# Patient Record
Sex: MALE | Race: WHITE | Employment: FULL TIME | URBAN - METROPOLITAN AREA
[De-identification: names, ages, dates, MRNs, and addresses within clinical notes are randomized per-mention and may not be internally consistent; named-entity substitution may affect disease eponyms.]

---

## 2017-01-30 ENCOUNTER — TELEPHONE (OUTPATIENT)
Dept: CARDIOLOGY CLINIC | Age: 57
End: 2017-01-30

## 2017-03-01 ENCOUNTER — TELEPHONE (OUTPATIENT)
Dept: CARDIOLOGY CLINIC | Age: 57
End: 2017-03-01

## 2017-03-08 ENCOUNTER — OFFICE VISIT (OUTPATIENT)
Dept: ORTHOPEDIC SURGERY | Age: 57
End: 2017-03-08

## 2017-03-08 VITALS
WEIGHT: 205 LBS | HEART RATE: 68 BPM | HEIGHT: 71 IN | BODY MASS INDEX: 28.7 KG/M2 | DIASTOLIC BLOOD PRESSURE: 63 MMHG | SYSTOLIC BLOOD PRESSURE: 109 MMHG

## 2017-03-08 DIAGNOSIS — M54.50 ACUTE LOW BACK PAIN WITHOUT SCIATICA, UNSPECIFIED BACK PAIN LATERALITY: Primary | ICD-10-CM

## 2017-03-08 DIAGNOSIS — M51.36 DDD (DEGENERATIVE DISC DISEASE), LUMBAR: ICD-10-CM

## 2017-03-08 DIAGNOSIS — M53.3 COCCYGODYNIA: ICD-10-CM

## 2017-03-08 DIAGNOSIS — M53.3 COCCYXDYNIA: ICD-10-CM

## 2017-03-08 DIAGNOSIS — M62.9 HAMSTRING TIGHTNESS OF BOTH LOWER EXTREMITIES: ICD-10-CM

## 2017-03-08 DIAGNOSIS — M53.3 SACROILIAC PAIN: ICD-10-CM

## 2017-03-08 PROBLEM — M51.369 DDD (DEGENERATIVE DISC DISEASE), LUMBAR: Status: ACTIVE | Noted: 2017-03-08

## 2017-03-08 PROCEDURE — 99203 OFFICE O/P NEW LOW 30 MIN: CPT | Performed by: PHYSICAL MEDICINE & REHABILITATION

## 2017-03-08 PROCEDURE — 72220 X-RAY EXAM SACRUM TAILBONE: CPT | Performed by: PHYSICAL MEDICINE & REHABILITATION

## 2017-03-08 PROCEDURE — 72100 X-RAY EXAM L-S SPINE 2/3 VWS: CPT | Performed by: PHYSICAL MEDICINE & REHABILITATION

## 2017-03-08 RX ORDER — PREDNISONE 10 MG/1
TABLET ORAL
Qty: 26 TABLET | Refills: 0 | Status: SHIPPED | OUTPATIENT
Start: 2017-03-08 | End: 2017-04-07

## 2017-04-07 ENCOUNTER — OFFICE VISIT (OUTPATIENT)
Dept: FAMILY MEDICINE CLINIC | Age: 57
End: 2017-04-07

## 2017-04-07 VITALS
DIASTOLIC BLOOD PRESSURE: 70 MMHG | SYSTOLIC BLOOD PRESSURE: 110 MMHG | BODY MASS INDEX: 29.63 KG/M2 | WEIGHT: 207 LBS | HEIGHT: 70 IN

## 2017-04-07 DIAGNOSIS — K21.9 GASTROESOPHAGEAL REFLUX DISEASE WITHOUT ESOPHAGITIS: ICD-10-CM

## 2017-04-07 DIAGNOSIS — R25.1 TREMOR: ICD-10-CM

## 2017-04-07 DIAGNOSIS — R53.83 FATIGUE, UNSPECIFIED TYPE: ICD-10-CM

## 2017-04-07 DIAGNOSIS — I42.2 HYPERTROPHIC CARDIOMYOPATHY (HCC): ICD-10-CM

## 2017-04-07 DIAGNOSIS — R73.9 ELEVATED BLOOD SUGAR: ICD-10-CM

## 2017-04-07 DIAGNOSIS — E78.5 HYPERLIPIDEMIA, UNSPECIFIED HYPERLIPIDEMIA TYPE: Primary | ICD-10-CM

## 2017-04-07 LAB
A/G RATIO: 2 (ref 1.1–2.2)
ALBUMIN SERPL-MCNC: 4.1 G/DL (ref 3.4–5)
ALP BLD-CCNC: 80 U/L (ref 40–129)
ALT SERPL-CCNC: 28 U/L (ref 10–40)
ANION GAP SERPL CALCULATED.3IONS-SCNC: 13 MMOL/L (ref 3–16)
AST SERPL-CCNC: 19 U/L (ref 15–37)
BILIRUB SERPL-MCNC: 0.6 MG/DL (ref 0–1)
BUN BLDV-MCNC: 21 MG/DL (ref 7–20)
CALCIUM SERPL-MCNC: 9.2 MG/DL (ref 8.3–10.6)
CHLORIDE BLD-SCNC: 107 MMOL/L (ref 99–110)
CHOLESTEROL, TOTAL: 157 MG/DL (ref 0–199)
CO2: 26 MMOL/L (ref 21–32)
CREAT SERPL-MCNC: 1 MG/DL (ref 0.9–1.3)
GFR AFRICAN AMERICAN: >60
GFR NON-AFRICAN AMERICAN: >60
GLOBULIN: 2.1 G/DL
GLUCOSE BLD-MCNC: 104 MG/DL (ref 70–99)
HDLC SERPL-MCNC: 40 MG/DL (ref 40–60)
LDL CHOLESTEROL CALCULATED: 99 MG/DL
POTASSIUM SERPL-SCNC: 4.7 MMOL/L (ref 3.5–5.1)
SODIUM BLD-SCNC: 146 MMOL/L (ref 136–145)
TOTAL PROTEIN: 6.2 G/DL (ref 6.4–8.2)
TRIGL SERPL-MCNC: 88 MG/DL (ref 0–150)
TSH SERPL DL<=0.05 MIU/L-ACNC: 2.37 UIU/ML (ref 0.27–4.2)
VITAMIN D 25-HYDROXY: 33.1 NG/ML
VLDLC SERPL CALC-MCNC: 18 MG/DL

## 2017-04-07 PROCEDURE — 99214 OFFICE O/P EST MOD 30 MIN: CPT | Performed by: FAMILY MEDICINE

## 2017-04-07 PROCEDURE — 36415 COLL VENOUS BLD VENIPUNCTURE: CPT | Performed by: FAMILY MEDICINE

## 2017-04-07 RX ORDER — ATORVASTATIN CALCIUM 20 MG/1
TABLET, FILM COATED ORAL
Qty: 30 TABLET | Refills: 6 | Status: SHIPPED | OUTPATIENT
Start: 2017-04-07 | End: 2018-03-19 | Stop reason: SDUPTHER

## 2017-04-08 LAB
ESTIMATED AVERAGE GLUCOSE: 122.6 MG/DL
HBA1C MFR BLD: 5.9 %

## 2017-05-26 ENCOUNTER — OFFICE VISIT (OUTPATIENT)
Dept: CARDIOLOGY CLINIC | Age: 57
End: 2017-05-26

## 2017-05-26 VITALS
WEIGHT: 206 LBS | HEART RATE: 62 BPM | DIASTOLIC BLOOD PRESSURE: 68 MMHG | BODY MASS INDEX: 29.49 KG/M2 | SYSTOLIC BLOOD PRESSURE: 106 MMHG | HEIGHT: 70 IN

## 2017-05-26 DIAGNOSIS — I48.19 PERSISTENT ATRIAL FIBRILLATION (HCC): Primary | ICD-10-CM

## 2017-05-26 PROCEDURE — 99214 OFFICE O/P EST MOD 30 MIN: CPT | Performed by: INTERNAL MEDICINE

## 2017-05-26 PROCEDURE — 93000 ELECTROCARDIOGRAM COMPLETE: CPT | Performed by: INTERNAL MEDICINE

## 2017-05-26 RX ORDER — LANOLIN ALCOHOL/MO/W.PET/CERES
500 CREAM (GRAM) TOPICAL NIGHTLY
COMMUNITY
End: 2017-09-26 | Stop reason: SINTOL

## 2017-06-06 RX ORDER — OMEPRAZOLE 40 MG/1
40 CAPSULE, DELAYED RELEASE ORAL DAILY
Qty: 90 CAPSULE | Refills: 1 | Status: SHIPPED | OUTPATIENT
Start: 2017-06-06 | End: 2017-12-15 | Stop reason: SDUPTHER

## 2017-06-19 ENCOUNTER — TELEPHONE (OUTPATIENT)
Dept: CARDIOLOGY CLINIC | Age: 57
End: 2017-06-19

## 2017-06-20 ENCOUNTER — TELEPHONE (OUTPATIENT)
Dept: CARDIOLOGY CLINIC | Age: 57
End: 2017-06-20

## 2017-07-03 PROCEDURE — 93272 ECG/REVIEW INTERPRET ONLY: CPT | Performed by: INTERNAL MEDICINE

## 2017-07-18 DIAGNOSIS — I49.3 PVC (PREMATURE VENTRICULAR CONTRACTION): ICD-10-CM

## 2017-07-18 DIAGNOSIS — I45.10 RIGHT BUNDLE BRANCH BLOCK: Primary | ICD-10-CM

## 2017-07-26 ENCOUNTER — OFFICE VISIT (OUTPATIENT)
Dept: ORTHOPEDIC SURGERY | Age: 57
End: 2017-07-26

## 2017-07-26 VITALS
HEART RATE: 58 BPM | SYSTOLIC BLOOD PRESSURE: 137 MMHG | DIASTOLIC BLOOD PRESSURE: 86 MMHG | BODY MASS INDEX: 29.48 KG/M2 | WEIGHT: 205.91 LBS | HEIGHT: 70 IN

## 2017-07-26 DIAGNOSIS — S16.1XXA CERVICAL STRAIN, INITIAL ENCOUNTER: Primary | ICD-10-CM

## 2017-07-26 DIAGNOSIS — M51.36 DDD (DEGENERATIVE DISC DISEASE), LUMBAR: ICD-10-CM

## 2017-07-26 DIAGNOSIS — S29.019A THORACIC MYOFASCIAL STRAIN, INITIAL ENCOUNTER: ICD-10-CM

## 2017-07-26 DIAGNOSIS — S39.012A LUMBAR STRAIN, INITIAL ENCOUNTER: ICD-10-CM

## 2017-07-26 PROCEDURE — 72040 X-RAY EXAM NECK SPINE 2-3 VW: CPT | Performed by: PHYSICAL MEDICINE & REHABILITATION

## 2017-07-26 PROCEDURE — 72070 X-RAY EXAM THORAC SPINE 2VWS: CPT | Performed by: PHYSICAL MEDICINE & REHABILITATION

## 2017-07-26 PROCEDURE — 72100 X-RAY EXAM L-S SPINE 2/3 VWS: CPT | Performed by: PHYSICAL MEDICINE & REHABILITATION

## 2017-07-26 PROCEDURE — 99213 OFFICE O/P EST LOW 20 MIN: CPT | Performed by: PHYSICAL MEDICINE & REHABILITATION

## 2017-07-26 RX ORDER — MELOXICAM 15 MG/1
TABLET ORAL
Qty: 30 TABLET | Refills: 1 | Status: SHIPPED | OUTPATIENT
Start: 2017-07-26 | End: 2017-12-20

## 2017-07-28 ENCOUNTER — HOSPITAL ENCOUNTER (OUTPATIENT)
Dept: PHYSICAL THERAPY | Age: 57
Discharge: HOME | End: 2017-07-31
Admitting: PHYSICAL MEDICINE & REHABILITATION

## 2017-08-02 ENCOUNTER — HOSPITAL ENCOUNTER (OUTPATIENT)
Dept: PHYSICAL THERAPY | Age: 57
Discharge: HOME | End: 2017-08-02
Admitting: PHYSICAL MEDICINE & REHABILITATION

## 2017-08-04 ENCOUNTER — HOSPITAL ENCOUNTER (OUTPATIENT)
Dept: PHYSICAL THERAPY | Age: 57
Discharge: HOME | End: 2017-08-04
Admitting: PHYSICAL MEDICINE & REHABILITATION

## 2017-08-08 ENCOUNTER — HOSPITAL ENCOUNTER (OUTPATIENT)
Dept: PHYSICAL THERAPY | Age: 57
Discharge: HOME | End: 2017-08-08
Admitting: PHYSICAL MEDICINE & REHABILITATION

## 2017-08-08 ENCOUNTER — TELEPHONE (OUTPATIENT)
Dept: FAMILY MEDICINE CLINIC | Age: 57
End: 2017-08-08

## 2017-08-09 NOTE — TELEPHONE ENCOUNTER
Let patient know that I reviewed the PT notes. From his description it sounds like a muscle spasm in the abdominal muscles.  Continue with physical therapy and follow-up with Ortho or here if symptoms worsen

## 2017-08-11 ENCOUNTER — HOSPITAL ENCOUNTER (OUTPATIENT)
Dept: PHYSICAL THERAPY | Age: 57
Discharge: HOME | End: 2017-08-11
Admitting: PHYSICAL MEDICINE & REHABILITATION

## 2017-08-15 ENCOUNTER — HOSPITAL ENCOUNTER (OUTPATIENT)
Dept: PHYSICAL THERAPY | Age: 57
Discharge: HOME | End: 2017-08-15
Admitting: PHYSICAL MEDICINE & REHABILITATION

## 2017-08-18 ENCOUNTER — HOSPITAL ENCOUNTER (OUTPATIENT)
Dept: PHYSICAL THERAPY | Age: 57
Discharge: HOME | End: 2017-08-18
Admitting: PHYSICAL MEDICINE & REHABILITATION

## 2017-08-22 ENCOUNTER — HOSPITAL ENCOUNTER (OUTPATIENT)
Dept: PHYSICAL THERAPY | Age: 57
Discharge: HOME | End: 2017-08-22
Admitting: PHYSICAL MEDICINE & REHABILITATION

## 2017-08-24 ENCOUNTER — OFFICE VISIT (OUTPATIENT)
Dept: FAMILY MEDICINE CLINIC | Age: 57
End: 2017-08-24

## 2017-08-24 ENCOUNTER — TELEPHONE (OUTPATIENT)
Dept: CARDIOLOGY CLINIC | Age: 57
End: 2017-08-24

## 2017-08-24 VITALS
OXYGEN SATURATION: 96 % | BODY MASS INDEX: 29.2 KG/M2 | TEMPERATURE: 97.8 F | DIASTOLIC BLOOD PRESSURE: 74 MMHG | WEIGHT: 204 LBS | HEIGHT: 70 IN | HEART RATE: 60 BPM | SYSTOLIC BLOOD PRESSURE: 122 MMHG

## 2017-08-24 DIAGNOSIS — R23.2 FLUSHING: ICD-10-CM

## 2017-08-24 DIAGNOSIS — T50.905A ADVERSE EFFECT DUE TO CORRECT MEDICINAL SUBSTANCE, PROPERLY GIVEN, INITIAL ENCOUNTER: Primary | ICD-10-CM

## 2017-08-24 DIAGNOSIS — L29.9 PRURITUS: ICD-10-CM

## 2017-08-24 PROCEDURE — 99213 OFFICE O/P EST LOW 20 MIN: CPT | Performed by: PHYSICIAN ASSISTANT

## 2017-08-25 ENCOUNTER — HOSPITAL ENCOUNTER (OUTPATIENT)
Dept: PHYSICAL THERAPY | Age: 57
Discharge: HOME | End: 2017-08-25
Admitting: PHYSICAL MEDICINE & REHABILITATION

## 2017-08-29 ENCOUNTER — HOSPITAL ENCOUNTER (OUTPATIENT)
Dept: PHYSICAL THERAPY | Age: 57
Discharge: HOME | End: 2017-08-29
Admitting: PHYSICAL MEDICINE & REHABILITATION

## 2017-09-05 ENCOUNTER — HOSPITAL ENCOUNTER (OUTPATIENT)
Dept: PHYSICAL THERAPY | Age: 57
Discharge: HOME | End: 2017-09-05
Admitting: PHYSICAL MEDICINE & REHABILITATION

## 2017-09-08 ENCOUNTER — HOSPITAL ENCOUNTER (OUTPATIENT)
Dept: PHYSICAL THERAPY | Age: 57
Discharge: HOME | End: 2017-09-08
Admitting: PHYSICAL MEDICINE & REHABILITATION

## 2017-09-15 ENCOUNTER — HOSPITAL ENCOUNTER (OUTPATIENT)
Dept: PHYSICAL THERAPY | Age: 57
Discharge: HOME | End: 2017-09-15
Admitting: PHYSICAL MEDICINE & REHABILITATION

## 2017-09-22 ENCOUNTER — HOSPITAL ENCOUNTER (OUTPATIENT)
Dept: PHYSICAL THERAPY | Age: 57
Discharge: HOME | End: 2017-09-22
Admitting: PHYSICAL MEDICINE & REHABILITATION

## 2017-09-26 ENCOUNTER — OFFICE VISIT (OUTPATIENT)
Dept: CARDIOLOGY CLINIC | Age: 57
End: 2017-09-26

## 2017-09-26 ENCOUNTER — TELEPHONE (OUTPATIENT)
Dept: CARDIOLOGY | Age: 57
End: 2017-09-26

## 2017-09-26 VITALS
HEIGHT: 70 IN | HEART RATE: 64 BPM | BODY MASS INDEX: 28.63 KG/M2 | WEIGHT: 200 LBS | SYSTOLIC BLOOD PRESSURE: 94 MMHG | DIASTOLIC BLOOD PRESSURE: 62 MMHG

## 2017-09-26 DIAGNOSIS — I42.2 HYPERTROPHIC CARDIOMYOPATHY (HCC): ICD-10-CM

## 2017-09-26 DIAGNOSIS — I48.0 PAROXYSMAL ATRIAL FIBRILLATION (HCC): Primary | ICD-10-CM

## 2017-09-26 DIAGNOSIS — I42.1 HYPERTROPHIC OBSTRUCTIVE CARDIOMYOPATHY (HCC): Primary | ICD-10-CM

## 2017-09-26 DIAGNOSIS — I45.10 RIGHT BUNDLE BRANCH BLOCK: ICD-10-CM

## 2017-09-26 PROCEDURE — 99214 OFFICE O/P EST MOD 30 MIN: CPT | Performed by: NURSE PRACTITIONER

## 2017-09-26 PROCEDURE — 93000 ELECTROCARDIOGRAM COMPLETE: CPT | Performed by: NURSE PRACTITIONER

## 2017-09-26 RX ORDER — ARMODAFINIL 150 MG/1
150 TABLET ORAL DAILY
COMMUNITY
End: 2017-12-20

## 2017-10-17 ENCOUNTER — HOSPITAL ENCOUNTER (OUTPATIENT)
Dept: NON INVASIVE DIAGNOSTICS | Age: 57
Discharge: HOME | End: 2017-10-17
Attending: NURSE PRACTITIONER | Admitting: NURSE PRACTITIONER

## 2017-10-17 ENCOUNTER — TELEPHONE (OUTPATIENT)
Dept: CARDIOLOGY CLINIC | Age: 57
End: 2017-10-17

## 2017-10-17 DIAGNOSIS — I42.1 OBSTRUCTIVE HYPERTROPHIC CARDIOMYOPATHY (HCC): ICD-10-CM

## 2017-10-17 LAB
LV EF: 58 %
LVEF MODALITY: NORMAL

## 2017-10-17 NOTE — TELEPHONE ENCOUNTER
Pt states that he discussed starting amiodarone with npbb at last ov. States he did some research on his own and would like to discuss with npbb or rps. Please call pt.

## 2017-10-18 NOTE — TELEPHONE ENCOUNTER
Dr. Apple Ware - Patient discussed the Amio with Paty Borges but wants to discuss in more detail with you. Mobile number [phone].

## 2017-10-19 RX ORDER — AMIODARONE HYDROCHLORIDE 200 MG/1
200 TABLET ORAL DAILY
Qty: 30 TABLET | Refills: 3 | Status: SHIPPED | OUTPATIENT
Start: 2017-10-19 | End: 2017-12-07 | Stop reason: SDUPTHER

## 2017-10-19 NOTE — TELEPHONE ENCOUNTER
10-19-17  at [phone] informing pt to call back. Pt can be scheduled with Mari Zavala as a new pt per RPS tomorrow 10-20-17 at 8a.

## 2017-10-19 NOTE — TELEPHONE ENCOUNTER
I spoke with pt, he stated he was not of the understanding that another physician would be doing a procedure on him other than RPS. I informed the pt that according to RPS message that RPS informed the pt that he needs a R/LHC with . Pt said he was not told this info. Pt is willing to see Christopher Juarez but cannot make any time of than first thing in the morning tomorrow due to work. Please ask Christopher Juarez if he is willing to see the pt at 730a or 8a or a time that works for Christopher Juarez. I informed the pt that our office will call him back with rosie at [phone].

## 2017-10-20 ENCOUNTER — OFFICE VISIT (OUTPATIENT)
Dept: CARDIOLOGY CLINIC | Age: 57
End: 2017-10-20

## 2017-10-20 ENCOUNTER — TELEPHONE (OUTPATIENT)
Dept: CARDIOLOGY CLINIC | Age: 57
End: 2017-10-20

## 2017-10-20 VITALS
DIASTOLIC BLOOD PRESSURE: 72 MMHG | BODY MASS INDEX: 28.77 KG/M2 | SYSTOLIC BLOOD PRESSURE: 118 MMHG | WEIGHT: 201 LBS | HEIGHT: 70 IN | HEART RATE: 62 BPM

## 2017-10-20 DIAGNOSIS — E78.2 MIXED HYPERLIPIDEMIA: ICD-10-CM

## 2017-10-20 DIAGNOSIS — I42.2 HYPERTROPHIC CARDIOMYOPATHY (HCC): Primary | ICD-10-CM

## 2017-10-20 DIAGNOSIS — G47.33 OBSTRUCTIVE SLEEP APNEA: ICD-10-CM

## 2017-10-20 DIAGNOSIS — K21.9 GASTROESOPHAGEAL REFLUX DISEASE WITHOUT ESOPHAGITIS: ICD-10-CM

## 2017-10-20 DIAGNOSIS — I48.0 PAROXYSMAL ATRIAL FIBRILLATION (HCC): ICD-10-CM

## 2017-10-20 DIAGNOSIS — I45.10 RIGHT BUNDLE BRANCH BLOCK: ICD-10-CM

## 2017-10-20 PROCEDURE — 99214 OFFICE O/P EST MOD 30 MIN: CPT | Performed by: INTERNAL MEDICINE

## 2017-10-20 NOTE — PATIENT INSTRUCTIONS
Recommendation:  - I reviewed his echo. I did not see any speckled appearance that was reported on the echocardiogram. He has known non-obstructive cardiomyopathy which has been managed with Verapamil. He has no risk factors that will predispose to sudden cardiac cath. He does have late enchancement on cardiac MRI which has been suggested as a potential tie-breaker in ICD decision making. However, as patients have late gadolinium enhancement and statistical power has been limited by low SCD event rates, the incremental information provided by late gadolinium enhancement is not yet clear. - I will obtain a follow up cardiac MRI. - He has atrial fibrillation which is managed by EP (Dr. Apple Ware).

## 2017-10-20 NOTE — PROGRESS NOTES
Seasonal allergies; Sleep apnea; and Tachycardia. Surgical History:   has a past surgical history that includes Knee arthroscopy (Right, 1982); Umbilical hernia repair; Colonoscopy (1/17/11); Incisional hernia repair; TURP (2012); Inguinal hernia repair (Bilateral, 2009, 2012,); Varicocelectomy (2000); Shoulder arthroscopy (Right, 12/31/2013); Endoscopy, colon, diagnostic; Upper gastrointestinal endoscopy (9/3); Elbow surgery (2015); and Wrist surgery. Social History:   reports that he has never smoked. He has never used smokeless tobacco. He reports that he drinks alcohol. He reports that he does not use drugs. Family History:  family history includes Cancer in his mother; Heart Disease in his maternal grandfather and maternal grandmother; High Blood Pressure in his father; High Cholesterol in his brother and mother; Prostate Cancer in his paternal uncle. Home Medications:  Reviewed and are listed in nursing record. and/or listed below  Current Outpatient Prescriptions   Medication Sig Dispense Refill    amiodarone (CORDARONE) 200 MG tablet Take 1 tablet by mouth daily 30 tablet 3    Armodafinil (NUVIGIL) 150 MG TABS tablet Take 150 mg by mouth daily      omeprazole (PRILOSEC) 40 MG delayed release capsule Take 1 capsule by mouth daily 90 capsule 1    Coenzyme Q10 (COQ10) 48 G POWD Take by mouth      Cholecalciferol (D3 ADULT) 1000 UNITS CHEW Take by mouth      zinc 50 MG CAPS Take by mouth      GARCINIA CAMBOGIA-CHROMIUM PO Take 1,000 mg by mouth      apixaban (ELIQUIS) 5 MG TABS tablet Take 1 tablet by mouth 2 times daily 180 tablet 3    verapamil (CALAN SR) 120 MG extended release tablet TAKE 1 TABLET TWICE A  tablet 3    atorvastatin (LIPITOR) 20 MG tablet TAKE 1 TABLET BY MOUTH ONE TIME A DAY 30 tablet 6    Misc Natural Products (TURMERIC CURCUMIN) CAPS Take by mouth      MAGNESIUM PO Take  by mouth daily.  clonazePAM (KLONOPIN) 1 MG tablet Take 1 mg by mouth daily.       mm.  Abnormal delayed enhancement throughout the areas of wall thickening involving almost the entire interventricular septum and anterior wall and abnormal delayed enhancement in the normal thickness lateral wall and inferior wall. No evidence of systolic anterior motion mitral valve or mitral valve regurgitation. Prominent dilatation of the left atrium, with further enlargement since 12/6/2013. Variant anatomy of the left atrial pulmonary vein ostia with common trunk for the left superior and left inferior pulmonary veins. Left ventricular ejection fraction is within normal limits at 60 %. Right ventricular ejection fraction is within normal limits at 68 %. Assessment:  1. Hypertrophic cardiomyopathy ([address]     2. Right bundle branch block     3. Paroxysmal atrial fibrillation (HCC)     4. Mixed hyperlipidemia     5. Gastroesophageal reflux disease without esophagitis     6. Obstructive sleep apnea       Recommendation:  - I reviewed his echo. I did not see any speckled appearance that was reported on the echocardiogram. He has known non-obstructive cardiomyopathy which has been managed with Verapamil. He has no risk factors that will predispose to sudden cardiac death. He does have late enchancement on cardiac MRI which has been suggested as a potential tie-breaker in ICD decision making. However statistical power has been limited by low SCD event rates, the incremental information provided by late gadolinium enhancement is not yet clear. - I will obtain a follow up cardiac MRI. - He has atrial fibrillation which is managed by EP (Dr. Beth Zhu). - I will see him back in a year. If you have questions, please do not hesitate to call me. I look forward to following Ramin Aceves along with you.       Felicitas Garvin MD, [facility] - [place], [place]  [phone] [place] office  [phone] [facility]  10/20/2017 9:03 AM

## 2017-10-24 NOTE — TELEPHONE ENCOUNTER
10-24-17  at [phone] informing pt to call back to inform if he has scheduled his MRI anywhere so [facility] can precert.

## 2017-11-01 NOTE — TELEPHONE ENCOUNTER
11-1-17  at [phone] informing pt to call back to inform if he has scheduled his MRI anywhere so Lorene Rivera can precert

## 2017-12-07 DIAGNOSIS — Z79.899 ENCOUNTER FOR MONITORING AMIODARONE THERAPY: Primary | ICD-10-CM

## 2017-12-07 DIAGNOSIS — Z51.81 ENCOUNTER FOR MONITORING AMIODARONE THERAPY: Primary | ICD-10-CM

## 2017-12-07 RX ORDER — AMIODARONE HYDROCHLORIDE 200 MG/1
200 TABLET ORAL DAILY
Qty: 10 TABLET | Refills: 0 | Status: SHIPPED | OUTPATIENT
Start: 2017-12-07 | End: 2018-03-13 | Stop reason: CLARIF

## 2017-12-13 ENCOUNTER — TELEPHONE (OUTPATIENT)
Dept: ORTHOPEDIC SURGERY | Age: 57
End: 2017-12-13

## 2017-12-13 ENCOUNTER — OFFICE VISIT (OUTPATIENT)
Dept: ORTHOPEDIC SURGERY | Age: 57
End: 2017-12-13

## 2017-12-13 VITALS
DIASTOLIC BLOOD PRESSURE: 64 MMHG | WEIGHT: 201.06 LBS | SYSTOLIC BLOOD PRESSURE: 110 MMHG | HEIGHT: 70 IN | BODY MASS INDEX: 28.78 KG/M2 | HEART RATE: 59 BPM

## 2017-12-13 DIAGNOSIS — M25.562 PAIN IN JOINT OF LEFT KNEE: Primary | ICD-10-CM

## 2017-12-13 DIAGNOSIS — S83.212A BUCKET-HANDLE TEAR OF MEDIAL MENISCUS OF LEFT KNEE AS CURRENT INJURY, INITIAL ENCOUNTER: ICD-10-CM

## 2017-12-13 PROCEDURE — 99214 OFFICE O/P EST MOD 30 MIN: CPT | Performed by: ORTHOPAEDIC SURGERY

## 2017-12-13 NOTE — PROGRESS NOTES
CHIEF COMPLAINT:    Chief Complaint   Patient presents with    Knee Pain     left knee pain. PT states back early last month he was playing baseball & was catching. he noticed a little bit of popping but no pain. Couple days later, he noticed pain & swelling. now swelling has gone away, but still having pain kneecap/medial side. HISTORY OF PRESENT ILLNESS:                The patient is a 62 y.o. male who presents today for evaluation of LEFT knee pain. He states this pain began about 6 weeks ago after playing baseball. He states that he was the catcher during the skin which involved a lot of deep squatting and lateral movements. He states he did notice some catching and clicking in the knee at that time which was followed by swelling and pain in the knee. He states he has tried rest, ice, compression and anti-inflammatory medications with no lasting relief. When questioned about the metal fragment noted on x-ray, he states he did have an incident a couple years ago where he got what he thought was a piece of wood stuck in his leg when cutting wood. He now question 5th that was maybe a shard of metal that flaked off instead.     Past Medical History:   Diagnosis Date    Abnormal heart rhythm     ADHD (attention deficit hyperactivity disorder)     Carpal tunnel syndrome 1/21/2015    Chronic low back pain     Chronic neck pain     Chronic sinusitis     Dr. Felicitas Garvin Dental crown present     lower    Epigastric hernia     Esophageal spasm     GERD (gastroesophageal reflux disease)     Hyperlipidemia     Hypertrophic cardiomyopathy (HCC)     IHSS (idiopathic hypertrophic subaortic stenosis) (HCC)     HUNTER on CPAP     Dr. Felicitas Garvin Primary localized osteoarthrosis, shoulder region 10/18/2013    Prostate cancer [facility])     prostate ; s/p radiation treatment    PVC's (premature ventricular contractions)     RBBB (right bundle branch block)     RLS (restless legs syndrome)     Dr. Julien Pak  Seasonal allergies     Sleep apnea     Tachycardia           The pain assessment was noted & is as follows:  Pain Assessment  Location of Pain: Knee  Location Modifiers: Left  Severity of Pain: 4  Quality of Pain: Aching, Sharp, Dull  Duration of Pain: A few hours  Frequency of Pain: Several times daily  Aggravating Factors: Bending, Stretching, Kneeling, Stairs, Walking  Limiting Behavior: Some  Relieving Factors: Rest, Nsaids, Ice  Result of Injury: No  Work-Related Injury: No  Are there other pain locations you wish to document?: No]      Work Status/Functionality:     Past Medical History: Medical history form was reviewed today & can be found in the media tab  Past Medical History:   Diagnosis Date    Abnormal heart rhythm     ADHD (attention deficit hyperactivity disorder)     Carpal tunnel syndrome 1/21/2015    Chronic low back pain     Chronic neck pain     Chronic sinusitis     Dr. Josue jamil present     lower    Epigastric hernia     Esophageal spasm     GERD (gastroesophageal reflux disease)     Hyperlipidemia     Hypertrophic cardiomyopathy (HCC)     IHSS (idiopathic hypertrophic subaortic stenosis) (HCC)     HUNTER on CPAP     Dr. Laurell Goltz Primary localized osteoarthrosis, shoulder region 10/18/2013    Prostate [address] ([facility] [address]     prostate ; s/p radiation treatment    PVC's (premature ventricular contractions)     RBBB (right bundle branch block)     RLS (restless legs syndrome)     Dr. Laurell Goltz Seasonal allergies     Sleep apnea     Tachycardia       Past Surgical History:     Past Surgical History:   Procedure Laterality Date    COLONOSCOPY  1/17/11    ELBOW SURGERY  2015    left    ENDOSCOPY, COLON, DIAGNOSTIC      INCISIONAL HERNIA REPAIR      INGUINAL HERNIA REPAIR Bilateral 2009, 2012,    KNEE ARTHROSCOPY Right 1982    Right    SHOULDER ARTHROSCOPY Right 12/31/2013    VIDEO ARTHROSCOPY RIGHT SHOULDER, SUBACROMIAL DECOMPRESSION, DISTAL CLAVICLE RESECTION, ROTATOR CUFF DEBRIDEMENT          TURP  8388    UMBILICAL HERNIA REPAIR      UPPER GASTROINTESTINAL ENDOSCOPY  9/3    VARICOCELECTOMY  2000    WRIST SURGERY      left     Current Medications:     Current Outpatient Prescriptions:     amiodarone (CORDARONE) 200 MG tablet, Take 1 tablet by mouth daily Patient needs lab work before any further refills. Pt notified. , Disp: 10 tablet, Rfl: 0    Armodafinil (NUVIGIL) 150 MG TABS tablet, Take 150 mg by mouth daily, Disp: , Rfl:     meloxicam (MOBIC) 15 MG tablet, 1 po qd for one week then PRN, Disp: 30 tablet, Rfl: 1    omeprazole (PRILOSEC) 40 MG delayed release capsule, Take 1 capsule by mouth daily, Disp: 90 capsule, Rfl: 1    Coenzyme Q10 (COQ10) 48 G POWD, Take by mouth, Disp: , Rfl:     Cholecalciferol (D3 ADULT) 1000 UNITS CHEW, Take by mouth, Disp: , Rfl:     zinc 50 MG CAPS, Take by mouth, Disp: , Rfl:     GARCINIA CAMBOGIA-CHROMIUM PO, Take 1,000 mg by mouth, Disp: , Rfl:     apixaban (ELIQUIS) 5 MG TABS tablet, Take 1 tablet by mouth 2 times daily, Disp: 180 tablet, Rfl: 3    verapamil (CALAN SR) 120 MG extended release tablet, TAKE 1 TABLET TWICE A DAY, Disp: 180 tablet, Rfl: 3    atorvastatin (LIPITOR) 20 MG tablet, TAKE 1 TABLET BY MOUTH ONE TIME A DAY, Disp: 30 tablet, Rfl: 6    fluticasone (FLONASE) 50 MCG/ACT nasal spray, 2 sprays by Nasal route daily, Disp: 1 Bottle, Rfl: 3    Misc Natural Products (TURMERIC CURCUMIN) CAPS, Take by mouth, Disp: , Rfl:     MAGNESIUM PO, Take  by mouth daily. , Disp: , Rfl:     clonazePAM (KLONOPIN) 1 MG tablet, Take 1 mg by mouth daily. , Disp: , Rfl:     Melatonin 10 MG CAPS, Take 10 mg by mouth Daily. , Disp: , Rfl:     naproxen (NAPROSYN) 500 MG tablet, Take 500 mg by mouth as needed. , Disp: , Rfl:     Multiple Vitamins-Minerals (GARRETT MULTI MEN PO), Take  by mouth 2 times daily. , Disp: , Rfl:   Allergies:  Review of patient's allergies indicates no known allergies.   Social History: reports that he has never smoked. He has never used smokeless tobacco. He reports that he drinks alcohol. He reports that he does not use drugs. Family History:   Family History   Problem Relation Age of Onset    Cancer Mother      melenoma    High Cholesterol Mother     High Blood Pressure Father     High Cholesterol Brother     Heart Disease Maternal Grandmother     Heart Disease Maternal Grandfather     Prostate Cancer Paternal Uncle        REVIEW OF SYSTEMS:   For new problems, a full review of systems will be found scanned in the patient's chart. CONSTITUTIONAL: Denies unexplained weight loss, fevers, chills   NEUROLOGICAL: Denies unsteady gait or progressive weakness  SKIN: Denies skin changes, delayed healing, rash, itching       PHYSICAL EXAM:    Vitals: Blood pressure 110/64, pulse 59, height 5' 10\" (1.778 m), weight 201 lb 1 oz (91.2 kg). GENERAL EXAM:  · General Apparence: Patient is adequately groomed with no evidence of malnutrition. · Orientation: The patient is oriented to time, place and person. · Mood & Affect:The patient's mood and affect are appropriate       LEFT knee PHYSICAL EXAMINATION:  · Inspection:  No visible deformity. He does have a small abrasion over the anterior aspect of the knee as well as an old nodule at the location of the shard of metal    · Palpation:  Tenderness to palpation along the medial patellofemoral compartment and anterior medial joint space      · Range of Motion: Normal range of motion    · Strength: No gross strength deficits are noted    · Special Tests:  Positive Ervin testing medially. Patient is unable to do a deep squat            · Skin:  There are no rashes, ulcerations or lesions. · Gait & station: Some relieving      · Additional Examinations:        Right Lower Extremity: Examination of the right lower extremity does not show any tenderness, deformity or injury. Range of motion is unremarkable. There is no gross instability.   There are no rashes, ulcerations or lesions. Strength and tone are normal.      Diagnostic Testing:      Views:  3   Location:  LEFT knee   Findings:  X-rays taken today reveal normal anatomy with mild medial patellofemoral arthritic changes. He does have a small shard of metal at his proximal tibia    Orders     Orders Placed This Encounter   Procedures    XR KNEE LEFT (3 VIEWS)     33585     Order Specific Question:   Reason for exam:     Answer:   Pain         Assessment / Treatment Plan:     1. LEFT knee medial meniscus tear    2. LEFT knee patellofemoral syndrome    We discussed with him our concern for meniscus tear. Given his presence of metal in the leg, he is not a candidate for an MRI scan. We discussed the possibility of attempting a diagnostic and therapeutic cortisone injection however, the patient is not interested in temporary treatment. We discussed the other treatment option of performing an  arthroscopy of the LEFT knee for a suspected medial meniscus tear. The patient is much more interested in this treatment plan and was scheduled for surgery today. We discussed the risks, benefits, and complications of arthroscopic knee surgery. The patient realizes that there are concerns with this surgery with respect to infection, deep vein thrombosis, neurological injury, delayed  rehabilitation, the possibility of arthrofibrosis of the knee, and specifically  Hoffa's fat pad fibrosis that can potentially cause difficulties. The patient realizes that there are also anesthetic concerns including cardiopulmonary issues, pulmonary issues, and even possibility of death or dystrophy. The patient voiced understanding to this as well as the normal  rehabilitation  that   is involved with weeks of physical therapy, exercise, and strengthening.  The patient also realizes that even though the surgery, from a functional perspective, typically allows the patient to return to good function at about 6 weeks, that it

## 2017-12-15 ENCOUNTER — OFFICE VISIT (OUTPATIENT)
Dept: FAMILY MEDICINE CLINIC | Age: 57
End: 2017-12-15

## 2017-12-15 ENCOUNTER — TELEPHONE (OUTPATIENT)
Dept: CARDIOLOGY CLINIC | Age: 57
End: 2017-12-15

## 2017-12-15 VITALS
HEIGHT: 71 IN | DIASTOLIC BLOOD PRESSURE: 52 MMHG | BODY MASS INDEX: 28.92 KG/M2 | WEIGHT: 206.6 LBS | SYSTOLIC BLOOD PRESSURE: 105 MMHG | HEART RATE: 57 BPM | TEMPERATURE: 96.2 F | OXYGEN SATURATION: 97 %

## 2017-12-15 DIAGNOSIS — M25.562 CHRONIC PAIN OF LEFT KNEE: ICD-10-CM

## 2017-12-15 DIAGNOSIS — R73.9 ELEVATED BLOOD SUGAR: ICD-10-CM

## 2017-12-15 DIAGNOSIS — G89.29 CHRONIC PAIN OF LEFT KNEE: ICD-10-CM

## 2017-12-15 DIAGNOSIS — Z01.818 PREOP EXAMINATION: Primary | ICD-10-CM

## 2017-12-15 DIAGNOSIS — E78.5 HYPERLIPIDEMIA, UNSPECIFIED HYPERLIPIDEMIA TYPE: ICD-10-CM

## 2017-12-15 LAB
A/G RATIO: 2.4 (ref 1.1–2.2)
ALBUMIN SERPL-MCNC: 4.5 G/DL (ref 3.4–5)
ALP BLD-CCNC: 66 U/L (ref 40–129)
ALT SERPL-CCNC: 20 U/L (ref 10–40)
ANION GAP SERPL CALCULATED.3IONS-SCNC: 11 MMOL/L (ref 3–16)
AST SERPL-CCNC: 15 U/L (ref 15–37)
BILIRUB SERPL-MCNC: 0.8 MG/DL (ref 0–1)
BUN BLDV-MCNC: 16 MG/DL (ref 7–20)
CALCIUM SERPL-MCNC: 9.3 MG/DL (ref 8.3–10.6)
CHLORIDE BLD-SCNC: 102 MMOL/L (ref 99–110)
CHOLESTEROL, TOTAL: 209 MG/DL (ref 0–199)
CO2: 31 MMOL/L (ref 21–32)
CREAT SERPL-MCNC: 1 MG/DL (ref 0.9–1.3)
GFR AFRICAN AMERICAN: >60
GFR NON-AFRICAN AMERICAN: >60
GLOBULIN: 1.9 G/DL
GLUCOSE BLD-MCNC: 104 MG/DL (ref 70–99)
HDLC SERPL-MCNC: 52 MG/DL (ref 40–60)
LDL CHOLESTEROL CALCULATED: 135 MG/DL
POTASSIUM SERPL-SCNC: 4.9 MMOL/L (ref 3.5–5.1)
SODIUM BLD-SCNC: 144 MMOL/L (ref 136–145)
TOTAL PROTEIN: 6.4 G/DL (ref 6.4–8.2)
TRIGL SERPL-MCNC: 110 MG/DL (ref 0–150)
VLDLC SERPL CALC-MCNC: 22 MG/DL

## 2017-12-15 PROCEDURE — 36415 COLL VENOUS BLD VENIPUNCTURE: CPT | Performed by: PHYSICIAN ASSISTANT

## 2017-12-15 PROCEDURE — 99242 OFF/OP CONSLTJ NEW/EST SF 20: CPT | Performed by: PHYSICIAN ASSISTANT

## 2017-12-15 RX ORDER — MULTIVIT WITH MINERALS/LUTEIN
1000 TABLET ORAL DAILY
COMMUNITY
End: 2019-08-06

## 2017-12-15 RX ORDER — OMEPRAZOLE 40 MG/1
40 CAPSULE, DELAYED RELEASE ORAL DAILY
Qty: 90 CAPSULE | Refills: 1 | Status: SHIPPED | OUTPATIENT
Start: 2017-12-15 | End: 2018-06-11 | Stop reason: SDUPTHER

## 2017-12-15 RX ORDER — UBIDECARENONE 75 MG
50 CAPSULE ORAL DAILY
COMMUNITY
End: 2020-09-30

## 2017-12-15 RX ORDER — ASCORBIC ACID 500 MG
500 TABLET ORAL DAILY
COMMUNITY
End: 2019-08-06

## 2017-12-15 NOTE — PROGRESS NOTES
[facility]   Pre-operative History and Physical      DIAGNOSIS:    1. Preop examination     2. Chronic pain of left knee           PROCEDURE:  Arthroscopic left knee surgery      History Obtained From:  patient    HISTORY OF PRESENT ILLNESS:    Anastacia Ge 1960 is a 62 y.o. male who presents for pre-operative evaluation.     Past Medical History:    Past Medical History:   Diagnosis Date    Abnormal heart rhythm     ADHD (attention deficit hyperactivity disorder)     Carpal tunnel syndrome 1/21/2015    Chronic low back pain     Chronic neck pain     Chronic sinusitis     Dr. Tiki Simeon Dental crown present     lower    Epigastric hernia     Esophageal spasm     GERD (gastroesophageal reflux disease)     Hyperlipidemia     Hypertrophic cardiomyopathy (HCC)     IHSS (idiopathic hypertrophic subaortic stenosis) (HCC)     HUNTER on CPAP     Dr. Tiki Simeon Primary localized osteoarthrosis, shoulder region 10/18/2013    Prostate cancer [facility])     prostate ; s/p radiation treatment    PVC's (premature ventricular contractions)     RBBB (right bundle branch block)     RLS (restless legs syndrome)     Dr. Tiki Simeon Seasonal allergies     Sleep apnea     Tachycardia      Past Surgical History:    Past Surgical History:   Procedure Laterality Date    COLONOSCOPY  1/17/11    ELBOW SURGERY  2015    left    ENDOSCOPY, COLON, DIAGNOSTIC      INCISIONAL HERNIA REPAIR      INGUINAL HERNIA REPAIR Bilateral 2009, 2012,    KNEE ARTHROSCOPY Right 1982    Right    SHOULDER ARTHROSCOPY Right 12/31/2013    VIDEO ARTHROSCOPY RIGHT SHOULDER, SUBACROMIAL DECOMPRESSION, DISTAL CLAVICLE RESECTION, ROTATOR CUFF DEBRIDEMENT          TURP  3685    UMBILICAL HERNIA REPAIR      UPPER GASTROINTESTINAL ENDOSCOPY  9/3    VARICOCELECTOMY  2000    WRIST SURGERY      left       Current Medication  Current Outpatient Prescriptions   Medication Sig Dispense Refill    vitamin E 1000 units capsule Take recommendations. Shyam Anderson is Medically stable for surgery. Report will be faxed.

## 2017-12-15 NOTE — TELEPHONE ENCOUNTER
Pt wanted to let Dr Swift Has know that he has scheduled his cardiac MRI is scheduled for Tuesday at [facility]. Pt also states he is having a knee scope on Thursday,Dec 21 with Dr Sara Mcclure at [facility] and needs cardiac clearance.

## 2017-12-16 LAB
ESTIMATED AVERAGE GLUCOSE: 128.4 MG/DL
HBA1C MFR BLD: 6.1 %

## 2017-12-20 NOTE — ANESTHESIA PRE-OP
Department of Anesthesiology  Preprocedure Note       Name:  Blayne Hernandez   Age:  62 y.o.  :  1960                                          MRN:  [id number]         Date:  2017      Surgeon:    Procedure:    Medications prior to admission:   Prior to Admission medications    Medication Sig Start Date End Date Taking? Authorizing Provider   vitamin E 1000 units capsule Take 1,000 Units by mouth daily    Historical Provider, MD   vitamin C (ASCORBIC ACID) 500 MG tablet Take 500 mg by mouth daily    Historical Provider, MD   vitamin B-12 (CYANOCOBALAMIN) 100 MCG tablet Take 50 mcg by mouth daily    Historical Provider, MD   omeprazole (PRILOSEC) 40 MG delayed release capsule Take 1 capsule by mouth daily 12/15/17   Matitt HERLINDA Estrada   amiodarone (CORDARONE) 200 MG tablet Take 1 tablet by mouth daily Patient needs lab work before any further refills. Pt notified. 17   Willie Johansen CNP   Coenzyme Q10 (COQ10) 48 G POWD Take by mouth daily     Historical Provider, MD   Cholecalciferol (D3 ADULT) 1000 UNITS CHEW Take by mouth daily     Historical Provider, MD   zinc 50 MG CAPS Take by mouth daily     Historical Provider, MD   apixaban (ELIQUIS) 5 MG TABS tablet Take 1 tablet by mouth 2 times daily 17   Eduin Onofre MD   verapamil (CALAN SR) 120 MG extended release tablet TAKE 1 TABLET TWICE A DAY 17   Willie Johansen CNP   atorvastatin (LIPITOR) 20 MG tablet TAKE 1 TABLET BY MOUTH ONE TIME A DAY 17   Cb Hickman MD   Misc Natural Products (TURMERIC CURCUMIN) CAPS Take by mouth daily     Historical Provider, MD   MAGNESIUM PO Take  by mouth daily. Historical Provider, MD   clonazePAM (KLONOPIN) 1 MG tablet Take 1 mg by mouth daily. 14   Historical Provider, MD   Melatonin 10 MG CAPS Take 10 mg by mouth as needed     Historical Provider, MD   naproxen (NAPROSYN) 500 MG tablet Take 500 mg by mouth as needed.  13   Historical Provider, MD   Multiple Vitamins-Minerals (GARRETT UMBILICAL HERNIA REPAIR      X 2    UPPER GASTROINTESTINAL ENDOSCOPY  9/3    VARICOCELECTOMY  2000       Social History:    Social History   Substance Use Topics    Smoking status: Never Smoker    Smokeless tobacco: Never Used    Alcohol use Yes      Comment: 2-3 drinks 3X per year                                 Counseling given: Not Answered      Vital Signs (Current): There were no vitals filed for this visit. BP Readings from Last 3 Encounters:   12/15/17 (!) 105/52   12/13/17 110/64   10/20/17 118/72       NPO Status:                                                                                 BMI:   Wt Readings from Last 3 Encounters:   12/20/17 200 lb (90.7 kg)   12/15/17 206 lb 9.6 oz (93.7 kg)   12/13/17 201 lb 1 oz (91.2 kg)     There is no height or weight on file to calculate BMI. Anesthesia Evaluation  Patient summary reviewed and Nursing notes reviewed no history of anesthetic complications:   Airway: Mallampati: II  TM distance: >3 FB      Dental:          Pulmonary:Negative Pulmonary ROS and normal exam    (+) sleep apnea: on CPAP,                             Cardiovascular:Negative CV ROS                   ROS comment: Chronic paroxysmal afib. Neuro/Psych:   Negative Neuro/Psych ROS  (+) neuromuscular disease:, psychiatric history:             ROS comment: Chronic neck pain GI/Hepatic/Renal: Neg GI/Hepatic/Renal ROS  (+) GERD:,      (-) hiatal hernia       Endo/Other: Negative Endo/Other ROS                    Abdominal:           Vascular:                                     NPO > MN    SR RBBB - no significant acute st t wave changes when compared to older ekgs    ECHO   Summary   -- Systolic function appears grossly normal with an estimated ejection   fraction of 55-60 %.   Left ventricular size is normal with severe asymetric left ventricular   hypertrophy.  Speckled appearance of myocardium, suggest rule out for   restrictive cardiomyopathy and comparison with ECG voltage. Normal left   ventricle diastolic filling pressure.   -- Moderately dilated left atrium with increased left atrial volume.      Signature    12/15/17 Dr. Adler Even Cardiac clearance         Anesthesia Plan      general     ASA 3     (I discussed with the patient the risks and benefits of PIV, general anesthesia, IV Narcotics, PACU. All questions were answered the patient agrees with the plan)  Induction: intravenous. Anesthetic plan and risks discussed with patient. Plan discussed with CRNA.                   Daniel Michaels MD   12/20/2017

## 2017-12-21 ENCOUNTER — HOSPITAL ENCOUNTER (OUTPATIENT)
Dept: SURGERY | Age: 57
Discharge: HOME | End: 2017-12-21
Attending: ORTHOPAEDIC SURGERY | Admitting: ORTHOPAEDIC SURGERY

## 2017-12-21 VITALS
HEIGHT: 71 IN | WEIGHT: 200 LBS | OXYGEN SATURATION: 99 % | HEART RATE: 58 BPM | BODY MASS INDEX: 28 KG/M2 | TEMPERATURE: 97.5 F | DIASTOLIC BLOOD PRESSURE: 56 MMHG | SYSTOLIC BLOOD PRESSURE: 97 MMHG | RESPIRATION RATE: 14 BRPM

## 2017-12-21 RX ORDER — LABETALOL HYDROCHLORIDE 5 MG/ML
5 INJECTION, SOLUTION INTRAVENOUS EVERY 10 MIN PRN
Status: DISCONTINUED | OUTPATIENT
Start: 2017-12-21 | End: 2017-12-22 | Stop reason: HOSPADM

## 2017-12-21 RX ORDER — OXYCODONE HYDROCHLORIDE AND ACETAMINOPHEN 5; 325 MG/1; MG/1
1 TABLET ORAL PRN
Status: COMPLETED | OUTPATIENT
Start: 2017-12-21 | End: 2017-12-21

## 2017-12-21 RX ORDER — MORPHINE SULFATE 2 MG/ML
1 INJECTION, SOLUTION INTRAMUSCULAR; INTRAVENOUS EVERY 5 MIN PRN
Status: DISCONTINUED | OUTPATIENT
Start: 2017-12-21 | End: 2017-12-22 | Stop reason: HOSPADM

## 2017-12-21 RX ORDER — OXYCODONE HYDROCHLORIDE AND ACETAMINOPHEN 5; 325 MG/1; MG/1
2 TABLET ORAL PRN
Status: COMPLETED | OUTPATIENT
Start: 2017-12-21 | End: 2017-12-21

## 2017-12-21 RX ORDER — PROMETHAZINE HYDROCHLORIDE 25 MG/ML
6.25 INJECTION, SOLUTION INTRAMUSCULAR; INTRAVENOUS
Status: ACTIVE | OUTPATIENT
Start: 2017-12-21 | End: 2017-12-21

## 2017-12-21 RX ORDER — DIPHENHYDRAMINE HYDROCHLORIDE 50 MG/ML
12.5 INJECTION INTRAMUSCULAR; INTRAVENOUS
Status: ACTIVE | OUTPATIENT
Start: 2017-12-21 | End: 2017-12-21

## 2017-12-21 RX ORDER — HYDROCODONE BITARTRATE AND ACETAMINOPHEN 5; 325 MG/1; MG/1
1-2 TABLET ORAL EVERY 4 HOURS PRN
Qty: 40 TABLET | Refills: 0 | Status: SHIPPED | OUTPATIENT
Start: 2017-12-21 | End: 2018-07-09 | Stop reason: ALTCHOICE

## 2017-12-21 RX ORDER — LIDOCAINE HYDROCHLORIDE 10 MG/ML
1 INJECTION, SOLUTION EPIDURAL; INFILTRATION; INTRACAUDAL; PERINEURAL
Status: ACTIVE | OUTPATIENT
Start: 2017-12-21 | End: 2017-12-21

## 2017-12-21 RX ORDER — MEPERIDINE HYDROCHLORIDE 50 MG/ML
12.5 INJECTION INTRAMUSCULAR; INTRAVENOUS; SUBCUTANEOUS EVERY 5 MIN PRN
Status: DISCONTINUED | OUTPATIENT
Start: 2017-12-21 | End: 2017-12-22 | Stop reason: HOSPADM

## 2017-12-21 RX ORDER — SODIUM CHLORIDE, SODIUM LACTATE, POTASSIUM CHLORIDE, CALCIUM CHLORIDE 600; 310; 30; 20 MG/100ML; MG/100ML; MG/100ML; MG/100ML
INJECTION, SOLUTION INTRAVENOUS CONTINUOUS
Status: DISCONTINUED | OUTPATIENT
Start: 2017-12-21 | End: 2017-12-22 | Stop reason: HOSPADM

## 2017-12-21 RX ORDER — HYDRALAZINE HYDROCHLORIDE 20 MG/ML
5 INJECTION INTRAMUSCULAR; INTRAVENOUS EVERY 10 MIN PRN
Status: DISCONTINUED | OUTPATIENT
Start: 2017-12-21 | End: 2017-12-22 | Stop reason: HOSPADM

## 2017-12-21 RX ORDER — ONDANSETRON 2 MG/ML
4 INJECTION INTRAMUSCULAR; INTRAVENOUS
Status: ACTIVE | OUTPATIENT
Start: 2017-12-21 | End: 2017-12-21

## 2017-12-21 RX ORDER — MORPHINE SULFATE 2 MG/ML
2 INJECTION, SOLUTION INTRAMUSCULAR; INTRAVENOUS EVERY 5 MIN PRN
Status: DISCONTINUED | OUTPATIENT
Start: 2017-12-21 | End: 2017-12-22 | Stop reason: HOSPADM

## 2017-12-21 RX ADMIN — SODIUM CHLORIDE, SODIUM LACTATE, POTASSIUM CHLORIDE, CALCIUM CHLORIDE: 600; 310; 30; 20 INJECTION, SOLUTION INTRAVENOUS at 15:08

## 2017-12-21 RX ADMIN — OXYCODONE HYDROCHLORIDE AND ACETAMINOPHEN 2 TABLET: 5; 325 TABLET ORAL at 16:37

## 2017-12-21 ASSESSMENT — PAIN SCALES - GENERAL
PAINLEVEL_OUTOF10: 0
PAINLEVEL_OUTOF10: 7
PAINLEVEL_OUTOF10: 7
PAINLEVEL_OUTOF10: 0

## 2017-12-21 ASSESSMENT — PAIN DESCRIPTION - LOCATION: LOCATION: KNEE

## 2017-12-21 ASSESSMENT — PAIN DESCRIPTION - ORIENTATION: ORIENTATION: LEFT

## 2017-12-21 ASSESSMENT — PAIN - FUNCTIONAL ASSESSMENT: PAIN_FUNCTIONAL_ASSESSMENT: 0-10

## 2017-12-21 NOTE — PROGRESS NOTES
Instructions for anticoagulation noted on epic per cardiologist DR Siena Jones : ok to proceed per Dr Kvng Rust

## 2017-12-21 NOTE — PROGRESS NOTES
Pre and post operative expectations and routines explained to patient, verbalized understanding.           Pt instructed on crutches/at home

## 2017-12-21 NOTE — ANESTHESIA PRE-OP
Department of Anesthesiology  Preprocedure Note       Name:  Claribel Claudio   Age:  62 y.o.  :  1960                                          MRN:  [id number]         Date:  2017      Surgeon:    Procedure:    Medications prior to admission:   Prior to Admission medications    Medication Sig Start Date End Date Taking? Authorizing Provider   HYDROcodone-acetaminophen (NORCO) 5-325 MG per tablet Take 1-2 tablets by mouth every 4 hours as needed for Pain  After surgery. 17  Yes EHRLINDA Martins   vitamin E 1000 units capsule Take 1,000 Units by mouth daily   Yes Historical Provider, MD   vitamin C (ASCORBIC ACID) 500 MG tablet Take 500 mg by mouth daily   Yes Historical Provider, MD   vitamin B-12 (CYANOCOBALAMIN) 100 MCG tablet Take 50 mcg by mouth daily   Yes Historical Provider, MD   omeprazole (PRILOSEC) 40 MG delayed release capsule Take 1 capsule by mouth daily 12/15/17  Yes HERLINDA Smart   amiodarone (CORDARONE) 200 MG tablet Take 1 tablet by mouth daily Patient needs lab work before any further refills. Pt notified. 17  Yes Patricia Miranda CNP   Coenzyme Q10 (COQ10) 50 G POWD Take by mouth daily    Yes Historical Provider, MD   Cholecalciferol (D3 ADULT) 1000 UNITS CHEW Take by mouth daily    Yes Historical Provider, MD   zinc 50 MG CAPS Take by mouth daily    Yes Historical Provider, MD   apixaban (ELIQUIS) 5 MG TABS tablet Take 1 tablet by mouth 2 times daily 17  Yes Bev Fernandez MD   verapamil (CALAN SR) 120 MG extended release tablet TAKE 1 TABLET TWICE A DAY 17  Yes Patricia Miranda CNP   atorvastatin (LIPITOR) 20 MG tablet TAKE 1 TABLET BY MOUTH ONE TIME A DAY 17  Yes Rizwana Choudhury MD   [facility] ([address] [facility]) CAPS Take by mouth daily    Yes Historical Provider, MD   MAGNESIUM PO Take  by mouth daily. Yes Historical Provider, MD   clonazePAM (KLONOPIN) 1 MG tablet Take 1 mg by mouth daily.  14  Yes Historical Provider, MD ringers infusion   Intravenous Continuous Valdemar Morin  mL/hr at 12/21/17 1508      lidocaine PF 1 % injection 1 mL  1 mL Intradermal Once PRN Valdemar Morin MD           Allergies:     Allergies   Allergen Reactions    Niacin And Related      Extreme itching /stinging started at head to waist       Problem List:    Patient Active Problem List   Diagnosis Code    GERD (gastroesophageal reflux disease) K21.9    Hyperlipidemia E78.5    Hypert[facility] ([facility] I42.2    Primary localized osteoarthrosis of shoulder region M19.019    Supraspinatus sprain S46.819A    Disorder of bursae and tendons in shoulder region M71.9, M67.919    Prostate cancer ([facility] C61    T7 sclerotic lesion M89.9    PVC (premature ventricular contraction) I49.3    Right bundle branch block I45.10    Restless legs syndrome G25.81    Obstructive sleep apnea G47.33    Obesity E66.9    Personal history of malignant neoplasm of prostate Z85.46    Achilles tendon disorder M67.979    Carpal tunnel syndrome G56.00    Iliotibial band syndrome M76.30    Patellar tendinitis M76.50    Multiple joint complaints M25.9    Lateral epicondylitis of right elbow M77.11    DDD (degenerative disc disease), lumbar M51.36    Fatigue R53.83    Elevated blood sugar R73.9    Paroxysmal atrial fibrillation (HCC) I48.0       Past Medical History:        Diagnosis Date    A-fib ([facility]     ADHD (attention deficit hyperactivity disorder)     Carpal tunnel syndrome 1/21/2015    Chronic low back pain     Chronic neck pain     Chronic sinusitis     Dr. Felicitas Garvin Dental crown present     lower    Epigastric hernia     Esophageal spasm     GERD (gastroesophageal reflux disease)     Hyperlipidemia     Hypertrophic cardiomyopathy (HCC)     IHSS (idiopathic hypertrophic subaortic stenosis) (HCC)     Kidney stones     HUNTER on CPAP     Dr. Julien TORRES    Primary localized osteoarthrosis, shoulder region 10/18/2013    Prostate cancer [facility])     prostate ; s/p radiation treatment    PVC's (premature ventricular contractions)     RBBB (right bundle branch block)     RLS (restless legs syndrome)     Dr. Caro Villagomez Seasonal allergies     Tachycardia        Past Surgical History:        Procedure Laterality Date    CARPAL TUNNEL RELEASE Left     COLONOSCOPY  1/17/11    ELBOW SURGERY  2015    left    ENDOSCOPY, COLON, DIAGNOSTIC      INGUINAL HERNIA REPAIR Bilateral 2009, 2012,    KNEE ARTHROSCOPY Right 1982    Right    SHOULDER ARTHROSCOPY Right 12/31/2013    VIDEO ARTHROSCOPY RIGHT SHOULDER, SUBACROMIAL DECOMPRESSION, DISTAL CLAVICLE RESECTION, ROTATOR CUFF DEBRIDEMENT          TURP  7610    X 2    UMBILICAL HERNIA REPAIR      X 2    UPPER GASTROINTESTINAL ENDOSCOPY  9/3    VARICOCELECTOMY  2000       Social History:    Social History   Substance Use Topics    Smoking status: Never Smoker    Smokeless tobacco: Never Used    Alcohol use Yes      Comment: 2-3 drinks 3X per year                                 Counseling given: Not Answered      Vital Signs (Current):   Vitals:    12/21/17 1458   BP: 120/71   Pulse: 59   Resp: 20   Temp: 97.9 °F (36.6 °C)   TempSrc: Oral   SpO2: 95%   Weight: 200 lb (90.7 kg)   Height: 5' 11\" (1.803 m)                                              BP Readings from Last 3 Encounters:   12/21/17 120/71   12/15/17 (!) 105/52   12/13/17 110/64       NPO Status: Time of last liquid consumption: 2200                        Time of last solid consumption: 0220                        Date of last liquid consumption: 12/20/17                        Date of last solid food consumption: 12/20/17    BMI:   Wt Readings from Last 3 Encounters:   12/21/17 200 lb (90.7 kg)   12/20/17 200 lb (90.7 kg)   12/15/17 206 lb 9.6 oz (93.7 kg)     Body mass index is 27.89 kg/m².     Anesthesia Evaluation   no history of anesthetic complications:   Airway: Mallampati: I  TM distance: >3 FB   Neck ROM: full  Mouth opening:

## 2017-12-21 NOTE — H&P
I have reviewed the history and physical and examined the patient and find no relevant changes. I have reviewed with the patient and/or family the risks, benefits, and alternatives to the procedure. Patient being given increased dosage/quantity of opoid pain medication in excess of [employer]SM[employer] guidelines which noted a 30 MED daily of opioids due to the fact that he/she has undergone major orthopaedic surgery as outlined in rule 4731-11-13. Dosages and further duration of the pain medication will be re-evaluated at her post op visit in 2 weeks. Patient was educated on dosing expectations and limits of prescribing as a result of the new [facility] rules enacted August 31, 2017. Please also note that this is not the initial opoid prescription issued to this patient but a continuation of medication utilized during the hospital admission as noted in the medical record. [place]ARRS report has also been utilized to screen for any abuse history or suspicious activity as outlined in [place]. All efforts have been taken to prevent abuse potential and misuse of opioid medications including education, screening, and close clinical follow up.

## 2017-12-22 NOTE — ANESTHESIA POST-OP
Postoperative Anesthesia Note    Name:    Grace Jackson  MRN:      [id number]    Patient Vitals for the past 12 hrs:   BP Temp Temp src Pulse Resp SpO2 Height Weight   12/21/17 1639 (!) 97/56 97.5 °F (36.4 °C) Temporal 58 14 99 % - -   12/21/17 1624 (!) 92/52 97.7 °F (36.5 °C) Temporal 56 14 97 % - -   12/21/17 1619 (!) 91/50 - - 58 14 98 % - -   12/21/17 1614 (!) 90/53 - - 60 14 99 % - -   12/21/17 1609 (!) 109/47 97.8 °F (36.6 °C) Temporal 64 14 98 % - -   12/21/17 1458 120/71 97.9 °F (36.6 °C) Oral 59 20 95 % 5' 11\" (1.803 m) 200 lb (90.7 kg)        LABS:    CBC  Lab Results   Component Value Date/Time    WBC 4.9 02/29/2016 11:18 PM    HGB 16.6 02/29/2016 11:18 PM    HCT 51.1 02/29/2016 11:18 PM     02/29/2016 11:18 PM     RENAL  Lab Results   Component Value Date/Time     12/15/2017 09:57 AM    K 4.9 12/15/2017 09:57 AM     12/15/2017 09:57 AM    CO2 31 12/15/2017 09:57 AM    BUN 16 12/15/2017 09:57 AM    CREATININE 1.0 12/15/2017 09:57 AM    GLUCOSE 104 (H) 12/15/2017 09:57 AM    GLUCOSE 83 05/22/2012 04:40 PM     COAGS  Lab Results   Component Value Date/Time    PROTIME 10.8 01/03/2014 04:00 PM    INR 0.96 01/03/2014 04:00 PM    APTT 29.0 03/01/2011 11:28 AM       Intake & Output: In: 700 [P.O.:250; I.V.:450]  Out: -     Nausea & Vomiting:  No    Level of Consciousness:  Awake    Pain Assessment:  Adequate analgesia    Anesthesia Complications:  No apparent anesthetic complications    SUMMARY      Vital signs stable on discharge from Stage I post anesthesia care.   Care transferred from Anesthesiology department on discharge from perioperative area

## 2017-12-22 NOTE — OP NOTE
[place], [place] [address]                                 OPERATIVE REPORT    PATIENT NAME: Taiwo IBRAHIM                        :        1960  MED REC NO:   [id number]                          ROOM:  ACCOUNT NO:   [de-identified]                          ADMIT DATE: 2017  PROVIDER:     Moise Corbin MD    DATE OF PROCEDURE:  2017    SERVICE:  Orthopedic surgery. PREOPERATIVE DIAGNOSES:  1. Presumed medial meniscus tear and an early osteoarthritis of the left  knee. 2.  Possible foreign body tibia. POSTOPERATIVE DIAGNOSES:  1. Tear of the posterior horn and mid body meniscus, bucket-handle  configuration involving 50% of the meniscus in the white-white zone. 2.  Tear of the mid body of the lateral meniscus 5% radial configuration. 3.  Tricompartmental synovitis. 4.  Medial femoral condyle grade 3 chondromalacia. 5.  Foreign body, left anterior tibia - proximal.    OPERATION PERFORMED:  1. Exam under anesthesia and video arthroscopy, left knee. 2.  Partial medial and lateral meniscectomy. 3.  Chondroplasty of the medial femoral condyle. 4.  Partial synovectomy. 5.  Exploration of proximal tibia for foreign body. SURGEON:  Myriam Ibarra MD    FIRST ASSISTANT:  HERLINDA Cota    ANESTHESIA:  General.    COMPLICATIONS:  None. INSTRUMENTS COUNT:  Correct. DISPOSITION:  To recovery room. ANTIBIOTICS:  Per SCIP protocol. TOURNIQUET:  350 mmHg for approximately 30 minutes. INDICATIONS FOR SURGERY:  The patient is a 55-year-old man, who presented  to the orthopedic office with complaints that were consistent with a medial  meniscus tear. Routine x-rays were obtained and it demonstrated what  appeared to be a small metallic fragment in this proximal tibia. He  reported that he was using a chainsaw and felt something cut himself a few  months ago in this area.   I explained to the

## 2017-12-23 ENCOUNTER — HOSPITAL ENCOUNTER (OUTPATIENT)
Dept: PHYSICAL THERAPY | Age: 57
Discharge: HOME | End: 2017-12-31
Admitting: ORTHOPAEDIC SURGERY

## 2017-12-23 NOTE — PLAN OF CARE
74  Functional Limitation: Mobility: Walking and moving around  Mobility: Walking and Moving Around Current Status (): At least 60 percent but less than 80 percent impaired, limited or restricted  Mobility: Walking and Moving Around Goal Status (): At least 20 percent but less than 40 percent impaired, limited or restricted    Pain Scale: 6/10  Easing factors: meds, ice, rest  Provocative factors: sleeping, standing, walking, kneeling, squatting, stairs. Type: []Constant   []Intermittent  []Radiating []Localized []other:     Numbness/Tingling: n/a    Occupation/School:    Living Status/Prior Level of Function: Independent with ADLs and IADLs, baseball, gofl  OBJECTIVE:     ROM LEFT RIGHT   Knee ext 0    Knee Flex 95    Ankle PF     Ankle DF 0    Strength  LEFT RIGHT   Quad set good    SLR indep without lag         Circumference   39.5 cm           Reflexes/Sensation:    [x]Dermatomes/Myotomes intact    []Reflexes equal and normal bilaterally   []Other:    Joint mobility:    [x]Normal    []Hypo   []Hyper    Palpation: diffuse tenderness, geena along medial joint line    Functional Mobility/Transfers: Independent. Limited with bed mobility due to hernias. Posture:     Bandages/Dressings/Incisions: incision sites covered with TegaDerm. No active drainage, no redness. Pt does have one incision with stitches. It was covered by band aid. Gait: (include devices/WB status) decreased stance on the left side. Orthopedic Special Tests: negative Cristal's    Hamstring and gastroc are tight. Quads were not assessed. [x] Patient history, allergies, meds reviewed. Medical chart reviewed. See intake form. Review Of Systems (ROS):  [x]Performed Review of systems (Integumentary, CardioPulmonary, Neurological) by intake and observation. Intake form has been scanned into medical record.  Patient has been instructed to contact their primary care physician restricted  Mobility: Walking and Moving Around Goal Status (): At least 20 percent but less than 40 percent impaired, limited or restricted    ASSESSMENT:   Functional Impairments:     []Noted lumbar/proximal hip/LE joint hypomobility   [x]Decreased LE functional ROM   [x]Decreased core/proximal hip strength and neuromuscular control   [x]Decreased LE functional strength   [x]Reduced balance/proprioceptive control   []other:      Functional Activity Limitations (from functional questionnaire and intake)   []Reduced ability to tolerate prolonged functional positions   [x]Reduced ability or difficulty with changes of positions or transfers between positions   [x]Reduced ability to maintain good posture and demonstrate good body mechanics with sitting, bending, and lifting   [x]Reduced ability to sleep   [] Reduced ability or tolerance with driving and/or computer work   [x]Reduced ability to perform lifting, carrying tasks   [x]Reduced ability to squat   []Reduced ability to forward bend   [x]Reduced ability to ambulate prolonged functional periods/distances/surfaces   [x]Reduced ability to ascend/descend stairs   [x]Reduced ability to run, hop, cut or jump   []other:    Participation Restrictions   []Reduced participation in self care activities   []Reduced participation in home management activities   []Reduced participation in work activities   [x]Reduced participation in social activities. [x]Reduced participation in sport/recreation activities. Classification :    [x]Signs/symptoms consistent with post-surgical status including decreased ROM, strength and function.    []Signs/symptoms consistent with joint sprain/strain   []Signs/symptoms consistent with patella-femoral syndrome   []Signs/symptoms consistent with knee OA/hip OA   []Signs/symptoms consistent with internal derangement of knee/Hip   []Signs/symptoms consistent with functional hip weakness/NMR control      []Signs/symptoms consistent with tendinitis/tendinosis    []signs/symptoms consistent with pathology which may benefit from Dry needling      []other:      Prognosis/Rehab Potential:      []Excellent   [x]Good    []Fair   []Poor    Tolerance of evaluation/treatment:    []Excellent   [x]Good    []Fair   []Poor  Physical Therapy Evaluation Complexity Justification  [x] A history of present problem with:  [] no personal factors and/or comorbidities that impact the plan of care;  [x]1-2 personal factors and/or comorbidities that impact the plan of care  []3 personal factors and/or comorbidities that impact the plan of care  [x] An examination of body systems using standardized tests and measures addressing any of the following: body structures and functions (impairments), activity limitations, and/or participation restrictions;:  [] a total of 1-2 or more elements   [x] a total of 3 or more elements   [] a total of 4 or more elements   [x] A clinical presentation with:  [x] stable and/or uncomplicated characteristics   [] evolving clinical presentation with changing characteristics  [] unstable and unpredictable characteristics;   [x] Clinical decision making of [x] low, [] moderate, [] high complexity using standardized patient assessment instrument and/or measurable assessment of functional outcome. [x] EVAL (LOW) 09511 (typically 20 minutes face-to-face)  [] EVAL (MOD) 05817 (typically 30 minutes face-to-face)  [] EVAL (HIGH) 76901 (typically 45 minutes face-to-face)  [] RE-EVAL       PLAN:   Frequency/Duration:  2 days per week for 6 Weeks:  Interventions:  [x]  Therapeutic exercise including: strength training, ROM, for Lower extremity and core   [x]  NMR activation and proprioception for LE, Glutes and Core   [x]  Manual therapy as indicated for LE, Hip and spine to include: Dry Needling/IASTM, STM, PROM, Gr I-IV mobilizations, manipulation.    [x] Modalities as needed that may include: thermal agents, E-stim, Biofeedback, US, iontophoresis as indicated  [x] Patient education on joint protection, postural re-education, activity modification, progression of HEP. HEP instruction: (see scanned forms)    GOALS:  Patient stated goal: normal activities, squatting. Therapist goals for Patient:   Short Term Goals: To be achieved in: 2 weeks  1. Independent in HEP and progression per patient tolerance, in order to prevent re-injury. 2. Patient will have a decrease in pain to facilitate improvement in movement, function, and ADLs as indicated by Functional Deficits. Long Term Goals: To be achieved in: 6 weeks  1. Disability index score of 35% or less for the LEFS to assist with reaching prior level of function. 2. Patient will demonstrate increased AROM of left knee from 0 - 135  to allow for proper joint functioning as indicated by patients Functional Deficits. 3. Patient will demonstrate an increase in Strength to good proximal hip strength and control, within 5lb HHD in LE to allow for proper functional mobility as indicated by patients Functional Deficits. 4. Patient will return to ascending and descending stairs with reciprocal, symmetrical gait without increased symptoms or restriction. 5. Possible GAP candidate for return to baseball.       Electronically signed by:  Stacy Valencia PT

## 2017-12-23 NOTE — FLOWSHEET NOTE
ambulation/stair navigation      Manual Treatments:  PROM / STM / Oscillations-Mobs:  G-I, II, III, IV (PA's, Inf., Post.)  [] (12399) Provided manual therapy to mobilize LE, proximal hip and/or LS spine soft tissue/joints for the purpose of modulating pain, promoting relaxation,  increasing ROM, reducing/eliminating soft tissue swelling/inflammation/restriction, improving soft tissue extensibility and allowing for proper ROM for normal function with self care, mobility, lifting and ambulation. Modalities:  VASO  x15 min    Charges:  Timed Code Treatment Minutes: 25   Total Treatment Minutes: 55     [x] EVAL (LOW) 53819 (typically 20 minutes face-to-face)  [] EVAL (MOD) 98323 (typically 30 minutes face-to-face)  [] EVAL (HIGH) 31269 (typically 45 minutes face-to-face)  [] RE-EVAL     [x] RU(45419) x  1   [] IONTO  [] NMR (40664) x      [x] VASO  [] Manual (27151) x       [] Other:  [] TA x       [] Mech Traction (32758)  [] ES(attended) (63146)      [] ES (un) (88721):     GOALS:   1. Disability index score of 35% or less for the LEFS to assist with reaching prior level of function. 2. Patient will demonstrate increased AROM of left knee from 0 - 135  to allow for proper joint functioning as indicated by patients Functional Deficits. 3. Patient will demonstrate an increase in Strength to good proximal hip strength and control, within 5lb HHD in LE to allow for proper functional mobility as indicated by patients Functional Deficits. 4. Patient will return to ascending and descending stairs with reciprocal, symmetrical gait without increased symptoms or restriction. 5. Possible GAP candidate for return to baseball. New or Updated Goals (if applicable):  [x] No change to goals established upon initial eval/last progress note:  New Goals:    Progression Towards Functional goals:   [] Patient is progressing as expected towards functional goals listed.     [] Progression is slowed due to complexities

## 2017-12-29 ENCOUNTER — HOSPITAL ENCOUNTER (OUTPATIENT)
Dept: PHYSICAL THERAPY | Age: 57
Discharge: HOME | End: 2017-12-29

## 2017-12-29 NOTE — FLOWSHEET NOTE
105 deg knee flex with heel slides using ball  Palpation:     Test used Initial score Current Score   Pain Summary VAS     Functional questionnaire LEFS     ROM flexion      extension     Strength quad      ABD      flexion          RESTRICTIONS/PRECAUTIONS: history of prostate CA,  A fib, hernias. Exercises/Interventions:     Therapeutic Ex Sets/reps Notes        QS X 10  :10 hep   SLR 2 x 10 hep   SL hip abd 2 x 10 hep   Heel slides on playground ball 2x10 hep   Seated HS s 3x  ;20 hep   Seated towel s 3x  ;20 hep   Clamshell  2x10 HEP   LAQ 2x10 HEP   FSU 4\" 2x10                                  Manual Intervention                                   NMR re-education     Tandem balance 3x10\" ea R/L                                               Therapeutic Exercise and NMR EXR  [x] (63040) Provided verbal/tactile cueing for activities related to strengthening, flexibility, endurance, ROM for improvements in LE, proximal hip, and core control with self care, mobility, lifting, ambulation.  [] (04233) Provided verbal/tactile cueing for activities related to improving balance, coordination, kinesthetic sense, posture, motor skill, proprioception  to assist with LE, proximal hip, and core control in self care, mobility, lifting, ambulation and eccentric single leg control.      NMR and Therapeutic Activities:    [x] (40848 or 75475) Provided verbal/tactile cueing for activities related to improving balance, coordination, kinesthetic sense, posture, motor skill, proprioception and motor activation to allow for proper function of core, proximal hip and LE with self care and ADLs  [] (16042) Gait Re-education- Provided training and instruction to the patient for proper LE, core and proximal hip recruitment and positioning and eccentric body weight control with ambulation re-education including up and down stairs     Home Exercise Program:    [x] (47833) Reviewed/Progressed HEP activities related to strengthening, flexibility, endurance, ROM of core, proximal hip and LE for functional self-care, mobility, lifting and ambulation/stair navigation   [] (16835)Reviewed/Progressed HEP activities related to improving balance, coordination, kinesthetic sense, posture, motor skill, proprioception of core, proximal hip and LE for self care, mobility, lifting, and ambulation/stair navigation      Manual Treatments:  PROM / STM / Oscillations-Mobs:  G-I, II, III, IV (PA's, Inf., Post.)  [] (79283) Provided manual therapy to mobilize LE, proximal hip and/or LS spine soft tissue/joints for the purpose of modulating pain, promoting relaxation,  increasing ROM, reducing/eliminating soft tissue swelling/inflammation/restriction, improving soft tissue extensibility and allowing for proper ROM for normal function with self care, mobility, lifting and ambulation. Modalities:  VASO  x15 min    Charges:  Timed Code Treatment Minutes: 36   Total Treatment Minutes: 51     [] EVAL (LOW) 54034 (typically 20 minutes face-to-face)  [] EVAL (MOD) 49787 (typically 30 minutes face-to-face)  [] EVAL (HIGH) 43708 (typically 45 minutes face-to-face)  [] RE-EVAL     [x] ZG(86931) x  2   [] IONTO  [] NMR (67988) x      [x] VASO  [] Manual (90179) x       [] Other:  [] TA x       [] Mech Traction (20076)  [] ES(attended) (86434)      [] ES (un) (76707):     GOALS:   1. Disability index score of 35% or less for the LEFS to assist with reaching prior level of function. 2. Patient will demonstrate increased AROM of left knee from 0 - 135  to allow for proper joint functioning as indicated by patients Functional Deficits. 3. Patient will demonstrate an increase in Strength to good proximal hip strength and control, within 5lb HHD in LE to allow for proper functional mobility as indicated by patients Functional Deficits. 4. Patient will return to ascending and descending stairs with reciprocal, symmetrical gait without increased symptoms or restriction. 5. Possible GAP candidate for return to baseball. New or Updated Goals (if applicable):  [x] No change to goals established upon initial eval/last progress note:  New Goals:    Progression Towards Functional goals:   [x] Patient is progressing as expected towards functional goals listed. [] Progression is slowed due to complexities listed. [] Progression has been slowed due to co-morbidities. [] Plan just implemented, too soon to assess goals progression  [] Other:     ASSESSMENT:    [x] Improvement noted relative to goals:  [] No Improvement noted related to goals:  Summary/Patient's response to treatment: Pt responded to tx well and francisco new exercises well. Discussed with pt importance of applying ice 2-3 times daily, using an ACE wrap, and elevating the knee when possible to help dec swelling in the knee. Provided pt with updated HEP. Treatment/Activity Tolerance:  [x] Patient tolerated treatment well [] Patient limited by fatique  [] Patient limited by pain  [] Patient limited by other medical complications  [] Other:     Prognosis: [x] Good [] Fair  [] Poor    Patient Requires Follow-up: [x] Yes  [] No    PLAN: Cont to build quad and general LE strength. Progress exercises NV as francisco.     [x] Continue per plan of care [] Alter current plan (see comments)  [] Plan of care initiated [] Hold pending MD visit [] Discharge    Electronically signed by: Leslye Akers PTA

## 2018-01-01 ENCOUNTER — HOSPITAL ENCOUNTER (OUTPATIENT)
Dept: PHYSICAL THERAPY | Age: 58
Discharge: HOME | End: 2018-01-31
Attending: ORTHOPAEDIC SURGERY | Admitting: ORTHOPAEDIC SURGERY

## 2018-01-02 ENCOUNTER — HOSPITAL ENCOUNTER (OUTPATIENT)
Dept: PHYSICAL THERAPY | Age: 58
Discharge: HOME | End: 2018-01-02

## 2018-01-02 NOTE — FLOWSHEET NOTE
verbal/tactile cueing for activities related to improving balance, coordination, kinesthetic sense, posture, motor skill, proprioception and motor activation to allow for proper function of core, proximal hip and LE with self care and ADLs  [] (42129) Gait Re-education- Provided training and instruction to the patient for proper LE, core and proximal hip recruitment and positioning and eccentric body weight control with ambulation re-education including up and down stairs     Home Exercise Program:    [x] (53103) Reviewed/Progressed HEP activities related to strengthening, flexibility, endurance, ROM of core, proximal hip and LE for functional self-care, mobility, lifting and ambulation/stair navigation   [] (17922)Reviewed/Progressed HEP activities related to improving balance, coordination, kinesthetic sense, posture, motor skill, proprioception of core, proximal hip and LE for self care, mobility, lifting, and ambulation/stair navigation      Manual Treatments:  PROM / STM / Oscillations-Mobs:  G-I, II, III, IV (PA's, Inf., Post.)  [] (04493) Provided manual therapy to mobilize LE, proximal hip and/or LS spine soft tissue/joints for the purpose of modulating pain, promoting relaxation,  increasing ROM, reducing/eliminating soft tissue swelling/inflammation/restriction, improving soft tissue extensibility and allowing for proper ROM for normal function with self care, mobility, lifting and ambulation. Modalities:  VASO  x15 min    Charges:  Timed Code Treatment Minutes: 40   Total Treatment Minutes: 55     [] EVAL (LOW) 47967 (typically 20 minutes face-to-face)  [] EVAL (MOD) 66121 (typically 30 minutes face-to-face)  [] EVAL (HIGH) 53357 (typically 45 minutes face-to-face)  [] RE-EVAL     [x] NK(09247) x  2   [] IONTO  [] NMR (89180) x      [x] VASO  [x] Manual (38061) x  1    [] Other:  [] TA x       [] Mech Traction (58565)  [] ES(attended) (17840)      [] ES (un) (25468):     GOALS:   1.  Disability index score of 35% or less for the LEFS to assist with reaching prior level of function. 2. Patient will demonstrate increased AROM of left knee from 0 - 135  to allow for proper joint functioning as indicated by patients Functional Deficits. 3. Patient will demonstrate an increase in Strength to good proximal hip strength and control, within 5lb HHD in LE to allow for proper functional mobility as indicated by patients Functional Deficits. 4. Patient will return to ascending and descending stairs with reciprocal, symmetrical gait without increased symptoms or restriction. 5. Possible GAP candidate for return to baseball. New or Updated Goals (if applicable):  [x] No change to goals established upon initial eval/last progress note:  New Goals:    Progression Towards Functional goals:   [x] Patient is progressing as expected towards functional goals listed. [] Progression is slowed due to complexities listed. [] Progression has been slowed due to co-morbidities. [] Plan just implemented, too soon to assess goals progression  [] Other:     ASSESSMENT:    [x] Improvement noted relative to goals:  [] No Improvement noted related to goals:  Summary/Patient's response to treatment: Pt has min soreness in distal quads with there-ex, but no inc'd c/o pain at patella or medial joint line with there-ex. He requires inc'd time to complete all there-ex and c/o intermittent pains in other body parts (L shoulder, low back) and has very flat affect and appeared very lethargic during tx. Will monitor as this is somewhat of pt's baseline (known to this PT from prior episodes of care) and could consider referral to his PCP if still c/o various aches, fatigue and for general malaise. Pt's edema improved with use of ace wrap, so asked to continue when his legs are in dependent position at work, and to elevate when he's able and once home from work.  Recommend continued PT to restore full flexion AROM, strength and improve

## 2018-01-03 ENCOUNTER — OFFICE VISIT (OUTPATIENT)
Dept: ORTHOPEDIC SURGERY | Age: 58
End: 2018-01-03

## 2018-01-03 VITALS — HEIGHT: 71 IN | WEIGHT: 199.96 LBS | BODY MASS INDEX: 27.99 KG/M2

## 2018-01-03 DIAGNOSIS — M75.82 ROTATOR CUFF TENDINITIS, LEFT: Primary | ICD-10-CM

## 2018-01-03 DIAGNOSIS — S83.242A TEAR OF MEDIAL MENISCUS OF LEFT KNEE, CURRENT, UNSPECIFIED TEAR TYPE, INITIAL ENCOUNTER: ICD-10-CM

## 2018-01-03 PROCEDURE — MISC250 COMPRESSION STOCKING: Performed by: PHYSICIAN ASSISTANT

## 2018-01-03 PROCEDURE — 99024 POSTOP FOLLOW-UP VISIT: CPT | Performed by: PHYSICIAN ASSISTANT

## 2018-01-03 NOTE — PROGRESS NOTES
History of present illness: The patient returns today for their first postoperative visit after knee arthroscopy. He no showed his 1st postop visit. He has been attending physical therapy and states he is improving with motion and function. He states he has been struggling somewhat with swelling to the knee and ankle. This is intermittent and related to activity. He started to notice some pain along the posterior aspect of his left shoulder. This is associated mostly with external rotation of the shoulder. He denies any specific injury    Physical examination: Inspection reveals mild swelling. Incisions are clean, dry, and intact. No signs of infection. Range of motion is approximately 0-125° today. There is no calf pain or signs of DVT with a negative Homans sign. He ambulates unassisted    Brief examination of the left shoulder reveals normal range of motion with pain on external rotation. No significant strength deficits are noted. Assessment/plan: The patient is doing well after knee arthroscopy. Surgical findings were reviewed today. I have recommended ice, judicious use of the NSAIDs with GI precautions, and physical therapy to diminish swelling and restore both range of motion and strength. He was provided with compression stockings . We will see the patient back in 3 weeks. The patient verbalized good understanding of the plan. I recommended that he begin some physical therapy for the left shoulder. If his symptoms do not improve with this treatment, he will return for designated x-rays and evaluation of the left shoulder      [facility]    This dictation was performed with a verbal recognition program (DRAGON) and it was checked for errors. It is possible that there are still dictated errors within this office note. If so, please bring any errors to my attention for an addendum. All efforts were made to ensure that this office note is accurate.

## 2018-01-04 ENCOUNTER — HOSPITAL ENCOUNTER (OUTPATIENT)
Dept: PHYSICAL THERAPY | Age: 58
Discharge: HOME | End: 2018-01-04

## 2018-01-04 NOTE — FLOWSHEET NOTE
may  Be difficult to treat both body parts at this time. As his knee becomes more stable, will switch focus to the shoulder. Treatment/Activity Tolerance:  [x] Patient tolerated treatment well [] Patient limited by fatique  [] Patient limited by pain  [] Patient limited by other medical complications  [] Other:     Prognosis: [x] Good [] Fair  [] Poor    Patient Requires Follow-up: [x] Yes  [] No    PLAN: Cont to build quad strength in more functional positions as pain permits.    [x] Continue per plan of care [] Alter current plan (see comments)  [] Plan of care initiated [] Hold pending MD visit [] Discharge    Electronically signed by: Annika Almodovar, PT, DPT

## 2018-01-10 ENCOUNTER — HOSPITAL ENCOUNTER (OUTPATIENT)
Dept: PHYSICAL THERAPY | Age: 58
Discharge: HOME | End: 2018-01-10

## 2018-01-10 LAB
LV EF: 63 %
LV EF: 63 %
LVEF MODALITY: NORMAL
LVEF MODALITY: NORMAL

## 2018-01-10 NOTE — FLOWSHEET NOTE
pattern. He still has severe pain when he crosses his LLE to wash his foot in the shower that he reports as 10/10 pain in lateral knee, but then pain immediately subsides. He needs to leave for cardiac MRI after PT visit. OBJECTIVE:   Observation: min swelling peripatellar area L knee, AAROM knee flex heel slide 121; gait with reciprocal pattern but with dec'd heel strike and lack of full TKE  Palpation: TTP medial knee at joint line and around medial portal, good patellar mobility     Test used Initial score Current Score   Pain Summary VAS  0-10 still when he bends his knee and ER's his hip (pain felt along distal ITB   Functional questionnaire LEFS     ROM flexion  121    extension  Lacks 5 pre-tx, 0   Strength quad      ABD      flexion          RESTRICTIONS/PRECAUTIONS: history of prostate CA,  A fib, hernias.     Exercises/Interventions:     Therapeutic Ex Sets/reps Notes   Bike  Non-billable time after set-up   QS X 10  :10 hep   SLR x15 1# hep   SL hip abd x15 1# hep   Heel slides on SB  1 set with strap 5\" 2x10 hep   SAQ with VMO squeeze 5\" 2x10 3#   Long-sitting HS s  Supine ITB stretch  Standing ITB stretch foot propped on stool 2x  ;3 hep   incline board gastroc stretch 3x  ;30 hep   Clamshell   Red tband  HEP   Bridge with adductor squeeze 5\"x15    LAQ with VMO squeeze 2x10 3#   FSU 8\"  Up and over 6\" 2x10  x10    Mini squat 2X10 To perform at home d/t time  Tactile cues for hip motion   Leg press NV    [facility] NV                   Manual Intervention     Pat mobs: all 3'         SASTM quad (lat>med), distal ITB, distal biceps femoris, proximal gastroc lat>med 8'    FR ITB 5'              NMR re-education     Tandem balance 3x10\" ea NV R/L                                               Therapeutic Exercise and NMR EXR  [x] (32447) Provided verbal/tactile cueing for activities related to strengthening, flexibility, endurance, ROM for improvements in LE, proximal hip, and core control with self care, strain on lateral knee likely caused bc continued muscle weakness/imbalance. Also discussed pt being aware of keeping LE in neutral rotation while sitting/resting for work and home to reduce lateral pull of patella. *Reviewed prior discussion of pt's L shoulder briefly and that it may be difficult to treat both body parts at this time. As his knee becomes more stable, will switch focus to the shoulder. Treatment/Activity Tolerance:  [x] Patient tolerated treatment well [] Patient limited by fatique  [] Patient limited by pain  [] Patient limited by other medical complications  [] Other:     Prognosis: [x] Good [] Fair  [] Poor    Patient Requires Follow-up: [x] Yes  [] No    PLAN: Cont to build quad strength in more functional positions as pain permits.    [x] Continue per plan of care [] Alter current plan (see comments)  [] Plan of care initiated [] Hold pending MD visit [] Discharge    Electronically signed by: Grace Alejo, PT, DPT

## 2018-01-12 ENCOUNTER — HOSPITAL ENCOUNTER (OUTPATIENT)
Dept: PHYSICAL THERAPY | Age: 58
Discharge: HOME | End: 2018-01-12

## 2018-01-12 NOTE — FLOWSHEET NOTE
knee flex heel slide 125; gait with reciprocal pattern but with dec'd heel strike and lack of full TKE  Palpation: TTP medial knee at joint line and around medial portal, good patellar mobility     Test used Initial score Current Score   Pain Summary VAS  0-4/10 still when he bends his knee and ER's his hip (pain felt along distal ITB   Functional questionnaire LEFS     ROM flexion  125    extension  Lacks 2 pre-tx, 0   Strength quad      ABD      flexion          RESTRICTIONS/PRECAUTIONS: history of prostate CA,  A fib, hernias.     Exercises/Interventions:     Therapeutic Ex Sets/reps Notes   Bike  Non-billable time after set-up   QS X 10  :10 hep   SLR 3x10 1# hep   SL hip abd 2x10 1# hep   Heel slides on SB  1 set with strap 5\" 2x10 hep   SAQ with VMO squeeze 5\" 2x10 3#   Long-sitting HS s  Supine ITB stretch  Standing ITB stretch foot propped on stool  hep   incline board gastroc stretch  hep   Clamshell   Red tband  HEP   Bridge with adductor squeeze 5\"x15    LAQ with VMO squeeze 2x10 3#   FSU 8\"  Up and over 6\" 2x10  x10 With PTA   Mini squat 2X10 To perform at home d/t time  Tactile cues for hip motion   Leg press NV With PTA   [facility] NV With PTA                  Manual Intervention     Pat mobs: all 3'         SASTM quad (lat>med), distal ITB, distal biceps femoris, proximal gastroc lat>med 8'    FR ITB 5'              NMR re-education     Tandem balance 3x10\" ea With PTA R/L                                               Therapeutic Exercise and NMR EXR  [x] (27236) Provided verbal/tactile cueing for activities related to strengthening, flexibility, endurance, ROM for improvements in LE, proximal hip, and core control with self care, mobility, lifting, ambulation.  [] (22379) Provided verbal/tactile cueing for activities related to improving balance, coordination, kinesthetic sense, posture, motor skill, proprioception  to assist with LE, proximal hip, and core control in self care, mobility, lifting, limited by pain  [] Patient limited by other medical complications  [] Other:     Prognosis: [x] Good [] Fair  [] Poor    Patient Requires Follow-up: [x] Yes  [] No    PLAN: Cont to build quad strength in more functional positions as pain permits.    [x] Continue per plan of care [] Alter current plan (see comments)  [] Plan of care initiated [] Hold pending MD visit [] Discharge    Electronically signed by: Nay Perez PT, DPT

## 2018-01-15 ENCOUNTER — OFFICE VISIT (OUTPATIENT)
Dept: ORTHOPEDIC SURGERY | Age: 58
End: 2018-01-15

## 2018-01-15 VITALS — WEIGHT: 199.96 LBS | BODY MASS INDEX: 27.99 KG/M2 | HEIGHT: 71 IN

## 2018-01-15 DIAGNOSIS — R52 PAIN: Primary | ICD-10-CM

## 2018-01-15 DIAGNOSIS — M75.82 ROTATOR CUFF TENDINITIS, LEFT: ICD-10-CM

## 2018-01-15 PROCEDURE — 99214 OFFICE O/P EST MOD 30 MIN: CPT | Performed by: ORTHOPAEDIC SURGERY

## 2018-01-15 RX ORDER — CYCLOBENZAPRINE HCL 10 MG
10 TABLET ORAL 3 TIMES DAILY PRN
Qty: 30 TABLET | Refills: 0 | Status: SHIPPED | OUTPATIENT
Start: 2018-01-15 | End: 2018-01-25

## 2018-01-15 RX ORDER — METHYLPREDNISOLONE 4 MG/1
TABLET ORAL
Qty: 1 KIT | Refills: 0 | Status: SHIPPED | OUTPATIENT
Start: 2018-01-15 | End: 2018-07-09 | Stop reason: ALTCHOICE

## 2018-01-15 NOTE — PROGRESS NOTES
Persistent  Frequency of Pain: Intermittent  Aggravating Factors:  Other (Comment) (certain ROM)  Limiting Behavior: Some  Relieving Factors: Rest  Result of Injury: No  Work-Related Injury: No  Are there other pain locations you wish to document?: No]      Work Status/Functionality:     Past Medical History: Medical history form was reviewed today & can be found in the media tab  Past Medical History:   Diagnosis Date    A-fib [facility])     ADHD (attention deficit hyperactivity disorder)     Carpal tunnel syndrome 1/21/2015    Chronic low back pain     Chronic neck pain     Chronic sinusitis     Dr. Domingo jamil present     lower    Epigastric hernia     Esophageal spasm     GERD (gastroesophageal reflux disease)     Hyperlipidemia     Hypertrophic cardiomyopathy (HCC)     IHSS (idiopathic hypertrophic subaortic stenosis) ([facility] [address]     Kidney stones     HUNTER on CPAP     Dr. Ashish Gonzáles- A-PAP    Primary localized osteoarthrosis, shoulder region 10/18/2013    Prostate cancer ([facility] [address]     prostate ; s/p radiation treatment    PVC's (premature ventricular contractions)     RBBB (right bundle branch block)     RLS (restless legs syndrome)     Dr. Álvarez Shallow Seasonal allergies     Tachycardia       Past Surgical History:     Past Surgical History:   Procedure Laterality Date    CARPAL TUNNEL RELEASE Left     COLONOSCOPY  1/17/11    ELBOW SURGERY  2015    left    ENDOSCOPY, COLON, DIAGNOSTIC      INGUINAL HERNIA REPAIR Bilateral 2009, 2012,    KNEE ARTHROSCOPY Right 1982    Right    KNEE ARTHROSCOPY Left 12/21/2017    SHOULDER ARTHROSCOPY Right 12/31/2013    VIDEO ARTHROSCOPY RIGHT SHOULDER, SUBACROMIAL DECOMPRESSION, DISTAL CLAVICLE RESECTION, ROTATOR CUFF DEBRIDEMENT          TURP  3458    X 2    UMBILICAL HERNIA REPAIR      X 2    UPPER GASTROINTESTINAL ENDOSCOPY  9/3    VARICOCELECTOMY  2000     Current Medications:     Current Outpatient Prescriptions:     methylPREDNISolone (MEDROL, History:    reports that he has never smoked. He has never used smokeless tobacco. He reports that he drinks alcohol. He reports that he does not use drugs. Family History:   Family History   Problem Relation Age of Onset    Cancer Mother      melenoma    High Cholesterol Mother     High Blood Pressure Father     High Cholesterol Brother     Heart Disease Maternal Grandmother     Heart Disease Maternal Grandfather     Prostate Cancer Paternal Uncle        REVIEW OF SYSTEMS:   For new problems, a full review of systems will be found scanned in the patient's chart. CONSTITUTIONAL: Denies unexplained weight loss, fevers, chills   NEUROLOGICAL: Denies unsteady gait or progressive weakness  SKIN: Denies skin changes, delayed healing, rash, itching       PHYSICAL EXAM:    Vitals: Height 5' 10.98\" (1.803 m), weight 199 lb 15.3 oz (90.7 kg). GENERAL EXAM:  · General Apparence: Patient is adequately groomed with no evidence of malnutrition. · Orientation: The patient is oriented to time, place and person. · Mood & Affect:The patient's mood and affect are appropriate       LEFT shoulder PHYSICAL EXAMINATION:  · Inspection:  No deformity ecchymosis or erythema    · Palpation:  The patient tenderness through the LEFT trapezius musculature, no significant bony tenderness at the greater tuberosity a.c. joint or bony structures of the shoulder itself      · Range of Motion: full Range of motion of the LEFT shoulder with pain at the extremes    · Strength: He has some very mild pain secondary to weakness with supraspinatus testing but no significant strength deficits. · Special Tests:  He can feel touch throughout the LEFT upper extremity            · Skin:  There are no rashes, ulcerations or lesions. · Gait & station: Slightly antalgic favoring the LEFT knee      · Additional Examinations:        Brief examination of the cervical spine demonstrates moderate stiffness. The LEFT side of the cervical spine.   He is very limited with LEFT lateral bend and axial rotation secondary to pain      Diagnostic Testin x-ray views of the cervical spine +3 x-ray views of the LEFT shoulder. Cervical spine + LEFT shoulder    The patient has evidence of metastases incidental spasm in the cervical spine no evidence of a complete abnormality. LEFT shoulder x-rays demonstrate mild degenerative changes at the a.c. joint and moderately sloped acromion, no acute bony abnormalities    Orders     Orders Placed This Encounter   Procedures    XR SHOULDER LEFT (MIN 2 VIEWS)     96259     Order Specific Question:   Reason for exam:     Answer:   Pain    XR Cervical Spine 2 or 3 VW    Ambulatory referral to Physical Therapy     Referral Priority:   Routine     Referral Type:   Eval and Treat     Referral Reason:   Specialty Services Required     Requested Specialty:   Physical Therapy     Number of Visits Requested:   1         Assessment / Treatment Plan:     1. LEFT shoulder rotator cuff tendinitis with cervical strain    2. Recent LEFT knee arthroscopy  At this time I recommended a Medrol Dosepak and a muscle relaxer. The primary thing at this point is usually the neck symptomatology. I have personally performed and/or participated in the history, exam and medical decision making and agree with all pertinent clinical information. I have also reviewed and agree with the past medical, family and social history unless otherwise noted. This dictation was performed with a verbal recognition program (DRAGON) and it was checked for errors. It is possible that there are still dictated errors within this office note. If so, please bring any errors to my attention for an addendum. All efforts were made to ensure that this office note is accurate.           Ray Trujillo MD

## 2018-01-16 NOTE — PLAN OF CARE
of Motivation                        [x]Co-Morbidities              []Cognitive Function, education/learning barriers              []Environmental, home barriers              []profession/work barriers  [x]past PT/medical experience  []other:  Justification: see above     Falls Risk Assessment (30 days):   [x] Falls Risk assessed and no intervention required. [] Falls Risk assessed and Patient requires intervention due to being higher risk   TUG score (>12s at risk):     [] Falls education provided, including       G-Codes:  PT G-Codes  Functional Assessment Tool Used: Quick Dash  Score: 45%  Functional Limitation: Carrying, moving and handling objects  Carrying, Moving and Handling Objects Current Status (): At least 40 percent but less than 60 percent impaired, limited or restricted  Carrying, Moving and Handling Objects Goal Status ():  At least 20 percent but less than 40 percent impaired, limited or restricted    ASSESSMENT:   Functional Impairments   [x]Noted spinal or UE joint hypomobility   []Noted spinal or UE joint hypermobility   [x]Decreased UE functional ROM   []Decreased UE functional strength   []Abnormal reflexes/sensation/myotomal/dermatomal deficits   [x]Decreased RC/scapular/core strength and neuromuscular control   []other:      Functional Activity Limitations (from functional questionnaire and intake)   [x]Reduced ability to tolerate prolonged functional positions   [x]Reduced ability or difficulty with changes of positions or transfers between positions   [x]Reduced ability to maintain good posture and demonstrate good body mechanics with sitting, bending, and lifting   [x] Reduced ability or tolerance with driving and/or computer work   [x]Reduced ability to sleep   [x]Reduced ability to perform lifting, reaching, carrying tasks   []Reduced ability to tolerate impact through UE   [x]Reduced ability to reach behind back   []Reduced ability to  or hold objects   [x]Reduced ability

## 2018-01-16 NOTE — PLAN OF CARE
difficulty moving his neck and is even feeling spasm into his jaw (when yawning.) He denies pain radiating into his lower arm, feels most of the pain is localized to lateral neck and upper and lateral shoulder. He has not been able to sleep comfortably on his L side now for a few months, so sleeps on his R side on a memory foam pillow. His shoulder has been painful the past few months with end range overhead reaching or reaching behind his body, but he hasn't had trouble lifting. He denies trouble moving his L arm below shoulder height, but d/t pain and limited motion in his neck the past few days, driving has gotten difficult. Pt got muscle relaxor and steroid pack (started last night/this am). He is very discouraged that he can't work on his L knee (is in PT currently finishing up rehab after L menisectomy) and his neck/shoulder all in the same PT visit. Relevant Medical History:xrays per EMR: The patient has evidence of metastases incidental spasm in the cervical spine no evidence of a complete abnormality. LEFT shoulder x-rays demonstrate mild degenerative changes at the a.c. joint and moderately sloped acromion, no acute bony abnormalities  Also R rotator cuff injury in past, hx B medial/lateral epicondylitis. Hx of prostate cancer. A-fib, PVC's, hypertrophic cardiomyopathy (no e-stim)    Functional Disability Index:PT G-Codes  Functional Assessment Tool Used: Quick Dash  Score: 45%  Functional Limitation: Carrying, moving and handling objects  Carrying, Moving and Handling Objects Current Status (): At least 40 percent but less than 60 percent impaired, limited or restricted  Carrying, Moving and Handling Objects Goal Status ():  At least 20 percent but less than 40 percent impaired, limited or restricted    Pain Scale: 7-8 in neck and \"20\" when he feels spasm in his neck, no shoulder pain entering PT for eval today/10  Easing factors: muscle relaxor helped him sleep a bit (1st dose last night)  Provocative factors: overhead or behind body reaching     Type: [x]Constant -neck  [x]Intermittent -shoulder []Radiating []Localized []other:     Numbness/Tingling: denies    Occupation/School: -desk work    Living Status/Prior Level of Function: Independent with ADLs and IADLs, more limitation in L shoulder reaching overhead or behind his body, recent trouble driving d/t neck tightness    OBJECTIVE:     CERV ROM     Cervical Flexion 30 with L upper cervical pain    Cervical Extension 23    Cervical SB 10 with upper cervical  20 with L pain   Cervical rotation 24 with l upper cervical P 60 with R upper cervical pain        ROM Left Right   Shoulder Flex 155 153   Shoulder Abd 175 175   Shoulder ER Functional Thumb to C7  P 85 with pain post/lat shoulder Functional Thumb to C7   Shoulder IR Functional thumb to T12 with pain post/lat shoulder  P 70 but pain post/lat shoulder Functional thumb to T12                  Strength  Left Right   Shoulder Flex 5 with min post/lat shoulder pain 5   Shoulder Scap 5 with min post/lat shoulder pain 5   Shoulder ER 5 5   Shoulder IR 5 5   Biceps  Triceps 5  5 5  5   Rhomboids  middlt trap  Low trap At least 2- (partial scap squeeze)  NT d/t pain/neck discomfort this date >2+ full scap squeeze     Reflexes/Sensation:    [x]Dermatomes/Myotomes intact    [x]Reflexes equal and normal bilaterally: 1+ B brachioradialis and Achilles, diminished B biceps/triceps/Patellar   [x]Other:- landaverde's, - babinski, -clonus     Joint mobility:    []Normal    [x]Hypo   []Hyper    Palpation: posterior cufff, subscap, upper trap, levator scap, C3-4-5 SP and L TP, denies pain with palpation of 1st and 2nd rib and proper elevation/depression    Functional Mobility/Transfers: guarded movement    Posture: sits with very forward head and B scapular protraction, inc'd cervical lordosis and inc'd thoracic kyphosis, dec'd lumbar lordosis    Bandages/Dressings/Incisions: old incision medial L elbow    Gait: (include devices/WB status): dec'd L stance/stride length d/t recent L knee sx    Orthopedic Special Tests:  Neer + HK  + Full can -    Empty can -  , - Obryon's, clunk    Spurling's  + L but no radiation of symptoms into shoulder or distally  Sharps ronak - C2 kick  - Tectorial membrane -  hautard's  (unable to test d/t pain/limited ROM) but passive supine vertebral artery testing ok                       [x] Patient history, allergies, meds reviewed. Medical chart reviewed. See intake form. Review Of Systems (ROS):  [x]Performed Review of systems (Integumentary, CardioPulmonary, Neurological) by intake and observation. Intake form has been scanned into medical record. Patient has been instructed to contact their primary care physician regarding ROS issues if not already being addressed at this time.       Co-morbidities/Complexities (which will affect course of rehabilitation):   []None           Arthritic conditions   []Rheumatoid arthritis (M05.9)  [x]Osteoarthritis (M19.91)   Cardiovascular conditions   [x]Hypertension (I10)  []Hyperlipidemia (E78.5)  []Angina pectoris (I20)  []Atherosclerosis (I70)   Musculoskeletal conditions   []Disc pathology   []Congenital spine pathologies   [x]Prior surgical intervention  []Osteoporosis (M81.8)  []Osteopenia (M85.8)   Endocrine conditions   []Hypothyroid (E03.9)  []Hyperthyroid Gastrointestinal conditions   []Constipation (E54.93)   Metabolic conditions   []Morbid obesity (E66.01)  []Diabetes type 1(E10.65) or 2 (E11.65)   []Neuropathy (G60.9)     Pulmonary conditions   []Asthma (J45)  []Coughing   []COPD (J44.9)   Psychological Disorders  []Anxiety (F41.9)  []Depression (F32.9)   []Other:   [x]Other:  Hypertrophic cardiomyopathy, PVCs        Barriers to/and or personal factors that will affect rehab potential:              []Age  []Sex              []Motivation/Lack of Motivation                        [x]Co-Morbidities Restrictions   [x]Reduced participation in self care activities   [x]Reduced participation in home management activities   [x]Reduced participation in work activities   [x]Reduced participation in social activities. [x]Reduced participation in sport/recreation activities. Classification:   []Signs/symptoms consistent with post-surgical status including decreased ROM, strength and function.   []Signs/symptoms consistent with joint sprain/strain   [x]Signs/symptoms consistent with shoulder impingement   []Signs/symptoms consistent with shoulder/elbow/wrist tendinopathy   []Signs/symptoms consistent with Rotator cuff tear   []Signs/symptoms consistent with labral tear   []Signs/symptoms consistent with postural dysfunction    []Signs/symptoms consistent with Glenohumeral IR Deficit - <45 degrees   [x]Signs/symptoms consistent with facet dysfunction of cervical/thoracic spine    []Signs/symptoms consistent with pathology which may benefit from Dry needling     []other:     Prognosis/Rehab Potential:      []Excellent   [x]Good    []Fair   []Poor    Tolerance of evaluation/treatment:    []Excellent   []Good    [x]Fair   []Poor  Physical Therapy Evaluation Complexity Justification  [x] A history of present problem with:  [] no personal factors and/or comorbidities that impact the plan of care;  []1-2 personal factors and/or comorbidities that impact the plan of care  [x]3 personal factors and/or comorbidities that impact the plan of care  [x] An examination of body systems using standardized tests and measures addressing any of the following: body structures and functions (impairments), activity limitations, and/or participation restrictions;:  [] a total of 1-2 or more elements   [] a total of 3 or more elements   [x] a total of 4 or more elements   [x] A clinical presentation with:  [] stable and/or uncomplicated characteristics   [x] evolving clinical presentation with changing characteristics  [] unstable and unpredictable characteristics;   [x] Clinical decision making of [] low, [x] moderate, [] high complexity using standardized patient assessment instrument and/or measurable assessment of functional outcome. [] EVAL (LOW) 14378 (typically 20 minutes face-to-face)  [x] EVAL (MOD) 97554 (typically 30 minutes face-to-face)  [] EVAL (HIGH) 67802 (typically 45 minutes face-to-face)  [] RE-EVAL       PLAN:  Frequency/Duration:  2 days per week for 4-5 Weeks:  INTERVENTIONS:  [x] Therapeutic exercise including: strength training, ROM, for Upper extremity and core   [x]  NMR activation and proprioception for UE, scap and Core   [x] Manual therapy as indicated for shoulder, scapula and spine to include: Dry Needling/IASTM, STM, PROM, Gr I-IV mobilizations, manipulation. [x] Modalities as needed that may include: thermal agents, E-stim, Biofeedback, US, iontophoresis as indicated  [x] Patient education on joint protection, postural re-education, activity modification, progression of HEP. HEP instruction: (see scanned forms)    GOALS:  Patient stated goal: Reduce neck and shoulder pain, regain shoulder ROM, eventually be able to get back to softball (pending knee recovery as well after sx)    Therapist goals for Patient:   Short Term Goals: To be achieved in: 2 weeks  1. Independent in HEP and progression per patient tolerance, in order to prevent re-injury. 2. Patient will have a decrease in pain to facilitate improvement in movement, function, and ADLs as indicated by Functional Deficits. Long Term Goals: To be achieved in: 4-5 weeks  1. Disability index score of 15% or less for the [facility] to assist with reaching prior level of function. 2. Patient will demonstrate increased AROM cervical spine >/= 120 deg flex/ext arc, >30 deg SB, >75 deg R/L rotation to allow for proper joint functioning as indicated by patients Functional Deficits: driving.   3. Pt will demonstrate pain-free L shoulder ROM without c/o impingement for ability to perform overhead and behind body ADLs. 3. Patient will demonstrate an increase in Strength to 5/5 scapular stabilizers, DNFs to allow for proper functional mobility as indicated by patients Functional Deficits: lifting 5# table to shelf height with LUE for household chores. 4. Patient will return to sleeping on L side without increased symptoms or restriction.    5. Pt will participate in GAP to aid return to softball (given recent L shoulder pain, recovery from knee sx) (patient specific functional goal)       Electronically signed by:  Anabell Mcguire, PT, DPT

## 2018-01-16 NOTE — FLOWSHEET NOTE
work  [] (68210) Reviewed/Progressed HEP activities related to improving balance, coordination, kinesthetic sense, posture, motor skill, proprioception of scapular, scapulothoracic and UE control with self care, reaching, carrying, lifting, house/yardwork, driving/computer work      Manual Treatments:  PROM / STM / Oscillations-Mobs:  G-I, II, III, IV (PA's, Inf., Post.)  [x] (29242) Provided manual therapy to mobilize soft tissue/joints of cervical/CT, scapular GHJ and UE for the purpose of modulating pain, promoting relaxation,  increasing ROM, reducing/eliminating soft tissue swelling/inflammation/restriction, improving soft tissue extensibility and allowing for proper ROM for normal function with self care, reaching, carrying, lifting, house/yardwork, driving/computer work    Modalities:  Moist heat cervical spine and L sup shoulder x15 min supine (bolster under knees)    Charges:  Timed Code Treatment Minutes: 25   Total Treatment Minutes: 70 (eval, heat)     [] EVAL (LOW) 39321 (typically 20 minutes face-to-face)  [x] EVAL (MOD) 42744 (typically 30 minutes face-to-face)  [] EVAL (HIGH) 60227 (typically 45 minutes face-to-face)  [] RE-EVAL     [] NZ(66852) x      [] IONTO  [] NMR (07280) x      [] VASO  [x] Manual (37796) x  2    [] Other:  [] TA x       [] Mech Traction (80162)  [] ES(attended) (98838)      [] ES (un) (36707):     GOALS:Short Term Goals: To be achieved in: 2 weeks  1. Independent in HEP and progression per patient tolerance, in order to prevent re-injury. 2. Patient will have a decrease in pain to facilitate improvement in movement, function, and ADLs as indicated by Functional Deficits. Long Term Goals: To be achieved in: 4-5 weeks  1. Disability index score of 15% or less for the [facility] to assist with reaching prior level of function.    2. Patient will demonstrate increased AROM cervical spine >/= 120 deg flex/ext arc, >30 deg SB, >75 deg R/L rotation to allow for proper joint

## 2018-01-18 ENCOUNTER — HOSPITAL ENCOUNTER (OUTPATIENT)
Dept: PHYSICAL THERAPY | Age: 58
Discharge: HOME | End: 2018-01-18
Admitting: ORTHOPAEDIC SURGERY

## 2018-01-18 NOTE — FLOWSHEET NOTE
[facility],  [address] [place]  Phone: [phone]  Fax [phone]    ATHLETIC TRAINING [address]  Date:  2018    Patient Name:  Sandip Kerns    :  1960  MRN: [id number]  Restrictions/Precautions:    Medical/Treatment Diagnosis Information:  ·   Diagnosis: M75.82 (ICD-10-CM) - Rotator cuff tendinitis, left  ·   Treatment Diagnosis: cervical pain M54.2, L shoulder pain M25.512, L shoulder impingement 75.42  Physician Information:    Referring Practitioner: Mg Lombardo Post-op  2wks    4 wks 6 wks 8 wks   3wks 6 wks 9 wks 12 wks                        Activity Log                                                          DOS/DOI:                                                         Date: 18   ATC Commun        Plyoback      Chop                         Chest                         ER Flip     1/2 foam roll pec stretch 3x30\"   Long/Short lever  Flex. Scap.                                 ABd.         punch        Body/Cardio Blade Flex/Ext                                    IR/ER                                    ABd/ADd        Push-up      Plus                           Wall  OVL alt pullbacks 15x                       Table                        Floor        Ball on Wall                Tramp        Theraband    Rows/Ext RTB 2x10 ea                         IR                          ER                          No money                          Horiz. ABd                          Biceps                          Triceps RTB 2x10                         Punches Lat pull RTB 2x10       Cable Column   Rows                               Ext.                                Lat. Pulldown                               Biceps                               Triceps        Modality MHP 10'   Initials BMG   Time spent with PT assistant

## 2018-01-25 ENCOUNTER — HOSPITAL ENCOUNTER (OUTPATIENT)
Dept: PHYSICAL THERAPY | Age: 58
Discharge: HOME | End: 2018-01-25

## 2018-01-25 NOTE — FLOWSHEET NOTE
[facility], [address] [place]  Phone: [phone]  Fax [phone]    ATHLETIC TRAINING [address]  Date:  2018    Patient Name:  Anders Owusu    :  1960  MRN: [id number]  Restrictions/Precautions:    Medical/Treatment Diagnosis Information:  ·   Diagnosis: M75.82 (ICD-10-CM) - Rotator cuff tendinitis, left  ·   Treatment Diagnosis: cervical pain M54.2, L shoulder pain M25.512, L shoulder impingement 75.42  Physician Information:    Referring Practitioner: Sonya Pickard Post-op  2wks    4 wks 6 wks 8 wks   3wks 6 wks 9 wks 12 wks                        Activity Log                                                          DOS/DOI:                                                         Date: 18   ATC Commun  Pt states he is feeling good today. Reports no soreness from previous visit. PT requested increase in activity today. Pt is also being treated for his knee, so PT requested biking for 5'. Bike  5'        Plyoback      Chop                          Chest                          ER Flip      1/2 foam roll pec stretch 3x30\"    Long/Short lever  Flex. Scap.                                  ABd.           punch          Body/Cardio Blade Flex/Ext                                     IR/ER                                     ABd/ADd          Push-up      Plus                            Wall  OVL alt pullbacks 15x OVL alt pullbacks 15x  OVL wall walks 5x                       Table                         Floor          Ball on Wall                 Tramp          Theraband    Rows/Ext RTB 2x10 ea GTB 2x15\" ea                         IR                           ER                           No money  OVL 10x3\"                         Horiz.  ABd                           Biceps                           Triceps RTB 2x10 GTB 2x15
driving/computer work. Therapeutic Activities:    [] (50676 or 42757) Provided verbal/tactile cueing for activities related to improving balance, coordination, kinesthetic sense, posture, motor skill, proprioception and motor activation to allow for proper function of scapular, scapulothoracic and UE control with self care, carrying, lifting, driving/computer work.      Home Exercise Program:    [x] (50732) Reviewed/Progressed HEP activities related to strengthening, flexibility, endurance, ROM of scapular, scapulothoracic and UE control with self care, reaching, carrying, lifting, house/yardwork, driving/computer work  [] (44907) Reviewed/Progressed HEP activities related to improving balance, coordination, kinesthetic sense, posture, motor skill, proprioception of scapular, scapulothoracic and UE control with self care, reaching, carrying, lifting, house/yardwork, driving/computer work      Manual Treatments:  PROM / STM / Oscillations-Mobs:  G-I, II, III, IV (PA's, Inf., Post.)  [x] (15884) Provided manual therapy to mobilize soft tissue/joints of cervical/CT, scapular GHJ and UE for the purpose of modulating pain, promoting relaxation,  increasing ROM, reducing/eliminating soft tissue swelling/inflammation/restriction, improving soft tissue extensibility and allowing for proper ROM for normal function with self care, reaching, carrying, lifting, house/yardwork, driving/computer work    Modalities:  Moist heat cervical spine and L sup shoulder x15 min supine (bolster under knees)    Charges:  Timed Code Treatment Minutes: 40   Total Treatment Minutes: 50(heat)     [] EVAL (LOW) 22931 (typically 20 minutes face-to-face)  [] EVAL (MOD) 76794 (typically 30 minutes face-to-face)  [] EVAL (HIGH) 76465 (typically 45 minutes face-to-face)  [] RE-EVAL     [x] FG(49988) x  1   [] IONTO  [] NMR (86232) x      [] VASO  [x] Manual (24876) x  2    [] Other:  [] TA x       [] Mech Traction (28511)  [] ES(attended) (91062)

## 2018-01-30 ENCOUNTER — HOSPITAL ENCOUNTER (OUTPATIENT)
Dept: PHYSICAL THERAPY | Age: 58
Discharge: HOME | End: 2018-01-30

## 2018-01-30 NOTE — FLOWSHEET NOTE
[facility], [address] [place] [address] [place]  Phone: [phone]  Fax [phone]    Physical Therapy Daily Treatment Note  Date:  2018    Patient Name:  Laurie Martínez    [facility]  :  1960  MRN: [id number]  Restrictions/Precautions:    Physician Information:  Referring Practitioner: Dr. Nahum Gavin  Medical/Treatment Diagnosis Information:  · Diagnosis: Left knee bucket handle tear  S83.212D   DOS: 17  s/p PMM, chondro, syno  · Treatment Diagnosis:  Left knee pain  M25.562    [] Conservative / [] Surgical - DOS: 17  Therapy Diagnosis/Practice Pattern:  Practice Pattern I: Bony or Soft Tissue Surgery  Insurance/Certification information:  PT Insurance Information: [facility] signed: [] YES  [x] NO  Number of Comorbidities:  []0     [x]1-2    []3+  Date of Patient follow up with Physician:     G-Code (if applicable):      Date G-Code Applied:  17  PT G-Codes  Functional Assessment Tool Used: LEFS  Score: 74  Functional Limitation: Mobility: Walking and moving around  Mobility: Walking and Moving Around Current Status (): At least 60 percent but less than 80 percent impaired, limited or restricted  Mobility: Walking and Moving Around Goal Status (): At least 20 percent but less than 40 percent impaired, limited or restricted    Progress Note: [x]  Yes  []  No  Next due by: Visit #10        Latex Allergy:  [x]NO      []YES  Preferred Language for Healthcare:   [x]English       []other:    Visit # Insurance Allowable Reporting Period   7 this POC  40-no auth needed Begin Date: 2018               End Date:      RECERT DUE BY:     SUBJECTIVE:  Pt reports improved neck pain (still not back to baseline) but he'd like to work on his knee today as he still feels stiff around the medial knee.  He is tight into end range flexion still and is frustrated by how long it's taking for flexion to be

## 2018-02-01 ENCOUNTER — HOSPITAL ENCOUNTER (OUTPATIENT)
Dept: PHYSICAL THERAPY | Age: 58
Discharge: HOME | End: 2018-02-28
Attending: ORTHOPAEDIC SURGERY | Admitting: ORTHOPAEDIC SURGERY

## 2018-02-02 ENCOUNTER — HOSPITAL ENCOUNTER (OUTPATIENT)
Dept: PHYSICAL THERAPY | Age: 58
Discharge: HOME | End: 2018-02-03

## 2018-02-02 ENCOUNTER — TELEPHONE (OUTPATIENT)
Dept: CARDIOLOGY CLINIC | Age: 58
End: 2018-02-02

## 2018-02-05 ENCOUNTER — HOSPITAL ENCOUNTER (OUTPATIENT)
Dept: PHYSICAL THERAPY | Age: 58
Discharge: HOME | End: 2018-02-06

## 2018-02-05 NOTE — FLOWSHEET NOTE
balance on airex 5x15\" R,L                                           Therapeutic Exercise and NMR EXR  [x] (47610) Provided verbal/tactile cueing for activities related to strengthening, flexibility, endurance, ROM for improvements in LE, proximal hip, and core control with self care, mobility, lifting, ambulation.  [] (09948) Provided verbal/tactile cueing for activities related to improving balance, coordination, kinesthetic sense, posture, motor skill, proprioception  to assist with LE, proximal hip, and core control in self care, mobility, lifting, ambulation and eccentric single leg control.      NMR and Therapeutic Activities:    [x] (34102 or 73632) Provided verbal/tactile cueing for activities related to improving balance, coordination, kinesthetic sense, posture, motor skill, proprioception and motor activation to allow for proper function of core, proximal hip and LE with self care and ADLs  [] (56550) Gait Re-education- Provided training and instruction to the patient for proper LE, core and proximal hip recruitment and positioning and eccentric body weight control with ambulation re-education including up and down stairs     Home Exercise Program:    [x] (38262) Reviewed/Progressed HEP activities related to strengthening, flexibility, endurance, ROM of core, proximal hip and LE for functional self-care, mobility, lifting and ambulation/stair navigation   [] (97474)Reviewed/Progressed HEP activities related to improving balance, coordination, kinesthetic sense, posture, motor skill, proprioception of core, proximal hip and LE for self care, mobility, lifting, and ambulation/stair navigation      Manual Treatments:  PROM / STM / Oscillations-Mobs:  G-I, II, III, IV (PA's, Inf., Post.)  [x] (10932) Provided manual therapy to mobilize LE, proximal hip and/or LS spine soft tissue/joints for the purpose of modulating pain, promoting relaxation,  increasing ROM, reducing/eliminating soft tissue swelling/inflammation/restriction, improving soft tissue extensibility and allowing for proper ROM for normal function with self care, mobility, lifting and ambulation. Modalities: ice A/P knee x10 min     Charges:  Timed Code Treatment Minutes: 40   Total Treatment Minutes: 55 (ice, bike)     [] EVAL (LOW) 16426 (typically 20 minutes face-to-face)  [] EVAL (MOD) 65822 (typically 30 minutes face-to-face)  [] EVAL (HIGH) 66746 (typically 45 minutes face-to-face)  [] RE-EVAL     [x] SR(52116) x  2   [] IONTO  [] NMR (21163) x      [] VASO  [x] Manual (78590) x  1    [] Other:  [] TA x       [] Mech Traction (62474)  [] ES(attended) (46707)      [] ES (un) (81819):     GOALS:   1. Disability index score of 35% or less for the LEFS to assist with reaching prior level of function. 2. Patient will demonstrate increased AROM of left knee from 0 - 135  to allow for proper joint functioning as indicated by patients Functional Deficits. 3. Patient will demonstrate an increase in Strength to good proximal hip strength and control, within 5lb HHD in LE to allow for proper functional mobility as indicated by patients Functional Deficits. 4. Patient will return to ascending and descending stairs with reciprocal, symmetrical gait without increased symptoms or restriction. 5. Possible GAP candidate for return to baseball. New or Updated Goals (if applicable):  [x] No change to goals established upon initial eval/last progress note:  New Goals:    Progression Towards Functional goals:   [x] Patient is progressing as expected towards functional goals listed. [] Progression is slowed due to complexities listed. [] Progression has been slowed due to co-morbidities.   [] Plan just implemented, too soon to assess goals progression  [] Other:     ASSESSMENT:    [x] Improvement noted relative to goals:  [] No Improvement noted related to goals:  Summary/Patient's response to treatment: Pt is doing fairly well at this

## 2018-02-08 ENCOUNTER — HOSPITAL ENCOUNTER (OUTPATIENT)
Dept: PHYSICAL THERAPY | Age: 58
Discharge: HOME | End: 2018-02-09

## 2018-02-08 NOTE — FLOWSHEET NOTE
Min swelling peripatellar area L knee, AROM knee 0-132; gait with reciprocal pattern and stance but dec'd stride length; pt is still wearing compression stockings B LEs (no notable edema distal to knee but pt states by end of the day he gets pitting.) - Cristal's  Palpation: TTP medial knee at joint line and pes anserine, good patellar mobility, pt c/o tightness in hamstring med/lat, ITB and slightly less in calf. Test used Initial score Current Score   Pain Summary VAS  0-1 or 2/10 still at medial knee   Functional questionnaire LEFS     ROM flexion  132    extension  0   Strength quad      ABD      flexion          RESTRICTIONS/PRECAUTIONS: history of prostate CA,  A fib, hernias.     Exercises/Interventions:     Therapeutic Ex Sets/reps Notes   Pt ed: continue to wear compression stocking; he may need a thigh high stocking as his current stocking falls down; takes 8 weeks to make strength gains 5'    Bike  Non-billable time after set-up   QS  hep   SLR 1.5# hep   SL hip abd-benjy wall slide 1.5# hep   Heel slides on SB   5\" 2x10 hep   SAQ with VMO squeeze  4#   Long-sitting HS s with med and lat bias    Supine ITB stretch  Standing ITB stretch foot propped on stool  John stretch       3x30\" Hep    ITB stretch with strap with AT following tx   incline board gastroc stretch 3x  ;30 hep   Clamshell   Red tband  HEP   Bridge with adductor squeeze  Bridge with CL march     LAQ with VMO squeeze  5#    Side-stepping in mini squat  MW  Retro MW 30'x2 ea OVL   FSU 8\"  Up and over 6\"  6\" LSD 2x10  x10  x15 With AT   Mini squat 2X10 With AT    Leg press  With AT   [facility]  With AT   Glider hip ABD/Ext, ext 20x OVL at ankles   Sit to stand DL  Sit to stand partial SL (RLE heel down, toes up)  Normal chair        Manual Intervention     Pat mobs: all 3'         STM quad (lat>med), distal ITB, pes anserine; SASTM ITB, parminder-patella 8'    FR ITB  With AT following tx   HS stretch, ITB stretch 3x30\"    tib-fem distraction GIII 3' pt's neck pain is improving, worked on his knee for a second time this week. Added more manual stretching and tib-fem distraction in efforts to decrease stiffness. This did seem to improve stiffness. Continue to advocate for a thigh high compression stocking to alleviate swelling issue. He will benefit from continued quad strengthening to aid eccentric control on steps, squatting, and continued hip abductor strengthening to reduce strain on lateral knee likely caused bc continued muscle weakness/imbalance. Recommend pt continue to work on LE strength HEP 3x/week, and daily stretching as he is also working on improving his L shoulder and neck pain and feels limited by time to complete all his exercises. Treatment/Activity Tolerance:  [x] Patient tolerated treatment well [] Patient limited by fatique  [] Patient limited by pain  [] Patient limited by other medical complications  [] Other:     Prognosis: [x] Good [] Fair  [] Poor    Patient Requires Follow-up: [x] Yes  [] No    PLAN: Cont to build quad strength and hip stability. Continue manual tx prn, can still consider SASTM for limitations in quad, HS and ITB. Pt to consider 1-2x/week PT for knee POC and 1x/week for neck/shoulder POC d/t time needed to fully address his limitations.   [x] Continue per plan of care [] Alter current plan (see comments)  [] Plan of care initiated [] Hold pending MD visit [] Discharge    Electronically signed by: Radha Hope, PT, DPT

## 2018-02-08 NOTE — FLOWSHEET NOTE
[facility],  [address] [place]  Phone: [phone]  Fax [phone]      ATHLETIC TRAINING [address]  Date:  2018    Patient Name:  Rita Triplett"  :  1960  MRN: [id number]  Restrictions/Precautions:    Medical/Treatment Diagnosis Information:  ·   Diagnosis: Left knee bucket handle tear  S83.212D   DOS: 17  s/p PMM, chondro, syno  ·   Treatment Diagnosis:  Left knee pain  M25.562    Physician Information:    Dr. Rasmussen Due    Weeks Post-op  8 wks  12 wks 16 wks 20 wks   24 wks                            Activity Log                                                  DOS/DOI:                                                    Date: 18   ATC communication:  Baseball - 1B, 3B, catcher  Desk job  HX: abdominal hernia, degen disc LB Needs ITB/quad str post RX  Down step - challenge  Pt's hips were fatigued prior to working on [address]      Elliptical      Treadmill      Airdyne            Gastroc stretch      Soleus stretch      Hamstring stretch      ITB stretch      Hip Flexor stretch      Quad stretch      Adductor stretch            Weight Shifting sp                                fp                                tp      Lateral walking (with/w/o TB)            Balance: PEP/Sparkle board                     SLS W/ PT W/PT           Star excursion load/explode            Extremity reach UE/LE            Leg Press Talha. (3 holes) 100# 3x10 100# 3x10  110# 2x10                      Ecc. NV 80# 2x10 90# 2x10                      Inv. Calf Press Talha.  NV standing 1/2 foam roll 20x 90# 2x10                       Ecc.                          Inv.            KASSY   Flex                 ABd 45# R/L 3x10 60# R/L 2x10  Held today              ADd                TKE 45# 20x5\" 60# 20x5\"               Ext 45# R/L 3x10 60# R/L 2x10  60# R/L 2x10          Steps Up W/ PT 8\" 15x

## 2018-02-12 ENCOUNTER — HOSPITAL ENCOUNTER (OUTPATIENT)
Dept: PHYSICAL THERAPY | Age: 58
Discharge: HOME | End: 2018-02-13

## 2018-02-12 NOTE — PROGRESS NOTES
Poor    Plan of Care:  [x] Continue Current Therapy Intervention    Frequency/Duration:  1-2 days per week for 4 Weeks:  HEP instruction:   1. Ther ex including: strength training, ROM, NMR and proprioception for the proximal hip, core and Lower extremity  2. Manual therapy as indicated including Dry Needling/IASTM, STM, PROM, Gr I-IV mobilizations, spinal mobilization/manipulation. 3. Modalities as needed including: thermal agents, E-stim, US, iontophoresis as indicated. 4. Patient education on joint protection, activity modification, progression of HEP.         Electronically signed by:  Randell Valente, PT, DPT

## 2018-02-12 NOTE — FLOWSHEET NOTE
pattern. He is not back to recreational activities at this time. OBJECTIVE:   Observation: pt is not wearing compression stockings anymore (no notable edema distal to knee but pt states by end of the day he gets pitting.) - Cristal's  Palpation: TTP medial knee at joint line and pes anserine, good patellar mobility, pt c/o tightness in hamstring med/lat, ITB and slightly less in calf. Test used Initial score Current Score   Pain Summary VAS 6/10 0-1 or 2/10 still at medial knee   Functional questionnaire LEFS 74% disability 59/80 or 26% disability   ROM flexion 95 127 with stiffness at end range  AAROM 130 with stiffness    extension 0 0   Strength Knee extension  Good quad set 5    Hip flexion  indep SLR 5    ABD NT 4+    Knee flexion NT 5   DL squat      Single leg stance   105 with 2/10 pain               RESTRICTIONS/PRECAUTIONS: history of prostate CA,  A fib, hernias.     Exercises/Interventions:     Therapeutic Ex Sets/reps Notes   Pt ed: continue to wear compression stocking; he may need a thigh high stocking as his current stocking falls down; takes 8 weeks to make strength gains 5'    Bike 5' Non-billable time after set-up   QS  hep   SLR 1.5# hep   SL hip abd-benjy wall slide 1.5# hep   Heel slides on SB   5\" 1x10 hep   SAQ with VMO squeeze  4#   Long-sitting HS s with med and lat bias    Supine ITB stretch  Standing ITB stretch foot propped on stool  Standing rectus stretch       3x30\" Hep    ITB stretch with strap with AT following tx   incline board gastroc stretch 3x  ;30 hep   Clamshell   Red tband  HEP   Bridge with adductor squeeze  Bridge with CL march     LAQ with VMO squeeze  5#    Side-stepping in mini squat  MW  Retro MW 30'x2 ea GVL   FSU 8\"  Up and over 6\"  6\" LSD 2x10  x10  x15 With AT   Mini squat 2X10 With AT    Leg press  With AT   [facility]  With AT   Glider hip ABD/Ext, ext 20x GVL at ankles   Sit to stand DL  Sit to stand partial SL (RLE heel down, toes up) 10x ea Normal chair complexities listed. [] Progression has been slowed due to co-morbidities. [] Plan just implemented, too soon to assess goals progression  [] Other:     ASSESSMENT:    [x] Improvement noted relative to goals:  [] No Improvement noted related to goals:  Summary/Patient's response to treatment: Overall, pt has done well post-op. However he has not met all of his goals at this time, with his main deficit being achieving end range knee flexion. Have started more tib-fem mobilizations in the last two sessions as well as have continued with STM for surrounding musculature to decrease these sx. He tests well on concentric MMT, but during CK[facility] quad strengthening (squatting, side stepping and stairs), it is clear that eccentric quad control is still lacking. He will benefit from continued quad strengthening to aid eccentric control on steps, squatting, and continued hip abductor strengthening to reduce strain on lateral knee likely caused bc continued muscle weakness/imbalance. Recommend pt continue to work on LE strength HEP 3x/week, and daily stretching as he is also working on improving his L shoulder and neck pain and feels limited by time to complete all his exercises. Treatment/Activity Tolerance:  [x] Patient tolerated treatment well [] Patient limited by fatique  [] Patient limited by pain  [] Patient limited by other medical complications  [] Other:     Prognosis: [x] Good [] Fair  [] Poor    Patient Requires Follow-up: [x] Yes  [] No    PLAN: Cont to build quad strength and hip stability. Continue manual tx prn, can still consider SASTM for limitations in quad, HS and ITB. May see pt up to 2x/week for this case  Depending on shoulder and neck sx.    [x] Continue per plan of care [] Alter current plan (see comments)  [] Plan of care initiated [] Hold pending MD visit [] Discharge    Electronically signed by: Monique Galindo, PT, DPT

## 2018-02-15 ENCOUNTER — HOSPITAL ENCOUNTER (OUTPATIENT)
Dept: PHYSICAL THERAPY | Age: 58
Discharge: HOME | End: 2018-02-16

## 2018-02-15 NOTE — FLOWSHEET NOTE
Ext 60# R/L 2x10  60# R/L 2x10  75# R/L 2x10            Steps Up 8\" 15x  4\" FSU to SLS on airex 15x3\"   6\" FSU to SLS on airex 15x3\"               Up and Over  BOSU 10x                Down                  Lateral 6\" LSD 15x   6\" post reach 15x 6\" LSD 15x   6\" post reach 15x 6\" LSD 15x   6\" post reach 5,6 10x 6\" LSD 15x    LSU and over BOSU 10x              Rotation              Squats  mini 15x                    wall                    BOSU  BOSU 15x DD 15x  BOSU 15x          Lunges:  Lunge to Balance                      Balance to Lunge                      Walking              Knee Extension Bilat. Ecc.                                  Inv. Hamstring Curls Bilat. Ecc.                                  Inv.              Soleus Press Bilat. Ecc.                              Inv.                                      Ladders                   Square                  Jump/Hop  Low                         Med.                         High                                                                     Modality CP 10' CP 10' CP 10'  CP 10'    Initials                             BMG  BMG BMG  BMG    Time spent with PT assistant

## 2018-02-15 NOTE — FLOWSHEET NOTE
FR ITB  With AT following tx   HS stretch, ITB stretch 2x30\"    tib-fem distraction GIII  Post tibial mob GIII 3'  4x20\"    NMR re-education     Tandem balance on airex      SL balance on 1/2 FR 5x15\" R,L    TT row in SLS LLE 20x red   TT pull down in DL squat on airex 20x black                                Therapeutic Exercise and NMR EXR  [x] (05487) Provided verbal/tactile cueing for activities related to strengthening, flexibility, endurance, ROM for improvements in LE, proximal hip, and core control with self care, mobility, lifting, ambulation.  [] (24482) Provided verbal/tactile cueing for activities related to improving balance, coordination, kinesthetic sense, posture, motor skill, proprioception  to assist with LE, proximal hip, and core control in self care, mobility, lifting, ambulation and eccentric single leg control.      NMR and Therapeutic Activities:    [x] (11684 or 15054) Provided verbal/tactile cueing for activities related to improving balance, coordination, kinesthetic sense, posture, motor skill, proprioception and motor activation to allow for proper function of core, proximal hip and LE with self care and ADLs  [] (22024) Gait Re-education- Provided training and instruction to the patient for proper LE, core and proximal hip recruitment and positioning and eccentric body weight control with ambulation re-education including up and down stairs     Home Exercise Program:    [x] (30125) Reviewed/Progressed HEP activities related to strengthening, flexibility, endurance, ROM of core, proximal hip and LE for functional self-care, mobility, lifting and ambulation/stair navigation   [] (78897)Reviewed/Progressed HEP activities related to improving balance, coordination, kinesthetic sense, posture, motor skill, proprioception of core, proximal hip and LE for self care, mobility, lifting, and ambulation/stair navigation      Manual Treatments:  PROM / STM / Oscillations-Mobs:  G-I, II, III, IV (PA's, Inf., Post.)  [x] (16538) Provided manual therapy to mobilize LE, proximal hip and/or LS spine soft tissue/joints for the purpose of modulating pain, promoting relaxation,  increasing ROM, reducing/eliminating soft tissue swelling/inflammation/restriction, improving soft tissue extensibility and allowing for proper ROM for normal function with self care, mobility, lifting and ambulation. Modalities: ice A/P knee x10 min     Charges:  Timed Code Treatment Minutes: 53   Total Treatment Minutes: 63(ice)     [] EVAL (LOW) 12025 (typically 20 minutes face-to-face)  [] EVAL (MOD) 84800 (typically 30 minutes face-to-face)  [] EVAL (HIGH) 86649 (typically 45 minutes face-to-face)  [] RE-EVAL     [x] QU(64660) x  2   [] IONTO  [x] NMR (34273) x  1   [] VASO  [x] Manual (17414) x  1    [] Other:  [] TA x       [] Mech Traction (07027)  [] ES(attended) (03175)      [] ES (un) (61194):     GOALS:   1. Disability index score of 35% or less for the LEFS to assist with reaching prior level of function. met  2. Patient will demonstrate increased AROM of left knee from 0 - 135  to allow for proper joint functioning as indicated by patients Functional Deficits. Progressing, not met  3. Patient will demonstrate an increase in Strength to good proximal hip strength and control, within 5lb HHD in LE to allow for proper functional mobility as indicated by patients Functional Deficits. Progressing, not met  4. Patient will return to ascending and descending stairs with reciprocal, symmetrical gait without increased symptoms or restriction. met  5. Possible GAP candidate for return to baseball. New or Updated Goals (if applicable):  [x] No change to goals established upon initial eval/last progress note:  New Goals:    Progression Towards Functional goals:   [x] Patient is progressing as expected towards functional goals listed. [] Progression is slowed due to complexities listed.   [] Progression has been slowed due to

## 2018-02-19 ENCOUNTER — HOSPITAL ENCOUNTER (OUTPATIENT)
Dept: PHYSICAL THERAPY | Age: 58
Discharge: HOME | End: 2018-02-20

## 2018-02-19 NOTE — FLOWSHEET NOTE
[facility], [address] [place] [address] Jac  Phone: [phone]  Fax [phone]    Physical Therapy Daily Treatment Note  Date:  2018    Patient Name:  Hafsa Sal    [facility]  :  1960  MRN: [id number]  Restrictions/Precautions:    Physician Information:  Referring Practitioner: Dr. Gera Monroe  Medical/Treatment Diagnosis Information:  · Diagnosis: Left knee bucket handle tear  S83.212D   DOS: 17  s/p PMM, chondro, syno  · Treatment Diagnosis:  Left knee pain  M25.562    [] Conservative / [] Surgical - DOS: 17  Therapy Diagnosis/Practice Pattern:  Practice Pattern I: Bony or Soft Tissue Surgery  Insurance/Certification information:  PT Insurance Information: [facility] signed: [] YES  [x] NO  Number of Comorbidities:  []0     [x]1-2    []3+  Date of Patient follow up with Physician:     G-Code (if applicable):      Date G-Code Applied:  17  PT G-Codes  Functional Assessment Tool Used: LEFS  Score: 26  Functional Limitation: Mobility: Walking and moving around  Mobility: Walking and Moving Around Current Status (): At least 20 percent but less than 40 percent impaired, limited or restricted  Mobility: Walking and Moving Around Goal Status (): At least 20 percent but less than 40 percent impaired, limited or restricted    Progress Note: [x]  Yes  []  No  Next due by: Visit #20        Latex Allergy:  [x]NO      []YES  Preferred Language for Healthcare:   [x]English       []other:    Visit # Insurance Allowable Reporting Period   12  40-no auth needed Begin Date: 2018               End Date:      RECERT DUE BY:     SUBJECTIVE:  Pt reports that he still has stiffness in his knee and this is his main complaint. Pt reports soreness in the back of the knee (soft area)  over the weekend.      OBJECTIVE:   Observation: pt is not wearing compression stockings anymore (no notable edema distal to SASTM ITB, parminder-patella, calf and HS 8'    FR ITB  With AT following tx   HS stretch 2x30\"    tib-fem distraction GIII  Post tibial mob GIII   4x20\"    NMR re-education     Tandem balance on airex      SL balance on 1/2 FR  NV   TT row in SLS LLE 20x black   TT pull down in DL squat on airex 20x black                                Therapeutic Exercise and NMR EXR  [x] (75552) Provided verbal/tactile cueing for activities related to strengthening, flexibility, endurance, ROM for improvements in LE, proximal hip, and core control with self care, mobility, lifting, ambulation.  [] (14308) Provided verbal/tactile cueing for activities related to improving balance, coordination, kinesthetic sense, posture, motor skill, proprioception  to assist with LE, proximal hip, and core control in self care, mobility, lifting, ambulation and eccentric single leg control.      NMR and Therapeutic Activities:    [x] (77741 or 38637) Provided verbal/tactile cueing for activities related to improving balance, coordination, kinesthetic sense, posture, motor skill, proprioception and motor activation to allow for proper function of core, proximal hip and LE with self care and ADLs  [] (34887) Gait Re-education- Provided training and instruction to the patient for proper LE, core and proximal hip recruitment and positioning and eccentric body weight control with ambulation re-education including up and down stairs     Home Exercise Program:    [x] (00637) Reviewed/Progressed HEP activities related to strengthening, flexibility, endurance, ROM of core, proximal hip and LE for functional self-care, mobility, lifting and ambulation/stair navigation   [] (95379)Reviewed/Progressed HEP activities related to improving balance, coordination, kinesthetic sense, posture, motor skill, proprioception of core, proximal hip and LE for self care, mobility, lifting, and ambulation/stair navigation      Manual Treatments:  PROM / STM / Oscillations-Mobs: G-I, II, III, IV (PA's, Inf., Post.)  [x] (89198) Provided manual therapy to mobilize LE, proximal hip and/or LS spine soft tissue/joints for the purpose of modulating pain, promoting relaxation,  increasing ROM, reducing/eliminating soft tissue swelling/inflammation/restriction, improving soft tissue extensibility and allowing for proper ROM for normal function with self care, mobility, lifting and ambulation. Modalities: ice A/P knee x10 min     Charges:  Timed Code Treatment Minutes: 62   Total Treatment Minutes: 72(ice)     [] EVAL (LOW) 31192 (typically 20 minutes face-to-face)  [] EVAL (MOD) 25670 (typically 30 minutes face-to-face)  [] EVAL (HIGH) 64587 (typically 45 minutes face-to-face)  [] RE-EVAL     [x] LB(49929) x  2   [] IONTO  [x] NMR (92328) x  1   [] VASO  [x] Manual (08849) x  1    [] Other:  [] TA x       [] Mech Traction (74412)  [] ES(attended) (85875)      [] ES (un) (04827):     GOALS:   1. Disability index score of 35% or less for the LEFS to assist with reaching prior level of function. met  2. Patient will demonstrate increased AROM of left knee from 0 - 135  to allow for proper joint functioning as indicated by patients Functional Deficits. Progressing, not met  3. Patient will demonstrate an increase in Strength to good proximal hip strength and control, within 5lb HHD in LE to allow for proper functional mobility as indicated by patients Functional Deficits. Progressing, not met  4. Patient will return to ascending and descending stairs with reciprocal, symmetrical gait without increased symptoms or restriction. met  5. Possible GAP candidate for return to baseball. New or Updated Goals (if applicable):  [x] No change to goals established upon initial eval/last progress note:  New Goals:    Progression Towards Functional goals:   [x] Patient is progressing as expected towards functional goals listed. [] Progression is slowed due to complexities listed.   [] Progression has

## 2018-02-22 ENCOUNTER — HOSPITAL ENCOUNTER (OUTPATIENT)
Dept: PHYSICAL THERAPY | Age: 58
Discharge: HOME | End: 2018-02-23

## 2018-02-22 NOTE — FLOWSHEET NOTE
[facility], [address] [place] [address] [place]  Phone: [phone]  Fax [phone]    Physical Therapy Daily Treatment Note  Date:  2018    Patient Name:  Berenice Smallwood    [facility]  :  1960  MRN: [id number]  Restrictions/Precautions:    Physician Information:  Referring Practitioner: Dr. Joni Can  Medical/Treatment Diagnosis Information:  · Diagnosis: Left knee bucket handle tear  S83.212D   DOS: 17  s/p PMM, chondro, syno  · Treatment Diagnosis:  Left knee pain  M25.562    [] Conservative / [] Surgical - DOS: 17  Therapy Diagnosis/Practice Pattern:  Practice Pattern I: Bony or Soft Tissue Surgery  Insurance/Certification information:  PT Insurance Information: [facility] signed: [] YES  [x] NO  Number of Comorbidities:  []0     [x]1-2    []3+  Date of Patient follow up with Physician:     G-Code (if applicable):      Date G-Code Applied:  17  PT G-Codes  Functional Assessment Tool Used: LEFS  Score: 26  Functional Limitation: Mobility: Walking and moving around  Mobility: Walking and Moving Around Current Status (): At least 20 percent but less than 40 percent impaired, limited or restricted  Mobility: Walking and Moving Around Goal Status (): At least 20 percent but less than 40 percent impaired, limited or restricted    Progress Note: [x]  Yes  []  No  Next due by: Visit #20        Latex Allergy:  [x]NO      []YES  Preferred Language for Healthcare:   [x]English       []other:    Visit # Insurance Allowable Reporting Period   13  40-no auth needed Begin Date: 2018               End Date:      RECERT DUE BY:     SUBJECTIVE:  Pt reports pain in his medial knee with stiffness all day long yesterday.    OBJECTIVE:   Observation: pt is not wearing compression stockings anymore (no notable edema distal to knee but pt states by end of the day he gets pitting.) - Cristal's  Palpation: TTP medial knee at joint line and pes anserine, good patellar mobility, pt c/o tightness in hamstring med/lat, ITB and slightly less in calf. Test used Initial score Current Score   Pain Summary VAS 6/10 0-4 at medial knee   Functional questionnaire LEFS 74% disability 59/80 or 26% disability   ROM flexion 95 140 with stiffness at end     extension 0 0   Strength Knee extension  Good quad set 5    Hip flexion  indep SLR 5    ABD NT 4+    Knee flexion NT 5   DL squat   105 with 2/10 pain   Single leg stance   20\" R,L               RESTRICTIONS/PRECAUTIONS: history of prostate CA,  A fib, hernias.     Exercises/Interventions:     Therapeutic Ex Sets/reps Notes   Pt ed: muscle atrophy happens in a matter of days post-op; takes 8 weeks to make strength gains; using stretches to improve stiffness; using specific ex's to improve entire LLE strength 5'    Bike 5' Non-billable time after set-up   QS  hep   SLR 1.5# hep   SL hip abd-benjy wall slide 1.5# hep   Heel slides on SB  On table with strap 5\" 1x10  x10 hep   SAQ with VMO squeeze  4#   Long-sitting HS s with med and lat bias    Supine ITB stretch  Standing ITB stretch foot propped on stool  Standing rectus stretch       3x30\" Hep    ITB stretch with strap with AT following tx   incline board gastroc stretch 3x  ;30 hep   Clamshell   Red tband  HEP   Bridge with adductor squeeze  Bridge with CL march     ecc LAQ  5\", 1x20 7#    Side-stepping in mini squat  MW  Retro MW  GVL  Contin for HEP   FSU 8\"  Up and over 6\"  4\" LSD   2x10     Cues for knee pos   Mini squat 2X10 With AT    Leg press  With AT   [facility]  With AT   Glider hip ABD/Ext, ext 20x GVL at ankles  No UE support   Sit to stand DL  Sit to stand partial SL (RLE heel down, toes up) Normal chair  + HEP   Cone reach (4 cones) x5 R,L Pain in R knee   Wall squat c SWB 4x20\"     Manual Intervention     Pat mobs: all          STM quad (lat>med), distal ITB, pes anserine; SASTM ITB, parminder-patella, calf and HS 8' Post.)  [x] (16495) Provided manual therapy to mobilize LE, proximal hip and/or LS spine soft tissue/joints for the purpose of modulating pain, promoting relaxation,  increasing ROM, reducing/eliminating soft tissue swelling/inflammation/restriction, improving soft tissue extensibility and allowing for proper ROM for normal function with self care, mobility, lifting and ambulation. Modalities: ice A/P knee x10 min     Charges:  Timed Code Treatment Minutes: 75   Total Treatment Minutes: 85(ice)     [] EVAL (LOW) 62037 (typically 20 minutes face-to-face)  [] EVAL (MOD) 24281 (typically 30 minutes face-to-face)  [] EVAL (HIGH) 14883 (typically 45 minutes face-to-face)  [] RE-EVAL     [x] NP(81600) x  4   [] IONTO  [x] NMR (86642) x      [] VASO  [x] Manual (88869) x  1    [] Other:  [] TA x       [] Mech Traction (49398)  [] ES(attended) (11053)      [] ES (un) (03314):     GOALS:   1. Disability index score of 35% or less for the LEFS to assist with reaching prior level of function. met  2. Patient will demonstrate increased AROM of left knee from 0 - 135  to allow for proper joint functioning as indicated by patients Functional Deficits. Progressing, not met  3. Patient will demonstrate an increase in Strength to good proximal hip strength and control, within 5lb HHD in LE to allow for proper functional mobility as indicated by patients Functional Deficits. Progressing, not met  4. Patient will return to ascending and descending stairs with reciprocal, symmetrical gait without increased symptoms or restriction. met  5. Possible GAP candidate for return to baseball. New or Updated Goals (if applicable):  [x] No change to goals established upon initial eval/last progress note:  New Goals:    Progression Towards Functional goals:   [x] Patient is progressing as expected towards functional goals listed. [] Progression is slowed due to complexities listed.   [] Progression has been slowed due to co-morbidities. [] Plan just implemented, too soon to assess goals progression  [] Other:     ASSESSMENT:    [x] Improvement noted relative to goals:  [] No Improvement noted related to goals:  Summary/Patient's response to treatment: D/t medial patellar irritation, trialed Desai tape to reduce PF pain sx. Pt is improving with quad control, knee ROM, despite being discouraged about his progress at this point. Spoke to pt about healing times and time needed to make strength gains. Have been working on increasing CKC activity in order to improve ADL's of stair descent, squatting, and ambulation with turning as pt states that these are the areas that he notes are still limited. Specifically, pt has decreased femoral control in the frontal plane with step down exercises and single leg squatting activities that mimic stair descent. Treatment/Activity Tolerance:  [x] Patient tolerated treatment well [] Patient limited by fatique  [] Patient limited by pain  [] Patient limited by other medical complications  [] Other:     Prognosis: [x] Good [] Fair  [] Poor    Patient Requires Follow-up: [x] Yes  [] No    PLAN: Cont to build quad strength and hip stability. Assess benefit to Desai tape NV.    [x] Continue per plan of care [] Alter current plan (see comments)  [] Plan of care initiated [] Hold pending MD visit [] Discharge    Electronically signed by: Cedrick Melchor, PT, DPT

## 2018-02-22 NOTE — FLOWSHEET NOTE
[facility], [address] [place]  Phone: [phone]  Fax [phone]      ATHLETIC TRAINING [address]  Date:  2018    Patient Name:  David Galan"  :  1960  MRN: [id number]  Restrictions/Precautions:    Medical/Treatment Diagnosis Information:  ·   Diagnosis: Left knee bucket handle tear  S83.212D   DOS: 17  s/p PMM, chondro, syno  ·   Treatment Diagnosis:  Left knee pain  M25.562    Physician Information:    Dr. Meena Blanchard    Weeks Post-op  8 wks  12 wks 16 wks 20 wks   24 wks                            Activity Log                                                  DOS/DOI:                                                    Date: 2/8/18 2/12/18  2/15/18  2/19/18 2/22/18   ATC communication:  Baseball - 1B, 3B, catcher  Desk job  HX: abdominal hernia, degen disc LB Pt's hips were fatigued prior to working on [address]        Elliptical        Treadmill        Airdyne                Gastroc stretch        Soleus stretch        Hamstring stretch        ITB stretch        Hip Flexor stretch        Quad stretch        Adductor stretch                Weight Shifting sp                                  fp                                  tp        Lateral walking (with/w/o TB)  CC Retrowalking 30# 10x               Balance: PEP/Sparkle board                       SLS   On 6\" box 3 way hip c GVL 10x R/L  w/PT          Star excursion load/explode              Extremity reach UE/LE                Leg Press Talha. 110# 2x10  110# 2x10  130# 2x15  130# 2x15 140# 2x10                     Ecc. 90# 2x10  90# 2x10  100# 2x15 110# 2x10 120# 2x10                     Inv. Calf Press Talha.  90# 2x10  90# 2x10 100# 2x15 110# 2x10 120# 2x10                      Ecc.                            Inv.                KASSY   Flex                   ABd Held today 60# R/L 2x10   60# R/L 2x10 60# R/L 2x12

## 2018-03-01 ENCOUNTER — HOSPITAL ENCOUNTER (OUTPATIENT)
Dept: PHYSICAL THERAPY | Age: 58
Discharge: HOME | End: 2018-03-31
Attending: ORTHOPAEDIC SURGERY | Admitting: ORTHOPAEDIC SURGERY

## 2018-03-01 ENCOUNTER — HOSPITAL ENCOUNTER (OUTPATIENT)
Dept: PHYSICAL THERAPY | Age: 58
Discharge: HOME | End: 2018-03-02
Admitting: ORTHOPAEDIC SURGERY

## 2018-03-01 ENCOUNTER — HOSPITAL ENCOUNTER (OUTPATIENT)
Dept: PHYSICAL THERAPY | Age: 58
Discharge: HOME | End: 2018-03-15
Attending: ORTHOPAEDIC SURGERY | Admitting: ORTHOPAEDIC SURGERY

## 2018-03-01 NOTE — FLOWSHEET NOTE
on airex 15x3\"  FSU to SLS airex on 6\" 15x3\" FSU to SLS airex on 6\" 20x3\" FSU to Airex on 6\" 15x5\"              Up and Over                  Down                  Lateral 6\" LSD 15x    LSU and over BOSU 10x LSD 6\" 15x  LSU & over bosu 10x LSD w/ PT  LSU & over bosu  15x LSD 6\" 15x              Rotation              Squats  mini                     wall                    BOSU BOSU 15x bosu 15x bosu 2x10 BOSU 25x          Lunges:  Lunge to Balance                      Balance to Lunge                      Walking              Knee Extension Bilat. Ecc.                                  Inv. Hamstring Curls Bilat. 50# 2x10                              Ecc.                                  Inv.              Soleus Press Bilat.                               Ecc.                              Inv.                           CC walkouts 30# retro/fwd 10x ea          Ladders                   Square                  Jump/Hop  Low                         Med.                         High                                                                     Modality CP 10'  CP 10' CP 10' CP 10'   Initials                             BMG  KRT KRT JLW   Time spent with PT assistant  22' 22'

## 2018-03-01 NOTE — FLOWSHEET NOTE
frustrated with his progress. OBJECTIVE:   Observation: pt is not wearing compression stockings anymore (GII pitting edema at ankle) - Cristal's  Palpation: TTP medial knee at joint line and pes anserine, good patellar mobility, pt c/o tightness in hamstring med/lat, ITB and slightly less in calf. Tests: - Bounce, - Ervin, - Thessally     Test used Initial score Current Score   Pain Summary VAS 6/10 2 at medial knee when standing, 8/10 if he pivots   Functional questionnaire LEFS 74% disability 59/80 or 26% disability   ROM flexion 95 137 with mild stiffness at end range     extension 0 0   Strength Knee extension  Good quad set 5    Hip flexion  indep SLR 5    ABD NT 4+    Knee flexion NT 5   DL squat   105 with 2/10 pain   Single leg stance   20\" R,L               RESTRICTIONS/PRECAUTIONS: history of prostate CA,  A fib, hernias. Exercises/Interventions:     Therapeutic Ex Sets/reps Notes   Pt ed: get a thigh length compression stocking as pitting edema still present. Make an appt with referrer regarding continued pain and stiffness.    5'    Bike 5' Non-billable time    QS  hep   SLR 1.5# hep   SL hip abd-benjy wall slide 1.5# hep   Heel slides on SB  On table with strap 5\" 1x10  x1 hep   SAQ with VMO squeeze  4#   Long-sitting HS s with med and lat bias    Supine ITB stretch  Standing ITB stretch foot propped on stool  Standing rectus stretch       3x30\" Hep    ITB stretch with strap with AT following tx   incline board gastroc stretch 3x  ;30 hep   Clamshell   Red tband  HEP   Bridge with adductor squeeze  Bridge with CL march     ecc LAQ  5\", 1x20 7#    Side-stepping in mini squat  MW  Retro MW  GVL  Contin for HEP   FSU 8\"  Up and over 6\"  4\" LSD        Cues for knee pos   Mini squat 2X10 With AT    Leg press  With AT   [facility]  With AT   Glider hip ABD/Ext, ext 20x GVL at ankles  No UE support   Sit to stand DL  Sit to stand partial SL (RLE heel down, toes up) Normal chair  + HEP   Cone reach (4 cones) Pain in R knee   SL squat with SWB 2x10 Light UE support   Wall squat c SWB 4x20\"     Manual Intervention     Pat mobs: all 3'    Cupping- quad, distal and mid IT band 10' total Mild suction d/t taking blood thinners        STM quad (lat>med), distal ITB, pes anserine; SASTM ITB, parminder-patella, calf and HS 8'    No help   HS stretch 3x30\"    tib-fem distraction GIII  Post tibial mob GIII   4x20\"    NMR re-education     Tandem balance on airex      SL balance on 1/2 FR  NV   TT row in SLS LLE black   TT pull down in DL squat on airex black                                Therapeutic Exercise and NMR EXR  [x] (72438) Provided verbal/tactile cueing for activities related to strengthening, flexibility, endurance, ROM for improvements in LE, proximal hip, and core control with self care, mobility, lifting, ambulation.  [] (65226) Provided verbal/tactile cueing for activities related to improving balance, coordination, kinesthetic sense, posture, motor skill, proprioception  to assist with LE, proximal hip, and core control in self care, mobility, lifting, ambulation and eccentric single leg control.      NMR and Therapeutic Activities:    [x] (90680 or 04552) Provided verbal/tactile cueing for activities related to improving balance, coordination, kinesthetic sense, posture, motor skill, proprioception and motor activation to allow for proper function of core, proximal hip and LE with self care and ADLs  [] (00547) Gait Re-education- Provided training and instruction to the patient for proper LE, core and proximal hip recruitment and positioning and eccentric body weight control with ambulation re-education including up and down stairs     Home Exercise Program:    [x] (98327) Reviewed/Progressed HEP activities related to strengthening, flexibility, endurance, ROM of core, proximal hip and LE for functional self-care, mobility, lifting and ambulation/stair navigation   [] (35471)Reviewed/Progressed HEP activities related to improving balance, coordination, kinesthetic sense, posture, motor skill, proprioception of core, proximal hip and LE for self care, mobility, lifting, and ambulation/stair navigation      Manual Treatments:  PROM / STM / Oscillations-Mobs:  G-I, II, III, IV (PA's, Inf., Post.)  [x] (01927) Provided manual therapy to mobilize LE, proximal hip and/or LS spine soft tissue/joints for the purpose of modulating pain, promoting relaxation,  increasing ROM, reducing/eliminating soft tissue swelling/inflammation/restriction, improving soft tissue extensibility and allowing for proper ROM for normal function with self care, mobility, lifting and ambulation. Modalities: ice A/P knee x10 min     Charges:  Timed Code Treatment Minutes: 40   Total Treatment Minutes: 50(ice)     [] EVAL (LOW) 68407 (typically 20 minutes face-to-face)  [] EVAL (MOD) 72053 (typically 30 minutes face-to-face)  [] EVAL (HIGH) 59092 (typically 45 minutes face-to-face)  [] RE-EVAL     [x] YH(81861) x  1   [] IONTO  [] NMR (91854) x      [] VASO  [x] Manual (41103) x  2    [] Other:  [] TA x       [] Mech Traction (13994)  [] ES(attended) (14507)      [] ES (un) (19310):     GOALS:   1. Disability index score of 35% or less for the LEFS to assist with reaching prior level of function. met  2. Patient will demonstrate increased AROM of left knee from 0 - 135  to allow for proper joint functioning as indicated by patients Functional Deficits. Progressing, not met  3. Patient will demonstrate an increase in Strength to good proximal hip strength and control, within 5lb HHD in LE to allow for proper functional mobility as indicated by patients Functional Deficits. Progressing, not met  4. Patient will return to ascending and descending stairs with reciprocal, symmetrical gait without increased symptoms or restriction. met  5. Possible GAP candidate for return to baseball.      New or Updated Goals (if applicable):  [x] No change to goals established upon

## 2018-03-05 ENCOUNTER — HOSPITAL ENCOUNTER (OUTPATIENT)
Dept: PHYSICAL THERAPY | Age: 58
Discharge: HOME | End: 2018-03-06
Admitting: ORTHOPAEDIC SURGERY

## 2018-03-05 NOTE — FLOWSHEET NOTE
Post.)  [x] (61017) Provided manual therapy to mobilize LE, proximal hip and/or LS spine soft tissue/joints for the purpose of modulating pain, promoting relaxation,  increasing ROM, reducing/eliminating soft tissue swelling/inflammation/restriction, improving soft tissue extensibility and allowing for proper ROM for normal function with self care, mobility, lifting and ambulation. Modalities: ice A/P knee x10 min     Charges:  Timed Code Treatment Minutes: 40   Total Treatment Minutes: 50(ice)     [] EVAL (LOW) 51394 (typically 20 minutes face-to-face)  [] EVAL (MOD) 25475 (typically 30 minutes face-to-face)  [] EVAL (HIGH) 23996 (typically 45 minutes face-to-face)  [] RE-EVAL     [x] TI(66003) x  2   [] IONTO  [] NMR (58642) x      [] VASO  [x] Manual (62579) x  1    [] Other:  [] TA x       [] Mech Traction (57791)  [] ES(attended) (65480)      [] ES (un) (77297):     GOALS:   1. Disability index score of 35% or less for the LEFS to assist with reaching prior level of function. met  2. Patient will demonstrate increased AROM of left knee from 0 - 135  to allow for proper joint functioning as indicated by patients Functional Deficits. Progressing, not met  3. Patient will demonstrate an increase in Strength to good proximal hip strength and control, within 5lb HHD in LE to allow for proper functional mobility as indicated by patients Functional Deficits. Progressing, not met  4. Patient will return to ascending and descending stairs with reciprocal, symmetrical gait without increased symptoms or restriction. met  5. Possible GAP candidate for return to baseball. New or Updated Goals (if applicable):  [x] No change to goals established upon initial eval/last progress note:  New Goals:    Progression Towards Functional goals:   [x] Patient is progressing as expected towards functional goals listed. [] Progression is slowed due to complexities listed.   [] Progression has been slowed due to

## 2018-03-06 ENCOUNTER — HOSPITAL ENCOUNTER (OUTPATIENT)
Dept: OTHER | Age: 58
Discharge: HOME | End: 2018-03-06
Attending: INTERNAL MEDICINE | Admitting: INTERNAL MEDICINE

## 2018-03-06 DIAGNOSIS — Z51.81 ENCOUNTER FOR MONITORING AMIODARONE THERAPY: ICD-10-CM

## 2018-03-06 DIAGNOSIS — Z79.899 ENCOUNTER FOR MONITORING AMIODARONE THERAPY: ICD-10-CM

## 2018-03-06 LAB
ALBUMIN SERPL-MCNC: 4.2 G/DL (ref 3.4–5)
ALP BLD-CCNC: 67 U/L (ref 40–129)
ALT SERPL-CCNC: 32 U/L (ref 10–40)
ANION GAP SERPL CALCULATED.3IONS-SCNC: 15 MMOL/L (ref 3–16)
AST SERPL-CCNC: 24 U/L (ref 15–37)
BILIRUB SERPL-MCNC: 0.4 MG/DL (ref 0–1)
BILIRUBIN DIRECT: <0.2 MG/DL (ref 0–0.3)
BILIRUBIN, INDIRECT: NORMAL MG/DL (ref 0–1)
BUN BLDV-MCNC: 14 MG/DL (ref 7–20)
CALCIUM SERPL-MCNC: 9.1 MG/DL (ref 8.3–10.6)
CHLORIDE BLD-SCNC: 103 MMOL/L (ref 99–110)
CO2: 29 MMOL/L (ref 21–32)
CREAT SERPL-MCNC: 1 MG/DL (ref 0.9–1.3)
GFR AFRICAN AMERICAN: >60
GFR NON-AFRICAN AMERICAN: >60
GLUCOSE BLD-MCNC: 111 MG/DL (ref 70–99)
PHOSPHORUS: 3 MG/DL (ref 2.5–4.9)
POTASSIUM SERPL-SCNC: 4.4 MMOL/L (ref 3.5–5.1)
SODIUM BLD-SCNC: 147 MMOL/L (ref 136–145)
TOTAL PROTEIN: 6.7 G/DL (ref 6.4–8.2)
TSH REFLEX FT4: 2.76 UIU/ML (ref 0.27–4.2)

## 2018-03-08 ENCOUNTER — HOSPITAL ENCOUNTER (OUTPATIENT)
Dept: PHYSICAL THERAPY | Age: 58
Discharge: HOME | End: 2018-03-09
Admitting: ORTHOPAEDIC SURGERY

## 2018-03-08 NOTE — FLOWSHEET NOTE
[facility], [address] [place] [address] [facility]  Phone: [phone]  Fax [phone]    Physical Therapy Daily Treatment Note  Date:  3/8/2018    Patient Name:  Allison Cates    [facility]  :  1960  MRN: [id number]  Restrictions/Precautions:    Physician Information:  Referring Practitioner: Dr. Benito Boyle   Medical/Treatment Diagnosis Information:  · Diagnosis: Left knee bucket handle tear  S83.212D   DOS: 17  s/p PMM, chondro, syno  · Treatment Diagnosis:  Left knee pain  M25.562    [] Conservative / [] Surgical - DOS: 17  Therapy Diagnosis/Practice Pattern:  Practice Pattern I: Bony or Soft Tissue Surgery  Insurance/Certification information:  PT Insurance Information: [facility] signed: [] YES  [x] NO  Number of Comorbidities:  []0     [x]1-2    []3+  Date of Patient follow up with Physician:     G-Code (if applicable):      Date G-Code Applied:  17  PT G-Codes  Functional Assessment Tool Used: LEFS  Score: 26  Functional Limitation: Mobility: Walking and moving around  Mobility: Walking and Moving Around Current Status (): At least 20 percent but less than 40 percent impaired, limited or restricted  Mobility: Walking and Moving Around Goal Status (): At least 20 percent but less than 40 percent impaired, limited or restricted    Progress Note: [x]  Yes  []  No  Next due by: Visit #20        Latex Allergy:  [x]NO      []YES  Preferred Language for Healthcare:   [x]English       []other:    Visit # Insurance Allowable Reporting Period   16 40-no auth needed Begin Date: 3/8/2018               End Date:      RECERT DUE BY:     SUBJECTIVE:  Pt reports that he has pain when standing for 30' or more at work. Stairs seem to be going better this week.    OBJECTIVE:   Observation: pt is not wearing compression stockings anymore (GII pitting edema at ankle) - Cristal's  Palpation: TTP medial knee at joint line and pes anserine, good patellar mobility, pt c/o tightness in hamstring med/lat, ITB and slightly less in calf. Test used Initial score Current Score   Pain Summary VAS 6/10 4 at medial knee when standing, 8/10 if he pivots   Functional questionnaire LEFS 74% disability 59/80 or 26% disability   ROM flexion 95 137 with mild stiffness at end range     extension 0 0   Strength Knee extension  Good quad set 5    Hip flexion  indep SLR 5    ABD NT 4+    Knee flexion NT 5   DL squat   105 with 2/10 pain   Single leg stance   20\" R,L               RESTRICTIONS/PRECAUTIONS: history of prostate CA,  A fib, hernias. Exercises/Interventions:     Therapeutic Ex Sets/reps Notes   Pt ed: get a thigh length compression stocking as pitting edema still present. Make an appt with referrer regarding continued pain and stiffness.    5'    Bike 5' Non-billable time    Self patellar mobs 3'            Heel slides on SB  On table with strap   x10 hep   SAQ with VMO squeeze  4#   Long-sitting HS s with med and lat bias    Supine ITB stretch  Standing ITB stretch foot propped on stool  Standing rectus stretch       3x30\" Hep    ITB stretch with strap with AT following tx   incline board gastroc stretch 3x  ;30 hep             ecc LAQ   10#    Side-stepping in mini squat  MW  Retro MW 40'x2 ea GVL  Contin for HEP   FSU 8\" to airex  LSU  x15 ea       Cues for knee pos   Mini squat 2X10 With AT    Leg press  With AT   [facility]  With AT   Glider hip ABD/Ext, ext 20x GVL at ankles  No UE support   Sit to stand DL  Sit to stand partial SL (RLE heel down, toes up) Normal chair  + HEP   Cone reach (5 cones) x5 R,L Pain in R knee   SL squat with SWB 2x10 Light UE support   Wall squat c SWB 4x30\"     Manual Intervention     Pat mobs: all 3'    Cupping- quad, distal and mid IT band Mild suction d/t taking blood thinners        STM quad (lat>med), distal ITB, pes anserine; SASTM ITB, parminder-patella, calf and HS 10'    No help   HS stretch 3x30\"

## 2018-03-12 ENCOUNTER — HOSPITAL ENCOUNTER (OUTPATIENT)
Dept: PHYSICAL THERAPY | Age: 58
Discharge: HOME | End: 2018-03-13
Admitting: ORTHOPAEDIC SURGERY

## 2018-03-12 NOTE — FLOWSHEET NOTE
STM quad (lat>med), distal ITB, pes anserine; SASTM ITB, parminder-patella, calf and HS 10'    No help   HS stretch 3x30\"    tib-fem distraction GIII  Post tibial mob GIII       NMR re-education     Tandem balance on airex      SL balance on 1/2 FR  NV   TT row in SLS LLE black   TT pull down in DL squat on airex black   DLB on DD's 30\"x 3                            Therapeutic Exercise and NMR EXR  [x] (27843) Provided verbal/tactile cueing for activities related to strengthening, flexibility, endurance, ROM for improvements in LE, proximal hip, and core control with self care, mobility, lifting, ambulation.  [] (31707) Provided verbal/tactile cueing for activities related to improving balance, coordination, kinesthetic sense, posture, motor skill, proprioception  to assist with LE, proximal hip, and core control in self care, mobility, lifting, ambulation and eccentric single leg control.      NMR and Therapeutic Activities:    [x] (93722 or 98201) Provided verbal/tactile cueing for activities related to improving balance, coordination, kinesthetic sense, posture, motor skill, proprioception and motor activation to allow for proper function of core, proximal hip and LE with self care and ADLs  [] (82867) Gait Re-education- Provided training and instruction to the patient for proper LE, core and proximal hip recruitment and positioning and eccentric body weight control with ambulation re-education including up and down stairs     Home Exercise Program:    [x] (20373) Reviewed/Progressed HEP activities related to strengthening, flexibility, endurance, ROM of core, proximal hip and LE for functional self-care, mobility, lifting and ambulation/stair navigation   [] (97939)Reviewed/Progressed HEP activities related to improving balance, coordination, kinesthetic sense, posture, motor skill, proprioception of core, proximal hip and LE for self care, mobility, lifting, and ambulation/stair navigation      Manual

## 2018-03-13 ENCOUNTER — TELEPHONE (OUTPATIENT)
Dept: CARDIOLOGY CLINIC | Age: 58
End: 2018-03-13

## 2018-03-13 ENCOUNTER — OFFICE VISIT (OUTPATIENT)
Dept: CARDIOLOGY CLINIC | Age: 58
End: 2018-03-13

## 2018-03-13 VITALS
HEIGHT: 70 IN | DIASTOLIC BLOOD PRESSURE: 76 MMHG | SYSTOLIC BLOOD PRESSURE: 122 MMHG | HEART RATE: 68 BPM | BODY MASS INDEX: 30.49 KG/M2 | WEIGHT: 213 LBS

## 2018-03-13 DIAGNOSIS — I49.3 PVC (PREMATURE VENTRICULAR CONTRACTION): ICD-10-CM

## 2018-03-13 DIAGNOSIS — I45.10 RIGHT BUNDLE BRANCH BLOCK: ICD-10-CM

## 2018-03-13 DIAGNOSIS — I48.0 PAROXYSMAL ATRIAL FIBRILLATION (HCC): Primary | ICD-10-CM

## 2018-03-13 PROCEDURE — 93000 ELECTROCARDIOGRAM COMPLETE: CPT | Performed by: NURSE PRACTITIONER

## 2018-03-13 PROCEDURE — 99214 OFFICE O/P EST MOD 30 MIN: CPT | Performed by: NURSE PRACTITIONER

## 2018-03-13 NOTE — LETTER
[address] Cardiology - [address] [place] [id number]  Phone: [phone]  Fax: [phone]    Jesus Bartlett CNP        March 14, 2018     [address] [facility]    Patient: Yesy Velasquez  MR Number: [id number]  YOB: 1960  Date of Visit: 3/13/2018    Dear Dr. Alannah Dye: Thank you for the request for consultation for [place] to me for the evaluation. Below are the relevant portions of my assessment and plan of care. [facility]   Electrophysiology  Note              Date:  March 13, 2018  Patient name: Yesy Velasquez  YOB: 1960    Primary Care physician: Alannah Dye MD    HISTORY OF PRESENT ILLNESS: The patient is a 62 y.o.  male with a past medical history of hypertrophic obstructive cardiomyopathy, NSVT, paroxysmal atrial fibrillation, RBBB, chronic pain, ADHD, and slepp apnea. [facility] Brigido Cortes in 6/2014 and has historically declined ICD implant because no one in his family has had sudden cardiac death. Echo in 6/2014 showed an EF of 60-65% and a 2.0cm mid septum. He complained of chest pain and had a normal stress test in 4/2016. He complained of palpitations and was diagnosed with atrial fibrillation in 4/2016. He declined anticoagulation but started Eliquis in 5/2017. A 2 week monitor worn in 6/2017 showed PAF and frequent PVCs. At his visit in 9/2017 he complained of PAF and palpitations. An echo in 10/2017 showed an EF of 55-60% and severe LVH. He was started on amiodarone in 10/2017. He was referred to Dr. Mary Alice Dumont for HOCM. A cardiac MRI in 1/2018 showed HCOM without LVOT obstruction at rest and an EF of 63%. Today he is being seen for paroxysmal atrial fibrillation. His EKG shows sinus rhythm with a RBBB and HR of 60.  He complains of nightly palpitations that occur every evening and can occur intermittently \"all vitamin C (ASCORBIC ACID) 500 MG tablet Take 500 mg by mouth daily    Historical Provider, MD   vitamin B-12 (CYANOCOBALAMIN) 100 MCG tablet Take 50 mcg by mouth daily    Historical Provider, MD   omeprazole (PRILOSEC) 40 MG delayed release capsule Take 1 capsule by mouth daily 12/15/17   HERLINDA Zimmer   Coenzyme Q10 (COQ10) 50 G POWD Take by mouth daily     Historical Provider, MD   Cholecalciferol (D3 ADULT) 1000 UNITS CHEW Take by mouth daily     Historical Provider, MD   zinc 50 MG CAPS Take by mouth daily     Historical Provider, MD   apixaban (ELIQUIS) 5 MG TABS tablet Take 1 tablet by mouth 2 times daily 5/26/17   Esteban Keys MD   verapamil (CALAN SR) 120 MG extended release tablet TAKE 1 TABLET TWICE A DAY 5/22/17   Blaire Faulkner CNP   atorvastatin (LIPITOR) 20 MG tablet TAKE 1 TABLET BY MOUTH ONE TIME A DAY 4/7/17   Bebeto Powers MD   Misc Natural Products (TURMERIC CURCUMIN) CAPS Take by mouth daily     Historical Provider, MD   MAGNESIUM PO Take  by mouth daily. Historical Provider, MD   clonazePAM (KLONOPIN) 1 MG tablet Take 1 mg by mouth daily. 4/30/14   Historical Provider, MD   Melatonin 10 MG CAPS Take 10 mg by mouth as needed     Historical Provider, MD   naproxen (NAPROSYN) 500 MG tablet Take 500 mg by mouth as needed. 2/27/13   Historical Provider, MD   Multiple Vitamins-Minerals (GARRETT MULTI MEN PO) Take by mouth daily     Historical Provider, MD       Allergies:  Niacin and related    Social History:   reports that he has never smoked. He has never used smokeless tobacco. He reports that he drinks alcohol. He reports that he does not use drugs. Family History: family history includes Cancer in his mother; Heart Disease in his maternal grandfather and maternal grandmother; High Blood Pressure in his father; High Cholesterol in his brother and mother; Prostate Cancer in his paternal uncle. Review of Systems   Constitutional: + fatigue  HENT: Negative. Eyes: Negative. Respiratory: Negative. Cardiovascular: see HPI  Gastrointestinal: Negative. Genitourinary: Negative. Musculoskeletal: Negative. Skin: Negative. Neurological: Negative. Hematological: Negative. Psychiatric/Behavioral: Negative. PHYSICAL EXAM:    Physical Examination:    /76   Pulse 68   Ht 5' 10\" (1.778 m)   Wt 213 lb (96.6 kg)   BMI 30.56 kg/m²       Constitutional and general appearance: alert, cooperative, no distress and appears stated age  [de-identified]: PERRL, no cervical lymphadenopathy. No masses palpable. Normal oral mucosa  Respiratory:  · Normal excursion and expansion without use of accessory muscles  · Resp auscultation: Normal breath sounds without dullness or wheezing  Cardiovascular:  · The apical impulse is not displaced  · Gr 0-4/5 systolic murmur, left chest. Regular S1 and S2.  · Jugular venous pulsation Normal  · The carotid upstroke is normal in amplitude and contour without delay or bruit  · Peripheral pulses are symmetrical and full   Abdomen:  · No masses or tenderness  · Bowel sounds present  Extremities:  ·  No cyanosis or clubbing  ·  No lower extremity edema  ·  Skin: warm and dry  Neurological:  · Alert and oriented  · Moves all extremities well  · Flat affect     DATA:    ECG 3/13/2018:  SR with RBBB HR 60    Echo 70/68/2428:  -- Systolic function appears grossly normal with an estimated ejection fraction of 55-60 %.   Left ventricular size is normal with severe asymetric left ventricular hypertrophy. Speckled appearance of myocardium, suggest rule out for restrictive cardiomyopathy and comparison with ECG voltage. Normal left ventricle diastolic filling pressure.   -- Moderately dilated left atrium with increased left atrial volume. Echo 6/6/2014:  Normal left ventricle size with concentric left ventricular hypertrophy.   Mid septum measured at 2.0 cm.  Hypertrophic cardiomyopathy.   Normal systolic function with an estimated ejection fraction of 60-65%

## 2018-03-14 RX ORDER — AMIODARONE HYDROCHLORIDE 200 MG/1
200 TABLET ORAL DAILY
Qty: 90 TABLET | Refills: 1 | Status: SHIPPED | OUTPATIENT
Start: 2018-03-14 | End: 2018-07-29 | Stop reason: SDUPTHER

## 2018-03-14 NOTE — COMMUNICATION BODY
cardiomyopathy ([facility] IHSS (idiopathic hypertrophic subaortic stenosis) ([facility] Kidney stones; HUNTER on CPAP; Primary localized osteoarthrosis, shoulder region; Prostate cancer ([facility] PVC's (premature ventricular contractions); RBBB (right bundle branch block); RLS (restless legs syndrome); Seasonal allergies; and Tachycardia. Past Surgical History:   has a past surgical history that includes Knee arthroscopy (Right, 1982); Umbilical hernia repair; Colonoscopy (1/17/11); TURP (2012); Inguinal hernia repair (Bilateral, 2009, 2012,); Varicocelectomy (2000); Shoulder arthroscopy (Right, 12/31/2013); Endoscopy, colon, diagnostic; Upper gastrointestinal endoscopy (9/3); Elbow surgery (2015); Carpal tunnel release (Left); and Knee arthroscopy (Left, 12/21/2017). Home Medications:    Prior to Admission medications    Medication Sig Start Date End Date Taking? Authorizing Provider   amiodarone (CORDARONE) 200 MG tablet Take 1 tablet by mouth daily 3/7/18   Gab Guardado CNP   methylPREDNISolone (MEDROL, EDIE,) 4 MG tablet Take by mouth as directed on package insert 1/15/18   HERLINDA Bradley   HYDROcodone-acetaminophen (NORCO) 5-325 MG per tablet Take 1-2 tablets by mouth every 4 hours as needed for Pain  After surgery.  12/21/17   HERLINDA Ray   vitamin E 1000 units capsule Take 1,000 Units by mouth daily    Historical Provider, MD   vitamin C (ASCORBIC ACID) 500 MG tablet Take 500 mg by mouth daily    Historical Provider, MD   vitamin B-12 (CYANOCOBALAMIN) 100 MCG tablet Take 50 mcg by mouth daily    Historical Provider, MD   omeprazole (PRILOSEC) 40 MG delayed release capsule Take 1 capsule by mouth daily 12/15/17   HERLINDA Day   Coenzyme Q10 (COQ10) 48 G POWD Take by mouth daily     Historical Provider, MD   Cholecalciferol (D3 ADULT) 1000 UNITS CHEW Take by mouth daily     Historical Provider, MD   zinc 50 MG CAPS Take by mouth daily     Historical Provider, MD   apixaban (ELIQUIS) 5 MG TABS tablet excursion and expansion without use of accessory muscles  · Resp auscultation: Normal breath sounds without dullness or wheezing  Cardiovascular:  · The apical impulse is not displaced  · Gr 0-2/9 systolic murmur, left chest. Regular S1 and S2.  · Jugular venous pulsation Normal  · The carotid upstroke is normal in amplitude and contour without delay or bruit  · Peripheral pulses are symmetrical and full   Abdomen:  · No masses or tenderness  · Bowel sounds present  Extremities:  ·  No cyanosis or clubbing  ·  No lower extremity edema  ·  Skin: warm and dry  Neurological:  · Alert and oriented  · Moves all extremities well  · Flat affect     DATA:    ECG 3/13/2018:  SR with RBBB HR 60    Echo 42/48/6161:  -- Systolic function appears grossly normal with an estimated ejection fraction of 55-60 %.   Left ventricular size is normal with severe asymetric left ventricular hypertrophy. Speckled appearance of myocardium, suggest rule out for restrictive cardiomyopathy and comparison with ECG voltage. Normal left ventricle diastolic filling pressure.   -- Moderately dilated left atrium with increased left atrial volume. Echo 6/6/2014:  Normal left ventricle size with concentric left ventricular hypertrophy.   Mid septum measured at 2.0 cm. Hypertrophic cardiomyopathy.   Normal systolic function with an estimated ejection fraction of 60-65%   No regional wall motion abnormalities are seen.   Diastolic filling parameters suggests normal diastolic function .   The left atrium is mild to moderately dilated.   Mild mitral regurgitation.   Trace tricuspid and pulmonic regurgitation.   Systolic pulmonary artery pressure (SPAP) is normal and estimated at 21 mmHg   (RA pressure 3 mmHg). Cardiac MRI 1/10/2018:  1.  Normal left ventricular chamber size with a normal global LV systolic function. Calculated    ejection fraction of 60%.  Asymmetric basal and mid septal hypertrophy with the maximum    end-diastolic myocardial thickness measuring up to 16 mm. Maximum LVOT velocity is 1.4 m/s. No    anterior systolic motion of the anterior mitral valve leaflet. Late gadolinium enhancement    imaging demonstrates areas of heterogeneous hyperenhancement predominantly throughout the basal    and mid anterior and septal myocardial segments. Collectively, these findings are consistent    with hypertrophic cardiomyopathy without dynamic LVOT obstruction at rest.   2.  Normal right ventricular size with a normal global RV systolic function. Calculated RV    ejection fraction of 63%. 3.  Mild mitral regurgitation with associated mild left atrial enlargement. Stress test 4/2016:   Left ventricular ejection fraction of 60 %.    There is normal isotope uptake at stress and rest. There is no evidence of myocardial ischemia or scar. CARDIOLOGY LABS:   CBC: No results for input(s): WBC, HGB, HCT, PLT in the last 72 hours. BMP: No results for input(s): NA, K, CO2, BUN, CREATININE, LABGLOM, GLUCOSE in the last 72 hours. PT/INR: No results for input(s): PROTIME, INR in the last 72 hours. APTT:No results for input(s): APTT in the last 72 hours. FASTING LIPID PANEL:  Lab Results   Component Value Date    HDL 52 12/15/2017    HDL 51 12/19/2011    LDLCALC 135 12/15/2017    TRIG 110 12/15/2017     LIVER PROFILE:No results for input(s): AST, ALT, ALB in the last 72 hours. Assessment:   1. Symptomatic paroxysmal atrial fibrillation: noted on monitor in 6/2017   -on Eliquis for [id number] score 1 (cardiomyopathy)   -amiodarone started in 10/2017  2. Hypertrophic obstructive cardiomyopathy: known history   -management per Dr. Wu Rae, cardiac MRI done in 1/2018  3. Frequent PVCs and history of NSVT: symptomatic, noted on event monitor  4. Right bundle branch block  5: HUNTER: reports compliance with CPAP    Plan:   1. Consider loop recorder for ongoing palpitations. Patient declined event monitor. 2. Discuss knee/leg swelling with ortho.  May

## 2018-03-15 ENCOUNTER — HOSPITAL ENCOUNTER (OUTPATIENT)
Dept: PHYSICAL THERAPY | Age: 58
Discharge: HOME | End: 2018-03-16
Admitting: ORTHOPAEDIC SURGERY

## 2018-03-15 NOTE — FLOWSHEET NOTE
[facility] [facility],  [address] [place]  Phone: [phone]  Fax [phone]      ATHLETIC TRAINING [address]  Date:  3/15/2018    Patient Name:  Zoë Garcia" :  1960  MRN: [id number]  Restrictions/Precautions:    Medical/Treatment Diagnosis Information:  ·   Diagnosis: Left knee bucket handle tear  S83.212D   DOS: 17  s/p PMM, chondro, syno  ·   Treatment Diagnosis:  Left knee pain  M25.562    Physician Information:    Dr. Troy Elkins    Weeks Post-op  8 wks  12 wks 16 wks 20 wks   24 wks                            Activity Log                                                  DOS/DOI:                                                    Date: 3/5/18 3/8/18 3/12/18  3/15/18   ATC communication:  Baseball - 1B, 3B, catcher  Desk job  HX: abdominal hernia, degen disc LB    Gave gym HEP today   Bike       Elliptical       Treadmill       Airdyne              Gastroc stretch       Soleus stretch       Hamstring stretch       ITB stretch       Hip Flexor stretch       Quad stretch       Adductor stretch              Weight Shifting sp                                 fp                                 tp       Lateral walking (with/w/o TB)              Balance: PEP/Sparkle board                      SLS             Star excursion load/explode             Extremity reach UE/LE              Leg Press Talha. 140# 2x10  w/# 2x10  140# 3x10                     Ecc. 120# 2x10  w/# 2x10  120# 25x                     Inv.               Calf Press Talha. 120# 2x10  w/# 2x10  120# 2x10                      Ecc.                           Inv.              KASSY   Flex                  ABd 60# R/L 2x15 60# R/L 2x15 60# R/L 2x15 60# R/L 2x15              ADd                 TKE 90# 20x5\" 90# 20x5\" 90# 25x5\"  90# 25x5\"              Ext 90# R/L 2x10  90# R/L 2x10 90# R/L 2x10  90# R/L 2x12          Steps Up FSU to

## 2018-03-15 NOTE — FLOWSHEET NOTE
[facility], [address] [place]  Phone: [phone]  Fax [phone]    Physical Therapy Daily Treatment Note  Date:  3/15/2018    Patient Name:  Sandip Kerns    [facility]  :  1960  MRN: [id number]  Restrictions/Precautions:    Physician Information:  Referring Practitioner: Dr. Filemon Solorzano   Medical/Treatment Diagnosis Information:  · Diagnosis: Left knee bucket handle tear  S83.212D   DOS: 17  s/p PMM, chondro, syno  · Treatment Diagnosis:  Left knee pain  M25.562    [] Conservative / [] Surgical - DOS: 17  Therapy Diagnosis/Practice Pattern:  Practice Pattern I: Bony or Soft Tissue Surgery  Insurance/Certification information:  PT Insurance Information: [facility] signed: [] YES  [x] NO  Number of Comorbidities:  []0     [x]1-2    []3+  Date of Patient follow up with Physician:     G-Code (if applicable):      Date G-Code Applied:  17  PT G-Codes  Functional Assessment Tool Used: LEFS  Score: 26  Functional Limitation: Mobility: Walking and moving around  Mobility: Walking and Moving Around Current Status (): At least 20 percent but less than 40 percent impaired, limited or restricted  Mobility: Walking and Moving Around Goal Status (): At least 20 percent but less than 40 percent impaired, limited or restricted    Progress Note: [x]  Yes  []  No  Next due by: Visit #20        Latex Allergy:  [x]NO      []YES  Preferred Language for Healthcare:   [x]English       []other:    Visit # Insurance Allowable Reporting Period   18-DC 40-no auth needed Begin Date: 3/15/2018               End Date:      RECERT DUE BY:     SUBJECTIVE:  Pt reports that his knee is finally improving with strength and is not having much pain unless he is standing for a while and then goes to turn/twist on the knee. After his last session in PT, he was sore for about 1.5 days (a general ache in the knee). Therapy is getting expensive but feels like he still needs to strengthen. OBJECTIVE:   Observation: pt is not wearing compression stockings anymore (GII pitting edema at ankle) - Cristal's  Palpation: TTP medial knee at joint line and pes anserine, good patellar mobility, pt c/o tightness in hamstring med/lat, ITB and slightly less in calf. Test used Initial score Current Score   Pain Summary VAS 6/10 0/10 rest  2/10 at medial knee when standing, 8/10 if he pivots   Functional questionnaire LEFS 74% disability 59/80 or 26% disability   ROM flexion 95 134 with mild discomfort at end range ; 140 AAROM    extension 0 0   Strength Knee extension  Good quad set 5    Hip flexion  indep SLR 5    Hip ABD NT L 4+, R 5-/5    Hip ER NT 5-/5    Knee extension NT 5    Knee flexion NT 5   DL squat   122 no pain   Single leg stance   20\" R,L but with increased PS L              RESTRICTIONS/PRECAUTIONS: history of prostate CA,  A fib, hernias.     Exercises/Interventions:     Therapeutic Ex Sets/reps Notes   Pt ed: [address] program vs Road to wellness vs get gym membership on own; review HEP 8'    Bike 5' Non-billable time    Self patellar mobs             Heel slides On table with strap x10 hep        Long-sitting HS s with med and lat bias    Supine ITB stretch  Standing ITB stretch foot propped on stool  Standing rectus stretch       3x30\" Hep    ITB stretch with strap with AT following tx   incline board gastroc stretch 3x  ;30 hep   Heel tap on 9\" step 3x10 HEP        ecc LAQ  5\", 1x20 10#    Side-stepping in mini squat  MW  Retro MW  GVL  Contin for HEP   [employer] 8\" to airex  [employer]         Cues for knee pos   Mini squat  With AT    Leg press  With AT   [facility]  With AT   Glider hip ABD/Ext, ext  GVL at ankles  No UE support   Sit to stand SL with hands 10x eaNormal chair  + HEP   [facility] reach Y  x10 R,L Pain in R knee   SL squat with SWB Light UE support   Wall squat c SWB    Manual Intervention     Pat mobs: all 3'    Cupping- quad, mobility, lifting, and ambulation/stair navigation      Manual Treatments:  PROM / STM / Oscillations-Mobs:  G-I, II, III, IV (PA's, Inf., Post.)  [x] (80593) Provided manual therapy to mobilize LE, proximal hip and/or LS spine soft tissue/joints for the purpose of modulating pain, promoting relaxation,  increasing ROM, reducing/eliminating soft tissue swelling/inflammation/restriction, improving soft tissue extensibility and allowing for proper ROM for normal function with self care, mobility, lifting and ambulation. Modalities: ice A/P knee x15 min     Charges:  Timed Code Treatment Minutes: 53   Total Treatment Minutes: 70(ice, re-assess)     [] EVAL (LOW) 43591 (typically 20 minutes face-to-face)  [] EVAL (MOD) 02447 (typically 30 minutes face-to-face)  [] EVAL (HIGH) 22087 (typically 45 minutes face-to-face)  [] RE-EVAL     [x] BS(94525) x  2   [] IONTO  [] NMR (55918) x      [] VASO  [x] Manual (98269) x  2    [] Other:  [] TA x       [] Mech Traction (70206)  [] ES(attended) (16142)      [] ES (un) (22054):     GOALS:   1. Disability index score of 35% or less for the LEFS to assist with reaching prior level of function. met  2. Patient will demonstrate increased AROM of left knee from 0 - 135  to allow for proper joint functioning as indicated by patients Functional Deficits. met  3. Patient will demonstrate an increase in Strength to good proximal hip strength and control, within 5lb HHD in LE to allow for proper functional mobility as indicated by patients Functional Deficits. met  4. Patient will return to ascending and descending stairs with reciprocal, symmetrical gait without increased symptoms or restriction. met  5. Possible GAP candidate for return to baseball. Pt considering GAP at this time.      New or Updated Goals (if applicable):  [x] No change to goals established upon initial eval/last progress note:  New Goals:    Progression Towards Functional goals:   [x] Patient is progressing as

## 2018-03-15 NOTE — DISCHARGE SUMMARY
[]Ultrasound            []Electrical Stimulation  [x] Gait Training     []Cervical Traction    [] Lumbar Traction  [x] Neuromuscular Re-education [x] Cold/hotpack         []Iontophoresis  [x] Instruction in HEP      Other:  [x] Manual Therapy                   []                       ?           []   Assessment:  [x] All Goals were achieved. [] The following goals were achieved (#'s):  [] The following goals were not achieved for the following reasons:/assessmen of improvement as it relates to each goal:  GOALS:   1. Disability index score of 35% or less for the LEFS to assist with reaching prior level of function. met  2. Patient will demonstrate increased AROM of left knee from 0 - 135  to allow for proper joint functioning as indicated by patients Functional Deficits. met  3. Patient will demonstrate an increase in Strength to good proximal hip strength and control, within 5lb HHD in LE to allow for proper functional mobility as indicated by patients Functional Deficits. met  4. Patient will return to ascending and descending stairs with reciprocal, symmetrical gait without increased symptoms or restriction. met  5. Possible GAP candidate for return to baseball. Pt considering GAP at this time. [facility]     Plan of Care:  [x] Discharge from Therapy Services due to:    Reason for Discharge:   [x] All goals achieved    [] Patient having surgery  [] Physician discontinued therapy  [] Insurance/Financial Limitations [] Patient did not return for follow up visits [] Home program/1 visit only   [] No subjective or objective improvement [] Plateaued   [] Patient was unable to adhere to the plan of care established at evaluation. [] Referred back to physician for re-evaluation and did not return.      [] Other:?       Electronically signed by:  Arvind Mendez, PT, DPT

## 2018-03-19 DIAGNOSIS — E78.5 HYPERLIPIDEMIA, UNSPECIFIED HYPERLIPIDEMIA TYPE: ICD-10-CM

## 2018-03-19 RX ORDER — ATORVASTATIN CALCIUM 20 MG/1
TABLET, FILM COATED ORAL
Qty: 90 TABLET | Refills: 0 | Status: SHIPPED | OUTPATIENT
Start: 2018-03-19 | End: 2018-04-22 | Stop reason: SDUPTHER

## 2018-04-22 DIAGNOSIS — E78.5 HYPERLIPIDEMIA, UNSPECIFIED HYPERLIPIDEMIA TYPE: ICD-10-CM

## 2018-04-23 RX ORDER — ATORVASTATIN CALCIUM 20 MG/1
TABLET, FILM COATED ORAL
Qty: 30 TABLET | Refills: 0 | Status: SHIPPED | OUTPATIENT
Start: 2018-04-23 | End: 2018-07-09 | Stop reason: SDUPTHER

## 2018-05-14 RX ORDER — VERAPAMIL HYDROCHLORIDE 240 MG/1
240 TABLET, FILM COATED, EXTENDED RELEASE ORAL NIGHTLY
Qty: 90 TABLET | Refills: 2 | Status: SHIPPED | OUTPATIENT
Start: 2018-05-14 | End: 2019-01-28 | Stop reason: SDUPTHER

## 2018-06-11 RX ORDER — OMEPRAZOLE 40 MG/1
40 CAPSULE, DELAYED RELEASE ORAL DAILY
Qty: 90 CAPSULE | Refills: 1 | Status: SHIPPED | OUTPATIENT
Start: 2018-06-11 | End: 2018-11-06 | Stop reason: SDUPTHER

## 2018-06-17 ENCOUNTER — TELEPHONE (OUTPATIENT)
Dept: OTHER | Facility: CLINIC | Age: 58
End: 2018-06-17

## 2018-06-22 ENCOUNTER — OFFICE VISIT (OUTPATIENT)
Dept: ORTHOPEDIC SURGERY | Age: 58
End: 2018-06-22

## 2018-06-22 VITALS
DIASTOLIC BLOOD PRESSURE: 78 MMHG | SYSTOLIC BLOOD PRESSURE: 116 MMHG | BODY MASS INDEX: 30.49 KG/M2 | WEIGHT: 212.96 LBS | HEART RATE: 82 BPM | HEIGHT: 70 IN

## 2018-06-22 DIAGNOSIS — S70.11XA QUADRICEPS CONTUSION, RIGHT, INITIAL ENCOUNTER: ICD-10-CM

## 2018-06-22 DIAGNOSIS — M79.604 LEG PAIN, RIGHT: Primary | ICD-10-CM

## 2018-06-22 PROCEDURE — 99213 OFFICE O/P EST LOW 20 MIN: CPT | Performed by: NURSE PRACTITIONER

## 2018-06-22 RX ORDER — TRAMADOL HYDROCHLORIDE 50 MG/1
50 TABLET ORAL EVERY 6 HOURS PRN
Qty: 12 TABLET | Refills: 0 | Status: SHIPPED | OUTPATIENT
Start: 2018-06-22 | End: 2018-06-25

## 2018-06-22 RX ORDER — CYCLOBENZAPRINE HCL 10 MG
10 TABLET ORAL 3 TIMES DAILY PRN
Qty: 30 TABLET | Refills: 0 | Status: SHIPPED | OUTPATIENT
Start: 2018-06-22 | End: 2018-07-02

## 2018-06-29 ENCOUNTER — OFFICE VISIT (OUTPATIENT)
Dept: ORTHOPEDIC SURGERY | Age: 58
End: 2018-06-29

## 2018-06-29 VITALS — WEIGHT: 212.96 LBS | BODY MASS INDEX: 30.49 KG/M2 | HEIGHT: 70 IN

## 2018-06-29 DIAGNOSIS — S70.11XA HEMATOMA OF RIGHT THIGH, INITIAL ENCOUNTER: Primary | ICD-10-CM

## 2018-06-29 PROCEDURE — 99213 OFFICE O/P EST LOW 20 MIN: CPT | Performed by: ORTHOPAEDIC SURGERY

## 2018-07-09 ENCOUNTER — OFFICE VISIT (OUTPATIENT)
Dept: FAMILY MEDICINE CLINIC | Age: 58
End: 2018-07-09

## 2018-07-09 VITALS
OXYGEN SATURATION: 97 % | BODY MASS INDEX: 29.92 KG/M2 | WEIGHT: 209 LBS | SYSTOLIC BLOOD PRESSURE: 128 MMHG | HEART RATE: 58 BPM | DIASTOLIC BLOOD PRESSURE: 82 MMHG | HEIGHT: 70 IN

## 2018-07-09 DIAGNOSIS — R73.9 ELEVATED BLOOD SUGAR: Primary | ICD-10-CM

## 2018-07-09 DIAGNOSIS — E78.5 HYPERLIPIDEMIA, UNSPECIFIED HYPERLIPIDEMIA TYPE: ICD-10-CM

## 2018-07-09 DIAGNOSIS — K21.9 GASTROESOPHAGEAL REFLUX DISEASE WITHOUT ESOPHAGITIS: ICD-10-CM

## 2018-07-09 DIAGNOSIS — R25.1 TREMOR: ICD-10-CM

## 2018-07-09 PROBLEM — M51.36 DDD (DEGENERATIVE DISC DISEASE), LUMBAR: Status: RESOLVED | Noted: 2017-03-08 | Resolved: 2018-07-09

## 2018-07-09 PROBLEM — S83.242A TEAR OF MEDIAL MENISCUS OF LEFT KNEE, CURRENT: Status: RESOLVED | Noted: 2018-01-03 | Resolved: 2018-07-09

## 2018-07-09 PROBLEM — M51.369 DDD (DEGENERATIVE DISC DISEASE), LUMBAR: Status: RESOLVED | Noted: 2017-03-08 | Resolved: 2018-07-09

## 2018-07-09 PROBLEM — M75.82 ROTATOR CUFF TENDINITIS, LEFT: Status: RESOLVED | Noted: 2018-01-03 | Resolved: 2018-07-09

## 2018-07-09 PROBLEM — R53.83 FATIGUE: Status: RESOLVED | Noted: 2017-04-07 | Resolved: 2018-07-09

## 2018-07-09 LAB
A/G RATIO: 2 (ref 1.1–2.2)
ALBUMIN SERPL-MCNC: 4.4 G/DL (ref 3.4–5)
ALP BLD-CCNC: 89 U/L (ref 40–129)
ALT SERPL-CCNC: 24 U/L (ref 10–40)
ANION GAP SERPL CALCULATED.3IONS-SCNC: 12 MMOL/L (ref 3–16)
AST SERPL-CCNC: 22 U/L (ref 15–37)
BILIRUB SERPL-MCNC: 0.6 MG/DL (ref 0–1)
BILIRUBIN, POC: 0
BLOOD URINE, POC: 0
BUN BLDV-MCNC: 19 MG/DL (ref 7–20)
CALCIUM SERPL-MCNC: 9.3 MG/DL (ref 8.3–10.6)
CHLORIDE BLD-SCNC: 103 MMOL/L (ref 99–110)
CHOLESTEROL, TOTAL: 264 MG/DL (ref 0–199)
CLARITY, POC: CLEAR
CO2: 27 MMOL/L (ref 21–32)
COLOR, POC: YELLOW
CREAT SERPL-MCNC: 1 MG/DL (ref 0.9–1.3)
GFR AFRICAN AMERICAN: >60
GFR NON-AFRICAN AMERICAN: >60
GLOBULIN: 2.2 G/DL
GLUCOSE BLD-MCNC: 102 MG/DL (ref 70–99)
GLUCOSE URINE, POC: 0
HBA1C MFR BLD: 5.7 %
HDLC SERPL-MCNC: 53 MG/DL (ref 40–60)
KETONES, POC: 0
LDL CHOLESTEROL CALCULATED: 191 MG/DL
LEUKOCYTE EST, POC: 0
NITRITE, POC: 0
PH, POC: 5.5
POTASSIUM SERPL-SCNC: 4.6 MMOL/L (ref 3.5–5.1)
PROTEIN, POC: 0
SODIUM BLD-SCNC: 142 MMOL/L (ref 136–145)
SPECIFIC GRAVITY, POC: 1.02
TOTAL PROTEIN: 6.6 G/DL (ref 6.4–8.2)
TRIGL SERPL-MCNC: 98 MG/DL (ref 0–150)
UROBILINOGEN, POC: 0.2
VLDLC SERPL CALC-MCNC: 20 MG/DL

## 2018-07-09 PROCEDURE — 81002 URINALYSIS NONAUTO W/O SCOPE: CPT | Performed by: FAMILY MEDICINE

## 2018-07-09 PROCEDURE — 99214 OFFICE O/P EST MOD 30 MIN: CPT | Performed by: FAMILY MEDICINE

## 2018-07-09 PROCEDURE — 83036 HEMOGLOBIN GLYCOSYLATED A1C: CPT | Performed by: FAMILY MEDICINE

## 2018-07-09 RX ORDER — ATORVASTATIN CALCIUM 20 MG/1
TABLET, FILM COATED ORAL
Qty: 90 TABLET | Refills: 1 | Status: SHIPPED | OUTPATIENT
Start: 2018-07-09 | End: 2019-01-13 | Stop reason: SDUPTHER

## 2018-07-09 ASSESSMENT — PATIENT HEALTH QUESTIONNAIRE - PHQ9
SUM OF ALL RESPONSES TO PHQ9 QUESTIONS 1 & 2: 0
SUM OF ALL RESPONSES TO PHQ QUESTIONS 1-9: 0
1. LITTLE INTEREST OR PLEASURE IN DOING THINGS: 0
2. FEELING DOWN, DEPRESSED OR HOPELESS: 0

## 2018-07-11 LAB
ALBUMIN SERPL-MCNC: 3.5 G/DL (ref 3.1–4.9)
ALPHA-1-GLOBULIN: 0.3 G/DL (ref 0.2–0.4)
ALPHA-2-GLOBULIN: 0.5 G/DL (ref 0.4–1.1)
BETA GLOBULIN: 1.3 G/DL (ref 0.9–1.6)
GAMMA GLOBULIN: 1 G/DL (ref 0.6–1.8)
SPE/IFE INTERPRETATION: NORMAL

## 2018-09-28 ENCOUNTER — TELEPHONE (OUTPATIENT)
Dept: FAMILY MEDICINE CLINIC | Age: 58
End: 2018-09-28

## 2018-10-30 ENCOUNTER — TELEPHONE (OUTPATIENT)
Dept: CARDIOLOGY CLINIC | Age: 58
End: 2018-10-30

## 2018-10-30 DIAGNOSIS — Z51.81 ENCOUNTER FOR MONITORING AMIODARONE THERAPY: Primary | ICD-10-CM

## 2018-10-30 DIAGNOSIS — Z79.899 ENCOUNTER FOR MONITORING AMIODARONE THERAPY: Primary | ICD-10-CM

## 2018-10-30 NOTE — TELEPHONE ENCOUNTER
We received a refill request for Amiodarone 200mg QD, patient was last here 03/13/2018, supposed to f/u in 6 months, had an appt 09/10/2018- no showed. More importantly, he needs labs done before we can give him a refill.    10/30/2018- lm for pt to call back (on home # and cell#), work # stated he is out of the office

## 2018-11-06 ENCOUNTER — TELEPHONE (OUTPATIENT)
Dept: FAMILY MEDICINE CLINIC | Age: 58
End: 2018-11-06

## 2018-11-06 RX ORDER — OMEPRAZOLE 40 MG/1
40 CAPSULE, DELAYED RELEASE ORAL DAILY
Qty: 90 CAPSULE | Refills: 1 | Status: SHIPPED | OUTPATIENT
Start: 2018-11-06 | End: 2019-01-28 | Stop reason: SDUPTHER

## 2018-11-07 ENCOUNTER — TELEPHONE (OUTPATIENT)
Dept: FAMILY MEDICINE CLINIC | Age: 58
End: 2018-11-07

## 2018-12-10 ENCOUNTER — HOSPITAL ENCOUNTER (OUTPATIENT)
Age: 58
Discharge: HOME | End: 2018-12-10
Payer: COMMERCIAL

## 2018-12-10 DIAGNOSIS — Z51.81 ENCOUNTER FOR MONITORING AMIODARONE THERAPY: ICD-10-CM

## 2018-12-10 DIAGNOSIS — Z79.899 ENCOUNTER FOR MONITORING AMIODARONE THERAPY: ICD-10-CM

## 2018-12-10 LAB
ALBUMIN SERPL-MCNC: 4.1 G/DL (ref 3.4–5)
ALP BLD-CCNC: 72 U/L (ref 40–129)
ALT SERPL-CCNC: 27 U/L (ref 10–40)
ANION GAP SERPL CALCULATED.3IONS-SCNC: 15 MMOL/L (ref 3–16)
AST SERPL-CCNC: 18 U/L (ref 15–37)
BILIRUB SERPL-MCNC: 0.5 MG/DL (ref 0–1)
BILIRUBIN DIRECT: <0.2 MG/DL (ref 0–0.3)
BILIRUBIN, INDIRECT: NORMAL MG/DL (ref 0–1)
BUN BLDV-MCNC: 12 MG/DL (ref 7–20)
CALCIUM SERPL-MCNC: 9.1 MG/DL (ref 8.3–10.6)
CHLORIDE BLD-SCNC: 104 MMOL/L (ref 99–110)
CO2: 26 MMOL/L (ref 21–32)
CREAT SERPL-MCNC: 0.9 MG/DL (ref 0.9–1.3)
GFR AFRICAN AMERICAN: >60
GFR NON-AFRICAN AMERICAN: >60
GLUCOSE BLD-MCNC: 84 MG/DL (ref 70–99)
POTASSIUM SERPL-SCNC: 4 MMOL/L (ref 3.5–5.1)
SODIUM BLD-SCNC: 145 MMOL/L (ref 136–145)
TOTAL PROTEIN: 6.7 G/DL (ref 6.4–8.2)
TSH REFLEX FT4: 2.69 UIU/ML (ref 0.27–4.2)

## 2018-12-10 PROCEDURE — 84443 ASSAY THYROID STIM HORMONE: CPT

## 2018-12-10 PROCEDURE — 80076 HEPATIC FUNCTION PANEL: CPT

## 2018-12-10 PROCEDURE — 36415 COLL VENOUS BLD VENIPUNCTURE: CPT

## 2018-12-10 PROCEDURE — 80048 BASIC METABOLIC PNL TOTAL CA: CPT

## 2018-12-11 ENCOUNTER — TELEPHONE (OUTPATIENT)
Dept: CARDIOLOGY CLINIC | Age: 58
End: 2018-12-11

## 2018-12-11 NOTE — TELEPHONE ENCOUNTER
----- Message from Son Francisco MD sent at 12/11/2018 12:12 PM EST -----  Labs ok, continue current meds

## 2018-12-11 NOTE — TELEPHONE ENCOUNTER
Created telephone encounter. Per Pt HIPAA from can leave results on machine. LMOM relaying message per RPS regarding lab results. Pt to call the office with any concerns.

## 2018-12-13 NOTE — PROGRESS NOTES
[address]   Electrophysiology Note            Reason for visit: 6 month follow hypertrophic cardiomyopathy; Paroxysmal atrial fibrillation      HISTORY OF PRESENT ILLNESS:     Mariajose Neri is a 62 y.o. male who presents for follow up for follow up of hypertrophic cardiomyopathy. He stated he has known for years he has hypertrophic cardiomyopathy . He also has now been diagnosed with PVC's and NSVT. Dr. Memory Crigler, EP, wanted him to have an ICD. He states that no one in his family has had SCD. He states his brother has Hypertrophic cardiomyopathy and found out after he was tested. He found he had hypertrophic cardiomyopathy when he saw Dr. Ruffus Nyhan. He states he had a murmur since a teenager. He did have week long episode of palpitations and was having PVC's. He wore a monitor in the past and did not have any symptoms. The second holter monitor showed PVC's. He denies chest pain, no syncope. He states he does feel like he is going to be SOB at times, but is not actually SOB. He does c/o some esophageal spasming after he eats or if he is constipated. The Verapamil helps these symptoms. He is on Adderral 20mg he will talk to his psychiatrist about stopping it. He does not feel it is helping him. Most recent Echo on record is from November 2013 with EF of 55-60%. On 04/15/16, he returned for a second opinion regarding Dr Alicia Mandujano suggestion of proceeding with an ICD placement for hypertrophic cardiomyopathy. He reports he has been having increased frequency of palpitations. He reports he has inconsistent episodes of palpitations. He reports he has palpitations 1-2 times a day and then occasionally none for about a week. He reports the episodes last for several minutes to several hours. He reports chest pressure under his arm on the left side. He describes the pain as a constant, aching pain that lasted for several hours. He rated the pain as 5/10.  He reports he has a fullness sensation in the chest. His EKG shows 12/31/2013); Endoscopy, colon, diagnostic; Upper gastrointestinal endoscopy (9/3); Elbow surgery (2015); Carpal tunnel release (Left); and Knee arthroscopy (Left, 12/21/2017). Home Medications:    Prior to Admission medications    Medication Sig Start Date End Date Taking? Authorizing Provider   omeprazole (PRILOSEC) 40 MG delayed release capsule Take 1 capsule by mouth daily 11/6/18  Yes Cuca Martinez MD   ELIQUIS 5 MG TABS tablet TAKE 1 TABLET BY MOUTH TWICE A DAY 9/5/18  Yes PARI Ruth CNP   amiodarone (CORDARONE) 200 MG tablet TAKE 1 TABLET BY MOUTH DAILY 8/2/18  Yes PARI Ruth CNP   atorvastatin (LIPITOR) 20 MG tablet TAKE 1 TABLET BY MOUTH ONE TIME A DAY 7/9/18  Yes Cuca Martinez MD   verapamil (CALAN SR) 240 MG extended release tablet Take 1 tablet by mouth nightly 5/14/18  Yes PARI Ruth CNP   vitamin E 1000 units capsule Take 1,000 Units by mouth daily   Yes Historical Provider, MD   vitamin C (ASCORBIC ACID) 500 MG tablet Take 500 mg by mouth daily   Yes Historical Provider, MD   vitamin B-12 (CYANOCOBALAMIN) 100 MCG tablet Take 50 mcg by mouth daily   Yes Historical Provider, MD   zinc 50 MG CAPS Take by mouth daily    Yes Historical Provider, MD   MAGNESIUM PO Take  by mouth daily. Yes Historical Provider, MD   clonazePAM (KLONOPIN) 1 MG tablet Take 1 mg by mouth daily. 4/30/14  Yes Historical Provider, MD   Cholecalciferol (D3 ADULT) 1000 UNITS CHEW Take by mouth daily     Historical Provider, MD   [facility] ([address]) CAPS Take by mouth daily     Historical Provider, MD   Melatonin 10 MG CAPS Take 10 mg by mouth as needed     Historical Provider, MD   Multiple Vitamins-Minerals (GARRETT MULTI MEN PO) Take by mouth daily     Historical Provider, MD       Allergies:  Niacin and related    Social History:   reports that he has never smoked. He has never used smokeless tobacco. He reports that he drinks alcohol.  He reports that he does not use drugs. Family History: family history includes Cancer in his mother; Heart Disease in his maternal grandfather and maternal grandmother; High Blood Pressure in his father; High Cholesterol in his brother and mother; Prostate Cancer in his paternal uncle. REVIEW OF SYSTEMS:  Unchanged from last visit. All 14-point review of systems are completed and pertinent positives are mentioned in the history of present illness. Other systems are reviewed and are negative. PHYSICAL EXAM:    Physical Examination:  Unchanged from last visit. /68   Pulse 56   Ht 5' 10\" (1.778 m)   Wt 211 lb 6.4 oz (95.9 kg)   SpO2 97%   BMI 30.33 kg/m²    Constitutional and General Appearance: alert, cooperative, no distress and appears stated age  HEENT: PERRL, no cervical lymphadenopathy. No masses palpable. Normal oral mucosa  Respiratory:  · Normal excursion and expansion without use of accessory muscles  · Resp Auscultation: Normal breath sounds without dullness or wheezing  Cardiovascular:  · The apical impulse is not displaced  · Heart tones are crisp and normal. regular S1 and S2.  · Jugular venous pulsation Normal  · The carotid upstroke is normal in amplitude and contour without delay or bruit  · Peripheral pulses are symmetrical and full   Abdomen:  · No masses or tenderness  · Bowel sounds present  Extremities:  ·  No Cyanosis or Clubbing  ·  Lower extremity edema: No  ·  Skin: Warm and dry  Neurological:  · Alert and oriented. · Moves all extremities well  · No abnormalities of mood, affect, memory, mentation, or behavior are noted    DATA:    ECG:  Bradycardia with right BBB, rate 58 bpm    ECHO: 15/14/05  Systolic function appears grossly normal with an estimated ejection   fraction of 55-60 %. Left ventricular size is normal with severe asymetric left ventricular   hypertrophy. Speckled appearance of myocardium, suggest rule out for   restrictive cardiomyopathy and comparison with ECG voltage.  Normal

## 2018-12-14 ENCOUNTER — OFFICE VISIT (OUTPATIENT)
Dept: CARDIOLOGY CLINIC | Age: 58
End: 2018-12-14
Payer: COMMERCIAL

## 2018-12-14 VITALS
WEIGHT: 211.4 LBS | BODY MASS INDEX: 30.26 KG/M2 | DIASTOLIC BLOOD PRESSURE: 68 MMHG | SYSTOLIC BLOOD PRESSURE: 120 MMHG | HEART RATE: 56 BPM | HEIGHT: 70 IN | OXYGEN SATURATION: 97 %

## 2018-12-14 DIAGNOSIS — I42.2 HYPERTROPHIC CARDIOMYOPATHY (HCC): ICD-10-CM

## 2018-12-14 DIAGNOSIS — R00.2 PALPITATIONS: ICD-10-CM

## 2018-12-14 DIAGNOSIS — I48.0 PAROXYSMAL ATRIAL FIBRILLATION (HCC): Primary | ICD-10-CM

## 2018-12-14 PROCEDURE — 99215 OFFICE O/P EST HI 40 MIN: CPT | Performed by: INTERNAL MEDICINE

## 2018-12-14 PROCEDURE — 93000 ELECTROCARDIOGRAM COMPLETE: CPT | Performed by: INTERNAL MEDICINE

## 2018-12-14 NOTE — PATIENT INSTRUCTIONS
RECOMMENDATIONS:  1. Risk, benefit, alternative, rationale of the procedure were discussed with the patient which included but were not limited to allergic reaction to the medications, pain, bleeding, infection, nerve injury, injury to diaphragm(breathing muscle), pulmonary embolus(blood clot in lungs), deep vein blood clot, pneumothorax, hemothorax, acute renal failure, cardiac perforation,  tamponade, need for emergent surgery (open heart), need for any future surgery, pulmonary vein stenosis, left atrial to esophageal fistula, stroke, myocardial infarction, need for permanent pacemaker, lead dislodgement and death. Given the complex nature of the disease no guarantee was given on the success of the procedure. Explained to patient that discontinuation of anticoagulation is not an indication for the procedure. 2. Discussed options including wearing a monitor, loop recorder placement and possible a fib ablation if needed   3. Continue Eliquis 5 mg twice daily due to Paroxysmal atrial fibrillation . 4. Plan for loop recorder placement. Hold Eliquis the night before and the day of the procedure   5.  Follow up with me after the procedure

## 2018-12-14 NOTE — LETTER
[address] Cardiology - [address] [place] [address] Tony Youngblood [address] [address]  Phone: [phone]  Fax: [phone]    Kailey Robles MD        December 18, 2018     [address] [facility]    Patient: Linda Johns  MR Number: [id number]  YOB: 1960  Date of Visit: 12/14/2018    Dear Dr. Zackary Carranza:    Below are the relevant portions of my assessment and plan of care. [address]   Electrophysiology Note            Reason for visit: 6 month follow hypertrophic cardiomyopathy; Paroxysmal atrial fibrillation      HISTORY OF PRESENT ILLNESS:     Linda Johns is a 62 y.o. male who presents for follow up for follow up of hypertrophic cardiomyopathy. He stated he has known for years he has hypertrophic cardiomyopathy . He also has now been diagnosed with PVC's and NSVT. Dr. Justin Le, EP, wanted him to have an ICD. He states that no one in his family has had SCD. He states his brother has Hypertrophic cardiomyopathy and found out after he was tested. He found he had hypertrophic cardiomyopathy when he saw Dr. Fletcher Bright. He states he had a murmur since a teenager. He did have week long episode of palpitations and was having PVC's. He wore a monitor in the past and did not have any symptoms. The second holter monitor showed PVC's. He denies chest pain, no syncope. He states he does feel like he is going to be SOB at times, but is not actually SOB. He does c/o some esophageal spasming after he eats or if he is constipated. The Verapamil helps these symptoms. He is on Adderral 20mg he will talk to his psychiatrist about stopping it. He does not feel it is helping him. Most recent Echo on record is from November 2013 with EF of 55-60%. On 04/15/16, he returned for a second opinion regarding Dr Vijay Smith suggestion of proceeding with an ICD placement for hypertrophic cardiomyopathy.  He reports he has been having increased frequency of palpitations. He reports he has inconsistent episodes of palpitations. He reports he has palpitations 1-2 times a day and then occasionally none for about a week. He reports the episodes last for several minutes to several hours. He reports chest pressure under his arm on the left side. He describes the pain as a constant, aching pain that lasted for several hours. He rated the pain as 5/10. He reports he has a fullness sensation in the chest. His EKG shows atrial fib which is new. He denies feeling the palpitations. His monitor showed Paroxysmal atrial fibrillation burden at 2%. Interval history since last office visit:   He wore a 2 week monitor in 6/2017 that showed Paroxysmal atrial fibrillation with frequent PVC's. When he returned for follow in September of 2017, he states he had palpitations and A fib. He had an echocardiogram in 10/2017 that showed an EF of 55-60% and severe LVH. He was started on amiodarone in 10/2017. He was seen by Dr. Scout Zimmer for hypertrophic cardiomyopathy. He underwent a cardiac MRI which showed hypertrophic cardiomyopathy without left ventricular outflow track obstruction at rest and an EF of 63%. Today his EKG shows sinus Bradycardia with right BBB, rate 58 bpm. He states he continues to have palpitations and has not decrease since he started amiodarone. This feels like a fluttering in his chest and a skipped beat. Patient currently denies any weight gain, edema, chest pain, shortness of breath, dizziness, and syncope. Past Medical History:   has a past medical history of A-fib [facility]); ADHD (attention deficit hyperactivity disorder); Carpal tunnel syndrome; Chronic low back pain; Chronic neck pain; Chronic sinusitis; Dental crown present; Epigastric hernia; Esophageal spasm; GERD (gastroesophageal reflux disease); Hyperlipidemia; Hypertrophic cardiomyopathy ([address] IHSS (idiopathic hypertrophic subaortic stenosis) ([facility] [address]  Kidney stones; HUNTER on 3. Continue Eliquis 5 mg twice daily due to Paroxysmal atrial fibrillation . 4. Plan for loop recorder placement. Hold Eliquis the night before and the day of the procedure   5. Follow up with me after the procedure     QUALITY MEASURES  1. Tobacco Cessation Counseling: NA  2. Retake of BP if >140/90:   NA  3. Documentation to PCP/referring for new patient:  Sent to PCP at close of office visit  4. CAD patient on anti-platelet: NA  5. CAD patient on STATIN therapy:  NA  6. Patient with CHF and aFib on anticoagulation:  NA    Scribe's attestation: This note was scribed in the presence of Dr. Joslyn Young. Vidhi Woodruff M.D. By Jacky Chappell RN    I, Trey Matson, personally performed the services described in this documentation, as scribed by the above signed scribe in my presence. It is both accurate and complete to my knowledge. I agree with the details independently gathered by the clinical support staff, while the remaining scribed note accurately describes my personal service to the patient.        All questions and concerns were addressed to the patient/family. Alternatives to my treatment were discussed. The note was completed using EMR. Every effort was made to ensure accuracy; however, inadvertent computerized transcription errors may be present. Discussed with patient and nursing. CIARRA YuanC. Electrophysiology  [facility]. [facility]. [address]. [place], [address]  Phone: [phone]  Fax: [phone]   12/14/2018  3:04 PM            If you have questions, please do not hesitate to call me. I look forward to following Gisella Vela along with you.     Sincerely,        Coby Qiu MD

## 2019-01-13 DIAGNOSIS — E78.5 HYPERLIPIDEMIA, UNSPECIFIED HYPERLIPIDEMIA TYPE: ICD-10-CM

## 2019-01-14 RX ORDER — ATORVASTATIN CALCIUM 20 MG/1
TABLET, FILM COATED ORAL
Qty: 90 TABLET | Refills: 0 | Status: SHIPPED | OUTPATIENT
Start: 2019-01-14 | End: 2019-01-28

## 2019-01-15 DIAGNOSIS — E78.5 HYPERLIPIDEMIA, UNSPECIFIED HYPERLIPIDEMIA TYPE: ICD-10-CM

## 2019-01-15 RX ORDER — ATORVASTATIN CALCIUM 20 MG/1
TABLET, FILM COATED ORAL
Qty: 90 TABLET | Refills: 0 | Status: SHIPPED | OUTPATIENT
Start: 2019-01-15 | End: 2020-04-08 | Stop reason: SDUPTHER

## 2019-01-28 ENCOUNTER — OFFICE VISIT (OUTPATIENT)
Dept: FAMILY MEDICINE CLINIC | Age: 59
End: 2019-01-28
Payer: COMMERCIAL

## 2019-01-28 VITALS
OXYGEN SATURATION: 98 % | HEART RATE: 64 BPM | HEIGHT: 70 IN | WEIGHT: 210 LBS | SYSTOLIC BLOOD PRESSURE: 126 MMHG | BODY MASS INDEX: 30.06 KG/M2 | DIASTOLIC BLOOD PRESSURE: 72 MMHG

## 2019-01-28 DIAGNOSIS — D22.9 NEVUS: ICD-10-CM

## 2019-01-28 DIAGNOSIS — K21.9 GASTROESOPHAGEAL REFLUX DISEASE WITHOUT ESOPHAGITIS: ICD-10-CM

## 2019-01-28 DIAGNOSIS — E78.5 HYPERLIPIDEMIA, UNSPECIFIED HYPERLIPIDEMIA TYPE: ICD-10-CM

## 2019-01-28 DIAGNOSIS — R73.9 ELEVATED BLOOD SUGAR: Primary | ICD-10-CM

## 2019-01-28 LAB
A/G RATIO: 2.3 (ref 1.1–2.2)
ALBUMIN SERPL-MCNC: 4.6 G/DL (ref 3.4–5)
ALP BLD-CCNC: 72 U/L (ref 40–129)
ALT SERPL-CCNC: 43 U/L (ref 10–40)
ANION GAP SERPL CALCULATED.3IONS-SCNC: 13 MMOL/L (ref 3–16)
AST SERPL-CCNC: 19 U/L (ref 15–37)
BILIRUB SERPL-MCNC: 0.5 MG/DL (ref 0–1)
BUN BLDV-MCNC: 16 MG/DL (ref 7–20)
CALCIUM SERPL-MCNC: 9.2 MG/DL (ref 8.3–10.6)
CHLORIDE BLD-SCNC: 103 MMOL/L (ref 99–110)
CHOLESTEROL, TOTAL: 152 MG/DL (ref 0–199)
CO2: 28 MMOL/L (ref 21–32)
CREAT SERPL-MCNC: 1.1 MG/DL (ref 0.9–1.3)
GFR AFRICAN AMERICAN: >60
GFR NON-AFRICAN AMERICAN: >60
GLOBULIN: 2 G/DL
GLUCOSE BLD-MCNC: 101 MG/DL (ref 70–99)
HBA1C MFR BLD: 5.6 %
HDLC SERPL-MCNC: 45 MG/DL (ref 40–60)
LDL CHOLESTEROL CALCULATED: 96 MG/DL
POTASSIUM SERPL-SCNC: 4.6 MMOL/L (ref 3.5–5.1)
SODIUM BLD-SCNC: 144 MMOL/L (ref 136–145)
TOTAL PROTEIN: 6.6 G/DL (ref 6.4–8.2)
TRIGL SERPL-MCNC: 56 MG/DL (ref 0–150)
VLDLC SERPL CALC-MCNC: 11 MG/DL

## 2019-01-28 PROCEDURE — 99214 OFFICE O/P EST MOD 30 MIN: CPT | Performed by: FAMILY MEDICINE

## 2019-01-28 PROCEDURE — 83036 HEMOGLOBIN GLYCOSYLATED A1C: CPT | Performed by: FAMILY MEDICINE

## 2019-01-28 RX ORDER — VERAPAMIL HYDROCHLORIDE 240 MG/1
240 TABLET, FILM COATED, EXTENDED RELEASE ORAL NIGHTLY
Qty: 90 TABLET | Refills: 1 | Status: SHIPPED | OUTPATIENT
Start: 2019-01-28 | End: 2019-09-11 | Stop reason: SDUPTHER

## 2019-01-28 RX ORDER — OMEPRAZOLE 40 MG/1
40 CAPSULE, DELAYED RELEASE ORAL DAILY
Qty: 90 CAPSULE | Refills: 1 | Status: SHIPPED | OUTPATIENT
Start: 2019-01-28 | End: 2019-07-27 | Stop reason: SDUPTHER

## 2019-04-25 ENCOUNTER — TELEPHONE (OUTPATIENT)
Dept: FAMILY MEDICINE CLINIC | Age: 59
End: 2019-04-25

## 2019-04-25 NOTE — TELEPHONE ENCOUNTER
Sonu Donohue from [facility] needs to talk to a nurse about this patient's care and his afib. Patient is not calling him back.       Please call Sonu Donohue at [phone] ext [phone]

## 2019-05-01 NOTE — TELEPHONE ENCOUNTER
Left message for Kristal Bryson, if she is calling regarding patient's cardiac issues he does have a cardiologist which is who should be contacted regarding the Afib.

## 2019-05-06 DIAGNOSIS — E78.5 HYPERLIPIDEMIA, UNSPECIFIED HYPERLIPIDEMIA TYPE: ICD-10-CM

## 2019-05-06 RX ORDER — ATORVASTATIN CALCIUM 20 MG/1
TABLET, FILM COATED ORAL
Qty: 90 TABLET | Refills: 0 | Status: SHIPPED | OUTPATIENT
Start: 2019-05-06 | End: 2019-08-06

## 2019-05-06 NOTE — TELEPHONE ENCOUNTER
Veronique Jack is requesting refill(s) lipitor  Last OV 1/28/19 (pertaining to medication)  LR 1/15/19 (per medication requested)  Next office visit scheduled or attempted No   If no, reason:  Pt is due in July

## 2019-08-06 ENCOUNTER — OFFICE VISIT (OUTPATIENT)
Dept: FAMILY MEDICINE CLINIC | Age: 59
End: 2019-08-06
Payer: COMMERCIAL

## 2019-08-06 VITALS
WEIGHT: 205 LBS | HEART RATE: 54 BPM | SYSTOLIC BLOOD PRESSURE: 122 MMHG | DIASTOLIC BLOOD PRESSURE: 74 MMHG | OXYGEN SATURATION: 98 % | BODY MASS INDEX: 29.35 KG/M2 | HEIGHT: 70 IN

## 2019-08-06 DIAGNOSIS — K21.9 GASTROESOPHAGEAL REFLUX DISEASE WITHOUT ESOPHAGITIS: ICD-10-CM

## 2019-08-06 DIAGNOSIS — R73.9 ELEVATED BLOOD SUGAR: ICD-10-CM

## 2019-08-06 DIAGNOSIS — E78.5 HYPERLIPIDEMIA, UNSPECIFIED HYPERLIPIDEMIA TYPE: Primary | ICD-10-CM

## 2019-08-06 DIAGNOSIS — I48.0 PAROXYSMAL ATRIAL FIBRILLATION (HCC): ICD-10-CM

## 2019-08-06 DIAGNOSIS — Z79.899 ON AMIODARONE THERAPY: ICD-10-CM

## 2019-08-06 LAB — HBA1C MFR BLD: 5.9 %

## 2019-08-06 PROCEDURE — 83036 HEMOGLOBIN GLYCOSYLATED A1C: CPT | Performed by: FAMILY MEDICINE

## 2019-08-06 PROCEDURE — 99214 OFFICE O/P EST MOD 30 MIN: CPT | Performed by: FAMILY MEDICINE

## 2019-08-06 RX ORDER — OMEPRAZOLE 40 MG/1
CAPSULE, DELAYED RELEASE ORAL
Qty: 90 CAPSULE | Refills: 1 | Status: SHIPPED | OUTPATIENT
Start: 2019-08-06 | End: 2020-03-12

## 2019-08-06 NOTE — PROGRESS NOTES
needed        No current facility-administered medications for this visit. Allergies   Allergen Reactions    Niacin And Related      Extreme itching /stinging started at head to waist       Social History     Tobacco Use    Smoking status: Never Smoker    Smokeless tobacco: Never Used   Substance Use Topics    Alcohol use: Yes     Comment: 2-3 drinks 3X per year        OBJECTIVE:   /74   Pulse 54   Ht 5' 10\" (1.778 m)   Wt 205 lb (93 kg)   SpO2 98%   BMI 29.41 kg/m²   NAD  Neck no bruit or JVD  S1 and S2 normal, no murmurs, clicks, gallops or rubs. Regular rate and rhythm. Chest is clear; no wheezes or rales. No edema or JVD. Neuro alert, no CN or motor deficits  Psych nl mood, thought and judgement    ASSESSMENT:   Diagnosis Orders   1. Hyperlipidemia, unspecified hyperlipidemia type  Lipid Panel, labs pending    Comprehensive Metabolic Panel   2. Gastroesophageal reflux disease without esophagitis  omeprazole (PRILOSEC) 40 MG delayed, stable release capsule   3. Elevated blood sugar  POCT glycosylated hemoglobin (Hb A1C) 5.9 today   4. Paroxysmal atrial fibrillation (HCC)   plan per cardiology   5. On amiodarone therapy  TSH without Reflex, labs pending   Patient is on Eliquis, we are limited with medications for his headaches. We talked about seeing a chiropractor to help with tension headaches. We talked about stress reduction. Plan:  1)  Medication: continue current medication regimen unchanged  2)  Recheck in 6 months, sooner should new symptoms or problems arise. 3) Wolf Escalante received counseling on the following healthy behaviors: medication adherence        Discussed use, benefit, and side effects of prescribed medications. Barriers to medication compliance addressed. All patient questions answered. Pt voiced understanding.

## 2019-08-07 LAB
A/G RATIO: 2.1 (ref 1.1–2.2)
ALBUMIN SERPL-MCNC: 4.6 G/DL (ref 3.4–5)
ALP BLD-CCNC: 90 U/L (ref 40–129)
ALT SERPL-CCNC: 33 U/L (ref 10–40)
ANION GAP SERPL CALCULATED.3IONS-SCNC: 14 MMOL/L (ref 3–16)
AST SERPL-CCNC: 22 U/L (ref 15–37)
BILIRUB SERPL-MCNC: 1 MG/DL (ref 0–1)
BUN BLDV-MCNC: 13 MG/DL (ref 7–20)
CALCIUM SERPL-MCNC: 9.5 MG/DL (ref 8.3–10.6)
CHLORIDE BLD-SCNC: 101 MMOL/L (ref 99–110)
CHOLESTEROL, TOTAL: 179 MG/DL (ref 0–199)
CO2: 29 MMOL/L (ref 21–32)
CREAT SERPL-MCNC: 0.9 MG/DL (ref 0.9–1.3)
GFR AFRICAN AMERICAN: >60
GFR NON-AFRICAN AMERICAN: >60
GLOBULIN: 2.2 G/DL
GLUCOSE BLD-MCNC: 101 MG/DL (ref 70–99)
HDLC SERPL-MCNC: 67 MG/DL (ref 40–60)
LDL CHOLESTEROL CALCULATED: 85 MG/DL
POTASSIUM SERPL-SCNC: 4 MMOL/L (ref 3.5–5.1)
SODIUM BLD-SCNC: 144 MMOL/L (ref 136–145)
TOTAL PROTEIN: 6.8 G/DL (ref 6.4–8.2)
TRIGL SERPL-MCNC: 133 MG/DL (ref 0–150)
TSH SERPL DL<=0.05 MIU/L-ACNC: 2.9 UIU/ML (ref 0.27–4.2)
VLDLC SERPL CALC-MCNC: 27 MG/DL

## 2019-08-27 DIAGNOSIS — E78.5 HYPERLIPIDEMIA, UNSPECIFIED HYPERLIPIDEMIA TYPE: ICD-10-CM

## 2019-08-28 RX ORDER — ATORVASTATIN CALCIUM 20 MG/1
TABLET, FILM COATED ORAL
Qty: 90 TABLET | Refills: 1 | Status: SHIPPED | OUTPATIENT
Start: 2019-08-28 | End: 2019-10-18

## 2019-09-11 RX ORDER — VERAPAMIL HYDROCHLORIDE 240 MG/1
240 TABLET, FILM COATED, EXTENDED RELEASE ORAL NIGHTLY
Qty: 90 TABLET | Refills: 1 | Status: SHIPPED | OUTPATIENT
Start: 2019-09-11 | End: 2020-03-11

## 2019-09-20 NOTE — PROGRESS NOTES
visit, loop recorder placement was discussed. His EKG today shows sinus dolores, rate 47 bpm long QT. He states he did not have his loop placement due to cost. He still has palpitations at times, but this has decreased in severity. He denies dizziness. He noted he has been bruising easily. He recently bruised his hip. He has also been bleeding easy from small cuts. He has some leg swelling as the day goes on, worse in the evenings. He has dyspnea on exertion. Past Medical History:   has a past medical history of A-fib ([address] ADHD (attention deficit hyperactivity disorder), Carpal tunnel syndrome, Chronic low back pain, Chronic neck pain, Chronic sinusitis, Dental crown present, Epigastric hernia, Esophageal spasm, GERD (gastroesophageal reflux disease), Hyperlipidemia, Hypertrophic cardiomyopathy ([address] IHSS (idiopathic hypertrophic subaortic stenosis) ([address] Kidney stones, HUNTER on CPAP, Primary localized osteoarthrosis, shoulder region, Prostate cancer ([facility] [address] PVC's (premature ventricular contractions), RBBB (right bundle branch block), RLS (restless legs syndrome), Seasonal allergies, and Tachycardia. Past Surgical History:   has a past surgical history that includes Knee arthroscopy (Right, 1982); Umbilical hernia repair; Colonoscopy (1/17/11); TURP (2012); Inguinal hernia repair (Bilateral, 2009, 2012,); Varicocelectomy (2000); Shoulder arthroscopy (Right, 12/31/2013); Endoscopy, colon, diagnostic; Upper gastrointestinal endoscopy (9/3); Elbow surgery (2015); Carpal tunnel release (Left); and Knee arthroscopy (Left, 12/21/2017). Home Medications:    Prior to Admission medications    Medication Sig Start Date End Date Taking?  Authorizing Provider   verapamil (CALAN SR) 240 MG extended release tablet Take 1 tablet by mouth nightly 9/11/19  Yes Nidia Costa MD   atorvastatin (LIPITOR) 20 MG tablet TAKE 1 TABLET BY MOUTH ONE TIME A DAY  8/28/19  Yes iNdia Costa MD   omeprazole (PRILOSEC) 40 MG delayed mucosa  Respiratory:  · Normal excursion and expansion without use of accessory muscles  · Resp Auscultation: Normal breath sounds without dullness or wheezing  Cardiovascular:  · The apical impulse is not displaced  · Heart tones are crisp and normal. regular S1 and S2.  · Jugular venous pulsation Normal  · The carotid upstroke is normal in amplitude and contour without delay or bruit  · Peripheral pulses are symmetrical and full   Abdomen:  · No masses or tenderness  · Bowel sounds present  Extremities:  ·  No Cyanosis or Clubbing  ·  Lower extremity edema: No  ·  Skin: Warm and dry  Neurological:  · Alert and oriented. · Moves all extremities well  · No abnormalities of mood, affect, memory, mentation, or behavior are noted    DATA:    ECG:  Sinus bradycardia 47 bpm, long QT    ECHO: 36/85/46  Systolic function appears grossly normal with an estimated ejection   fraction of 55-60 %. Left ventricular size is normal with severe asymetric left ventricular   hypertrophy. Speckled appearance of myocardium, suggest rule out for   restrictive cardiomyopathy and comparison with ECG voltage. Normal left   ventricle diastolic filling pressure. Moderately dilated left atrium with increased left atrial volume. CARDIOLOGY LABS:   CBC: No results for input(s): WBC, HGB, HCT, PLT in the last 72 hours. BMP: No results for input(s): NA, K, CO2, BUN, CREATININE, LABGLOM, GLUCOSE in the last 72 hours. PT/INR: No results for input(s): PROTIME, INR in the last 72 hours. APTT:No results for input(s): APTT in the last 72 hours. FASTING LIPID PANEL:  Lab Results   Component Value Date    HDL 67 08/06/2019    HDL 51 12/19/2011    LDLCALC 85 08/06/2019    TRIG 133 08/06/2019     LIVER PROFILE:No results for input(s): AST, ALT, ALB in the last 72 hours. IMPRESSION:    1. Hypertrophic Cardiomyopathy   2. Paroxysmal atrial fibrillation   3. HUNTER  4. Right BBB  5. Non sustained ventricular tachycardia   6. Palpitations   7. Abnormal MRI   8. Fatigue: sleep apnea. 9. Long QT  10. VALERA  11. Peripheral edema       RECOMMENDATIONS:   1. Echocardiogram to evaluate heart functions and pressures  2. Continue current medications for now. Prolonged Qtc discussed  3. Implantable cardioverter defibrillator  Discussed. Patient understand risk of sudden cardiac death and does not want to proceed with ICD  4. Follow up with me in 6 months        QUALITY MEASURES  1. Tobacco Cessation Counseling: NA  2. Retake of BP if >140/90:   NA  3. Documentation to PCP/referring for new patient:  Sent to PCP at close of office visit  4. CAD patient on anti-platelet: NA  5. CAD patient on STATIN therapy:  NA  6. Patient with CHF and aFib on anticoagulation:  NA      All questions and concerns were addressed to the patient/family. Alternatives to my treatment were discussed. The note was completed using EMR. Every effort was made to ensure accuracy; however, inadvertent computerized transcription errors may be present. Scribe's attestation: This note was scribed in the presence of Dr. Adriano Mireles. oDnald Rangel M.D. By Elizabeth Bowman RN     I, Salena Pool, personally performed the services described in this documentation, as scribed by the above signed scribe in my presence. It is both accurate and complete to my knowledge. I agree with the details independently gathered by the clinical support staff, while the remaining scribed note accurately describes my personal service to the patient.        BELA Patel F.A.C.C. Electrophysiology  [facility]. [address]. [address].   [place], [address]  Phone: [phone]  Fax: [phone]

## 2019-09-25 ENCOUNTER — OFFICE VISIT (OUTPATIENT)
Dept: CARDIOLOGY CLINIC | Age: 59
End: 2019-09-25
Payer: COMMERCIAL

## 2019-09-25 VITALS
OXYGEN SATURATION: 97 % | WEIGHT: 206.5 LBS | SYSTOLIC BLOOD PRESSURE: 124 MMHG | HEIGHT: 70 IN | DIASTOLIC BLOOD PRESSURE: 74 MMHG | HEART RATE: 47 BPM | BODY MASS INDEX: 29.56 KG/M2

## 2019-09-25 DIAGNOSIS — I42.2 HYPERTROPHIC CARDIOMYOPATHY (HCC): Primary | ICD-10-CM

## 2019-09-25 DIAGNOSIS — I48.0 PAROXYSMAL ATRIAL FIBRILLATION (HCC): ICD-10-CM

## 2019-09-25 PROCEDURE — 93000 ELECTROCARDIOGRAM COMPLETE: CPT | Performed by: INTERNAL MEDICINE

## 2019-09-25 PROCEDURE — 99214 OFFICE O/P EST MOD 30 MIN: CPT | Performed by: INTERNAL MEDICINE

## 2019-09-25 NOTE — LETTER
omeprazole (PRILOSEC) 40 MG delayed release capsule TAKE 1 CAPSULE BY MOUTH EVERY DAY 8/6/19  Yes Dede Saba MD   amiodarone (CORDARONE) 200 MG tablet TAKE 1 TABLET BY MOUTH EVERY DAY 1/15/19  Yes PARI Irene CNP   atorvastatin (LIPITOR) 20 MG tablet TAKE 1 TABLET BY MOUTH ONE TIME A DAY  1/15/19  Yes Dede Saba MD   vitamin B-12 (CYANOCOBALAMIN) 100 MCG tablet Take 50 mcg by mouth daily   Yes Historical Provider, MD   Cholecalciferol (D3 ADULT) 1000 UNITS CHEW Take by mouth daily    Yes Historical Provider, MD   zinc 50 MG CAPS Take by mouth daily    Yes Historical Provider, MD   clonazePAM (KLONOPIN) 1 MG tablet Take 1 mg by mouth daily. 4/30/14  Yes Historical Provider, MD   Melatonin 10 MG CAPS Take 10 mg by mouth as needed    Yes Historical Provider, MD   ELIQUIS 5 MG TABS tablet TAKE 1 TABLET BY MOUTH TWICE A DAY  Patient not taking: Reported on 9/25/2019 6/26/19   PARI Irene CNP       Allergies:  Niacin and related    Social History:   reports that he has never smoked. He has never used smokeless tobacco. He reports that he drinks alcohol. He reports that he does not use drugs. Family History: family history includes Cancer in his mother; Heart Disease in his maternal grandfather and maternal grandmother; High Blood Pressure in his father; High Cholesterol in his brother and mother; Prostate Cancer in his paternal uncle. REVIEW OF SYSTEMS:  Unchanged from last visit. All 14-point review of systems are completed and pertinent positives are mentioned in the history of present illness. Other systems are reviewed and are negative. PHYSICAL EXAM:    Physical Examination:  Unchanged from last visit.    /74   Pulse (!) 47   Ht 5' 10\" (1.778 m)   Wt 206 lb 8 oz (93.7 kg)   SpO2 97%   BMI 29.63 kg/m²     Constitutional and General Appearance: alert, cooperative, no distress and appears stated age 4. Right BBB  5. Non sustained ventricular tachycardia   6. Palpitations   7. Abnormal MRI   8. Fatigue: sleep apnea. 9. Long QT  10. AVLERA  11. Peripheral edema       RECOMMENDATIONS:   1. Echocardiogram to evaluate heart functions and pressures  2. Continue current medications for now. Prolonged Qtc discussed  3. Implantable cardioverter defibrillator  Discussed. Patient understand risk of sudden cardiac death and does not want to proceed with ICD  4. Follow up with me in 6 months        QUALITY MEASURES  1. Tobacco Cessation Counseling: NA  2. Retake of BP if >140/90:   NA  3. Documentation to PCP/referring for new patient:  Sent to PCP at close of office visit  4. CAD patient on anti-platelet: NA  5. CAD patient on STATIN therapy:  NA  6. Patient with CHF and aFib on anticoagulation:  NA      All questions and concerns were addressed to the patient/family. Alternatives to my treatment were discussed. The note was completed using EMR. Every effort was made to ensure accuracy; however, inadvertent computerized transcription errors may be present. Scribe's attestation: This note was scribed in the presence of Dr. Davian Hutton. Kg Pena M.D. By Andie Paez RN     I, Nydia Lewis, personally performed the services described in this documentation, as scribed by the above signed scribe in my presence. It is both accurate and complete to my knowledge. I agree with the details independently gathered by the clinical support staff, while the remaining scribed note accurately describes my personal service to the patient.        BELA Patel F.A.C.C. Electrophysiology  [place] [address]. [address]. [address].   [place], [address]  Phone: [phone]  Fax: [phone]

## 2019-10-02 ENCOUNTER — HOSPITAL ENCOUNTER (OUTPATIENT)
Dept: CARDIOLOGY | Age: 59
Discharge: HOME | End: 2019-10-02
Payer: COMMERCIAL

## 2019-10-02 DIAGNOSIS — I42.2 HYPERTROPHIC CARDIOMYOPATHY (HCC): ICD-10-CM

## 2019-10-02 LAB
LV EF: 58 %
LVEF MODALITY: NORMAL

## 2019-10-02 PROCEDURE — 93306 TTE W/DOPPLER COMPLETE: CPT

## 2019-10-04 ENCOUNTER — TELEPHONE (OUTPATIENT)
Dept: CARDIOLOGY CLINIC | Age: 59
End: 2019-10-04

## 2019-10-05 DIAGNOSIS — Z79.899 MEDICATION MANAGEMENT: Primary | ICD-10-CM

## 2019-10-07 RX ORDER — APIXABAN 5 MG/1
TABLET, FILM COATED ORAL
Qty: 180 TABLET | Refills: 0 | Status: SHIPPED | OUTPATIENT
Start: 2019-10-07 | End: 2020-11-12 | Stop reason: SDUPTHER

## 2019-10-10 ENCOUNTER — OFFICE VISIT (OUTPATIENT)
Dept: ORTHOPEDIC SURGERY | Age: 59
End: 2019-10-10
Payer: COMMERCIAL

## 2019-10-10 VITALS — BODY MASS INDEX: 29.57 KG/M2 | HEIGHT: 70 IN | WEIGHT: 206.57 LBS

## 2019-10-10 DIAGNOSIS — M17.0 PRIMARY OSTEOARTHRITIS OF BOTH KNEES: Primary | ICD-10-CM

## 2019-10-10 DIAGNOSIS — M25.562 PAIN IN BOTH KNEES, UNSPECIFIED CHRONICITY: ICD-10-CM

## 2019-10-10 DIAGNOSIS — M25.561 PAIN IN BOTH KNEES, UNSPECIFIED CHRONICITY: ICD-10-CM

## 2019-10-10 PROCEDURE — 99213 OFFICE O/P EST LOW 20 MIN: CPT | Performed by: ORTHOPAEDIC SURGERY

## 2019-10-10 PROCEDURE — 20611 DRAIN/INJ JOINT/BURSA W/US: CPT | Performed by: ORTHOPAEDIC SURGERY

## 2019-10-10 RX ORDER — BETAMETHASONE SODIUM PHOSPHATE AND BETAMETHASONE ACETATE 3; 3 MG/ML; MG/ML
24 INJECTION, SUSPENSION INTRA-ARTICULAR; INTRALESIONAL; INTRAMUSCULAR; SOFT TISSUE ONCE
Status: COMPLETED | OUTPATIENT
Start: 2019-10-10 | End: 2019-10-10

## 2019-10-10 RX ADMIN — BETAMETHASONE SODIUM PHOSPHATE AND BETAMETHASONE ACETATE 24 MG: 3; 3 INJECTION, SUSPENSION INTRA-ARTICULAR; INTRALESIONAL; INTRAMUSCULAR; SOFT TISSUE at 09:36

## 2019-10-18 ENCOUNTER — OFFICE VISIT (OUTPATIENT)
Dept: CARDIOLOGY CLINIC | Age: 59
End: 2019-10-18
Payer: COMMERCIAL

## 2019-10-18 VITALS
OXYGEN SATURATION: 96 % | SYSTOLIC BLOOD PRESSURE: 110 MMHG | HEART RATE: 57 BPM | BODY MASS INDEX: 29.12 KG/M2 | HEIGHT: 71 IN | DIASTOLIC BLOOD PRESSURE: 70 MMHG | WEIGHT: 208 LBS

## 2019-10-18 DIAGNOSIS — I48.0 PAROXYSMAL ATRIAL FIBRILLATION (HCC): ICD-10-CM

## 2019-10-18 DIAGNOSIS — I42.2 HYPERTROPHIC CARDIOMYOPATHY (HCC): ICD-10-CM

## 2019-10-18 DIAGNOSIS — E78.5 HYPERLIPIDEMIA, UNSPECIFIED HYPERLIPIDEMIA TYPE: Primary | ICD-10-CM

## 2019-10-18 PROCEDURE — 99214 OFFICE O/P EST MOD 30 MIN: CPT | Performed by: INTERNAL MEDICINE

## 2019-10-18 RX ORDER — ZOLPIDEM TARTRATE 5 MG/1
5 TABLET ORAL NIGHTLY PRN
COMMUNITY
End: 2020-02-01 | Stop reason: ALTCHOICE

## 2019-10-21 RX ORDER — AMIODARONE HYDROCHLORIDE 200 MG/1
TABLET ORAL
Qty: 90 TABLET | Refills: 0 | Status: SHIPPED | OUTPATIENT
Start: 2019-10-21 | End: 2020-02-10

## 2019-12-19 ENCOUNTER — OFFICE VISIT (OUTPATIENT)
Dept: ORTHOPEDIC SURGERY | Age: 59
End: 2019-12-19
Payer: COMMERCIAL

## 2019-12-19 DIAGNOSIS — M25.562 LEFT KNEE PAIN, UNSPECIFIED CHRONICITY: Primary | ICD-10-CM

## 2019-12-19 PROCEDURE — 99213 OFFICE O/P EST LOW 20 MIN: CPT | Performed by: ORTHOPAEDIC SURGERY

## 2019-12-19 RX ORDER — MELOXICAM 15 MG/1
15 TABLET ORAL DAILY
Qty: 90 TABLET | Refills: 0 | Status: ON HOLD | OUTPATIENT
Start: 2019-12-19 | End: 2020-02-02 | Stop reason: HOSPADM

## 2020-01-03 ENCOUNTER — OFFICE VISIT (OUTPATIENT)
Dept: ORTHOPEDIC SURGERY | Age: 60
End: 2020-01-03
Payer: COMMERCIAL

## 2020-01-03 PROCEDURE — 99213 OFFICE O/P EST LOW 20 MIN: CPT | Performed by: PHYSICIAN ASSISTANT

## 2020-01-03 NOTE — PROGRESS NOTES
patient the nature of osteoarthritis of the knee. We talked about treatment of arthritis and the various options that are involved with this. The patient understands that the treatments can vary from essentially doing nothing to a total joint replacement arthroplasty for arthritis. I then went on to describe the utilization of glucosamine and chondroitin sulfate as a joint nutrition product. We talked about the fact that this is essentially a joint vitamin with typically minimal side effects. We also talked about utilization of prescription over-the-counter anti-inflammatory medications as the next option. We also discussed the possibility of brace wear or orthotic wear if the patient has significant varus alignment. We then went on to discuss the possibility of Visco supplementation with hyaluronate products. We talked about the typical course of this type of treatment and the fact that often times in the treatment for significant arthritis, this is successful less than half the time. We also talked about the corticosteroid injections and the fact that this can give a brief window of relief, but does not cure the problem; in fact, the pain often has a rebound effect in 6-10 weeks after the steroid has worn off. We also discussed arthroscopy surgery in attempts to debride the joint, but the fact that this is relatively unreliable treatment in the face of significant arthritis. It can occasionally be used, particularly if there is significant meniscus pathology. Lastly we discussed total joint replacement arthroplasty as the final and definitive step in treatment of arthritis. Patient realizes the magnitude of this type of treatment as well as having voiced a general understanding to the duration of the prosthesis. The patient voiced understanding to these continuum of treatment options.     At this time we are going go ahead and employing a course of outpatient physical therapy for lower extremity strengthening and

## 2020-01-07 ENCOUNTER — HOSPITAL ENCOUNTER (OUTPATIENT)
Dept: PHYSICAL THERAPY | Age: 60
Setting detail: THERAPIES SERIES
Discharge: HOME | End: 2020-01-07
Payer: COMMERCIAL

## 2020-01-07 PROCEDURE — 97161 PT EVAL LOW COMPLEX 20 MIN: CPT | Performed by: PHYSICAL THERAPIST

## 2020-01-07 PROCEDURE — 97110 THERAPEUTIC EXERCISES: CPT | Performed by: PHYSICAL THERAPIST

## 2020-01-07 PROCEDURE — 97112 NEUROMUSCULAR REEDUCATION: CPT | Performed by: PHYSICAL THERAPIST

## 2020-01-07 NOTE — FLOWSHEET NOTE
navigation      Manual Treatments:  PROM / STM / Oscillations-Mobs:  G-I, II, III, IV (PA's, Inf., Post.)  [] (38032) Provided manual therapy to mobilize LE, proximal hip and/or LS spine soft tissue/joints for the purpose of modulating pain, promoting relaxation,  increasing ROM, reducing/eliminating soft tissue swelling/inflammation/restriction, improving soft tissue extensibility and allowing for proper ROM for normal function with self care, mobility, lifting and ambulation. Modalities:  Deferred   [] GAME READY (VASO)- for significant edema, swelling, pain control. Charges:  Timed Code Treatment Minutes: 30'   Total Treatment Minutes: 48'     [facility] time in/time out:   (and requires time in and out for each CPT code)    [x] EVAL (LOW) 40582 (typically 20 minutes face-to-face)  [] EVAL (MOD) 30035 (typically 30 minutes face-to-face)  [] EVAL (HIGH) 26395 (typically 45 minutes face-to-face)  [] RE-EVAL     [x] UI(88354) x     [] IONTO  [x] NMR (03965) x     [] VASO  [] Manual (88818) x      [] Other:  [] TA x      [] Mech Traction (12362)  [] ES(attended) (87042)      [] ES (un) (85570):       GOALS:  (cut and paste from eval)  Patient stated goal: Relieve pain  []? Progressing: []? Met: []? Not Met: []? Adjusted     Therapist goals for Patient:   Short Term Goals: To be achieved in: 2 weeks  1. Independent in HEP and progression per patient tolerance, in order to prevent re-injury. []? Progressing: []? Met: []? Not Met: []? Adjusted  2. Patient will have a decrease in pain to facilitate improvement in movement, function, and ADLs as indicated by Functional Deficits. []? Progressing: []? Met: []? Not Met: []? Adjusted     Long Term Goals: To be achieved in: 8 weeks  1. Disability index score of 18% or less for the LEFS to assist with reaching prior level of function. []? Progressing: []? Met: []? Not Met: []? Adjusted  2.  Patient will demonstrate increased AROM to 0-145 l knee to allow for proper joint knees. Return to Play: (if applicable)   []  Stage 1: Intro to Strength   []  Stage 2: Return to Run and Strength   []  Stage 3: Return to Jump and Strength   []  Stage 4: Dynamic Strength and Agility   []  Stage 5: Sport Specific Training     []  Ready to Return to Play, Meets All Above Stages   []  Not Ready for Return to Sports   Comments:                               PLAN: See eval. Cont PT 2x/week for 8 weeks. Progress strengthening as tolerated. [] Continue per plan of care [] Alter current plan (see comments above)  [x] Plan of care initiated [] Hold pending MD visit [] Discharge      Electronically signed by:  Devaughn Flores PT    Note: If patient does not return for scheduled/ recommended follow up visits, this note will serve as a discharge from care along with most recent update on progress.

## 2020-01-07 NOTE — PLAN OF CARE
ibuprofen, anti-inflam  Provocative factors: squatting, stairs ( mostly up), stiffness in am.    Type: [x]Constant   []Intermittent  []Radiating []Localized []other:     Numbness/Tingling: Denies    Occupation/School:full time . Desk job. Living Status/Prior Level of Function: Independent with ADLs and IADLs, Plays baseball Feb-Nov. Plays catcher. Practice 1-2x/week. Stationary bike at home. Has small weight bench. [place]. OBJECTIVE: hamstring 90/90: 138 on R, 140 on L,    ROM LEFT RIGHT   HIP Flex WFL WFL   HIP Abd     HIP Ext     HIP IR Restricted Restricted   HIP ER     Knee ext 0 0   Knee Flex 140 145   Ankle PF     Ankle DF tight tight   Ankle In     Ankle Ev     Strength  LEFT RIGHT   HIP Flexors 4-/5 4/5   HIP Abductors 3+/5 3+/5   HIP Ext     Hip ER     Knee EXT (quad) 4/5 4/5   Knee Flex (HS) 4/5 4/5   Ankle DF Intact TW/HW Intact TW/HW   Ankle PF     Ankle Inv     Ankle EV     Quad tone/SLR 4-/5/4-/5 4/5/4/5   Circumference  Mid apex  7 cm prox             Reflexes/Sensation:    []Dermatomes/Myotomes intact    [x]Reflexes equal and normal bilaterally   []Other:    Joint mobility:    [x]Normal    []Hypo   []Hyper    Palpation: tender R ITB, medial/lateral joint line L knee    Functional Mobility/Transfers: IND    Posture: sym iliac crest,     Bandages/Dressings/Incisions: NA    Gait: (include devices/WB status) Decreased hip ext/stride length    Orthopedic Special Tests: trend with SL stance L>R. Pain with squat test. Lacks calf flexibility. + Compression test, - ligamentous tests. [x] Patient history, allergies, meds reviewed. Medical chart reviewed. See intake form. Review Of Systems (ROS):  [x]Performed Review of systems (Integumentary, CardioPulmonary, Neurological) by intake and observation. Intake form has been scanned into medical record.  Patient has been instructed to contact their primary care physician regarding ROS issues if not and/or comorbidities that impact the plan of care  [x] An examination of body systems using standardized tests and measures addressing any of the following: body structures and functions (impairments), activity limitations, and/or participation restrictions;:  [] a total of 1-2 or more elements   [x] a total of 3 or more elements   [] a total of 4 or more elements   [x] A clinical presentation with:  [x] stable and/or uncomplicated characteristics   [] evolving clinical presentation with changing characteristics  [] unstable and unpredictable characteristics;   [x] Clinical decision making of [x] low, [] moderate, [] high complexity using standardized patient assessment instrument and/or measurable assessment of functional outcome. [x] EVAL (LOW) 55511 (typically 20 minutes face-to-face)  [] EVAL (MOD) 04588 (typically 30 minutes face-to-face)  [] EVAL (HIGH) 25501 (typically 45 minutes face-to-face)  [] RE-EVAL       PLAN:   Frequency/Duration:  2 days per week for 8 Weeks:  Interventions:  [x]  Therapeutic exercise including: strength training, ROM, for Lower extremity and core   [x]  NMR activation and proprioception for LE, Glutes and Core   [x]  Manual therapy as indicated for LE, Hip and spine to include: Dry Needling/IASTM, STM, PROM, Gr I-IV mobilizations, manipulation. [x] Modalities as needed that may include: thermal agents, E-stim, Biofeedback, US, iontophoresis as indicated  [x] Patient education on joint protection, postural re-education, activity modification, progression of HEP. HEP instruction: (see scanned forms)    GOALS:  Patient stated goal: Relieve pain  [] Progressing: [] Met: [] Not Met: [] Adjusted    Therapist goals for Patient:   Short Term Goals: To be achieved in: 2 weeks  1. Independent in HEP and progression per patient tolerance, in order to prevent re-injury. [] Progressing: [] Met: [] Not Met: [] Adjusted  2.  Patient will have a decrease in pain to facilitate improvement

## 2020-01-14 ENCOUNTER — HOSPITAL ENCOUNTER (OUTPATIENT)
Dept: PHYSICAL THERAPY | Age: 60
Setting detail: THERAPIES SERIES
Discharge: HOME | End: 2020-01-14
Payer: COMMERCIAL

## 2020-01-14 PROCEDURE — 97112 NEUROMUSCULAR REEDUCATION: CPT | Performed by: PHYSICAL THERAPIST

## 2020-01-14 PROCEDURE — 97140 MANUAL THERAPY 1/> REGIONS: CPT | Performed by: PHYSICAL THERAPIST

## 2020-01-14 PROCEDURE — 97110 THERAPEUTIC EXERCISES: CPT | Performed by: PHYSICAL THERAPIST

## 2020-01-14 NOTE — FLOWSHEET NOTE
[x] (95360) Reviewed/Progressed HEP activities related to strengthening, flexibility, endurance, ROM of core, proximal hip and LE for functional self-care, mobility, lifting and ambulation/stair navigation   [] (32545)Reviewed/Progressed HEP activities related to improving balance, coordination, kinesthetic sense, posture, motor skill, proprioception of core, proximal hip and LE for self care, mobility, lifting, and ambulation/stair navigation      Manual Treatments:  PROM / STM / Oscillations-Mobs:  G-I, II, III, IV (PA's, Inf., Post.)  [x] (88411) Provided manual therapy to mobilize LE, proximal hip and/or LS spine soft tissue/joints for the purpose of modulating pain, promoting relaxation,  increasing ROM, reducing/eliminating soft tissue swelling/inflammation/restriction, improving soft tissue extensibility and allowing for proper ROM for normal function with self care, mobility, lifting and ambulation. Modalities:  Deferred   [] GAME READY (VASO)- for significant edema, swelling, pain control. Charges:  Timed Code Treatment Minutes: 54'   Total Treatment Minutes: 54'     [facility] time in/time out:   (and requires time in and out for each CPT code)    [x] EVAL (LOW) 75644 (typically 20 minutes face-to-face)  [] EVAL (MOD) 69397 (typically 30 minutes face-to-face)  [] EVAL (HIGH) 96287 (typically 45 minutes face-to-face)  [] RE-EVAL     [x] LL(36207) x   2  [] IONTO  [x] NMR (70854) x   1  [] VASO  [x] Manual (00995) x 1     [] Other:  [] TA x      [] Mech Traction (00807)  [] ES(attended) (55600)      [] ES (un) (68964):       GOALS:  (cut and paste from eval)  Patient stated goal: Relieve pain  []? Progressing: []? Met: []? Not Met: []? Adjusted     Therapist goals for Patient:   Short Term Goals: To be achieved in: 2 weeks  1. Independent in HEP and progression per patient tolerance, in order to prevent re-injury. []? Progressing: []? Met: []? Not Met: []? Adjusted  2.  Patient will have a decrease in pain to facilitate improvement in movement, function, and ADLs as indicated by Functional Deficits. []? Progressing: []? Met: []? Not Met: []? Adjusted     Long Term Goals: To be achieved in: 8 weeks  1. Disability index score of 18% or less for the LEFS to assist with reaching prior level of function. []? Progressing: []? Met: []? Not Met: []? Adjusted  2. Patient will demonstrate increased AROM to 0-145 l knee to allow for proper joint functioning as indicated by patients Functional Deficits. []? Progressing: []? Met: []? Not Met: []? Adjusted  3. Patient will demonstrate an increase in Strength to good proximal hip strength and control, within 5lb HHD in LE to allow for proper functional mobility as indicated by patients Functional Deficits. []? Progressing: []? Met: []? Not Met: []? Adjusted  4. Patient will return to ascending stairs functional activities without increased symptoms or restriction. []? Progressing: []? Met: []? Not Met: []? Adjusted  5. Patient to be able to squat into catchers position without pain (patient specific functional goal)    []? Progressing: []? Met: []? Not Met: []? Adjusted          Progression Towards Functional goals:  [] Patient is progressing as expected towards functional goals listed. [] Progression is slowed due to complexities listed. [] Progression has been slowed due to co-morbidities. [x] Plan just implemented, too soon to assess goals progression  [] Other:         Overall Progression Towards Functional goals/ Treatment Progress Update:  [] Patient is progressing as expected towards functional goals listed. [] Progression is slowed due to complexities/Impairments listed. [] Progression has been slowed due to co-morbidities.   [x] Plan just implemented, too soon to assess goals progression <30days   [] Goals require adjustment due to lack of progress  [] Patient is not progressing as expected and requires additional follow up with physician  []

## 2020-01-21 ENCOUNTER — HOSPITAL ENCOUNTER (OUTPATIENT)
Dept: PHYSICAL THERAPY | Age: 60
Setting detail: THERAPIES SERIES
Discharge: HOME | End: 2020-01-21
Payer: COMMERCIAL

## 2020-01-21 PROCEDURE — 97140 MANUAL THERAPY 1/> REGIONS: CPT | Performed by: PHYSICAL THERAPIST

## 2020-01-21 PROCEDURE — 97112 NEUROMUSCULAR REEDUCATION: CPT | Performed by: PHYSICAL THERAPIST

## 2020-01-21 PROCEDURE — 97110 THERAPEUTIC EXERCISES: CPT | Performed by: PHYSICAL THERAPIST

## 2020-01-21 NOTE — FLOWSHEET NOTE
radiation. Exercises/Interventions:     Therapeutic Ex (22628) Sets/sec Reps Notes/CUES   Piriformis stretches H30 5 HEP   ITB Figure 4 and knee to opposite hip H30 5 HEP   3D  Hamstrings/Calf H30 5 HEP   Hip flexor with strap H30 5 HEP   SL clams/ hip abd h5 2x10 reps HEP   Reverse clams wirh OVL H5 2x10    SLR with ER H5 [id number] HEP         Incline stretches H30 5 reps    Bike 6'     Hip hiking/ LSD 4\" 2x10 reps + to HEP   LBW/ Monster walks/Retro walks  2 laps + to HEP/+ BTB   Manual Intervention (29554)            Prone quad stretches/HS stretches H30 5 Manual R/L         ITB /HS STM with stick 10'  Decreased tolerance. + to HEP               NMR re-education (24431)   CUES NEEDED   Patient education OA 10'           Gliders post/ PL H5 2x10 reps                                        Therapeutic Activity (86972)                                                Therapeutic Exercise and NMR EXR  [x] (68470) Provided verbal/tactile cueing for activities related to strengthening, flexibility, endurance, ROM for improvements in LE, proximal hip, and core control with self care, mobility, lifting, ambulation.  [] (67391) Provided verbal/tactile cueing for activities related to improving balance, coordination, kinesthetic sense, posture, motor skill, proprioception  to assist with LE, proximal hip, and core control in self care, mobility, lifting, ambulation and eccentric single leg control.      NMR and Therapeutic Activities:    [x] (25051 or 30227) Provided verbal/tactile cueing for activities related to improving balance, coordination, kinesthetic sense, posture, motor skill, proprioception and motor activation to allow for proper function of core, proximal hip and LE with self care and ADLs  [] (15256) Gait Re-education- Provided training and instruction to the patient for proper LE, core and proximal hip recruitment and positioning and eccentric body weight control with ambulation re-education including up and down stairs     Home Exercise Program:    [x] (56963) Reviewed/Progressed HEP activities related to strengthening, flexibility, endurance, ROM of core, proximal hip and LE for functional self-care, mobility, lifting and ambulation/stair navigation   [] (96522)Reviewed/Progressed HEP activities related to improving balance, coordination, kinesthetic sense, posture, motor skill, proprioception of core, proximal hip and LE for self care, mobility, lifting, and ambulation/stair navigation      Manual Treatments:  PROM / STM / Oscillations-Mobs:  G-I, II, III, IV (PA's, Inf., Post.)  [x] (33365) Provided manual therapy to mobilize LE, proximal hip and/or LS spine soft tissue/joints for the purpose of modulating pain, promoting relaxation,  increasing ROM, reducing/eliminating soft tissue swelling/inflammation/restriction, improving soft tissue extensibility and allowing for proper ROM for normal function with self care, mobility, lifting and ambulation. Modalities:  Deferred   [] GAME READY (VASO)- for significant edema, swelling, pain control. Charges:  Timed Code Treatment Minutes: 54'   Total Treatment Minutes: 54'     [facility] time in/time out:   (and requires time in and out for each CPT code)    [x] EVAL (LOW) 51897 (typically 20 minutes face-to-face)  [] EVAL (MOD) 41858 (typically 30 minutes face-to-face)  [] EVAL (HIGH) 23431 (typically 45 minutes face-to-face)  [] RE-EVAL     [x] RS(96538) x   2  [] IONTO  [x] NMR (95989) x   1  [] VASO  [x] Manual (11403) x 1     [] Other:  [] TA x      [] Mech Traction (97241)  [] ES(attended) (79609)      [] ES (un) (24592):       GOALS:  (cut and paste from eval)  Patient stated goal: Relieve pain  []? Progressing: []? Met: []? Not Met: []? Adjusted     Therapist goals for Patient:   Short Term Goals: To be achieved in: 2 weeks  1. Independent in HEP and progression per patient tolerance, in order to prevent re-injury. []? Progressing: []? Met: []?  Not Met: []? Adjusted  2. Patient will have a decrease in pain to facilitate improvement in movement, function, and ADLs as indicated by Functional Deficits. []? Progressing: []? Met: []? Not Met: []? Adjusted     Long Term Goals: To be achieved in: 8 weeks  1. Disability index score of 18% or less for the LEFS to assist with reaching prior level of function. []? Progressing: []? Met: []? Not Met: []? Adjusted  2. Patient will demonstrate increased AROM to 0-145 l knee to allow for proper joint functioning as indicated by patients Functional Deficits. []? Progressing: []? Met: []? Not Met: []? Adjusted  3. Patient will demonstrate an increase in Strength to good proximal hip strength and control, within 5lb HHD in LE to allow for proper functional mobility as indicated by patients Functional Deficits. []? Progressing: []? Met: []? Not Met: []? Adjusted  4. Patient will return to ascending stairs functional activities without increased symptoms or restriction. []? Progressing: []? Met: []? Not Met: []? Adjusted  5. Patient to be able to squat into catchers position without pain (patient specific functional goal)    []? Progressing: []? Met: []? Not Met: []? Adjusted          Progression Towards Functional goals:  [] Patient is progressing as expected towards functional goals listed. [] Progression is slowed due to complexities listed. [] Progression has been slowed due to co-morbidities. [x] Plan just implemented, too soon to assess goals progression  [] Other:         Overall Progression Towards Functional goals/ Treatment Progress Update:  [] Patient is progressing as expected towards functional goals listed. [] Progression is slowed due to complexities/Impairments listed. [] Progression has been slowed due to co-morbidities.   [x] Plan just implemented, too soon to assess goals progression <30days   [] Goals require adjustment due to lack of progress  [] Patient is not progressing as expected and requires

## 2020-01-21 NOTE — FLOWSHEET NOTE
Ecc.                           Inv.                             Ladders                Square               Jump/Hop  Low                      Med.                      High                                                            Modality declined   Initials                             DTM   Time spent one on one (workers comp)    Time spent with PT assistant

## 2020-01-24 ENCOUNTER — APPOINTMENT (OUTPATIENT)
Dept: PHYSICAL THERAPY | Age: 60
End: 2020-01-24
Payer: COMMERCIAL

## 2020-01-28 ENCOUNTER — HOSPITAL ENCOUNTER (OUTPATIENT)
Dept: PHYSICAL THERAPY | Age: 60
Setting detail: THERAPIES SERIES
Discharge: HOME | End: 2020-01-28
Payer: COMMERCIAL

## 2020-01-28 PROCEDURE — 97140 MANUAL THERAPY 1/> REGIONS: CPT | Performed by: PHYSICAL THERAPIST

## 2020-01-28 PROCEDURE — 97112 NEUROMUSCULAR REEDUCATION: CPT | Performed by: PHYSICAL THERAPIST

## 2020-01-28 PROCEDURE — 97110 THERAPEUTIC EXERCISES: CPT | Performed by: PHYSICAL THERAPIST

## 2020-01-28 NOTE — FLOWSHEET NOTE
[facility]  [address]  Phone: [phone]  Fax [phone]      ATHLETIC TRAINING [address]   Date:  2020    Patient Name:  Sherrie Ewing"       :  1960  MRN: [id number]  Restrictions/Precautions:    Medical/Treatment Diagnosis Information:  ·  BL knee OA     Physician Information:   Emre Salas Post-op  8 wks  12 wks 16 wks 20 wks   24 wks                            Activity Log                                                  DOS/DOI:                                                    Date:  20   ATC communication: has knee ext machine at home  Most difficulty going up steps    Knees achy after last visit   Bike     Elliptical     Treadmill     Airdyne          Gastroc stretch     Soleus stretch     Hamstring stretch     ITB stretch     Hip Flexor stretch     Quad stretch     Adductor stretch          Weight Shifting sp  4\" post reach R/L 2x10                             fp                               tp     Lateral walking (with/w/o TB)          Balance: PEP/Sparkle board                    SLS           Star excursion load/explode           Extremity reach UE/LE          Leg Press Talha. 100# 3x10 100# 3x12                     Ecc. NV 80# R/L 2x10                     Inv. Calf Press Talha. 100# 3x10 100# 3x12                      Ecc.                         Inv.          KASSY   Flex  45# R/L 3x10              ABd 45# R/L 2x10 45# R/L 3x10              ADd  60# R/L 3x10             TKE 60# R/L 20x5\" 75# R/L 30x5\"              Ext 60# R/L 2x10 60# R/L 3x10        Steps Up                Up and Over                Down                Lateral                Rotation          Squats  mini                   wall                  BOSU           Lunges:  Lunge to Balance                    Balance to Lunge                    Walking          Knee Extension Bilat. 30# 3x10                               Ecc.                                Inv. Hamstring Curls Bilat. 30# 3x10 (^NV) 40# 3x10                              Ecc.                                Inv.          Soleus Press Bilat. Ecc.                            Inv.                                Ladders                 Square                Jump/Hop  Low                       Med.                       High                                                               Modality declined declined   Initials                             DTM DTM   Time spent one on one (workers comp)     Time spent with PT assistant

## 2020-01-31 ENCOUNTER — HOSPITAL ENCOUNTER (OUTPATIENT)
Dept: PHYSICAL THERAPY | Age: 60
Setting detail: THERAPIES SERIES
Discharge: HOME | End: 2020-01-31
Payer: COMMERCIAL

## 2020-01-31 PROCEDURE — 97140 MANUAL THERAPY 1/> REGIONS: CPT | Performed by: PHYSICAL THERAPIST

## 2020-01-31 PROCEDURE — 97110 THERAPEUTIC EXERCISES: CPT | Performed by: PHYSICAL THERAPIST

## 2020-01-31 NOTE — FLOWSHEET NOTE
control with ambulation re-education including up and down stairs     Home Exercise Program:    [x] (29127) Reviewed/Progressed HEP activities related to strengthening, flexibility, endurance, ROM of core, proximal hip and LE for functional self-care, mobility, lifting and ambulation/stair navigation   [] (19692)Reviewed/Progressed HEP activities related to improving balance, coordination, kinesthetic sense, posture, motor skill, proprioception of core, proximal hip and LE for self care, mobility, lifting, and ambulation/stair navigation      Manual Treatments:  PROM / STM / Oscillations-Mobs:  G-I, II, III, IV (PA's, Inf., Post.)  [x] (27735) Provided manual therapy to mobilize LE, proximal hip and/or LS spine soft tissue/joints for the purpose of modulating pain, promoting relaxation,  increasing ROM, reducing/eliminating soft tissue swelling/inflammation/restriction, improving soft tissue extensibility and allowing for proper ROM for normal function with self care, mobility, lifting and ambulation. Modalities:  Deferred   [] GAME READY (VASO)- for significant edema, swelling, pain control. Charges:  Timed Code Treatment Minutes: 40'   Total Treatment Minutes: 72'     [facility] time in/time out:   (and requires time in and out for each CPT code)    [] EVAL (LOW) 24686 (typically 20 minutes face-to-face)  [] EVAL (MOD) 78086 (typically 30 minutes face-to-face)  [] EVAL (HIGH) 27593 (typically 45 minutes face-to-face)  [] RE-EVAL     [x] WG(80998) x   2  [] IONTO  [x] NMR (52721) x   [] VASO  [x] Manual (03898) x 1     [] Other:  [] TA x      [] Mech Traction (36488)  [] ES(attended) (78890)      [] ES (un) (34944):       GOALS:  (cut and paste from eval)  Patient stated goal: Relieve pain  []? Progressing: []? Met: []? Not Met: []? Adjusted     Therapist goals for Patient:   Short Term Goals: To be achieved in: 2 weeks  1. Independent in HEP and progression per patient tolerance, in order to prevent re-injury. []? Progressing: []? Met: []? Not Met: []? Adjusted  2. Patient will have a decrease in pain to facilitate improvement in movement, function, and ADLs as indicated by Functional Deficits. []? Progressing: []? Met: []? Not Met: []? Adjusted     Long Term Goals: To be achieved in: 8 weeks  1. Disability index score of 18% or less for the LEFS to assist with reaching prior level of function. []? Progressing: []? Met: []? Not Met: []? Adjusted  2. Patient will demonstrate increased AROM to 0-145 l knee to allow for proper joint functioning as indicated by patients Functional Deficits. []? Progressing: []? Met: []? Not Met: []? Adjusted  3. Patient will demonstrate an increase in Strength to good proximal hip strength and control, within 5lb HHD in LE to allow for proper functional mobility as indicated by patients Functional Deficits. []? Progressing: []? Met: []? Not Met: []? Adjusted  4. Patient will return to ascending stairs functional activities without increased symptoms or restriction. []? Progressing: []? Met: []? Not Met: []? Adjusted  5. Patient to be able to squat into catchers position without pain (patient specific functional goal)    []? Progressing: []? Met: []? Not Met: []? Adjusted          Progression Towards Functional goals:  [] Patient is progressing as expected towards functional goals listed. [] Progression is slowed due to complexities listed. [] Progression has been slowed due to co-morbidities. [x] Plan just implemented, too soon to assess goals progression  [] Other:         Overall Progression Towards Functional goals/ Treatment Progress Update:  [] Patient is progressing as expected towards functional goals listed. [] Progression is slowed due to complexities/Impairments listed. [] Progression has been slowed due to co-morbidities.   [x] Plan just implemented, too soon to assess goals progression <30days   [] Goals require adjustment due to lack of progress  [] Patient is not progressing as expected and requires additional follow up with physician  [] Other    Prognosis for POC: [x] Good [] Fair  [] Poor      Patient requires continued skilled intervention: [x] Yes  [] No    Treatment/Activity Tolerance:  [x] Patient able to complete treatment  [] Patient limited by fatigue  [] Patient limited by pain    [] Patient limited by other medical complications  [] Other:     ASSESSMENT:  Presents with B LE flexibility deficits with quad and hip weakness contributing to increased compression  joint forces B knees. Continued IASTM treatment. Return to Play: (if applicable)   []  Stage 1: Intro to Strength   []  Stage 2: Return to Run and Strength   []  Stage 3: Return to Jump and Strength   []  Stage 4: Dynamic Strength and Agility   []  Stage 5: Sport Specific Training     []  Ready to Return to Play, Meets All Above Stages   []  Not Ready for Return to Sports   Comments:                               PLAN: See eval. Cont PT 2x/week for 8 weeks. Progress strengthening as tolerated. [x] Continue per plan of care [] Alter current plan (see comments above)  [] Plan of care initiated [] Hold pending MD visit [] Discharge      Electronically signed by:  Hawa Spears, [place] [id number]    Note: If patient does not return for scheduled/ recommended follow up visits, this note will serve as a discharge from care along with most recent update on progress.

## 2020-02-01 ENCOUNTER — HOSPITAL ENCOUNTER (INPATIENT)
Age: 60
LOS: 1 days | Discharge: HOME | DRG: 379 | End: 2020-02-02
Attending: EMERGENCY MEDICINE | Admitting: INTERNAL MEDICINE
Payer: COMMERCIAL

## 2020-02-01 PROBLEM — K92.2 GIB (GASTROINTESTINAL BLEEDING): Status: ACTIVE | Noted: 2020-02-01

## 2020-02-01 PROBLEM — Z79.01 CURRENT USE OF LONG TERM ANTICOAGULATION: Status: ACTIVE | Noted: 2020-02-01

## 2020-02-01 LAB
A/G RATIO: 1.7 (ref 1.1–2.2)
ABO/RH: NORMAL
ALBUMIN SERPL-MCNC: 4.2 G/DL (ref 3.4–5)
ALP BLD-CCNC: 82 U/L (ref 40–129)
ALT SERPL-CCNC: 28 U/L (ref 10–40)
ANION GAP SERPL CALCULATED.3IONS-SCNC: 11 MMOL/L (ref 3–16)
ANTIBODY SCREEN: NORMAL
AST SERPL-CCNC: 21 U/L (ref 15–37)
BASOPHILS ABSOLUTE: 0.1 K/UL (ref 0–0.2)
BASOPHILS RELATIVE PERCENT: 1.1 %
BILIRUB SERPL-MCNC: 0.6 MG/DL (ref 0–1)
BUN BLDV-MCNC: 18 MG/DL (ref 7–20)
CALCIUM SERPL-MCNC: 9.1 MG/DL (ref 8.3–10.6)
CHLORIDE BLD-SCNC: 103 MMOL/L (ref 99–110)
CO2: 27 MMOL/L (ref 21–32)
CREAT SERPL-MCNC: 1.1 MG/DL (ref 0.9–1.3)
EOSINOPHILS ABSOLUTE: 0.1 K/UL (ref 0–0.6)
EOSINOPHILS RELATIVE PERCENT: 2.5 %
GFR AFRICAN AMERICAN: >60
GFR NON-AFRICAN AMERICAN: >60
GLOBULIN: 2.5 G/DL
GLUCOSE BLD-MCNC: 101 MG/DL (ref 70–99)
HCT VFR BLD CALC: 44.7 % (ref 40.5–52.5)
HEMOGLOBIN: 15 G/DL (ref 13.5–17.5)
INR BLD: 1.14 (ref 0.86–1.14)
LYMPHOCYTES ABSOLUTE: 0.9 K/UL (ref 1–5.1)
LYMPHOCYTES RELATIVE PERCENT: 17.6 %
MCH RBC QN AUTO: 30.7 PG (ref 26–34)
MCHC RBC AUTO-ENTMCNC: 33.5 G/DL (ref 31–36)
MCV RBC AUTO: 91.7 FL (ref 80–100)
MONOCYTES ABSOLUTE: 0.5 K/UL (ref 0–1.3)
MONOCYTES RELATIVE PERCENT: 9.1 %
NEUTROPHILS ABSOLUTE: 3.6 K/UL (ref 1.7–7.7)
NEUTROPHILS RELATIVE PERCENT: 69.7 %
PDW BLD-RTO: 14.7 % (ref 12.4–15.4)
PLATELET # BLD: 221 K/UL (ref 135–450)
PMV BLD AUTO: 8.6 FL (ref 5–10.5)
POTASSIUM SERPL-SCNC: 4.2 MMOL/L (ref 3.5–5.1)
PROTHROMBIN TIME: 13.2 SEC (ref 10–13.2)
RBC # BLD: 4.88 M/UL (ref 4.2–5.9)
SODIUM BLD-SCNC: 141 MMOL/L (ref 136–145)
TOTAL PROTEIN: 6.7 G/DL (ref 6.4–8.2)
WBC # BLD: 5.1 K/UL (ref 4–11)

## 2020-02-01 PROCEDURE — 85018 HEMOGLOBIN: CPT

## 2020-02-01 PROCEDURE — 99285 EMERGENCY DEPT VISIT HI MDM: CPT

## 2020-02-01 PROCEDURE — C9113 INJ PANTOPRAZOLE SODIUM, VIA: HCPCS | Performed by: INTERNAL MEDICINE

## 2020-02-01 PROCEDURE — 85014 HEMATOCRIT: CPT

## 2020-02-01 PROCEDURE — 86901 BLOOD TYPING SEROLOGIC RH(D): CPT

## 2020-02-01 PROCEDURE — 80053 COMPREHEN METABOLIC PANEL: CPT

## 2020-02-01 PROCEDURE — 85610 PROTHROMBIN TIME: CPT

## 2020-02-01 PROCEDURE — 2580000003 HC RX 258: Performed by: INTERNAL MEDICINE

## 2020-02-01 PROCEDURE — 86850 RBC ANTIBODY SCREEN: CPT

## 2020-02-01 PROCEDURE — 86900 BLOOD TYPING SEROLOGIC ABO: CPT

## 2020-02-01 PROCEDURE — 2060000000 HC ICU INTERMEDIATE R&B

## 2020-02-01 PROCEDURE — 6360000002 HC RX W HCPCS: Performed by: INTERNAL MEDICINE

## 2020-02-01 PROCEDURE — 6370000000 HC RX 637 (ALT 250 FOR IP): Performed by: INTERNAL MEDICINE

## 2020-02-01 PROCEDURE — 85025 COMPLETE CBC W/AUTO DIFF WBC: CPT

## 2020-02-01 RX ORDER — CLONAZEPAM 1 MG/1
1 TABLET ORAL DAILY
Status: DISCONTINUED | OUTPATIENT
Start: 2020-02-01 | End: 2020-02-02 | Stop reason: HOSPADM

## 2020-02-01 RX ORDER — SODIUM CHLORIDE 9 MG/ML
INJECTION, SOLUTION INTRAVENOUS CONTINUOUS
Status: DISCONTINUED | OUTPATIENT
Start: 2020-02-01 | End: 2020-02-02

## 2020-02-01 RX ORDER — ACETAMINOPHEN 325 MG/1
650 TABLET ORAL EVERY 4 HOURS PRN
Status: DISCONTINUED | OUTPATIENT
Start: 2020-02-01 | End: 2020-02-02 | Stop reason: HOSPADM

## 2020-02-01 RX ORDER — ONDANSETRON 2 MG/ML
4 INJECTION INTRAMUSCULAR; INTRAVENOUS EVERY 6 HOURS PRN
Status: DISCONTINUED | OUTPATIENT
Start: 2020-02-01 | End: 2020-02-02 | Stop reason: HOSPADM

## 2020-02-01 RX ORDER — AMIODARONE HYDROCHLORIDE 200 MG/1
200 TABLET ORAL DAILY
Status: DISCONTINUED | OUTPATIENT
Start: 2020-02-01 | End: 2020-02-02 | Stop reason: HOSPADM

## 2020-02-01 RX ORDER — PANTOPRAZOLE SODIUM 40 MG/10ML
40 INJECTION, POWDER, LYOPHILIZED, FOR SOLUTION INTRAVENOUS EVERY 12 HOURS
Status: DISCONTINUED | OUTPATIENT
Start: 2020-02-01 | End: 2020-02-02 | Stop reason: HOSPADM

## 2020-02-01 RX ORDER — SODIUM CHLORIDE 0.9 % (FLUSH) 0.9 %
10 SYRINGE (ML) INJECTION EVERY 12 HOURS SCHEDULED
Status: DISCONTINUED | OUTPATIENT
Start: 2020-02-01 | End: 2020-02-02 | Stop reason: HOSPADM

## 2020-02-01 RX ORDER — SODIUM CHLORIDE 0.9 % (FLUSH) 0.9 %
10 SYRINGE (ML) INJECTION PRN
Status: DISCONTINUED | OUTPATIENT
Start: 2020-02-01 | End: 2020-02-02 | Stop reason: HOSPADM

## 2020-02-01 RX ORDER — SODIUM CHLORIDE 9 MG/ML
10 INJECTION INTRAVENOUS EVERY 12 HOURS
Status: DISCONTINUED | OUTPATIENT
Start: 2020-02-01 | End: 2020-02-01

## 2020-02-01 RX ORDER — VERAPAMIL HYDROCHLORIDE 240 MG/1
240 TABLET, FILM COATED, EXTENDED RELEASE ORAL NIGHTLY
Status: DISCONTINUED | OUTPATIENT
Start: 2020-02-01 | End: 2020-02-02

## 2020-02-01 RX ORDER — ATORVASTATIN CALCIUM 40 MG/1
20 TABLET, FILM COATED ORAL DAILY
Status: DISCONTINUED | OUTPATIENT
Start: 2020-02-01 | End: 2020-02-02 | Stop reason: HOSPADM

## 2020-02-01 RX ADMIN — VERAPAMIL HYDROCHLORIDE 240 MG: 240 TABLET, FILM COATED, EXTENDED RELEASE ORAL at 20:27

## 2020-02-01 RX ADMIN — SODIUM CHLORIDE: 9 INJECTION, SOLUTION INTRAVENOUS at 20:26

## 2020-02-01 RX ADMIN — ATORVASTATIN CALCIUM 20 MG: 40 TABLET, FILM COATED ORAL at 20:26

## 2020-02-01 RX ADMIN — Medication 10 ML: at 20:26

## 2020-02-01 RX ADMIN — CLONAZEPAM 1 MG: 1 TABLET ORAL at 20:26

## 2020-02-01 RX ADMIN — POLYETHYLENE GLYCOL-3350 AND ELECTROLYTES 2000 ML: 236; 6.74; 5.86; 2.97; 22.74 POWDER, FOR SOLUTION ORAL at 20:27

## 2020-02-01 RX ADMIN — PANTOPRAZOLE SODIUM 40 MG: 40 INJECTION, POWDER, FOR SOLUTION INTRAVENOUS at 20:26

## 2020-02-01 ASSESSMENT — PAIN SCALES - GENERAL
PAINLEVEL_OUTOF10: 0

## 2020-02-01 NOTE — PROGRESS NOTES
4 Eyes Skin Assessment     The patient is being assess for   Admission    I agree that 2 RN's have performed a thorough Head to Toe Skin Assessment on the patient. ALL assessment sites listed below have been assessed. Areas assessed by both nurses:   [x]   Head, Face, and Ears   [x]   Shoulders, Back, and Chest, Abdomen  [x]   Arms, Elbows, and Hands   [x]   Coccyx, Sacrum, and Ischium  [x]   Legs, Feet, and Heels            **SHARE this note so that the co-signing nurse is able to place an eSignature**    Co-signer eSignature: Electronically signed by Mayi Sims RN on 2/1/20 at 7:37 PM    Does the Patient have Skin Breakdown?   No          Dedrick Prevention initiated:  No   Wound Care Orders initiated:  No      [facility] nurse consulted for Pressure Injury (Stage 3,4, Unstageable, DTI, NWPT, Complex wounds)and New or Established Ostomies:  No      Primary Nurse eSignature: Electronically signed by Corbin Veloz RN on 2/1/20 at 6:50 PM

## 2020-02-01 NOTE — FLOWSHEET NOTE
02/01/20 1847   Assessment   Charting Type Admission   Neurological   Neuro (WDL) WDL   Level of Consciousness 0   Hudson Coma Scale   Eye Opening 4   Best Verbal Response 5   Best Motor Response 6   Hudson Coma Scale Score 15   HEENT   HEENT (WDL) WDL   Respiratory   Respiratory (WDL) WDL   Respiratory Pattern Regular   Respiratory Depth Normal   Respiratory Quality/Effort Unlabored   L Breath Sounds Clear   R Breath Sounds Clear   Cardiac   Cardiac (WDL) WDL   Cardiac Regularity Regular   Heart Sounds S1, S2   Cardiac Rhythm NSR   Gastrointestinal   Abdominal (WDL) X   Abdomen Inspection Soft;Rounded   Tenderness Soft;Nontender; No guarding   GI Symptoms Other (Comment)  (Bloody stool)   Last BM (including prior to admit) 02/01/20   RUQ Bowel Sounds Active   LUQ Bowel Sounds Active   RLQ Bowel Sounds Active   LLQ Bowel Sounds Active   Peripheral Vascular   Peripheral Vascular (WDL) WDL   Skin Color/Condition   Skin Color/Condition (WDL) WDL   Skin Integrity   Skin Integrity (WDL) WDL   Musculoskeletal   Musculoskeletal (WDL) WDL   Genitourinary   Genitourinary (WDL) WDL   Anus/Rectum   Anus/Rectum (WDL) WDL   Psychosocial   Psychosocial (WDL) WDL

## 2020-02-01 NOTE — ED NOTES
Pt and wife educated about admit and diet. Pt ok to have clear liquids until midnight, after midnight NPO.        Gita Dacosta RN  02/01/20 9605

## 2020-02-01 NOTE — ED PROVIDER NOTES
K/uL   Comprehensive Metabolic Panel   Result Value Ref Range    Sodium 141 136 - 145 mmol/L    Potassium 4.2 3.5 - 5.1 mmol/L    Chloride 103 99 - 110 mmol/L    CO2 27 21 - 32 mmol/L    Anion Gap 11 3 - 16    Glucose 101 (H) 70 - 99 mg/dL    BUN 18 7 - 20 mg/dL    CREATININE 1.1 0.9 - 1.3 mg/dL    GFR Non-African American >60 >60    GFR African American >60 >60    Calcium 9.1 8.3 - 10.6 mg/dL    Total Protein 6.7 6.4 - 8.2 g/dL    Alb 4.2 3.4 - 5.0 g/dL    Albumin/Globulin Ratio 1.7 1.1 - 2.2    Total Bilirubin 0.6 0.0 - 1.0 mg/dL    Alkaline Phosphatase 82 40 - 129 U/L    ALT 28 10 - 40 U/L    AST 21 15 - 37 U/L    Globulin 2.5 g/dL   Protime-INR   Result Value Ref Range    Protime 13.2 10.0 - 13.2 sec    INR 1.14 0.86 - 1.14   TYPE AND SCREEN   Result Value Ref Range    ABO/Rh A POS     Antibody Screen NEG          RADIOLOGY:  All x-ray studies are viewed/reviewed by me. Formal interpretations per the radiologist are as follows: No orders to display           EKG:  See EKG interpretation by an attending physician. PROCEDURES:   N/A    CRITICAL CARE TIME:   N/A    CONSULTS:  None      EMERGENCY DEPARTMENT COURSE andDIFFERENTIAL DIAGNOSIS/MDM:   Vitals:    Vitals:    02/01/20 1405 02/01/20 1409   BP:  (!) 142/87   Pulse:  57   Resp: 16 (!) 6   Temp:  97.7 °F (36.5 °C)   TempSrc:  Oral   SpO2:  97%   Weight: 205 lb (93 kg)    Height: 5' 11\" (1.803 m)        Patient wasgiven the following medications:  Medications - No data to display      Patient was evaluated in conjunction with attending. he presented to the emergency room today with complaints of multiple episodes of bright red blood per rectum. Patient is anticoagulated on Eliquis for arrhythmia. Patient hemoglobin today was stable at 15, his vital signs are stable with no tachycardia. He had no significant belly discomfort.   Given that he was anticoagulated and has had multiple episodes of bright red blood per rectum I did consult GI and spoke

## 2020-02-01 NOTE — H&P
Hospital Medicine History & Physical      PCP: Alton Noriega MD    Date of Admission: 2/1/2020    Date of Service: Pt seen/examined on 2/1/2020 and Admitted to Inpatient with expected LOS greater than two midnights due to medical therapy. Chief Complaint: [place] bleeding  Since this morning      History Of Present Illness:      61 y.o. male who presented to [facility] with above complaint. Bright red blood. No abdominal pain abdominal distention or hematemesis. No dark-colored urine or epistaxis.   He is on Eliquis for atrial fibrillation he denies a history of GI bleed in the past.  But he told that he has a history of hemorrhoids     Past Medical History:          Diagnosis Date    A-fib [facility])     ADHD (attention deficit hyperactivity disorder)     Carpal tunnel syndrome 1/21/2015    Chronic low back pain     Chronic neck pain     Chronic sinusitis     Dr. Courtney jamil present     lower    Epigastric hernia     Esophageal spasm     GERD (gastroesophageal reflux disease)     Hyperlipidemia     Hypertrophic cardiomyopathy (HCC)     IHSS (idiopathic hypertrophic subaortic stenosis) ([address]     Kidney stones     HUNTER on CPAP     Dr. Viola Hernandez- A-PAP    Primary localized osteoarthrosis, shoulder region 10/18/2013    Prostate cancer [facility])     prostate ; s/p radiation treatment    PVC's (premature ventricular contractions)     RBBB (right bundle branch block)     RLS (restless legs syndrome)     Dr. Mirela Stanford Seasonal allergies     Tachycardia        Past Surgical History:          Procedure Laterality Date    CARPAL TUNNEL RELEASE Left     COLONOSCOPY  1/17/11    ELBOW SURGERY  2015    left    ENDOSCOPY, COLON, DIAGNOSTIC      INGUINAL HERNIA REPAIR Bilateral 2009, 2012,    KNEE ARTHROSCOPY Right 1982    Right    KNEE ARTHROSCOPY Left 12/21/2017    SHOULDER ARTHROSCOPY Right 12/31/2013    VIDEO ARTHROSCOPY RIGHT SHOULDER, SUBACROMIAL DECOMPRESSION, DISTAL CLAVICLE RESECTION, ROTATOR CUFF DEBRIDEMENT          TURP  2592    X 2    UMBILICAL HERNIA REPAIR      X 2    UPPER GASTROINTESTINAL ENDOSCOPY  9/3    VARICOCELECTOMY  2000       Medications Prior to Admission:      Prior to Admission medications    Medication Sig Start Date End Date Taking? Authorizing Provider   meloxicam (MOBIC) 15 MG tablet Take 1 tablet by mouth daily 12/19/19  Yes Seth Lopez MD   amiodarone (CORDARONE) 200 MG tablet TAKE 1 TABLET BY MOUTH EVERY DAY 10/21/19  Yes PARI Downing CNP   ELIQUIS 5 MG TABS tablet TAKE 1 TABLET BY MOUTH TWICE A DAY  Patient taking differently: Pt states he is only taking once daily 10/7/19  Yes PARI Downing CNP   verapamil (CALAN SR) 240 MG extended release tablet Take 1 tablet by mouth nightly 9/11/19  Yes Mable Enciso MD   omeprazole (PRILOSEC) 40 MG delayed release capsule TAKE 1 CAPSULE BY MOUTH EVERY DAY 8/6/19  Yes Mable Enciso MD   atorvastatin (LIPITOR) 20 MG tablet TAKE 1 TABLET BY MOUTH ONE TIME A DAY  1/15/19  Yes Mable Enciso MD   vitamin B-12 (CYANOCOBALAMIN) 100 MCG tablet Take 50 mcg by mouth daily   Yes Historical Provider, MD   Cholecalciferol (D3 ADULT) 1000 UNITS CHEW Take by mouth daily    Yes Historical Provider, MD   zinc 50 MG CAPS Take by mouth daily    Yes Historical Provider, MD   clonazePAM (KLONOPIN) 1 MG tablet Take 1 mg by mouth daily. 4/30/14  Yes Historical Provider, MD   Melatonin 10 MG CAPS Take 10 mg by mouth as needed    Yes Historical Provider, MD       Allergies:  Niacin and related    Social History:      The patient currently lives at home with family    TOBACCO:   reports that he has never smoked. He has never used smokeless tobacco.  ETOH:   reports current alcohol use. E-Cigarettes Vaping or Juuling     Questions Responses    E-Cigarette Use     Start Date     Does device contain nicotine?      Quit Date     E-Cigarette Type             Family History:      Reviewed in detail and negative for DM, CAD, Cancer, CVA. Positive as follows:        Problem Relation Age of Onset    Cancer Mother         melenoma    High Cholesterol Mother     High Blood Pressure Father     High Cholesterol Brother     Heart Disease Maternal Grandmother     Heart Disease Maternal Grandfather     Prostate Cancer Paternal Uncle        REVIEW OF SYSTEMS:   Pertinent positives as noted in the HPI. All other systems reviewed and negative. PHYSICAL EXAM PERFORMED:    /82   Pulse 55   Temp 97.7 °F (36.5 °C) (Oral)   Resp 16   Ht 5' 11\" (1.803 m)   Wt 205 lb (93 kg)   SpO2 97%   BMI 28.59 kg/m²     General appearance:  No apparent distress, appears stated age and cooperative. HEENT:  Normal cephalic, atraumatic without obvious deformity. Pupils equal, round, and reactive to light. Extra ocular muscles intact. Conjunctivae/corneas clear. Neck: Supple, with full range of motion. No jugular venous distention. Trachea midline. Respiratory:  Normal respiratory effort. Clear to auscultation, bilaterally without Rales/Wheezes/Rhonchi. Cardiovascular:  Regular rate and rhythm with normal S1/S2 without murmurs, rubs or gallops. Abdomen: Soft, non-tender, non-distended with normal bowel sounds. Musculoskeletal:  No clubbing, cyanosis or edema bilaterally. Full range of motion without deformity. Skin: Skin color, texture, turgor normal.  No rashes or lesions. Neurologic:  Neurovascularly intact without any focal sensory/motor deficits.  Cranial nerves: II-XII intact, grossly non-focal.  Psychiatric:  Alert and oriented, thought content appropriate, normal insight  Capillary Refill: Brisk,< 3 seconds   Peripheral Pulses: +2 palpable, equal bilaterally       Labs:     Recent Labs     02/01/20  1417   WBC 5.1   HGB 15.0   HCT 44.7        Recent Labs     02/01/20  1417      K 4.2      CO2 27   BUN 18   CREATININE 1.1   CALCIUM 9.1     Recent Labs     02/01/20  1417   AST 21   ALT 28   BILITOT 0.6   ALKPHOS 82     Recent Labs     02/01/20  1417   INR 1.14     No results for input(s): CKTOTAL, TROPONINI in the last 72 hours. Urinalysis:      Lab Results   Component Value Date    NITRU Negative 02/21/2015    WBCUA 4-5 06/10/2012    BACTERIA Rare 06/10/2012    RBCUA  06/10/2012    BLOODU 0 07/09/2018    BLOODU SMALL 02/21/2015    SPECGRAV 1.025 07/09/2018    SPECGRAV 1.020 02/21/2015    GLUCOSEU 0 07/09/2018    GLUCOSEU Negative 02/21/2015       Radiology:     CXR: I have reviewed the CXR with the following interpretation:   EKG:  I have reviewed the EKG with the following interpretation:     No orders to display       ASSESSMENT:    LUCA/Ashleigh Kaye 1106 Problems    Diagnosis Date Noted    Paroxysmal atrial fibrillation ([facility] [I48.0] 09/26/2017     Priority: High    GIB (gastrointestinal bleeding) [K92.2] 02/01/2020    Current use of long term anticoagulation [Z79.01] 02/01/2020    Obstructive sleep apnea [G47.33] 10/23/2013    GERD (gastroesophageal reflux disease) [K21.9] 08/27/2010    Hyperlipidemia [E78.5] 08/27/2010    Hypertrophic cardiomyopathy ([facility] [I42.2] 08/27/2010         PLAN:    Bright [place] bleed-admit, hold Eliquis, H&H, IV fluids, type and screen and be PRBC PRN, GI evaluation, GI was contacted from emergency room and advised on clear liquid and possible colonoscopy tomorrow,  morning, n.p.o. midnight, IV Protonix    Paroxysmal atrial fibrillation, on Eliquis-hold and reverse if bleeding worsen    HUNTER-verify the use of CPAP        DVT Prophylaxis: SCD  Diet: Diet NPO Time Specified  Code Status: Full    PT/OT Eval Status: Ambulatory    Dispo -2 days       Man Hallman MD    Thank you Mirta Francis MD for the opportunity to be involved in this patient's care. If you have any questions or concerns please feel free to contact me at [phone].

## 2020-02-02 VITALS
HEIGHT: 71 IN | SYSTOLIC BLOOD PRESSURE: 121 MMHG | HEART RATE: 61 BPM | BODY MASS INDEX: 29.44 KG/M2 | WEIGHT: 210.32 LBS | OXYGEN SATURATION: 97 % | TEMPERATURE: 97.4 F | DIASTOLIC BLOOD PRESSURE: 55 MMHG | RESPIRATION RATE: 18 BRPM

## 2020-02-02 LAB
ANION GAP SERPL CALCULATED.3IONS-SCNC: 10 MMOL/L (ref 3–16)
BASOPHILS ABSOLUTE: 0 K/UL (ref 0–0.2)
BASOPHILS RELATIVE PERCENT: 1 %
BUN BLDV-MCNC: 12 MG/DL (ref 7–20)
CALCIUM SERPL-MCNC: 8.4 MG/DL (ref 8.3–10.6)
CHLORIDE BLD-SCNC: 105 MMOL/L (ref 99–110)
CO2: 27 MMOL/L (ref 21–32)
CREAT SERPL-MCNC: 0.9 MG/DL (ref 0.9–1.3)
EOSINOPHILS ABSOLUTE: 0.1 K/UL (ref 0–0.6)
EOSINOPHILS RELATIVE PERCENT: 2.8 %
GFR AFRICAN AMERICAN: >60
GFR NON-AFRICAN AMERICAN: >60
GLUCOSE BLD-MCNC: 90 MG/DL (ref 70–99)
HCT VFR BLD CALC: 37.2 % (ref 40.5–52.5)
HCT VFR BLD CALC: 41.3 % (ref 40.5–52.5)
HCT VFR BLD CALC: 42.7 % (ref 40.5–52.5)
HEMOGLOBIN: 12.6 G/DL (ref 13.5–17.5)
HEMOGLOBIN: 13.9 G/DL (ref 13.5–17.5)
HEMOGLOBIN: 14.3 G/DL (ref 13.5–17.5)
INR BLD: 1.18 (ref 0.86–1.14)
LYMPHOCYTES ABSOLUTE: 1 K/UL (ref 1–5.1)
LYMPHOCYTES RELATIVE PERCENT: 19 %
MAGNESIUM: 1.9 MG/DL (ref 1.8–2.4)
MCH RBC QN AUTO: 31 PG (ref 26–34)
MCHC RBC AUTO-ENTMCNC: 33.9 G/DL (ref 31–36)
MCV RBC AUTO: 91.6 FL (ref 80–100)
MONOCYTES ABSOLUTE: 0.6 K/UL (ref 0–1.3)
MONOCYTES RELATIVE PERCENT: 12.4 %
NEUTROPHILS ABSOLUTE: 3.3 K/UL (ref 1.7–7.7)
NEUTROPHILS RELATIVE PERCENT: 64.8 %
PDW BLD-RTO: 14.5 % (ref 12.4–15.4)
PLATELET # BLD: 190 K/UL (ref 135–450)
PMV BLD AUTO: 8.9 FL (ref 5–10.5)
POTASSIUM REFLEX MAGNESIUM: 3.4 MMOL/L (ref 3.5–5.1)
PROTHROMBIN TIME: 13.7 SEC (ref 10–13.2)
RBC # BLD: 4.06 M/UL (ref 4.2–5.9)
SODIUM BLD-SCNC: 142 MMOL/L (ref 136–145)
WBC # BLD: 5.1 K/UL (ref 4–11)

## 2020-02-02 PROCEDURE — 3609010300 HC COLONOSCOPY W/BIOPSY SINGLE/MULTIPLE: Performed by: INTERNAL MEDICINE

## 2020-02-02 PROCEDURE — 83735 ASSAY OF MAGNESIUM: CPT

## 2020-02-02 PROCEDURE — 85025 COMPLETE CBC W/AUTO DIFF WBC: CPT

## 2020-02-02 PROCEDURE — 2580000003 HC RX 258: Performed by: INTERNAL MEDICINE

## 2020-02-02 PROCEDURE — 7100000010 HC PHASE II RECOVERY - FIRST 15 MIN: Performed by: INTERNAL MEDICINE

## 2020-02-02 PROCEDURE — 80048 BASIC METABOLIC PNL TOTAL CA: CPT

## 2020-02-02 PROCEDURE — 6360000002 HC RX W HCPCS: Performed by: INTERNAL MEDICINE

## 2020-02-02 PROCEDURE — 85014 HEMATOCRIT: CPT

## 2020-02-02 PROCEDURE — 88305 TISSUE EXAM BY PATHOLOGIST: CPT

## 2020-02-02 PROCEDURE — 0DBN8ZX EXCISION OF SIGMOID COLON, VIA NATURAL OR ARTIFICIAL OPENING ENDOSCOPIC, DIAGNOSTIC: ICD-10-PCS | Performed by: INTERNAL MEDICINE

## 2020-02-02 PROCEDURE — 85018 HEMOGLOBIN: CPT

## 2020-02-02 PROCEDURE — 99152 MOD SED SAME PHYS/QHP 5/>YRS: CPT | Performed by: INTERNAL MEDICINE

## 2020-02-02 PROCEDURE — 99153 MOD SED SAME PHYS/QHP EA: CPT | Performed by: INTERNAL MEDICINE

## 2020-02-02 PROCEDURE — 36415 COLL VENOUS BLD VENIPUNCTURE: CPT

## 2020-02-02 PROCEDURE — 7100000011 HC PHASE II RECOVERY - ADDTL 15 MIN: Performed by: INTERNAL MEDICINE

## 2020-02-02 PROCEDURE — 6370000000 HC RX 637 (ALT 250 FOR IP): Performed by: INTERNAL MEDICINE

## 2020-02-02 PROCEDURE — 85610 PROTHROMBIN TIME: CPT

## 2020-02-02 PROCEDURE — 2709999900 HC NON-CHARGEABLE SUPPLY: Performed by: INTERNAL MEDICINE

## 2020-02-02 PROCEDURE — C9113 INJ PANTOPRAZOLE SODIUM, VIA: HCPCS | Performed by: INTERNAL MEDICINE

## 2020-02-02 RX ORDER — POTASSIUM CHLORIDE 20 MEQ/1
20 TABLET, EXTENDED RELEASE ORAL ONCE
Status: DISCONTINUED | OUTPATIENT
Start: 2020-02-02 | End: 2020-02-02 | Stop reason: HOSPADM

## 2020-02-02 RX ORDER — DIPHENHYDRAMINE HYDROCHLORIDE 50 MG/ML
INJECTION INTRAMUSCULAR; INTRAVENOUS PRN
Status: DISCONTINUED | OUTPATIENT
Start: 2020-02-02 | End: 2020-02-02 | Stop reason: ALTCHOICE

## 2020-02-02 RX ORDER — FENTANYL CITRATE 50 UG/ML
INJECTION, SOLUTION INTRAMUSCULAR; INTRAVENOUS PRN
Status: DISCONTINUED | OUTPATIENT
Start: 2020-02-02 | End: 2020-02-02 | Stop reason: ALTCHOICE

## 2020-02-02 RX ORDER — MIDAZOLAM HYDROCHLORIDE 5 MG/ML
INJECTION INTRAMUSCULAR; INTRAVENOUS PRN
Status: DISCONTINUED | OUTPATIENT
Start: 2020-02-02 | End: 2020-02-02 | Stop reason: ALTCHOICE

## 2020-02-02 RX ADMIN — AMIODARONE HYDROCHLORIDE 200 MG: 200 TABLET ORAL at 08:23

## 2020-02-02 RX ADMIN — SODIUM CHLORIDE: 9 INJECTION, SOLUTION INTRAVENOUS at 08:24

## 2020-02-02 RX ADMIN — PANTOPRAZOLE SODIUM 40 MG: 40 INJECTION, POWDER, FOR SOLUTION INTRAVENOUS at 05:37

## 2020-02-02 RX ADMIN — SODIUM CHLORIDE: 9 INJECTION, SOLUTION INTRAVENOUS at 03:06

## 2020-02-02 RX ADMIN — ATORVASTATIN CALCIUM 20 MG: 40 TABLET, FILM COATED ORAL at 08:23

## 2020-02-02 ASSESSMENT — PAIN - FUNCTIONAL ASSESSMENT: PAIN_FUNCTIONAL_ASSESSMENT: 0-10

## 2020-02-02 NOTE — PATIENT CARE CONFERENCE
Patient discharged home, picked up by wife. Patient verbalized understanding of dc and follow up instructions. Escorted to lobby by staff.

## 2020-02-02 NOTE — CONSULTS
Gastroenterology Consult Note    Patient:   Ruben Mcdaniel   YOB: 1960   Facility:   [address] [place] [address]   Referring/PCP: Fide Whitney MD  Date:     2/1/2020  Consultant:   Declan Shen MD    Subjective: This 61 y.o. male was admitted 2/1/2020 with a diagnosis of \"GIB (gastrointestinal bleeding) [K92.2]  GIB (gastrointestinal bleeding) [K92.2]\" and is seen in consultation regarding \"hematochezia\". Information was obtained from interview of  the patient, examination of the patient, and review of records. I did  update the past medical, surgical, social and / or family history. Hematochezia mod-severe in colon associated w fatigue    Current status  Present  Diet Order: DIET CLEAR LIQUID;  Diet NPO Time Specified  Diet NPO, After Midnight and he is tolerating diet. Recently, he has experienced no abdominal  Pain and he has not required Intravenous narcotic analgesics. The patient has also experienced no constipation, diarrhea, fever, melena, nausea and vomiting      Prior to Admission medications    Medication Sig Start Date End Date Taking?  Authorizing Provider   meloxicam (MOBIC) 15 MG tablet Take 1 tablet by mouth daily 12/19/19  Yes Kathie Gallagher MD   amiodarone (CORDARONE) 200 MG tablet TAKE 1 TABLET BY MOUTH EVERY DAY 10/21/19  Yes PARI Cedeño CNP   ELIQUIS 5 MG TABS tablet TAKE 1 TABLET BY MOUTH TWICE A DAY  Patient taking differently: Pt states he is only taking once daily 10/7/19  Yes PARI Cedeño CNP   verapamil (CALAN SR) 240 MG extended release tablet Take 1 tablet by mouth nightly 9/11/19  Yes Fide Whitney MD   omeprazole (PRILOSEC) 40 MG delayed release capsule TAKE 1 CAPSULE BY MOUTH EVERY DAY 8/6/19  Yes Fide Whitney MD   atorvastatin (LIPITOR) 20 MG tablet TAKE 1 TABLET BY MOUTH ONE TIME A DAY  1/15/19  Yes Fide Whitney MD   vitamin B-12 (CYANOCOBALAMIN) 100 MCG tablet Take 50 mcg by mouth daily   Yes Historical Provider, MD

## 2020-02-02 NOTE — DISCHARGE SUMMARY
Instructions/Follow-up:  Follow-up with PCP     Code Status:  Full Code     Activity: activity as tolerated    Diet: regular diet      Discharge Medications:     Current Discharge Medication List           Details   amiodarone (CORDARONE) 200 MG tablet TAKE 1 TABLET BY MOUTH EVERY DAY  Qty: 90 tablet, Refills: 0      ELIQUIS 5 MG TABS tablet TAKE 1 TABLET BY MOUTH TWICE A DAY  Qty: 180 tablet, Refills: 0      verapamil (CALAN SR) 240 MG extended release tablet Take 1 tablet by mouth nightly  Qty: 90 tablet, Refills: 1      omeprazole (PRILOSEC) 40 MG delayed release capsule TAKE 1 CAPSULE BY MOUTH EVERY DAY  Qty: 90 capsule, Refills: 1    Associated Diagnoses: Gastroesophageal reflux disease without esophagitis      atorvastatin (LIPITOR) 20 MG tablet TAKE 1 TABLET BY MOUTH ONE TIME A DAY   Qty: 90 tablet, Refills: 0    Associated Diagnoses: Hyperlipidemia, unspecified hyperlipidemia type      vitamin B-12 (CYANOCOBALAMIN) 100 MCG tablet Take 50 mcg by mouth daily      Cholecalciferol (D3 ADULT) 1000 UNITS CHEW Take by mouth daily       zinc 50 MG CAPS Take by mouth daily       clonazePAM (KLONOPIN) 1 MG tablet Take 1 mg by mouth daily. Melatonin 10 MG CAPS Take 10 mg by mouth as needed              Time Spent on discharge is more than 45 minutes in the examination, evaluation, counseling and review of medications and discharge plan. Signed:    [address] [place] [address], APRN - [facility]   2/2/2020      Thank you Gillian Reyes MD for the opportunity to be involved in this patient's care. If you have any questions or concerns please feel free to contact me at [phone].

## 2020-02-02 NOTE — OP NOTE
and Mobic.  OK to re-start Eliquis upon discharge     Norm Santana MD       ([phone]) [phone]          Norm Santana MD PICU/anesthesia/ED

## 2020-02-02 NOTE — H&P
(gastroesophageal reflux disease)     Hyperlipidemia     Hypertrophic cardiomyopathy (HCC)     IHSS (idiopathic hypertrophic subaortic stenosis) (HCC)     Kidney stones     HUNTER on CPAP     Dr. Calixto Alt- A-PAP    Primary localized osteoarthrosis, shoulder region 10/18/2013    Prostate cancer [facility])     prostate ; s/p radiation treatment    PVC's (premature ventricular contractions)     RBBB (right bundle branch block)     RLS (restless legs syndrome)     Dr. Fuentes Beverage Seasonal allergies     Tachycardia      Past Surgical History:   Procedure Laterality Date    CARPAL TUNNEL RELEASE Left     COLONOSCOPY  1/17/11    ELBOW SURGERY  2015    left    ENDOSCOPY, COLON, DIAGNOSTIC      INGUINAL HERNIA REPAIR Bilateral 2009, 2012,    KNEE ARTHROSCOPY Right 1982    Right    KNEE ARTHROSCOPY Left 12/21/2017    SHOULDER ARTHROSCOPY Right 12/31/2013    VIDEO ARTHROSCOPY RIGHT SHOULDER, SUBACROMIAL DECOMPRESSION, DISTAL CLAVICLE RESECTION, ROTATOR CUFF DEBRIDEMENT          TURP  1218    X 2    UMBILICAL HERNIA REPAIR      X 2    UPPER GASTROINTESTINAL ENDOSCOPY  9/3    VARICOCELECTOMY  2000       Nurses notes reviewed and agreed. Medications reviewed  Allergies: Allergies   Allergen Reactions    Niacin And Related      Extreme itching /stinging started at head to waist           Physical Exam:  Vital Signs: /75   Pulse 55   Temp 97.1 °F (36.2 °C) (Temporal)   Resp 16   Ht 5' 11\" (1.803 m)   Wt 210 lb 5.1 oz (95.4 kg)   SpO2 98%   BMI 29.33 kg/m²  Body mass index is 29.33 kg/m². Airway:Normal  Cardiac:Normal  Pulmonary:Normal  Abdomen:Normal  Specific to procedure: none      Pre-Procedure Assessment/Plan:  ASA 3 - Patient with moderate systemic disease with functional limitations  [place] I  Level of Sedation Plan: Moderate sedation    Post Procedure plan: Return to same level of care    I assessed the patient and find that the patient is in satisfactory condition to proceed with the

## 2020-02-03 ENCOUNTER — CARE COORDINATION (OUTPATIENT)
Dept: CASE MANAGEMENT | Age: 60
End: 2020-02-03

## 2020-02-04 ENCOUNTER — OFFICE VISIT (OUTPATIENT)
Dept: FAMILY MEDICINE CLINIC | Age: 60
End: 2020-02-04
Payer: COMMERCIAL

## 2020-02-04 ENCOUNTER — HOSPITAL ENCOUNTER (OUTPATIENT)
Dept: PHYSICAL THERAPY | Age: 60
Setting detail: THERAPIES SERIES
Discharge: HOME | End: 2020-02-04
Payer: COMMERCIAL

## 2020-02-04 VITALS
HEART RATE: 69 BPM | WEIGHT: 209 LBS | SYSTOLIC BLOOD PRESSURE: 90 MMHG | BODY MASS INDEX: 29.26 KG/M2 | HEIGHT: 71 IN | OXYGEN SATURATION: 96 % | DIASTOLIC BLOOD PRESSURE: 60 MMHG

## 2020-02-04 LAB
CHOLESTEROL, TOTAL: 138 MG/DL (ref 0–199)
HDLC SERPL-MCNC: 50 MG/DL (ref 40–60)
LDL CHOLESTEROL CALCULATED: 75 MG/DL
TRIGL SERPL-MCNC: 66 MG/DL (ref 0–150)
VLDLC SERPL CALC-MCNC: 13 MG/DL

## 2020-02-04 PROCEDURE — 99496 TRANSJ CARE MGMT HIGH F2F 7D: CPT | Performed by: FAMILY MEDICINE

## 2020-02-04 PROCEDURE — 1111F DSCHRG MED/CURRENT MED MERGE: CPT | Performed by: FAMILY MEDICINE

## 2020-02-04 PROCEDURE — 36415 COLL VENOUS BLD VENIPUNCTURE: CPT | Performed by: FAMILY MEDICINE

## 2020-02-04 ASSESSMENT — PATIENT HEALTH QUESTIONNAIRE - PHQ9
1. LITTLE INTEREST OR PLEASURE IN DOING THINGS: 0
SUM OF ALL RESPONSES TO PHQ9 QUESTIONS 1 & 2: 0
2. FEELING DOWN, DEPRESSED OR HOPELESS: 0
SUM OF ALL RESPONSES TO PHQ QUESTIONS 1-9: 0
SUM OF ALL RESPONSES TO PHQ QUESTIONS 1-9: 0

## 2020-02-04 NOTE — PROGRESS NOTES
extended release tablet  Take 1 tablet by mouth nightly             vitamin B-12 (CYANOCOBALAMIN) 100 MCG tablet  Take 50 mcg by mouth daily             zinc 50 MG CAPS  Take by mouth daily                    Medications marked \"taking\" at this time  Outpatient Medications Marked as Taking for the 2/4/20 encounter (Office Visit) with Raymundo Corado MD   Medication Sig Dispense Refill    amiodarone (CORDARONE) 200 MG tablet TAKE 1 TABLET BY MOUTH EVERY DAY 90 tablet 0    ELIQUIS 5 MG TABS tablet TAKE 1 TABLET BY MOUTH TWICE A DAY (Patient taking differently: Pt states he is only taking once daily) 180 tablet 0    verapamil (CALAN SR) 240 MG extended release tablet Take 1 tablet by mouth nightly 90 tablet 1    omeprazole (PRILOSEC) 40 MG delayed release capsule TAKE 1 CAPSULE BY MOUTH EVERY DAY 90 capsule 1    atorvastatin (LIPITOR) 20 MG tablet TAKE 1 TABLET BY MOUTH ONE TIME A DAY  90 tablet 0    vitamin B-12 (CYANOCOBALAMIN) 100 MCG tablet Take 50 mcg by mouth daily      Cholecalciferol (D3 ADULT) 1000 UNITS CHEW Take by mouth daily       zinc 50 MG CAPS Take by mouth daily       clonazePAM (KLONOPIN) 1 MG tablet Take 1 mg by mouth daily.  Melatonin 10 MG CAPS Take 10 mg by mouth as needed           Medications patient taking as of now reconciled against medications ordered at time of hospital discharge: Yes    Chief Complaint   Patient presents with   [address] from Hospital       History of Present illness - Follow up of Hospital diagnosis(es):   Encounter Diagnoses   Name Primary?  Gastrointestinal hemorrhage, unspecified gastrointestinal hemorrhage type Yes    Paroxysmal atrial fibrillation (HCC)     [facility]pert[facility] ([facility]     Current use of long term anticoagulation     Hyperlipidemia, unspecified hyperlipidemia type     Tremor          Inpatient course: Discharge summary reviewed- see chart.     Interval history/Current status: Patient had recurrent symptoms last night of to check his schedule before he can schedule patient was not aware that meloxicam was an NSAID. He is no longer taking this medicine  - [place] DISCHARGE MEDS RECONCILED W/ CURRENT OUTPATIENT MED LIST    2. Paroxysmal atrial fibrillation (HCC)  Patient was instructed before discharge to continue with his Eliquis patient has discontinued his Eliquis. He was told to contact his cardiologist to let them know that he stopped this medicine  - [place] DISCHARGE MEDS RECONCILED W/ CURRENT OUTPATIENT MED LIST    3. Hypertrophic cardiomyopathy ([facility]  Again patient needs to contact cardiology in regards to recent events with a GI bleed and that he stopped his Eliquis  - [place] DISCHARGE MEDS RECONCILED W/ CURRENT OUTPATIENT MED LIST    4. Current use of long term anticoagulation  Patient currently not taking see above  - [place] DISCHARGE MEDS RECONCILED W/ CURRENT OUTPATIENT MED LIST    5. Hyperlipidemia, unspecified hyperlipidemia type  Labs pending  - Lipid Panel  - [place] DISCHARGE MEDS RECONCILED W/ CURRENT OUTPATIENT MED LIST    6.  Tremor  Patient was given a referral for this new issue  - Aisha Delarosa MD, Neurology, [facility]        Medical Decision Making: high complexity

## 2020-02-04 NOTE — FLOWSHEET NOTE
[place] [address] [facility],  [address]        Physical Therapy  Cancellation/No-show Note  Patient Name:  Katelynn Macdonald  :  1960   Date:  2020  Cancelled visits to date: 1  No-shows to date: 0    For today's appointment patient:  ?  Cancelled  ? Rescheduled appointment  ? No-show     Reason given by patient:  ?  Patient ill  ? Conflicting appointment  ? No transportation    ? Conflict with work  ? No reason given  ? Other:     Comments: Reports he was at the hospital all weekend.      Electronically signed by:  He Nelson, [facility]

## 2020-02-05 ENCOUNTER — CARE COORDINATION (OUTPATIENT)
Dept: CASE MANAGEMENT | Age: 60
End: 2020-02-05

## 2020-02-06 ENCOUNTER — CARE COORDINATION (OUTPATIENT)
Dept: CASE MANAGEMENT | Age: 60
End: 2020-02-06

## 2020-02-06 NOTE — CARE COORDINATION
[facility] Transitions Follow Up Call    2020    Patient: Evan Sanz  Patient : 1960   MRN: [id number]  Reason for Admission: GIB   Discharge Date: 20 RARS: Readmission Risk Score: 9         Spoke with: Nieves Love with patient who reported he is doing ok and stated he continues to have slight bleeding with bowel movements. Patient stated the blood is not dark in color and does appear on the toilet paper with wiping. Patient denied any dizziness, lightheadedness, or SOB. Patient reported he is eating and drinking without any issues. Patient still waiting to hear back from GI regarding scheduling follow up apt and bx results. Patient reported he did have follow up with Dr. Vasques January on 2020. Patient denied any further needs at this time and agreed to follow up calls. CTN advised patient of use of urgent care or physicians 24 hr access line if assistance is needed after hours. Care Transitions Subsequent and Final Call    Subsequent and Final Calls  Have your medications changed?:  Yes  Do you have any questions related to your medications?:  No  Do you currently have any active services?:  No  Do you have any needs or concerns that I can assist you with?:  No  Identified Barriers:  None  Care Transitions Interventions                          Other Interventions:             Follow Up  Future Appointments   Date Time Provider Tamika Romero   2020  8:00 AM [place] [facility] AND PT [facility]   2020  8:00 AM [place] Radha, PT [facility] AND PT [facility]   2020  8:00 AM [place] [facility] AND PT [facility]   4/3/2020  8:00 AM Cristela Hoar, MD [facility] Shaunna Goodpasture BSN, RN, [facility]  Care Transition Nurse   [phone]

## 2020-02-07 ENCOUNTER — HOSPITAL ENCOUNTER (OUTPATIENT)
Dept: PHYSICAL THERAPY | Age: 60
Setting detail: THERAPIES SERIES
Discharge: HOME | End: 2020-02-07
Payer: COMMERCIAL

## 2020-02-07 PROCEDURE — 97110 THERAPEUTIC EXERCISES: CPT | Performed by: PHYSICAL THERAPIST

## 2020-02-07 PROCEDURE — 97140 MANUAL THERAPY 1/> REGIONS: CPT | Performed by: PHYSICAL THERAPIST

## 2020-02-07 PROCEDURE — 97112 NEUROMUSCULAR REEDUCATION: CPT | Performed by: PHYSICAL THERAPIST

## 2020-02-07 NOTE — FLOWSHEET NOTE
Up and Over                 Down                 Lateral                 Rotation            Squats  mini                    wall                   [facility]             Lunges:  Lunge to Balance                     Balance to Lunge                     Walking            Knee Extension Bilat. 30# 3x10  NV                              Ecc.                                 Inv. Hamstring Curls Bilat. 30# 3x10 (^NV) 40# 3x10 NV                              Ecc.                                 Inv.            Soleus Press Bilat. Ecc.                             Inv.                                   Ladders                  Square                 Jump/Hop  Low                        Med.                        High                                                                  Modality declined declined delcined   Initials                             DTM DTM DB   Time spent one on one (workers comp)      Time spent with PT assistant

## 2020-02-07 NOTE — FLOWSHEET NOTE
[facility],  [address]  Phone: [phone]  Fax [phone]    Physical Therapy Treatment Note/ Progress Report:           Date:  2020    Patient Name:  Mary Leslie    :  1960  MRN: [id number]  Restrictions/Precautions:    Medical/Treatment Diagnosis Information:  · Diagnosis: M17.0 OA B knees  · Treatment Diagnosis: M17.0 OA B knees, B knee pain M25.561, W47.206  Insurance/Certification information:  PT Insurance Information: BCBS/40 PT/no auth  Physician Information:  Referring Practitioner: Jasson Rajan  Has the plan of care been signed (Y/N):        []  Yes  [x]  No     Date of Patient follow up with Physician: none      Is this a Progress Report:     []  Yes  [x]  No        If Yes:  Date Range for reporting period:  Beginnin20   Endin20    Progress report will be due (10 Rx or 30 days whichever is less): 30 days      Recertification will be due (POC Duration  / 90 days whichever is less): 8 weeks       Visit # Insurance Allowable Requires auth   5 40    [x]no        []yes:     Functional Scale: LEFS: 23%   Date assessed:  20      Latex Allergy:  [x]NO      []YES  Preferred Language for Healthcare:   [x]English       []other:      Pain level:  1-810     SUBJECTIVE: Reports B knees were doing pretty well last several days and then pain started back today. Has been off Blood thinners    OBJECTIVE: + ATC this visit. Has knee extension machine at home.  Observation:    Test measurements:  Tight hamstrings/quads/calves. Weak quad and hip abducters. OBJECTIVE  Test used Initial score Current Score   Pain Summary 0-10 1-8 0 at rest   Functional questionnaire LEFS 23%    Functional Testing            ROM Knee flex L/R 140/145          Strength Hip abd 3+/5     SLR/hip flex/quad tone 4-/5       RESTRICTIONS/PRECAUTIONS: Prostate CA with radiation.     Exercises/Interventions: Therapeutic Ex (75722) Sets/sec Reps Notes/CUES   Piriformis stretches H30 5 HEP   ITB Figure 4 and knee to opposite hip H30 5 HEP   3D  Hamstrings/Calf H30 5 HEP   Hip flexor with strap H30 5 HEP   SL clams/ hip abd LVL h5  3 x10 reps HEP   SAQ 5#  25 reps B    Reverse clams wirh OVL SLR with ER H5 [id number] HEP   Ta Dahs 3D in standing H5 10 reps each BLE   Incline stretches H30 5 reps    Bike 6'     Hip hiking/ LSD 4\" 2x10 reps + to HEP   Standing wall squats with SB H5 1x15 reps    LBW/ Monster walks/Retro walks  2 laps + to HEP/+ BTB   Manual Intervention (54680)      MFD to B ITB  10' With Hip flexor stretcheswith strap over EOB   Manual R/L               NMR re-education (78272)   CUES NEEDED   Patient education OA 10'     Posterior reaches off 4\" step R/L  2x10 reps                                           Therapeutic Activity (73602)                                                Therapeutic Exercise and NMR EXR  [x] (69939) Provided verbal/tactile cueing for activities related to strengthening, flexibility, endurance, ROM for improvements in LE, proximal hip, and core control with self care, mobility, lifting, ambulation.  [] (64604) Provided verbal/tactile cueing for activities related to improving balance, coordination, kinesthetic sense, posture, motor skill, proprioception  to assist with LE, proximal hip, and core control in self care, mobility, lifting, ambulation and eccentric single leg control.      NMR and Therapeutic Activities:    [x] (45455 or 69868) Provided verbal/tactile cueing for activities related to improving balance, coordination, kinesthetic sense, posture, motor skill, proprioception and motor activation to allow for proper function of core, proximal hip and LE with self care and ADLs  [] (89854) Gait Re-education- Provided training and instruction to the patient for proper LE, core and proximal hip recruitment and positioning and eccentric body weight control with ambulation re-education including up and down stairs     Home Exercise Program:    [x] (71050) Reviewed/Progressed HEP activities related to strengthening, flexibility, endurance, ROM of core, proximal hip and LE for functional self-care, mobility, lifting and ambulation/stair navigation   [] (73925)Reviewed/Progressed HEP activities related to improving balance, coordination, kinesthetic sense, posture, motor skill, proprioception of core, proximal hip and LE for self care, mobility, lifting, and ambulation/stair navigation      Manual Treatments:  PROM / STM / Oscillations-Mobs:  G-I, II, III, IV (PA's, Inf., Post.)  [x] (53725) Provided manual therapy to mobilize LE, proximal hip and/or LS spine soft tissue/joints for the purpose of modulating pain, promoting relaxation,  increasing ROM, reducing/eliminating soft tissue swelling/inflammation/restriction, improving soft tissue extensibility and allowing for proper ROM for normal function with self care, mobility, lifting and ambulation. Modalities:  Deferred   [] GAME READY (VASO)- for significant edema, swelling, pain control. Charges:  Timed Code Treatment Minutes: 54'   Total Treatment Minutes: 79'     [facility] time in/time out:   (and requires time in and out for each CPT code)    [] EVAL (LOW) 03466 (typically 20 minutes face-to-face)  [] EVAL (MOD) 00798 (typically 30 minutes face-to-face)  [] EVAL (HIGH) 00398 (typically 45 minutes face-to-face)  [] RE-EVAL     [x] ZA(26524) x   2  [] IONTO  [x] NMR (08843) x 1  [] VASO  [x] Manual (98451) x 1     [] Other:  [] TA x      [] Mech Traction (18229)  [] ES(attended) (75197)      [] ES (un) (30951):       GOALS:  (cut and paste from eval)  Patient stated goal: Relieve pain  []? Progressing: []? Met: []? Not Met: []? Adjusted     Therapist goals for Patient:   Short Term Goals: To be achieved in: 2 weeks  1. Independent in HEP and progression per patient tolerance, in order to prevent re-injury. []?  Progressing: []?

## 2020-02-10 RX ORDER — AMIODARONE HYDROCHLORIDE 200 MG/1
TABLET ORAL
Qty: 90 TABLET | Refills: 0 | Status: SHIPPED | OUTPATIENT
Start: 2020-02-10 | End: 2020-05-07

## 2020-02-11 ENCOUNTER — APPOINTMENT (OUTPATIENT)
Dept: PHYSICAL THERAPY | Age: 60
End: 2020-02-11
Payer: COMMERCIAL

## 2020-02-12 ENCOUNTER — CARE COORDINATION (OUTPATIENT)
Dept: CASE MANAGEMENT | Age: 60
End: 2020-02-12

## 2020-02-14 ENCOUNTER — HOSPITAL ENCOUNTER (OUTPATIENT)
Dept: PHYSICAL THERAPY | Age: 60
Setting detail: THERAPIES SERIES
Discharge: HOME | End: 2020-02-14
Payer: COMMERCIAL

## 2020-02-14 PROCEDURE — 97110 THERAPEUTIC EXERCISES: CPT | Performed by: PHYSICAL THERAPIST

## 2020-02-14 PROCEDURE — 97112 NEUROMUSCULAR REEDUCATION: CPT | Performed by: PHYSICAL THERAPIST

## 2020-02-14 PROCEDURE — 97140 MANUAL THERAPY 1/> REGIONS: CPT | Performed by: PHYSICAL THERAPIST

## 2020-02-14 NOTE — FLOWSHEET NOTE
[facility],  [address] [place]  Phone: [phone]  Fax [phone]    Physical Therapy Treatment Note/ Progress Report:           Date:  2020    Patient Name:  Evan Sanz    :  1960  MRN: [id number]  Restrictions/Precautions:    Medical/Treatment Diagnosis Information:  · Diagnosis: M17.0 OA B knees  · Treatment Diagnosis: M17.0 OA B knees, B knee pain M25.561, F73.425  Insurance/Certification information:  PT Insurance Information: BCBS/40 PT/no auth  Physician Information:  Referring Practitioner: Amber Parkinson  Has the plan of care been signed (Y/N):        []  Yes  [x]  No     Date of Patient follow up with Physician: none      Is this a Progress Report:     [x]  Yes  []  No        If Yes:  Date Range for reporting period:  Beginnin20   Endin20    Progress report will be due (10 Rx or 30 days whichever is less): 30 days      Recertification will be due (POC Duration  / 90 days whichever is less): 8 weeks       Visit # Insurance Allowable Requires auth   6 40    [x]no        []yes:     Functional Scale: LEFS: 35%   Date assessed:  20      Latex Allergy:  [x]NO      []YES  Preferred Language for Healthcare:   [x]English       []other:      Pain level:  0-6/10     SUBJECTIVE: Reports B knees have been sore in general but not as much last few days as noted the beginning of the week. Split more firewood. OBJECTIVE: + ATC this visit. Has knee extension machine at home.  Observation:    Test measurements:  Tight hamstrings/quads/calves. Weak quad and hip abducters.   OBJECTIVE  Test used Initial score Current Score  20   Pain Summary 0-10 1-8 0 at rest   Functional questionnaire LEFS 23% 35%   Functional Testing            ROM Knee flex L/R 140/145 140/140    Knee flex/ext 4/5 4+/5   Strength Hip abd 3+/5 4-/5    SLR/hip flex/quad tone 4-/5 4/5      RESTRICTIONS/PRECAUTIONS: Prostate CA with radiation. Exercises/Interventions:     Therapeutic Ex (97730) Sets/sec Reps Notes/CUES   Piriformis stretches H30 5 HEP   ITB Figure 4 and knee to opposite hip H30 5 HEP   3D  Hamstrings/Calf H30 5 HEP   Hip flexor with strap H30 5 HEP   SL clams/ hip abd LVL h5  3 x10 reps HEP   SAQ 5#  25 reps B    Reverse clams wirh OVL SLR with ER H5 [id number] HEP   Ta Dahs 3D in standing BLE   Incline stretches H30 5 reps    Bike 6'     Hip hiking/ LSD 4\" 2x10 reps + to HEP   Standing wall squats with SB H5 1x15 reps    LBW/ Monster walks/Retro walks  2 laps + to HEP/+ BTB   Manual Intervention (33836)      Prone quad stretches/HS stretches H30 5 Manual R/L         HG 5 tool to ITB B LE 10'  R ITB >L          NMR re-education (42874)   CUES NEEDED   Patient education OA 10'     Posterior reaches off 4\" step R/L  2x10 reps                                           Therapeutic Activity (09189)                                                Therapeutic Exercise and NMR EXR  [x] (94900) Provided verbal/tactile cueing for activities related to strengthening, flexibility, endurance, ROM for improvements in LE, proximal hip, and core control with self care, mobility, lifting, ambulation.  [] (27208) Provided verbal/tactile cueing for activities related to improving balance, coordination, kinesthetic sense, posture, motor skill, proprioception  to assist with LE, proximal hip, and core control in self care, mobility, lifting, ambulation and eccentric single leg control.      NMR and Therapeutic Activities:    [x] (16758 or 28227) Provided verbal/tactile cueing for activities related to improving balance, coordination, kinesthetic sense, posture, motor skill, proprioception and motor activation to allow for proper function of core, proximal hip and LE with self care and ADLs  [] (12945) Gait Re-education- Provided training and instruction to the patient for proper LE, core and proximal hip recruitment and positioning and eccentric body weight control with ambulation re-education including up and down stairs     Home Exercise Program:    [x] (25794) Reviewed/Progressed HEP activities related to strengthening, flexibility, endurance, ROM of core, proximal hip and LE for functional self-care, mobility, lifting and ambulation/stair navigation   [] (69943)Reviewed/Progressed HEP activities related to improving balance, coordination, kinesthetic sense, posture, motor skill, proprioception of core, proximal hip and LE for self care, mobility, lifting, and ambulation/stair navigation      Manual Treatments:  PROM / STM / Oscillations-Mobs:  G-I, II, III, IV (PA's, Inf., Post.)  [x] (00921) Provided manual therapy to mobilize LE, proximal hip and/or LS spine soft tissue/joints for the purpose of modulating pain, promoting relaxation,  increasing ROM, reducing/eliminating soft tissue swelling/inflammation/restriction, improving soft tissue extensibility and allowing for proper ROM for normal function with self care, mobility, lifting and ambulation. Modalities:  Deferred   [] GAME READY (VASO)- for significant edema, swelling, pain control. Charges:  Timed Code Treatment Minutes: 54'   Total Treatment Minutes: 79'     [facility] time in/time out:   (and requires time in and out for each CPT code)    [] EVAL (LOW) 38602 (typically 20 minutes face-to-face)  [] EVAL (MOD) 03950 (typically 30 minutes face-to-face)  [] EVAL (HIGH) 46627 (typically 45 minutes face-to-face)  [] RE-EVAL     [x] AG(63330) x   2  [] IONTO  [x] NMR (31782) x 1  [] VASO  [x] Manual (26853) x 1     [] Other:  [] TA x      [] Mech Traction (71304)  [] ES(attended) (84854)      [] ES (un) (96570):       GOALS:  (cut and paste from eval)  Patient stated goal: Relieve pain  [x]? Progressing: []? Met: []? Not Met: []? Adjusted     Therapist goals for Patient:   Short Term Goals: To be achieved in: 2 weeks  1.  Independent in HEP and progression per patient tolerance, in order

## 2020-02-21 ENCOUNTER — TELEPHONE (OUTPATIENT)
Dept: ORTHOPEDIC SURGERY | Age: 60
End: 2020-02-21

## 2020-02-21 ENCOUNTER — HOSPITAL ENCOUNTER (OUTPATIENT)
Dept: PHYSICAL THERAPY | Age: 60
Setting detail: THERAPIES SERIES
Discharge: HOME | End: 2020-02-21
Payer: COMMERCIAL

## 2020-02-21 PROCEDURE — 97110 THERAPEUTIC EXERCISES: CPT | Performed by: PHYSICAL THERAPIST

## 2020-02-21 PROCEDURE — 97140 MANUAL THERAPY 1/> REGIONS: CPT | Performed by: PHYSICAL THERAPIST

## 2020-02-21 PROCEDURE — 97112 NEUROMUSCULAR REEDUCATION: CPT | Performed by: PHYSICAL THERAPIST

## 2020-02-21 NOTE — FLOWSHEET NOTE
[facility],  [address]  Phone: [phone]  Fax [phone]    Physical Therapy Treatment Note/ Progress Report:           Date:  2020    Patient Name:  Anaid Jarvis    :  1960  MRN: [id number]  Restrictions/Precautions:    Medical/Treatment Diagnosis Information:  · Diagnosis: M17.0 OA B knees  · Treatment Diagnosis: M17.0 OA B knees, B knee pain M25.561, Q43.964  Insurance/Certification information:  PT Insurance Information: BCBS/40 PT/no auth  Physician Information:  Referring Practitioner: Lalit Higgins  Has the plan of care been signed (Y/N):        []  Yes  [x]  No     Date of Patient follow up with Physician: to schedule F/U appt. Is this a Progress Report:     []  Yes  [x]  No        If Yes:  Date Range for reporting period:  Beginnin20  Ending:     Progress report will be due (10 Rx or 30 days whichever is less): 30 days       Recertification will be due (POC Duration  / 90 days whichever is less): 3/4/20      Visit # Insurance Allowable Requires auth   7 40    [x]no        []yes:     Functional Scale: LEFS: 35%   Date assessed:  20      Latex Allergy:  [x]NO      []YES  Preferred Language for Healthcare:   [x]English       []other:      Pain level:  0-6/10     SUBJECTIVE: Reports B knees have been sore in general . Overall not much change with pain B knees. Reports overdoing it with splitting firewood. OBJECTIVE:  ATC this visit. Has knee extension machine at home.  Observation:    Test measurements:  Tight hamstrings/quads/calves. Weak quad and hip abducters.   OBJECTIVE  Test used Initial score Current Score  20   Pain Summary 0-10 1-8 0 at rest   Functional questionnaire LEFS 23% 35%   Functional Testing            ROM Knee flex L/R 140/145 140/140    Knee flex/ext 4/5 4+/5   Strength Hip abd 3+/5 4-/5    SLR/hip flex/quad tone 4-/5 4/5 RESTRICTIONS/PRECAUTIONS: Prostate CA with radiation. Exercises/Interventions:     Therapeutic Ex (06290) Sets/sec Reps Notes/CUES   Piriformis stretches H30 5 HEP   ITB Figure 4 and knee to opposite hip H30 5 HEP   3D  Hamstrings/Calf H30 5 HEP   Hip flexor with strap H30 5 HEP   SL clams/ hip abd LVL h5  3 x10 reps HEP   SAQ 5#  25 reps B    Heels lifted with clams LVL H5 2x10 SLR with ER  H5 [id number] HEP   Ta Dahs 3D in standing BLE   Incline stretches H30 5 reps    Bike 6'     Hip hiking/ LSD 4\" 2x10 reps + to HEP   Standing wall squats with SB H5 1x15 reps    LBW/ Monster walks/Retro walks  2 laps + to HEP/+ BTB   Manual Intervention (07995)      Prone quad stretches/HS stretches Manual R/L         ITB /HS STM with stick 10'  R more painful than L today   HG 5 tool to ITB B LE       NMR re-education (09127)   CUES NEEDED   Patient education OA 10'     Posterior reaches off 4\" step R/L  2x10 reps                                           Therapeutic Activity (21147)                                                Therapeutic Exercise and NMR EXR  [x] (96951) Provided verbal/tactile cueing for activities related to strengthening, flexibility, endurance, ROM for improvements in LE, proximal hip, and core control with self care, mobility, lifting, ambulation.  [] (91357) Provided verbal/tactile cueing for activities related to improving balance, coordination, kinesthetic sense, posture, motor skill, proprioception  to assist with LE, proximal hip, and core control in self care, mobility, lifting, ambulation and eccentric single leg control.      NMR and Therapeutic Activities:    [x] (57129 or 85839) Provided verbal/tactile cueing for activities related to improving balance, coordination, kinesthetic sense, posture, motor skill, proprioception and motor activation to allow for proper function of core, proximal hip and LE with self care and ADLs  [] (05867) Gait Re-education- Provided training and instruction in: 2 weeks  1. Independent in HEP and progression per patient tolerance, in order to prevent re-injury. []? Progressing: [x]? Met: []? Not Met: []? Adjusted  2. Patient will have a decrease in pain to facilitate improvement in movement, function, and ADLs as indicated by Functional Deficits. [x]? Progressing: []? Met: []? Not Met: []? Adjusted     Long Term Goals: To be achieved in: 8 weeks  1. Disability index score of 18% or less for the LEFS to assist with reaching prior level of function. [x]? Progressing: []? Met: []? Not Met: []? Adjusted  2. Patient will demonstrate increased AROM to 0-145 l knee to allow for proper joint functioning as indicated by patients Functional Deficits. [x]? Progressing: []? Met: []? Not Met: []? Adjusted  3. Patient will demonstrate an increase in Strength to good proximal hip strength and control, within 5lb HHD in LE to allow for proper functional mobility as indicated by patients Functional Deficits. [x]? Progressing: []? Met: []? Not Met: []? Adjusted  4. Patient will return to ascending stairs functional activities without increased symptoms or restriction. [x]? Progressing: []? Met: []? Not Met: []? Adjusted  5. Patient to be able to squat into catchers position without pain (patient specific functional goal)    [x]? Progressing: []? Met: []? Not Met: []? Adjusted          Progression Towards Functional goals:  [] Patient is progressing as expected towards functional goals listed. [] Progression is slowed due to complexities listed. [x] Progression has been slowed due to co-morbidities. [] Plan just implemented, too soon to assess goals progression  [] Other:         Overall Progression Towards Functional goals/ Treatment Progress Update:  [x] Patient is progressing as expected towards functional goals listed. [] Progression is slowed due to complexities/Impairments listed. [] Progression has been slowed due to co-morbidities.   [] Plan just implemented, too soon to assess goals progression <30days   [] Goals require adjustment due to lack of progress  [] Patient is not progressing as expected and requires additional follow up with physician  [] Other    Prognosis for POC: [x] Good [] Fair  [] Poor      Patient requires continued skilled intervention: [x] Yes  [] No    Treatment/Activity Tolerance:  [x] Patient able to complete treatment  [] Patient limited by fatigue  [] Patient limited by pain    [] Patient limited by other medical complications  [] Other:     ASSESSMENT:  Presents with B LE flexibility deficits with quad and hip weakness contributing to increased compression  joint forces B knees. Return to Play: (if applicable)   []  Stage 1: Intro to Strength   []  Stage 2: Return to Run and Strength   []  Stage 3: Return to Jump and Strength   []  Stage 4: Dynamic Strength and Agility   []  Stage 5: Sport Specific Training     []  Ready to Return to Play, Meets All Above Stages   []  Not Ready for Return to Sports   Comments:                               PLAN: See eval. Cont PT 2x/week for 4-6 weeks. Patient to schedule appointment with MD  [x] Continue per plan of care [] Alter current plan (see comments above)  [] Plan of care initiated [] Hold pending MD visit [] Discharge      Electronically signed by:  Elda Mariscal, [address]    Note: If patient does not return for scheduled/ recommended follow up visits, this note will serve as a discharge from care along with most recent update on progress.

## 2020-02-21 NOTE — FLOWSHEET NOTE
[facility],  [address] [facility]  Phone: [phone]  Fax [phone]      ATHLETIC TRAINING [address]  Date:  2020    Patient Name:  Glenn Alejo"       :  1960  MRN: [id number]  Restrictions/Precautions:    Medical/Treatment Diagnosis Information:  ·  BL knee OA     Physician Information:   Shyrl Fee Post-op  8 wks  12 wks 16 wks 20 wks   24 wks                            Activity Log                                                  DOS/DOI:                                                    Date:  20   ATC communication: has knee ext machine at home  Catcher uses knee savers  Most difficulty going up steps    Knees achy after last visit ITB/HF tight  Fatigued today  Pt edu knee savers states pressure in jt when into knee crease L knee sore today   Bike       Elliptical       Treadmill       Airdyne              Gastroc stretch       Soleus stretch       Hamstring stretch       ITB stretch   TS x10/30\"H    Hip Flexor stretch       Quad stretch   TS 3D x10/30\"H    Adductor stretch              Weight Shifting sp  4\" post reach R/L 2x10                               fp                                 tp       Lateral walking (with/w/o TB)              Balance: PEP/Sparkle board                      SLS             Star excursion load/explode             Extremity reach UE/LE              Leg Press Talha. 100# 3x10 100# 3x12 100# 3x12 add # 3x12 ^NV                     Ecc. NV 80# R/L 2x10 held [address] [place] [address] Pkwy 80# R/L 2x10                     Inv. Calf Press Talha.  100# 3x10 100# 3x12 100# 3x12 # 3x12                      Ecc.                           Inv.              [facility] [place]   Flex  45# R/L 3x10 45# R/L 3x12 45# R/L 3x12              ABd 45# R/L 2x10 45# R/L 3x10 45# R/L 3x12 45# R/L 3x12              ADd  60# R/L 3x10 60# R/L 3x12 60# R/L 3x12

## 2020-02-21 NOTE — TELEPHONE ENCOUNTER
----- Message from Jericho Carcamo MA sent at 2/18/2020  8:28 AM EST -----  Regarding: re: Daniela Sloan  UPDATE:    Verbal script given to [facility]. They will verify coverage then contact patient for consent & copayment then call us to schedule office delivery.

## 2020-02-26 ENCOUNTER — TELEPHONE (OUTPATIENT)
Dept: ORTHOPEDIC SURGERY | Age: 60
End: 2020-02-26

## 2020-02-26 ENCOUNTER — HOSPITAL ENCOUNTER (OUTPATIENT)
Age: 60
Discharge: HOME | End: 2020-02-26
Payer: COMMERCIAL

## 2020-02-26 LAB
ALBUMIN SERPL-MCNC: 4.1 G/DL (ref 3.4–5)
ALP BLD-CCNC: 87 U/L (ref 40–129)
ALT SERPL-CCNC: 24 U/L (ref 10–40)
AST SERPL-CCNC: 19 U/L (ref 15–37)
BILIRUB SERPL-MCNC: 0.4 MG/DL (ref 0–1)
BILIRUBIN DIRECT: <0.2 MG/DL (ref 0–0.3)
BILIRUBIN, INDIRECT: NORMAL MG/DL (ref 0–1)
TOTAL PROTEIN: 6.6 G/DL (ref 6.4–8.2)
TSH REFLEX FT4: 3.89 UIU/ML (ref 0.27–4.2)

## 2020-02-26 PROCEDURE — 84443 ASSAY THYROID STIM HORMONE: CPT

## 2020-02-26 PROCEDURE — 80076 HEPATIC FUNCTION PANEL: CPT

## 2020-02-26 PROCEDURE — 36415 COLL VENOUS BLD VENIPUNCTURE: CPT

## 2020-02-28 ENCOUNTER — HOSPITAL ENCOUNTER (OUTPATIENT)
Dept: PHYSICAL THERAPY | Age: 60
Setting detail: THERAPIES SERIES
Discharge: HOME | End: 2020-02-28
Payer: COMMERCIAL

## 2020-02-28 PROCEDURE — 97112 NEUROMUSCULAR REEDUCATION: CPT | Performed by: PHYSICAL THERAPIST

## 2020-02-28 PROCEDURE — 97110 THERAPEUTIC EXERCISES: CPT | Performed by: PHYSICAL THERAPIST

## 2020-02-28 NOTE — FLOWSHEET NOTE
training and instruction to the patient for proper LE, core and proximal hip recruitment and positioning and eccentric body weight control with ambulation re-education including up and down stairs     Home Exercise Program:    [x] (19706) Reviewed/Progressed HEP activities related to strengthening, flexibility, endurance, ROM of core, proximal hip and LE for functional self-care, mobility, lifting and ambulation/stair navigation   [] (71477)Reviewed/Progressed HEP activities related to improving balance, coordination, kinesthetic sense, posture, motor skill, proprioception of core, proximal hip and LE for self care, mobility, lifting, and ambulation/stair navigation      Manual Treatments:  PROM / STM / Oscillations-Mobs:  G-I, II, III, IV (PA's, Inf., Post.)  [x] (65277) Provided manual therapy to mobilize LE, proximal hip and/or LS spine soft tissue/joints for the purpose of modulating pain, promoting relaxation,  increasing ROM, reducing/eliminating soft tissue swelling/inflammation/restriction, improving soft tissue extensibility and allowing for proper ROM for normal function with self care, mobility, lifting and ambulation. Modalities:  Deferred   [] GAME READY (VASO)- for significant edema, swelling, pain control. Charges:  Timed Code Treatment Minutes: 40'   Total Treatment Minutes: 79'     [facility] time in/time out:   (and requires time in and out for each CPT code)    [] EVAL (LOW) 57062 (typically 20 minutes face-to-face)  [] EVAL (MOD) 82998 (typically 30 minutes face-to-face)  [] EVAL (HIGH) 31561 (typically 45 minutes face-to-face)  [] RE-EVAL     [x] HZ(68071) x   2  [] IONTO  [x] NMR (48276) x 1  [] VASO  [x] Manual (65089) x      [] Other:  [] TA x      [] Mech Traction (63935)  [] ES(attended) (43587)      [] ES (un) (80657):       GOALS:    Patient stated goal: Relieve pain  [x]? Progressing: []? Met: []? Not Met: []? Adjusted     Therapist goals for Patient:   Short Term Goals:  To be achieved

## 2020-03-05 ENCOUNTER — NURSE ONLY (OUTPATIENT)
Dept: ORTHOPEDIC SURGERY | Age: 60
End: 2020-03-05

## 2020-03-05 PROCEDURE — 99999 PR OFFICE/OUTPT VISIT,PROCEDURE ONLY: CPT | Performed by: PHYSICIAN ASSISTANT

## 2020-03-05 NOTE — PROGRESS NOTES
Diagnosis: Left knee osteoarthritis    Procedure: Left Visco supplementation injection #1    The patient is symptomatic from osteoarthritis of the left knee joint with documented radiological signs of arthritis. The patient has also failed 3 months of conservative treatment including home exercise, education, Tylenol and/or NSAIDs use. The patient was offered a Visco supplementation today. Risks, benefits, and alternatives to the injections were discussed in detail with the patient. The risks discussed included but are not limited to infection, skin reactions, hot swollen joints, and anaphylaxis. The patient gave verbal informed consent for the injection. The patient's skin was prepped with  3 sterile gauze  pads soaked with alcohol solution and the knee joint was injected with 2 mL of Gelsyn 3 intra-articularly under sterile conditions. Technique: Under sterile conditions a [facility] [facility] [employer] ultrasound unit with a variable frequency (6.0-15.0 MHz) linear transducer was used to localize the placement of a 22-gauge needle into the knee joint. Findings: Successful needle placement for intra-articular Visco supplementation injection. Final images were taken and saved for permanent record. The patient tolerated the injection reasonably well. The patient was given instructions to ice the kne and avoid strenuous activities for 24-48 hours. The patient was instructed to call the office immediately if there is increased pain, redness, warmth, fever, or chills. We will see the patient back in one week for their second injection.

## 2020-03-11 ENCOUNTER — TELEPHONE (OUTPATIENT)
Dept: FAMILY MEDICINE CLINIC | Age: 60
End: 2020-03-11

## 2020-03-11 RX ORDER — VERAPAMIL HYDROCHLORIDE 240 MG/1
CAPSULE, EXTENDED RELEASE ORAL
Qty: 90 CAPSULE | Refills: 1 | Status: SHIPPED | OUTPATIENT
Start: 2020-03-11 | End: 2020-07-01 | Stop reason: ALTCHOICE

## 2020-03-12 RX ORDER — OMEPRAZOLE 40 MG/1
CAPSULE, DELAYED RELEASE ORAL
Qty: 90 CAPSULE | Refills: 0 | Status: SHIPPED | OUTPATIENT
Start: 2020-03-12 | End: 2020-06-04

## 2020-03-13 ENCOUNTER — NURSE ONLY (OUTPATIENT)
Dept: ORTHOPEDIC SURGERY | Age: 60
End: 2020-03-13

## 2020-03-13 PROCEDURE — 99999 PR OFFICE/OUTPT VISIT,PROCEDURE ONLY: CPT | Performed by: PHYSICIAN ASSISTANT

## 2020-03-13 RX ORDER — HYALURONATE SODIUM 10 MG/ML
20 SYRINGE (ML) INTRAARTICULAR ONCE
Status: SHIPPED | OUTPATIENT
Start: 2020-03-13

## 2020-03-19 ENCOUNTER — NURSE ONLY (OUTPATIENT)
Dept: ORTHOPEDIC SURGERY | Age: 60
End: 2020-03-19

## 2020-03-19 PROCEDURE — 99999 PR OFFICE/OUTPT VISIT,PROCEDURE ONLY: CPT | Performed by: PHYSICIAN ASSISTANT

## 2020-04-08 RX ORDER — ATORVASTATIN CALCIUM 20 MG/1
TABLET, FILM COATED ORAL
Qty: 90 TABLET | Refills: 1 | Status: SHIPPED | OUTPATIENT
Start: 2020-04-08 | End: 2020-09-30 | Stop reason: SDUPTHER

## 2020-04-08 NOTE — TELEPHONE ENCOUNTER
Brayan Lujan [phone] (home) [phone] (work)   is requesting refill(s) of medication atorvastatin (LIPITOR) to preferred pharmacy [facility]  Requesting 90 day    Last OV 2-4-20 (pertaining to medication)   Last refill 1-15-20 (per medication requested)  Next office visit scheduled or attempted No  Date   If No, reason: stated he didn't need to make one, he is seen every 6 months.

## 2020-04-08 NOTE — PROGRESS NOTES
[facility]   Cardiac Follow Up            HPI:  Kathleen Le is a 61 y.o. male who presents for follow up. He was previously followed by Dr. Jf Mccollum for hypertrophic cardiomyopathy, PVCs and NSVT. He was previously evaluated for an ICD at [facility]. He has worn cardiac monitors in the past that have shown Paroxysmal atrial fibrillation and PVCs. His monitor from 4/2016 showed symptoms associated with PAF. His Lexiscan from 4/2016 showed an adequate response to [place]. His monitor from 6/2017 showed PAF and frequent monomorphic PVCs. His echocardiogram from 10/2017 showed an EF of 55-60% with severe asymmetric left ventricular hypertrophy. His cardiac MRI showed normal LV chamber with an EF of 60%, Asymmetric basal and mid septal hypertrophy with the maximum end-diastolic myocardial thickness measuring up to 16 mm. Maximum LVOT velocity is 1.4 m/s. There is no LVOT gradient at rest.  There is some late gadolinium enhancement in the basal anteroseptal area. This was not quantified. In October 2019 his echo had similar findings along with a severely dilated left atrium. He then was evaluated by interventional cardiology who recommended ischemic testing. The patient refused any further testing at that time. He reports that he has been feeling well. He reports that his brother also has a mild case of septal wall thickening, but no history of unexplained syncope. [facility] He denies any unexplained syncope or dizziness. He generally is very active playing baseball. His activity levels have decreased recently due to his orthopedic issues and cough from his allergies. Prior to his knee pain, he was able to tolerate activity well without symptoms. He also complains of a cough that he attributes to his season allergies as this occurs every year around the same time. He has been taking Verapamil for many years. He was told to take this to reduce the workload of his heart.  When he does not take this, he feels Blood Pressure in his father; High Cholesterol in his brother and mother; Prostate Cancer in his paternal uncle. Home Medications:  Outpatient Encounter Medications as of 4/10/2020   Medication Sig Dispense Refill    atorvastatin (LIPITOR) 20 MG tablet TAKE 1 TABLET BY MOUTH ONE TIME A DAY 90 tablet 1    omeprazole (PRILOSEC) 40 MG delayed release capsule TAKE 1 CAPSULE BY MOUTH EVERY DAY 90 capsule 0    verapamil (VERELAN) 240 MG extended release capsule TAKE 1 CAPSULE BY MOUTH EVERY DAY AT NIGHT 90 capsule 1    amiodarone (CORDARONE) 200 MG tablet TAKE 1 TABLET BY MOUTH EVERY DAY 90 tablet 0    ELIQUIS 5 MG TABS tablet TAKE 1 TABLET BY MOUTH TWICE A DAY (Patient taking differently: Pt states he is only taking once daily) 180 tablet 0    vitamin B-12 (CYANOCOBALAMIN) 100 MCG tablet Take 50 mcg by mouth daily      Cholecalciferol (D3 ADULT) 1000 UNITS CHEW Take by mouth daily       zinc 50 MG CAPS Take by mouth daily       clonazePAM (KLONOPIN) 1 MG tablet Take 1 mg by mouth daily.  Melatonin 10 MG CAPS Take 10 mg by mouth as needed        Facility-Administered Encounter Medications as of 4/10/2020   Medication Dose Route Frequency Provider Last Rate Last Dose    sodium hyaluronate (viscosup) injection 20 mg  20 mg Intra-articular Once HERLINDA Cuellar           Allergies:  Niacin and related     Review of Systems   Constitutional: Negative. HENT: Negative. Eyes: Negative. Respiratory: Negative. Cardiovascular: Negative. Gastrointestinal: Negative. Genitourinary: Negative. Musculoskeletal: Negative. Skin: Negative. Neurological: Negative. Hematological: Negative. Psychiatric/Behavioral: Negative. /60   Pulse 56   Ht 5' 11\" (1.803 m)   Wt 215 lb 8 oz (97.8 kg)   SpO2 97%   BMI 30.06 kg/m²       Objective:  Physical Exam   Constitutional: He is oriented to person, place, and time. He appears well-developed and well-nourished.    HENT:   Head: Normocephalic and atraumatic. Eyes: Pupils are equal, round, and reactive to light. Neck: Normal range of motion. Cardiovascular: Normal rate, regular rhythm and physiologically split 2nd heart sound, 2/6 VASHTI     Pulmonary/Chest: Effort normal and breath sounds normal.   Abdominal: Soft. No tenderness. Musculoskeletal: Normal range of motion. He exhibits no edema. Neurological: He is alert and oriented to person, place, and time. Skin: Skin is warm and dry. Psychiatric: He has a normal mood and affect. Assessment:  1. HCM without LVOT gradient  2. PAF  3. PVC's  4. RBBB  5. HUNTER     Plan:  1. 7 day CAM monitor to evaluate PVC and afib burden. 2. Continue current medications as prescribed. Amiodarone 200 mg daily, Verapamil 240 mg daily, Eliquis 5 mg twice daily. 3. Follow up with a virtual visit with me in 1 month. QUALITY MEASURES  1. Tobacco Cessation Counseling: NA  2. Retake of BP if >140/90:   NA  3. Documentation to PCP/referring for new patient:  Sent to PCP at close of office visit  4. CAD patient on anti-platelet: NA  5. CAD patient on STATIN therapy:  NA  6. Patient with aFib on anticoagulation:  Yes      This note was scribed in the presence of Dr. Beena Tapia MD by Ric Salas RN.      I, Dr. Beena Tapia, personally performed the services described in this documentation as scribed by Ric Salas RN  in my presence, and it is both accurate and complete.          Beena Tapia M.D.

## 2020-04-10 ENCOUNTER — OFFICE VISIT (OUTPATIENT)
Dept: CARDIOLOGY CLINIC | Age: 60
End: 2020-04-10
Payer: COMMERCIAL

## 2020-04-10 VITALS
OXYGEN SATURATION: 97 % | WEIGHT: 215.5 LBS | BODY MASS INDEX: 30.17 KG/M2 | HEART RATE: 56 BPM | DIASTOLIC BLOOD PRESSURE: 60 MMHG | SYSTOLIC BLOOD PRESSURE: 100 MMHG | HEIGHT: 71 IN

## 2020-04-10 PROCEDURE — 99214 OFFICE O/P EST MOD 30 MIN: CPT | Performed by: INTERNAL MEDICINE

## 2020-04-10 PROCEDURE — 93000 ELECTROCARDIOGRAM COMPLETE: CPT | Performed by: INTERNAL MEDICINE

## 2020-04-10 NOTE — LETTER
[facility]   Cardiac Follow Up            HPI:  Kian Sotelo is a 61 y.o. male who presents for follow up. He was previously followed by Dr. Dora Cloud for hypertrophic cardiomyopathy, PVCs and NSVT. He was previously evaluated for an ICD at [facility]. He has worn cardiac monitors in the past that have shown Paroxysmal atrial fibrillation and PVCs. His monitor from 4/2016 showed symptoms associated with PAF. His Lexiscan from 4/2016 showed an adequate response to [place]. His monitor from 6/2017 showed PAF and frequent monomorphic PVCs. His echocardiogram from 10/2017 showed an EF of 55-60% with severe asymmetric left ventricular hypertrophy. His cardiac MRI showed normal LV chamber with an EF of 60%, Asymmetric basal and mid septal hypertrophy with the maximum end-diastolic myocardial thickness measuring up to 16 mm. Maximum LVOT velocity is 1.4 m/s. There is no LVOT gradient at rest.  There is some late gadolinium enhancement in the basal anteroseptal area. This was not quantified. In October 2019 his echo had similar findings along with a severely dilated left atrium. He then was evaluated by interventional cardiology who recommended ischemic testing. The patient refused any further testing at that time. He reports that he has been feeling well. He reports that his brother also has a mild case of septal wall thickening, but no history of unexplained syncope. [facility] He denies any unexplained syncope or dizziness. He generally is very active playing baseball. His activity levels have decreased recently due to his orthopedic issues and cough from his allergies. Prior to his knee pain, he was able to tolerate activity well without symptoms. He also complains of a cough that he attributes to his season allergies as this occurs every year around the same time. He has been taking Verapamil for many years.  He was told to take this to reduce family history includes Cancer in his mother; Heart Disease in his maternal grandfather and maternal grandmother; High Blood Pressure in his father; High Cholesterol in his brother and mother; Prostate Cancer in his paternal uncle. Home Medications:  Outpatient Encounter Medications as of 4/10/2020   Medication Sig Dispense Refill    atorvastatin (LIPITOR) 20 MG tablet TAKE 1 TABLET BY MOUTH ONE TIME A DAY 90 tablet 1    omeprazole (PRILOSEC) 40 MG delayed release capsule TAKE 1 CAPSULE BY MOUTH EVERY DAY 90 capsule 0    verapamil (VERELAN) 240 MG extended release capsule TAKE 1 CAPSULE BY MOUTH EVERY DAY AT NIGHT 90 capsule 1    amiodarone (CORDARONE) 200 MG tablet TAKE 1 TABLET BY MOUTH EVERY DAY 90 tablet 0    ELIQUIS 5 MG TABS tablet TAKE 1 TABLET BY MOUTH TWICE A DAY (Patient taking differently: Pt states he is only taking once daily) 180 tablet 0    vitamin B-12 (CYANOCOBALAMIN) 100 MCG tablet Take 50 mcg by mouth daily      Cholecalciferol (D3 ADULT) 1000 UNITS CHEW Take by mouth daily       zinc 50 MG CAPS Take by mouth daily       clonazePAM (KLONOPIN) 1 MG tablet Take 1 mg by mouth daily.  Melatonin 10 MG CAPS Take 10 mg by mouth as needed        Facility-Administered Encounter Medications as of 4/10/2020   Medication Dose Route Frequency Provider Last Rate Last Dose    sodium hyaluronate (viscosup) injection 20 mg  20 mg Intra-articular Once HERLINDA [place]           Allergies:  Niacin and related     Review of Systems   Constitutional: Negative. HENT: Negative. Eyes: Negative. Respiratory: Negative. Cardiovascular: Negative. Gastrointestinal: Negative. Genitourinary: Negative. Musculoskeletal: Negative. Skin: Negative. Neurological: Negative. Hematological: Negative. Psychiatric/Behavioral: Negative.     /60   Pulse 56   Ht 5' 11\" (1.803 m)   Wt 215 lb 8 oz (97.8 kg)   SpO2 97%   BMI 30.06 kg/m²        Objective:  Physical Exam

## 2020-04-14 ENCOUNTER — TELEPHONE (OUTPATIENT)
Dept: CARDIOLOGY CLINIC | Age: 60
End: 2020-04-14

## 2020-04-15 ENCOUNTER — TELEPHONE (OUTPATIENT)
Dept: CARDIOLOGY CLINIC | Age: 60
End: 2020-04-15

## 2020-04-15 PROCEDURE — 0296T PR EXT ECG > 48HR TO 21 DAY RCRD W/CONECT INTL RCRD: CPT | Performed by: INTERNAL MEDICINE

## 2020-04-15 NOTE — TELEPHONE ENCOUNTER
Monitor placed by [address] [place] [address]  Length of monitor 7 DAY  Monitor ordered by [facility]  Serial number [id number]  Kit ID [id number]  Activation successful prior to pt leaving office?  Yes

## 2020-04-23 PROCEDURE — 0298T PR EXT ECG > 48HR TO 21 DAY REVIEW AND INTERPRETATN: CPT | Performed by: INTERNAL MEDICINE

## 2020-04-29 ENCOUNTER — TELEPHONE (OUTPATIENT)
Dept: CARDIOLOGY CLINIC | Age: 60
End: 2020-04-29

## 2020-05-04 NOTE — TELEPHONE ENCOUNTER
The MRI has been done. I spoke with the radiologist from [facility] when she was in the office that day and asked that the LGE be quantified. It appears that was not done.

## 2020-05-04 NOTE — TELEPHONE ENCOUNTER
Looks like we are not waiting on anything. There's no MRI order that I see. Do you want one ordered and if so is it a cardiac MRI?

## 2020-05-08 RX ORDER — AMIODARONE HYDROCHLORIDE 200 MG/1
TABLET ORAL
Qty: 90 TABLET | Refills: 1 | Status: SHIPPED | OUTPATIENT
Start: 2020-05-08 | End: 2020-07-01

## 2020-06-04 RX ORDER — OMEPRAZOLE 40 MG/1
CAPSULE, DELAYED RELEASE ORAL
Qty: 90 CAPSULE | Refills: 0 | Status: SHIPPED | OUTPATIENT
Start: 2020-06-04 | End: 2020-08-31

## 2020-06-04 NOTE — TELEPHONE ENCOUNTER
Gus Yoana [phone] (home) [phone] (work)   is requesting refill(s) of medication Omeprazole to preferred pharmacy [facility]    Last OV 2/4/20 (pertaining to medication)   Last refill 3/12/20 (per medication requested)  Next office visit scheduled or attempted No  Date   If No, reason When do you pt back?

## 2020-06-05 ENCOUNTER — OFFICE VISIT (OUTPATIENT)
Dept: ORTHOPEDIC SURGERY | Age: 60
End: 2020-06-05
Payer: COMMERCIAL

## 2020-06-05 VITALS — WEIGHT: 215.61 LBS | BODY MASS INDEX: 30.19 KG/M2 | HEIGHT: 71 IN

## 2020-06-05 PROCEDURE — 99213 OFFICE O/P EST LOW 20 MIN: CPT | Performed by: PHYSICIAN ASSISTANT

## 2020-06-05 PROCEDURE — 20610 DRAIN/INJ JOINT/BURSA W/O US: CPT | Performed by: PHYSICIAN ASSISTANT

## 2020-06-05 RX ORDER — BETAMETHASONE SODIUM PHOSPHATE AND BETAMETHASONE ACETATE 3; 3 MG/ML; MG/ML
12 INJECTION, SUSPENSION INTRA-ARTICULAR; INTRALESIONAL; INTRAMUSCULAR; SOFT TISSUE ONCE
Status: COMPLETED | OUTPATIENT
Start: 2020-06-05 | End: 2020-06-05

## 2020-06-05 RX ORDER — BUPIVACAINE HYDROCHLORIDE 5 MG/ML
30 INJECTION, SOLUTION PERINEURAL ONCE
Status: COMPLETED | OUTPATIENT
Start: 2020-06-05 | End: 2020-06-05

## 2020-06-05 RX ORDER — METHYLPREDNISOLONE ACETATE 40 MG/ML
40 INJECTION, SUSPENSION INTRA-ARTICULAR; INTRALESIONAL; INTRAMUSCULAR; SOFT TISSUE ONCE
Status: COMPLETED | OUTPATIENT
Start: 2020-06-05 | End: 2020-06-05

## 2020-06-05 RX ADMIN — BETAMETHASONE SODIUM PHOSPHATE AND BETAMETHASONE ACETATE 12 MG: 3; 3 INJECTION, SUSPENSION INTRA-ARTICULAR; INTRALESIONAL; INTRAMUSCULAR; SOFT TISSUE at 09:41

## 2020-06-05 RX ADMIN — BUPIVACAINE HYDROCHLORIDE 150 MG: 5 INJECTION, SOLUTION PERINEURAL at 09:40

## 2020-06-05 RX ADMIN — METHYLPREDNISOLONE ACETATE 40 MG: 40 INJECTION, SUSPENSION INTRA-ARTICULAR; INTRALESIONAL; INTRAMUSCULAR; SOFT TISSUE at 09:40

## 2020-06-05 NOTE — PROGRESS NOTES
Hypertrophic cardiomyopathy ([facility]     IHSS (idiopathic hypertrophic subaortic stenosis) ([facility]     Kidney stones     HUNTER on CPAP     Dr. Tony Interiano- ASHISHPAP    Primary localized osteoarthrosis, shoulder region 10/18/2013    Prostate cancer [facility])     prostate ; s/p radiation treatment    PVC's (premature ventricular contractions)     RBBB (right bundle branch block)     RLS (restless legs syndrome)     Dr. Harjit Johnson Seasonal allergies     Tachycardia      Patient Active Problem List    Diagnosis Date Noted    Paroxysmal atrial fibrillation ([facility] 09/26/2017     Priority: High    GIB (gastrointestinal bleeding) 02/01/2020    Current use of long term anticoagulation 02/01/2020    Palpitations 12/14/2018    Elevated blood sugar 04/07/2017    Personal history of malignant neoplasm of prostate 12/10/2013    Restless legs syndrome 10/23/2013    Obstructive sleep apnea 10/23/2013    Right bundle branch block 01/12/2013    Obesity 01/12/2013    GERD (gastroesophageal reflux disease) 08/27/2010    Hyperlipidemia 08/27/2010    Hypertrophic cardiomyopathy ([facility] 08/27/2010     Current Outpatient Medications   Medication Sig Dispense Refill    omeprazole (PRILOSEC) 40 MG delayed release capsule TAKE 1 CAPSULE BY MOUTH EVERY DAY 90 capsule 0    amiodarone (CORDARONE) 200 MG tablet TAKE 1 TABLET BY MOUTH EVERY DAY 90 tablet 1    atorvastatin (LIPITOR) 20 MG tablet TAKE 1 TABLET BY MOUTH ONE TIME A DAY 90 tablet 1    verapamil (VERELAN) 240 MG extended release capsule TAKE 1 CAPSULE BY MOUTH EVERY DAY AT NIGHT 90 capsule 1    ELIQUIS 5 MG TABS tablet TAKE 1 TABLET BY MOUTH TWICE A DAY (Patient taking differently: Pt states he is only taking once daily) 180 tablet 0    vitamin B-12 (CYANOCOBALAMIN) 100 MCG tablet Take 50 mcg by mouth daily      Cholecalciferol (D3 ADULT) 1000 UNITS CHEW Take by mouth daily       zinc 50 MG CAPS Take by mouth daily       clonazePAM (KLONOPIN) 1 MG tablet Take 1 mg by

## 2020-06-17 ENCOUNTER — OFFICE VISIT (OUTPATIENT)
Dept: ORTHOPEDIC SURGERY | Age: 60
End: 2020-06-17
Payer: COMMERCIAL

## 2020-06-17 VITALS — HEIGHT: 71 IN | BODY MASS INDEX: 29.68 KG/M2 | WEIGHT: 212 LBS

## 2020-06-17 PROCEDURE — 99214 OFFICE O/P EST MOD 30 MIN: CPT | Performed by: PHYSICIAN ASSISTANT

## 2020-06-17 NOTE — LETTER
performance of any surgical or medical procedure(s). I am aware that the practice of surgery and medicine is not an exact science, and I acknowledge that no guarantees have been made to me concerning the results of the procedure(s). 5) I consent to the taking of photographs before, during and after the procedure(s) for scientific and educational purposes. I also understand that medical students and residents may participate in the procedure(s) set forth in Paragraph 1, and I consent to their participation under the supervision of the above named physician. 6) I consent to the administration of anesthesia and to the use of such anesthetics as may be deemed advisable by the anesthesiologist engaged to administer anesthesia. 7) I certify that I have read and understand this consent to the surgical or medical procedure(s); that all the information contained herein was disclosed to me by the informing physician prior to my signing; that all blanks or statements requiring insertions or completion were filled in and inapplicable paragraphs, if any, were stricken before I signed; and that all questions asked by me about the procedure(s) have been fully answered by the informing physician in a satisfactory manner.    ________________________________                           _______________________________  Signature of patient                                                                  Ana Gibbs M.D.  ________________________________                           ________________________________  Signature of Informing Physician                                           Informing Physician (Print)    If patient is unable to sign or is a minor, complete one of the following:   A) Patient is a minor ______________ years of age.    B) Patient is unable to sign

## 2020-06-17 NOTE — PROGRESS NOTES
Chief Complaint    Knee Pain (LT KNEE INJECTION DIDN'T HELP, MADE PAIN WORSE)      History of Present Illness:  Alisha Donovan is a 61 y.o. male who returns today for reevaluation treatment regarding persistent left knee pain. He continues to complain of pain over the medial border and medial facet of the patella which is worse with going up and down stairs, squatting, kneeling, getting out of a seated position. Once he gets up and begins ambulating his symptoms improved. He also reports occasional sharp pains when laying in bed at night. He is tried 2 different cortisone injections which provided minimal relief. He is also tried both oral and topical anti-inflammatory medications as well as a course of physical therapy. A previous MRI showed no meniscal pathology however he did have grade 3 changes of the medial compartment. He is frustrated with his progress. He also notes that he has not been compliant with any additional home exercises and in fact is probably gained about 15 pounds in the last several months.       Pain Assessment  Location of Pain: Knee  Location Modifiers: Left  Severity of Pain: 8  Frequency of Pain: Intermittent  Aggravating Factors: Standing, Walking, Stairs  Limiting Behavior: Some  Work-Related Injury: No  Are there other pain locations you wish to document?: No    Medical History:  Past Medical History:   Diagnosis Date    [facility]     ADHD (attention deficit hyperactivity disorder)     Carpal tunnel syndrome 1/21/2015    Chronic low back pain     Chronic neck pain     Chronic sinusitis     Dr. Ward Valenzuela Dental crown present     lower    Epigastric hernia     Esophageal spasm     GERD (gastroesophageal reflux disease)     Hyperlipidemia     Hypertrophic cardiomyopathy (HCC)     [facility]idiopathic [facility]rophic [facility]aortic stenosis) [facility]     Kidney stones     HUNTER on CPAP     Dr. Nathen Sullivan- DEONNA-PAP    Primary localized osteoarthrosis, shoulder region 10/18/2013    Prostate cancer [facility])     prostate ; s/p radiation treatment    PVC's (premature ventricular contractions)     RBBB (right bundle branch block)     RLS (restless legs syndrome)     Dr. Kelsi Crocker Seasonal allergies     Tachycardia      Patient Active Problem List    Diagnosis Date Noted    Paroxysmal atrial fibrillation ([facility] 09/26/2017     Priority: High    GIB (gastrointestinal bleeding) 02/01/2020    Current use of long term anticoagulation 02/01/2020    Palpitations 12/14/2018    Elevated blood sugar 04/07/2017    Personal history of malignant neoplasm of prostate 12/10/2013    Restless legs syndrome 10/23/2013    Obstructive sleep apnea 10/23/2013    Right bundle branch block 01/12/2013    Obesity 01/12/2013    GERD (gastroesophageal reflux disease) 08/27/2010    Hyperlipidemia 08/27/2010    Hypertrophic cardiomyopathy ([facility] 08/27/2010     Current Outpatient Medications   Medication Sig Dispense Refill    omeprazole (PRILOSEC) 40 MG delayed release capsule TAKE 1 CAPSULE BY MOUTH EVERY DAY 90 capsule 0    amiodarone (CORDARONE) 200 MG tablet TAKE 1 TABLET BY MOUTH EVERY DAY 90 tablet 1    atorvastatin (LIPITOR) 20 MG tablet TAKE 1 TABLET BY MOUTH ONE TIME A DAY 90 tablet 1    verapamil (VERELAN) 240 MG extended release capsule TAKE 1 CAPSULE BY MOUTH EVERY DAY AT NIGHT 90 capsule 1    ELIQUIS 5 MG TABS tablet TAKE 1 TABLET BY MOUTH TWICE A DAY (Patient taking differently: Pt states he is only taking once daily) 180 tablet 0    vitamin B-12 (CYANOCOBALAMIN) 100 MCG tablet Take 50 mcg by mouth daily      Cholecalciferol (D3 ADULT) 1000 UNITS CHEW Take by mouth daily       zinc 50 MG CAPS Take by mouth daily       clonazePAM (KLONOPIN) 1 MG tablet Take 1 mg by mouth daily.       Melatonin 10 MG CAPS Take 10 mg by mouth as needed        Current Facility-Administered Medications   Medication Dose Route Frequency Provider Last Rate Last Dose    sodium hyaluronate (viscosup) injection 20 mg osteoarthritis of left knee Yes    Plica syndrome of knee, left        Office Procedures:  No orders of the defined types were placed in this encounter. Treatment Plan: Today I had a lengthy discussion with the patient regarding his ongoing pain and the options for treatment. He has since failed aggressive conservative treatment and continues to have persistent pain over the medial joint line and medial plica shelf. He also has some symptoms consistent with patellofemoral syndrome however he has not responded to conservative treatment. We have since now discussed arthroscopic surgery and he does wish to proceed. I recommended a left knee diagnostic video arthroscopy, chondroplasty, plica excision, and meniscal work as needed. We discussed the risks, benefits, and complications of arthroscopic knee surgery. The patient realizes that there are concerns with this surgery with respect to infection, deep vein thrombosis, neurological injury, delayed  rehabilitation, the possibility of arthrofibrosis of the knee, and specifically  Hoffa's fat pad fibrosis that can potentially cause difficulties. The patient realizes that there are also anesthetic concerns including cardiopulmonary issues, pulmonary issues, and even possibility of death or dystrophy. The patient voiced understanding to this as well as the normal  rehabilitation  that   is involved with weeks of physical therapy, exercise, and strengthening. The patient also realizes that even though the surgery, from a functional perspective, typically allows the patient to return to good function at about 6 weeks, that it often takes 6 months to completely rehabilitate from this operation. The patient also realizes that if there is an arthritic component to the symptoms, then they may still have some degree of arthritis pain.

## 2020-06-30 NOTE — PROGRESS NOTES
2020    TELEHEALTH EVALUATION -- Audio/Visual (During GWNQT-72 public health emergency)    HPI:  Harper Warren is a 61 y.o. male who initially presented for cardiomyopathy. He was previously followed by Dr. Kieran Briones for hypertrophic cardiomyopathy, PVCs and NSVT. [place] [facility] He has worn cardiac monitors in the past that have shown Paroxysmal atrial fibrillation and PVCs. His monitor from 2016 showed symptoms associated with PAF. His Lexiscan from 2016 showed an adequate response to Laddie Broaden. His monitor from 2017 showed PAF and frequent monomorphic PVCs. His echocardiogram from 10/2017 showed an EF of 55-60% with severe asymmetric left ventricular hypertrophy. His cardiac MRI showed normal LV chamber with an EF of 60%, Asymmetric basal and mid septal hypertrophy with the maximum end-diastolic myocardial thickness measuring up to 16 mm. Maximum LVOT velocity is 1.4 m/s. There is no LVOT gradient at rest.  There is some late gadolinium enhancement in the basal anteroseptal area. This was quantified at 25%. Kylah Burton (:  1960) has requested an audio/video evaluation for the following concern(s): HCM    Today he states that he is feeling okay. He denies dizziness or passing out. He states that he does have the occasional flutter that occurs 2-3 times aweek. It lasts about half an hour. He notices them mostly in the evening. He states that he does not notice them when he does exercise. Event monitor from  demonstrates rare PVCs and no NSVT. He is taking his amiodarone 200 mg daily. He is tolerating it well. He has not been very active lately due to knee pain. He is scheduled to get a scope in the near future with [facility] at [facility].      Review of Systems    Prior to Visit Medications    Medication Sig Taking?  Authorizing Provider   verapamil (CALAN SR) 240 MG extended release tablet Take 240 mg by mouth daily Yes Historical Provider, MD   omeprazole (PRILOSEC) 40 MG delayed release capsule TAKE 1 CAPSULE BY MOUTH EVERY DAY Yes Ramon Hughes MD   amiodarone (CORDARONE) 200 MG tablet TAKE 1 TABLET BY MOUTH EVERY DAY Yes Clinton Hernandez MD   atorvastatin (LIPITOR) 20 MG tablet TAKE 1 TABLET BY MOUTH ONE TIME A DAY Yes Ramon Hughes MD   ELIQUIS 5 MG TABS tablet TAKE 1 TABLET BY MOUTH TWICE A DAY  Patient taking differently: Pt states he is only taking once daily Yes Kaiden Qiu APRN - CNP   vitamin B-12 (CYANOCOBALAMIN) 100 MCG tablet Take 50 mcg by mouth daily Yes Historical Provider, MD   Cholecalciferol (D3 ADULT) 1000 UNITS CHEW Take by mouth daily  Yes Historical Provider, MD   zinc 50 MG CAPS Take by mouth daily  Yes Historical Provider, MD   clonazePAM (KLONOPIN) 1 MG tablet Take 1 mg by mouth daily.  Yes Historical Provider, MD   Melatonin 10 MG CAPS Take 10 mg by mouth as needed  Yes Historical Provider, MD       Social History     Tobacco Use    Smoking status: Never Smoker    Smokeless tobacco: Never Used   Substance Use Topics    Alcohol use: Yes     Comment: 2-3 drinks 3X per year     Drug use: No        Past Medical History:   Diagnosis Date    [facility]     ADHD (attention deficit hyperactivity disorder)     Carpal tunnel syndrome 1/21/2015    Chronic low back pain     Chronic neck pain     Chronic sinusitis     Dr. Chel Wright Dental crown present     lower    Epigastric hernia     Esophageal spasm     GERD (gastroesophageal reflux disease)     Hyperlipidemia     Hypertrophic cardiomyopathy (HCC)     IHSS [facility]idiopathic hypertrophic subaortic stenosis) ([facility]     Kidney stones     HUNTER on CPAP     Dr. Zi Navarrete- A-PAP    Primary localized osteoarthrosis, shoulder region 10/18/2013    Prostate cancer [facility])     prostate ; s/p radiation treatment    PVC's (premature ventricular contractions)     RBBB (right bundle branch block)     RLS (restless legs syndrome)     Dr. Chel Wright Seasonal allergies     Tachycardia        PHYSICAL EXAMINATION:  [ INSTRUCTIONS:  \"[x]\" Indicates a positive item  \"[]\" Indicates a negative item  -- DELETE ALL ITEMS NOT EXAMINED]  Vital Signs: (As obtained by patient/caregiver or practitioner observation)    Blood pressure-  Heart rate-    Respiratory rate-    Temperature-  Pulse oximetry-     Constitutional: [x] Appears well-developed and well-nourished [x] No apparent distress      [] Abnormal-   Mental status  [x] Alert and awake  [x] Oriented to person/place/time [x]Able to follow commands      Eyes:  EOM    [x]  Normal  [] Abnormal-  Sclera  [x]  Normal  [] Abnormal -         Discharge [x]  None visible  [] Abnormal -    HENT:   [x] Normocephalic, atraumatic. [] Abnormal   [x] Mouth/Throat: Mucous membranes are moist.     External Ears [x] Normal  [] Abnormal-     Neck: [x] No visualized mass     Pulmonary/Chest: [x] Respiratory effort normal.  [x] No visualized signs of difficulty breathing or respiratory distress        [] Abnormal-      Musculoskeletal:   [x] Normal gait with no signs of ataxia         [x] Normal range of motion of neck        [] Abnormal-       Neurological:        [x] No Facial Asymmetry (Cranial nerve 7 motor function) (limited exam to video visit)          [x] No gaze palsy        [] Abnormal-         Skin:        [x] No significant exanthematous lesions or discoloration noted on facial skin         [] Abnormal-            Psychiatric:       [x] Normal Affect [x] No Hallucinations        [] Abnormal-     Other pertinent observable physical exam findings-     ASSESSMENT/PLAN:  1. HCM without LVOT gradient- he does not have any major risk factors for SCA in HCM. He does however have > 20% scar burden by LGE in the basal septum. In some studies, this finding has a stronger correlation with risk than the major risk factors. His age (> 61 years) is associated with a strong reduction in risk.  We discussed these issues and have decided to monitor for symptoms and defer ICD implantation at this time. 2. PAF     PLAN:  1. Decrease amiodarone to 100mg daily. Take half of your current dose. If atrial fib reoccurs  2. Mail 14 day monitor  3. Follow up 3 months      Selina Mendez is a 61 y.o. male being evaluated by a Virtual Visit (video visit) encounter to address concerns as mentioned above. A caregiver was present when appropriate. Due to this being a TeleHealth encounter ([facility]), evaluation of the following organ systems was limited: Vitals/Constitutional/EENT/Resp/CV/GI//MS/Neuro/Skin/Heme-Lymph-Imm. Pursuant to the emergency declaration under the [address] [place] [address], [address] [place] [address] authority and the [employer] Act, this Virtual Visit was conducted with patient's (and/or legal guardian's) consent, to reduce the patient's risk of exposure to COVID-19 and provide necessary medical care. The patient (and/or legal guardian) has also been advised to contact this office for worsening conditions or problems, and seek emergency medical treatment and/or call 911 if deemed necessary. Patient identification was verified at the start of the visit: Yes    Total time spent on this encounter: 15    Services were provided through a video synchronous discussion virtually to substitute for in-person clinic visit. Patient and provider were located at their individual homes. This note was scribed in the presence of Lizzeth Fonseca MD by Vahe Keating RN.     I, Dr. Lizzeth Fonseca, personally performed the services described in this documentation as scribed by Vahe Keating RN in my presence, and it is both accurate and complete. --Vahe Keating RN on 7/1/2020 at 11:30 AM    An electronic signature was used to authenticate this note.

## 2020-07-01 ENCOUNTER — TELEPHONE (OUTPATIENT)
Dept: CARDIOLOGY CLINIC | Age: 60
End: 2020-07-01

## 2020-07-01 ENCOUNTER — VIRTUAL VISIT (OUTPATIENT)
Dept: CARDIOLOGY CLINIC | Age: 60
End: 2020-07-01
Payer: COMMERCIAL

## 2020-07-01 PROCEDURE — 99214 OFFICE O/P EST MOD 30 MIN: CPT | Performed by: INTERNAL MEDICINE

## 2020-07-01 RX ORDER — AMIODARONE HYDROCHLORIDE 200 MG/1
100 TABLET ORAL DAILY
Qty: 45 TABLET | Refills: 0
Start: 2020-07-01 | End: 2020-10-21

## 2020-07-01 RX ORDER — VERAPAMIL HYDROCHLORIDE 240 MG/1
240 TABLET, FILM COATED, EXTENDED RELEASE ORAL DAILY
COMMUNITY
End: 2020-10-21

## 2020-07-01 NOTE — PATIENT INSTRUCTIONS
1.  Decrease amiodarone to 100mg daily. Take half of your current dose. If atrial fib reoccurs  2. Mail 14 day monitor  3.   Follow up 3 months

## 2020-07-06 ENCOUNTER — TELEPHONE (OUTPATIENT)
Dept: CARDIOLOGY CLINIC | Age: 60
End: 2020-07-06

## 2020-07-06 ENCOUNTER — OFFICE VISIT (OUTPATIENT)
Dept: FAMILY MEDICINE CLINIC | Age: 60
End: 2020-07-06
Payer: COMMERCIAL

## 2020-07-06 VITALS
DIASTOLIC BLOOD PRESSURE: 60 MMHG | HEART RATE: 51 BPM | BODY MASS INDEX: 30.63 KG/M2 | OXYGEN SATURATION: 98 % | RESPIRATION RATE: 18 BRPM | TEMPERATURE: 98.2 F | HEIGHT: 71 IN | WEIGHT: 218.8 LBS | SYSTOLIC BLOOD PRESSURE: 108 MMHG

## 2020-07-06 PROCEDURE — 99213 OFFICE O/P EST LOW 20 MIN: CPT | Performed by: PHYSICIAN ASSISTANT

## 2020-07-06 NOTE — TELEPHONE ENCOUNTER
He is at low-moderate CV risk for non cardiac surgery based on his HCM. He can hold Eliquis 2 days before the procedure.

## 2020-07-06 NOTE — TELEPHONE ENCOUNTER
Pt states he is having a scope of his left knee with Dr Chino Guevara on 7/16 and needs a cardiac risk assessment. Pt had vv with [facility] on 7/1. Please advise.

## 2020-07-06 NOTE — PROGRESS NOTES
[facility]  [address] [place] [address]   O: [phone]   F: [phone]    PRE- OPERATIVE HISTORY AND PHYSICAL    Today's Date: 07/06/20    Chief Complaint   Patient presents with    Pre-op Exam     Left Knee Arthroscopy Dr Maurizio Aburto:       Ruby Guerreroenrike 1960 is a 61 y.o. male who presents for pre-operative evaluation for:     PROCEDURE:  Left knee arthroscopy     INDICATION FOR PROCEDURE:  Knee pain and weakness    DATE OF PROCEDURE: 7/17/2020    PHYSICIAN performing PROCEDURE:   Dr. Bradford Jackson anesthesia is:  general  History of adverse or allergic reaction to anesthesia?   No    Teeth: DENTURES?   none     Bleeding risk?:   no recent or remote history of abnormal bleeding  Previous transfusion/Complications: no      REVIEW OF SYSTEMS:    CONSTITUTIONAL:  negative  EYES:  negative  HEENT:  negative  RESPIRATORY:  negative  CARDIOVASCULAR:  negative  GASTROINTESTINAL:  negative  GENITOURINARY:  negative  INTEGUMENT/BREAST:  negative  HEMATOLOGIC/LYMPHATIC:  negative  ALLERGIC/IMMUNOLOGIC:  negative  ENDOCRINE:  negative  MUSCULOSKELETAL:  Negative    NEUROLOGICAL:  negative    PAST MEDICAL HISTORY:  Past Medical History:   Diagnosis Date    A-fib [facility])     ADHD (attention deficit hyperactivity disorder)     Carpal tunnel syndrome 1/21/2015    Chronic low back pain     Chronic neck pain     Chronic sinusitis     Dr. Sherryle [place] Dental crown present     lower    Epigastric hernia     Esophageal spasm     GERD (gastroesophageal reflux disease)     Hyperlipidemia     Hypertrophic cardiomyopathy (HCC)     IHSS (idiopathic hypertrophic subaortic stenosis) ([facility]     Kidney stones     HUNTER on CPAP     Dr. Supriya Bragg- A-PAP    Primary localized osteoarthrosis, shoulder region 10/18/2013    Prostate cancer [facility])     prostate ; s/p radiation treatment    PVC's (premature ventricular contractions)     RBBB (right bundle branch 240 mg by mouth daily      amiodarone (CORDARONE) 200 MG tablet Take 0.5 tablets by mouth daily 45 tablet 0    omeprazole (PRILOSEC) 40 MG delayed release capsule TAKE 1 CAPSULE BY MOUTH EVERY DAY 90 capsule 0    atorvastatin (LIPITOR) 20 MG tablet TAKE 1 TABLET BY MOUTH ONE TIME A DAY 90 tablet 1    ELIQUIS 5 MG TABS tablet TAKE 1 TABLET BY MOUTH TWICE A DAY (Patient taking differently: Pt states he is only taking once daily) 180 tablet 0    vitamin B-12 (CYANOCOBALAMIN) 100 MCG tablet Take 50 mcg by mouth daily      Cholecalciferol (D3 ADULT) 1000 UNITS CHEW Take by mouth daily       zinc 50 MG CAPS Take by mouth daily       clonazePAM (KLONOPIN) 1 MG tablet Take 1 mg by mouth daily.  Melatonin 10 MG CAPS Take 10 mg by mouth as needed        Current Facility-Administered Medications   Medication Dose Route Frequency Provider Last Rate Last Dose    sodium hyaluronate (viscosup) injection 20 mg  20 mg Intra-articular Once Sameer Swift            ALLERGIES:    Allergies   Allergen Reactions    Niacin And Related      Extreme itching /stinging started at head to waist       PHYSICAL EXAM:    Vitals:    07/06/20 0835   BP: 108/60   Pulse: 51   Resp: 18   Temp: 98.2 °F (36.8 °C)   SpO2: 98%   Weight: 218 lb 12.8 oz (99.2 kg)   Height: 5' 11\" (1.803 m)   Body mass index is 30.52 kg/m². Constitutional:  Awake, alert, cooperative, no apparent distress. Eyes:  Lids and lashes normal, PERRLA, EOMI, sclera clear, conjunctiva normal  Head/ENT:  Normocephalic, atraumatic, sinuses nontender on palpation, TMs intact, ear canals clear, oral pharynx with moist mucus membranes, tonsils without erythema or exudates, gums normal and good dentition. Neck:  Supple, no adenopathy, thyroid symmetric.   Heart:  Regular rate and rhythm, normal S1 and S2, no S3 or S4, and no murmur noted  Lungs:  Clear to auscultation, no crackles or wheezing  Abdomen:  No scars, normal bowel sounds, soft, non-distended, non-tender, no masses palpated, no hepatosplenomegally  Extremities:  No clubbing, cyanosis, or edema. Moves all joints. Neurologic:  Awake, alert, oriented to name, place and time. Cranial nerves II-XII are grossly intact. Motor and sensory equal and intact bilateral.       ADDITIONAL DATA:  LABWORK:  No orders of the defined types were placed in this encounter. ASSESSMENT and PLAN:    Pre-Operative Medical Clearance Examination for  Left knee arthroscope    Meaghan Butterfield 1960 61 y.o. male is medically satisfactory for surgery. They will require cardiology evaluation prior to procedure. Meaghan Butterfield was advised to STOP all  NSAID medications including, but not limited to, aspirin, ibuprofen, and naprosyn 7-10 days prior to procedure.          [address], [place] 07/06/20   9:42 AM

## 2020-07-06 NOTE — TELEPHONE ENCOUNTER
King Abel is pt okay to lhave cardiac clearance to have his knee scoped on 7/16? Pt last vv 7/1/20. Pt is currently on Eliquis 5 mg BID. JMB please advise. Pt can be reached at [phone].       TY

## 2020-07-08 ENCOUNTER — TELEPHONE (OUTPATIENT)
Dept: ORTHOPEDIC SURGERY | Age: 60
End: 2020-07-08

## 2020-07-09 NOTE — PROGRESS NOTES
[place]    Age 61 y.o.    male    1960    MRN [id number]    7/17/2020  Arrival Time_____________  OR Time____________[address]     Procedure(s):  VIDEO ARTHROSCOPY LEFT KNEE, CHONDROPLASTY, PLICA EXCISION, MENISCAL WORK AS NEEDED                      General    Surgeon(s):  Nixon Vicente, MD       Phone [phone] ([place]) [phone] (work)    [address] [place] [address]  Cell Work  _________________________________________________________________  _________________________________________________________________  _________________________________________________________________  _________________________________________________________________  _________________________________________________________________      PCP _____________________________ Phone_________________       H&P__________________Bringing    Chart            Epic  DOS     Called_______  EKG__________________Bringing    Chart            Epic  DOS     Called_______  LAB__________________ Bringing    Chart            Epic  DOS     Called_______  Cardiac Clearance_______Bringing    Chart            Epic      DOS       Called_______    Cardiologist________________________ Phone___________________________      ? [employer] concerns / Waiver on Chart            [employer]_____________  ? Pre-op Instructions Given [place]          ______________________________  ? Directions to Surgery Center                          ______________________________  ? Transportation Home_______________      _______________________________  ?  Crutches/Walker__________________        _______________________________      ________Pre-op Orders   _______Transcribed    _______Wt.  ________Pharmacy          _______SCD  ______VTE     ______Beta Blocker  ________Consent             ________TED Steff Marts

## 2020-07-14 NOTE — PROGRESS NOTES
Left message on voice mail letting patient know  time for 7/16/2020 surgery has changed.  Arrival time 0700

## 2020-07-17 ENCOUNTER — ANESTHESIA EVENT (OUTPATIENT)
Dept: OPERATING ROOM | Age: 60
End: 2020-07-17
Payer: COMMERCIAL

## 2020-07-17 ENCOUNTER — HOSPITAL ENCOUNTER (OUTPATIENT)
Age: 60
Setting detail: OUTPATIENT SURGERY
Discharge: HOME | End: 2020-07-17
Attending: ORTHOPAEDIC SURGERY | Admitting: ORTHOPAEDIC SURGERY
Payer: COMMERCIAL

## 2020-07-17 ENCOUNTER — ANESTHESIA (OUTPATIENT)
Dept: OPERATING ROOM | Age: 60
End: 2020-07-17
Payer: COMMERCIAL

## 2020-07-17 VITALS
SYSTOLIC BLOOD PRESSURE: 126 MMHG | OXYGEN SATURATION: 97 % | HEART RATE: 59 BPM | BODY MASS INDEX: 29.12 KG/M2 | TEMPERATURE: 96.8 F | RESPIRATION RATE: 13 BRPM | HEIGHT: 71 IN | DIASTOLIC BLOOD PRESSURE: 65 MMHG | WEIGHT: 208 LBS

## 2020-07-17 VITALS
DIASTOLIC BLOOD PRESSURE: 54 MMHG | OXYGEN SATURATION: 98 % | SYSTOLIC BLOOD PRESSURE: 106 MMHG | RESPIRATION RATE: 6 BRPM

## 2020-07-17 LAB — SARS-COV-2, NAAT: NOT DETECTED

## 2020-07-17 PROCEDURE — 7100000011 HC PHASE II RECOVERY - ADDTL 15 MIN: Performed by: ORTHOPAEDIC SURGERY

## 2020-07-17 PROCEDURE — 3600000004 HC SURGERY LEVEL 4 BASE: Performed by: ORTHOPAEDIC SURGERY

## 2020-07-17 PROCEDURE — 2580000003 HC RX 258: Performed by: ORTHOPAEDIC SURGERY

## 2020-07-17 PROCEDURE — 2500000003 HC RX 250 WO HCPCS: Performed by: ORTHOPAEDIC SURGERY

## 2020-07-17 PROCEDURE — 3600000014 HC SURGERY LEVEL 4 ADDTL 15MIN: Performed by: ORTHOPAEDIC SURGERY

## 2020-07-17 PROCEDURE — 6360000002 HC RX W HCPCS: Performed by: NURSE ANESTHETIST, CERTIFIED REGISTERED

## 2020-07-17 PROCEDURE — 7100000010 HC PHASE II RECOVERY - FIRST 15 MIN: Performed by: ORTHOPAEDIC SURGERY

## 2020-07-17 PROCEDURE — 2580000003 HC RX 258: Performed by: ANESTHESIOLOGY

## 2020-07-17 PROCEDURE — 2709999900 HC NON-CHARGEABLE SUPPLY: Performed by: ORTHOPAEDIC SURGERY

## 2020-07-17 PROCEDURE — 7100000000 HC PACU RECOVERY - FIRST 15 MIN: Performed by: ORTHOPAEDIC SURGERY

## 2020-07-17 PROCEDURE — 2500000003 HC RX 250 WO HCPCS: Performed by: NURSE ANESTHETIST, CERTIFIED REGISTERED

## 2020-07-17 PROCEDURE — 3700000001 HC ADD 15 MINUTES (ANESTHESIA): Performed by: ORTHOPAEDIC SURGERY

## 2020-07-17 PROCEDURE — 2720000010 HC SURG SUPPLY STERILE: Performed by: ORTHOPAEDIC SURGERY

## 2020-07-17 PROCEDURE — 3700000000 HC ANESTHESIA ATTENDED CARE: Performed by: ORTHOPAEDIC SURGERY

## 2020-07-17 PROCEDURE — 7100000001 HC PACU RECOVERY - ADDTL 15 MIN: Performed by: ORTHOPAEDIC SURGERY

## 2020-07-17 PROCEDURE — U0002 COVID-19 LAB TEST NON-CDC: HCPCS

## 2020-07-17 RX ORDER — OXYCODONE HYDROCHLORIDE AND ACETAMINOPHEN 5; 325 MG/1; MG/1
1 TABLET ORAL PRN
Status: DISCONTINUED | OUTPATIENT
Start: 2020-07-17 | End: 2020-07-17 | Stop reason: HOSPADM

## 2020-07-17 RX ORDER — CEFAZOLIN SODIUM 1 G/3ML
INJECTION, POWDER, FOR SOLUTION INTRAMUSCULAR; INTRAVENOUS PRN
Status: DISCONTINUED | OUTPATIENT
Start: 2020-07-17 | End: 2020-07-17 | Stop reason: SDUPTHER

## 2020-07-17 RX ORDER — BUPIVACAINE HYDROCHLORIDE 2.5 MG/ML
INJECTION, SOLUTION INFILTRATION; PERINEURAL PRN
Status: DISCONTINUED | OUTPATIENT
Start: 2020-07-17 | End: 2020-07-17 | Stop reason: ALTCHOICE

## 2020-07-17 RX ORDER — EPHEDRINE SULFATE 50 MG/ML
INJECTION INTRAVENOUS PRN
Status: DISCONTINUED | OUTPATIENT
Start: 2020-07-17 | End: 2020-07-17 | Stop reason: SDUPTHER

## 2020-07-17 RX ORDER — KETOROLAC TROMETHAMINE 30 MG/ML
INJECTION, SOLUTION INTRAMUSCULAR; INTRAVENOUS PRN
Status: DISCONTINUED | OUTPATIENT
Start: 2020-07-17 | End: 2020-07-17 | Stop reason: SDUPTHER

## 2020-07-17 RX ORDER — SODIUM CHLORIDE, SODIUM LACTATE, POTASSIUM CHLORIDE, CALCIUM CHLORIDE 600; 310; 30; 20 MG/100ML; MG/100ML; MG/100ML; MG/100ML
INJECTION, SOLUTION INTRAVENOUS CONTINUOUS
Status: DISCONTINUED | OUTPATIENT
Start: 2020-07-17 | End: 2020-07-17 | Stop reason: HOSPADM

## 2020-07-17 RX ORDER — HYDROCODONE BITARTRATE AND ACETAMINOPHEN 5; 325 MG/1; MG/1
1 TABLET ORAL EVERY 6 HOURS PRN
Qty: 28 TABLET | Refills: 0 | Status: SHIPPED | OUTPATIENT
Start: 2020-07-17 | End: 2020-07-24

## 2020-07-17 RX ORDER — MIDAZOLAM HYDROCHLORIDE 1 MG/ML
INJECTION INTRAMUSCULAR; INTRAVENOUS PRN
Status: DISCONTINUED | OUTPATIENT
Start: 2020-07-17 | End: 2020-07-17 | Stop reason: SDUPTHER

## 2020-07-17 RX ORDER — LABETALOL HYDROCHLORIDE 5 MG/ML
5 INJECTION, SOLUTION INTRAVENOUS
Status: DISCONTINUED | OUTPATIENT
Start: 2020-07-17 | End: 2020-07-17 | Stop reason: HOSPADM

## 2020-07-17 RX ORDER — LIDOCAINE HYDROCHLORIDE 10 MG/ML
2 INJECTION, SOLUTION INFILTRATION; PERINEURAL
Status: DISCONTINUED | OUTPATIENT
Start: 2020-07-17 | End: 2020-07-17 | Stop reason: HOSPADM

## 2020-07-17 RX ORDER — DIPHENHYDRAMINE HYDROCHLORIDE 50 MG/ML
6.25 INJECTION INTRAMUSCULAR; INTRAVENOUS
Status: DISCONTINUED | OUTPATIENT
Start: 2020-07-17 | End: 2020-07-17 | Stop reason: HOSPADM

## 2020-07-17 RX ORDER — HYDRALAZINE HYDROCHLORIDE 20 MG/ML
5 INJECTION INTRAMUSCULAR; INTRAVENOUS EVERY 30 MIN PRN
Status: DISCONTINUED | OUTPATIENT
Start: 2020-07-17 | End: 2020-07-17 | Stop reason: HOSPADM

## 2020-07-17 RX ORDER — SODIUM CHLORIDE, SODIUM LACTATE, POTASSIUM CHLORIDE, AND CALCIUM CHLORIDE .6; .31; .03; .02 G/100ML; G/100ML; G/100ML; G/100ML
IRRIGANT IRRIGATION PRN
Status: DISCONTINUED | OUTPATIENT
Start: 2020-07-17 | End: 2020-07-17 | Stop reason: ALTCHOICE

## 2020-07-17 RX ORDER — ONDANSETRON 2 MG/ML
INJECTION INTRAMUSCULAR; INTRAVENOUS PRN
Status: DISCONTINUED | OUTPATIENT
Start: 2020-07-17 | End: 2020-07-17 | Stop reason: SDUPTHER

## 2020-07-17 RX ORDER — MEPERIDINE HYDROCHLORIDE 50 MG/ML
12.5 INJECTION INTRAMUSCULAR; INTRAVENOUS; SUBCUTANEOUS EVERY 5 MIN PRN
Status: DISCONTINUED | OUTPATIENT
Start: 2020-07-17 | End: 2020-07-17 | Stop reason: HOSPADM

## 2020-07-17 RX ORDER — OXYCODONE HYDROCHLORIDE AND ACETAMINOPHEN 5; 325 MG/1; MG/1
2 TABLET ORAL PRN
Status: DISCONTINUED | OUTPATIENT
Start: 2020-07-17 | End: 2020-07-17 | Stop reason: HOSPADM

## 2020-07-17 RX ORDER — ONDANSETRON 2 MG/ML
4 INJECTION INTRAMUSCULAR; INTRAVENOUS EVERY 30 MIN PRN
Status: DISCONTINUED | OUTPATIENT
Start: 2020-07-17 | End: 2020-07-17 | Stop reason: HOSPADM

## 2020-07-17 RX ORDER — FENTANYL CITRATE 50 UG/ML
INJECTION, SOLUTION INTRAMUSCULAR; INTRAVENOUS PRN
Status: DISCONTINUED | OUTPATIENT
Start: 2020-07-17 | End: 2020-07-17 | Stop reason: SDUPTHER

## 2020-07-17 RX ORDER — PROPOFOL 10 MG/ML
INJECTION, EMULSION INTRAVENOUS PRN
Status: DISCONTINUED | OUTPATIENT
Start: 2020-07-17 | End: 2020-07-17 | Stop reason: SDUPTHER

## 2020-07-17 RX ADMIN — KETOROLAC TROMETHAMINE 30 MG: 30 INJECTION, SOLUTION INTRAMUSCULAR; INTRAVENOUS at 08:18

## 2020-07-17 RX ADMIN — FENTANYL CITRATE 50 MCG: 50 INJECTION INTRAMUSCULAR; INTRAVENOUS at 09:00

## 2020-07-17 RX ADMIN — SODIUM CHLORIDE, POTASSIUM CHLORIDE, SODIUM LACTATE AND CALCIUM CHLORIDE: 600; 310; 30; 20 INJECTION, SOLUTION INTRAVENOUS at 08:23

## 2020-07-17 RX ADMIN — MIDAZOLAM HYDROCHLORIDE 2 MG: 2 INJECTION, SOLUTION INTRAMUSCULAR; INTRAVENOUS at 09:00

## 2020-07-17 RX ADMIN — PROPOFOL 200 MG: 10 INJECTION, EMULSION INTRAVENOUS at 09:00

## 2020-07-17 RX ADMIN — EPHEDRINE SULFATE 20 MG: 50 INJECTION INTRAVENOUS at 09:37

## 2020-07-17 RX ADMIN — ONDANSETRON 4 MG: 2 INJECTION INTRAMUSCULAR; INTRAVENOUS at 09:12

## 2020-07-17 RX ADMIN — FENTANYL CITRATE 50 MCG: 50 INJECTION INTRAMUSCULAR; INTRAVENOUS at 09:43

## 2020-07-17 RX ADMIN — CEFAZOLIN 2000 MG: 1 INJECTION, POWDER, FOR SOLUTION INTRAMUSCULAR; INTRAVENOUS at 09:00

## 2020-07-17 ASSESSMENT — PULMONARY FUNCTION TESTS
PIF_VALUE: 4
PIF_VALUE: 8
PIF_VALUE: 9
PIF_VALUE: 6
PIF_VALUE: 2
PIF_VALUE: 4
PIF_VALUE: 9
PIF_VALUE: 4
PIF_VALUE: 8
PIF_VALUE: 11
PIF_VALUE: 4
PIF_VALUE: 9
PIF_VALUE: 8
PIF_VALUE: 4
PIF_VALUE: 9
PIF_VALUE: 17
PIF_VALUE: 16
PIF_VALUE: 7
PIF_VALUE: 3
PIF_VALUE: 15
PIF_VALUE: 8
PIF_VALUE: 9
PIF_VALUE: 8
PIF_VALUE: 2
PIF_VALUE: 9
PIF_VALUE: 8
PIF_VALUE: 6
PIF_VALUE: 9
PIF_VALUE: 4
PIF_VALUE: 4
PIF_VALUE: 0
PIF_VALUE: 1
PIF_VALUE: 4
PIF_VALUE: 8
PIF_VALUE: 5
PIF_VALUE: 4
PIF_VALUE: 5
PIF_VALUE: 9
PIF_VALUE: 9
PIF_VALUE: 1
PIF_VALUE: 8
PIF_VALUE: 4
PIF_VALUE: 5
PIF_VALUE: 4
PIF_VALUE: 8
PIF_VALUE: 9
PIF_VALUE: 12
PIF_VALUE: 15
PIF_VALUE: 4
PIF_VALUE: 3
PIF_VALUE: 9
PIF_VALUE: 3
PIF_VALUE: 5
PIF_VALUE: 2
PIF_VALUE: 2
PIF_VALUE: 12
PIF_VALUE: 8
PIF_VALUE: 9
PIF_VALUE: 8
PIF_VALUE: 1
PIF_VALUE: 14
PIF_VALUE: 5

## 2020-07-17 NOTE — ANESTHESIA PRE PROCEDURE
Department of Anesthesiology  Preprocedure Note       Name:  Joice Lombard   Age:  61 y.o.  :  1960                                          MRN:  [id number]         Date:  2020      Surgeon: Jahaira Cervantes):  Rosy Jaime MD    Procedure: Procedure(s):  VIDEO ARTHROSCOPY LEFT KNEE, CHONDROPLASTY, PLICA EXCISION, MENISCAL WORK AS NEEDED -SLEEP APNEA-    Medications prior to admission:   Prior to Admission medications    Medication Sig Start Date End Date Taking? Authorizing Provider   verapamil (CALAN SR) 240 MG extended release tablet Take 240 mg by mouth daily   Yes Historical Provider, MD   amiodarone (CORDARONE) 200 MG tablet Take 0.5 tablets by mouth daily 20  Yes Ariana Larkin MD   omeprazole (PRILOSEC) 40 MG delayed release capsule TAKE 1 CAPSULE BY MOUTH EVERY DAY 20  Yes Mary Rahman MD   atorvastatin (LIPITOR) 20 MG tablet TAKE 1 TABLET BY MOUTH ONE TIME A DAY 20  Yes Mary Rahman MD   ELIQUIS 5 MG TABS tablet TAKE 1 TABLET BY MOUTH TWICE A DAY 10/7/19  Yes Sherren Angelica, APRN - CNP   vitamin B-12 (CYANOCOBALAMIN) 100 MCG tablet Take 50 mcg by mouth daily   Yes Historical Provider, MD   Cholecalciferol (D3 ADULT) 1000 UNITS CHEW Take by mouth daily    Yes Historical Provider, MD   zinc 50 MG CAPS Take by mouth daily    Yes Historical Provider, MD   clonazePAM (KLONOPIN) 1 MG tablet Take 1 mg by mouth daily.  14  Yes Historical Provider, MD   Melatonin 10 MG CAPS Take 10 mg by mouth as needed    Yes Historical Provider, MD       Current medications:    Current Facility-Administered Medications   Medication Dose Route Frequency Provider Last Rate Last Dose    ceFAZolin (ANCEF) 2 g in dextrose 5 % 100 mL IVPB  2 g Intravenous On Call to [address], MD        lidocaine 1 % injection 2 mL  2 mL Intradermal Once PRN Chau Barakat MD        lactated ringers infusion   Intravenous Continuous Chau Barakat MD        HYDROmorphone (DILAUDID) injection 0.5 mg  0.5 mg Intravenous Q10 Min PRN Brayan Clemente MD        HYDROmorphone (DILAUDID) injection 0.5 mg  0.5 mg Intravenous Q5 Min PRN Brayan Clemente MD        oxyCODONE-acetaminophen (PERCOCET) 5-325 MG per tablet 1 tablet  1 tablet Oral PRN Brayan Clemente MD        Or    oxyCODONE-acetaminophen (PERCOCET) 5-325 MG per tablet 2 tablet  2 tablet Oral PRN Brayan Clemente MD        diphenhydrAMINE (BENADRYL) injection 6.25 mg  6.25 mg Intravenous Once PRN Brayan Clemente MD        ondansetron [facility]) injection 4 mg  4 mg Intravenous Q30 Min PRN Brayan Clemente MD        labetalol (NORMODYNE;TRANDATE) injection 5 mg  5 mg Intravenous Q15 Min PRN Brayan Clemente MD        hydrALAZINE (APRESOLINE) injection 5 mg  5 mg Intravenous Q30 Min PRN Brayan Clemente MD        meperidine (DEMEROL) injection 12.5 mg  12.5 mg Intravenous Q5 Min PRN Brayan Clemente MD           Allergies:     Allergies   Allergen Reactions    Niacin And Related      Extreme itching /stinging started at head to waist       Problem List:    Patient Active Problem List   Diagnosis Code    GERD (gastroesophageal reflux disease) K21.9    Hyperlipidemia E78.5    Hypertrophic cardiomyopathy (HCC) I42.2    Right bundle branch block I45.10    Restless legs syndrome G25.81    Obstructive sleep apnea G47.33    Obesity E66.9    Personal history of malignant neoplasm of prostate Z85.46    Elevated blood sugar R73.9    Paroxysmal atrial fibrillation (HCC) I48.0    Palpitations R00.2    GIB (gastrointestinal bleeding) K92.2    Current use of long term anticoagulation Z79.01       Past Medical History:        Diagnosis Date    [facility]-fib [facility])     ADHD (attention deficit hyperactivity disorder)     Carpal tunnel syndrome 1/21/2015    Chronic low back pain     Chronic neck pain     Chronic sinusitis     Dr. Bonny Cates Dental crown present     lower    Epigastric hernia     Esophageal spasm     GERD (gastroesophageal reflux disease)     Hyperlipidemia     Hypertrophic cardiomyopathy (HCC)     IHSS (idiopathic hypertrophic subaortic stenosis) (HCC)     Kidney stones     HUNTER on CPAP     Dr. Christina Boggs- DEONNA-PAP    Primary localized osteoarthrosis, shoulder region 10/18/2013    Prostate cancer [facility])     prostate ; s/p radiation treatment    PVC's (premature ventricular contractions)     RBBB (right bundle branch block)     RLS (restless legs syndrome)     Dr. Aubrey Ambrocio Seasonal allergies     Tachycardia        Past Surgical History:        Procedure Laterality Date    CARPAL TUNNEL RELEASE Left     COLONOSCOPY  1/17/11    COLONOSCOPY N/A 2/2/2020    COLONOSCOPY WITH BIOPSY performed by Alta Ragsdale MD at [place] [address]  2015    left    ENDOSCOPY, COLON, DIAGNOSTIC      INGUINAL HERNIA REPAIR Bilateral 2009, 2012,    KNEE ARTHROSCOPY Right 1982    Right    KNEE ARTHROSCOPY Left 12/21/2017    SHOULDER ARTHROSCOPY Right 12/31/2013    VIDEO ARTHROSCOPY RIGHT SHOULDER, SUBACROMIAL DECOMPRESSION, DISTAL CLAVICLE RESECTION, ROTATOR CUFF DEBRIDEMENT          TURP  3227    X 2    UMBILICAL HERNIA REPAIR      X 2    UPPER GASTROINTESTINAL ENDOSCOPY  9/3    VARICOCELECTOMY  2000       Social History:    Social History     Tobacco Use    Smoking status: Never Smoker    Smokeless tobacco: Never Used   Substance Use Topics    Alcohol use:  Yes     Alcohol/week: 0.0 - 1.0 standard drinks                                Counseling given: Not Answered      Vital Signs (Current):   Vitals:    07/14/20 1003 07/17/20 0813   BP:  131/78   Pulse:  54   Resp:  16   Temp:  97.3 °F (36.3 °C)   SpO2:  98%   Weight: 208 lb (94.3 kg)    Height: 5' 11\" (1.803 m)                                               BP Readings from Last 3 Encounters:   07/17/20 131/78   07/06/20 108/60   04/10/20 100/60       NPO Status: Time of last liquid consumption: 2100                        Time of last solid sleep apnea:                             Cardiovascular:                      Neuro/Psych:   (+) neuromuscular disease:, psychiatric history:            GI/Hepatic/Renal:   (+) GERD:,           Endo/Other:                     Abdominal:           Vascular:                                      Anesthesia Plan      general     ASA 3       Induction: intravenous. Anesthetic plan and risks discussed with patient. Plan discussed with CRNA.                   Maureen Partida MD   7/17/2020

## 2020-07-17 NOTE — ANESTHESIA POSTPROCEDURE EVALUATION
Department of Anesthesiology  Postprocedure Note    Patient: Beverly Medina  MRN: [id number]  YOB: 1960  Date of evaluation: 7/17/2020  Time:  12:02 PM     Procedure Summary     Date:  07/17/20 Room / Location:  [address] [place] [address]    Anesthesia Start:  9303 Anesthesia Stop:  1001    Procedure:  VIDEO ARTHROSCOPY LEFT KNEE, CHONDROPLASTY, PLICA EXCISION, PARTIAL MEDIAL LATERAL MENISECTOMY -SLEEP APNEA- (Left Knee) Diagnosis:       Chondromalacia of left patella      (Chondromalacia of left patella [M22.42])    Surgeon:  Helga Martin MD Responsible Provider:  Gray Fraser MD    Anesthesia Type:  general ASA Status:  3          Anesthesia Type: general    Ruth Phase I: Ruth Score: 9    Ruth Phase II: Ruth Score: 9    Last vitals: Reviewed and per EMR flowsheets.        Anesthesia Post Evaluation    Comments: Postoperative Anesthesia Note    Name:    Beverly Medina  MRN:      [id number]    Patient Vitals in the past 12 hrs:  07/17/20 1033, BP:126/65, Pulse:59, Resp:13, SpO2:97 %  07/17/20 1018, BP:116/66, Temp:96.8 °F (36 °C), Temp src:Temporal, Pulse:63, Resp:12, SpO2:100 %  07/17/20 1013, BP:121/63, Pulse:68, Resp:16, SpO2:99 %  07/17/20 1008, BP:114/61, Pulse:74, Resp:12, SpO2:99 %  07/17/20 1004, SpO2:97 %  07/17/20 1003, BP:(!) 110/55, Temp:96.8 °F (36 °C), Temp src:Temporal, Pulse:68, Resp:12, SpO2:91 %  07/17/20 0813, BP:131/78, Temp:97.3 °F (36.3 °C), Pulse:54, Resp:16, SpO2:98 %     LABS:    CBC  Lab Results       Component                Value               Date/Time                  WBC                      5.1                 02/02/2020 04:58 AM        HGB                      13.9                02/02/2020 12:04 PM        HCT                      41.3                02/02/2020 12:04 PM        PLT                      190                 02/02/2020 04:58 AM   RENAL  Lab Results       Component                Value               Date/Time                  NA 142                 02/02/2020 04:58 AM        K                        3.4 (L)             02/02/2020 04:58 AM        CL                       105                 02/02/2020 04:58 AM        CO2                      27                  02/02/2020 04:58 AM        BUN                      12                  02/02/2020 04:58 AM        CREATININE               0.9                 02/02/2020 04:58 AM        GLUCOSE                  90                  02/02/2020 04:58 AM        GLUCOSE                  83                  05/22/2012 04:40 PM   COAGS  Lab Results       Component                Value               Date/Time                  PROTIME                  13.7 (H)            02/02/2020 04:58 AM        INR                      1.18 (H)            02/02/2020 04:58 AM        APTT                     29.0                03/01/2011 11:28 AM     Intake & Output: In: 500 (I.V.:500)  Out: -     Nausea & Vomiting:  No    Level of Consciousness:  Awake    Pain Assessment:  Adequate analgesia    Anesthesia Complications:  No apparent anesthetic complications    SUMMARY      Vital signs stable  OK to discharge from Stage I post anesthesia care.   Care transferred from Anesthesiology department on discharge from perioperative area

## 2020-07-18 NOTE — OP NOTE
[address] [address]                                OPERATIVE REPORT    PATIENT NAME: Don IBRAHIM                        :        1960  MED REC NO:   [id number]                          ROOM:  ACCOUNT NO:   [de-identified]                           ADMIT DATE: 2020  PROVIDER:     Alirio Kearney MD      DATE OF PROCEDURE:  2020    PREOPERATIVE DIAGNOSES:  Left knee medial meniscus tear, lateral  meniscus tear, medial plica syndrome, severe synovitis, and anterior fat  pad hypertrophy. POSTOPERATIVE DIAGNOSES:  Left knee medial meniscus tear, lateral  meniscus tear, medial plica syndrome, severe synovitis, and anterior fat  pad hypertrophy. PROCEDURE PERFORMED:  Left knee diagnostic video arthroscopy,  arthroscopic partial medial meniscectomy, partial lateral meniscectomy,  medial plica excision, and anterior synovectomy and fat pad excision. PRIMARY SURGEON:  Alirio Kearney MD    ANESTHESIA:  General.    COMPLICATIONS:  None apparent. POSTOP CONDITION:  To recovery room. ESTIMATED BLOOD LOSS:  10 mL. INDICATIONS FOR SURGERY:  The patient is a 20-year-old white male, who  has had two to three previous knee arthroscopies. He has ended up with  severe anterior knee pain which is refractory to conservative management  affecting his activities of daily living. He was appraised of risks,  benefits, alternatives of surgery, limitations of surgery, also clearly  discussed especially in light of preexisting chondral changes and  multiply operated on knee. He agreed with these and wished to proceed  with surgery. The patient has also gained significant amount of weight  recently and understands that weight loss is also a significant  contributor to anterior knee pain. DETAILS OF PROCEDURE:  The patient was brought to the operating room.    After administration of general anesthesia, he was placed on the OR  table in standard arthroscopic position. Left lower extremity prepped  and draped in a normal sterile fashion. Limb was exsanguinated. A  standard two-portal arthroscopy was performed. The following were the  findings:    1. THE PATELLOFEMORAL JOINT:  The patellofemoral joint had actually  normal cartilage with small areas of grade 1 chondral changes, but  otherwise normal cartilage. There was a small flap tear of the  cartilage over the medial femoral condyle where there was a large  pathologic medial shelf plica. A chondroplasty was performed debriding  loose cartilages back to a stable rim. 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 When we first tried to put the  cannula into the knee, it was very difficult. This was most probably  secondary to anterior scarring with multiply operated on knee. We then  spent probably the next 15 to 20 minutes removing all the anterior fat  pad hypertrophy and scar tissue, first from the medial portal and then  we switched portals for the lateral portal.  We also removed the plica  in the anteromedial aspect of the knee and went all the way into the  anterolateral aspect of the knee removing any scar tissue. This  significantly decreased the patellofemoral joint reactive forces and  opened up the patellofemoral joint significantly. 2.  THE MEDIAL COMPARTMENT:  The medial compartment had a very small  posterior horn of the medial meniscus tear for which a partial medial  meniscectomy was performed using combination of baskets and an  ArthroCare radiofrequency device sculpting the meniscus tear back to a  stable rim. Cartilage surfaces both on the femoral and tibial side were  intact with no significant chondral damage. 3.  NOTCH:  The notch had an intact ACL and PCL with no evidence of  ligamentous instability.     4.  THE LATERAL COMPARTMENT:  The lateral compartment had small edge  tearing of the lateral meniscus, for which a partial lateral  meniscectomy was performed using combination of baskets and an  ArthroCare radiofrequency device, sculpting the meniscus tear back to a  stable rim. The cartilage surfaces again were intact with no  significant chondral changes. In fact, all three compartments had  almost normal cartilage. Therefore, a total knee arthroplasty would  probably be a very last resort for the patient. The pain may be as a  result of this anterior fat pad hypertrophy and a patella infera type  syndrome. For this reason, aggressive physical therapy to regain range  of motion strength and if patient can tolerate next four to six weeks of  antiinflammatory medications to prevent recurrence, would be indicated. The knee was then irrigated with copious amounts of irrigation solution. Portals were closed using 4-0 Monocryl suture. Dry sterile compression  dressing was applied. The patient was then taken to the recovery room  in stable condition having tolerated the procedure well. Again, postoperatively, the patient could be full weightbearing but work  on range of motion exercises and scar prevention. Stef Schmitt MD    D: 07/17/2020 15:42:24       T: 07/17/2020 21:32:53     [email or web address]  Job#: [id number]     Doc#: [id number]    CC:   Marilyn Hatchet, MD

## 2020-07-22 ENCOUNTER — TELEPHONE (OUTPATIENT)
Dept: ORTHOPEDIC SURGERY | Age: 60
End: 2020-07-22

## 2020-07-23 ENCOUNTER — VIRTUAL VISIT (OUTPATIENT)
Dept: ORTHOPEDIC SURGERY | Age: 60
End: 2020-07-23

## 2020-07-23 PROCEDURE — 99024 POSTOP FOLLOW-UP VISIT: CPT | Performed by: PHYSICIAN ASSISTANT

## 2020-07-23 RX ORDER — METHYLPREDNISOLONE 4 MG/1
TABLET ORAL
Qty: 1 KIT | Refills: 0 | Status: SHIPPED | OUTPATIENT
Start: 2020-07-23 | End: 2020-07-29

## 2020-07-23 NOTE — PROGRESS NOTES
Mary Jane Callejas is a 61 y.o. male being evaluated by a Virtual Visit (video visit) encounter to address concerns as mentioned above. A caregiver was present when appropriate. Due to this being a TeleHealth encounter (During YEXIU-32 public health emergency), evaluation of the following organ systems was limited: Vitals/Constitutional/EENT/Resp/CV/GI//MS/Neuro/Skin/Heme-Lymph-Imm. Pursuant to the emergency declaration under the [address] [place] [address] and the [employer] Act, this Virtual Visit was conducted with patient's (and/or legal guardian's) consent, to reduce the patient's risk of exposure to COVID-19 and provide necessary medical care. The patient (and/or legal guardian) has also been advised to contact this office for worsening conditions or problems, and seek emergency medical treatment and/or call 911 if deemed necessary. Services were provided through a video synchronous discussion virtually to substitute for in-person clinic visit. Patient's location was at their home. --Yoko Meyer MD on 7/23/2020 at 8:46 AM    An electronic signature was used to authenticate this note. History of present illness: The patient returns today for their first postoperative visit after knee arthroscopy. Pain control has been satisfactory with oral medications. There have been no fevers or chills. Patient had a partial medial meniscectomy, partial lateral meniscectomy, and an extensive synovectomy. He does take blood thinners. Pain is controlled and it is usually 2-3/10. Physical examination: Inspection reveals expected swelling. Incisions are clean, dry, and intact. No signs of infection. Range of motion limited by pain and swelling. There is no calf pain or signs of DVT with a negative Homans sign. Assessment/plan: The patient is doing well after knee arthroscopy. Surgical findings were reviewed today. I have recommended ice, judicious use of the Medrol Dosepak, and physical therapy to diminish swelling and restore both range of motion and strength. We will see the patient back in 3 weeks if needed. The patient verbalized good understanding of the plan.

## 2020-07-30 ENCOUNTER — HOSPITAL ENCOUNTER (OUTPATIENT)
Dept: PHYSICAL THERAPY | Age: 60
Setting detail: THERAPIES SERIES
Discharge: HOME | End: 2020-07-30
Payer: COMMERCIAL

## 2020-07-30 PROCEDURE — MISC250 COMPRESSION STOCKING: Performed by: ORTHOPAEDIC SURGERY

## 2020-07-30 PROCEDURE — 97161 PT EVAL LOW COMPLEX 20 MIN: CPT

## 2020-07-30 PROCEDURE — 97110 THERAPEUTIC EXERCISES: CPT

## 2020-07-30 NOTE — PLAN OF CARE
[facility], [address]  Phone: [phone]  Fax [phone]     Physical Therapy Certification    Dear Referring Practitioner: Dr. Niurka Whiteside,    We had the pleasure of evaluating the following patient for physical therapy services at [address] [place] [address]. A summary of our findings can be found in the initial assessment below. This includes our plan of care. If you have any questions or concerns regarding these findings, please do not hesitate to contact me at the office phone number checked above. Thank you for the referral.       Physician Signature:_______________________________Date:__________________  By signing above (or electronic signature), therapists plan is approved by physician    Patient: Meaghan Butterfield   : 1960   MRN: [id number]  Referring Physician: Referring Practitioner: Dr. Niurka Whiteside      Evaluation Date: 2020      Medical Diagnosis Information:  Diagnosis: U17.52 ([id number]) - Plica syndrome of knee, left; S83.232D (ICD-10-CM) - Complex tear of medial meniscus of left knee as current injury, subsequent encounter; S83.272D (ICD-10-CM) - Complex tear of lateral meniscus of left knee as current injury, subsequent encounter ; M65.9 (ICD-10-CM) - Synovitis of left knee   Treatment Diagnosis: M25.562 left knee pain; M25.662 Left knee stiffness; R26.2 Difficulty in walking; M25.462 Left knee effusion                                         Insurance information: PT Insurance Information:  [employer]  - [id number]- NO AUTH       Precautions/ Contra-indications: A-fib, previous L,R knee arthroscopies; chronic LBP; hx prostate cancer  PROCEDURE PERFORMED :  Left knee diagnostic video arthroscopy,  arthroscopic partial medial meniscectomy, partial lateral meniscectomy,  medial plica excision, and anterior synovectomy and fat pad excision.     Latex Allergy:  [x]NO [x]Other: tremors in his head- getting an MRI on Saturday ; hx of prostate cancer       Barriers to/and or personal factors that will affect rehab potential:              []Age  []Sex              []Motivation/Lack of Motivation                        [x]Co-Morbidities              []Cognitive Function, education/learning barriers              []Environmental, home barriers              []profession/work barriers  [x]past PT/medical experience  []other:  Justification:     Falls Risk Assessment (30 days):   [x] Falls Risk assessed and no intervention required.   [] Falls Risk assessed and Patient requires intervention due to being higher risk   TUG score (>12s at risk):     [] Falls education provided, including           ASSESSMENT:   Functional Impairments:     [x]Noted lumbar/proximal hip/LE joint hypomobility   [x]Decreased LE functional ROM   [x]Decreased core/proximal hip strength and neuromuscular control   [x]Decreased LE functional strength   [x]Reduced balance/proprioceptive control   []other:      Functional Activity Limitations (from functional questionnaire and intake)   [x]Reduced ability to tolerate prolonged functional positions- lying supine   [x]Reduced ability or difficulty with changes of positions or transfers between positions   [x]Reduced ability to maintain good posture and demonstrate good body mechanics with sitting, bending, and lifting   [x]Reduced ability to sleep   [] Reduced ability or tolerance with driving and/or computer work   []Reduced ability to perform lifting, carrying tasks   [x]Reduced ability to squat   [x]Reduced ability to forward bend   [x]Reduced ability to ambulate prolonged functional periods/distances/surfaces   [x]Reduced ability to ascend/descend stairs   [x]Reduced ability to run, hop, cut or jump   []other:    Participation Restrictions   [x]Reduced participation in self care activities   [x]Reduced participation in home management activities   [x]Reduced participation in work activities   [x]Reduced participation in social activities. [x]Reduced participation in sport/recreation activities. Classification :    [x]Signs/symptoms consistent with post-surgical status including decreased ROM, strength and function. []Signs/symptoms consistent with joint sprain/strain   []Signs/symptoms consistent with patella-femoral syndrome   []Signs/symptoms consistent with knee OA/hip OA   []Signs/symptoms consistent with internal derangement of knee/Hip   []Signs/symptoms consistent with functional hip weakness/NMR control      []Signs/symptoms consistent with tendinitis/tendinosis    []signs/symptoms consistent with pathology which may benefit from Dry needling      []other:      Prognosis/Rehab Potential:      []Excellent   [x]Good    []Fair   []Poor    Tolerance of evaluation/treatment:    []Excellent   []Good    [x]Fair   []Poor  Physical Therapy Evaluation Complexity Justification  [x] A history of present problem with:  [] no personal factors and/or comorbidities that impact the plan of care;  []1-2 personal factors and/or comorbidities that impact the plan of care  [x]3 personal factors and/or comorbidities that impact the plan of care  [x] An examination of body systems using standardized tests and measures addressing any of the following: body structures and functions (impairments), activity limitations, and/or participation restrictions;:  [] a total of 1-2 or more elements   [] a total of 3 or more elements   [x] a total of 4 or more elements   [x] A clinical presentation with:  [x] stable and/or uncomplicated characteristics   [] evolving clinical presentation with changing characteristics  [] unstable and unpredictable characteristics;   [x] Clinical decision making of [x] low, [] moderate, [] high complexity using standardized patient assessment instrument and/or measurable assessment of functional outcome.     [x] EVAL (LOW) 81330 (typically 20 minutes face-to-face)  [] EVAL (MOD) 23283 (typically 30 minutes face-to-face)  [] EVAL (HIGH) 42169 (typically 45 minutes face-to-face)  [] RE-EVAL       PLAN:   Frequency/Duration:  2 days per week for 8 Weeks:  Interventions:  [x]  Therapeutic exercise including: strength training, ROM, for Lower extremity and core   [x]  NMR activation and proprioception for LE, Glutes and Core   [x]  Manual therapy as indicated for LE, Hip and spine to include: Dry Needling/IASTM, STM, PROM, Gr I-IV mobilizations, manipulation. [x] Modalities as needed that may include: thermal agents,  [x] Patient education on joint protection, postural re-education, activity modification, progression of HEP. HEP instruction: (see scanned forms)    GOALS:  Patient stated goal: no pain and get back to baseball  [] Progressing: [] Met: [] Not Met: [] Adjusted    Therapist goals for Patient:   Short Term Goals: To be achieved in: 2 weeks  1. Independent in HEP and progression per patient tolerance, in order to prevent re-injury. [] Progressing: [] Met: [] Not Met: [] Adjusted  2. Patient will have a decrease in pain to facilitate improvement in movement, function, and ADLs as indicated by Functional Deficits. [] Progressing: [] Met: [] Not Met: [] Adjusted    Long Term Goals: To be achieved in: 8 weeks  1. Disability index score of 35% or less for the LEFS to assist with reaching prior level of function. [] Progressing: [] Met: [] Not Met: [] Adjusted  2. Patient will demonstrate increased left knee AROM to 0-140 deg to allow for proper joint functioning for deeper squatting, kneeling, and stair ambulation. [] Progressing: [] Met: [] Not Met: [] Adjusted  3. Patient will demonstrate an increase in Strength to 5/5 for the left hip and knee musculature to return to normal CKC activities with good knee control, such as stairs, squatting, and light jogging. [] Progressing: [] Met: [] Not Met: [] Adjusted  4.  Patient will return to at least 30 minutes of moderate physical activity (patient specific goal). [] Progressing: [] Met: [] Not Met: [] Adjusted  5. Patient will have improved ankle DF to 15 deg to promote normal tibial advancement and HS flexibility in the 90-90 position to 65 deg to reduce posterior pelvic tilt.    [] Progressing: [] Met: [] Not Met: [] Adjusted     Electronically signed by:  Stacy Ovalles PT, LILYT

## 2020-07-30 NOTE — FLOWSHEET NOTE
[facility],  [address]  Phone: [phone]  Fax [phone]    Physical Therapy Treatment Note/ Progress Report:           Date:  2020    Patient Name:  Tiffanie Francisco    :  1960  MRN: [id number]  Restrictions/Precautions:    Medical/Treatment Diagnosis Information:  · Diagnosis: E83.47 (IIQ-30-YW) - Plica syndrome of knee, left; S83.232D (ICD-10-CM) - Complex tear of medial meniscus of left knee as current injury, subsequent encounter; S83.272D (ICD-10-CM) - Complex tear of lateral meniscus of left knee as current injury, subsequent encounter ; M65.9 (ICD-10-CM) - Synovitis of left knee  · Treatment Diagnosis: M25.562 left knee pain; M25.662 Left knee stiffness; R26.2 Difficulty in walking; M25.462 Left knee effusion  Insurance/Certification information:  PT Insurance Information:  BCBS  - 3000D-MET-/10-$[id number]-40PT- NO AUTH  Physician Information:  Referring Practitioner: Dr. Pantera Pierson  Has the plan of care been signed (Y/N):        []  Yes  [x]  No     Date of Patient follow up with Physician: not scheduled    Is this a Progress Report:     []  Yes  [x]  No        If Yes:  Date Range for reporting period:  Beginning 20  Ending    Progress report will be due (10 Rx or 30 days whichever is less):       Recertification will be due (POC Duration  / 90 days whichever is less):      Visit # Insurance Allowable Requires auth   1 31 (40, but 9 used prev)    [x]no        []yes:     Functional Scale: LEFS 64% disability  Date assessed:  20      Therapy Diagnosis/Practice Pattern:E      Number of Comorbidities:  []0     []1-2    [x]3+    Latex Allergy:  [x]NO      []YES  Preferred Language for Healthcare:   [x]English       []other:      Pain level:  3-10/10     SUBJECTIVE:  See eval    OBJECTIVE: See eval   Observation:    Test measurements:      RESTRICTIONS/PRECAUTIONS: A-fib, previous L,R knee arthroscopies; chronic LBP; hx prostate cancer  PROCEDURE PERFORMED 7/17:  Left knee diagnostic video arthroscopy,  arthroscopic partial medial meniscectomy, partial lateral meniscectomy,  medial plica excision, and anterior synovectomy and fat pad excision. Exercises/Interventions:     Therapeutic Ex (75824) Sets/sec Notes/CUES HEP   Pt ed; findings of exam and POC; compression stocking; elevation above level of heart, ice; bruising from surgery 6'     Standing HS stretch 30\"x3  X   Standing gastroc stretch on wedge 30\"x2  X   Standing quad stretch on chair 30\"x2  X   Heel slide supine c strap 5\"x10  X   Quad set 10\"x10  X   SLR 10x  X                                 Manual Intervention (52508)      STM quad, IT band 6'     Patellar mobs sup and inf, med, lat 20xea     Don compression stocking 2'                       NMR re-education (20014)   CUES NEEDED                                                         Therapeutic Activity (28135)                                                Therapeutic Exercise and NMR EXR  [] (83087) Provided verbal/tactile cueing for activities related to strengthening, flexibility, endurance, ROM for improvements in LE, proximal hip, and core control with self care, mobility, lifting, ambulation.  [] (41582) Provided verbal/tactile cueing for activities related to improving balance, coordination, kinesthetic sense, posture, motor skill, proprioception  to assist with LE, proximal hip, and core control in self care, mobility, lifting, ambulation and eccentric single leg control.      NMR and Therapeutic Activities:    [] (49160 or 25797) Provided verbal/tactile cueing for activities related to improving balance, coordination, kinesthetic sense, posture, motor skill, proprioception and motor activation to allow for proper function of core, proximal hip and LE with self care and ADLs  [] (77496) Gait Re-education- Provided training and instruction to the patient for proper LE, core and proximal hip recruitment and positioning and eccentric body weight control with ambulation re-education including up and down stairs     Home Exercise Program:    [x] (06255) Reviewed/Progressed HEP activities related to strengthening, flexibility, endurance, ROM of core, proximal hip and LE for functional self-care, mobility, lifting and ambulation/stair navigation   [] (94537)Reviewed/Progressed HEP activities related to improving balance, coordination, kinesthetic sense, posture, motor skill, proprioception of core, proximal hip and LE for self care, mobility, lifting, and ambulation/stair navigation      Manual Treatments:  PROM / STM / Oscillations-Mobs:  G-I, II, III, IV (PA's, Inf., Post.)  [] (95609) Provided manual therapy to mobilize LE, proximal hip and/or LS spine soft tissue/joints for the purpose of modulating pain, promoting relaxation,  increasing ROM, reducing/eliminating soft tissue swelling/inflammation/restriction, improving soft tissue extensibility and allowing for proper ROM for normal function with self care, mobility, lifting and ambulation. Modalities:     [x] GAME READY (VASO)- for significant edema, swelling, pain control. - boot x 15', CP ant knee. Long sit  Charges:  Timed Code Treatment Minutes: 20   Total Treatment Minutes: 64     [facility] time in/time out:   (and requires time in and out for each CPT code)    [x] EVAL (LOW) 66287 (typically 20 minutes face-to-face)  [] EVAL (MOD) 91520 (typically 30 minutes face-to-face)  [] EVAL (HIGH) 11822 (typically 45 minutes face-to-face)  [] RE-EVAL     [x] KH(52542) x 1    [] IONTO  [] NMR (19487) x     [x] VASO  [x] Manual (63494) x  0    [] Other:  [] TA x      [] Mech Traction (96875)  [] ES(attended) (15755)      [] ES (un) (56015):       GOALS: Patient stated goal: no pain and get back to baseball  []? Progressing: []? Met: []? Not Met: []? Adjusted     Therapist goals for Patient:   Short Term Goals:  To be achieved in: 2 weeks  1. Independent in HEP and progression per patient tolerance, in order to prevent re-injury. []? Progressing: []? Met: []? Not Met: []? Adjusted  2. Patient will have a decrease in pain to facilitate improvement in movement, function, and ADLs as indicated by Functional Deficits. []? Progressing: []? Met: []? Not Met: []? Adjusted     Long Term Goals: To be achieved in: 8 weeks  1. Disability index score of 35% or less for the LEFS to assist with reaching prior level of function. []? Progressing: []? Met: []? Not Met: []? Adjusted  2. Patient will demonstrate increased left knee AROM to 0-140 deg to allow for proper joint functioning for deeper squatting, kneeling, and stair ambulation. []? Progressing: []? Met: []? Not Met: []? Adjusted  3. Patient will demonstrate an increase in Strength to 5/5 for the left hip and knee musculature to return to normal [facility] activities with good knee control, such as stairs, squatting, and light jogging. []? Progressing: []? Met: []? Not Met: []? Adjusted  4. Patient will return to at least 30 minutes of moderate physical activity (patient specific goal). []? Progressing: []? Met: []? Not Met: []? Adjusted  5. Patient will have improved ankle DF to 15 deg to promote normal tibial advancement and HS flexibility in the 90-90 position to 65 deg to reduce posterior pelvic tilt. []? Progressing: []? Met: []? Not Met: []? Adjusted        Progression Towards Functional goals:  [] Patient is progressing as expected towards functional goals listed. [] Progression is slowed due to complexities listed. [] Progression has been slowed due to co-morbidities. [x] Plan just implemented, too soon to assess goals progression  [] Other:         Overall Progression Towards Functional goals/ Treatment Progress Update:  [] Patient is progressing as expected towards functional goals listed. [] Progression is slowed due to complexities/Impairments listed.   [] Progression has been slowed due to co-morbidities. [x] Plan just implemented, too soon to assess goals progression <30days   [] Goals require adjustment due to lack of progress  [] Patient is not progressing as expected and requires additional follow up with physician  [] Other    Prognosis for POC: [x] Good [] Fair  [] Poor      Patient requires continued skilled intervention: [x] Yes  [] No    Treatment/Activity Tolerance:  [x] Patient able to complete treatment  [] Patient limited by fatigue  [] Patient limited by pain    [] Patient limited by other medical complications  [] Other:     ASSESSMENT: see eval    Return to Play: (if applicable)   []  Stage 1: Intro to Strength   []  Stage 2: Return to Run and Strength   []  Stage 3: Return to Jump and Strength   []  Stage 4: Dynamic Strength and Agility   []  Stage 5: Sport Specific Training     []  Ready to Return to Play, Meets All Above Stages   []  Not Ready for Return to Sports   Comments:                               PLAN: See eval  [] Continue per plan of care [] Alter current plan (see comments above)  [x] Plan of care initiated [] Hold pending MD visit [] Discharge      Electronically signed by:  Delinda Gosselin, PT, DPT    Note: If patient does not return for scheduled/ recommended follow up visits, this note will serve as a discharge from care along with most recent update on progress.

## 2020-08-03 ENCOUNTER — HOSPITAL ENCOUNTER (OUTPATIENT)
Dept: PHYSICAL THERAPY | Age: 60
Setting detail: THERAPIES SERIES
Discharge: HOME | End: 2020-08-03
Payer: COMMERCIAL

## 2020-08-03 PROCEDURE — 97110 THERAPEUTIC EXERCISES: CPT

## 2020-08-03 PROCEDURE — 97016 VASOPNEUMATIC DEVICE THERAPY: CPT

## 2020-08-03 PROCEDURE — 97140 MANUAL THERAPY 1/> REGIONS: CPT

## 2020-08-03 NOTE — FLOWSHEET NOTE
[facility],  [address]  Phone: [phone]  Fax [phone]    Physical Therapy Treatment Note/ Progress Report:           Date:  8/3/2020    Patient Name:  Antolin Martinez    :  1960  MRN: [id number]  Restrictions/Precautions:    Medical/Treatment Diagnosis Information:  · Diagnosis: J23.63 (JBD-00-KN) - Plica syndrome of knee, left; S83.232D (ICD-10-CM) - Complex tear of medial meniscus of left knee as current injury, subsequent encounter; S83.272D (ICD-10-CM) - Complex tear of lateral meniscus of left knee as current injury, subsequent encounter ; M65.9 (ICD-10-CM) - Synovitis of left knee  · Treatment Diagnosis: M25.562 left knee pain; M25.662 Left knee stiffness; R26.2 Difficulty in walking; M25.462 Left knee effusion  Insurance/Certification information:  PT Insurance Information:  BCBS  - 3000D-MET-/10-$[id number]-40PT- NO AUTH  Physician Information:  Referring Practitioner: Dr. Jing Limon  Has the plan of care been signed (Y/N):        []  Yes  [x]  No     Date of Patient follow up with Physician: not scheduled    Is this a Progress Report:     []  Yes  [x]  No        If Yes:  Date Range for reporting period:  Beginning 20  Ending    Progress report will be due (10 Rx or 30 days whichever is less):       Recertification will be due (POC Duration  / 90 days whichever is less):      Visit # Insurance Allowable Requires auth   2 31 (40, but 9 used prev)    [x]no        []yes:     Functional Scale: LEFS 64% disability  Date assessed:  20      Therapy Diagnosis/Practice Pattern:E      Number of Comorbidities:  []0     []1-2    [x]3+    Latex Allergy:  [x]NO      []YES  Preferred Language for Healthcare:   [x]English       []other:      Pain level:  3-10/10     SUBJECTIVE:  Pt reports that he is still having pain from the thigh down, especially when he woke up this morning.  It seemed to subside as the day went on. He is still having posterior thigh pain when he sits- this symptom has been present long before he had surgery. This is the worst symptom. Is wondering about veins in his inner thigh that seem to be popping out. OBJECTIVE:    Observation: varicose veins at medial thigh- non tender. Bruising just adjacent is tender - Cristal    Test measurements:  Knee flexion 125 AROM, 132 AAROM, knee ext 0   + SLR L    RESTRICTIONS/PRECAUTIONS: A-fib, previous L,R knee arthroscopies; chronic LBP; hx prostate cancer  PROCEDURE PERFORMED 7/17:  Left knee diagnostic video arthroscopy,  arthroscopic partial medial meniscectomy, partial lateral meniscectomy,  medial plica excision, and anterior synovectomy and fat pad excision. Exercises/Interventions:     Therapeutic Ex (32523) Sets/sec Notes/CUES HEP   Pt ed; findings of exam and POC; compression stocking; elevation above level of heart, ice; bruising from surgery  Sitting on wedge, call MD regarding varicose veins         6'     Standing HS stretch  X   Standing gastroc stretch on wedge  X   Standing quad stretch on chair  X   Heel slide supine c strap 5\"x10  X   Quad set  X   SLR  X   Sciatic nerve glide- supine, ankle PF with knee f/e 20x  X               Standing HSC 2x10 2# X   TKE 45\" 5\"x10     LP 0-70 deg 80# 2x10           bike NV?      Manual Intervention ([phone])      STM quad, IT band 10'     Patellar mobs sup and inf, med, lat 20xea     Don compression stocking      Patellar \"tent\" x 2 + medial glide using leukotape 5'                 NMR re-education (61817)   CUES NEEDED                                                         Therapeutic Activity (58281)                                                Therapeutic Exercise and NMR EXR  [] (87204) Provided verbal/tactile cueing for activities related to strengthening, flexibility, endurance, ROM for improvements in LE, proximal hip, and core control with self care, mobility, lifting, Treatment Minutes: 50   Total Treatment Minutes: 65     [facility] time in/time out:   (and requires time in and out for each CPT code)    [] EVAL (LOW) 42702 (typically 20 minutes face-to-face)  [] EVAL (MOD) 43344 (typically 30 minutes face-to-face)  [] EVAL (HIGH) 29621 (typically 45 minutes face-to-face)  [] RE-EVAL     [x] PZ(40037) x 2    [] IONTO  [] NMR (03671) x     [x] VASO  [x] Manual (52487) x  1   [] Other:  [] TA x      [] Mech Traction (09756)  [] ES(attended) (26611)      [] ES (un) (60632):       GOALS: Patient stated goal: no pain and get back to baseball  []? Progressing: []? Met: []? Not Met: []? Adjusted     Therapist goals for Patient:   Short Term Goals: To be achieved in: 2 weeks  1. Independent in HEP and progression per patient tolerance, in order to prevent re-injury. []? Progressing: []? Met: []? Not Met: []? Adjusted  2. Patient will have a decrease in pain to facilitate improvement in movement, function, and ADLs as indicated by Functional Deficits. []? Progressing: []? Met: []? Not Met: []? Adjusted     Long Term Goals: To be achieved in: 8 weeks  1. Disability index score of 35% or less for the LEFS to assist with reaching prior level of function. []? Progressing: []? Met: []? Not Met: []? Adjusted  2. Patient will demonstrate increased left knee AROM to 0-140 deg to allow for proper joint functioning for deeper squatting, kneeling, and stair ambulation. []? Progressing: []? Met: []? Not Met: []? Adjusted  3. Patient will demonstrate an increase in Strength to 5/5 for the left hip and knee musculature to return to normal [facility] activities with good knee control, such as stairs, squatting, and light jogging. []? Progressing: []? Met: []? Not Met: []? Adjusted  4. Patient will return to at least 30 minutes of moderate physical activity (patient specific goal). []? Progressing: []? Met: []? Not Met: []? Adjusted  5.  Patient will have improved ankle DF to 15 deg to promote normal tibial size  [x] Continue per plan of care [] Alter current plan (see comments above)  [] Plan of care initiated [] Hold pending MD visit [] Discharge      Electronically signed by:  Selam Monique, PT, DPT    Note: If patient does not return for scheduled/ recommended follow up visits, this note will serve as a discharge from care along with most recent update on progress. Access Code: [email or web address]   URL: [email or web address]   Date: 08/03/2020   Prepared by: Selam Monique     Exercises   Standing Hamstring Stretch with Step - 3 sets - 30s hold - 2x daily - 7x weekly   Standing Gastroc Stretch - 3 sets - 30s hold - 2x daily - 7x weekly   Standing Quad Stretch with Table and Chair Support - 3 sets - 30s hold - 2x daily - 7x weekly   Sitting Heel Slide with Towel - 10 reps - 5s hold - 2x daily - 7x weekly   Long Sitting Quad Set - 10 reps - 10s hold - 1x daily - 7x weekly   Active Straight Leg Raise with Quad Set - 10 reps - 2 sets - 1x daily - 7x weekly   Supine Sciatic Nerve Glide - 20 reps - 2x daily - 7x weekly   Standing Hamstring Curl with Chair Support - 10 reps - 2 sets - 1x daily - 7x weekly

## 2020-08-07 ENCOUNTER — HOSPITAL ENCOUNTER (OUTPATIENT)
Dept: PHYSICAL THERAPY | Age: 60
Setting detail: THERAPIES SERIES
Discharge: HOME | End: 2020-08-07
Payer: COMMERCIAL

## 2020-08-07 PROCEDURE — 97140 MANUAL THERAPY 1/> REGIONS: CPT

## 2020-08-07 PROCEDURE — 97110 THERAPEUTIC EXERCISES: CPT

## 2020-08-07 NOTE — FLOWSHEET NOTE
[facility],  [address]  Phone: [phone]  Fax [phone]    Physical Therapy Treatment Note/ Progress Report:           Date:  2020    Patient Name:  Joice Lombard    :  1960  MRN: [id number]  Restrictions/Precautions:    Medical/Treatment Diagnosis Information:  · Diagnosis: J02.65 ([id number]) - Plica syndrome of knee, left; S83.232D (ICD-10-CM) - Complex tear of medial meniscus of left knee as current injury, subsequent encounter; S83.272D (ICD-10-CM) - Complex tear of lateral meniscus of left knee as current injury, subsequent encounter ; M65.9 (ICD-10-CM) - Synovitis of left knee  · Treatment Diagnosis: M25.562 left knee pain; M25.662 Left knee stiffness; R26.2 Difficulty in walking; M25.462 Left knee effusion  Insurance/Certification information:  PT Insurance Information:  [employer]  - 3000D-MET-/10-$[id number]-40PT- NO AUTH  Physician Information:  Referring Practitioner: Dr. Beckford Shells  Has the plan of care been signed (Y/N):        []  Yes  [x]  No     Date of Patient follow up with Physician: not scheduled    Is this a Progress Report:     []  Yes  [x]  No        If Yes:  Date Range for reporting period:  Beginning 20  Ending    Progress report will be due (10 Rx or 30 days whichever is less):       Recertification will be due (POC Duration  / 90 days whichever is less):      Visit # Insurance Allowable Requires auth   3 31 (40, but 9 used prev)    [x]no        []yes:     Functional Scale: LEFS 64% disability  Date assessed:  20      Therapy Diagnosis/Practice Pattern:E      Number of Comorbidities:  []0     []1-2    [x]3+    Latex Allergy:  [x]NO      []YES  Preferred Language for Healthcare:   [x]English       []other:      Pain level:  3-10/10     SUBJECTIVE:  Pt reports that he is doing a little better overall, pain level has decreased, especially in the medial knee.  Still having the pain in the back of his leg. Forgot his compression stockings to swap today, but has been wearing his old knee high one. Forgot to do the nerve glide. OBJECTIVE:    Observation: varicose veins at medial thigh- non tender. Bruising just adjacent is tender - Cristal    Test measurements:  Knee flexion 125 AROM, 132 AAROM, knee ext 0   + SLR L    RESTRICTIONS/PRECAUTIONS: A-fib, previous L,R knee arthroscopies; chronic LBP; hx prostate cancer  PROCEDURE PERFORMED 7/17:  Left knee diagnostic video arthroscopy,  arthroscopic partial medial meniscectomy, partial lateral meniscectomy,  medial plica excision, and anterior synovectomy and fat pad excision.     Exercises/Interventions:     Therapeutic Ex (40563) Sets/sec Notes/CUES HEP   Pt ed; findings of exam and POC; compression stocking; elevation above level of heart, ice; bruising from surgery  Sitting on wedge, call MD regarding varicose veins         6'     Standing HS stretch  X   Standing gastroc stretch on wedge  X   Standing quad stretch on chair  X   Heel slide supine c strap 5\"x10  X   Quad set  X   SLR  X   Sciatic nerve glide- supine, ankle PF with knee f/e 20x  X   Seated SWB s lumbar flex, RSB, LSB 15\"x5 ea           Standing HSC 2x10 2#- increase NV X   TKE  KASSY hip ABD 60# 5\"x10  30# x15     LP 0-90 deg 90# 2x12           bike      Manual Intervention (22374)      IASTM quad, IT band, superior only, bevel 30 deg, patellar framing 10'     Patellar mobs sup and inf, med, lat 20xea     Don compression stocking      Patellar \"tent\" x 2 + medial glide using leukotape 5'     L hip joint mobs- inf and lateral distraction 15\"x6 ea           NMR re-education (84122)   CUES NEEDED                                                         Therapeutic Activity (87352)                                                Therapeutic Exercise and NMR EXR  [x] (19141) Provided verbal/tactile cueing for activities related to strengthening, flexibility, endurance, ROM for improvements in LE, proximal hip, and core control with self care, mobility, lifting, ambulation.  [] (99113) Provided verbal/tactile cueing for activities related to improving balance, coordination, kinesthetic sense, posture, motor skill, proprioception  to assist with LE, proximal hip, and core control in self care, mobility, lifting, ambulation and eccentric single leg control. NMR and Therapeutic Activities:    [] (40413 or 01901) Provided verbal/tactile cueing for activities related to improving balance, coordination, kinesthetic sense, posture, motor skill, proprioception and motor activation to allow for proper function of core, proximal hip and LE with self care and ADLs  [] (20027) Gait Re-education- Provided training and instruction to the patient for proper LE, core and proximal hip recruitment and positioning and eccentric body weight control with ambulation re-education including up and down stairs     Home Exercise Program:    [x] (27285) Reviewed/Progressed HEP activities related to strengthening, flexibility, endurance, ROM of core, proximal hip and LE for functional self-care, mobility, lifting and ambulation/stair navigation   [] (53183)Reviewed/Progressed HEP activities related to improving balance, coordination, kinesthetic sense, posture, motor skill, proprioception of core, proximal hip and LE for self care, mobility, lifting, and ambulation/stair navigation      Manual Treatments:  PROM / STM / Oscillations-Mobs:  G-I, II, III, IV (PA's, Inf., Post.)  [x] (67397) Provided manual therapy to mobilize LE, proximal hip and/or LS spine soft tissue/joints for the purpose of modulating pain, promoting relaxation,  increasing ROM, reducing/eliminating soft tissue swelling/inflammation/restriction, improving soft tissue extensibility and allowing for proper ROM for normal function with self care, mobility, lifting and ambulation.      Modalities:     [] GAME READY (VASO)- for significant edema, swelling, pain control. - knee x 15', CP ant knee. Long sit  - declined  Charges:  Timed Code Treatment Minutes: 45   Total Treatment Minutes: 47 (rest breaks(     [facility] time in/time out:   (and requires time in and out for each CPT code)    [] EVAL (LOW) 15880 (typically 20 minutes face-to-face)  [] EVAL (MOD) 49606 (typically 30 minutes face-to-face)  [] EVAL (HIGH) 21353 (typically 45 minutes face-to-face)  [] RE-EVAL     [x] HO(10404) x 2    [] IONTO  [] NMR (07433) x     [] VASO  [x] Manual (62210) x  1   [] Other:  [] TA x      [] Mech Traction (12839)  [] ES(attended) (60448)      [] ES (un) (16725):       GOALS: Patient stated goal: no pain and get back to baseball  []? Progressing: []? Met: []? Not Met: []? Adjusted     Therapist goals for Patient:   Short Term Goals: To be achieved in: 2 weeks  1. Independent in HEP and progression per patient tolerance, in order to prevent re-injury. []? Progressing: []? Met: []? Not Met: []? Adjusted  2. Patient will have a decrease in pain to facilitate improvement in movement, function, and ADLs as indicated by Functional Deficits. []? Progressing: []? Met: []? Not Met: []? Adjusted     Long Term Goals: To be achieved in: 8 weeks  1. Disability index score of 35% or less for the LEFS to assist with reaching prior level of function. []? Progressing: []? Met: []? Not Met: []? Adjusted  2. Patient will demonstrate increased left knee AROM to 0-140 deg to allow for proper joint functioning for deeper squatting, kneeling, and stair ambulation. []? Progressing: []? Met: []? Not Met: []? Adjusted  3. Patient will demonstrate an increase in Strength to 5/5 for the left hip and knee musculature to return to normal [facility] activities with good knee control, such as stairs, squatting, and light jogging. []? Progressing: []? Met: []? Not Met: []? Adjusted  4. Patient will return to at least 30 minutes of moderate physical activity (patient specific goal). []?  Progressing: []? Met: []? Not Met: []? Adjusted  5. Patient will have improved ankle DF to 15 deg to promote normal tibial advancement and HS flexibility in the 90-90 position to 65 deg to reduce posterior pelvic tilt. []? Progressing: []? Met: []? Not Met: []? Adjusted        Progression Towards Functional goals:  [] Patient is progressing as expected towards functional goals listed. [] Progression is slowed due to complexities listed. [] Progression has been slowed due to co-morbidities. [x] Plan just implemented, too soon to assess goals progression  [] Other:         Overall Progression Towards Functional goals/ Treatment Progress Update:  [] Patient is progressing as expected towards functional goals listed. [] Progression is slowed due to complexities/Impairments listed. [] Progression has been slowed due to co-morbidities. [x] Plan just implemented, too soon to assess goals progression <30days   [] Goals require adjustment due to lack of progress  [] Patient is not progressing as expected and requires additional follow up with physician  [] Other    Prognosis for POC: [x] Good [] Fair  [] Poor      Patient requires continued skilled intervention: [x] Yes  [] No    Treatment/Activity Tolerance:  [x] Patient able to complete treatment  [] Patient limited by fatigue  [] Patient limited by pain    [] Patient limited by other medical complications  [] Other:     ASSESSMENT: Pt had success with patellar taping, so re-applied today. 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 Able to increase modestly with strengthening. Limited tolerance to standing and transitions d/t LBP. Could try lumbar gapping NV.      Return to Play: (if applicable)   []  Stage 1: Intro to Strength   []  Stage 2: Return to Run and Strength   []  Stage 3: Return to Jump and Strength   []  Stage 4: Dynamic Strength and Agility   []  Stage 5: Sport Specific Training     []  Ready to Return to Play, Meets All Above Stages   []  Not Ready for Return to Sports   Comments:                               PLAN: Pt to bring compression stocking NV, may need to swap for a smaller size  [x] Continue per plan of care [] Alter current plan (see comments above)  [] Plan of care initiated [] Hold pending MD visit [] Discharge      Electronically signed by:  Nivia Wallis, PT, DPT    Note: If patient does not return for scheduled/ recommended follow up visits, this note will serve as a discharge from care along with most recent update on progress. Access Code: [email or web address]   URL: [email or web address]   Date: 08/03/2020   Prepared by: Nivia Wallis     Exercises   Standing Hamstring Stretch with Step - 3 sets - 30s hold - 2x daily - 7x weekly   Standing Gastroc Stretch - 3 sets - 30s hold - 2x daily - 7x weekly   Standing Quad Stretch with Table and Chair Support - 3 sets - 30s hold - 2x daily - 7x weekly   Sitting Heel Slide with Towel - 10 reps - 5s hold - 2x daily - 7x weekly   Long Sitting Quad Set - 10 reps - 10s hold - 1x daily - 7x weekly   Active Straight Leg Raise with Quad Set - 10 reps - 2 sets - 1x daily - 7x weekly   Supine Sciatic Nerve Glide - 20 reps - 2x daily - 7x weekly   Standing Hamstring Curl with Chair Support - 10 reps - 2 sets - 1x daily - 7x weekly

## 2020-08-10 ENCOUNTER — HOSPITAL ENCOUNTER (OUTPATIENT)
Dept: PHYSICAL THERAPY | Age: 60
Setting detail: THERAPIES SERIES
Discharge: HOME | End: 2020-08-10
Payer: COMMERCIAL

## 2020-08-10 PROCEDURE — 97110 THERAPEUTIC EXERCISES: CPT

## 2020-08-10 PROCEDURE — 97140 MANUAL THERAPY 1/> REGIONS: CPT

## 2020-08-12 ENCOUNTER — HOSPITAL ENCOUNTER (OUTPATIENT)
Dept: PHYSICAL THERAPY | Age: 60
Setting detail: THERAPIES SERIES
Discharge: HOME | End: 2020-08-12
Payer: COMMERCIAL

## 2020-08-12 PROCEDURE — 97110 THERAPEUTIC EXERCISES: CPT | Performed by: PHYSICAL THERAPIST

## 2020-08-12 PROCEDURE — 97140 MANUAL THERAPY 1/> REGIONS: CPT | Performed by: PHYSICAL THERAPIST

## 2020-08-12 NOTE — FLOWSHEET NOTE
flexibility, endurance, ROM for improvements in LE, proximal hip, and core control with self care, mobility, lifting, ambulation.  [] (86985) Provided verbal/tactile cueing for activities related to improving balance, coordination, kinesthetic sense, posture, motor skill, proprioception  to assist with LE, proximal hip, and core control in self care, mobility, lifting, ambulation and eccentric single leg control. NMR and Therapeutic Activities:    [] (10385 or 87882) Provided verbal/tactile cueing for activities related to improving balance, coordination, kinesthetic sense, posture, motor skill, proprioception and motor activation to allow for proper function of core, proximal hip and LE with self care and ADLs  [] (83217) Gait Re-education- Provided training and instruction to the patient for proper LE, core and proximal hip recruitment and positioning and eccentric body weight control with ambulation re-education including up and down stairs     Home Exercise Program:    [x] (07026) Reviewed/Progressed HEP activities related to strengthening, flexibility, endurance, ROM of core, proximal hip and LE for functional self-care, mobility, lifting and ambulation/stair navigation   [] (54510)Reviewed/Progressed HEP activities related to improving balance, coordination, kinesthetic sense, posture, motor skill, proprioception of core, proximal hip and LE for self care, mobility, lifting, and ambulation/stair navigation      Manual Treatments:  PROM / STM / Oscillations-Mobs:  G-I, II, III, IV (PA's, Inf., Post.)  [x] (95718) Provided manual therapy to mobilize LE, proximal hip and/or LS spine soft tissue/joints for the purpose of modulating pain, promoting relaxation,  increasing ROM, reducing/eliminating soft tissue swelling/inflammation/restriction, improving soft tissue extensibility and allowing for proper ROM for normal function with self care, mobility, lifting and ambulation.      Modalities:     [] GAME READY (VASO)- for significant edema, swelling, pain control. - knee x 15', CP ant knee. Long sit  - declined  Charges:  Timed Code Treatment Minutes: 45   Total Treatment Minutes: 45     [facility] time in/time out:   (and requires time in and out for each CPT code)    [] EVAL (LOW) 77427 (typically 20 minutes face-to-face)  [] EVAL (MOD) 52728 (typically 30 minutes face-to-face)  [] EVAL (HIGH) 24923 (typically 45 minutes face-to-face)  [] RE-EVAL     [x] NZ(81082) x 2    [] IONTO  [] NMR (33625) x     [] VASO  [x] Manual (33275) x  1   [] Other:  [] TA x      [] Mech Traction (17732)  [] ES(attended) (54618)      [] ES (un) (95405):       GOALS: Patient stated goal: no pain and get back to baseball  []? Progressing: []? Met: []? Not Met: []? Adjusted     Therapist goals for Patient:   Short Term Goals: To be achieved in: 2 weeks  1. Independent in HEP and progression per patient tolerance, in order to prevent re-injury. []? Progressing: []? Met: []? Not Met: []? Adjusted  2. Patient will have a decrease in pain to facilitate improvement in movement, function, and ADLs as indicated by Functional Deficits. []? Progressing: []? Met: []? Not Met: []? Adjusted     Long Term Goals: To be achieved in: 8 weeks  1. Disability index score of 35% or less for the LEFS to assist with reaching prior level of function. []? Progressing: []? Met: []? Not Met: []? Adjusted  2. Patient will demonstrate increased left knee AROM to 0-140 deg to allow for proper joint functioning for deeper squatting, kneeling, and stair ambulation. []? Progressing: []? Met: []? Not Met: []? Adjusted  3. Patient will demonstrate an increase in Strength to 5/5 for the left hip and knee musculature to return to normal [facility] activities with good knee control, such as stairs, squatting, and light jogging. []? Progressing: []? Met: []? Not Met: []? Adjusted  4. Patient will return to at least 30 minutes of moderate physical activity (patient specific goal).    []? Progressing: []? Met: []? Not Met: []? Adjusted  5. Patient will have improved ankle DF to 15 deg to promote normal tibial advancement and HS flexibility in the 90-90 position to 65 deg to reduce posterior pelvic tilt. []? Progressing: []? Met: []? Not Met: []? Adjusted        Progression Towards Functional goals:  [] Patient is progressing as expected towards functional goals listed. [] Progression is slowed due to complexities listed. [] Progression has been slowed due to co-morbidities. [x] Plan just implemented, too soon to assess goals progression  [] Other:         Overall Progression Towards Functional goals/ Treatment Progress Update:  [] Patient is progressing as expected towards functional goals listed. [] Progression is slowed due to complexities/Impairments listed. [] Progression has been slowed due to co-morbidities. [x] Plan just implemented, too soon to assess goals progression <30days   [] Goals require adjustment due to lack of progress  [] Patient is not progressing as expected and requires additional follow up with physician  [] Other    Prognosis for POC: [x] Good [] Fair  [] Poor      Patient requires continued skilled intervention: [x] Yes  [] No    Treatment/Activity Tolerance:  [x] Patient able to complete treatment  [] Patient limited by fatigue  [] Patient limited by pain    [] Patient limited by other medical complications  [] Other:     ASSESSMENT: trial of KT for medial tracking support. Valgus knee cues with stepping exercise, but decreased knee pain reported with [facility] vs [facility]. Add squeeze added to LP and LAQ to encourage VMO activation.      Return to Play: (if applicable)   []  Stage 1: Intro to Strength   []  Stage 2: Return to Run and Strength   []  Stage 3: Return to Jump and Strength   []  Stage 4: Dynamic Strength and Agility   []  Stage 5: Sport Specific Training     []  Ready to Return to Play, Meets All Above Stages   []  Not Ready for Return to Sports   Comments: PLAN: Pt to bring compression stocking NV, may need to swap for a smaller size  [x] Continue per plan of care [] Alter current plan (see comments above)  [] Plan of care initiated [] Hold pending MD visit [] Discharge      Electronically signed by:  Iza Messina, PT,     Note: If patient does not return for scheduled/ recommended follow up visits, this note will serve as a discharge from care along with most recent update on progress. Access Code: [email or web address]   URL: [email or web address]   Date: 08/03/2020   Prepared by: Jessica Samaniego     Exercises   Standing Hamstring Stretch with Step - 3 sets - 30s hold - 2x daily - 7x weekly   Standing Gastroc Stretch - 3 sets - 30s hold - 2x daily - 7x weekly   Standing Quad Stretch with Table and Chair Support - 3 sets - 30s hold - 2x daily - 7x weekly   Sitting Heel Slide with Towel - 10 reps - 5s hold - 2x daily - 7x weekly   Long Sitting Quad Set - 10 reps - 10s hold - 1x daily - 7x weekly   Active Straight Leg Raise with Quad Set - 10 reps - 2 sets - 1x daily - 7x weekly   Supine Sciatic Nerve Glide - 20 reps - 2x daily - 7x weekly   Standing Hamstring Curl with Chair Support - 10 reps - 2 sets - 1x daily - 7x weekly

## 2020-08-14 ENCOUNTER — APPOINTMENT (OUTPATIENT)
Dept: PHYSICAL THERAPY | Age: 60
End: 2020-08-14
Payer: COMMERCIAL

## 2020-08-17 ENCOUNTER — APPOINTMENT (OUTPATIENT)
Dept: PHYSICAL THERAPY | Age: 60
End: 2020-08-17
Payer: COMMERCIAL

## 2020-08-18 ENCOUNTER — HOSPITAL ENCOUNTER (OUTPATIENT)
Dept: PHYSICAL THERAPY | Age: 60
Setting detail: THERAPIES SERIES
Discharge: HOME | End: 2020-08-18
Payer: COMMERCIAL

## 2020-08-18 PROCEDURE — 97140 MANUAL THERAPY 1/> REGIONS: CPT

## 2020-08-18 PROCEDURE — 97110 THERAPEUTIC EXERCISES: CPT

## 2020-08-18 NOTE — FLOWSHEET NOTE
[facility],  [address]  Phone: [phone]  Fax [phone]    Physical Therapy Treatment Note/ Progress Report:           Date:  2020    Patient Name:  Mary Vega    :  1960  MRN: [id number]  Restrictions/Precautions:    Medical/Treatment Diagnosis Information:  · Diagnosis: O81.84 (MFJ-90-QM) - Plica syndrome of knee, left; S83.232D (ICD-10-CM) - Complex tear of medial meniscus of left knee as current injury, subsequent encounter; S83.272D (ICD-10-CM) - Complex tear of lateral meniscus of left knee as current injury, subsequent encounter ; M65.9 (ICD-10-CM) - Synovitis of left knee  · Treatment Diagnosis: M25.562 left knee pain; M25.662 Left knee stiffness; R26.2 Difficulty in walking; M25.462 Left knee effusion  Insurance/Certification information:  PT Insurance Information:  BCBS  - 3000D-MET-10-$[id number]-40PT- NO AUTH  Physician Information:  Referring Practitioner: Dr. Caity Raymond  Has the plan of care been signed (Y/N):        []  Yes  [x]  No     Date of Patient follow up with Physician: not scheduled    Is this a Progress Report:     []  Yes  [x]  No        If Yes:  Date Range for reporting period:  Beginning 20  Ending    Progress report will be due (10 Rx or 30 days whichever is less): 3/02      Recertification will be due (POC Duration  / 90 days whichever is less):      Visit # Insurance Allowable Requires auth   6 31 (40, but 9 used prev)    [x]no        []yes:     Functional Scale: LEFS 64% disability  Date assessed:  20      Therapy Diagnosis/Practice Pattern:E      Number of Comorbidities:  []0     []1-2    [x]3+    Latex Allergy:  [x]NO      []YES  Preferred Language for Healthcare:   [x]English       []other:      Pain level: eval 3-10/10 Current pain 3/10    SUBJECTIVE:  Pt reports back pain is much better.   Increased knee pain from mowing the lawn, doing yardwork, and also volunteering at [employer]. He had to carry boxes and do a lot of stairs when volunteering. OBJECTIVE:    Observation:    Test measurements:  Knee flexion 138 AROM, knee ext 0       RESTRICTIONS/PRECAUTIONS: A-fib, previous L,R knee arthroscopies; chronic LBP; hx prostate cancer  PROCEDURE PERFORMED 7/17:  Left knee diagnostic video arthroscopy,  arthroscopic partial medial meniscectomy, partial lateral meniscectomy,  medial plica excision, and anterior synovectomy and fat pad excision.     Exercises/Interventions:     Therapeutic Ex (24190) Sets/sec Notes/CUES HEP   Pt ed; findings of exam and POC; compression stocking; elevation above level of heart, ice; bruising from surgery  Sitting on wedge, call MD regarding varicose veins              Standing HS stretch cage Core act VC with transitions X   Standing gastroc stretch on wedge 30\"x3 X   Standing quad stretch oncage Core act VC with transitions X   Supine HL TA act     TA act + log roll Cues not to perf a sit-up    Heel slide supine c strap 5\"x10  X   Quad set  X   SLR  X   Sciatic nerve glide- supine, ankle PF with knee f/e 20x  X   Seated SWB s lumbar flex, RSB, LSB a     Seated LAQ w/ add 5\" x20 2# -easy, no pain                Side step 4x40' OVl at shins    Mini squat  2UE support    Step up fwd,   Up and over  LSU  pn w/ down     one UE support     Standing HSC  2#-  X   TKE  KASSY hip ABD  Hip ext   45# 3x10 R,L  30#x15 R,L   Cues for TKE    LP 0-90 deg +add 80# 2x10 ecc  60# 2x10 L           bike '  seat 17    Manual Intervention (50183)      STM quad, IT band, superior only,  10'     Patellar mobs sup and inf, med, lat 20xea           Patellar \"tent\" x 2 + medial glide using leukotape 5'    L hip joint mobs- inf and lateral distraction      KT medial tracking \"Y\" strip  No QDA          NMR re-education (31194)  CUES NEEDED    Step up to stork airex 5\"x10                                                     Therapeutic Activity (47130) tissue extensibility and allowing for proper ROM for normal function with self care, mobility, lifting and ambulation. Modalities:     [] GAME READY (VASO)- for significant edema, swelling, pain control. - knee x 15', CP ant knee. Long sit  - declined  Charges:  Timed Code Treatment Minutes: 44   Total Treatment Minutes: 44     BWC time in/time out:   (and requires time in and out for each CPT code)    [] EVAL (LOW) 50701 (typically 20 minutes face-to-face)  [] EVAL (MOD) 82965 (typically 30 minutes face-to-face)  [] EVAL (HIGH) 46782 (typically 45 minutes face-to-face)  [] RE-EVAL     [x] DF(11359) x 2    [] IONTO  [] NMR (89085) x     [] VASO  [x] Manual (89267) x  1   [] Other:  [] TA x      [] Mech Traction (97417)  [] ES(attended) (24421)      [] ES (un) (40755):       GOALS: Patient stated goal: no pain and get back to baseball  []? Progressing: []? Met: []? Not Met: []? Adjusted     Therapist goals for Patient:   Short Term Goals: To be achieved in: 2 weeks  1. Independent in HEP and progression per patient tolerance, in order to prevent re-injury. []? Progressing: []? Met: []? Not Met: []? Adjusted  2. Patient will have a decrease in pain to facilitate improvement in movement, function, and ADLs as indicated by Functional Deficits. []? Progressing: []? Met: []? Not Met: []? Adjusted     Long Term Goals: To be achieved in: 8 weeks  1. Disability index score of 35% or less for the LEFS to assist with reaching prior level of function. []? Progressing: []? Met: []? Not Met: []? Adjusted  2. Patient will demonstrate increased left knee AROM to 0-140 deg to allow for proper joint functioning for deeper squatting, kneeling, and stair ambulation. []? Progressing: []? Met: []? Not Met: []? Adjusted  3. Patient will demonstrate an increase in Strength to 5/5 for the left hip and knee musculature to return to normal CKC activities with good knee control, such as stairs, squatting, and light jogging.  []? Progressing: []? Met: []? Not Met: []? Adjusted  4. Patient will return to at least 30 minutes of moderate physical activity (patient specific goal). []? Progressing: []? Met: []? Not Met: []? Adjusted  5. Patient will have improved ankle DF to 15 deg to promote normal tibial advancement and HS flexibility in the 90-90 position to 65 deg to reduce posterior pelvic tilt. []? Progressing: []? Met: []? Not Met: []? Adjusted        Progression Towards Functional goals:  [] Patient is progressing as expected towards functional goals listed. [] Progression is slowed due to complexities listed. [] Progression has been slowed due to co-morbidities. [x] Plan just implemented, too soon to assess goals progression  [] Other:         Overall Progression Towards Functional goals/ Treatment Progress Update:  [] Patient is progressing as expected towards functional goals listed. [] Progression is slowed due to complexities/Impairments listed. [] Progression has been slowed due to co-morbidities. [x] Plan just implemented, too soon to assess goals progression <30days   [] Goals require adjustment due to lack of progress  [] Patient is not progressing as expected and requires additional follow up with physician  [] Other    Prognosis for POC: [x] Good [] Fair  [] Poor      Patient requires continued skilled intervention: [x] Yes  [] No    Treatment/Activity Tolerance:  [x] Patient able to complete treatment  [] Patient limited by fatigue  [] Patient limited by pain    [] Patient limited by other medical complications  [] Other:     ASSESSMENT: pt felt that leukotape aided more than k-tape so re-applied this today. His knee pain was elevated since doing more over the weekend and could no perform step ups without pain today.      Return to Play: (if applicable)   []  Stage 1: Intro to Strength   []  Stage 2: Return to Run and Strength   []  Stage 3: Return to Jump and Strength   []  Stage 4: Dynamic Strength and Agility   []  Stage 5: Sport Specific Training     []  Ready to Return to Play, [employer] All Above Stages   []  Not Ready for Return to Sports   Comments:                               PLAN: Pt to bring compression stocking NV, may need to swap for a smaller size  [x] Continue per plan of care [] Alter current plan (see comments above)  [] Plan of care initiated [] Hold pending MD visit [] Discharge      Electronically signed by:  Leigha Rodriguez, PT, DPT     Note: If patient does not return for scheduled/ recommended follow up visits, this note will serve as a discharge from care along with most recent update on progress. Access Code: [email or web address]   URL: "[employer]/   Date: 08/03/2020   Prepared by: Leigha Rdoriguez     Exercises   Standing Hamstring Stretch with Step - 3 sets - 30s hold - 2x daily - 7x weekly   Standing Gastroc Stretch - 3 sets - 30s hold - 2x daily - 7x weekly   Standing Quad Stretch with Table and Chair Support - 3 sets - 30s hold - 2x daily - 7x weekly   Sitting Heel Slide with Towel - 10 reps - 5s hold - 2x daily - 7x weekly   Long Sitting Quad Set - 10 reps - 10s hold - 1x daily - 7x weekly   Active Straight Leg Raise with Quad Set - 10 reps - 2 sets - 1x daily - 7x weekly   Supine Sciatic Nerve Glide - 20 reps - 2x daily - 7x weekly   Standing Hamstring Curl with Chair Support - 10 reps - 2 sets - 1x daily - 7x weekly

## 2020-08-19 ENCOUNTER — APPOINTMENT (OUTPATIENT)
Dept: PHYSICAL THERAPY | Age: 60
End: 2020-08-19
Payer: COMMERCIAL

## 2020-08-21 ENCOUNTER — APPOINTMENT (OUTPATIENT)
Dept: PHYSICAL THERAPY | Age: 60
End: 2020-08-21
Payer: COMMERCIAL

## 2020-08-24 ENCOUNTER — HOSPITAL ENCOUNTER (OUTPATIENT)
Dept: PHYSICAL THERAPY | Age: 60
Setting detail: THERAPIES SERIES
Discharge: HOME | End: 2020-08-24
Payer: COMMERCIAL

## 2020-08-24 PROCEDURE — 97110 THERAPEUTIC EXERCISES: CPT

## 2020-08-24 PROCEDURE — 97140 MANUAL THERAPY 1/> REGIONS: CPT

## 2020-08-27 ENCOUNTER — HOSPITAL ENCOUNTER (OUTPATIENT)
Dept: PHYSICAL THERAPY | Age: 60
Setting detail: THERAPIES SERIES
Discharge: HOME | End: 2020-08-27
Payer: COMMERCIAL

## 2020-08-27 PROCEDURE — 97110 THERAPEUTIC EXERCISES: CPT

## 2020-08-27 PROCEDURE — 97140 MANUAL THERAPY 1/> REGIONS: CPT

## 2020-08-27 NOTE — PROGRESS NOTES
[facility],  [address]  Phone: [phone]  Fax [phone]    Physical Therapy Treatment Note/ Progress Report:           Date:  2020    Patient Name:  Lai Perry    :  1960  MRN: [id number]  Restrictions/Precautions:    Medical/Treatment Diagnosis Information:  · Diagnosis: O17.29 (WLS-84-PF) - Plica syndrome of knee, left; S83.232D (ICD-10-CM) - Complex tear of medial meniscus of left knee as current injury, subsequent encounter; S83.272D (ICD-10-CM) - Complex tear of lateral meniscus of left knee as current injury, subsequent encounter ; M65.9 (ICD-10-CM) - Synovitis of left knee  · Treatment Diagnosis: M25.562 left knee pain; M25.662 Left knee stiffness; R26.2 Difficulty in walking; M25.462 Left knee effusion  Insurance/Certification information:  PT Insurance Information:  BCBS  - 3000D-MET-90/10-$0CP-40PT- NO AUTH  Physician Information:  Referring Practitioner: Dr. Marquis Morales  Has the plan of care been signed (Y/N):        [x]  Yes  []  No     Date of Patient follow up with Physician: not scheduled    Is this a Progress Report:     [x]  Yes  []  No        If Yes:  Date Range for reporting period:  Beginning 20  Ending 20    Progress report will be due (10 Rx or 30 days whichever is OJGS):      Recertification will be due (POC Duration  / 90 days whichever is less):      Visit # Insurance Allowable Requires auth   8- PN  31 (40, but 9 used prev)    [x]no        []yes:     Functional Scale: LEFS 64% disability  Date assessed:  20  LEFS 55% disability     20      Therapy Diagnosis/Practice Pattern:E      Number of Comorbidities:  []0     []1-2    [x]3+    Latex Allergy:  [x]NO      []YES  Preferred Language for Healthcare:   [x]English       []other:      Pain level: eval 3-10/10 Current pain 3/10    SUBJECTIVE:  Pt reports that his knee is getting better.  Is able to go down stairs and up stairs much better, but he has pain in the back of his knee when moving the left knee from a bent to straight position on the stairs. Still needs to use the railing on the stairs. OBJECTIVE:    Observation:    Test measurements:  Knee flexion 138 AROM (145 AAROM), knee ext 0   + SLR L   MMT knee extension L 37.0#, R 45. 8#Hip abduction L 24.1 # R, 26.5#    RESTRICTIONS/PRECAUTIONS: A-fib, previous L,R knee arthroscopies; chronic LBP; hx prostate cancer  PROCEDURE PERFORMED 7/17:  Left knee diagnostic video arthroscopy,  arthroscopic partial medial meniscectomy, partial lateral meniscectomy,  medial plica excision, and anterior synovectomy and fat pad excision.     Exercises/Interventions:     Therapeutic Ex (78788) Sets/sec Notes/CUES HEP   Pt ed; findings of exam and POC; compression stocking; elevation above level of heart, ice; bruising from surgery  Sitting on wedge, call MD regarding varicose veins              Standing HS stretch cage Core act VC with transitions X   Standing gastroc stretch on wedge  X   Standing quad stretch oncage 30\"x2 R,LCore act VC with transitions X   Supine HL TA act     TA act + log roll Cues not to perf a sit-up    Heel slide supine c strap 5\"x10  X   Quad set  X   SLR 20x2# X   SAQ c adductor sq 20x5\" 2#    Sciatic nerve glide- supine, ankle PF with knee f/e   X   SL hip ABD  X   clamshell 2# on thigh X   Seated SWB s lumbar flex, RSB, LSB a     Seated LAQ w/ add  2# -easy, no pain                Side step  OVl at shins    Mini squat  2UE support    Step up fwd,   Up and over  LSU  pn w/ down     one UE support     Standing HSC  2#-  X   TKE  KASSY hip ABD  Hip ext      Cues for TKE    LP 0-90 deg +add   110# 3x10 DL  85# 2x10 ECC  60# x15 L (incr NV)    HSC  \" Ecc 2x10  2x10 L 50# (incr NV)  40#    bike '  seat 17    Manual Intervention (51085)      STM quad, IT band, superior only,  10'     Patellar mobs sup and inf, med, lat 20xea     Don compression stocking 3'     Patellar \"tent\" x 2 + medial glide using leukotape     L hip joint mobs- inf and lateral distraction      KT medial tracking \"Y\" strip  No QDA          NMR re-education (10170)  CUES NEEDED    Step up to [employer] airex                                                      Therapeutic Activity (22708)                                                Therapeutic Exercise and NMR EXR  [x] (01405) Provided verbal/tactile cueing for activities related to strengthening, flexibility, endurance, ROM for improvements in LE, proximal hip, and core control with self care, mobility, lifting, ambulation.  [] (46486) Provided verbal/tactile cueing for activities related to improving balance, coordination, kinesthetic sense, posture, motor skill, proprioception  to assist with LE, proximal hip, and core control in self care, mobility, lifting, ambulation and eccentric single leg control.      NMR and Therapeutic Activities:    [] (62632 or 20401) Provided verbal/tactile cueing for activities related to improving balance, coordination, kinesthetic sense, posture, motor skill, proprioception and motor activation to allow for proper function of core, proximal hip and LE with self care and ADLs  [] (54058) Gait Re-education- Provided training and instruction to the patient for proper LE, core and proximal hip recruitment and positioning and eccentric body weight control with ambulation re-education including up and down stairs     Home Exercise Program:    [x] (43135) Reviewed/Progressed HEP activities related to strengthening, flexibility, endurance, ROM of core, proximal hip and LE for functional self-care, mobility, lifting and ambulation/stair navigation   [] (03036)Reviewed/Progressed HEP activities related to improving balance, coordination, kinesthetic sense, posture, motor skill, proprioception of core, proximal hip and LE for self care, mobility, lifting, and ambulation/stair navigation      Manual Treatments:  PROM / STM / Oscillations-Mobs:  G-I, II, III, IV (PA's, Inf., Post.)  [x] (67859) Provided manual therapy to mobilize LE, proximal hip and/or LS spine soft tissue/joints for the purpose of modulating pain, promoting relaxation,  increasing ROM, reducing/eliminating soft tissue swelling/inflammation/restriction, improving soft tissue extensibility and allowing for proper ROM for normal function with self care, mobility, lifting and ambulation. Modalities:     [] GAME READY (VASO)- for significant edema, swelling, pain control. - knee x 15', CP ant knee. Long sit  - declined  Charges:  Timed Code Treatment Minutes: 44   Total Treatment Minutes: 44     BWC time in/time out:   (and requires time in and out for each CPT code)    [] EVAL (LOW) 80646 (typically 20 minutes face-to-face)  [] EVAL (MOD) 85213 (typically 30 minutes face-to-face)  [] EVAL (HIGH) 26469 (typically 45 minutes face-to-face)  [] RE-EVAL     [x] FV(79569) x 2    [] IONTO  [] NMR (63359) x     [] VASO  [x] Manual (29150) x  1   [] Other:  [] TA x      [] Mech Traction (72094)  [] ES(attended) (98648)      [] ES (un) (30677):       GOALS: Patient stated goal: no pain and get back to baseball  [x]? Progressing: []? Met: []? Not Met: []? Adjusted     Therapist goals for Patient:   Short Term Goals: To be achieved in: 2 weeks  1. Independent in HEP and progression per patient tolerance, in order to prevent re-injury. [x]? Progressing: []? Met: []? Not Met: []? Adjusted  2. Patient will have a decrease in pain to facilitate improvement in movement, function, and ADLs as indicated by Functional Deficits. [x]? Progressing: []? Met: []? Not Met: []? Adjusted     Long Term Goals: To be achieved in: 8 weeks  1. Disability index score of 35% or less for the LEFS to assist with reaching prior level of function. [x]? Progressing: []? Met: []? Not Met: []? Adjusted  2.  Patient will demonstrate increased left knee AROM to 0-140 deg to allow for proper joint functioning for deeper squatting, kneeling, and stair ambulation. []? Progressing: [x]? Met: []? Not Met: []? Adjusted  3. Patient will demonstrate an increase in Strength to 5/5 for the left hip and knee musculature to return to normal CKC activities with good knee control, such as stairs, squatting, and light jogging. [x]? Progressing: []? Met: []? Not Met: []? Adjusted  4. Patient will return to at least 30 minutes of moderate physical activity (patient specific goal). [x]? Progressing: []? Met: []? Not Met: []? Adjusted  5. Patient will have improved ankle DF to 15 deg to promote normal tibial advancement and HS flexibility in the 90-90 position to 65 deg to reduce posterior pelvic tilt. [x]? Progressing: []? Met: []? Not Met: []? Adjusted        Progression Towards Functional goals:  [x] Patient is progressing as expected towards functional goals listed. [] Progression is slowed due to complexities listed. [] Progression has been slowed due to co-morbidities. [] Plan just implemented, too soon to assess goals progression  [] Other:         Overall Progression Towards Functional goals/ Treatment Progress Update:  [x] Patient is progressing as expected towards functional goals listed. [] Progression is slowed due to complexities/Impairments listed. [] Progression has been slowed due to co-morbidities.   [] Plan just implemented, too soon to assess goals progression <30days   [] Goals require adjustment due to lack of progress  [] Patient is not progressing as expected and requires additional follow up with physician  [] Other    Prognosis for POC: [x] Good [] Fair  [] Poor      Patient requires continued skilled intervention: [x] Yes  [] No    Treatment/Activity Tolerance:  [x] Patient able to complete treatment  [] Patient limited by fatigue  [] Patient limited by pain    [] Patient limited by other medical complications  [] Other:     ASSESSMENT: Julio is progressing with left knee function with improved ability to walk community distances, work in the yard, and ambulate stairs. He still has a mild-moderate level of pain with these activities. This is possibly related to fascial restrictions surrounding the knee-cap and slight lateral patellar tracking. He will benefit from continued quadricep strengthening to improve patellar tracking as well as hip strengthening to prevent femoral IR and adduction with above listed activities. Recommend continued PT 2x/week x 4 more weeks. Return to Play: (if applicable)   []  Stage 1: Intro to Strength   []  Stage 2: Return to Run and Strength   []  Stage 3: Return to Jump and Strength   []  Stage 4: Dynamic Strength and Agility   []  Stage 5: Sport Specific Training     []  Ready to Return to Play, Meets All Above Stages   []  Not Ready for Return to Sports   Comments:                               PLAN:  [x] Continue per plan of care [] Alter current plan (see comments above)  [] Plan of care initiated [] Hold pending MD visit [] Discharge      Electronically signed by:  Karen Riojas, PT, DPT     Note: If patient does not return for scheduled/ recommended follow up visits, this note will serve as a discharge from care along with most recent update on progress. Access Code: [email or web address]   URL: [employer]/   Date: 08/24/2020   Prepared by: Karen Riojas     Exercises   Standing Hamstring Stretch with Step - 3 sets - 30s hold - 2x daily - 7x weekly   Standing Gastroc Stretch - 3 sets - 30s hold - 2x daily - 7x weekly   Standing Quad Stretch with Table and Chair Support - 3 sets - 30s hold - 2x daily - 7x weekly   Sitting Heel Slide with Towel - 10 reps - 5s hold - 2x daily - 7x weekly   Long Sitting Quad Set - 10 reps - 10s hold - 1x daily - 7x weekly   Active Straight Leg Raise with Quad Set - 10 reps - 2 sets - 1x daily - 7x weekly   Supine Sciatic Nerve Glide - 20 reps - 2x daily - 7x weekly   Clamshell - 10 reps - 3 sets - 1x daily - 7x

## 2020-08-31 ENCOUNTER — HOSPITAL ENCOUNTER (OUTPATIENT)
Dept: PHYSICAL THERAPY | Age: 60
Setting detail: THERAPIES SERIES
Discharge: HOME | End: 2020-08-31
Payer: COMMERCIAL

## 2020-08-31 PROCEDURE — 97110 THERAPEUTIC EXERCISES: CPT

## 2020-08-31 PROCEDURE — 97140 MANUAL THERAPY 1/> REGIONS: CPT

## 2020-08-31 RX ORDER — OMEPRAZOLE 40 MG/1
CAPSULE, DELAYED RELEASE ORAL
Qty: 90 CAPSULE | Refills: 0 | Status: SHIPPED | OUTPATIENT
Start: 2020-08-31 | End: 2020-12-21

## 2020-08-31 NOTE — FLOWSHEET NOTE
Oscillations-Mobs:  G-I, II, III, IV (PA's, Inf., Post.)  [x] (11658) Provided manual therapy to mobilize LE, proximal hip and/or LS spine soft tissue/joints for the purpose of modulating pain, promoting relaxation,  increasing ROM, reducing/eliminating soft tissue swelling/inflammation/restriction, improving soft tissue extensibility and allowing for proper ROM for normal function with self care, mobility, lifting and ambulation. Modalities:     [] GAME READY (VASO)- for significant edema, swelling, pain control. - knee x 15', CP ant knee. Long sit  - declined  Charges:  Timed Code Treatment Minutes: 26   Total Treatment Minutes: 26     [facility] time in/time out:   (and requires time in and out for each CPT code)    [] EVAL (LOW) 63976 (typically 20 minutes face-to-face)  [] EVAL (MOD) 33531 (typically 30 minutes face-to-face)  [] EVAL (HIGH) 08158 (typically 45 minutes face-to-face)  [] RE-EVAL     [x] ELIZONDO(31277) x 1    [] IONTO  [] NMR (25032) x     [] VASO  [x] Manual (89881) x  1   [] Other:  [] TA x      [] Mech Traction (26645)  [] ES(attended) (21527)      [] ES (un) (34802):       GOALS: Patient stated goal: no pain and get back to baseball  [x]? Progressing: []? Met: []? Not Met: []? Adjusted     Therapist goals for Patient:   Short Term Goals: To be achieved in: 2 weeks  1. Independent in HEP and progression per patient tolerance, in order to prevent re-injury. [x]? Progressing: []? Met: []? Not Met: []? Adjusted  2. Patient will have a decrease in pain to facilitate improvement in movement, function, and ADLs as indicated by Functional Deficits. [x]? Progressing: []? Met: []? Not Met: []? Adjusted     Long Term Goals: To be achieved in: 8 weeks  1. Disability index score of 35% or less for the LEFS to assist with reaching prior level of function. [x]? Progressing: []? Met: []? Not Met: []? Adjusted  2.  Patient will demonstrate increased left knee AROM to 0-140 deg to allow for proper joint functioning for deeper squatting, kneeling, and stair ambulation. []? Progressing: [x]? Met: []? Not Met: []? Adjusted  3. Patient will demonstrate an increase in Strength to 5/5 for the left hip and knee musculature to return to normal CKC activities with good knee control, such as stairs, squatting, and light jogging. [x]? Progressing: []? Met: []? Not Met: []? Adjusted  4. Patient will return to at least 30 minutes of moderate physical activity (patient specific goal). [x]? Progressing: []? Met: []? Not Met: []? Adjusted  5. Patient will have improved ankle DF to 15 deg to promote normal tibial advancement and HS flexibility in the 90-90 position to 65 deg to reduce posterior pelvic tilt. [x]? Progressing: []? Met: []? Not Met: []? Adjusted        Progression Towards Functional goals:  [x] Patient is progressing as expected towards functional goals listed. [] Progression is slowed due to complexities listed. [] Progression has been slowed due to co-morbidities. [] Plan just implemented, too soon to assess goals progression  [] Other:         Overall Progression Towards Functional goals/ Treatment Progress Update:  [x] Patient is progressing as expected towards functional goals listed. [] Progression is slowed due to complexities/Impairments listed. [] Progression has been slowed due to co-morbidities. [] Plan just implemented, too soon to assess goals progression <30days   [] Goals require adjustment due to lack of progress  [] Patient is not progressing as expected and requires additional follow up with physician  [] Other    Prognosis for POC: [x] Good [] Fair  [] Poor      Patient requires continued skilled intervention: [x] Yes  [] No    Treatment/Activity Tolerance:  [x] Patient able to complete treatment  [] Patient limited by fatigue  [] Patient limited by pain    [] Patient limited by other medical complications  [] Other:     ASSESSMENT: [place] treatment time d/t pt needing to leave early.  Chose to focus on knee and lumbopelvic mobility so that his movement patterns would not be altered throughout the day. Will resume more strengthening NV. Return to Play: (if applicable)   []  Stage 1: Intro to Strength   []  Stage 2: Return to Run and Strength   []  Stage 3: Return to Jump and Strength   []  Stage 4: Dynamic Strength and Agility   []  Stage 5: Sport Specific Training     []  Ready to Return to Play, Meets All Above Stages   []  Not Ready for Return to Sports   Comments:                               PLAN:  [x] Continue per plan of care [] Alter current plan (see comments above)  [] Plan of care initiated [] Hold pending MD visit [] Discharge      Electronically signed by:  Girish Bradley, PT, DPT     Note: If patient does not return for scheduled/ recommended follow up visits, this note will serve as a discharge from care along with most recent update on progress. Access Code: [email or web address]   URL: [email or web address]   Date: 08/24/2020   Prepared by: Girish Bradley     Exercises   Standing Hamstring Stretch with Step - 3 sets - 30s hold - 2x daily - 7x weekly   Standing Gastroc Stretch - 3 sets - 30s hold - 2x daily - 7x weekly   Standing Quad Stretch with Table and Chair Support - 3 sets - 30s hold - 2x daily - 7x weekly   Sitting Heel Slide with Towel - 10 reps - 5s hold - 2x daily - 7x weekly   Long Sitting Quad Set - 10 reps - 10s hold - 1x daily - 7x weekly   Active Straight Leg Raise with Quad Set - 10 reps - 2 sets - 1x daily - 7x weekly   Supine Sciatic Nerve Glide - 20 reps - 2x daily - 7x weekly   Clamshell - 10 reps - 3 sets - 1x daily - 7x weekly   Sidelying Hip Abduction - 10 reps - 2 sets - 1x daily - 7x weekly   Standing Hamstring Curl with Chair Support - 10 reps - 2 sets - 1x daily - 7x weekly   Side Stepping with Resistance at Feet - 10 reps - 4 sets - 1x daily - 4x weekly

## 2020-08-31 NOTE — TELEPHONE ENCOUNTER
Antolin Martinez [phone] (home) [phone] (work)   is requesting refill(s) of medication Omeprazole to preferred pharmacy [facility]    Last OV 2/4/20 (pertaining to medication)   Last refill 6/4/20 (per medication requested)  Next office visit scheduled or attempted Yes  Date 9/30/20  If No, reason

## 2020-09-02 RX ORDER — VERAPAMIL HYDROCHLORIDE 240 MG/1
CAPSULE, EXTENDED RELEASE ORAL
Qty: 90 CAPSULE | Refills: 1 | Status: SHIPPED | OUTPATIENT
Start: 2020-09-02 | End: 2021-02-22

## 2020-09-03 ENCOUNTER — HOSPITAL ENCOUNTER (OUTPATIENT)
Dept: PHYSICAL THERAPY | Age: 60
Setting detail: THERAPIES SERIES
Discharge: HOME | End: 2020-09-03
Payer: COMMERCIAL

## 2020-09-03 PROCEDURE — 97110 THERAPEUTIC EXERCISES: CPT

## 2020-09-03 PROCEDURE — 97140 MANUAL THERAPY 1/> REGIONS: CPT

## 2020-09-03 PROCEDURE — 97112 NEUROMUSCULAR REEDUCATION: CPT

## 2020-09-03 NOTE — FLOWSHEET NOTE
[facility],  [address]  Phone: [phone]  Fax [phone]    Physical Therapy Treatment Note/ Progress Report:           Date:  9/3/2020    Patient Name:  Betsy Peoples    :  1960  MRN: [id number]  Restrictions/Precautions:    Medical/Treatment Diagnosis Information:  · Diagnosis: I11.88 (ABD-89-PX) - Plica syndrome of knee, left; S83.232D (ICD-10-CM) - Complex tear of medial meniscus of left knee as current injury, subsequent encounter; S83.272D (ICD-10-CM) - Complex tear of lateral meniscus of left knee as current injury, subsequent encounter ; M65.9 (ICD-10-CM) - Synovitis of left knee  · Treatment Diagnosis: M25.562 left knee pain; M25.662 Left knee stiffness; R26.2 Difficulty in walking; M25.462 Left knee effusion  Insurance/Certification information:  PT Insurance Information:  [employer]  - [id number]- NO AUTH  Physician Information:  Referring Practitioner: Dr. Kennedy Lisa  Has the plan of care been signed (Y/N):        [x]  Yes  []  No     Date of Patient follow up with Physician: not scheduled    Is this a Progress Report:     []  Yes  [x]  No        If Yes:  Date Range for reporting period:  Beginning 20  Ending 20    Progress report will be due (10 Rx or 30 days whichever is HPWB):1/10      Recertification will be due (POC Duration  / 90 days whichever is less):      Visit # Insurance Allowable Requires auth   10  31 (40, but 9 used prev)    [x]no        []yes:     Functional Scale: LEFS 64% disability  Date assessed:  20  LEFS 55% disability     20      Therapy Diagnosis/Practice Pattern:E      Number of Comorbidities:  []0     []1-2    [x]3+    Latex Allergy:  [x]NO      []YES  Preferred Language for Healthcare:   [x]English       []other:      Pain level: eval 3-10/10 Current pain 3/10    SUBJECTIVE:  Pt reports that his knee felt the best that it ever has after LV, but the side of his knee was hurting last night. His back has been bothering him the past few days. OBJECTIVE:    Observation:    Test measurements:  Knee flexion 138 AROM (145 AAROM), knee ext 0   + SLR L   MMT knee extension L 37.0#, R 45. 8#Hip abduction L 24.1 # R, 26.5#    RESTRICTIONS/PRECAUTIONS: A-fib, previous L,R knee arthroscopies; chronic LBP; hx prostate cancer  PROCEDURE PERFORMED 7/17:  Left knee diagnostic video arthroscopy,  arthroscopic partial medial meniscectomy, partial lateral meniscectomy,  medial plica excision, and anterior synovectomy and fat pad excision.     Exercises/Interventions:     Therapeutic Ex (38729) Sets/sec Notes/CUES HEP   Pt ed; findings of exam and POC; compression stocking; elevation above level of heart, ice; bruising from surgery  Sitting on wedge, call MD regarding varicose veins              Standing HS stretch cage  Supine HS s c strap   30\"x2 R,LCore act VC with transitions X   Figure 4 stretch 2x30\" R,L X   IT band stretch c strap 30\"x2 X   Thoracic mobility in chair + pec stretch 5\"x5 X   Standing gastroc stretch on wedge  X   Standing quad stretch oncage Core act VC with transitions X   QL stretch cage     Supine HL TA act     TA act + log roll Cues not to perf a sit-up    Heel slide supine c strap 5\"x10  X   Quad set  X   SLR 2# X   SAQ c adductor sq 2#    Sciatic nerve glide- supine, ankle PF with knee f/e   X   SL hip ABD  X   clamshell 2# on thigh X   Seated SWB s lumbar flex, RSB, LSB a     Seated LAQ w/ add  2# -easy, no pain          SWB wall sit 5x10\"     Side step  OVl at shins    Mini squat  2UE support    Step up fwd,   Up and over  LSU  Heel tap 4\" 3x10 pn w/ down       one UE support        X   Standing HSC  2#-  X   TKE  KASSY hip ABD  Hip ext      Cues for TKE    LP 0-90 deg +add   (incr NV)    HSC  \" Ecc 50# (incr NV)  40#    bike '  seat 17    Manual Intervention (72911)      STM quad, IT band, superior only,  6'     Patellar mobs sup and inf, med, lat 20xea     posterior tibial glide GIII 15\"x4     Don compression stocking      Patellar \"tent\" x 2 + medial glide using leukotape     L hip joint mobs- inf and lateral distraction      KT medial tracking \"Y\" strip  No QDA          NMR re-education (80539)  CUES NEEDED    Step up to [employer] post, lateral 2x10 ea R,L  X   SLS airex 10\"x10                                         Therapeutic Activity (07916)                                                Therapeutic Exercise and NMR EXR  [x] (87532) Provided verbal/tactile cueing for activities related to strengthening, flexibility, endurance, ROM for improvements in LE, proximal hip, and core control with self care, mobility, lifting, ambulation.  [] (32475) Provided verbal/tactile cueing for activities related to improving balance, coordination, kinesthetic sense, posture, motor skill, proprioception  to assist with LE, proximal hip, and core control in self care, mobility, lifting, ambulation and eccentric single leg control.      NMR and Therapeutic Activities:    [] (01870 or 69131) Provided verbal/tactile cueing for activities related to improving balance, coordination, kinesthetic sense, posture, motor skill, proprioception and motor activation to allow for proper function of core, proximal hip and LE with self care and ADLs  [] (95854) Gait Re-education- Provided training and instruction to the patient for proper LE, core and proximal hip recruitment and positioning and eccentric body weight control with ambulation re-education including up and down stairs     Home Exercise Program:    [x] (74287) Reviewed/Progressed HEP activities related to strengthening, flexibility, endurance, ROM of core, proximal hip and LE for functional self-care, mobility, lifting and ambulation/stair navigation   [] (53917)Reviewed/Progressed HEP activities related to improving balance, coordination, kinesthetic sense, posture, motor skill, proprioception of core, proximal hip and LE for self care, mobility, lifting, and ambulation/stair navigation      Manual Treatments:  PROM / STM / Oscillations-Mobs:  G-I, II, III, IV (PA's, Inf., Post.)  [x] (66486) Provided manual therapy to mobilize LE, proximal hip and/or LS spine soft tissue/joints for the purpose of modulating pain, promoting relaxation,  increasing ROM, reducing/eliminating soft tissue swelling/inflammation/restriction, improving soft tissue extensibility and allowing for proper ROM for normal function with self care, mobility, lifting and ambulation. Modalities:     [] GAME READY (VASO)- for significant edema, swelling, pain control. - knee x 15', CP ant knee. Long sit  - declined  Charges:  Timed Code Treatment Minutes: 45   Total Treatment Minutes: 45     [facility] time in/time out:   (and requires time in and out for each CPT code)    [] EVAL (LOW) 92679 (typically 20 minutes face-to-face)  [] EVAL (MOD) 77260 (typically 30 minutes face-to-face)  [] EVAL (HIGH) 91367 (typically 45 minutes face-to-face)  [] RE-EVAL     [x] WS(53507) x 1    [] IONTO  [x] NMR (24821) x  1   [] VASO  [x] Manual (00563) x  1   [] Other:  [] TA x      [] Mech Traction (51615)  [] ES(attended) (26193)      [] ES (un) (02275):       GOALS: Patient stated goal: no pain and get back to baseball  [x]? Progressing: []? Met: []? Not Met: []? Adjusted     Therapist goals for Patient:   Short Term Goals: To be achieved in: 2 weeks  1. Independent in HEP and progression per patient tolerance, in order to prevent re-injury. [x]? Progressing: []? Met: []? Not Met: []? Adjusted  2. Patient will have a decrease in pain to facilitate improvement in movement, function, and ADLs as indicated by Functional Deficits. [x]? Progressing: []? Met: []? Not Met: []? Adjusted     Long Term Goals: To be achieved in: 8 weeks  1. Disability index score of 35% or less for the LEFS to assist with reaching prior level of function. [x]?  Progressing: []? Met: []? Not Met: []? Adjusted  2. Patient will demonstrate increased left knee AROM to 0-140 deg to allow for proper joint functioning for deeper squatting, kneeling, and stair ambulation. []? Progressing: [x]? Met: []? Not Met: []? Adjusted  3. Patient will demonstrate an increase in Strength to 5/5 for the left hip and knee musculature to return to normal CKC activities with good knee control, such as stairs, squatting, and light jogging. [x]? Progressing: []? Met: []? Not Met: []? Adjusted  4. Patient will return to at least 30 minutes of moderate physical activity (patient specific goal). [x]? Progressing: []? Met: []? Not Met: []? Adjusted  5. Patient will have improved ankle DF to 15 deg to promote normal tibial advancement and HS flexibility in the 90-90 position to 65 deg to reduce posterior pelvic tilt. [x]? Progressing: []? Met: []? Not Met: []? Adjusted        Progression Towards Functional goals:  [x] Patient is progressing as expected towards functional goals listed. [] Progression is slowed due to complexities listed. [] Progression has been slowed due to co-morbidities. [] Plan just implemented, too soon to assess goals progression  [] Other:         Overall Progression Towards Functional goals/ Treatment Progress Update:  [x] Patient is progressing as expected towards functional goals listed. [] Progression is slowed due to complexities/Impairments listed. [] Progression has been slowed due to co-morbidities.   [] Plan just implemented, too soon to assess goals progression <30days   [] Goals require adjustment due to lack of progress  [] Patient is not progressing as expected and requires additional follow up with physician  [] Other    Prognosis for POC: [x] Good [] Fair  [] Poor      Patient requires continued skilled intervention: [x] Yes  [] No    Treatment/Activity Tolerance:  [x] Patient able to complete treatment  [] Patient limited by fatigue  [] Patient limited by pain    [] Abduction - 10 reps - 2 sets - 1x daily - 7x weekly   Single Leg Balance on Foam Pad - 10 reps - 10s hold - 1x daily - 7x weekly   Side Stepping with Resistance at Feet - 10 reps - 4 sets - 1x daily - 4x weekly   Lateral Lunge with Slider - 10 reps - 2 sets - 1x daily - 7x weekly

## 2020-09-10 ENCOUNTER — HOSPITAL ENCOUNTER (OUTPATIENT)
Dept: PHYSICAL THERAPY | Age: 60
Setting detail: THERAPIES SERIES
Discharge: HOME | End: 2020-09-10
Payer: COMMERCIAL

## 2020-09-10 PROCEDURE — 97110 THERAPEUTIC EXERCISES: CPT | Performed by: PHYSICAL THERAPIST

## 2020-09-10 PROCEDURE — 97140 MANUAL THERAPY 1/> REGIONS: CPT | Performed by: PHYSICAL THERAPIST

## 2020-09-10 NOTE — FLOWSHEET NOTE
[facility],  [address]  Phone: [phone]  Fax [phone]    Physical Therapy Treatment Note/ Progress Report:           Date:  9/10/2020    Patient Name:  Yanira Baker    :  1960  MRN: [id number]  Restrictions/Precautions:    Medical/Treatment Diagnosis Information:  · Diagnosis: F69.33 ([id number]-IA) - Plica syndrome of knee, left; S83.232D (ICD-10-CM) - Complex tear of medial meniscus of left knee as current injury, subsequent encounter; S83.272D (ICD-10-CM) - Complex tear of lateral meniscus of left knee as current injury, subsequent encounter ; M65.9 (ICD-10-CM) - Synovitis of left knee  · Treatment Diagnosis: M25.562 left knee pain; M25.662 Left knee stiffness; R26.2 Difficulty in walking; M25.462 Left knee effusion  Insurance/Certification information:  PT Insurance Information:  [employer]  - 3000D-MET-/10-$0CP-40PT- NO AUTH  Physician Information:  Referring Practitioner: Dr. Valerie Cordova  Has the plan of care been signed (Y/N):        [x]  Yes  []  No     Date of Patient follow up with Physician: not scheduled    Is this a Progress Report:     []  Yes  [x]  No        If Yes:  Date Range for reporting period:  Beginning 20  Ending 20    Progress report will be due (10 Rx or 30 days whichever is ICHD):      Recertification will be due (POC Duration  / 90 days whichever is less):      Visit # Insurance Allowable Requires auth   11 31 (40, but 9 used prev)    [x]no        []yes:     Functional Scale: LEFS 64% disability  Date assessed:  20  LEFS 55% disability     20      Therapy Diagnosis/Practice Pattern:E      Number of Comorbidities:  []0     []1-2    [x]3+    Latex Allergy:  [x]NO      []YES  Preferred Language for Healthcare:   [x]English       []other:      Pain level: eval 3-10/10 Current pain 3/10    SUBJECTIVE:  Pt reports some days he gets more medial knee pain and today posterior tibial glide GIII      Don compression stocking      Patellar \"tent\" x 2 + medial glide using leukotape     L hip joint mobs- inf and lateral distraction      KT medial tracking \"Y\" strip  No QDA          NMR re-education (48328)  CUES NEEDED    Step up to stork airex                                                      Therapeutic Activity (50831)                                                Therapeutic Exercise and NMR EXR  [x] (44258) Provided verbal/tactile cueing for activities related to strengthening, flexibility, endurance, ROM for improvements in LE, proximal hip, and core control with self care, mobility, lifting, ambulation.  [] (93361) Provided verbal/tactile cueing for activities related to improving balance, coordination, kinesthetic sense, posture, motor skill, proprioception  to assist with LE, proximal hip, and core control in self care, mobility, lifting, ambulation and eccentric single leg control.      NMR and Therapeutic Activities:    [] (54697 or 41341) Provided verbal/tactile cueing for activities related to improving balance, coordination, kinesthetic sense, posture, motor skill, proprioception and motor activation to allow for proper function of core, proximal hip and LE with self care and ADLs  [] (05063) Gait Re-education- Provided training and instruction to the patient for proper LE, core and proximal hip recruitment and positioning and eccentric body weight control with ambulation re-education including up and down stairs     Home Exercise Program:    [x] (35952) Reviewed/Progressed HEP activities related to strengthening, flexibility, endurance, ROM of core, proximal hip and LE for functional self-care, mobility, lifting and ambulation/stair navigation   [] (63593)Reviewed/Progressed HEP activities related to improving balance, coordination, kinesthetic sense, posture, motor skill, proprioception of core, proximal hip and LE for self care, mobility, lifting, and ambulation/stair navigation      Manual Treatments:  PROM / STM / Oscillations-Mobs:  G-I, II, III, IV (PA's, Inf., Post.)  [x] (55139) Provided manual therapy to mobilize LE, proximal hip and/or LS spine soft tissue/joints for the purpose of modulating pain, promoting relaxation,  increasing ROM, reducing/eliminating soft tissue swelling/inflammation/restriction, improving soft tissue extensibility and allowing for proper ROM for normal function with self care, mobility, lifting and ambulation. Modalities:     [] GAME READY (VASO)- for significant edema, swelling, pain control. - knee x 15', CP ant knee. Long sit  - declined  Charges:  Timed Code Treatment Minutes: 42   Total Treatment Minutes: 42     BWC time in/time out:   (and requires time in and out for each CPT code)    [] EVAL (LOW) 52357 (typically 20 minutes face-to-face)  [] EVAL (MOD) 40465 (typically 30 minutes face-to-face)  [] EVAL (HIGH) 28057 (typically 45 minutes face-to-face)  [] RE-EVAL     [x] LF(10709) x 2    [] IONTO  [] NMR (38919) x     [] VASO  [x] Manual (50568) x  1   [] Other:  [] TA x      [] Mech Traction (32370)  [] ES(attended) (56314)      [] ES (un) (99848):       GOALS: Patient stated goal: no pain and get back to baseball  [x]? Progressing: []? Met: []? Not Met: []? Adjusted     Therapist goals for Patient:   Short Term Goals: To be achieved in: 2 weeks  1. Independent in HEP and progression per patient tolerance, in order to prevent re-injury. [x]? Progressing: []? Met: []? Not Met: []? Adjusted  2. Patient will have a decrease in pain to facilitate improvement in movement, function, and ADLs as indicated by Functional Deficits. [x]? Progressing: []? Met: []? Not Met: []? Adjusted     Long Term Goals: To be achieved in: 8 weeks  1. Disability index score of 35% or less for the LEFS to assist with reaching prior level of function. [x]? Progressing: []? Met: []? Not Met: []? Adjusted  2.  Patient will demonstrate increased left knee AROM to 0-140 deg to allow for proper joint functioning for deeper squatting, kneeling, and stair ambulation. []? Progressing: [x]? Met: []? Not Met: []? Adjusted  3. Patient will demonstrate an increase in Strength to 5/5 for the left hip and knee musculature to return to normal CKC activities with good knee control, such as stairs, squatting, and light jogging. [x]? Progressing: []? Met: []? Not Met: []? Adjusted  4. Patient will return to at least 30 minutes of moderate physical activity (patient specific goal). [x]? Progressing: []? Met: []? Not Met: []? Adjusted  5. Patient will have improved ankle DF to 15 deg to promote normal tibial advancement and HS flexibility in the 90-90 position to 65 deg to reduce posterior pelvic tilt. [x]? Progressing: []? Met: []? Not Met: []? Adjusted        Progression Towards Functional goals:  [x] Patient is progressing as expected towards functional goals listed. [] Progression is slowed due to complexities listed. [] Progression has been slowed due to co-morbidities. [] Plan just implemented, too soon to assess goals progression  [] Other:         Overall Progression Towards Functional goals/ Treatment Progress Update:  [x] Patient is progressing as expected towards functional goals listed. [] Progression is slowed due to complexities/Impairments listed. [] Progression has been slowed due to co-morbidities.   [] Plan just implemented, too soon to assess goals progression <30days   [] Goals require adjustment due to lack of progress  [] Patient is not progressing as expected and requires additional follow up with physician  [] Other    Prognosis for POC: [x] Good [] Fair  [] Poor      Patient requires continued skilled intervention: [x] Yes  [] No    Treatment/Activity Tolerance:  [x] Patient able to complete treatment  [] Patient limited by fatigue  [] Patient limited by pain    [] Patient limited by other medical complications  [] Other: ASSESSMENT: continued tightness distal HS and ITB. Able to progress reps with wall squats and resume band walks (also added to HEP). Mild lateral joint pain reported with LSD, tried TB add with no change reported. Return to Play: (if applicable)   []  Stage 1: Intro to Strength   []  Stage 2: Return to Run and Strength   []  Stage 3: Return to Jump and Strength   []  Stage 4: Dynamic Strength and Agility   []  Stage 5: Sport Specific Training     []  Ready to Return to Play, Meets All Above Stages   []  Not Ready for Return to Sports   Comments:                               PLAN:  [x] Continue per plan of care [] Alter current plan (see comments above)  [] Plan of care initiated [] Hold pending MD visit [] Discharge      Electronically signed by:  Sofia Solano PT, DPT     Note: If patient does not return for scheduled/ recommended follow up visits, this note will serve as a discharge from care along with most recent update on progress. Access Code: [email or web address]   URL: "[employer]"/   Date: 08/24/2020   Prepared by: Petty Aguero     Exercises   Standing Hamstring Stretch with Step - 3 sets - 30s hold - 2x daily - 7x weekly   Standing Gastroc Stretch - 3 sets - 30s hold - 2x daily - 7x weekly   Standing Quad Stretch with Table and Chair Support - 3 sets - 30s hold - 2x daily - 7x weekly   Sitting Heel Slide with Towel - 10 reps - 5s hold - 2x daily - 7x weekly   Long Sitting Quad Set - 10 reps - 10s hold - 1x daily - 7x weekly   Active Straight Leg Raise with Quad Set - 10 reps - 2 sets - 1x daily - 7x weekly   Supine Sciatic Nerve Glide - 20 reps - 2x daily - 7x weekly   Clamshell - 10 reps - 3 sets - 1x daily - 7x weekly   Sidelying Hip Abduction - 10 reps - 2 sets - 1x daily - 7x weekly   Standing Hamstring Curl with Chair Support - 10 reps - 2 sets - 1x daily - 7x weekly   Side Stepping with Resistance at Feet - 10 reps - 4 sets - 1x daily - 4x weekly

## 2020-09-15 ENCOUNTER — HOSPITAL ENCOUNTER (OUTPATIENT)
Dept: PHYSICAL THERAPY | Age: 60
Setting detail: THERAPIES SERIES
Discharge: HOME | End: 2020-09-15
Payer: COMMERCIAL

## 2020-09-15 PROCEDURE — 97140 MANUAL THERAPY 1/> REGIONS: CPT | Performed by: PHYSICAL THERAPIST

## 2020-09-15 PROCEDURE — 97112 NEUROMUSCULAR REEDUCATION: CPT | Performed by: PHYSICAL THERAPIST

## 2020-09-15 PROCEDURE — 97110 THERAPEUTIC EXERCISES: CPT | Performed by: PHYSICAL THERAPIST

## 2020-09-15 NOTE — FLOWSHEET NOTE
weekend from going to a wedding and standing a lot and also working in his yard mowing and raking. He wore his compression stocking to bed last night and this morning is feeling better. OBJECTIVE:    Observation:    Test measurements:            RESTRICTIONS/PRECAUTIONS: A-fib, previous L,R knee arthroscopies; chronic LBP; hx prostate cancer  PROCEDURE PERFORMED 7/17:  Left knee diagnostic video arthroscopy,  arthroscopic partial medial meniscectomy, partial lateral meniscectomy,  medial plica excision, and anterior synovectomy and fat pad excision. Exercises/Interventions:     Therapeutic Ex (47277) Sets/sec Notes/CUES HEP   Pt ed; findings of exam and POC; compression stocking; elevation above level of heart, ice; bruising from surgery  Sitting on wedge, call MD regarding varicose veins              Standing HS stretch cage Core act VC with transitions X   Standing gastroc stretch on wedge  X   Standing quad stretch oncage 30\"x2 R,LCore act VC with transitions X   QL stretch cage 20\" R,L ea    Supine ITB S 2x30\"manual HEP 9/10 supine vs seated   Supine HL TA act     TA act + log roll Cues not to perf a sit-up    Heel slide supine c strap 5\"x10  X   Quad set  X   SLR 2# X   SAQ c adductor sq 2#    Sciatic nerve glide- supine, ankle PF with knee f/e   X   SL hip ABD  X   clamshell 2# on thigh X   Seated SWB s lumbar flex, RSB, LSB a     Seated LAQ w/ add  2# -easy, no pain          SWB wall sit \"     Side step.  Fwd/bwd monster walk HEP 9/10   Mini squat  2UE support    Step up fwd,   Up and over  LSU  Heel tap 4\" 2x10 pn w/ down       one UE support, no pain, good knee alignment          Standing HSC  2#-  X   TKE  KASSY hip ABD    Hip ext   60#,45# x10 ea R,L60# 2x15 R,L   60# heavy    LP 0-90 deg +add   (incr NV) Pillow for head   HSC  \" Ecc 50#   40#    bike '  seat 17    Manual Intervention (61215)      IASTM quad, HS, IT band, superior/inf, bevel 30 deg, patellar framing 10'     Patellar mobs sup and inf, med, lat 20xea     HS and ITB S 2x30\" ea     posterior tibial glide GIII      Don compression stocking      Patellar \"tent\" x 2 + medial glide using leukotape     L hip joint mobs- inf and lateral distraction      KT medial tracking \"Y\" strip  No QDA          NMR re-education (92559)  CUES NEEDED    Step up to stork airex 15\"x5 air-ex KASSY prn    CC retro walk w/ A, lat, P toe taps R 40# x8     FSU to toe tap BOSU flat down 5\" x15 [facility] light support                                        Therapeutic Activity (70960)                                                Therapeutic Exercise and NMR EXR  [x] (02877) Provided verbal/tactile cueing for activities related to strengthening, flexibility, endurance, ROM for improvements in LE, proximal hip, and core control with self care, mobility, lifting, ambulation.  [] (58462) Provided verbal/tactile cueing for activities related to improving balance, coordination, kinesthetic sense, posture, motor skill, proprioception  to assist with LE, proximal hip, and core control in self care, mobility, lifting, ambulation and eccentric single leg control.      NMR and Therapeutic Activities:    [] (73189 or 52846) Provided verbal/tactile cueing for activities related to improving balance, coordination, kinesthetic sense, posture, motor skill, proprioception and motor activation to allow for proper function of core, proximal hip and LE with self care and ADLs  [] (35755) Gait Re-education- Provided training and instruction to the patient for proper LE, core and proximal hip recruitment and positioning and eccentric body weight control with ambulation re-education including up and down stairs     Home Exercise Program:    [x] (16719) Reviewed/Progressed HEP activities related to strengthening, flexibility, endurance, ROM of core, proximal hip and LE for functional self-care, mobility, lifting and ambulation/stair navigation   [] (44039)Reviewed/Progressed HEP activities related to score of 35% or less for the LEFS to assist with reaching prior level of function. [x]? Progressing: []? Met: []? Not Met: []? Adjusted  2. Patient will demonstrate increased left knee AROM to 0-140 deg to allow for proper joint functioning for deeper squatting, kneeling, and stair ambulation. []? Progressing: [x]? Met: []? Not Met: []? Adjusted  3. Patient will demonstrate an increase in Strength to 5/5 for the left hip and knee musculature to return to normal CKC activities with good knee control, such as stairs, squatting, and light jogging. [x]? Progressing: []? Met: []? Not Met: []? Adjusted  4. Patient will return to at least 30 minutes of moderate physical activity (patient specific goal). [x]? Progressing: []? Met: []? Not Met: []? Adjusted  5. Patient will have improved ankle DF to 15 deg to promote normal tibial advancement and HS flexibility in the 90-90 position to 65 deg to reduce posterior pelvic tilt. [x]? Progressing: []? Met: []? Not Met: []? Adjusted        Progression Towards Functional goals:  [x] Patient is progressing as expected towards functional goals listed. [] Progression is slowed due to complexities listed. [] Progression has been slowed due to co-morbidities. [] Plan just implemented, too soon to assess goals progression  [] Other:     Overall Progression Towards Functional goals/ Treatment Progress Update:  [x] Patient is progressing as expected towards functional goals listed. [] Progression is slowed due to complexities/Impairments listed. [] Progression has been slowed due to co-morbidities.   [] Plan just implemented, too soon to assess goals progression <30days   [] Goals require adjustment due to lack of progress  [] Patient is not progressing as expected and requires additional follow up with physician  [] Other    Prognosis for POC: [x] Good [] Fair  [] Poor      Patient requires continued skilled intervention: [x] Yes  [] No    Treatment/Activity Tolerance:  [x] Patient able to complete treatment  [] Patient limited by fatigue  [] Patient limited by pain    [] Patient limited by other medical complications  [] Other:     ASSESSMENT: improved control with balance progressions and no pain with LSD this visit. Continued tightness distal ITB and lateral HS noted w/ IASTM. Return to Play: (if applicable)   []  Stage 1: Intro to Strength   []  Stage 2: Return to Run and Strength   []  Stage 3: Return to Jump and Strength   []  Stage 4: Dynamic Strength and Agility   []  Stage 5: Sport Specific Training     []  Ready to Return to Play, Meets All Above Stages   []  Not Ready for Return to Sports   Comments:                               PLAN:  [x] Continue per plan of care [] Alter current plan (see comments above)  [] Plan of care initiated [] Hold pending MD visit [] Discharge      Electronically signed by:  Joby Locke PT, DPT     Note: If patient does not return for scheduled/ recommended follow up visits, this note will serve as a discharge from care along with most recent update on progress. Access Code: [email or web address]   URL: [email or web address]   Date: 08/24/2020   Prepared by: Debbie Croak     Exercises   Standing Hamstring Stretch with Step - 3 sets - 30s hold - 2x daily - 7x weekly   Standing Gastroc Stretch - 3 sets - 30s hold - 2x daily - 7x weekly   Standing Quad Stretch with Table and Chair Support - 3 sets - 30s hold - 2x daily - 7x weekly   Sitting Heel Slide with Towel - 10 reps - 5s hold - 2x daily - 7x weekly   Long Sitting Quad Set - 10 reps - 10s hold - 1x daily - 7x weekly   Active Straight Leg Raise with Quad Set - 10 reps - 2 sets - 1x daily - 7x weekly   Supine Sciatic Nerve Glide - 20 reps - 2x daily - 7x weekly   Clamshell - 10 reps - 3 sets - 1x daily - 7x weekly   Sidelying Hip Abduction - 10 reps - 2 sets - 1x daily - 7x weekly   Standing Hamstring Curl with Chair Support - 10 reps - 2 sets - 1x daily - 7x weekly

## 2020-09-17 ENCOUNTER — HOSPITAL ENCOUNTER (OUTPATIENT)
Dept: PHYSICAL THERAPY | Age: 60
Setting detail: THERAPIES SERIES
Discharge: HOME | End: 2020-09-17
Payer: COMMERCIAL

## 2020-09-17 PROCEDURE — 97140 MANUAL THERAPY 1/> REGIONS: CPT

## 2020-09-17 PROCEDURE — 97110 THERAPEUTIC EXERCISES: CPT

## 2020-09-17 NOTE — FLOWSHEET NOTE
[facility],  [address]  Phone: [phone]  Fax [phone]    Physical Therapy Treatment Note/ Progress Report:           Date:  2020    Patient Name:  Mary Jane Callejas    :  1960  MRN: [id number]  Restrictions/Precautions:    Medical/Treatment Diagnosis Information:  · Diagnosis: W76.08 ([id number]) - Plica syndrome of knee, left; S83.232D (ICD-10-CM) - Complex tear of medial meniscus of left knee as current injury, subsequent encounter; S83.272D (ICD-10-CM) - Complex tear of lateral meniscus of left knee as current injury, subsequent encounter ; M65.9 (ICD-10-CM) - Synovitis of left knee  · Treatment Diagnosis: M25.562 left knee pain; M25.662 Left knee stiffness; R26.2 Difficulty in walking; M25.462 Left knee effusion  Insurance/Certification information:  PT Insurance Information:  BCBS  - 3000D-MET-/10-$0CP-40PT- NO AUTH  Physician Information:  Referring Practitioner: Dr. Olive Ji  Has the plan of care been signed (Y/N):        [x]  Yes  []  No     Date of Patient follow up with Physician: not scheduled    Is this a Progress Report:     []  Yes  [x]  No        If Yes:  Date Range for reporting period:  Beginning 20  Ending 20    Progress report will be due (10 Rx or 30 days whichever is LLFG):      Recertification will be due (POC Duration  / 90 days whichever is less):      Visit # Insurance Allowable Requires auth   13 31 (40, but 9 used prev)    [x]no        []yes:     Functional Scale: LEFS 64% disability  Date assessed:  20  LEFS 55% disability     20      Therapy Diagnosis/Practice Pattern:E      Number of Comorbidities:  []0     []1-2    [x]3+    Latex Allergy:  [x]NO      []YES  Preferred Language for Healthcare:   [x]English       []other:      Pain level: eval 3-10/10 Current pain 3/10, up to 7/10    SUBJECTIVE:  Pt reports that his knee has been good and bad. It was bad after raking and mowing as reported last session. OBJECTIVE:    Observation:    Test measurements:            RESTRICTIONS/PRECAUTIONS: A-fib, previous L,R knee arthroscopies; chronic LBP; hx prostate cancer  PROCEDURE PERFORMED 7/17:  Left knee diagnostic video arthroscopy,  arthroscopic partial medial meniscectomy, partial lateral meniscectomy,  medial plica excision, and anterior synovectomy and fat pad excision. Exercises/Interventions:     Therapeutic Ex (72710) Sets/sec Notes/CUES HEP   Pt ed; decreasing amount of yardwork at a time- split tasks between days, not exercising through pain 5'             Standing HS stretch cage Core act VC with transitions X   Standing gastroc stretch on wedge  X   Standing quad stretch oncage Core act VC with transitions X   QL stretch cage 15\" x3 R,L ea    Supine figure 4 stretch 2x30\" R,L    Supine HL TA act     TA act + log roll Cues not to perf a sit-up    Heel slide supine c strap 5\"x10  X   Quad set  X   SLR 2# X   SAQ c adductor sq 2#    Sciatic nerve glide- supine, ankle PF with knee f/e   X   SL hip ABD  X   clamshell 2# on thigh X   Seated SWB s lumbar flex, RSB, LSB a     Seated LAQ w/ add  2# -easy, no pain          SWB wall sit + pulses 5x10\"     Side step.  Fwd/bwd monster walk HEP 9/10   Hip ABD iso      Mini squat  2UE support    Step up fwd,   Up and over  LSU  Heel tap lateral 4\"  Heel tap fwd 4\" 2x10  x10 pn w/ down       one UE support, stopped d/t pain          Standing HSC  2#-  X   TKE  KASSY hip ABD    Hip ext      60# heavy    LP 0-90 deg +add   120# 3x10 DL  80# 2x10 ECC  60# x15 L(incr NV) Pillow for head   HSC  \" Ecc 2x12  2x10 L50#   40#- bar at F    bike '  seat 17    Manual Intervention (94069)      IASTM quad, HS, IT band, superior/inf, bevel 45 deg, patellar framing 10'     Patellar mobs sup and inf, med, lat 20xea      ITB S 2x30\" ea     posterior tibial glide GIII 15\"x4     Don compression stocking      Patellar \"tent\" x 2 + medial glide using leukotape     L hip joint mobs- inf and lateral distraction      KT medial tracking \"Y\" strip  No QDA          NMR re-education (11054)  CUES NEEDED    Step up to [facility] prn    CC retro walk w/ A, lat, P toe taps R 40# x8     FSU to toe tap BOSU flat down  KASSY light support    Stair amb no HR  \" one HR 1x4 steps ea                                   Therapeutic Activity (08878)                                                Therapeutic Exercise and NMR EXR  [x] (70260) Provided verbal/tactile cueing for activities related to strengthening, flexibility, endurance, ROM for improvements in LE, proximal hip, and core control with self care, mobility, lifting, ambulation.  [] (93225) Provided verbal/tactile cueing for activities related to improving balance, coordination, kinesthetic sense, posture, motor skill, proprioception  to assist with LE, proximal hip, and core control in self care, mobility, lifting, ambulation and eccentric single leg control.      NMR and Therapeutic Activities:    [] (39476 or 83547) Provided verbal/tactile cueing for activities related to improving balance, coordination, kinesthetic sense, posture, motor skill, proprioception and motor activation to allow for proper function of core, proximal hip and LE with self care and ADLs  [] (57495) Gait Re-education- Provided training and instruction to the patient for proper LE, core and proximal hip recruitment and positioning and eccentric body weight control with ambulation re-education including up and down stairs     Home Exercise Program:    [x] (52628) Reviewed/Progressed HEP activities related to strengthening, flexibility, endurance, ROM of core, proximal hip and LE for functional self-care, mobility, lifting and ambulation/stair navigation   [] (07014)Reviewed/Progressed HEP activities related to improving balance, coordination, kinesthetic sense, posture, motor skill, proprioception of core, proximal hip and LE for self care, mobility, lifting, and ambulation/stair navigation      Manual Treatments:  PROM / STM / Oscillations-Mobs:  G-I, II, III, IV (PA's, Inf., Post.)  [x] (95550) Provided manual therapy to mobilize LE, proximal hip and/or LS spine soft tissue/joints for the purpose of modulating pain, promoting relaxation,  increasing ROM, reducing/eliminating soft tissue swelling/inflammation/restriction, improving soft tissue extensibility and allowing for proper ROM for normal function with self care, mobility, lifting and ambulation. Modalities:  CP L knee x10'   [] GAME READY (VASO)- for significant edema, swelling, pain control. - knee x 15', CP ant knee. Long sit  - declined  Charges:  Timed Code Treatment Minutes: 40   Total Treatment Minutes: 55 indep ex, rest     BWC time in/time out:   (and requires time in and out for each CPT code)    [] EVAL (LOW) 86386 (typically 20 minutes face-to-face)  [] EVAL (MOD) 64034 (typically 30 minutes face-to-face)  [] EVAL (HIGH) 54447 (typically 45 minutes face-to-face)  [] RE-EVAL     [x] CH(42868) x 2  [] IONTO  [x] NMR (99943) x0     [] VASO  [x] Manual (45082) x  1   [] Other:  [] TA x      [] Mech Traction (93467)  [] ES(attended) (96572)      [] ES (un) (79748):       GOALS: Patient stated goal: no pain and get back to baseball  [x]? Progressing: []? Met: []? Not Met: []? Adjusted     Therapist goals for Patient:   Short Term Goals: To be achieved in: 2 weeks  1. Independent in HEP and progression per patient tolerance, in order to prevent re-injury. [x]? Progressing: []? Met: []? Not Met: []? Adjusted  2. Patient will have a decrease in pain to facilitate improvement in movement, function, and ADLs as indicated by Functional Deficits. [x]? Progressing: []? Met: []? Not Met: []? Adjusted     Long Term Goals: To be achieved in: 8 weeks  1. Disability index score of 35% or less for the LEFS to assist with reaching prior level of function. [x]?  Progressing: []? Met: []? Not Met: []? Adjusted  2. Patient will demonstrate increased left knee AROM to 0-140 deg to allow for proper joint functioning for deeper squatting, kneeling, and stair ambulation. []? Progressing: [x]? Met: []? Not Met: []? Adjusted  3. Patient will demonstrate an increase in Strength to 5/5 for the left hip and knee musculature to return to normal CKC activities with good knee control, such as stairs, squatting, and light jogging. [x]? Progressing: []? Met: []? Not Met: []? Adjusted  4. Patient will return to at least 30 minutes of moderate physical activity (patient specific goal). [x]? Progressing: []? Met: []? Not Met: []? Adjusted  5. Patient will have improved ankle DF to 15 deg to promote normal tibial advancement and HS flexibility in the 90-90 position to 65 deg to reduce posterior pelvic tilt. [x]? Progressing: []? Met: []? Not Met: []? Adjusted        Progression Towards Functional goals:  [x] Patient is progressing as expected towards functional goals listed. [] Progression is slowed due to complexities listed. [] Progression has been slowed due to co-morbidities. [] Plan just implemented, too soon to assess goals progression  [] Other:     Overall Progression Towards Functional goals/ Treatment Progress Update:  [x] Patient is progressing as expected towards functional goals listed. [] Progression is slowed due to complexities/Impairments listed. [] Progression has been slowed due to co-morbidities.   [] Plan just implemented, too soon to assess goals progression <30days   [] Goals require adjustment due to lack of progress  [] Patient is not progressing as expected and requires additional follow up with physician  [] Other    Prognosis for POC: [x] Good [] Fair  [] Poor      Patient requires continued skilled intervention: [x] Yes  [] No    Treatment/Activity Tolerance:  [x] Patient able to complete treatment  [] Patient limited by fatigue  [] Patient limited by pain    [] Patient limited by other medical complications  [] Other:     ASSESSMENT: Pt needs one HR for proper form when ascending and descending stairs. Increased patellofemoral pain with trial of 6\" heel tap. Pt encouraged to report pain vs working through discomfort to build strength. Reported improved knee pain after IASTM and joint mobilizations. Return to Play: (if applicable)   []  Stage 1: Intro to Strength   []  Stage 2: Return to Run and Strength   []  Stage 3: Return to Jump and Strength   []  Stage 4: Dynamic Strength and Agility   []  Stage 5: Sport Specific Training     []  Ready to Return to Play, Meets All Above Stages   []  Not Ready for Return to Sports   Comments:                               PLAN:  [x] Continue per plan of care [] Alter current plan (see comments above)  [] Plan of care initiated [] Hold pending MD visit [] Discharge      Electronically signed by:  Elva Lozada, PT, DPT     Note: If patient does not return for scheduled/ recommended follow up visits, this note will serve as a discharge from care along with most recent update on progress. Access Code: [email or web address]   URL: [email or web address]   Date: 08/24/2020   Prepared by: Elva Lozada     Exercises   Standing Hamstring Stretch with Step - 3 sets - 30s hold - 2x daily - 7x weekly   Standing Gastroc Stretch - 3 sets - 30s hold - 2x daily - 7x weekly   Standing Quad Stretch with Table and Chair Support - 3 sets - 30s hold - 2x daily - 7x weekly   Sitting Heel Slide with Towel - 10 reps - 5s hold - 2x daily - 7x weekly   Long Sitting Quad Set - 10 reps - 10s hold - 1x daily - 7x weekly   Active Straight Leg Raise with Quad Set - 10 reps - 2 sets - 1x daily - 7x weekly   Supine Sciatic Nerve Glide - 20 reps - 2x daily - 7x weekly   Clamshell - 10 reps - 3 sets - 1x daily - 7x weekly   Sidelying Hip Abduction - 10 reps - 2 sets - 1x daily - 7x weekly   Standing Hamstring Curl with Chair Support - 10 reps - 2 sets - 1x daily - 7x weekly   Side Stepping with Resistance at Feet - 10 reps - 4 sets - 1x daily - 4x weekly

## 2020-09-21 ENCOUNTER — HOSPITAL ENCOUNTER (OUTPATIENT)
Dept: PHYSICAL THERAPY | Age: 60
Setting detail: THERAPIES SERIES
Discharge: HOME | End: 2020-09-21
Payer: COMMERCIAL

## 2020-09-21 PROCEDURE — 97110 THERAPEUTIC EXERCISES: CPT

## 2020-09-21 PROCEDURE — 97140 MANUAL THERAPY 1/> REGIONS: CPT

## 2020-09-21 NOTE — FLOWSHEET NOTE
[facility],  [address]  Phone: [phone]  Fax [phone]    Physical Therapy Treatment Note/ Progress Report:           Date:  2020    Patient Name:  Yanira Baker    :  1960  MRN: [id number]  Restrictions/Precautions:    Medical/Treatment Diagnosis Information:  · Diagnosis: D01.93 ([id number]) - Plica syndrome of knee, left; S83.232D (ICD-10-CM) - Complex tear of medial meniscus of left knee as current injury, subsequent encounter; S83.272D (ICD-10-CM) - Complex tear of lateral meniscus of left knee as current injury, subsequent encounter ; M65.9 (ICD-10-CM) - Synovitis of left knee  · Treatment Diagnosis: M25.562 left knee pain; M25.662 Left knee stiffness; R26.2 Difficulty in walking; M25.462 Left knee effusion  Insurance/Certification information:  PT Insurance Information:  BCBS  - 3000D-MET-10-$[id number]-40PT- NO AUTH  Physician Information:  Referring Practitioner: Dr. Valerie Cordova  Has the plan of care been signed (Y/N):        [x]  Yes  []  No     Date of Patient follow up with Physician: not scheduled    Is this a Progress Report:     []  Yes  [x]  No        If Yes:  Date Range for reporting period:  Beginning 20  Ending 20    Progress report will be due (10 Rx or 30 days whichever is DTYA):      Recertification will be due (POC Duration  / 90 days whichever is less):      Visit # Insurance Allowable Requires auth   14 31 (40, but 9 used prev)    [x]no        []yes:     Functional Scale: LEFS 64% disability  Date assessed:  20  LEFS 55% disability     20      Therapy Diagnosis/Practice Pattern:E      Number of Comorbidities:  []0     []1-2    [x]3+    Latex Allergy:  [x]NO      []YES  Preferred Language for Healthcare:   [x]English       []other:      Pain level: eval 3-10/10 Current pain 3/10, up to 7/10    SUBJECTIVE:  Pt reports that his knee is feeling pretty good today, sore under the knee-cap still. Feels better leaving sessions. OBJECTIVE:    Observation:    Test measurements:            RESTRICTIONS/PRECAUTIONS: A-fib, previous L,R knee arthroscopies; chronic LBP; hx prostate cancer  PROCEDURE PERFORMED 7/17:  Left knee diagnostic video arthroscopy,  arthroscopic partial medial meniscectomy, partial lateral meniscectomy,  medial plica excision, and anterior synovectomy and fat pad excision. Exercises/Interventions:     Therapeutic Ex (44746) Sets/sec Notes/CUES HEP   Pt ed; decreasing amount of yardwork at a time- split tasks between days, not exercising through pain 5'             Standing HS 3-way on steps 30\" R,L X   Standing gastroc stretch on wedge  X   Standing quad stretch oncage Core act VC with transitions X   QL stretch cage     Supine figure 4 stretch     Supine HL TA act     TA act + log roll Cues not to perf a sit-up    Heel slide supine c strap 5\"x10  X   Quad set  X   SLR 2# X   SAQ c adductor sq 2#    Sciatic nerve glide- supine, ankle PF with knee f/e   X   SL hip ABD  X   clamshell 2# on thigh X   Seated SWB s lumbar flex, RSB, LSB a     Seated LAQ w/ add  2# -easy, no pain          SWB wall sit + pulses      Side step.  Fwd/bwd monster walk HEP 9/10   Hip ABD iso      Mini squat  2UE support    Step up fwd,   Up and over  LSU  Heel tap lateral 4\"  Heel tap fwd 4\"  pn w/ down       one UE support, stopped d/t pain          Glider ext, ABD/ex 10x ea R stance Painful L stance    Standing HSC  2#-  X   TKE  KASSY hip ABD    Hip ext      60# heavy    LP 0-90 deg +add   130# 3x10 DL  90# 2x10 ECC  (incr NV) Pillow for head   HSC  \" Ecc 2x10  2x10 L50#   40# , then 35#- bar at F    Knee ext 90-40 2x10 30#    bike 5'  seat 17- indep    Manual Intervention (64898)      IASTM quad, HS, IT band, superior/inf, bevel 45 deg, patellar framing 10'     Patellar mobs sup and inf, med, lat 20xea      ITB S 2x30\" ea     posterior tibial glide GIII 15\"x4     Bradford Roberts compression stocking      Patellar \"tent\" x 2 + medial glide using leukotape     L hip joint mobs- inf and lateral distraction      KT medial tracking \"Y\" strip  No QDA          NMR re-education (71440)  CUES NEEDED    Step up to [facility] prn    CC retro walk w/ A, lat, P toe taps R 40# x8     FSU to toe tap BOSU flat down 5\" x15 KASSY light support, painful during, not after    Stair amb no HR  \" one HR                                    Therapeutic Activity (68987)                                                Therapeutic Exercise and NMR EXR  [x] (12430) Provided verbal/tactile cueing for activities related to strengthening, flexibility, endurance, ROM for improvements in LE, proximal hip, and core control with self care, mobility, lifting, ambulation.  [] (66841) Provided verbal/tactile cueing for activities related to improving balance, coordination, kinesthetic sense, posture, motor skill, proprioception  to assist with LE, proximal hip, and core control in self care, mobility, lifting, ambulation and eccentric single leg control.      NMR and Therapeutic Activities:    [] (63127 or 14241) Provided verbal/tactile cueing for activities related to improving balance, coordination, kinesthetic sense, posture, motor skill, proprioception and motor activation to allow for proper function of core, proximal hip and LE with self care and ADLs  [] (37300) Gait Re-education- Provided training and instruction to the patient for proper LE, core and proximal hip recruitment and positioning and eccentric body weight control with ambulation re-education including up and down stairs     Home Exercise Program:    [x] (67442) Reviewed/Progressed HEP activities related to strengthening, flexibility, endurance, ROM of core, proximal hip and LE for functional self-care, mobility, lifting and ambulation/stair navigation   [] (27314)Reviewed/Progressed HEP activities related to improving balance, coordination, kinesthetic sense, posture, motor skill, proprioception of core, proximal hip and LE for self care, mobility, lifting, and ambulation/stair navigation      Manual Treatments:  PROM / STM / Oscillations-Mobs:  G-I, II, III, IV (PA's, Inf., Post.)  [x] (07150) Provided manual therapy to mobilize LE, proximal hip and/or LS spine soft tissue/joints for the purpose of modulating pain, promoting relaxation,  increasing ROM, reducing/eliminating soft tissue swelling/inflammation/restriction, improving soft tissue extensibility and allowing for proper ROM for normal function with self care, mobility, lifting and ambulation. Modalities:  CP L knee x10'   [] GAME READY (VASO)- for significant edema, swelling, pain control. - knee x 15', CP ant knee. Long sit  - declined  Charges:  Timed Code Treatment Minutes: 40   Total Treatment Minutes: 55 indep ex, rest     BWC time in/time out:   (and requires time in and out for each CPT code)    [] EVAL (LOW) 74721 (typically 20 minutes face-to-face)  [] EVAL (MOD) 16234 (typically 30 minutes face-to-face)  [] EVAL (HIGH) 54503 (typically 45 minutes face-to-face)  [] RE-EVAL     [x] PQ(41729) x 2  [] IONTO  [x] NMR (45728) x0     [] VASO  [x] Manual (56760) x  1   [] Other:  [] TA x      [] Mech Traction (44710)  [] ES(attended) (86639)      [] ES (un) (85792):       GOALS: Patient stated goal: no pain and get back to baseball  [x]? Progressing: []? Met: []? Not Met: []? Adjusted     Therapist goals for Patient:   Short Term Goals: To be achieved in: 2 weeks  1. Independent in HEP and progression per patient tolerance, in order to prevent re-injury. [x]? Progressing: []? Met: []? Not Met: []? Adjusted  2. Patient will have a decrease in pain to facilitate improvement in movement, function, and ADLs as indicated by Functional Deficits. [x]? Progressing: []? Met: []? Not Met: []? Adjusted     Long Term Goals: To be achieved in: 8 weeks  1.  Disability index score of 35% or less for the LEFS to assist with reaching prior level of function. [x]? Progressing: []? Met: []? Not Met: []? Adjusted  2. Patient will demonstrate increased left knee AROM to 0-140 deg to allow for proper joint functioning for deeper squatting, kneeling, and stair ambulation. []? Progressing: [x]? Met: []? Not Met: []? Adjusted  3. Patient will demonstrate an increase in Strength to 5/5 for the left hip and knee musculature to return to normal CKC activities with good knee control, such as stairs, squatting, and light jogging. [x]? Progressing: []? Met: []? Not Met: []? Adjusted  4. Patient will return to at least 30 minutes of moderate physical activity (patient specific goal). [x]? Progressing: []? Met: []? Not Met: []? Adjusted  5. Patient will have improved ankle DF to 15 deg to promote normal tibial advancement and HS flexibility in the 90-90 position to 65 deg to reduce posterior pelvic tilt. [x]? Progressing: []? Met: []? Not Met: []? Adjusted        Progression Towards Functional goals:  [x] Patient is progressing as expected towards functional goals listed. [] Progression is slowed due to complexities listed. [] Progression has been slowed due to co-morbidities. [] Plan just implemented, too soon to assess goals progression  [] Other:     Overall Progression Towards Functional goals/ Treatment Progress Update:  [x] Patient is progressing as expected towards functional goals listed. [] Progression is slowed due to complexities/Impairments listed. [] Progression has been slowed due to co-morbidities.   [] Plan just implemented, too soon to assess goals progression <30days   [] Goals require adjustment due to lack of progress  [] Patient is not progressing as expected and requires additional follow up with physician  [] Other    Prognosis for POC: [x] Good [] Fair  [] Poor      Patient requires continued skilled intervention: [x] Yes  [] No    Treatment/Activity Tolerance:  [x] Patient able to complete treatment [] Patient limited by fatigue  [] Patient limited by pain    [] Patient limited by other medical complications  [] Other:     ASSESSMENT: Pt has retropatellar pain with squatting activities still, but not with weight machines, so have used this more for increasing quadricep strengthening. Return to Play: (if applicable)   []  Stage 1: Intro to Strength   []  Stage 2: Return to Run and Strength   []  Stage 3: Return to Jump and Strength   []  Stage 4: Dynamic Strength and Agility   []  Stage 5: Sport Specific Training     []  Ready to Return to Play, Meets All Above Stages   []  Not Ready for Return to Sports   Comments:                               PLAN:  [x] Continue per plan of care [] Alter current plan (see comments above)  [] Plan of care initiated [] Hold pending MD visit [] Discharge      Electronically signed by:  Stanford Swift, PT, DPT     Note: If patient does not return for scheduled/ recommended follow up visits, this note will serve as a discharge from care along with most recent update on progress. Access Code: [email or web address]   URL: [email or web address]   Date: 08/24/2020   Prepared by: Stanford Swift     Exercises   Standing Hamstring Stretch with Step - 3 sets - 30s hold - 2x daily - 7x weekly   Standing Gastroc Stretch - 3 sets - 30s hold - 2x daily - 7x weekly   Standing Quad Stretch with Table and Chair Support - 3 sets - 30s hold - 2x daily - 7x weekly   Sitting Heel Slide with Towel - 10 reps - 5s hold - 2x daily - 7x weekly   Long Sitting Quad Set - 10 reps - 10s hold - 1x daily - 7x weekly   Active Straight Leg Raise with Quad Set - 10 reps - 2 sets - 1x daily - 7x weekly   Supine Sciatic Nerve Glide - 20 reps - 2x daily - 7x weekly   Clamshell - 10 reps - 3 sets - 1x daily - 7x weekly   Sidelying Hip Abduction - 10 reps - 2 sets - 1x daily - 7x weekly   Standing Hamstring Curl with Chair Support - 10 reps - 2 sets - 1x daily - 7x weekly   Side Stepping with Resistance at Feet - 10 reps - 4 sets - 1x daily - 4x weekly

## 2020-09-24 ENCOUNTER — HOSPITAL ENCOUNTER (OUTPATIENT)
Dept: PHYSICAL THERAPY | Age: 60
Setting detail: THERAPIES SERIES
Discharge: HOME | End: 2020-09-24
Payer: COMMERCIAL

## 2020-09-24 PROCEDURE — 97110 THERAPEUTIC EXERCISES: CPT

## 2020-09-24 PROCEDURE — 97112 NEUROMUSCULAR REEDUCATION: CPT

## 2020-09-24 PROCEDURE — 97140 MANUAL THERAPY 1/> REGIONS: CPT

## 2020-09-24 PROCEDURE — 93268 ECG RECORD/REVIEW: CPT | Performed by: INTERNAL MEDICINE

## 2020-09-24 NOTE — FLOWSHEET NOTE
[facility],  [address]  Phone: [phone]  Fax [phone]    Physical Therapy Treatment Note/ Progress Report:           Date:  2020    Patient Name:  Mel Delacruz    :  1960  MRN: [id number]  Restrictions/Precautions:    Medical/Treatment Diagnosis Information:  · Diagnosis: I11.98 (AHK-57-OV) - Plica syndrome of knee, left; S83.232D (ICD-10-CM) - Complex tear of medial meniscus of left knee as current injury, subsequent encounter; S83.272D (ICD-10-CM) - Complex tear of lateral meniscus of left knee as current injury, subsequent encounter ; M65.9 (ICD-10-CM) - Synovitis of left knee  · Treatment Diagnosis: M25.562 left knee pain; M25.662 Left knee stiffness; R26.2 Difficulty in walking; M25.462 Left knee effusion  Insurance/Certification information:  PT Insurance Information:  BCBS  - 3000D-MET-/10-$0CP-40PT- NO AUTH  Physician Information:  Referring Practitioner: Dr. Caroline Stroud  Has the plan of care been signed (Y/N):        [x]  Yes  []  No     Date of Patient follow up with Physician: not scheduled    Is this a Progress Report:     [x]  Yes  []  No        If Yes:  Date Range for reporting period:  Beginning 20  Ending 20    Progress report will be due (10 Rx or 30 days whichever is less):     Recertification will be due (POC Duration  / 90 days whichever is less): 10/23      Visit # Insurance Allowable Requires auth   15- POC 31 (40, but 9 used prev)    [x]no        []yes:     Functional Scale: LEFS 64% disability  Date assessed:  20  LEFS 55% disability      20  LEFS 45% disability      20      Therapy Diagnosis/Practice Pattern:E      Number of Comorbidities:  []0     []1-2    [x]3+    Latex Allergy:  [x]NO      []YES  Preferred Language for Healthcare:   [x]English       []other:      Pain level: eval 3-10/10 Current pain 0/10, up to 7/10    SUBJECTIVE:  Pt 2x10  2x10 L50#   40# , then 35#- bar at F    Knee ext 90-40  30#    bike 5'  seat 17- indep    Manual Intervention (11932)      IASTM quad, HS, IT band, superior/inf, bevel 45 deg, patellar framing 10'     Patellar mobs sup and inf, med, lat 20xea      ITB S 2x30\" ea     posterior tibial glide GIII 15\"x4     Don compression stocking      Patellar \"tent\" x 2 + medial glide using leukotape     L hip joint mobs- inf and lateral distraction      KT medial tracking \"Y\" strip  No QDA          NMR re-education (72369)  CUES NEEDED    Step up to [facility] [place] prn    CC retro walk w/ A, lat, P toe taps R  CC walks lateral with A, L, P taps CL 40# x5  25# 5x R,L     FSU to toe tap BOSU flat down  KASSY light support, painful during, not after    Stair amb no HR  \" one HR                                    Therapeutic Activity (51263)                                                Therapeutic Exercise and NMR EXR  [x] (21419) Provided verbal/tactile cueing for activities related to strengthening, flexibility, endurance, ROM for improvements in LE, proximal hip, and core control with self care, mobility, lifting, ambulation.  [] (11252) Provided verbal/tactile cueing for activities related to improving balance, coordination, kinesthetic sense, posture, motor skill, proprioception  to assist with LE, proximal hip, and core control in self care, mobility, lifting, ambulation and eccentric single leg control.      NMR and Therapeutic Activities:    [] (94135 or 32047) Provided verbal/tactile cueing for activities related to improving balance, coordination, kinesthetic sense, posture, motor skill, proprioception and motor activation to allow for proper function of core, proximal hip and LE with self care and ADLs  [] (35463) Gait Re-education- Provided training and instruction to the patient for proper LE, core and proximal hip recruitment and positioning and eccentric body weight control with ambulation re-education including up and down stairs     Home Exercise Program:    [x] (09782) Reviewed/Progressed HEP activities related to strengthening, flexibility, endurance, ROM of core, proximal hip and LE for functional self-care, mobility, lifting and ambulation/stair navigation   [] (08951)Reviewed/Progressed HEP activities related to improving balance, coordination, kinesthetic sense, posture, motor skill, proprioception of core, proximal hip and LE for self care, mobility, lifting, and ambulation/stair navigation      Manual Treatments:  PROM / STM / Oscillations-Mobs:  G-I, II, III, IV (PA's, Inf., Post.)  [x] (92352) Provided manual therapy to mobilize LE, proximal hip and/or LS spine soft tissue/joints for the purpose of modulating pain, promoting relaxation,  increasing ROM, reducing/eliminating soft tissue swelling/inflammation/restriction, improving soft tissue extensibility and allowing for proper ROM for normal function with self care, mobility, lifting and ambulation. Modalities:  CP L knee x10'   [] GAME READY (VASO)- for significant edema, swelling, pain control. - knee x 15', CP ant knee. Long sit  - declined  Charges:  Timed Code Treatment Minutes: 45   Total Treatment Minutes: 55 bike, re-assess     BW time in/time out:   (and requires time in and out for each CPT code)    [] EVAL (LOW) 94517 (typically 20 minutes face-to-face)  [] EVAL (MOD) 99464 (typically 30 minutes face-to-face)  [] EVAL (HIGH) 28395 (typically 45 minutes face-to-face)  [] RE-EVAL     [x] IG(82265) x 1  [] IONTO  [x] NMR (62633) x1     [] VASO  [x] Manual (56731) x  1   [] Other:  [] TA x      [] Mech Traction (22424)  [] ES(attended) (89534)      [] ES (un) (00760):       GOALS: Patient stated goal: no pain and get back to baseball  [x]? Progressing: []? Met: []? Not Met: []? Adjusted     Therapist goals for Patient:   Short Term Goals: To be achieved in: 2 weeks  1.  Independent in HEP and progression per patient tolerance, in order to prevent re-injury. [x]? Progressing: []? Met: []? Not Met: []? Adjusted  2. Patient will have a decrease in pain to facilitate improvement in movement, function, and ADLs as indicated by Functional Deficits. [x]? Progressing: []? Met: []? Not Met: []? Adjusted     Long Term Goals: To be achieved in: 8 weeks  1. Disability index score of 35% or less for the LEFS to assist with reaching prior level of function. [x]? Progressing: []? Met: []? Not Met: []? Adjusted  2. Patient will demonstrate increased left knee AROM to 0-140 deg to allow for proper joint functioning for deeper squatting, kneeling, and stair ambulation. []? Progressing: [x]? Met: []? Not Met: []? Adjusted  3. Patient will demonstrate an increase in Strength to 5/5 for the left hip and knee musculature to return to normal CKC activities with good knee control, such as stairs, squatting, and light jogging. [x]? Progressing: []? Met: []? Not Met: []? Adjusted  4. Patient will return to at least 30 minutes of moderate physical activity (patient specific goal). [x]? Progressing: []? Met: []? Not Met: []? Adjusted  5. Patient will have improved ankle DF to 15 deg to promote normal tibial advancement and HS flexibility in the 90-90 position to 65 deg to reduce posterior pelvic tilt. [x]? Progressing: []? Met: []? Not Met: []? Adjusted        Progression Towards Functional goals:  [x] Patient is progressing as expected towards functional goals listed. [] Progression is slowed due to complexities listed. [] Progression has been slowed due to co-morbidities. [] Plan just implemented, too soon to assess goals progression  [] Other:     Overall Progression Towards Functional goals/ Treatment Progress Update:  [x] Patient is progressing as expected towards functional goals listed. [] Progression is slowed due to complexities/Impairments listed. [] Progression has been slowed due to co-morbidities.   [] Plan just implemented, too soon to assess goals progression <30days   [] Goals require adjustment due to lack of progress  [] Patient is not progressing as expected and requires additional follow up with physician  [] Other    Prognosis for POC: [x] Good [] Fair  [] Poor      Patient requires continued skilled intervention: [x] Yes  [] No    Treatment/Activity Tolerance:  [x] Patient able to complete treatment  [] Patient limited by fatigue  [] Patient limited by pain    [] Patient limited by other medical complications  [] Other:     ASSESSMENT: Pt has retropatellar pain with squatting activities still, but not with weight machines, so have used this more for increasing quadricep strengthening. He is progressing well toward his goals, but will benefit from continued quadricep strengthening, particularly for activities that require eccentric quad control: stairs, walking down hill and sitting in a chair. Return to Play: (if applicable)   []  Stage 1: Intro to Strength   []  Stage 2: Return to Run and Strength   []  Stage 3: Return to Jump and Strength   []  Stage 4: Dynamic Strength and Agility   []  Stage 5: Sport Specific Training     []  Ready to Return to Play, Meets All Above Stages   []  Not Ready for Return to Sports   Comments:                               PLAN: continue 2x/week x 4 weeks  [x] Continue per plan of care [] Alter current plan (see comments above)  [] Plan of care initiated [] Hold pending MD visit [] Discharge      Electronically signed by:  May Lopez PT, DPT     Note: If patient does not return for scheduled/ recommended follow up visits, this note will serve as a discharge from care along with most recent update on progress. Access Code: [email or web address]   URL: [email or web address]/   Date: 08/24/2020   Prepared by: May Lopez     Exercises   Standing Hamstring Stretch with Step - 3 sets - 30s hold - 2x daily - 7x weekly   Standing Gastroc Stretch - 3 sets - 30s hold - 2x daily - 7x weekly   Standing Quad Stretch with Table and Chair Support - 3 sets - 30s hold - 2x daily - 7x weekly   Sitting Heel Slide with Towel - 10 reps - 5s hold - 2x daily - 7x weekly   Long Sitting Quad Set - 10 reps - 10s hold - 1x daily - 7x weekly   Active Straight Leg Raise with Quad Set - 10 reps - 2 sets - 1x daily - 7x weekly   Supine Sciatic Nerve Glide - 20 reps - 2x daily - 7x weekly   Clamshell - 10 reps - 3 sets - 1x daily - 7x weekly   Sidelying Hip Abduction - 10 reps - 2 sets - 1x daily - 7x weekly   Standing Hamstring Curl with Chair Support - 10 reps - 2 sets - 1x daily - 7x weekly   Side Stepping with Resistance at Feet - 10 reps - 4 sets - 1x daily - 4x weekly

## 2020-09-25 ENCOUNTER — TELEPHONE (OUTPATIENT)
Dept: CARDIOLOGY CLINIC | Age: 60
End: 2020-09-25

## 2020-09-28 ENCOUNTER — HOSPITAL ENCOUNTER (OUTPATIENT)
Dept: PHYSICAL THERAPY | Age: 60
Setting detail: THERAPIES SERIES
Discharge: HOME | End: 2020-09-28
Payer: COMMERCIAL

## 2020-09-28 PROCEDURE — 97110 THERAPEUTIC EXERCISES: CPT

## 2020-09-28 PROCEDURE — 97140 MANUAL THERAPY 1/> REGIONS: CPT

## 2020-09-28 NOTE — PLAN OF CARE
dysfunction   []other:       Prognosis/Rehab Potential:    []Excellent   [x]Good    []Fair   []Poor: Toleration of evaluation or treatment:    []Excellent   [x]Good    []Fair   []Poor     New or Updated Goals (if applicable):  [] No change to goals established upon initial eval/last progress note:  New Goals:    GOALS:   Patient stated goal: no pain and get back to baseball  [x]? ? Progressing: []?? Met: []?? Not Met: []?? Adjusted     Therapist goals for Patient:   Short Term Goals: To be achieved in: 2 weeks  1. Independent in HEP and progression per patient tolerance, in order to prevent re-injury. [x]? ? Progressing: []?? Met: []?? Not Met: []?? Adjusted  2. Patient will have a decrease in pain to facilitate improvement in movement, function, and ADLs as indicated by Functional Deficits. [x]? ? Progressing: []?? Met: []?? Not Met: []?? Adjusted     Long Term Goals: To be achieved in: 8 weeks  1. Disability index score of 35% or less for the LEFS to assist with reaching prior level of function. [x]? ? Progressing: []?? Met: []?? Not Met: []?? Adjusted  2. Patient will demonstrate increased left knee AROM to 0-140 deg to allow for proper joint functioning for deeper squatting, kneeling, and stair ambulation.   []?? Progressing: [x]? ? Met: []?? Not Met: []?? Adjusted  3. Patient will demonstrate an increase in Strength to 5/5 for the left hip and knee musculature to return to normal CKC activities with good knee control, such as stairs, squatting, and light jogging. [x]? ? Progressing: []?? Met: []?? Not Met: []?? Adjusted  4. Patient will return to at least 30 minutes of moderate physical activity (patient specific goal).   [x]? ? Progressing: []?? Met: []?? Not Met: []?? Adjusted  5. Patient will have improved ankle DF to 15 deg to promote normal tibial advancement and HS flexibility in the 90-90 position to 65 deg to reduce posterior pelvic tilt.   [x]? ? Progressing: []?? Met: []?? Not Met: []?? Adjusted        Rehab Potential:   []Excellent   [x] Good   [] Fair   [] Poor    Plan of Care:  [x] Continue Current Therapy Intervention    Frequency/Duration:  2 days per week for 4 Weeks:  HEP instruction:   1. Ther ex including: strength training, ROM, NMR and proprioception for the proximal hip, core and Lower extremity  2. Manual therapy as indicated including Dry Needling/IASTM, STM, PROM, Gr I-IV mobilizations, spinal mobilization/manipulation. 3. Modalities as needed including: thermal agents, E-stim, US, iontophoresis as indicated. 4. Patient education on joint protection, activity modification, progression of HEP.         Electronically signed by:  Lakeisha Hernandez, PT, DPT

## 2020-09-28 NOTE — FLOWSHEET NOTE
[facility],  [address] [place]  Phone: [phone]  Fax [phone]    Physical Therapy Treatment Note/ Progress Report:           Date:  2020    Patient Name:  Yari Sunshine    :  1960  MRN: [id number]  Restrictions/Precautions:    Medical/Treatment Diagnosis Information:  · Diagnosis: D60.08 (SVP-09-IH) - Plica syndrome of knee, left; S83.232D (ICD-10-CM) - Complex tear of medial meniscus of left knee as current injury, subsequent encounter; S83.272D (ICD-10-CM) - Complex tear of lateral meniscus of left knee as current injury, subsequent encounter ; M65.9 (ICD-10-CM) - Synovitis of left knee  · Treatment Diagnosis: M25.562 left knee pain; M25.662 Left knee stiffness; R26.2 Difficulty in walking; M25.462 Left knee effusion  Insurance/Certification information:  PT Insurance Information:  BCBS  - 3000D-MET-90/10-$0CP-40PT- NO AUTH  Physician Information:  Referring Practitioner: Dr. Nellie Figueroa  Has the plan of care been signed (Y/N):        [x]  Yes  []  No     Date of Patient follow up with Physician: not scheduled    Is this a Progress Report:     [x]  Yes  []  No        If Yes:  Date Range for reporting period:  Beginning 20  Ending 20    Progress report will be due (10 Rx or 30 days whichever is less):     Recertification will be due (POC Duration  / 90 days whichever is less): 10/23      Visit # Insurance Allowable Requires auth   15- POC 31 (40, but 9 used prev)    [x]no        []yes:     Functional Scale: LEFS 64% disability  Date assessed:  20  LEFS 55% disability      20  LEFS 45% disability      20      Therapy Diagnosis/Practice Pattern:E      Number of Comorbidities:  []0     []1-2    [x]3+    Latex Allergy:  [x]NO      []YES  Preferred Language for Healthcare:   [x]English       []other:      Pain level: eval 3-10/10 Current pain 1/10    SUBJECTIVE:  Pt reports that he had about 1/10 pain in his knee over the weekend, which is pretty good. He didn't do any yardwork. OBJECTIVE:    Observation:    Test measurements:  Knee flexion 148, knee ext 0      MMT knee extension L 46.6#, R 41.4# Hip abduction L 33.1 # R, 39.2#    RESTRICTIONS/PRECAUTIONS: A-fib, previous L,R knee arthroscopies; chronic LBP; hx prostate cancer  PROCEDURE PERFORMED 7/17:  Left knee diagnostic video arthroscopy,  arthroscopic partial medial meniscectomy, partial lateral meniscectomy,  medial plica excision, and anterior synovectomy and fat pad excision. Exercises/Interventions:     Therapeutic Ex (02318) Sets/sec Notes/CUES HEP   Pt ed; decreasing amount of yardwork at a time- split tasks between days, not exercising through pain            Standing HS 3-way on steps 30\" R,L X   Standing gastroc stretch on wedge  X   Standing quad stretch oncage 30\"x2 R,LCore act VC with transitions X   QL stretch cage     Supine figure 4 stretch     Supine HL TA act     TA act + log roll Cues not to perf a sit-up    Heel slide supine c strap 5\"x10  X   Quad set  X   SLR 2# X   SAQ c adductor sq 2#    Sciatic nerve glide- supine, ankle PF with knee f/e   X   SL hip ABD  X   clamshell 2# on thigh X   Seated SWB s lumbar flex, RSB, LSB a     Seated LAQ w/ add  2# -easy, no pain          SWB wall sit + pulses      Side step.  Fwd/bwd monster walk HEP 9/10   Hip ABD iso      Mini squat  2UE support    Step up fwd,   Up and over  LSU  Heel tap lateral 4\"  Heel tap fwd 4\"  pn w/ down       one UE support, stopped d/t pain          Glider ext, ABD/ex  Painful L stance    Standing HSC  2#-  X   TKE  KASSY hip ABD    Hip ext      60# heavy    LP 0-90 deg +add   140# 2x12 DL  90# 2x12 ECC   Pillow for head   HSC  \" Ecc 2x12  2x10 L50#   40# , bar at F    Knee ext 90-40  30#    bike 5'  seat 17- indep    Manual Intervention (76916)      IASTM quad, HS, IT band, superior/inf, bevel 45 deg, patellar framing 5'     Patellar mobs sup and inf, med, lat 20xea      ITB S 2x30\" ea     posterior tibial glide GIII 15\"x4     Don compression stocking      Patellar \"tent\" x 2 + medial glide using leukotape     L hip joint mobs- inf and lateral distraction      KT medial tracking \"Y\" strip  No QDA          NMR re-education (06816)  CUES NEEDED    Step up to stork DD 5\"x10  KASSY prn, mild pain last few, but better than BOSU    CC retro walk w/ A, lat, P toe taps R  CC walks lateral with A, L, P taps CL 40# x5  25# 5x R,L     FSU to toe tap BOSU flat down  KASSY light support, painful during, not after    Stair amb no HR  \" one HR                                    Therapeutic Activity (57316)                                                Therapeutic Exercise and NMR EXR  [x] (87887) Provided verbal/tactile cueing for activities related to strengthening, flexibility, endurance, ROM for improvements in LE, proximal hip, and core control with self care, mobility, lifting, ambulation.  [] (13453) Provided verbal/tactile cueing for activities related to improving balance, coordination, kinesthetic sense, posture, motor skill, proprioception  to assist with LE, proximal hip, and core control in self care, mobility, lifting, ambulation and eccentric single leg control.      NMR and Therapeutic Activities:    [] (69839 or 99248) Provided verbal/tactile cueing for activities related to improving balance, coordination, kinesthetic sense, posture, motor skill, proprioception and motor activation to allow for proper function of core, proximal hip and LE with self care and ADLs  [] (01495) Gait Re-education- Provided training and instruction to the patient for proper LE, core and proximal hip recruitment and positioning and eccentric body weight control with ambulation re-education including up and down stairs     Home Exercise Program:    [x] (22638) Reviewed/Progressed HEP activities related to strengthening, flexibility, endurance, ROM of core, proximal hip and LE for Functional Deficits. [x]? Progressing: []? Met: []? Not Met: []? Adjusted     Long Term Goals: To be achieved in: 8 weeks  1. Disability index score of 35% or less for the LEFS to assist with reaching prior level of function. [x]? Progressing: []? Met: []? Not Met: []? Adjusted  2. Patient will demonstrate increased left knee AROM to 0-140 deg to allow for proper joint functioning for deeper squatting, kneeling, and stair ambulation. []? Progressing: [x]? Met: []? Not Met: []? Adjusted  3. Patient will demonstrate an increase in Strength to 5/5 for the left hip and knee musculature to return to normal CKC activities with good knee control, such as stairs, squatting, and light jogging. [x]? Progressing: []? Met: []? Not Met: []? Adjusted  4. Patient will return to at least 30 minutes of moderate physical activity (patient specific goal). [x]? Progressing: []? Met: []? Not Met: []? Adjusted  5. Patient will have improved ankle DF to 15 deg to promote normal tibial advancement and HS flexibility in the 90-90 position to 65 deg to reduce posterior pelvic tilt. [x]? Progressing: []? Met: []? Not Met: []? Adjusted        Progression Towards Functional goals:  [x] Patient is progressing as expected towards functional goals listed. [] Progression is slowed due to complexities listed. [] Progression has been slowed due to co-morbidities. [] Plan just implemented, too soon to assess goals progression  [] Other:     Overall Progression Towards Functional goals/ Treatment Progress Update:  [x] Patient is progressing as expected towards functional goals listed. [] Progression is slowed due to complexities/Impairments listed. [] Progression has been slowed due to co-morbidities.   [] Plan just implemented, too soon to assess goals progression <30days   [] Goals require adjustment due to lack of progress  [] Patient is not progressing as expected and requires additional follow up with physician  [] Other    Prognosis for POC: [x] Good [] Fair  [] Poor      Patient requires continued skilled intervention: [x] Yes  [] No    Treatment/Activity Tolerance:  [x] Patient able to complete treatment  [] Patient limited by fatigue  [] Patient limited by pain    [] Patient limited by other medical complications  [] Other:     ASSESSMENT: Pt has retropatellar pain with squatting activities still, but not with weight machines, so have used this more for increasing quadricep strengthening. He is progressing well toward his goals, but will benefit from continued quadricep strengthening, particularly for activities that require eccentric quad control: stairs, walking down hill and sitting in a chair. Mild reports of pain with DD step up (while balancing) and also with lateral walking on CC (L leading), but pain does not last after the exercise is completed. Return to Play: (if applicable)   []  Stage 1: Intro to Strength   []  Stage 2: Return to Run and Strength   []  Stage 3: Return to Jump and Strength   []  Stage 4: Dynamic Strength and Agility   []  Stage 5: Sport Specific Training     []  Ready to Return to Play, Meets All Above Stages   []  Not Ready for Return to Sports   Comments:                               PLAN: continue 2x/week x 4 weeks  [x] Continue per plan of care [] Alter current plan (see comments above)  [] Plan of care initiated [] Hold pending MD visit [] Discharge      Electronically signed by:  Sonal Nation, PT, DPT     Note: If patient does not return for scheduled/ recommended follow up visits, this note will serve as a discharge from care along with most recent update on progress. Access Code: [email or web address]   URL: "[employer] com/   Date: 08/24/2020   Prepared by: Sonal Nation     Exercises   Standing Hamstring Stretch with Step - 3 sets - 30s hold - 2x daily - 7x weekly   Standing Gastroc Stretch - 3 sets - 30s hold - 2x daily - 7x weekly   Standing Quad Stretch with Table and Chair Support - 3 sets - 30s hold - 2x daily - 7x weekly   Sitting Heel Slide with Towel - 10 reps - 5s hold - 2x daily - 7x weekly   Long Sitting Quad Set - 10 reps - 10s hold - 1x daily - 7x weekly   Active Straight Leg Raise with Quad Set - 10 reps - 2 sets - 1x daily - 7x weekly   Supine Sciatic Nerve Glide - 20 reps - 2x daily - 7x weekly   Clamshell - 10 reps - 3 sets - 1x daily - 7x weekly   Sidelying Hip Abduction - 10 reps - 2 sets - 1x daily - 7x weekly   Standing Hamstring Curl with Chair Support - 10 reps - 2 sets - 1x daily - 7x weekly   Side Stepping with Resistance at Feet - 10 reps - 4 sets - 1x daily - 4x weekly

## 2020-09-30 ENCOUNTER — OFFICE VISIT (OUTPATIENT)
Dept: FAMILY MEDICINE CLINIC | Age: 60
End: 2020-09-30
Payer: COMMERCIAL

## 2020-09-30 VITALS
BODY MASS INDEX: 30.54 KG/M2 | DIASTOLIC BLOOD PRESSURE: 72 MMHG | OXYGEN SATURATION: 97 % | SYSTOLIC BLOOD PRESSURE: 122 MMHG | TEMPERATURE: 97.3 F | WEIGHT: 219 LBS | HEART RATE: 52 BPM

## 2020-09-30 PROBLEM — Z87.19 HISTORY OF GI BLEED: Status: ACTIVE | Noted: 2020-09-30

## 2020-09-30 PROBLEM — K92.2 GIB (GASTROINTESTINAL BLEEDING): Status: RESOLVED | Noted: 2020-02-01 | Resolved: 2020-09-30

## 2020-09-30 PROBLEM — R25.1 TREMOR: Status: ACTIVE | Noted: 2020-09-30

## 2020-09-30 LAB
A/G RATIO: 2.1 (ref 1.1–2.2)
ALBUMIN SERPL-MCNC: 4.4 G/DL (ref 3.4–5)
ALP BLD-CCNC: 81 U/L (ref 40–129)
ALT SERPL-CCNC: 23 U/L (ref 10–40)
ANION GAP SERPL CALCULATED.3IONS-SCNC: 10 MMOL/L (ref 3–16)
AST SERPL-CCNC: 18 U/L (ref 15–37)
BILIRUB SERPL-MCNC: 0.4 MG/DL (ref 0–1)
BUN BLDV-MCNC: 15 MG/DL (ref 7–20)
CALCIUM SERPL-MCNC: 9.1 MG/DL (ref 8.3–10.6)
CHLORIDE BLD-SCNC: 106 MMOL/L (ref 99–110)
CHOLESTEROL, TOTAL: 186 MG/DL (ref 0–199)
CO2: 27 MMOL/L (ref 21–32)
CREAT SERPL-MCNC: 1.1 MG/DL (ref 0.8–1.3)
GFR AFRICAN AMERICAN: >60
GFR NON-AFRICAN AMERICAN: >60
GLOBULIN: 2.1 G/DL
GLUCOSE BLD-MCNC: 94 MG/DL (ref 70–99)
HBA1C MFR BLD: 6.2 %
HDLC SERPL-MCNC: 54 MG/DL (ref 40–60)
LDL CHOLESTEROL CALCULATED: 115 MG/DL
POTASSIUM SERPL-SCNC: 4.6 MMOL/L (ref 3.5–5.1)
SODIUM BLD-SCNC: 143 MMOL/L (ref 136–145)
TOTAL PROTEIN: 6.5 G/DL (ref 6.4–8.2)
TRIGL SERPL-MCNC: 85 MG/DL (ref 0–150)
TSH SERPL DL<=0.05 MIU/L-ACNC: 2.5 UIU/ML (ref 0.27–4.2)
VLDLC SERPL CALC-MCNC: 17 MG/DL

## 2020-09-30 PROCEDURE — 99214 OFFICE O/P EST MOD 30 MIN: CPT | Performed by: FAMILY MEDICINE

## 2020-09-30 PROCEDURE — 90686 IIV4 VACC NO PRSV 0.5 ML IM: CPT | Performed by: FAMILY MEDICINE

## 2020-09-30 PROCEDURE — 83036 HEMOGLOBIN GLYCOSYLATED A1C: CPT | Performed by: FAMILY MEDICINE

## 2020-09-30 PROCEDURE — 90471 IMMUNIZATION ADMIN: CPT | Performed by: FAMILY MEDICINE

## 2020-09-30 RX ORDER — ATORVASTATIN CALCIUM 20 MG/1
TABLET, FILM COATED ORAL
Qty: 90 TABLET | Refills: 1 | Status: SHIPPED | OUTPATIENT
Start: 2020-09-30 | End: 2021-05-12 | Stop reason: SDUPTHER

## 2020-09-30 RX ORDER — BUTALBITAL, ACETAMINOPHEN AND CAFFEINE 300; 40; 50 MG/1; MG/1; MG/1
CAPSULE ORAL
COMMUNITY
Start: 2020-09-24

## 2020-09-30 NOTE — PROGRESS NOTES
SUBJECTIVE:  Joice Lombard is a 61 y.o. male who presents for evaluation of elevated blood sugar, paroxysmal A. fib, cardiomyopathy, history of GI bleed, tremor, history of prostate cancer, hypertension and hyperlipidemia. He indicates that he is feeling well and denies any symptoms referable to his elevated blood pressure. Specifically denies chest pain, palpitations, dyspnea, orthopnea, PND or peripheral edema or neuro sx. No anorexia,  or leg cramps noted. Current medication regimen is as listed below. He denies any side effects of medication, and has been taking it regularly. Lab Results   Component Value Date    [address] 75 02/04/2020       Patient was last seen in a hospital follow-up after a GI bleed. At that time he was no longer taking Eliquis. He did restarted and then had an episode of hematuria. Again Eliquis was briefly stopped. He was evaluated by his oncologist.  He is now back on Eliquis. He has not had no further bleeding. His last A1c was 5.9. He had knee surgery recently he is currently in physical therapy. He is not back to his usual exercise level. He sees cardiology for paroxysmal A. fib and cardiomyopathy. His medications were reviewed. At patient's last appointment, he was referred to neurology for tremor. He was seen by Dr. Parul Owens. That evaluation included an MRI of the brain and thoracic spine. Patient is on amiodarone, we need to continue to monitor his thyroid functioning.   Patient has been using CPAP machine for years and finds it helpful    Current Outpatient Medications   Medication Sig Dispense Refill    butalbital-APAP-caffeine -40 MG CAPS per capsule TK ONE C PO  Q 12 H PRF SEVERE HEADACHE      atorvastatin (LIPITOR) 20 MG tablet TAKE 1 TABLET BY MOUTH ONE TIME A DAY 90 tablet 1    verapamil (VERELAN) 240 MG extended release capsule TAKE 1 CAPSULE BY MOUTH EVERY NIGHT 90 capsule 1    omeprazole (PRILOSEC) 40 MG delayed release capsule TAKE 1 CAPSULE BY MOUTH EVERY DAY 90 capsule 0    verapamil (CALAN SR) 240 MG extended release tablet Take 240 mg by mouth daily      amiodarone (CORDARONE) 200 MG tablet Take 0.5 tablets by mouth daily 45 tablet 0    ELIQUIS 5 MG TABS tablet TAKE 1 TABLET BY MOUTH TWICE A  tablet 0    clonazePAM (KLONOPIN) 1 MG tablet Take 1 mg by mouth daily. Current Facility-Administered Medications   Medication Dose Route Frequency Provider Last Rate Last Dose    sodium hyaluronate (viscosup) injection 20 mg  20 mg Intra-articular Once [place], [place]           Allergies   Allergen Reactions    Niacin And Related      Extreme itching /stinging started at head to waist       Social History     Tobacco Use    Smoking status: Never Smoker    Smokeless tobacco: Never Used   Substance Use Topics    Alcohol use: Yes     Alcohol/week: 0.0 - 1.0 standard drinks       OBJECTIVE:   /72   Pulse 52   Temp 97.3 °F (36.3 °C)   Wt 219 lb (99.3 kg)   SpO2 97%   BMI 30.54 kg/m²   NAD  Neck no bruit or JVD  S1 and S2 normal, no murmurs, clicks, gallops or rubs. Regular rate and rhythm. Chest is clear; no wheezes or rales. No edema or JVD. Neuro alert, no CN or motor deficits  Psych nl mood, thought and judgement    ASSESSMENT:   Diagnosis Orders   1. Elevated blood sugar, today 6.2 up slightly. Discussed low-carb diet POCT glycosylated hemoglobin (Hb A1C)   2. Hyperlipidemia, unspecified hyperlipidemia type, labs pending atorvastatin (LIPITOR) 20 MG tablet    Lipid Panel    Comprehensive Metabolic Panel   3. Need for influenza vaccination  INFLUENZA, QUADV, 3 YRS AND OLDER, IM PF, PREFILL SYR OR SDV, 0.5ML (AFLURIA QUADV, PF)   4. Paroxysmal atrial fibrillation ([facility] [address] currently in sinus rhythm follow-up with cardiology TSH without Reflex   5. Hypertrophic cardiomyopathy ([facility] [address]     6. Obstructive sleep apnea   continue CPAP, managed by pulmonology   7. History of GI bleed   no active bleeding, continue PPI   8.  Tremor   plan per neurology        Plan:  1)  Medication: continue current medication regimen unchanged  2)  Recheck in 6 months, sooner should new symptoms or problems arise.   3) LLR

## 2020-09-30 NOTE — PROGRESS NOTES
Vaccine Information Sheet, \"Influenza - Inactivated\"  given to Vini Bueno, or parent/legal guardian of  Vini Bueno and verbalized understanding. Patient responses:    Have you ever had a reaction to a flu vaccine? No  Do you have any current illness? No  Have you ever had [place] Syndrome? No  Do you have a serious allergy to any of the follow: Neomycin, Polymyxin, Thimerosal, eggs or egg products? No    Flu vaccine given per order. Please see immunization tab. Risks and benefits explained. Current VIS given.

## 2020-10-01 ENCOUNTER — HOSPITAL ENCOUNTER (OUTPATIENT)
Dept: PHYSICAL THERAPY | Age: 60
Setting detail: THERAPIES SERIES
Discharge: HOME | End: 2020-10-01
Payer: COMMERCIAL

## 2020-10-01 PROCEDURE — 97110 THERAPEUTIC EXERCISES: CPT

## 2020-10-01 PROCEDURE — 97140 MANUAL THERAPY 1/> REGIONS: CPT

## 2020-10-01 PROCEDURE — 97112 NEUROMUSCULAR REEDUCATION: CPT

## 2020-10-01 NOTE — FLOWSHEET NOTE
about 3/10 soreness below his knee cap yesterday for no reason. He is still not getting any sharp pain in his knee and is pleased with that. OBJECTIVE:    Observation:    Test measurements:  Knee flexion 148, knee ext 0          RESTRICTIONS/PRECAUTIONS: A-fib, previous L,R knee arthroscopies; chronic LBP; hx prostate cancer  PROCEDURE PERFORMED 7/17:  Left knee diagnostic video arthroscopy,  arthroscopic partial medial meniscectomy, partial lateral meniscectomy,  medial plica excision, and anterior synovectomy and fat pad excision. Exercises/Interventions:     Therapeutic Ex (65687) Sets/sec Notes/CUES HEP   Pt ed; decreasing amount of yardwork at a time- split tasks between days, not exercising through pain            Standing HS 3-way on steps 30\" R,L X   Standing gastroc stretch on wedge 30\"x2 X   Standing quad stretch oncage 30\"x2 R,LCore act VC with transitions X   QL stretch cage     Supine figure 4 stretch     Supine HL TA act     TA act + log roll Cues not to perf a sit-up    Heel slide supine c strap 5\"x10  X   Quad set  X   SLR 2# X   SAQ c adductor sq 2#    Sciatic nerve glide- supine, ankle PF with knee f/e   X   SL hip ABD  X   clamshell 2# on thigh X   Seated SWB s lumbar flex, RSB, LSB a     Seated LAQ w/ add  2# -easy, no pain          SWB wall sit  DL--> SL 10x3\" Cues to decr knee valgus    Side step.  Fwd/bwd monster walk HEP 9/10   Hip ABD iso      Mini squat  2UE support    Step up fwd,   Up and over  LSU  Heel tap lateral 4\"  Heel tap fwd 4\"  pn w/ down       one UE support, stopped d/t pain          Glider ext, ABD/ex  Painful L stance    Standing HSC  2#-  X   TKE  KASSY hip ABD    Hip ext      60# heavy    LP 0-90 deg +add   140# 2x12 DL  100# 2x12 ECC   Pillow for head   HSC  \" Ecc 2x12  2x10 L50#   40# , bar at F    Knee ext 90-40  30#    bike 5'  seat 17- indep    Manual Intervention (10776)      IASTM quad, HS, IT band, superior/inf, bevel 45 deg, patellar framing 5'     Patellar mobs sup and inf, med, lat 20xea      ITB S 2x30\" ea     posterior tibial glide GIII 15\"x4     Don compression stocking      Patellar \"tent\" x 2 + medial glide using leukotape     L hip joint mobs- inf and lateral distraction      KT medial tracking \"Y\" strip  No QDA          NMR re-education (29794)  CUES NEEDED    Step up to stork airex 5\"x10  KASSY prn, pain when surface is very unsteady- BOSU or DD    CC retro walk w/ A, lat, P toe taps R  CC walks lateral with A, L, P taps CL 45# x5  25# 5x R,L     FSU to toe tap BOSU flat down  KASSY light support, painful during, not after    Stair amb no HR  \" one HR                                    Therapeutic Activity (44229)                                                Therapeutic Exercise and NMR EXR  [x] (73146) Provided verbal/tactile cueing for activities related to strengthening, flexibility, endurance, ROM for improvements in LE, proximal hip, and core control with self care, mobility, lifting, ambulation.  [] (56961) Provided verbal/tactile cueing for activities related to improving balance, coordination, kinesthetic sense, posture, motor skill, proprioception  to assist with LE, proximal hip, and core control in self care, mobility, lifting, ambulation and eccentric single leg control.      NMR and Therapeutic Activities:    [] (25049 or 03264) Provided verbal/tactile cueing for activities related to improving balance, coordination, kinesthetic sense, posture, motor skill, proprioception and motor activation to allow for proper function of core, proximal hip and LE with self care and ADLs  [] (37734) Gait Re-education- Provided training and instruction to the patient for proper LE, core and proximal hip recruitment and positioning and eccentric body weight control with ambulation re-education including up and down stairs     Home Exercise Program:    [x] (26463) Reviewed/Progressed HEP activities related to strengthening, flexibility, endurance, ROM of core, proximal hip and LE for functional self-care, mobility, lifting and ambulation/stair navigation   [] (86728)Reviewed/Progressed HEP activities related to improving balance, coordination, kinesthetic sense, posture, motor skill, proprioception of core, proximal hip and LE for self care, mobility, lifting, and ambulation/stair navigation      Manual Treatments:  PROM / STM / Oscillations-Mobs:  G-I, II, III, IV (PA's, Inf., Post.)  [x] (01469) Provided manual therapy to mobilize LE, proximal hip and/or LS spine soft tissue/joints for the purpose of modulating pain, promoting relaxation,  increasing ROM, reducing/eliminating soft tissue swelling/inflammation/restriction, improving soft tissue extensibility and allowing for proper ROM for normal function with self care, mobility, lifting and ambulation. Modalities:  CP L knee x10'   [] GAME READY (VASO)- for significant edema, swelling, pain control. - knee x 15', CP ant knee. Long sit  - declined  Charges:  Timed Code Treatment Minutes: 40   Total Treatment Minutes:  50(bike, indep ex)     [facility] time in/time out:   (and requires time in and out for each CPT code)    [] EVAL (LOW) 59047 (typically 20 minutes face-to-face)  [] EVAL (MOD) 20341 (typically 30 minutes face-to-face)  [] EVAL (HIGH) 70352 (typically 45 minutes face-to-face)  [] RE-EVAL     [x] AU(56605) x 1  [] IONTO  [x] NMR (18813) x  1  [] VASO  [x] Manual (55059) x  1   [] Other:  [] TA x      [] Mech Traction (62805)  [] ES(attended) (87495)      [] ES (un) (20922):       GOALS: Patient stated goal: no pain and get back to baseball  [x]? Progressing: []? Met: []? Not Met: []? Adjusted     Therapist goals for Patient:   Short Term Goals: To be achieved in: 2 weeks  1. Independent in HEP and progression per patient tolerance, in order to prevent re-injury. [x]? Progressing: []? Met: []? Not Met: []? Adjusted  2.  Patient will have a decrease in pain to facilitate improvement in movement, function, and ADLs as indicated by Functional Deficits. [x]? Progressing: []? Met: []? Not Met: []? Adjusted     Long Term Goals: To be achieved in: 8 weeks  1. Disability index score of 35% or less for the LEFS to assist with reaching prior level of function. [x]? Progressing: []? Met: []? Not Met: []? Adjusted  2. Patient will demonstrate increased left knee AROM to 0-140 deg to allow for proper joint functioning for deeper squatting, kneeling, and stair ambulation. []? Progressing: [x]? Met: []? Not Met: []? Adjusted  3. Patient will demonstrate an increase in Strength to 5/5 for the left hip and knee musculature to return to normal CKC activities with good knee control, such as stairs, squatting, and light jogging. [x]? Progressing: []? Met: []? Not Met: []? Adjusted  4. Patient will return to at least 30 minutes of moderate physical activity (patient specific goal). [x]? Progressing: []? Met: []? Not Met: []? Adjusted  5. Patient will have improved ankle DF to 15 deg to promote normal tibial advancement and HS flexibility in the 90-90 position to 65 deg to reduce posterior pelvic tilt. [x]? Progressing: []? Met: []? Not Met: []? Adjusted        Progression Towards Functional goals:  [x] Patient is progressing as expected towards functional goals listed. [] Progression is slowed due to complexities listed. [] Progression has been slowed due to co-morbidities. [] Plan just implemented, too soon to assess goals progression  [] Other:     Overall Progression Towards Functional goals/ Treatment Progress Update:  [x] Patient is progressing as expected towards functional goals listed. [] Progression is slowed due to complexities/Impairments listed. [] Progression has been slowed due to co-morbidities.   [] Plan just implemented, too soon to assess goals progression <30days   [] Goals require adjustment due to lack of progress  [] Patient is not progressing as expected and requires additional follow up with physician  [] Other    Prognosis for POC: [x] Good [] Fair  [] Poor      Patient requires continued skilled intervention: [x] Yes  [] No    Treatment/Activity Tolerance:  [x] Patient able to complete treatment  [] Patient limited by fatigue  [] Patient limited by pain    [] Patient limited by other medical complications  [] Other:     ASSESSMENT: Pt has retropatellar pain with squatting activities still, but not with weight machines, so have used this more for increasing quadricep strengthening. He is progressing well toward his goals, but will benefit from continued quadricep strengthening, particularly for activities that require eccentric quad control: stairs, walking down hill and sitting in a chair. Less pain with CC lateral walking today and no pain with airex step up. He has increased knee pain when transitioning to uneven surfaces. Return to Play: (if applicable)   []  Stage 1: Intro to Strength   []  Stage 2: Return to Run and Strength   []  Stage 3: Return to Jump and Strength   []  Stage 4: Dynamic Strength and Agility   []  Stage 5: Sport Specific Training     []  Ready to Return to Play, Meets All Above Stages   []  Not Ready for Return to Sports   Comments:                               PLAN: continue 2x/week x 4 weeks  [x] Continue per plan of care [] Alter current plan (see comments above)  [] Plan of care initiated [] Hold pending MD visit [] Discharge      Electronically signed by:  Spencer Soares, PT, DPT     Note: If patient does not return for scheduled/ recommended follow up visits, this note will serve as a discharge from care along with most recent update on progress. Access Code: [email or web address]   URL: [email or web address]   Date: 08/24/2020   Prepared by: Spencer Soares     Exercises   Standing Hamstring Stretch with Step - 3 sets - 30s hold - 2x daily - 7x weekly   Standing Gastroc Stretch - 3 sets - 30s hold - 2x daily - 7x weekly   Standing Quad Stretch with Table and Chair Support - 3 sets - 30s hold - 2x daily - 7x weekly   Sitting Heel Slide with Towel - 10 reps - 5s hold - 2x daily - 7x weekly   Long Sitting Quad Set - 10 reps - 10s hold - 1x daily - 7x weekly   Active Straight Leg Raise with Quad Set - 10 reps - 2 sets - 1x daily - 7x weekly   Supine Sciatic Nerve Glide - 20 reps - 2x daily - 7x weekly   Clamshell - 10 reps - 3 sets - 1x daily - 7x weekly   Sidelying Hip Abduction - 10 reps - 2 sets - 1x daily - 7x weekly   Standing Hamstring Curl with Chair Support - 10 reps - 2 sets - 1x daily - 7x weekly   Side Stepping with Resistance at Feet - 10 reps - 4 sets - 1x daily - 4x weekly

## 2020-10-05 ENCOUNTER — HOSPITAL ENCOUNTER (OUTPATIENT)
Dept: PHYSICAL THERAPY | Age: 60
Setting detail: THERAPIES SERIES
Discharge: HOME | End: 2020-10-05
Payer: COMMERCIAL

## 2020-10-05 PROCEDURE — 97110 THERAPEUTIC EXERCISES: CPT

## 2020-10-05 PROCEDURE — 97140 MANUAL THERAPY 1/> REGIONS: CPT

## 2020-10-05 NOTE — FLOWSHEET NOTE
[facility],  [address]  Phone: [phone]  Fax [phone]    Physical Therapy Treatment Note/ Progress Report:           Date:  10/5/2020    Patient Name:  Kameron Bangura    :  1960  MRN: [id number]  Restrictions/Precautions:    Medical/Treatment Diagnosis Information:  · Diagnosis: B94.63 (VZI-00-PM) - Plica syndrome of knee, left; S83.232D (ICD-10-CM) - Complex tear of medial meniscus of left knee as current injury, subsequent encounter; S83.272D (ICD-10-CM) - Complex tear of lateral meniscus of left knee as current injury, subsequent encounter ; M65.9 (ICD-10-CM) - Synovitis of left knee  · Treatment Diagnosis: M25.562 left knee pain; M25.662 Left knee stiffness; R26.2 Difficulty in walking; M25.462 Left knee effusion  Insurance/Certification information:  PT Insurance Information:  BCBS  - 3000D-MET-/10-$[id number]- NO AUTH  Physician Information:  Referring Practitioner: Dr. Marilynn Burkitt  Has the plan of care been signed (Y/N):        [x]  Yes  []  No     Date of Patient follow up with Physician: not scheduled    Is this a Progress Report:     [x]  Yes  []  No        If Yes:  Date Range for reporting period:  Beginning 20  Ending 20    Progress report will be due (10 Rx or 30 days whichever is less):     Recertification will be due (POC Duration  / 90 days whichever is less): 10/23      Visit # Insurance Allowable Requires auth   18 31 (40, but 9 used prev)    [x]no        []yes:     Functional Scale: LEFS 64% disability  Date assessed:  20  LEFS 55% disability      20  LEFS 45% disability      20      Therapy Diagnosis/Practice Pattern:E      Number of Comorbidities:  []0     []1-2    [x]3+    Latex Allergy:  [x]NO      []YES  Preferred Language for Healthcare:   [x]English       []other:      Pain level: eval 3-10/10 Current pain 0-3/10    SUBJECTIVE:  Pt reports that his knee pain is less intense and less frequent- has knee pain about 30% of the day, up to 2-3/10. He has aches and pains everywhere else in the body- R foot, neck, and back. It is frustrating. He is also dealing with fatigue. Has talked to his doctor about this, but has been unable to have it addressed as he feels it should be. The fatigue could be a side-effect of his beta blocker. OBJECTIVE:    Observation:    Test measurements:  Knee flexion 148, knee ext 0          RESTRICTIONS/PRECAUTIONS: A-fib, previous L,R knee arthroscopies; chronic LBP; hx prostate cancer  PROCEDURE PERFORMED 7/17:  Left knee diagnostic video arthroscopy,  arthroscopic partial medial meniscectomy, partial lateral meniscectomy,  medial plica excision, and anterior synovectomy and fat pad excision. Exercises/Interventions:     Therapeutic Ex (81026) Sets/sec Notes/CUES HEP   Pt ed; decreasing amount of yardwork at a time- split tasks between days, not exercising through pain            Standing HS 3-way on steps 30\" R,L X   Standing gastroc stretch on wedge 30\"x2 X   Standing quad stretch oncage 30\"x2 R,LCore act VC with transitions X   pec stretch stretch cage 30 x3 R,L ea    Supine figure 4 stretch     Supine HL TA act     TA act + log roll Cues not to perf a sit-up    Heel slide seated c strap 5\"x10  X   Quad set  X   SLR 2# X   SAQ c adductor sq 2#    Sciatic nerve glide- supine, ankle PF with knee f/e   X   SL hip ABD  X   clamshell 2# on thigh X   Seated SWB s lumbar flex, RSB, LSB a     Seated LAQ w/ add  2# -easy, no pain          SWB wall sit  RXX 10x5\" Cues to decr knee valgus    Side step.  Fwd/bwd monster walk HEP 9/10   Hip ABD iso      Mini squat  2UE support    Step up fwd,   Up and over  LSU  Heel tap lateral 2\"  Heel tap fwd 4\" 2x10 pn w/ down       one UE support, stopped d/t pain          Glider ext, ABD/ex 15x ea R,L stance OVL ankles    Standing HSC  2#-  X   TKE  KASSY hip ABD    Hip ext      60# heavy    LP 0-90 deg +add   140# 2x12 DL  100# 2x10 ECC  70#  2x10 L    incr NV Pillow for head   HSC  \" Ecc 2x15  2x10 L50#   40#  bar at F    Knee ext 90-40  30#    bike 5'  seat 17- indep    Manual Intervention (06683)      IASTM quad, HS, IT band, superior/inf, bevel 45 deg, patellar framing 5'     Patellar mobs sup and inf, med, lat 20xea      ITB S 2x30\" ea     posterior tibial glide GIII 15\"x4     Don compression stocking      Patellar \"tent\" x 2 + medial glide using leukotape     L hip joint mobs- inf and lateral distraction      KT medial tracking \"Y\" strip  No QDA          NMR re-education (54285)  CUES NEEDED    Step up to stork airex 5\"x10  KASSY prn, pain when surface is very unsteady- BOSU or DD    CC retro walk w/ A, lat, P toe taps R  CC walks lateral with A, L, P taps CL      FSU to toe tap BOSU flat down  KASSY light support, painful during, not after    Stair amb no HR  \" one HR                                    Therapeutic Activity (09073)                                                Therapeutic Exercise and NMR EXR  [x] (86716) Provided verbal/tactile cueing for activities related to strengthening, flexibility, endurance, ROM for improvements in LE, proximal hip, and core control with self care, mobility, lifting, ambulation.  [] (66275) Provided verbal/tactile cueing for activities related to improving balance, coordination, kinesthetic sense, posture, motor skill, proprioception  to assist with LE, proximal hip, and core control in self care, mobility, lifting, ambulation and eccentric single leg control.      NMR and Therapeutic Activities:    [] (89268 or 46955) Provided verbal/tactile cueing for activities related to improving balance, coordination, kinesthetic sense, posture, motor skill, proprioception and motor activation to allow for proper function of core, proximal hip and LE with self care and ADLs  [] (20754) Gait Re-education- Provided training and instruction to the patient for proper LE, core and proximal hip recruitment and positioning and eccentric body weight control with ambulation re-education including up and down stairs     Home Exercise Program:    [x] (23465) Reviewed/Progressed HEP activities related to strengthening, flexibility, endurance, ROM of core, proximal hip and LE for functional self-care, mobility, lifting and ambulation/stair navigation   [] (89166)Reviewed/Progressed HEP activities related to improving balance, coordination, kinesthetic sense, posture, motor skill, proprioception of core, proximal hip and LE for self care, mobility, lifting, and ambulation/stair navigation      Manual Treatments:  PROM / STM / Oscillations-Mobs:  G-I, II, III, IV (PA's, Inf., Post.)  [x] (38355) Provided manual therapy to mobilize LE, proximal hip and/or LS spine soft tissue/joints for the purpose of modulating pain, promoting relaxation,  increasing ROM, reducing/eliminating soft tissue swelling/inflammation/restriction, improving soft tissue extensibility and allowing for proper ROM for normal function with self care, mobility, lifting and ambulation. Modalities:  CP L knee x10'   [] GAME READY (VASO)- for significant edema, swelling, pain control. - knee x 15', CP ant knee. Long sit  - declined  Charges:  Timed Code Treatment Minutes: 45   Total Treatment Minutes: 55(bike, rest, waiting for PT)     [facility] time in/time out:   (and requires time in and out for each CPT code)    [] EVAL (LOW) 87015 (typically 20 minutes face-to-face)  [] EVAL (MOD) 14103 (typically 30 minutes face-to-face)  [] EVAL (HIGH) 71188 (typically 45 minutes face-to-face)  [] RE-EVAL     [x] FE(77199) x 2  [] IONTO  [x] NMR (48309) x    [] VASO  [x] Manual (22206) x  1   [] Other:  [] TA x      [] Mech Traction (36031)  [] ES(attended) (52512)      [] ES (un) (68427):       GOALS: Patient stated goal: no pain and get back to baseball  [x]? Progressing: []? Met: []? Not Met: []?  Adjusted     Therapist goals for Patient:   Short Term Goals: To be achieved in: 2 weeks  1. Independent in HEP and progression per patient tolerance, in order to prevent re-injury. [x]? Progressing: []? Met: []? Not Met: []? Adjusted  2. Patient will have a decrease in pain to facilitate improvement in movement, function, and ADLs as indicated by Functional Deficits. [x]? Progressing: []? Met: []? Not Met: []? Adjusted     Long Term Goals: To be achieved in: 8 weeks  1. Disability index score of 35% or less for the LEFS to assist with reaching prior level of function. [x]? Progressing: []? Met: []? Not Met: []? Adjusted  2. Patient will demonstrate increased left knee AROM to 0-140 deg to allow for proper joint functioning for deeper squatting, kneeling, and stair ambulation. []? Progressing: [x]? Met: []? Not Met: []? Adjusted  3. Patient will demonstrate an increase in Strength to 5/5 for the left hip and knee musculature to return to normal CKC activities with good knee control, such as stairs, squatting, and light jogging. [x]? Progressing: []? Met: []? Not Met: []? Adjusted  4. Patient will return to at least 30 minutes of moderate physical activity (patient specific goal). [x]? Progressing: []? Met: []? Not Met: []? Adjusted  5. Patient will have improved ankle DF to 15 deg to promote normal tibial advancement and HS flexibility in the 90-90 position to 65 deg to reduce posterior pelvic tilt. [x]? Progressing: []? Met: []? Not Met: []? Adjusted        Progression Towards Functional goals:  [x] Patient is progressing as expected towards functional goals listed. [] Progression is slowed due to complexities listed. [] Progression has been slowed due to co-morbidities. [] Plan just implemented, too soon to assess goals progression  [] Other:     Overall Progression Towards Functional goals/ Treatment Progress Update:  [x] Patient is progressing as expected towards functional goals listed.     [] Progression is slowed due to complexities/Impairments listed. [] Progression has been slowed due to co-morbidities. [] Plan just implemented, too soon to assess goals progression <30days   [] Goals require adjustment due to lack of progress  [] Patient is not progressing as expected and requires additional follow up with physician  [] Other    Prognosis for POC: [x] Good [] Fair  [] Poor      Patient requires continued skilled intervention: [x] Yes  [] No    Treatment/Activity Tolerance:  [x] Patient able to complete treatment  [] Patient limited by fatigue  [] Patient limited by pain    [] Patient limited by other medical complications  [] Other:     ASSESSMENT: Pt has retropatellar pain with squatting activities still, but not with weight machines, so have used this more for increasing quadricep strengthening. He is progressing well toward his goals, but will benefit from continued quadricep strengthening, particularly for activities that require eccentric quad control: stairs, walking down hill and sitting in a chair. No pain reported with exercise today; cues needed for quad control on heel taps. Return to Play: (if applicable)   []  Stage 1: Intro to Strength   []  Stage 2: Return to Run and Strength   []  Stage 3: Return to Jump and Strength   []  Stage 4: Dynamic Strength and Agility   []  Stage 5: Sport Specific Training     []  Ready to Return to Play, Meets All Above Stages   []  Not Ready for Return to Sports   Comments:                               PLAN: continue 2x/week x 4 weeks  [x] Continue per plan of care [] Alter current plan (see comments above)  [] Plan of care initiated [] Hold pending MD visit [] Discharge      Electronically signed by:  Shoshana Lin PT, DPT     Note: If patient does not return for scheduled/ recommended follow up visits, this note will serve as a discharge from care along with most recent update on progress. Access Code: [email or web address]   URL: [email or web address]   Date: 08/24/2020 Prepared by: Soo Sanches     Exercises   Standing Hamstring Stretch with Step - 3 sets - 30s hold - 2x daily - 7x weekly   Standing Gastroc Stretch - 3 sets - 30s hold - 2x daily - 7x weekly   Standing Quad Stretch with Table and Chair Support - 3 sets - 30s hold - 2x daily - 7x weekly   Sitting Heel Slide with Towel - 10 reps - 5s hold - 2x daily - 7x weekly   Long Sitting Quad Set - 10 reps - 10s hold - 1x daily - 7x weekly   Active Straight Leg Raise with Quad Set - 10 reps - 2 sets - 1x daily - 7x weekly   Supine Sciatic Nerve Glide - 20 reps - 2x daily - 7x weekly   Clamshell - 10 reps - 3 sets - 1x daily - 7x weekly   Sidelying Hip Abduction - 10 reps - 2 sets - 1x daily - 7x weekly   Standing Hamstring Curl with Chair Support - 10 reps - 2 sets - 1x daily - 7x weekly   Side Stepping with Resistance at Feet - 10 reps - 4 sets - 1x daily - 4x weekly

## 2020-10-06 ENCOUNTER — APPOINTMENT (OUTPATIENT)
Dept: CT IMAGING | Age: 60
End: 2020-10-06
Payer: COMMERCIAL

## 2020-10-06 ENCOUNTER — HOSPITAL ENCOUNTER (EMERGENCY)
Age: 60
Discharge: HOME | End: 2020-10-06
Attending: EMERGENCY MEDICINE
Payer: COMMERCIAL

## 2020-10-06 VITALS
RESPIRATION RATE: 20 BRPM | OXYGEN SATURATION: 98 % | SYSTOLIC BLOOD PRESSURE: 157 MMHG | HEART RATE: 61 BPM | HEIGHT: 71 IN | WEIGHT: 213 LBS | DIASTOLIC BLOOD PRESSURE: 87 MMHG | TEMPERATURE: 97.9 F | BODY MASS INDEX: 29.82 KG/M2

## 2020-10-06 LAB
A/G RATIO: 1.6 (ref 1.1–2.2)
ALBUMIN SERPL-MCNC: 4.5 G/DL (ref 3.4–5)
ALP BLD-CCNC: 82 U/L (ref 40–129)
ALT SERPL-CCNC: 24 U/L (ref 10–40)
ANION GAP SERPL CALCULATED.3IONS-SCNC: 12 MMOL/L (ref 3–16)
AST SERPL-CCNC: 23 U/L (ref 15–37)
BASOPHILS ABSOLUTE: 0 K/UL (ref 0–0.2)
BASOPHILS RELATIVE PERCENT: 0.8 %
BILIRUB SERPL-MCNC: 0.3 MG/DL (ref 0–1)
BILIRUBIN URINE: NEGATIVE
BLOOD, URINE: ABNORMAL
BUN BLDV-MCNC: 19 MG/DL (ref 7–20)
CALCIUM SERPL-MCNC: 9.4 MG/DL (ref 8.3–10.6)
CHLORIDE BLD-SCNC: 103 MMOL/L (ref 99–110)
CLARITY: CLEAR
CO2: 29 MMOL/L (ref 21–32)
COLOR: YELLOW
CREAT SERPL-MCNC: 1.3 MG/DL (ref 0.8–1.3)
EOSINOPHILS ABSOLUTE: 0.1 K/UL (ref 0–0.6)
EOSINOPHILS RELATIVE PERCENT: 2.2 %
GFR AFRICAN AMERICAN: >60
GFR NON-AFRICAN AMERICAN: 56
GLOBULIN: 2.8 G/DL
GLUCOSE BLD-MCNC: 128 MG/DL (ref 70–99)
GLUCOSE URINE: NEGATIVE MG/DL
HCT VFR BLD CALC: 41.7 % (ref 40.5–52.5)
HEMOGLOBIN: 13.5 G/DL (ref 13.5–17.5)
KETONES, URINE: NEGATIVE MG/DL
LEUKOCYTE ESTERASE, URINE: NEGATIVE
LIPASE: 30 U/L (ref 13–60)
LYMPHOCYTES ABSOLUTE: 0.8 K/UL (ref 1–5.1)
LYMPHOCYTES RELATIVE PERCENT: 13.9 %
MCH RBC QN AUTO: 27.9 PG (ref 26–34)
MCHC RBC AUTO-ENTMCNC: 32.3 G/DL (ref 31–36)
MCV RBC AUTO: 86.3 FL (ref 80–100)
MICROSCOPIC EXAMINATION: YES
MONOCYTES ABSOLUTE: 0.6 K/UL (ref 0–1.3)
MONOCYTES RELATIVE PERCENT: 9.6 %
MUCUS: ABNORMAL /LPF
NEUTROPHILS ABSOLUTE: 4.3 K/UL (ref 1.7–7.7)
NEUTROPHILS RELATIVE PERCENT: 73.5 %
NITRITE, URINE: NEGATIVE
PDW BLD-RTO: 16.1 % (ref 12.4–15.4)
PH UA: 7.5 (ref 5–8)
PLATELET # BLD: 265 K/UL (ref 135–450)
PMV BLD AUTO: 8.7 FL (ref 5–10.5)
POTASSIUM REFLEX MAGNESIUM: 3.7 MMOL/L (ref 3.5–5.1)
PROTEIN UA: NEGATIVE MG/DL
RBC # BLD: 4.84 M/UL (ref 4.2–5.9)
RBC UA: ABNORMAL /HPF (ref 0–4)
SODIUM BLD-SCNC: 144 MMOL/L (ref 136–145)
SPECIFIC GRAVITY UA: 1.01 (ref 1–1.03)
TOTAL PROTEIN: 7.3 G/DL (ref 6.4–8.2)
URINE REFLEX TO CULTURE: ABNORMAL
URINE TYPE: ABNORMAL
UROBILINOGEN, URINE: 0.2 E.U./DL
WBC # BLD: 5.8 K/UL (ref 4–11)
WBC UA: ABNORMAL /HPF (ref 0–5)

## 2020-10-06 PROCEDURE — 81001 URINALYSIS AUTO W/SCOPE: CPT

## 2020-10-06 PROCEDURE — 2580000003 HC RX 258: Performed by: EMERGENCY MEDICINE

## 2020-10-06 PROCEDURE — 83690 ASSAY OF LIPASE: CPT

## 2020-10-06 PROCEDURE — 96375 TX/PRO/DX INJ NEW DRUG ADDON: CPT

## 2020-10-06 PROCEDURE — 85025 COMPLETE CBC W/AUTO DIFF WBC: CPT

## 2020-10-06 PROCEDURE — 80053 COMPREHEN METABOLIC PANEL: CPT

## 2020-10-06 PROCEDURE — 74176 CT ABD & PELVIS W/O CONTRAST: CPT

## 2020-10-06 PROCEDURE — 99284 EMERGENCY DEPT VISIT MOD MDM: CPT

## 2020-10-06 PROCEDURE — 6360000002 HC RX W HCPCS: Performed by: EMERGENCY MEDICINE

## 2020-10-06 PROCEDURE — 96374 THER/PROPH/DIAG INJ IV PUSH: CPT

## 2020-10-06 RX ORDER — KETOROLAC TROMETHAMINE 10 MG/1
10 TABLET, FILM COATED ORAL EVERY 6 HOURS PRN
Qty: 20 TABLET | Refills: 0 | Status: ON HOLD | OUTPATIENT
Start: 2020-10-06 | End: 2021-03-04

## 2020-10-06 RX ORDER — KETOROLAC TROMETHAMINE 30 MG/ML
30 INJECTION, SOLUTION INTRAMUSCULAR; INTRAVENOUS ONCE
Status: COMPLETED | OUTPATIENT
Start: 2020-10-06 | End: 2020-10-06

## 2020-10-06 RX ORDER — TAMSULOSIN HYDROCHLORIDE 0.4 MG/1
0.4 CAPSULE ORAL DAILY
Qty: 5 CAPSULE | Refills: 0 | Status: SHIPPED | OUTPATIENT
Start: 2020-10-06 | End: 2021-10-12 | Stop reason: ALTCHOICE

## 2020-10-06 RX ORDER — ONDANSETRON 2 MG/ML
4 INJECTION INTRAMUSCULAR; INTRAVENOUS ONCE
Status: COMPLETED | OUTPATIENT
Start: 2020-10-06 | End: 2020-10-06

## 2020-10-06 RX ORDER — 0.9 % SODIUM CHLORIDE 0.9 %
1000 INTRAVENOUS SOLUTION INTRAVENOUS ONCE
Status: COMPLETED | OUTPATIENT
Start: 2020-10-06 | End: 2020-10-06

## 2020-10-06 RX ADMIN — SODIUM CHLORIDE 1000 ML: 9 INJECTION, SOLUTION INTRAVENOUS at 04:32

## 2020-10-06 RX ADMIN — KETOROLAC TROMETHAMINE 30 MG: 30 INJECTION, SOLUTION INTRAMUSCULAR at 04:32

## 2020-10-06 RX ADMIN — ONDANSETRON 4 MG: 2 INJECTION INTRAMUSCULAR; INTRAVENOUS at 04:32

## 2020-10-06 ASSESSMENT — PAIN SCALES - GENERAL
PAINLEVEL_OUTOF10: 10
PAINLEVEL_OUTOF10: 10

## 2020-10-06 NOTE — ED PROVIDER NOTES
[facility]  ED  eMERGENCY dEPARTMENTeNCOUnter      Pt Name: Montana Michaels  MRN: [id number]  [place] 1960  Date of evaluation: 10/6/2020  Provider: Linda Sawyer MD    [address] [place] [address]       Chief Complaint   Patient presents with    Flank Pain     Patient comes into ED with c/o left flank pain that began at 0230 this morning. Patient has hx of renal calculi. HISTORY OF PRESENT ILLNESS   (Location/Symptom, Timing/Onset,Context/Setting, Quality, Duration, Modifying Factors, Severity)  Note limiting factors. Montana Michaels is a 61 y.o. male who presents to the emergency department for acute onset of left-sided back and flank pain now radiating to the abdomen. He rates the pain at severe. Started at about 230 this morning while he was sleeping. He states it is identical to when he has had a kidney stone in the past.  He is nauseated but no vomiting. No fevers or chills. He has noticed dysuria for the past 3 days and hematuria about a month ago. Nursing notes were reviewed. REVIEW OF SYSTEMS    (2-9 systems for level 4, 10 or more for level 5)     Review of Systems    Positive and pertinent negative as per HPI. Except as noted above in the ROS, all other systems were reviewed and were negative.     PAST MEDICAL HISTORY     Past Medical History:   Diagnosis Date    A-fib [facility])     ADHD (attention deficit hyperactivity disorder)     Carpal tunnel syndrome 1/21/2015    Chronic low back pain     Chronic neck pain     Chronic sinusitis     Dr. Celena Kocher Dental crown present     lower    Epigastric hernia     Esophageal spasm     GERD (gastroesophageal reflux disease)     Hyperlipidemia     Hypertrophic cardiomyopathy (HCC)     IHSS (idiopathic hypertrophic subaortic stenosis) ([facility] [address]     Kidney stones     HUNTER on CPAP     Dr. Ariza Pete- A-PAP    Primary localized osteoarthrosis, shoulder region 10/18/2013    Prostate cancer [facility])     prostate ; s/p radiation treatment    PVC's (premature ventricular contractions)     RBBB (right bundle branch block)     RLS (restless legs syndrome)     Dr. Rosario [facility] Seasonal allergies     Tachycardia          SURGICALHISTORY       Past Surgical History:   Procedure Laterality Date    CARPAL TUNNEL RELEASE Left     COLONOSCOPY  1/17/11    COLONOSCOPY N/A 2/2/2020    COLONOSCOPY WITH BIOPSY performed by Clark [facility] at [facility]  2015    left    ENDOSCOPY, COLON, DIAGNOSTIC      INGUINAL HERNIA REPAIR Bilateral 2009, 2012,    KNEE ARTHROSCOPY Right 1982    Right    KNEE ARTHROSCOPY Left 12/21/2017    KNEE ARTHROSCOPY Left 7/17/2020    VIDEO ARTHROSCOPY LEFT KNEE, CHONDROPLASTY, PLICA EXCISION, PARTIAL MEDIAL LATERAL MENISECTOMY -SLEEP APNEA- performed by Andrea Valdovinos MD at [place] [address] ARTHROSCOPY Right 12/31/2013    VIDEO ARTHROSCOPY RIGHT SHOULDER, SUBACROMIAL DECOMPRESSION, DISTAL CLAVICLE RESECTION, ROTATOR CUFF DEBRIDEMENT          TURP  8274    X 2    UMBILICAL HERNIA REPAIR      X 2    UPPER GASTROINTESTINAL ENDOSCOPY  9/3    VARICOCELECTOMY  2000         CURRENT MEDICATIONS       Discharge Medication List as of 10/6/2020  5:57 AM      CONTINUE these medications which have NOT CHANGED    Details   butalbital-APAP-caffeine -40 MG CAPS per capsule TK ONE C PO  Q 12 H PRF SEVERE HEADACHEHistorical Med      atorvastatin (LIPITOR) 20 MG tablet TAKE 1 TABLET BY MOUTH ONE TIME A DAY, Disp-90 tablet,R-1Normal      verapamil (VERELAN) 240 MG extended release capsule TAKE 1 CAPSULE BY MOUTH EVERY NIGHT, Disp-90 capsule,R-1Normal      omeprazole (PRILOSEC) 40 MG delayed release capsule TAKE 1 CAPSULE BY MOUTH EVERY DAY, Disp-90 capsule,R-0Normal      verapamil (CALAN SR) 240 MG extended release tablet Take 240 mg by mouth dailyHistorical Med      amiodarone (CORDARONE) 200 MG tablet Take 0.5 tablets by mouth daily, Disp-45 tablet, R-0Adjust Sig      ELIQUIS 5 MG TABS tablet TAKE 1 TABLET BY MOUTH TWICE A DAY, Disp-180 tablet, R-0Normal      clonazePAM (KLONOPIN) 1 MG tablet Take 1 mg by mouth daily. ALLERGIES     Niacin and related    FAMILY HISTORY       Family History   Problem Relation Age of Onset    Cancer Mother         melenoma    High Cholesterol Mother     High Blood Pressure Father     High Cholesterol Brother     Heart Disease Maternal Grandmother     Heart Disease Maternal Grandfather     Prostate Cancer Paternal Uncle           SOCIAL HISTORY       Social History     Socioeconomic History    Marital status:      Spouse name: None    Number of children: None    Years of education: None    Highest education level: None   Occupational History    None   Social Needs    Financial resource strain: None    Food insecurity     Worry: None     Inability: None    Transportation needs     Medical: None     Non-medical: None   Tobacco Use    Smoking status: Never Smoker    Smokeless tobacco: Never Used   Substance and Sexual Activity    Alcohol use:  Yes     Alcohol/week: 0.0 - 1.0 standard drinks    Drug use: No    Sexual activity: Yes     Partners: Female   Lifestyle    Physical activity     Days per week: None     Minutes per session: None    Stress: None   Relationships    Social connections     Talks on phone: None     Gets together: None     Attends [employer] service: None     Active member of club or organization: None     Attends meetings of clubs or organizations: None     Relationship status: None    Intimate partner violence     Fear of current or ex partner: None     Emotionally abused: None     Physically abused: None     Forced sexual activity: None   Other Topics Concern    None   Social History Narrative    None       SCREENINGS    [place] Coma Scale  Eye Opening: Spontaneous  Best Verbal Response: Oriented  Best Motor Response: Obeys commands  [place] Coma Scale Score: 15        PHYSICAL EXAM    (up to 7 for level 4, 8 or more for level 5)     ED Triage Vitals [10/06/20 0354]   BP Temp Temp src Pulse Resp SpO2 Height Weight   (!) 157/87 97.9 °F (36.6 °C) -- 61 20 98 % 5' 11\" (1.803 m) 213 lb (96.6 kg)       Physical Exam  Vitals signs and nursing note reviewed. Constitutional:       Appearance: Normal appearance. He is well-developed. He is not ill-appearing. HENT:      Head: Normocephalic and atraumatic. Right Ear: External ear normal.      Left Ear: External ear normal.      Nose: Nose normal.   Eyes:      General: No scleral icterus. Right eye: No discharge. Left eye: No discharge. Conjunctiva/sclera: Conjunctivae normal.   Neck:      Musculoskeletal: Neck supple. Cardiovascular:      Rate and Rhythm: Normal rate and regular rhythm. Heart sounds: Normal heart sounds. Pulmonary:      Effort: Pulmonary effort is normal. No respiratory distress. Breath sounds: Normal breath sounds. No wheezing or rales. Abdominal:      General: Bowel sounds are normal. There is no distension. Palpations: Abdomen is soft. Tenderness: There is abdominal tenderness. There is no guarding or rebound. Comments: Left upper quadrant tenderness. Skin:     Coloration: Skin is not pale. Neurological:      Mental Status: He is alert.    Psychiatric:         Mood and Affect: Mood normal.         Behavior: Behavior normal.             DIAGNOSTIC RESULTS     EKG: All EKG's are interpreted by the Emergency Department Physician who either signs or Co-signs this chart in the absence of a cardiologist.    12 lead EKG shows     RADIOLOGY:   Non-plain film images such as CT, Ultrasound and MRI are read by the radiologist. Plain radiographic images are visualized and preliminarily interpreted by the emergency physician with the below findings:        Interpretation per the Radiologist below, if available at the time of this note:    CT ABDOMEN PELVIS WO CONTRAST Additional Contrast? None   Preliminary Result   1. 5 mm calculus in left UVJ with mild upstream hydroureteronephrosis. 2. Nonobstructing bilateral renal calculi. ED BEDSIDE ULTRASOUND:   Performed by ED Physician - none    LABS:  Labs Reviewed   CBC WITH AUTO DIFFERENTIAL - Abnormal; Notable for the following components:       Result Value    RDW 16.1 (*)     Lymphocytes Absolute 0.8 (*)     All other components within normal limits    Narrative:     Performed at:  [address],  [place], [address] [employer]   Phone [phone]   COMPREHENSIVE METABOLIC PANEL W/ REFLEX TO MG FOR LOW K - Abnormal; Notable for the following components:    Glucose 128 (*)     GFR Non- 56 (*)     All other components within normal limits    Narrative:     Performed at:  [address],  [place], [address] [employer]   Phone [phone]   URINE RT REFLEX TO CULTURE - Abnormal; Notable for the following components:    Blood, Urine LARGE (*)     All other components within normal limits    Narrative:     Performed at:  [address],  [place], [address] [employer]   Phone [phone]   MICROSCOPIC URINALYSIS - Abnormal; Notable for the following components:    Mucus, UA Rare (*)     RBC, UA 21-50 (*)     All other components within normal limits    Narrative:     Performed at:  [address],  [place], [address] [employer]   Phone [phone]   LIPASE    Narrative:     Performed at:  [address],  [place], [address] [employer]   Phone [phone]       All other labs were within normal range or not returned as of this dictation.     EMERGENCY DEPARTMENT COURSE and DIFFERENTIAL DIAGNOSIS/MDM:   Vitals:    Vitals:    10/06/20 0354   BP: (!) 157/87   Pulse: 61   Resp: 20   Temp: 97.9 °F (36.6 °C)   SpO2: 98%   Weight: 213 lb (96.6 kg)   Height: 5' 11\" (1.803 m)       Male comes in with left-sided abdominal and flank pain. Laboratory as well as CT scan of the abdomen and pelvis without contrast.  Patient is given Toradol and Zofran and also IV fluids to help with his symptoms. Patient is reassessed and his symptoms are improved. The patient is now pain-free. CT scan shows a 5 mm UVJ stone. No signs of urinary tract infection. The patient will be discharged home with medications for his symptoms and a referral to urology. The patient is agreeable with this treatment plan. CRITICAL CARE TIME   None       CONSULTS:  None    PROCEDURES:  Unless otherwise noted above, none     Procedures    FINAL IMPRESSION      1. Ureterolithiasis    2.  Elevated blood pressure reading          DISPOSITION/PLAN   DISPOSITION Decision To Discharge 10/06/2020 05:55:19 AM      PATIENT REFERREDTO:  Delfina Vidales MD  [address]  [phone]          [facility] [place] [facility] [address]  [phone]            DISCHARGEMEDICATIONS:  Discharge Medication List as of 10/6/2020  5:57 AM      START taking these medications    Details   ketorolac (TORADOL) 10 MG tablet Take 1 tablet by mouth every 6 hours as needed for Pain, Disp-20 tablet,R-0Print      tamsulosin (FLOMAX) 0.4 MG capsule Take 1 capsule by mouth daily for 5 days, Disp-5 capsule,R-0Print                (Please note that portions of this note were completed with a voice recognition program.  Efforts were made to edit the dictations but occasionally words are mis-transcribed.)    Emily Lopez MD (electronically signed)  Attending Emergency Physician        Emily Lopez MD  10/06/20 8614

## 2020-10-06 NOTE — ED NOTES
Pt states pain is much better, comfortable at rest at this time.      Madalyn Sabillon RN  10/06/20 6272

## 2020-10-06 NOTE — ED NOTES
Discharge instructions reviewed without questions. No further needs voiced at this time. Ambulatory from department in stable condition.        Patricia Jones RN  10/06/20 4703

## 2020-10-08 ENCOUNTER — HOSPITAL ENCOUNTER (OUTPATIENT)
Dept: PHYSICAL THERAPY | Age: 60
Setting detail: THERAPIES SERIES
Discharge: HOME | End: 2020-10-08
Payer: COMMERCIAL

## 2020-10-08 NOTE — FLOWSHEET NOTE
[facility],  [address]        Physical Therapy  Cancellation/No-show Note  Patient Name:  Elliott Timmons  :  1960   Date:  10/8/2020  Cancelled visits to date: 1  No-shows to date: 0    For today's appointment patient:  X? Cancelled  ? Rescheduled appointment  ? No-show     Reason given by patient:  ?  Patient ill  ? Conflicting appointment  ? No transportation    X? Conflict with work  ? No reason given  ?   Other:     Comments:      Electronically signed by:  Arlyn Chen, PT, DPT

## 2020-10-12 ENCOUNTER — HOSPITAL ENCOUNTER (OUTPATIENT)
Dept: PHYSICAL THERAPY | Age: 60
Setting detail: THERAPIES SERIES
Discharge: HOME | End: 2020-10-12
Payer: COMMERCIAL

## 2020-10-12 PROCEDURE — 97140 MANUAL THERAPY 1/> REGIONS: CPT

## 2020-10-12 PROCEDURE — 97110 THERAPEUTIC EXERCISES: CPT

## 2020-10-12 PROCEDURE — 97112 NEUROMUSCULAR REEDUCATION: CPT

## 2020-10-12 NOTE — FLOWSHEET NOTE
[facility],  [address]  Phone: [phone]  Fax [phone]    Physical Therapy Treatment Note/ Progress Report:           Date:  10/12/2020    Patient Name:  Soha Orantes    :  1960  MRN: [id number]  Restrictions/Precautions:    Medical/Treatment Diagnosis Information:  · Diagnosis: O80.40 (CSK-52-LM) - Plica syndrome of knee, left; S83.232D (ICD-10-CM) - Complex tear of medial meniscus of left knee as current injury, subsequent encounter; S83.272D (ICD-10-CM) - Complex tear of lateral meniscus of left knee as current injury, subsequent encounter ; M65.9 (ICD-10-CM) - Synovitis of left knee  · Treatment Diagnosis: M25.562 left knee pain; M25.662 Left knee stiffness; R26.2 Difficulty in walking; M25.462 Left knee effusion  Insurance/Certification information:  PT Insurance Information:  [employer]  - 3000D-MET-90/10-$[id number]- NO AUTH  Physician Information:  Referring Practitioner: Dr. Jonnie Hernandez  Has the plan of care been signed (Y/N):        [x]  Yes  []  No     Date of Patient follow up with Physician: not scheduled    Is this a Progress Report:     [x]  Yes  []  No        If Yes:  Date Range for reporting period:  Beginning 20  Ending 20    Progress report will be due (10 Rx or 30 days whichever is less):     Recertification will be due (POC Duration  / 90 days whichever is less): 10/23      Visit # Insurance Allowable Requires auth   19 31 (40, but 9 used prev)    [x]no        []yes:     Functional Scale: LEFS 64% disability  Date assessed:  20  LEFS 55% disability      20  LEFS 45% disability      20      Therapy Diagnosis/Practice Pattern:E      Number of Comorbidities:  []0     []1-2    [x]3+    Latex Allergy:  [x]NO      []YES  Preferred Language for Healthcare:   [x]English       []other:      Pain level: eval 3-10/10 Current pain 0-3/10    SUBJECTIVE:  Pt  Reports some knee pain yesterday after helping to move very large rocks from his mother's yard (estimates that they were 1000 lbs). He feels like his knee progress has plateaued recently. OBJECTIVE:    Observation:    Test measurements:  Knee flexion 148, knee ext 0          RESTRICTIONS/PRECAUTIONS: A-fib, previous L,R knee arthroscopies; chronic LBP; hx prostate cancer  PROCEDURE PERFORMED 7/17:  Left knee diagnostic video arthroscopy,  arthroscopic partial medial meniscectomy, partial lateral meniscectomy,  medial plica excision, and anterior synovectomy and fat pad excision. Exercises/Interventions:     Therapeutic Ex (37544) Sets/sec Notes/CUES HEP   Pt ed; decreasing amount of yardwork at a time- split tasks between days, not exercising through pain            Standing HS 3-way on steps 30\" R,L X   Standing gastroc stretch on wedge 30\"x2 X   Standing quad stretch oncage 30\"x2 R,LCore act VC with transitions X   pec stretch stretch cage 30 x3 R,L ea    Supine figure 4 stretch     Supine HL TA act     TA act + log roll Cues not to perf a sit-up    Heel slide seated c strap 5\"x10  X   Quad set  X   SLR 2# X   SAQ c adductor sq 2#    Sciatic nerve glide- supine, ankle PF with knee f/e   X   SL hip ABD  X   clamshell 2# on thigh X   Seated SWB s lumbar flex, RSB, LSB a     Seated LAQ w/ add  2# -easy, no pain          SWB wall sit  RXX 2x10 Cues to decr knee valgus    Side step.  Fwd/bwd monster walk HEP 9/10   Hip ABD iso      Mini squat  2UE support    Step up fwd,   Up and over  LSU  Heel tap lateral 2\"  Heel tap fwd 4\"  pn w/ down       one UE support, stopped d/t pain          Glider ext,  15x ea R,L stance GVL ankles    Standing HSC  2#-  X   TKE  KASSY hip ABD    Hip ext      60# heavy    LP 0-90 deg +add   140# 2x12 DL  100# 2x10 ECC  80#  2x10 L    incr NV Pillow for head   HSC  \" Ecc 3x10  2x10 L50#   40#  bar at F    Knee ext 90-40 2x10 45#    bike   seat 17- indep    Manual Intervention (28718)      IASTM quad, HS, IT band, superior/inf, bevel 45 deg, patellar framing 5'     Patellar mobs sup and inf, med, lat 20xea      ITB S 2x30\" ea     posterior tibial glide GIII 15\"x4     Don compression stocking      Patellar \"tent\" x 2 + medial glide using leukotape     L hip joint mobs- inf and lateral distraction      KT medial tracking \"Y\" strip  No QDA          NMR re-education (49946)  CUES NEEDED    Step up to stork airex 5\"x10  KASSY prn, pain when surface is very unsteady- BOSU or DD    CC retro walk w/ A, lat, P toe taps R  CC walks lateral with A, L, P taps CL      FSU to toe tap BOSU flat down  KASSY light support, painful during, not after    Stair amb no HR  \" one HR      Agility ladder: fwd two feet   fwd Two in one out 2 laps  2 laps     Lateral two feet  Lateral two in two out 2 laps left only  2 laps to left only Pain when pushing off on LLE to Right for lateral directions    Throwing 50% with softball to net 6 throws                 Therapeutic Activity (75238)                                                Therapeutic Exercise and NMR EXR  [x] (41757) Provided verbal/tactile cueing for activities related to strengthening, flexibility, endurance, ROM for improvements in LE, proximal hip, and core control with self care, mobility, lifting, ambulation.  [] (00116) Provided verbal/tactile cueing for activities related to improving balance, coordination, kinesthetic sense, posture, motor skill, proprioception  to assist with LE, proximal hip, and core control in self care, mobility, lifting, ambulation and eccentric single leg control.      NMR and Therapeutic Activities:    [] (83463 or 78765) Provided verbal/tactile cueing for activities related to improving balance, coordination, kinesthetic sense, posture, motor skill, proprioception and motor activation to allow for proper function of core, proximal hip and LE with self care and ADLs  [] (03966) Gait Re-education- Provided training and instruction to the patient for Patient:   Short Term Goals: To be achieved in: 2 weeks  1. Independent in HEP and progression per patient tolerance, in order to prevent re-injury. [x]? Progressing: []? Met: []? Not Met: []? Adjusted  2. Patient will have a decrease in pain to facilitate improvement in movement, function, and ADLs as indicated by Functional Deficits. [x]? Progressing: []? Met: []? Not Met: []? Adjusted     Long Term Goals: To be achieved in: 8 weeks  1. Disability index score of 35% or less for the LEFS to assist with reaching prior level of function. [x]? Progressing: []? Met: []? Not Met: []? Adjusted  2. Patient will demonstrate increased left knee AROM to 0-140 deg to allow for proper joint functioning for deeper squatting, kneeling, and stair ambulation. []? Progressing: [x]? Met: []? Not Met: []? Adjusted  3. Patient will demonstrate an increase in Strength to 5/5 for the left hip and knee musculature to return to normal CKC activities with good knee control, such as stairs, squatting, and light jogging. [x]? Progressing: []? Met: []? Not Met: []? Adjusted  4. Patient will return to at least 30 minutes of moderate physical activity (patient specific goal). [x]? Progressing: []? Met: []? Not Met: []? Adjusted  5. Patient will have improved ankle DF to 15 deg to promote normal tibial advancement and HS flexibility in the 90-90 position to 65 deg to reduce posterior pelvic tilt. [x]? Progressing: []? Met: []? Not Met: []? Adjusted        Progression Towards Functional goals:  [x] Patient is progressing as expected towards functional goals listed. [] Progression is slowed due to complexities listed. [] Progression has been slowed due to co-morbidities. [] Plan just implemented, too soon to assess goals progression  [] Other:     Overall Progression Towards Functional goals/ Treatment Progress Update:  [x] Patient is progressing as expected towards functional goals listed.     [] Progression is slowed due to complexities/Impairments listed. [] Progression has been slowed due to co-morbidities. [] Plan just implemented, too soon to assess goals progression <30days   [] Goals require adjustment due to lack of progress  [] Patient is not progressing as expected and requires additional follow up with physician  [] Other    Prognosis for POC: [x] Good [] Fair  [] Poor      Patient requires continued skilled intervention: [x] Yes  [] No    Treatment/Activity Tolerance:  [x] Patient able to complete treatment  [] Patient limited by fatigue  [] Patient limited by pain    [] Patient limited by other medical complications  [] Other:     ASSESSMENT: Pt was able to get back to light throwing and agility drills in order to ease back into physical activity (pt is most compliant with baseball for cardio). He had medial knee pain with a side shuffle when pushing off of the LLE, but the pain did not last. Able to progress with weights for strengthening today. Return to Play: (if applicable)   []  Stage 1: Intro to Strength   []  Stage 2: Return to Run and Strength   []  Stage 3: Return to Jump and Strength   []  Stage 4: Dynamic Strength and Agility   []  Stage 5: Sport Specific Training     []  Ready to Return to Play, Meets All Above Stages   []  Not Ready for Return to Sports   Comments:                               PLAN: continue 2x/week x 4 weeks  [x] Continue per plan of care [] Alter current plan (see comments above)  [] Plan of care initiated [] Hold pending MD visit [] Discharge      Electronically signed by:  Soo Sanches, PT, DPT     Note: If patient does not return for scheduled/ recommended follow up visits, this note will serve as a discharge from care along with most recent update on progress. Access Code: [email or web address]   URL: [email or web address]   Date: 08/24/2020   Prepared by: Soo Sancehs     Exercises   Standing Hamstring Stretch with Step - 3 sets - 30s hold - 2x daily - 7x weekly   Standing Gastroc Stretch - 3 sets - 30s hold - 2x daily - 7x weekly   Standing Quad Stretch with Table and Chair Support - 3 sets - 30s hold - 2x daily - 7x weekly   Sitting Heel Slide with Towel - 10 reps - 5s hold - 2x daily - 7x weekly   Long Sitting Quad Set - 10 reps - 10s hold - 1x daily - 7x weekly   Active Straight Leg Raise with Quad Set - 10 reps - 2 sets - 1x daily - 7x weekly   Supine Sciatic Nerve Glide - 20 reps - 2x daily - 7x weekly   Clamshell - 10 reps - 3 sets - 1x daily - 7x weekly   Sidelying Hip Abduction - 10 reps - 2 sets - 1x daily - 7x weekly   Standing Hamstring Curl with Chair Support - 10 reps - 2 sets - 1x daily - 7x weekly   Side Stepping with Resistance at Feet - 10 reps - 4 sets - 1x daily - 4x weekly

## 2020-10-14 ENCOUNTER — HOSPITAL ENCOUNTER (OUTPATIENT)
Dept: PHYSICAL THERAPY | Age: 60
Setting detail: THERAPIES SERIES
Discharge: HOME | End: 2020-10-14
Payer: COMMERCIAL

## 2020-10-14 PROCEDURE — 97112 NEUROMUSCULAR REEDUCATION: CPT

## 2020-10-14 PROCEDURE — 97140 MANUAL THERAPY 1/> REGIONS: CPT

## 2020-10-14 PROCEDURE — 97110 THERAPEUTIC EXERCISES: CPT

## 2020-10-14 NOTE — FLOWSHEET NOTE
soreness yesterday, but 6-7/10 today at his medial knee. It is tender to the touch also. OBJECTIVE:    Observation:    Test measurements:  Knee flexion 148, knee ext 0          RESTRICTIONS/PRECAUTIONS: A-fib, previous L,R knee arthroscopies; chronic LBP; hx prostate cancer  PROCEDURE PERFORMED 7/17:  Left knee diagnostic video arthroscopy,  arthroscopic partial medial meniscectomy, partial lateral meniscectomy,  medial plica excision, and anterior synovectomy and fat pad excision. Exercises/Interventions:     Therapeutic Ex (68614) Sets/sec Notes/CUES HEP   Pt ed; decreasing amount of yardwork at a time- split tasks between days, not exercising through pain            Foam roller quads, IT band 20 rolls ea    Standing HS 3-way on steps 30\" R,L X   Standing gastroc stretch on wedge 30\"x3 X   Standing quad stretch oncage 30\"x2 R,LCore act VC with transitions X   pec stretch stretch cage     Supine figure 4 stretch     Heel slide seated c strap 5\"x10  X   Eccentric ankle eversion  20x     Sciatic nerve glide- supine, ankle PF with knee f/e   X   SL hip ABD  X   clamshell 2# on thigh X         SWB wall sit  RXX  Cues to decr knee valgus    Side step.  Fwd/bwd monster walk HEP 9/10   Hip ABD iso      Step up fwd,   Up and over  LSU  Heel tap lateral 2\"  Heel tap fwd 4\"  pn w/ down       one UE support, stopped d/t pain          Glider ext,   GVL ankles    TKE  KASSY hip ABD    Hip ext      60# heavy    LP 0-90 deg +add       incr NV Pillow for head   HSC  \" Ecc 50#   40#  bar at F    Knee ext 90-40  45#    bike 5'  seat 17- indep    Manual Intervention (40454)      IASTM quad, HS, IT band, superior/inf, bevel 45 deg, patellar framing 5'     Patellar mobs sup and inf, med, lat 20xea      ITB S 2x30\" ea     posterior tibial glide GIII 15\"x4     iASTM peroneus brevis and PF L 5'     Patellar \"tent\" x 2 + medial glide using leukotape     L hip joint mobs- inf and lateral distraction      KT medial tracking \"Y\" strip No QDA          NMR re-education (11108)  CUES NEEDED    Step up to stork airex 5\"x10  KASSY prn, pain when surface is very unsteady- BOSU or DD    CC retro walk w/ A, lat, P toe taps R  CC walks lateral with A, L, P taps CL      SL deadlift 2x10 R,L UE support X   Hip abduction isometric 10\"x5 R,L     FSU to toe tap BOSU flat down  KASSY light support, painful during, not after    Stair amb no HR  \" one HR      Agility ladder: fwd two feet   fwd Two in one out     Lateral two feet  Lateral two in two out Pain when pushing off on LLE to Right for lateral directions    Throwing 50% with softball to net                 Therapeutic Activity (63693)                                                Therapeutic Exercise and NMR EXR  [x] (79081) Provided verbal/tactile cueing for activities related to strengthening, flexibility, endurance, ROM for improvements in LE, proximal hip, and core control with self care, mobility, lifting, ambulation.  [] (20318) Provided verbal/tactile cueing for activities related to improving balance, coordination, kinesthetic sense, posture, motor skill, proprioception  to assist with LE, proximal hip, and core control in self care, mobility, lifting, ambulation and eccentric single leg control.      NMR and Therapeutic Activities:    [] (45337 or 36834) Provided verbal/tactile cueing for activities related to improving balance, coordination, kinesthetic sense, posture, motor skill, proprioception and motor activation to allow for proper function of core, proximal hip and LE with self care and ADLs  [] (90293) Gait Re-education- Provided training and instruction to the patient for proper LE, core and proximal hip recruitment and positioning and eccentric body weight control with ambulation re-education including up and down stairs     Home Exercise Program:    [x] (22855) Reviewed/Progressed HEP activities related to strengthening, flexibility, endurance, ROM of core, proximal hip and LE for functional self-care, mobility, lifting and ambulation/stair navigation   [] (39846)Reviewed/Progressed HEP activities related to improving balance, coordination, kinesthetic sense, posture, motor skill, proprioception of core, proximal hip and LE for self care, mobility, lifting, and ambulation/stair navigation      Manual Treatments:  PROM / STM / Oscillations-Mobs:  G-I, II, III, IV (PA's, Inf., Post.)  [x] (25343) Provided manual therapy to mobilize LE, proximal hip and/or LS spine soft tissue/joints for the purpose of modulating pain, promoting relaxation,  increasing ROM, reducing/eliminating soft tissue swelling/inflammation/restriction, improving soft tissue extensibility and allowing for proper ROM for normal function with self care, mobility, lifting and ambulation. Modalities:     [] GAME READY (VASO)- for significant edema, swelling, pain control. - knee x 15', CP ant knee. Long sit  - declined  Charges:  Timed Code Treatment Minutes: 55   Total Treatment Minutes: 60(bike)     [facility] time in/time out:   (and requires time in and out for each CPT code)    [] EVAL (LOW) 76806 (typically 20 minutes face-to-face)  [] EVAL (MOD) 66447 (typically 30 minutes face-to-face)  [] EVAL (HIGH) 28449 (typically 45 minutes face-to-face)  [] RE-EVAL     [x] FG(02057) x 2  [] IONTO  [x] NMR (07966) x  1  [] VASO  [x] Manual (06034) x  1   [] Other:  [] TA x      [] Mech Traction (45908)  [] ES(attended) (10073)      [] ES (un) (70629):       GOALS: Patient stated goal: no pain and get back to baseball  [x]? Progressing: []? Met: []? Not Met: []? Adjusted     Therapist goals for Patient:   Short Term Goals: To be achieved in: 2 weeks  1. Independent in HEP and progression per patient tolerance, in order to prevent re-injury. [x]? Progressing: []? Met: []? Not Met: []? Adjusted  2. Patient will have a decrease in pain to facilitate improvement in movement, function, and ADLs as indicated by Functional Deficits. [x]? Progressing: []? Met: []? Not Met: []? Adjusted     Long Term Goals: To be achieved in: 8 weeks  1. Disability index score of 35% or less for the LEFS to assist with reaching prior level of function. [x]? Progressing: []? Met: []? Not Met: []? Adjusted  2. Patient will demonstrate increased left knee AROM to 0-140 deg to allow for proper joint functioning for deeper squatting, kneeling, and stair ambulation. []? Progressing: [x]? Met: []? Not Met: []? Adjusted  3. Patient will demonstrate an increase in Strength to 5/5 for the left hip and knee musculature to return to normal CK activities with good knee control, such as stairs, squatting, and light jogging. [x]? Progressing: []? Met: []? Not Met: []? Adjusted  4. Patient will return to at least 30 minutes of moderate physical activity (patient specific goal). [x]? Progressing: []? Met: []? Not Met: []? Adjusted  5. Patient will have improved ankle DF to 15 deg to promote normal tibial advancement and HS flexibility in the 90-90 position to 65 deg to reduce posterior pelvic tilt. [x]? Progressing: []? Met: []? Not Met: []? Adjusted        Progression Towards Functional goals:  [x] Patient is progressing as expected towards functional goals listed. [] Progression is slowed due to complexities listed. [] Progression has been slowed due to co-morbidities. [] Plan just implemented, too soon to assess goals progression  [] Other:     Overall Progression Towards Functional goals/ Treatment Progress Update:  [x] Patient is progressing as expected towards functional goals listed. [] Progression is slowed due to complexities/Impairments listed. [] Progression has been slowed due to co-morbidities.   [] Plan just implemented, too soon to assess goals progression <30days   [] Goals require adjustment due to lack of progress  [] Patient is not progressing as expected and requires additional follow up with physician  [] Other    Prognosis for POC: [x] Good [] Fair  [] Poor      Patient requires continued skilled intervention: [x] Yes  [] No    Treatment/Activity Tolerance:  [x] Patient able to complete treatment  [] Patient limited by fatigue  [] Patient limited by pain    [] Patient limited by other medical complications  [] Other:     ASSESSMENT: Pt's knee pain reduced to 0/10 after MT and stretching. Worked more on functional hip strengthening vs weight machines this visit. Pt needed cues for hip position on hip abduction isometric. Return to Play: (if applicable)   []  Stage 1: Intro to Strength   []  Stage 2: Return to Run and Strength   []  Stage 3: Return to Jump and Strength   []  Stage 4: Dynamic Strength and Agility   []  Stage 5: Sport Specific Training     []  Ready to Return to Play, Meets All Above Stages   []  Not Ready for Return to Sports   Comments:                               PLAN: continue 2x/week x 4 weeks  [x] Continue per plan of care [] Alter current plan (see comments above)  [] Plan of care initiated [] Hold pending MD visit [] Discharge      Electronically signed by:  Jacoby Guidry, PT, DPT     Note: If patient does not return for scheduled/ recommended follow up visits, this note will serve as a discharge from care along with most recent update on progress. Access Code: [email or web address]   URL: [employer]/   Date: 10/14/2020   Prepared by: Jacoby Guidry     Exercises   Supine ITB Stretch with Strap - 3 sets - 30s hold - 1x daily - 7x weekly   Supine Hamstring Stretch with Strap - 3 sets - 30s hold - 1x daily - 7x weekly   Supine Figure 4 Piriformis Stretch - 3 sets - 30s hold - 1x daily - 7x weekly   Seated Thoracic Lumbar Extension with Pectoralis Stretch - 10 reps - 1x daily - 7x weekly   Standing Gastroc Stretch - 3 sets - 30s hold - 2x daily - 7x weekly   Standing Quad Stretch with Table and Chair Support - 3 sets - 30s hold - 2x daily - 7x weekly   Sitting Heel Slide with Towel - 10 reps - 5s hold - 2x daily - 7x weekly   Quadriceps Mobilization with Foam Roll - 20 reps - 1x daily - 7x weekly   Sidelying IT Band Foam Roll Mobilization - 20 reps - 1x daily - 7x weekly   Supine Sciatic Nerve Glide - 20 reps - 2x daily - 7x weekly   Clamshell - 10 reps - 3 sets - 1x daily - 3x weekly   Lateral Step Down - 10 reps - 3 sets - 1x daily - 3x weekly   Sidelying Hip Abduction - 10 reps - 2 sets - 1x daily - 3x weekly   Single Leg Balance on Foam Pad - 10 reps - 10s hold - 1x daily - 3x weekly   Side Stepping with Resistance at Feet - 10 reps - 4 sets - 1x daily - 3x weekly   Lateral Lunge with Slider - 10 reps - 2 sets - 1x daily - 3x weekly   Forward T - 10 reps - 2 sets - 1x daily - 3x weekly   Long Sitting Ankle Eversion with Resistance - 20 reps - 1x daily - 3x weekly

## 2020-10-19 ENCOUNTER — HOSPITAL ENCOUNTER (OUTPATIENT)
Dept: PHYSICAL THERAPY | Age: 60
Setting detail: THERAPIES SERIES
Discharge: HOME | End: 2020-10-19
Payer: COMMERCIAL

## 2020-10-19 PROCEDURE — 97110 THERAPEUTIC EXERCISES: CPT

## 2020-10-19 PROCEDURE — 97140 MANUAL THERAPY 1/> REGIONS: CPT

## 2020-10-19 PROCEDURE — 97112 NEUROMUSCULAR REEDUCATION: CPT

## 2020-10-19 NOTE — FLOWSHEET NOTE
[facility],  [address]  Phone: [phone]  Fax [phone]    Physical Therapy Treatment Note/ Progress Report:           Date:  10/19/2020    Patient Name:  Ely Harley    :  1960  MRN: [id number]  Restrictions/Precautions:    Medical/Treatment Diagnosis Information:  · Diagnosis: C96.69 (IRX-42-SR) - Plica syndrome of knee, left; S83.232D (ICD-10-CM) - Complex tear of medial meniscus of left knee as current injury, subsequent encounter; S83.272D (ICD-10-CM) - Complex tear of lateral meniscus of left knee as current injury, subsequent encounter ; M65.9 (ICD-10-CM) - Synovitis of left knee  · Treatment Diagnosis: M25.562 left knee pain; M25.662 Left knee stiffness; R26.2 Difficulty in walking; M25.462 Left knee effusion  Insurance/Certification information:  PT Insurance Information:  BCBS  - 3000D-MET-/[id number]-$[id number]- NO AUTH  Physician Information:  Referring Practitioner: Dr. All Hunt  Has the plan of care been signed (Y/N):        [x]  Yes  []  No     Date of Patient follow up with Physician: not scheduled    Is this a Progress Report:     [x]  Yes  []  No        If Yes:  Date Range for reporting period:  Beginning 20  Ending 20    Progress report will be due (10 Rx or 30 days whichever is less):     Recertification will be due (POC Duration  / 90 days whichever is less): 10/23      Visit # Insurance Allowable Requires auth   21- POC NV 31 (40, but 9 used prev)    [x]no        []yes:     Functional Scale: LEFS 64% disability  Date assessed:  20  LEFS 55% disability      20  LEFS 45% disability      20      Therapy Diagnosis/Practice Pattern:E      Number of Comorbidities:  []0     []1-2    [x]3+    Latex Allergy:  [x]NO      []YES  Preferred Language for Healthcare:   [x]English       []other:      Pain level: eval 3-10/10 Current pain 0-3/10    SUBJECTIVE:  Pt reports that he continues to have intermittent pain throughout the day. On average it is 2-3, but had a bad day on Saturday for some reason. Still has intermittent lateral foot pain. OBJECTIVE:    Observation:    Test measurements:  Knee flexion 148, knee ext 0          RESTRICTIONS/PRECAUTIONS: A-fib, previous L,R knee arthroscopies; chronic LBP; hx prostate cancer  PROCEDURE PERFORMED 7/17:  Left knee diagnostic video arthroscopy,  arthroscopic partial medial meniscectomy, partial lateral meniscectomy,  medial plica excision, and anterior synovectomy and fat pad excision. Exercises/Interventions:     Therapeutic Ex (91103) Sets/sec Notes/CUES HEP   Pt ed; decreasing amount of yardwork at a time- split tasks between days, not exercising through pain            Foam roller quads, IT band     Standing HS 3-way on steps 30\" R,L X   Standing gastroc stretch on wedge 30\"x3 X   Standing quad stretch oncage 30\"x2 R,LCore act VC with transitions X   pec stretch stretch cage     Supine figure 4 stretch     Heel slide seated c strap 5\"x10  X   Eccentric ankle eversion       Sciatic nerve glide- supine, ankle PF with knee f/e   X   SL hip ABD  X   clamshell 2# on thigh X         SWB wall sit  RXX  Cues to decr knee valgus    Side step.  Fwd/bwd monster walk HEP 9/10   Hip ABD iso      Step up fwd + hip ABD GVL at knees  Up and over  LSU c GVL at knees  Heel tap lateral 2\"  Heel tap fwd 4\" 4\" 15x 6\" x10 R,L pn w/ down       one UE support, stopped d/t pain          Glider ext,   GVL ankles    TKE  KASSY hip ABD    Hip ext   45# x10 ea   60# heavy    LP 0-90 deg +add   130# 3x10 DL  100# 2x12 ECC  80#  2x10 L    incr NV Pillow for head   HSC  \" Ecc 50#   40#  bar at F    Knee ext 90-40  45#    bike 5'  seat 17- indep    Manual Intervention (72366)      IASTM quad, HS, IT band, superior/inf, bevel 45 deg, patellar framing  Pin and stretch 8'     Patellar mobs sup and inf, med, lat 20xea      ITB S 2x30\" ea     posterior tibial glide GIII 15\"x4     iASTM peroneus brevis and PF L      Patellar \"tent\" x 2 + medial glide using leukotape     L hip joint mobs- inf and lateral distraction      KT medial tracking \"Y\" strip  No QDA          NMR re-education (95398)  CUES NEEDED    Step up to [facility] prn, pain when surface is very unsteady- BOSU or DD    CC retro walk w/ A, lat, P toe taps R  CC walks lateral with A, L, P taps CL      SL deadlift UE support X   Hip abduction isometric     FSU to toe tap BOSU flat down  KASSY light support, painful during, not after    Stair amb no HR  \" one HR      Agility ladder: fwd two feet  Fwd one foot   fwd Two in one out 2 laps  2 laps  2 laps    Lateral two feet  Lateral two in two out Pain when pushing off on LLE to Right for lateral directions    Throwing 50% with softball to net                 Therapeutic Activity (74410)                                                Therapeutic Exercise and NMR EXR  [x] (96319) Provided verbal/tactile cueing for activities related to strengthening, flexibility, endurance, ROM for improvements in LE, proximal hip, and core control with self care, mobility, lifting, ambulation.  [] (26288) Provided verbal/tactile cueing for activities related to improving balance, coordination, kinesthetic sense, posture, motor skill, proprioception  to assist with LE, proximal hip, and core control in self care, mobility, lifting, ambulation and eccentric single leg control.      NMR and Therapeutic Activities:    [] (06701 or 70178) Provided verbal/tactile cueing for activities related to improving balance, coordination, kinesthetic sense, posture, motor skill, proprioception and motor activation to allow for proper function of core, proximal hip and LE with self care and ADLs  [] (99100) Gait Re-education- Provided training and instruction to the patient for proper LE, core and proximal hip recruitment and positioning and eccentric body weight control with ambulation re-education including up and down stairs     Home Exercise Program:    [x] (07993) Reviewed/Progressed HEP activities related to strengthening, flexibility, endurance, ROM of core, proximal hip and LE for functional self-care, mobility, lifting and ambulation/stair navigation   [] (56165)Reviewed/Progressed HEP activities related to improving balance, coordination, kinesthetic sense, posture, motor skill, proprioception of core, proximal hip and LE for self care, mobility, lifting, and ambulation/stair navigation      Manual Treatments:  PROM / STM / Oscillations-Mobs:  G-I, II, III, IV (PA's, Inf., Post.)  [x] (37460) Provided manual therapy to mobilize LE, proximal hip and/or LS spine soft tissue/joints for the purpose of modulating pain, promoting relaxation,  increasing ROM, reducing/eliminating soft tissue swelling/inflammation/restriction, improving soft tissue extensibility and allowing for proper ROM for normal function with self care, mobility, lifting and ambulation. Modalities:     [] GAME READY (VASO)- for significant edema, swelling, pain control. - knee x 15', CP ant knee. Long sit  - declined  Charges:  Timed Code Treatment Minutes: 55   Total Treatment Minutes: 60(bike)     [facility] time in/time out:   (and requires time in and out for each CPT code)    [] EVAL (LOW) 18554 (typically 20 minutes face-to-face)  [] EVAL (MOD) 21250 (typically 30 minutes face-to-face)  [] EVAL (HIGH) 98558 (typically 45 minutes face-to-face)  [] RE-EVAL     [x] MP(84787) x 2  [] IONTO  [x] NMR (42804) x  1  [] VASO  [x] Manual (39960) x  1   [] Other:  [] TA x      [] Mech Traction (40636)  [] ES(attended) (96341)      [] ES (un) (13555):       GOALS: Patient stated goal: no pain and get back to baseball  [x]? Progressing: []? Met: []? Not Met: []? Adjusted     Therapist goals for Patient:   Short Term Goals: To be achieved in: 2 weeks  1.  Independent in HEP and progression per patient tolerance, in order to prevent re-injury. [x]? Progressing: []? Met: []? Not Met: []? Adjusted  2. Patient will have a decrease in pain to facilitate improvement in movement, function, and ADLs as indicated by Functional Deficits. [x]? Progressing: []? Met: []? Not Met: []? Adjusted     Long Term Goals: To be achieved in: 8 weeks  1. Disability index score of 35% or less for the LEFS to assist with reaching prior level of function. [x]? Progressing: []? Met: []? Not Met: []? Adjusted  2. Patient will demonstrate increased left knee AROM to 0-140 deg to allow for proper joint functioning for deeper squatting, kneeling, and stair ambulation. []? Progressing: [x]? Met: []? Not Met: []? Adjusted  3. Patient will demonstrate an increase in Strength to 5/5 for the left hip and knee musculature to return to normal CKC activities with good knee control, such as stairs, squatting, and light jogging. [x]? Progressing: []? Met: []? Not Met: []? Adjusted  4. Patient will return to at least 30 minutes of moderate physical activity (patient specific goal). [x]? Progressing: []? Met: []? Not Met: []? Adjusted  5. Patient will have improved ankle DF to 15 deg to promote normal tibial advancement and HS flexibility in the 90-90 position to 65 deg to reduce posterior pelvic tilt. [x]? Progressing: []? Met: []? Not Met: []? Adjusted        Progression Towards Functional goals:  [x] Patient is progressing as expected towards functional goals listed. [] Progression is slowed due to complexities listed. [] Progression has been slowed due to co-morbidities. [] Plan just implemented, too soon to assess goals progression  [] Other:     Overall Progression Towards Functional goals/ Treatment Progress Update:  [x] Patient is progressing as expected towards functional goals listed. [] Progression is slowed due to complexities/Impairments listed. [] Progression has been slowed due to co-morbidities.   [] Plan just implemented, too soon to assess goals progression <30days   [] Goals require adjustment due to lack of progress  [] Patient is not progressing as expected and requires additional follow up with physician  [] Other    Prognosis for POC: [x] Good [] Fair  [] Poor      Patient requires continued skilled intervention: [x] Yes  [] No    Treatment/Activity Tolerance:  [x] Patient able to complete treatment  [] Patient limited by fatigue  [] Patient limited by pain    [] Patient limited by other medical complications  [] Other:     ASSESSMENT: Pt's knee pain reduced to 0/10 after MT and stretching. Reported good hip fatigue and VMO fatigue with step up activities today. No pain with any activity, including agility ladder today. Expresses motivation to work harder on HEP and on strengthening in general.     Return to Play: (if applicable)   []  Stage 1: Intro to Strength   []  Stage 2: Return to Run and Strength   []  Stage 3: Return to Jump and Strength   []  Stage 4: Dynamic Strength and Agility   []  Stage 5: Sport Specific Training     []  Ready to Return to Play, Meets All Above Stages   []  Not Ready for Return to Sports   Comments:                               PLAN: decrease to 1 x 4 weeks  [x] Continue per plan of care [] Alter current plan (see comments above)  [] Plan of care initiated [] Hold pending MD visit [] Discharge      Electronically signed by:  Dorothy Cervantes, PT, DPT     Note: If patient does not return for scheduled/ recommended follow up visits, this note will serve as a discharge from care along with most recent update on progress. Access Code: [email or web address]   URL: [email or web address]   Date: 10/19/2020   Prepared by: Dorothy Cervantes     Exercises   Supine ITB Stretch with Strap - 3 sets - 30s hold - 1x daily - 7x weekly   Supine Hamstring Stretch with Strap - 3 sets - 30s hold - 1x daily - 7x weekly   Supine Figure 4 Piriformis Stretch - 3 sets - 30s hold - 1x daily - 7x weekly   Seated Thoracic Lumbar Extension with Pectoralis Stretch - 10 reps - 1x daily - 7x weekly   Standing Gastroc Stretch - 3 sets - 30s hold - 2x daily - 7x weekly   Standing Quad Stretch with Table and Chair Support - 3 sets - 30s hold - 2x daily - 7x weekly   Sitting Heel Slide with Towel - 10 reps - 5s hold - 2x daily - 7x weekly   Quadriceps Mobilization with Foam Roll - 20 reps - 1x daily - 7x weekly   Sidelying IT Band Foam Roll Mobilization - 20 reps - 1x daily - 7x weekly   Supine Sciatic Nerve Glide - 20 reps - 2x daily - 7x weekly   Clamshell - 10 reps - 3 sets - 1x daily - 3x weekly   Lateral Step Down - 10 reps - 3 sets - 1x daily - 3x weekly   Sidelying Hip Abduction - 10 reps - 2 sets - 1x daily - 3x weekly   Single Leg Balance on Foam Pad - 10 reps - 10s hold - 1x daily - 3x weekly   Side Stepping with Resistance at Feet - 10 reps - 4 sets - 1x daily - 3x weekly   Lateral Lunge with Slider - 10 reps - 2 sets - 1x daily - 3x weekly   Forward T - 10 reps - 2 sets - 1x daily - 3x weekly   Long Sitting Ankle Eversion with Resistance - 20 reps - 1x daily - 3x weekly   Lateral Step Up - 10 reps - 2 sets - 1x daily - 3x weekly   Standing Hip Abduction on BOSU® Ball - 10 reps - 2 sets - 1x daily - 3x weekly

## 2020-10-21 ENCOUNTER — OFFICE VISIT (OUTPATIENT)
Dept: CARDIOLOGY CLINIC | Age: 60
End: 2020-10-21
Payer: COMMERCIAL

## 2020-10-21 VITALS
DIASTOLIC BLOOD PRESSURE: 70 MMHG | WEIGHT: 216.5 LBS | BODY MASS INDEX: 30.31 KG/M2 | HEIGHT: 71 IN | OXYGEN SATURATION: 98 % | SYSTOLIC BLOOD PRESSURE: 120 MMHG | HEART RATE: 56 BPM

## 2020-10-21 PROBLEM — R00.2 PALPITATIONS: Status: RESOLVED | Noted: 2018-12-14 | Resolved: 2020-10-21

## 2020-10-21 PROCEDURE — 99214 OFFICE O/P EST MOD 30 MIN: CPT | Performed by: NURSE PRACTITIONER

## 2020-10-21 PROCEDURE — 93000 ELECTROCARDIOGRAM COMPLETE: CPT | Performed by: NURSE PRACTITIONER

## 2020-10-21 RX ORDER — AMIODARONE HYDROCHLORIDE 200 MG/1
200 TABLET ORAL DAILY
Qty: 90 TABLET | Refills: 0
Start: 2020-10-21 | End: 2020-11-05

## 2020-10-21 NOTE — LETTER
reflux disease), Hyperlipidemia, Hypertrophic cardiomyopathy ([facility] IHSS (idiopathic hypertrophic subaortic stenosis) ([facility] [address] Kidney stones, HUNTER on CPAP, Primary localized osteoarthrosis, shoulder region, Prostate cancer ([facility] [address] [facility] PVC's (premature ventricular contractions), RBBB (right bundle branch block), RLS (restless legs syndrome), Seasonal allergies, and Tachycardia. Past Surgical History:   has a past surgical history that includes Knee arthroscopy (Right, 1982); Umbilical hernia repair; Colonoscopy (1/17/11); TURP (2012); Inguinal hernia repair (Bilateral, 2009, 2012,); Varicocelectomy (2000); Shoulder arthroscopy (Right, 12/31/2013); Endoscopy, colon, diagnostic; Upper gastrointestinal endoscopy (9/3); Elbow surgery (2015); Carpal tunnel release (Left); Knee arthroscopy (Left, 12/21/2017); Colonoscopy (N/A, 2/2/2020); and Knee arthroscopy (Left, 7/17/2020). Home Medications:    Prior to Admission medications    Medication Sig Start Date End Date Taking?  Authorizing Provider   ketorolac (TORADOL) 10 MG tablet Take 1 tablet by mouth every 6 hours as needed for Pain 10/6/20 10/6/21 Yes Lesly Orozco MD   butalbital-APAP-caffeine -40 MG CAPS per capsule TK ONE C PO  Q 12 H PRF SEVERE HEADACHE 9/24/20  Yes Historical Provider, MD   atorvastatin (LIPITOR) 20 MG tablet TAKE 1 TABLET BY MOUTH ONE TIME A DAY 9/30/20  Yes Felicity Lin MD   verapamil (VERELAN) 240 MG extended release capsule TAKE 1 CAPSULE BY MOUTH EVERY NIGHT 9/2/20  Yes Felicity Lin MD   omeprazole (PRILOSEC) 40 MG delayed release capsule TAKE 1 CAPSULE BY MOUTH EVERY DAY 8/31/20  Yes Felicity Lin MD   verapamil (CALAN SR) 240 MG extended release tablet Take 240 mg by mouth daily   Yes Historical Provider, MD   amiodarone (CORDARONE) 200 MG tablet Take 0.5 tablets by mouth daily 7/1/20  Yes Di Anderson MD   ELIQUIS 5 MG TABS tablet TAKE 1 TABLET BY MOUTH TWICE A DAY 10/7/19  Yes Rubén Borges, APRN - CNP clonazePAM (KLONOPIN) 1 MG tablet Take 1 mg by mouth daily. 4/30/14  Yes Historical Provider, MD   tamsulosin (FLOMAX) 0.4 MG capsule Take 1 capsule by mouth daily for 5 days 10/6/20 10/11/20  Akiko Cho MD       Allergies:  Niacin and related    Social History:   reports that he has never smoked. He has never used smokeless tobacco. He reports current alcohol use. He reports that he does not use drugs. Family History: family history includes Cancer in his mother; Heart Disease in his maternal grandfather and maternal grandmother; High Blood Pressure in his father; High Cholesterol in his brother and mother; Prostate Cancer in his paternal uncle. Review of Systems   All 14-point review of systems are completed and pertinent positives are mentioned in the history of present illness. Other systems are reviewed and are negative. PHYSICAL EXAM:    Vital signs:    /70   Pulse 56   Ht 5' 11\" (1.803 m)   Wt 216 lb 8 oz (98.2 kg)   SpO2 98%   BMI 30.20 kg/m²      Constitutional and general appearance: alert, cooperative, no distress and appears stated age  HEENT: PERRL, no cervical lymphadenopathy. No masses palpable.  Normal oral mucosa  Respiratory:  · Normal excursion and expansion without use of accessory muscles  · Resp auscultation: Normal breath sounds without dullness or wheezing  Cardiovascular:  · The apical impulse is not displaced  · Gr 2/6 systolic murmur, left chest. Regular S1 and S2.  · Jugular venous pulsation Normal  · The carotid upstroke is normal in amplitude and contour without delay or bruit  · Peripheral pulses are symmetrical and full   Abdomen:  · No masses or tenderness  · Bowel sounds present  Extremities:  ·  No cyanosis or clubbing  ·  No lower extremity edema  ·  Skin: warm and dry  Neurological:  · Alert and oriented  · Moves all extremities well  · Flat affect     DATA:    ECG 10/21/2020:  SB with RBBB HR 57    Echo 10/2/2019: Normal systolic function with an estimated ejection fraction of 55-60%. Moderate concentric left ventricular hypertrophy ( septal wall is more   increased in size (1.6 cm) ). No regional wall motion abnormalities are seen. Normal left ventricular diastolic filling pressure. The left atrium is severely dilated. The right atrium is mildly dilated. Mild aortic regurgitation. Mild mitral and pulmonic regurgitation. Echo 57/07/9459:  -- Systolic function appears grossly normal with an estimated ejection fraction of 55-60 %.   Left ventricular size is normal with severe asymetric left ventricular hypertrophy. Speckled appearance of myocardium, suggest rule out for restrictive cardiomyopathy and comparison with ECG voltage. Normal left ventricle diastolic filling pressure.   -- Moderately dilated left atrium with increased left atrial volume. Cardiac MRI 1/10/2018:  1.  Normal left ventricular chamber size with a normal global LV systolic function. Calculated    ejection fraction of 60%. Asymmetric basal and mid septal hypertrophy with the maximum    end-diastolic myocardial thickness measuring up to 16 mm. Maximum LVOT velocity is 1.4 m/s. No    anterior systolic motion of the anterior mitral valve leaflet. Late gadolinium enhancement    imaging demonstrates areas of heterogeneous hyperenhancement predominantly throughout the basal    and mid anterior and septal myocardial segments. Collectively, these findings are consistent    with hypertrophic cardiomyopathy without dynamic LVOT obstruction at rest.   2.  Normal right ventricular size with a normal global RV systolic function. Calculated RV    ejection fraction of 63%. 3.  Mild mitral regurgitation with associated mild left atrial enlargement. Stress test 4/2016:   Left ventricular ejection fraction of 60 %.    There is normal isotope uptake at stress and rest. There is no evidence of myocardial ischemia or scar.      CARDIOLOGY LABS: CBC: No results for input(s): WBC, HGB, HCT, PLT in the last 72 hours. BMP: No results for input(s): NA, K, CO2, BUN, CREATININE, LABGLOM, GLUCOSE in the last 72 hours. PT/INR: No results for input(s): PROTIME, INR in the last 72 hours. APTT:No results for input(s): APTT in the last 72 hours. FASTING LIPID PANEL:  Lab Results   Component Value Date    HDL 54 09/30/2020    HDL 51 12/19/2011    LDLCALC 115 09/30/2020    TRIG 85 09/30/2020     LIVER PROFILE:No results for input(s): AST, ALT, ALB in the last 72 hours. Assessment:   1. Symptomatic paroxysmal atrial fibrillation: stable    -noted on monitor in 6/2017   -[id number] score 1 (cardiomyopathy)   -amiodarone started in 10/2017  2. Hypertrophic obstructive cardiomyopathy: known history   -management per Dr. Lewis Pryor, cardiac MRI done in 1/2018  3. Frequent PVCs and history of NSVT: stable    -symptomatic, noted on event monitors  4. Right bundle branch block: stable   5: HUNTER: reports compliance with CPAP    Plan:   1. Continue amiodarone, verapamil, and Eliquis  2. Annual CBC (due 10/2021)  3. BMP, LFT, and TSH every 6 months while on amiodarone (due 2/2021)  4. Will discuss increased amiodarone requirement, ongoing palpitations, and request to stop Eliquis with Dr. Lavinia Hood   5.  Follow up in 6 months unless indicated sooner    Shashi Wilde, [address]  [phone]

## 2020-10-21 NOTE — PATIENT INSTRUCTIONS
I will discuss Eliquis and next steps with PVCs and possible loop recorder with Dr. Margie Corral and call you  Follow up in 6 months

## 2020-10-21 NOTE — PROGRESS NOTES
[address]   Electrophysiology  Note              Date:  October 21, 2020  Patient name: Ulises Tillman  YOB: 1960    Primary Care physician: Josefa Marroquin MD    HISTORY OF PRESENT ILLNESS: The patient is a 61 y.o.  male with a history of HOCM, NSVT, PAF, RBBB, chronic pain, ADHD, and sleep apnea. He transferred service for HOCM from Dr. Nahomy Nguyen in 6/2014 and has historically declined ICD implant because no one in his family has had sudden cardiac death. Echo in 6/2014 showed an EF of 60-65% and a 2.0cm mid septum. He complained of chest pain and had a normal stress test in 4/2016. He complained of palpitations and was diagnosed with atrial fibrillation in 4/2016. A 2 week monitor worn in 6/2017 showed PAF and frequent PVCs. Echo in 10/2017 showed an EF of 55-60% and severe LVH. He was started on amiodarone in 10/2017. He was referred to Dr. Saintclair Labella for HOCM. Cardiac MRI in 1/2018 showed HOCM without LVOT obstruction at rest and an EF of 63%. Echo in 10/2019 showed an EF of 55-60% and moderate LVH. Event monitor in 4/2020 showed rare PVCs and no NSVT. Amiodarone was decreased to 100mg po QD. Event monitor in 7/2020 showed rare PVCs and 4 beat NSVT. Today he is being seen for afib and PVCs. EKG shows sinus dolores with a RBBB and HR of 57. He states he increased amiodarone back to 200mg per day one month ago due to daily palpitations. Continues to have frequent palpitations and occasional fluttering. Has some SOB and cough that he feels are related to allergies. Denies chest pain and dizziness. Reports minimal caffeine intake and reports compliance with CPAP. Would like to stop Eliquis if possible.      Past Medical History:   has a past medical history of A-fib ([address] ADHD (attention deficit hyperactivity disorder), Carpal tunnel syndrome, Chronic low back pain, Chronic neck pain, Chronic sinusitis, Dental crown present, Epigastric hernia, Esophageal spasm, GERD (gastroesophageal reflux disease), Hyperlipidemia, Hypertrophic cardiomyopathy ([facility] [address] IHSS (idiopathic hypertrophic subaortic stenosis) ([facility] [address] Kidney stones, HUNTER on CPAP, Primary localized osteoarthrosis, shoulder region, Prostate cancer ([facility] [address] PVC's (premature ventricular contractions), RBBB (right bundle branch block), RLS (restless legs syndrome), Seasonal allergies, and Tachycardia. Past Surgical History:   has a past surgical history that includes Knee arthroscopy (Right, 1982); Umbilical hernia repair; Colonoscopy (1/17/11); TURP (2012); Inguinal hernia repair (Bilateral, 2009, 2012,); Varicocelectomy (2000); Shoulder arthroscopy (Right, 12/31/2013); Endoscopy, colon, diagnostic; Upper gastrointestinal endoscopy (9/3); Elbow surgery (2015); Carpal tunnel release (Left); Knee arthroscopy (Left, 12/21/2017); Colonoscopy (N/A, 2/2/2020); and Knee arthroscopy (Left, 7/17/2020). Home Medications:    Prior to Admission medications    Medication Sig Start Date End Date Taking?  Authorizing Provider   ketorolac (TORADOL) 10 MG tablet Take 1 tablet by mouth every 6 hours as needed for Pain 10/6/20 10/6/21 Yes Linda Sawyer MD   butalbital-APAP-caffeine -40 MG CAPS per capsule TK ONE C PO  Q 12 H PRF SEVERE HEADACHE 9/24/20  Yes Historical Provider, MD   atorvastatin (LIPITOR) 20 MG tablet TAKE 1 TABLET BY MOUTH ONE TIME A DAY 9/30/20  Yes Du Vences MD   verapamil (VERELAN) 240 MG extended release capsule TAKE 1 CAPSULE BY MOUTH EVERY NIGHT 9/2/20  Yes Du Vences MD   omeprazole (PRILOSEC) 40 MG delayed release capsule TAKE 1 CAPSULE BY MOUTH EVERY DAY 8/31/20  Yes Du Vences MD   verapamil (CALAN SR) 240 MG extended release tablet Take 240 mg by mouth daily   Yes Historical Provider, MD   amiodarone (CORDARONE) 200 MG tablet Take 0.5 tablets by mouth daily 7/1/20  Yes Joyceyln Collier MD   ELIQUIS 5 MG TABS tablet TAKE 1 TABLET BY MOUTH TWICE A DAY 10/7/19  Yes PARI Schmitz - MATT   clonazePAM (KLONOPIN) 1 MG tablet Take 1 mg by mouth daily. 4/30/14  Yes Historical Provider, MD   tamsulosin (FLOMAX) 0.4 MG capsule Take 1 capsule by mouth daily for 5 days 10/6/20 10/11/20  Andrew Brown MD       Allergies:  Niacin and related    Social History:   reports that he has never smoked. He has never used smokeless tobacco. He reports current alcohol use. He reports that he does not use drugs. Family History: family history includes Cancer in his mother; Heart Disease in his maternal grandfather and maternal grandmother; High Blood Pressure in his father; High Cholesterol in his brother and mother; Prostate Cancer in his paternal uncle. Review of Systems   All 14-point review of systems are completed and pertinent positives are mentioned in the history of present illness. Other systems are reviewed and are negative. PHYSICAL EXAM:    Vital signs:    /70   Pulse 56   Ht 5' 11\" (1.803 m)   Wt 216 lb 8 oz (98.2 kg)   SpO2 98%   BMI 30.20 kg/m²      Constitutional and general appearance: alert, cooperative, no distress and appears stated age  HEENT: PERRL, no cervical lymphadenopathy. No masses palpable.  Normal oral mucosa  Respiratory:  · Normal excursion and expansion without use of accessory muscles  · Resp auscultation: Normal breath sounds without dullness or wheezing  Cardiovascular:  · The apical impulse is not displaced  · Gr 2/6 systolic murmur, left chest. Regular S1 and S2.  · Jugular venous pulsation Normal  · The carotid upstroke is normal in amplitude and contour without delay or bruit  · Peripheral pulses are symmetrical and full   Abdomen:  · No masses or tenderness  · Bowel sounds present  Extremities:  ·  No cyanosis or clubbing  ·  No lower extremity edema  ·  Skin: warm and dry  Neurological:  · Alert and oriented  · Moves all extremities well  · Flat affect     DATA:    ECG 10/21/2020:  SB with RBBB HR 57    Echo 07/3/6199:  Normal systolic function with an estimated ejection fraction of 55-60%. Moderate concentric left ventricular hypertrophy ( septal wall is more   increased in size (1.6 cm) ). No regional wall motion abnormalities are seen. Normal left ventricular diastolic filling pressure. The left atrium is severely dilated. The right atrium is mildly dilated. Mild aortic regurgitation. Mild mitral and pulmonic regurgitation. Echo 30/57/8627:  -- Systolic function appears grossly normal with an estimated ejection fraction of 55-60 %.   Left ventricular size is normal with severe asymetric left ventricular hypertrophy. Speckled appearance of myocardium, suggest rule out for restrictive cardiomyopathy and comparison with ECG voltage. Normal left ventricle diastolic filling pressure.   -- Moderately dilated left atrium with increased left atrial volume. Cardiac MRI 1/10/2018:  1.  Normal left ventricular chamber size with a normal global LV systolic function. Calculated    ejection fraction of 60%. Asymmetric basal and mid septal hypertrophy with the maximum    end-diastolic myocardial thickness measuring up to 16 mm. Maximum LVOT velocity is 1.4 m/s. No    anterior systolic motion of the anterior mitral valve leaflet. Late gadolinium enhancement    imaging demonstrates areas of heterogeneous hyperenhancement predominantly throughout the basal    and mid anterior and septal myocardial segments. Collectively, these findings are consistent    with hypertrophic cardiomyopathy without dynamic LVOT obstruction at rest.   2.  Normal right ventricular size with a normal global RV systolic function. Calculated RV    ejection fraction of 63%. 3.  Mild mitral regurgitation with associated mild left atrial enlargement. Stress test 4/2016:   Left ventricular ejection fraction of 60 %.    There is normal isotope uptake at stress and rest. There is no evidence of myocardial ischemia or scar.      CARDIOLOGY LABS:   CBC: No results for input(s): WBC, HGB, HCT, PLT in the last 72 hours. BMP: No results for input(s): NA, K, CO2, BUN, CREATININE, LABGLOM, GLUCOSE in the last 72 hours. PT/INR: No results for input(s): PROTIME, INR in the last 72 hours. APTT:No results for input(s): APTT in the last 72 hours. FASTING LIPID PANEL:  Lab Results   Component Value Date    HDL 54 09/30/2020    HDL 51 12/19/2011    LDLCALC 115 09/30/2020    TRIG 85 09/30/2020     LIVER PROFILE:No results for input(s): AST, ALT, ALB in the last 72 hours. Assessment:   1. Symptomatic paroxysmal atrial fibrillation: stable    -noted on monitor in 6/2017   -[id number] score 1 (cardiomyopathy)   -amiodarone started in 10/2017  2. Hypertrophic obstructive cardiomyopathy: known history   -management per Dr. Michelle Alan, cardiac MRI done in 1/2018  3. Frequent PVCs and history of NSVT: stable    -symptomatic, noted on event monitors  4. Right bundle branch block: stable   5: HUNTER: reports compliance with CPAP    Plan:   1. Continue amiodarone, verapamil, and Eliquis  2. Annual CBC (due 10/2021)  3. BMP, LFT, and TSH every 6 months while on amiodarone (due 2/2021)  4. Will discuss increased amiodarone requirement, ongoing palpitations, and request to stop Eliquis with Dr. Abdoul Martinez   5.  Follow up in 6 months unless indicated sooner    August Lubna, [address]  [phone]

## 2020-10-22 ENCOUNTER — HOSPITAL ENCOUNTER (OUTPATIENT)
Dept: PHYSICAL THERAPY | Age: 60
Setting detail: THERAPIES SERIES
Discharge: HOME | End: 2020-10-22
Payer: COMMERCIAL

## 2020-10-22 PROCEDURE — 97140 MANUAL THERAPY 1/> REGIONS: CPT

## 2020-10-22 PROCEDURE — 97112 NEUROMUSCULAR REEDUCATION: CPT

## 2020-10-22 PROCEDURE — 97110 THERAPEUTIC EXERCISES: CPT

## 2020-10-22 NOTE — FLOWSHEET NOTE
[facility],  [address]  Phone: [phone]  Fax [phone]    Physical Therapy Treatment Note/ Progress Report:           Date:  10/22/2020    Patient Name:  Carmelina Timmons    :  1960  MRN: [id number]  Restrictions/Precautions:    Medical/Treatment Diagnosis Information:  · Diagnosis: W35.80 (SPR-38-ZS) - Plica syndrome of knee, left; S83.232D (ICD-10-CM) - Complex tear of medial meniscus of left knee as current injury, subsequent encounter; S83.272D (ICD-10-CM) - Complex tear of lateral meniscus of left knee as current injury, subsequent encounter ; M65.9 (ICD-10-CM) - Synovitis of left knee  · Treatment Diagnosis: M25.562 left knee pain; M25.662 Left knee stiffness; R26.2 Difficulty in walking; M25.462 Left knee effusion  Insurance/Certification information:  PT Insurance Information:  BCBS  - [id number]- NO AUTH  Physician Information:  Referring Practitioner: Dr. Timothy Eddy  Has the plan of care been signed (Y/N):        [x]  Yes  []  No     Date of Patient follow up with Physician: not scheduled    Is this a Progress Report:     [x]  Yes  []  No        If Yes:  Date Range for reporting period:  Beginning 20  Ending 20    Progress report will be due (10 Rx or 30 days whichever is less):     Recertification will be due (POC Duration  / 90 days whichever is less): 10/23      Visit # Insurance Allowable Requires auth   22- POC 31 (40, but 9 used prev)    [x]no        []yes:     Functional Scale: LEFS 64% disability  Date assessed:  20  LEFS 55% disability      20  LEFS 45% disability      20  LEFS 35% disability      10/22/20      Therapy Diagnosis/Practice Pattern:E      Number of Comorbidities:  []0     []1-2    [x]3+    Latex Allergy:  [x]NO      []YES  Preferred Language for Healthcare:   [x]English       []other:      Pain level: eval 3-10/10 Current pain 0-3/10    SUBJECTIVE:  Pt reports that he continues to have intermittent pain throughout the day. On average it is 2-3/10. He feels this about 50% of the time. He feels weaker in the LLE compared to the right still. He reports that he is having more pain on the outside of the left foot. This started about 2 weeks ago. Has not seen a provider regarding this. OBJECTIVE:    Observation:    Test measurements:  Knee flexion 148, knee ext 0   + SLR L   MMT knee extension L 46.5#, R 47.3# Hip abduction L 35.1 # R, 34.3#    RESTRICTIONS/PRECAUTIONS: A-fib, previous L,R knee arthroscopies; chronic LBP; hx prostate cancer  PROCEDURE PERFORMED 7/17:  Left knee diagnostic video arthroscopy,  arthroscopic partial medial meniscectomy, partial lateral meniscectomy,  medial plica excision, and anterior synovectomy and fat pad excision. Exercises/Interventions:     Therapeutic Ex (79537) Sets/sec Notes/CUES HEP   Pt ed; decreasing amount of yardwork at a time- split tasks between days, not exercising through pain            Foam roller quads, IT band     Standing HS 3-way on steps 30\" R,Lindep X   Standing gastroc stretch on wedge 30\"a7gyfua X   Standing quad stretch oncage 30\"x2 R,LCore act VC with transitions X   pec stretch stretch cage     Supine figure 4 stretch     Heel slide seated c strap 5\"x10  X   Eccentric ankle eversion       Sciatic nerve glide- supine, ankle PF with knee f/e   X   SL hip ABD  X   clamshell 2# on thigh X         SWB wall sit  RXX  Cues to decr knee valgus    Side step.  Fwd/bwd monster walk HEP 9/10   Hip ABD iso      Step up fwd + hip ABD GVL at knees  Up and over  LSU c GVL at knees  Heel tap lateral 2\"  Heel tap fwd 4\" 6\" 20x R,L 6\" x20 R,L pn w/ down       one UE support, stopped d/t pain          TKE  KASSY hip ABD    Hip ext   45# x10 ea  45# 2x10 R,L   60# heavy    LP 0-90 deg +add       incr NV Pillow for head   HSC  \" Ecc [id number] L50# bar at neutral  40#  bar at neutral    Knee ext 90-40 2x10 45#    bike 5'  seat 17- indep    Manual Intervention (14566)      IASTM quad, HS, IT band, superior/inf, bevel 45 deg, patellar framing  Pin and stretch 8'     Patellar mobs sup and inf, med, lat 20xea      ITB S      posterior tibial glide GIII 15\"x4     iASTM peroneus brevis and PF L      Patellar \"tent\" x 2 + medial glide using leukotape     L hip joint mobs- inf and lateral distraction      KT medial tracking \"Y\" strip  No QDA          NMR re-education (70317)  CUES NEEDED    Step up to [facility] prn, pain when surface is very unsteady- BOSU or DD    CC retro walk w/ A, lat, P toe taps R  CC walks lateral with A, L, P taps CL      SL deadlift UE support X   Hip abduction isometric     FSU to toe tap BOSU flat down  KASSY light support, painful during, not after    Glider ext, abd/ext with black SB providing valgus force L knee 15x ea R on glider    Agility ladder: fwd two feet  Fwd one foot   fwd Two in one out 2 laps        Lateral two feet  Lateral two in two out 1 lap   1 laps Pain when pushing off on LLE to Right for lateral directions    Throwing 50% with softball to net                 Therapeutic Activity (54167)                                                Therapeutic Exercise and NMR EXR  [x] (39774) Provided verbal/tactile cueing for activities related to strengthening, flexibility, endurance, ROM for improvements in LE, proximal hip, and core control with self care, mobility, lifting, ambulation.  [] (76675) Provided verbal/tactile cueing for activities related to improving balance, coordination, kinesthetic sense, posture, motor skill, proprioception  to assist with LE, proximal hip, and core control in self care, mobility, lifting, ambulation and eccentric single leg control.      NMR and Therapeutic Activities:    [] (71297 or 53890) Provided verbal/tactile cueing for activities related to improving balance, coordination, kinesthetic sense, posture, motor skill, proprioception and motor activation to allow for proper function of core, proximal hip and LE with self care and ADLs  [] (67947) Gait Re-education- Provided training and instruction to the patient for proper LE, core and proximal hip recruitment and positioning and eccentric body weight control with ambulation re-education including up and down stairs     Home Exercise Program:    [x] (58198) Reviewed/Progressed HEP activities related to strengthening, flexibility, endurance, ROM of core, proximal hip and LE for functional self-care, mobility, lifting and ambulation/stair navigation   [] (03420)Reviewed/Progressed HEP activities related to improving balance, coordination, kinesthetic sense, posture, motor skill, proprioception of core, proximal hip and LE for self care, mobility, lifting, and ambulation/stair navigation      Manual Treatments:  PROM / STM / Oscillations-Mobs:  G-I, II, III, IV (PA's, Inf., Post.)  [x] (40446) Provided manual therapy to mobilize LE, proximal hip and/or LS spine soft tissue/joints for the purpose of modulating pain, promoting relaxation,  increasing ROM, reducing/eliminating soft tissue swelling/inflammation/restriction, improving soft tissue extensibility and allowing for proper ROM for normal function with self care, mobility, lifting and ambulation. Modalities:     [] GAME READY (VASO)- for significant edema, swelling, pain control. - knee x 15', CP ant knee.  Long sit  - declined  Charges:  Timed Code Treatment Minutes: 55   Total Treatment Minutes: 75(bike, indep stretches)     [facility] time in/time out:   (and requires time in and out for each CPT code)    [] EVAL (LOW) 54569 (typically 20 minutes face-to-face)  [] EVAL (MOD) 82513 (typically 30 minutes face-to-face)  [] EVAL (HIGH) 62918 (typically 45 minutes face-to-face)  [] RE-EVAL     [x] HB(36108) x 2  [] IONTO  [x] NMR (85918) x  1  [] VASO  [x] Manual (83515) x  1   [] Other:  [] TA x      [] Mech Traction (64527)  [] ES(attended) (72344)      [] ES (un) (89861):       GOALS: Patient stated goal: no pain and get back to baseball  [x]? Progressing: []? Met: []? Not Met: []? Adjusted     Therapist goals for Patient:   Short Term Goals: To be achieved in: 2 weeks  1. Independent in HEP and progression per patient tolerance, in order to prevent re-injury. []? Progressing: [x]? Met: []? Not Met: []? Adjusted  2. Patient will have a decrease in pain to facilitate improvement in movement, function, and ADLs as indicated by Functional Deficits. [x]? Progressing: []? Met: []? Not Met: []? Adjusted     Long Term Goals: To be achieved in: 8 weeks  1. Disability index score of 35% or less for the LEFS to assist with reaching prior level of function. []? Progressing: [x]? Met: []? Not Met: []? Adjusted  2. Patient will demonstrate increased left knee AROM to 0-140 deg to allow for proper joint functioning for deeper squatting, kneeling, and stair ambulation. []? Progressing: [x]? Met: []? Not Met: []? Adjusted  3. Patient will demonstrate an increase in Strength to 5/5 for the left hip and knee musculature to return to normal CKC activities with good knee control, such as stairs, squatting, and light jogging. [x]? Progressing: []? Met: []? Not Met: []? Adjusted  4. Patient will return to at least 30 minutes of moderate physical activity (patient specific goal). [x]? Progressing: []? Met: []? Not Met: []? Adjusted  5. Patient will have improved ankle DF to 15 deg to promote normal tibial advancement and HS flexibility in the 90-90 position to 65 deg to reduce posterior pelvic tilt. [x]? Progressing: []? Met: []? Not Met: []? Adjusted        Progression Towards Functional goals:  [x] Patient is progressing as expected towards functional goals listed. [] Progression is slowed due to complexities listed. [] Progression has been slowed due to co-morbidities.   [] Plan just implemented, too soon to assess goals progression  [] Other:     Overall Progression Towards Functional goals/ Treatment Progress Update:  [x] Patient is progressing as expected towards functional goals listed. [] Progression is slowed due to complexities/Impairments listed. [] Progression has been slowed due to co-morbidities. [] Plan just implemented, too soon to assess goals progression <30days   [] Goals require adjustment due to lack of progress  [] Patient is not progressing as expected and requires additional follow up with physician  [] Other    Prognosis for POC: [x] Good [] Fair  [] Poor      Patient requires continued skilled intervention: [x] Yes  [] No    Treatment/Activity Tolerance:  [x] Patient able to complete treatment  [] Patient limited by fatigue  [] Patient limited by pain    [] Patient limited by other medical complications  [] Other:     ASSESSMENT: see POC    Return to Play: (if applicable)   []  Stage 1: Intro to Strength   []  Stage 2: Return to Run and Strength   []  Stage 3: Return to Jump and Strength   []  Stage 4: Dynamic Strength and Agility   []  Stage 5: Sport Specific Training     []  Ready to Return to Play, Meets All Above Stages   []  Not Ready for Return to Sports   Comments:                               PLAN: decrease to 1 x 4 weeks  [x] Continue per plan of care [] Alter current plan (see comments above)  [] Plan of care initiated [] Hold pending MD visit [] Discharge      Electronically signed by:  Ramya Moralez, PT, DPT     Note: If patient does not return for scheduled/ recommended follow up visits, this note will serve as a discharge from care along with most recent update on progress. Access Code: [email or web address]   URL: [email or web address]   Date: 10/19/2020   Prepared by: Ramya Moralez     Exercises   Supine ITB Stretch with Strap - 3 sets - 30s hold - 1x daily - 7x weekly   Supine Hamstring Stretch with Strap - 3 sets - 30s hold - 1x daily - 7x weekly   Supine Figure 4 Piriformis Stretch - 3 sets - 30s hold - 1x daily - 7x weekly   Seated Thoracic Lumbar Extension with Pectoralis Stretch - 10 reps - 1x daily - 7x weekly   Standing Gastroc Stretch - 3 sets - 30s hold - 2x daily - 7x weekly   Standing Quad Stretch with Table and Chair Support - 3 sets - 30s hold - 2x daily - 7x weekly   Sitting Heel Slide with Towel - 10 reps - 5s hold - 2x daily - 7x weekly   Quadriceps Mobilization with Foam Roll - 20 reps - 1x daily - 7x weekly   Sidelying IT Band Foam Roll Mobilization - 20 reps - 1x daily - 7x weekly   Supine Sciatic Nerve Glide - 20 reps - 2x daily - 7x weekly   Clamshell - 10 reps - 3 sets - 1x daily - 3x weekly   Lateral Step Down - 10 reps - 3 sets - 1x daily - 3x weekly   Sidelying Hip Abduction - 10 reps - 2 sets - 1x daily - 3x weekly   Single Leg Balance on Foam Pad - 10 reps - 10s hold - 1x daily - 3x weekly   Side Stepping with Resistance at Feet - 10 reps - 4 sets - 1x daily - 3x weekly   Lateral Lunge with Slider - 10 reps - 2 sets - 1x daily - 3x weekly   Forward T - 10 reps - 2 sets - 1x daily - 3x weekly   Long Sitting Ankle Eversion with Resistance - 20 reps - 1x daily - 3x weekly   Lateral Step Up - 10 reps - 2 sets - 1x daily - 3x weekly   Standing Hip Abduction on BOSU® Ball - 10 reps - 2 sets - 1x daily - 3x weekly

## 2020-10-23 NOTE — PLAN OF CARE
[facility], [address] [facility]  [address] [facility], [address] [place] [address]  Phone: [phone]  Fax [phone]     Physical Therapy Re-Certification Plan of Care    Dear  Dr. Marilynn Burkitt,    We had the pleasure of treating the following patient for physical therapy services at [address] [place] [address]. A summary of our findings can be found in the updated assessment below. This includes our plan of care. If you have any questions or concerns regarding these findings, please do not hesitate to contact me at the office phone number checked above. Thank you for the referral.     Physician Signature:________________________________Date:__________________  By signing above, therapists plan is approved by physician      Patient: Kameron Bangura   : 1960   MRN: [id number]  Referring Physician:  Dr. Marilynn Burkitt      Evaluation Date: 10/23/2020      Medical Diagnosis Information:  ·   Diagnosis: M13.06 (IOR-42-QK) - Plica syndrome of knee, left; S83.232D (ICD-10-CM) - Complex tear of medial meniscus of left knee as current injury, subsequent encounter; S83.272D (ICD-10-CM) - Complex tear of lateral meniscus of left knee as current injury, subsequent encounter ; M65.9 (ICD-10-CM) - Synovitis of left knee    Treatment Diagnosis: M25.562 left knee pain; M25.662 Left knee stiffness; R26.2 Difficulty in walking; M25.462 Left knee effusionInsurance information:   [facility]    Date Range:20-10/22/20  Total visits:  22    G-Codes: (if applicable)   21% disability  Functional Index used: LEFS    SUBJECTIVE: Pt reports that he continues to have intermittent pain throughout the day. On average it is 2-3/10. He feels this about 50% of the time. He feels weaker in the LLE compared to the right still. He reports that he is having more pain on the outside of the left foot. This started about 2 weeks ago. Has not seen a provider regarding this.      Current Pain Scale:0-3/10    Type: []Constant   [x]Intermitment  []Radiating []Localized  []other:     Functional Limitations: []Sitting []Standing [x]Walking    []Squatting []Stairs            []ADL's  []Driving []Sports/Recreation  []Other:      · OBJECTIVE: Knee flexion 148, knee ext 0  · + SLR L  MMT knee extension L 46.5#, R 47.3# Hip abduction L 35.1 # R, 34.3#    Gait: amb s AD with bilateral ER, femoral IR during weight acceptance LLE    Joint mobility:   []Normal    [x]Hypo patello-femoral mobility   []Hyper    Palpation: increased fascial restrictions parminder-patella, especially on superomedial border    Orthopedic Tests: none    OTHER:      ASSESSMENT: Julio continues to make slow, but steady progress in PT. His pain levels are staying consistently lower and he is able to complete more [facility] activities, such as heavy yardwork without difficulty at this time. He does still have medial knee pain with ambulation, likely d/t femoral IR with weight acceptance. He will benefit from continued proximal stabilization, especially in the closed chain position in order to improve this. Recommend that he decrease PT visits to 1x/week x 4 weeks.       Response to Treatment:   [x]Patient is responding well to treatment and improvement is noted with regards  to goals   []Patient should continue to improve in reasonable time if they continue HEP   []Patient has plateaued and is no longer responding to skilled PT intervention    []Patient is getting worse and would benefit from return to referring MD   []Patient unable to adhere to initial POC    Functional deficiencies/Impairements which affect ADL's and Reduce overall function:     [x]decreased core/proximal hip strength and neuromuscular control - Reduced  overall function   [x]decreased LE ROM/joint mobility- Reduced overall Function    [x]decreased LE strength- Reduced overall function with gait and steps   [x]difficulty with SLS- Reduced overall function and possible falls risk   [x]pain/difficulty with ambulation- reduced overall function and mobility   [x]unable to perform sport/recreational activity due to pain and dysfunction   []other:       Prognosis/Rehab Potential:    []Excellent   [x]Good    []Fair   []Poor: Toleration of evaluation or treatment:    []Excellent   [x]Good    []Fair   []Poor     New or Updated Goals (if applicable):  [x] No change to goals established upon initial eval/last progress note:  New Goals:    GOALS:   GOALS: Patient stated goal: no pain and get back to baseball  [x]? ? Progressing: []?? Met: []?? Not Met: []?? Adjusted     Therapist goals for Patient:   Short Term Goals: To be achieved in: 2 weeks  1. Independent in HEP and progression per patient tolerance, in order to prevent re-injury. []?? Progressing: [x]? ? Met: []?? Not Met: []?? Adjusted  2. Patient will have a decrease in pain to facilitate improvement in movement, function, and ADLs as indicated by Functional Deficits. [x]? ? Progressing: []?? Met: []?? Not Met: []?? Adjusted     Long Term Goals: To be achieved in: 8 weeks  1. Disability index score of 35% or less for the LEFS to assist with reaching prior level of function.   []?? Progressing: [x]? ? Met: []?? Not Met: []?? Adjusted  2. Patient will demonstrate increased left knee AROM to 0-140 deg to allow for proper joint functioning for deeper squatting, kneeling, and stair ambulation.   []?? Progressing: [x]? ? Met: []?? Not Met: []?? Adjusted  3. Patient will demonstrate an increase in Strength to 5/5 for the left hip and knee musculature to return to normal CKC activities with good knee control, such as stairs, squatting, and light jogging. [x]? ? Progressing: []?? Met: []?? Not Met: []?? Adjusted  4. Patient will return to at least 30 minutes of moderate physical activity (patient specific goal).   [x]? ? Progressing: []?? Met: []?? Not Met: []?? Adjusted  5. Patient will have improved ankle DF to 15 deg to promote normal tibial advancement and HS flexibility in the 90-90 position to 65 deg to reduce posterior pelvic tilt.   [x]? ? Progressing: []?? Met: []?? Not Met: []?? Adjusted           Rehab Potential:   []Excellent   [x] Good   [] Fair   [] Poor    Plan of Care:  [x] Continue Current Therapy Intervention    Frequency/Duration:  1 days per week for 4 Weeks:  HEP instruction:   1. Ther ex including: strength training, ROM, NMR and proprioception for the proximal hip, core and Lower extremity  2. Manual therapy as indicated including Dry Needling/IASTM, STM, PROM, Gr I-IV mobilizations, spinal mobilization/manipulation. 3. Modalities as needed including: thermal agents, E-stim, US, iontophoresis as indicated. 4. Patient education on joint protection, activity modification, progression of HEP.         Electronically signed by:  Marilu Orlando PT, DPT

## 2020-10-28 ENCOUNTER — HOSPITAL ENCOUNTER (OUTPATIENT)
Dept: PHYSICAL THERAPY | Age: 60
Setting detail: THERAPIES SERIES
Discharge: HOME | End: 2020-10-28
Payer: COMMERCIAL

## 2020-10-28 ENCOUNTER — TELEPHONE (OUTPATIENT)
Dept: CARDIOLOGY CLINIC | Age: 60
End: 2020-10-28

## 2020-10-28 PROCEDURE — 97112 NEUROMUSCULAR REEDUCATION: CPT

## 2020-10-28 PROCEDURE — 97140 MANUAL THERAPY 1/> REGIONS: CPT

## 2020-10-28 PROCEDURE — 97110 THERAPEUTIC EXERCISES: CPT

## 2020-10-28 NOTE — FLOWSHEET NOTE
[facility],  [address]  Phone: [phone]  Fax [phone]    Physical Therapy Treatment Note/ Progress Report:           Date:  10/28/2020    Patient Name:  Ulises Tillman    :  1960  MRN: [id number]  Restrictions/Precautions:    Medical/Treatment Diagnosis Information:  · Diagnosis: C70.04 (SOY-46-JJ) - Plica syndrome of knee, left; S83.232D (ICD-10-CM) - Complex tear of medial meniscus of left knee as current injury, subsequent encounter; S83.272D (ICD-10-CM) - Complex tear of lateral meniscus of left knee as current injury, subsequent encounter ; M65.9 (ICD-10-CM) - Synovitis of left knee  · Treatment Diagnosis: M25.562 left knee pain; M25.662 Left knee stiffness; R26.2 Difficulty in walking; M25.462 Left knee effusion  Insurance/Certification information:  PT Insurance Information:  [employer]  - 3000D-MET-/10-$0CP-40PT- NO AUTH  Physician Information:  Referring Practitioner: Dr. Kathryn Molina  Has the plan of care been signed (Y/N):        [x]  Yes  []  No     Date of Patient follow up with Physician: not scheduled    Is this a Progress Report:     []  Yes  [x]  No        If Yes:  Date Range for reporting period:  Beginning 20  Ending 20    Progress report will be due (10 Rx or 30 days whichever is less): 79/47    Recertification will be due (POC Duration  / 90 days whichever is less): 10/23      Visit # Insurance Allowable Requires auth   23 31 (40, but 9 used prev)    [x]no        []yes:     Functional Scale: LEFS 64% disability  Date assessed:  20  LEFS 55% disability      20  LEFS 45% disability      20  LEFS 35% disability      10/22/20      Therapy Diagnosis/Practice Pattern:E      Number of Comorbidities:  []0     []1-2    [x]3+    Latex Allergy:  [x]NO      []YES  Preferred Language for Healthcare:   [x]English       []other:      Pain level: eval 3-10/10 Current pain 0-3/10    SUBJECTIVE:  Pt reports that he had increased pain in his knee yesterday for some reason, but it is better today. Tightness in the right hamstring today. OBJECTIVE:    Observation:    Test measurements:  Knee flexion 148, knee ext 0   + SLR L   MMT knee extension L 46.5#, R 47.3# Hip abduction L 35.1 # R, 34.3#    RESTRICTIONS/PRECAUTIONS: A-fib, previous L,R knee arthroscopies; chronic LBP; hx prostate cancer  PROCEDURE PERFORMED 7/17:  Left knee diagnostic video arthroscopy,  arthroscopic partial medial meniscectomy, partial lateral meniscectomy,  medial plica excision, and anterior synovectomy and fat pad excision. Exercises/Interventions:     Therapeutic Ex (78230) Sets/sec Notes/CUES HEP   Pt ed; decreasing amount of yardwork at a time- split tasks between days, not exercising through pain            Foam roller quads, IT band     Standing HS 3-way on steps 30\" R,Lindep X   Standing gastroc stretch on wedge 30\"l5gyldz X   Standing quad stretch oncage 30\"x2 R,LCore act VC with transitions X   pec stretch stretch cage     Supine figure 4 stretch     Heel slide seated c strap 5\"x10  X   Eccentric ankle eversion       Sciatic nerve glide- supine, ankle PF with knee f/e   X   SL hip ABD  X   clamshell 2# on thigh X         Sit to stand 2nd highest plyobox RXX  \" single leg  Sit to stand chair RXX 15x   x5  x5 Too easy  Too difficult  For home     X   Side step.  Fwd/bwd monster walk HEP 9/10   Hip ABD iso      Step up fwd + hip ABD GVL at knees  Up and over  LSU c GVL at knees  Heel tap lateral 2\"  Heel tap fwd 4\"  pn w/ down       one UE support, stopped d/t pain          TKE  KASSY hip ABD    Hip ext   45# x10 ea  45# 2x10 R,L   60# heavy    LP 0-90 deg +add   130# 3x10 DL  100# 2x12 ECC  80#  2x10 L    incr NV Pillow for head   HSC  \" Ecc [id number] L50# bar incr NV  40#  bar at neutral    Knee ext 90-40 2x10 45#    bike   seat 17- indep    Manual Intervention (32997)      IASTM quad, HS, IT band, superior/inf, bevel 45 deg, patellar framing  Pin and stretch 8'     Patellar mobs sup and inf, med, lat 20xea      ITB S      posterior tibial glide GIII 15\"x4     iASTM peroneus brevis and PF L      Patellar \"tent\" x 2 + medial glide using leukotape     L hip joint mobs- inf and lateral distraction      KT medial tracking \"Y\" strip  No QDA          NMR re-education (75523)  CUES NEEDED    BOSU DL squat hold 20\"x4     CC retro walk w/ A, lat, P toe taps R  CC walks lateral with A, L, P taps CL      SL deadlift x10 R,LUE support X   Hip abduction isometric     FSU to toe tap BOSU flat down  KASSY light support, painful during, not after    Glider ext, abd/ext with black SB providing valgus force L knee 15x ea R on glider    Agility ladder: fwd two feet  Fwd one foot   fwd Two in one out 2 laps        Lateral two feet  Lateral two in two up 2 laps   2 laps   Pain pushing off left for last few steps    Throwing 50% with softball to net                 Therapeutic Activity (21045)                                                Therapeutic Exercise and NMR EXR  [x] (04851) Provided verbal/tactile cueing for activities related to strengthening, flexibility, endurance, ROM for improvements in LE, proximal hip, and core control with self care, mobility, lifting, ambulation.  [] (84465) Provided verbal/tactile cueing for activities related to improving balance, coordination, kinesthetic sense, posture, motor skill, proprioception  to assist with LE, proximal hip, and core control in self care, mobility, lifting, ambulation and eccentric single leg control.      NMR and Therapeutic Activities:    [] (07570 or 32193) Provided verbal/tactile cueing for activities related to improving balance, coordination, kinesthetic sense, posture, motor skill, proprioception and motor activation to allow for proper function of core, proximal hip and LE with self care and ADLs  [] (04061) Gait Re-education- Provided training and instruction to the patient for proper LE, core and proximal hip recruitment and positioning and eccentric body weight control with ambulation re-education including up and down stairs     Home Exercise Program:    [x] (28798) Reviewed/Progressed HEP activities related to strengthening, flexibility, endurance, ROM of core, proximal hip and LE for functional self-care, mobility, lifting and ambulation/stair navigation   [] (88839)Reviewed/Progressed HEP activities related to improving balance, coordination, kinesthetic sense, posture, motor skill, proprioception of core, proximal hip and LE for self care, mobility, lifting, and ambulation/stair navigation      Manual Treatments:  PROM / STM / Oscillations-Mobs:  G-I, II, III, IV (PA's, Inf., Post.)  [x] (43681) Provided manual therapy to mobilize LE, proximal hip and/or LS spine soft tissue/joints for the purpose of modulating pain, promoting relaxation,  increasing ROM, reducing/eliminating soft tissue swelling/inflammation/restriction, improving soft tissue extensibility and allowing for proper ROM for normal function with self care, mobility, lifting and ambulation. Modalities:     [] GAME READY (VASO)- for significant edema, swelling, pain control. - knee x 15', CP ant knee. Long sit  - declined  Charges:  Timed Code Treatment Minutes: 65   Total Treatment Minutes: 75(indep stretches)     BW time in/time out:   (and requires time in and out for each CPT code)    [] EVAL (LOW) 45274 (typically 20 minutes face-to-face)  [] EVAL (MOD) 35100 (typically 30 minutes face-to-face)  [] EVAL (HIGH) 00222 (typically 45 minutes face-to-face)  [] RE-EVAL     [x] BL(59881) x 2  [] IONTO  [x] NMR (64386) x  1  [] VASO  [x] Manual (82369) x  1   [] Other:  [] TA x      [] Mech Traction (51074)  [] ES(attended) (07905)      [] ES (un) (12757):       GOALS: Patient stated goal: no pain and get back to baseball  [x]? Progressing: []? Met: []? Not Met: []?  Adjusted     Therapist goals for Patient:   Short Term Goals: To be achieved in: 2 weeks  1. Independent in HEP and progression per patient tolerance, in order to prevent re-injury. []? Progressing: [x]? Met: []? Not Met: []? Adjusted  2. Patient will have a decrease in pain to facilitate improvement in movement, function, and ADLs as indicated by Functional Deficits. [x]? Progressing: []? Met: []? Not Met: []? Adjusted     Long Term Goals: To be achieved in: 8 weeks  1. Disability index score of 35% or less for the LEFS to assist with reaching prior level of function. []? Progressing: [x]? Met: []? Not Met: []? Adjusted  2. Patient will demonstrate increased left knee AROM to 0-140 deg to allow for proper joint functioning for deeper squatting, kneeling, and stair ambulation. []? Progressing: [x]? Met: []? Not Met: []? Adjusted  3. Patient will demonstrate an increase in Strength to 5/5 for the left hip and knee musculature to return to normal CKC activities with good knee control, such as stairs, squatting, and light jogging. [x]? Progressing: []? Met: []? Not Met: []? Adjusted  4. Patient will return to at least 30 minutes of moderate physical activity (patient specific goal). [x]? Progressing: []? Met: []? Not Met: []? Adjusted  5. Patient will have improved ankle DF to 15 deg to promote normal tibial advancement and HS flexibility in the 90-90 position to 65 deg to reduce posterior pelvic tilt. [x]? Progressing: []? Met: []? Not Met: []? Adjusted        Progression Towards Functional goals:  [x] Patient is progressing as expected towards functional goals listed. [] Progression is slowed due to complexities listed. [] Progression has been slowed due to co-morbidities. [] Plan just implemented, too soon to assess goals progression  [] Other:     Overall Progression Towards Functional goals/ Treatment Progress Update:  [x] Patient is progressing as expected towards functional goals listed.     [] Progression is slowed due to complexities/Impairments listed. [] Progression has been slowed due to co-morbidities. [] Plan just implemented, too soon to assess goals progression <30days   [] Goals require adjustment due to lack of progress  [] Patient is not progressing as expected and requires additional follow up with physician  [] Other    Prognosis for POC: [x] Good [] Fair  [] Poor      Patient requires continued skilled intervention: [x] Yes  [] No    Treatment/Activity Tolerance:  [x] Patient able to complete treatment  [] Patient limited by fatigue  [] Patient limited by pain    [] Patient limited by other medical complications  [] Other:     ASSESSMENT: Pt continues to demonstrate quadricep weakness in the LLE compared to the RLE. Able to complete sit to stand from a standard chair LXX much easier than RXX and without trunk deviations. Easily fatigued with any cardio work. Did well with most exercise except for lateral ladder drills when pushing off the LLE. He had pain with the last few steps of this drill. Return to Play: (if applicable)   []  Stage 1: Intro to Strength   []  Stage 2: Return to Run and Strength   []  Stage 3: Return to Jump and Strength   []  Stage 4: Dynamic Strength and Agility   []  Stage 5: Sport Specific Training     []  Ready to Return to Play, Meets All Above Stages   []  Not Ready for Return to Sports   Comments:                               PLAN: decrease to 1 x 3 weeks  [x] Continue per plan of care [] Alter current plan (see comments above)  [] Plan of care initiated [] Hold pending MD visit [] Discharge      Electronically signed by:  Shai Mc, PT, DPT     Note: If patient does not return for scheduled/ recommended follow up visits, this note will serve as a discharge from care along with most recent update on progress. Access Code: [email or web address]   URL: [email or web address]   Date: 10/19/2020   Prepared by: Shai Mc     Exercises   Supine ITB Stretch with Strap - 3 sets - 35s

## 2020-10-28 NOTE — TELEPHONE ENCOUNTER
Discussed Dr. Jessica Carrizales' recommendations for a loop recorder and he would like to proceed (risks, benefits, and alternative therapy discussed). Please call patient on 11/4/2020 to schedule. He is very concerned about insurance approval. Will also route to KACIE Harris to work on authorization.

## 2020-11-05 ENCOUNTER — HOSPITAL ENCOUNTER (OUTPATIENT)
Dept: PHYSICAL THERAPY | Age: 60
Setting detail: THERAPIES SERIES
Discharge: HOME | End: 2020-11-05
Payer: COMMERCIAL

## 2020-11-05 PROCEDURE — 97110 THERAPEUTIC EXERCISES: CPT

## 2020-11-05 PROCEDURE — 97140 MANUAL THERAPY 1/> REGIONS: CPT

## 2020-11-05 PROCEDURE — 97112 NEUROMUSCULAR REEDUCATION: CPT

## 2020-11-05 RX ORDER — AMIODARONE HYDROCHLORIDE 200 MG/1
TABLET ORAL
Qty: 90 TABLET | Refills: 1 | Status: SHIPPED | OUTPATIENT
Start: 2020-11-05 | End: 2021-04-19 | Stop reason: SDUPTHER

## 2020-11-05 NOTE — TELEPHONE ENCOUNTER
Pt requesting amiodarone 200 mg tab. Pending script sent to [facility].     Last OV 10/21/2020 with NP BB  Last Labs 10/6/2020  Next OV 4/19/2021

## 2020-11-05 NOTE — FLOWSHEET NOTE
[facility],  [address] [place]  Phone: [phone]  Fax [phone]    Physical Therapy Treatment Note/ Progress Report:           Date:  2020    Patient Name:  Merced Collet    :  1960  MRN: [id number]  Restrictions/Precautions:    Medical/Treatment Diagnosis Information:  · Diagnosis: K44.39 (WU-99-OH) - Plica syndrome of knee, left; S83.232D (ICD-10-CM) - Complex tear of medial meniscus of left knee as current injury, subsequent encounter; S83.272D (ICD-10-CM) - Complex tear of lateral meniscus of left knee as current injury, subsequent encounter ; M65.9 (ICD-10-CM) - Synovitis of left knee  · Treatment Diagnosis: M25.562 left knee pain; M25.662 Left knee stiffness; R26.2 Difficulty in walking; M25.462 Left knee effusion  Insurance/Certification information:  PT Insurance Information:  BCBS  - 3000D-MET-/10-$0CP-40PT- NO AUTH  Physician Information:  Referring Practitioner: Dr. Kaylee Em  Has the plan of care been signed (Y/N):        [x]  Yes  []  No     Date of Patient follow up with Physician: not scheduled    Is this a Progress Report:     []  Yes  [x]  No        If Yes:  Date Range for reporting period:  Beginning 20  Ending 20    Progress report will be due (10 Rx or 30 days whichever is less):     Recertification will be due (POC Duration  / 90 days whichever is less): 10/23      Visit # Insurance Allowable Requires auth   24 31 (40, but 9 used prev)    [x]no        []yes:     Functional Scale: LEFS 64% disability  Date assessed:  20  LEFS 55% disability      20  LEFS 45% disability      20  LEFS 35% disability      10/22/20      Therapy Diagnosis/Practice Pattern:E      Number of Comorbidities:  []0     []1-2    [x]3+    Latex Allergy:  [x]NO      []YES  Preferred Language for Healthcare:   [x]English       []other:      Pain level: eval 3-10/10 Current pain 0-3/10    SUBJECTIVE:  Pt reports that he is having more pain on the outer thigh today (points to IT band) vs inside of his knee. His pain still comes and goes and does not seem to be consistent. OBJECTIVE:    Observation: knee       RESTRICTIONS/PRECAUTIONS: A-fib, previous L,R knee arthroscopies; chronic LBP; hx prostate cancer  PROCEDURE PERFORMED 7/17:  Left knee diagnostic video arthroscopy,  arthroscopic partial medial meniscectomy, partial lateral meniscectomy,  medial plica excision, and anterior synovectomy and fat pad excision. Exercises/Interventions:     Therapeutic Ex (56566) Sets/sec Notes/CUES HEP   Pt ed; decreasing amount of yardwork at a time- split tasks between days, not exercising through pain            Foam roller quads, IT band     Standing HS 3-way on steps 30\" R,Lindep X   Standing gastroc stretch on wedge 30\"t9dxvbo X   Standing quad stretch oncage 30\"x2 R,LCore act VC with transitions X   pec stretch stretch cage     Supine figure 4 stretch     Heel slide seated c strap 5\"x10  X   Eccentric ankle eversion       Sciatic nerve glide- supine, ankle PF with knee f/e   X   SL hip ABD  X   clamshell 2# on thigh X         Sit to stand 2nd highest plyobox RXX  \" single leg  Sit to stand chair RXX   x10 R,L Too easy  Too difficult  For home     X   Side step.  Fwd/bwd monster walk HEP 9/10   Hip ABD iso      Step up fwd + hip ABD GVL at knees  Up and over  LSU c GVL at knees  Heel tap lateral 2\"  Heel tap fwd 4\"  pn w/ down       one UE support, stopped d/t pain          TKE  KASSY hip ABD    Hip ext   45# 3x10 ea  45# 2x10 R,L   60# heavy    LP 0-90 deg +add   130# 3x10 DL  100# 2x12 ECC  80#  2x10 L    incr NV Pillow for head   HSC  \" Ecc 50# bar incr NV  40#  bar at neutral    Knee ext 90-40 45#    bike 5'  seat 17- indep    Manual Intervention (16794)      IASTM quad, HS, IT band, superior/inf, bevel 45 deg, patellar framing  Pin and stretch in aminah position 8'     Patellar mobs sup and inf, med, lat 20xea      ITB S      posterior tibial glide GIII 15\"x4     iASTM peroneus brevis and PF L      Patellar \"tent\" x 2 + medial glide using leukotape     L hip joint mobs- inf and lateral distraction      KT medial tracking \"Y\" strip  No QDA          NMR re-education (24504)  CUES NEEDED    BOSU DL squat hold 20\"x5     CC retro walk w/ A, lat, P toe taps R  CC walks lateral with A, L, P taps CL      SL deadlift x10 R,LUE support X   Hip abduction isometric     SWB squat FDL --> SL 5\"x10 R,L     Glider ext, abd/ext with black SB providing valgus force L knee     Agility ladder: fwd two feet  bwd   fwd Two in one out 2 laps  2 laps      Lateral two feet  Lateral two in two up 2 laps   2 laps   Pain pushing off left for last few steps    Throwing 50% with softball to net     Step to hold R,L 10x ea     Ball toss tandem  Ball toss SLS 20x R,L  \" YMB    Therapeutic Activity (62776)                                                Therapeutic Exercise and NMR EXR  [x] (48288) Provided verbal/tactile cueing for activities related to strengthening, flexibility, endurance, ROM for improvements in LE, proximal hip, and core control with self care, mobility, lifting, ambulation.  [] (92539) Provided verbal/tactile cueing for activities related to improving balance, coordination, kinesthetic sense, posture, motor skill, proprioception  to assist with LE, proximal hip, and core control in self care, mobility, lifting, ambulation and eccentric single leg control.      NMR and Therapeutic Activities:    [] (45417 or 49249) Provided verbal/tactile cueing for activities related to improving balance, coordination, kinesthetic sense, posture, motor skill, proprioception and motor activation to allow for proper function of core, proximal hip and LE with self care and ADLs  [] (08244) Gait Re-education- Provided training and instruction to the patient for proper LE, core and proximal hip recruitment and positioning and eccentric body weight control with ambulation re-education including up and down stairs     Home Exercise Program:    [x] (36217) Reviewed/Progressed HEP activities related to strengthening, flexibility, endurance, ROM of core, proximal hip and LE for functional self-care, mobility, lifting and ambulation/stair navigation   [] (70252)Reviewed/Progressed HEP activities related to improving balance, coordination, kinesthetic sense, posture, motor skill, proprioception of core, proximal hip and LE for self care, mobility, lifting, and ambulation/stair navigation      Manual Treatments:  PROM / STM / Oscillations-Mobs:  G-I, II, III, IV (PA's, Inf., Post.)  [x] (07743) Provided manual therapy to mobilize LE, proximal hip and/or LS spine soft tissue/joints for the purpose of modulating pain, promoting relaxation,  increasing ROM, reducing/eliminating soft tissue swelling/inflammation/restriction, improving soft tissue extensibility and allowing for proper ROM for normal function with self care, mobility, lifting and ambulation. Modalities:     [] GAME READY (VASO)- for significant edema, swelling, pain control. - knee x 15', CP ant knee. Long sit  - declined  Charges:  Timed Code Treatment Minutes: 55   Total Treatment Minutes: 70(indep stretches, bike)     [employer] time in/time out:   (and requires time in and out for each CPT code)    [] EVAL (LOW) 32258 (typically 20 minutes face-to-face)  [] EVAL (MOD) 10662 (typically 30 minutes face-to-face)  [] EVAL (HIGH) 20115 (typically 45 minutes face-to-face)  [] RE-EVAL     [x] MD(05597) x 2  [] IONTO  [x] NMR (39233) x  1  [] VASO  [x] Manual (99473) x  1   [] Other:  [] TA x      [] Mech Traction (45469)  [] ES(attended) (66569)      [] ES (un) (44941):       GOALS: Patient stated goal: no pain and get back to baseball  [x]? Progressing: []? Met: []? Not Met: []? Adjusted     Therapist goals for Patient:   Short Term Goals: To be achieved in: 2 weeks  1.  Independent in HEP and co-morbidities. [] Plan just implemented, too soon to assess goals progression <30days   [] Goals require adjustment due to lack of progress  [] Patient is not progressing as expected and requires additional follow up with physician  [] Other    Prognosis for POC: [x] Good [] Fair  [] Poor      Patient requires continued skilled intervention: [x] Yes  [] No    Treatment/Activity Tolerance:  [x] Patient able to complete treatment  [] Patient limited by fatigue  [] Patient limited by pain    [] Patient limited by other medical complications  [] Other:     ASSESSMENT: Pt continues to tolerate progressions in strengthening and proprioception as noted above with minimal c/o pain. Only noted pain with side shuffle on ladders, but this subsided after the exercise was completed. Femoral IR and adduction noted with step hold and with sit to stand RXX. Pt able to correct some with VC, but will benefit from continued proximal stabilization to correct this and reduce patellofemoral joint stress. Return to Play: (if applicable)   []  Stage 1: Intro to Strength   []  Stage 2: Return to Run and Strength   []  Stage 3: Return to Jump and Strength   []  Stage 4: Dynamic Strength and Agility   []  Stage 5: Sport Specific Training     []  Ready to Return to Play, Meets All Above Stages   []  Not Ready for Return to Sports   Comments:                               PLAN: decrease to 1 x 3 weeks  [x] Continue per plan of care [] Alter current plan (see comments above)  [] Plan of care initiated [] Hold pending MD visit [] Discharge      Electronically signed by:  Arlyn Chen PT, DPT     Note: If patient does not return for scheduled/ recommended follow up visits, this note will serve as a discharge from care along with most recent update on progress. Access Code: [email or web address]   URL: [email or web address]   Date: 10/19/2020   Prepared by: Arlyn Chen     Exercises   Supine ITB Stretch with Strap - 3 sets - 35s hold - 1x daily - 7x weekly   Supine Hamstring Stretch with Strap - 3 sets - 30s hold - 1x daily - 7x weekly   Supine Figure 4 Piriformis Stretch - 3 sets - 30s hold - 1x daily - 7x weekly   Seated Thoracic Lumbar Extension with Pectoralis Stretch - 10 reps - 1x daily - 7x weekly   Standing Gastroc Stretch - 3 sets - 30s hold - 2x daily - 7x weekly   Standing Quad Stretch with Table and Chair Support - 3 sets - 30s hold - 2x daily - 7x weekly   Sitting Heel Slide with Towel - 10 reps - 5s hold - 2x daily - 7x weekly   Quadriceps Mobilization with Foam Roll - 20 reps - 1x daily - 7x weekly   Sidelying IT Band Foam Roll Mobilization - 20 reps - 1x daily - 7x weekly   Supine Sciatic Nerve Glide - 20 reps - 2x daily - 7x weekly   Clamshell - 10 reps - 3 sets - 1x daily - 3x weekly   Lateral Step Down - 10 reps - 3 sets - 1x daily - 3x weekly   Sidelying Hip Abduction - 10 reps - 2 sets - 1x daily - 3x weekly   Single Leg Balance on Foam Pad - 10 reps - 10s hold - 1x daily - 3x weekly   Side Stepping with Resistance at Feet - 10 reps - 4 sets - 1x daily - 3x weekly   Lateral Lunge with Slider - 10 reps - 2 sets - 1x daily - 3x weekly   Forward T - 10 reps - 2 sets - 1x daily - 3x weekly   Long Sitting Ankle Eversion with Resistance - 20 reps - 1x daily - 3x weekly   Lateral Step Up - 10 reps - 2 sets - 1x daily - 3x weekly   Standing Hip Abduction on BOSU® Ball - 10 reps - 2 sets - 1x daily - 3x weekly

## 2020-11-10 NOTE — TELEPHONE ENCOUNTER
Medication Refill    Medication needing refilled:  ELIQUIS 5 MG TABS tablet      Dosage of the medication:    How are you taking this medication (QD, BID, TID, QID, PRN):  TAKE 1 TABLET BY MOUTH TWICE A DAY  30 or 90 day supply called in:  80  Which Pharmacy are we sending the medication to?:  [facility]/pharmacy #[id number]- [facility], [address] [place] [address],[address] [facility] [phone]

## 2020-11-11 ENCOUNTER — HOSPITAL ENCOUNTER (OUTPATIENT)
Dept: PHYSICAL THERAPY | Age: 60
Setting detail: THERAPIES SERIES
Discharge: HOME | End: 2020-11-11
Payer: COMMERCIAL

## 2020-11-11 PROCEDURE — 97140 MANUAL THERAPY 1/> REGIONS: CPT

## 2020-11-11 PROCEDURE — 97110 THERAPEUTIC EXERCISES: CPT

## 2020-11-11 PROCEDURE — 97112 NEUROMUSCULAR REEDUCATION: CPT

## 2020-11-12 NOTE — TELEPHONE ENCOUNTER
Pt stopped into office; frustrated that his Eliquis has not been filled. He states his pharmacy has been trying to contact us for a week to get it filled and that he had called our office earlier this week. Please fill as soon as possible. Pt uses [facility].

## 2020-11-12 NOTE — FLOWSHEET NOTE
[facility],  [address]  Phone: [phone]  Fax [phone]    Physical Therapy Treatment Note/ Progress Report:           Date:  2020    Patient Name:  Asim Lozada    :  1960  MRN: [id number]  Restrictions/Precautions:    Medical/Treatment Diagnosis Information:  · Diagnosis: S74.43 (PSE-68-AZ) - Plica syndrome of knee, left; S83.232D (ICD-10-CM) - Complex tear of medial meniscus of left knee as current injury, subsequent encounter; S83.272D (ICD-10-CM) - Complex tear of lateral meniscus of left knee as current injury, subsequent encounter ; M65.9 (ICD-10-CM) - Synovitis of left knee  · Treatment Diagnosis: M25.562 left knee pain; M25.662 Left knee stiffness; R26.2 Difficulty in walking; M25.462 Left knee effusion  Insurance/Certification information:  PT Insurance Information:  BCBS  - 3000D-MET-[id number]- NO AUTH  Physician Information:  Referring Practitioner: Dr. Una Rehman  Has the plan of care been signed (Y/N):        [x]  Yes  []  No     Date of Patient follow up with Physician: not scheduled    Is this a Progress Report:     []  Yes  [x]  No        If Yes:  Date Range for reporting period:  Beginning   Ending 10/23    Progress report will be due (10 Rx or 30 days whichever is less): 23/10    Recertification will be due (POC Duration  / 90 days whichever is less):       Visit # Insurance Allowable Requires auth   25- POC NV 31 (40, but 9 used prev)    [x]no        []yes:     Functional Scale: LEFS 64% disability  Date assessed:  20  LEFS 55% disability      20  LEFS 45% disability      20  LEFS 35% disability      10/22/20      Therapy Diagnosis/Practice Pattern:E      Number of Comorbidities:  []0     []1-2    [x]3+    Latex Allergy:  [x]NO      []YES  Preferred Language for Healthcare:   [x]English       []other:      Pain level: eval 3-10/10 Current pain 0-3/10    SUBJECTIVE:  Pt reports that he is having more pain on the outer thigh today (points to IT band) vs inside of his knee. His pain still comes and goes and does not seem to be consistent. OBJECTIVE:    Observation: knee    MMT knee extension L 46.5#, R 47.3# Hip abduction L 35.1 # R, 34.3#    RESTRICTIONS/PRECAUTIONS: A-fib, previous L,R knee arthroscopies; chronic LBP; hx prostate cancer  PROCEDURE PERFORMED 7/17:  Left knee diagnostic video arthroscopy,  arthroscopic partial medial meniscectomy, partial lateral meniscectomy,  medial plica excision, and anterior synovectomy and fat pad excision. Exercises/Interventions:     Therapeutic Ex (49383) Sets/sec Notes/CUES HEP   Pt ed; decreasing amount of yardwork at a time- split tasks between days, not exercising through pain            Foam roller quads, IT band     Standing HS 3-way on steps 30\" R,Lindep X   Standing gastroc stretch on wedge 30\"z0krbhm X   Standing quad stretch oncage 30\"x2 R,LCore act VC with transitions X   pec stretch stretch cage     Supine figure 4 stretch     Heel slide seated c strap 5\"x10  X   Eccentric ankle eversion       Sciatic nerve glide- supine, ankle PF with knee f/e   X   SL hip ABD  X   clamshell 2# on thigh X         Sit to stand 2nd highest plyobox RXX  \" single leg  Sit to stand chair RXX, LXX   x10 R,L     OVL at knees     X   Side step.  Fwd/bwd monster walk HEP 9/10   Hip ABD iso      Step up fwd + hip ABD GVL at knees  Up and over  LSU c GVL at knees  Heel tap lateral 2\"  Heel tap fwd 4\"  pn w/ down       one UE support, stopped d/t pain          TKE  KASSY hip ABD    Hip ext      60# heavy    LP 0-90 deg +add   130# 3x10 DL  100# 3x10 ECC  90#  2x10 L    incr NV Pillow for head   HSC  \" Ecc 3x10  2x10 L55#   45#      Knee ext 90-40 3x1045#    bike 5'  seat 17- indep    Manual Intervention (39046)      IASTM quad, HS, IT band, superior/inf, bevel 45 deg, patellar framing   8'     Patellar mobs sup and inf, med, including up and down stairs     Home Exercise Program:    [x] (11805) Reviewed/Progressed HEP activities related to strengthening, flexibility, endurance, ROM of core, proximal hip and LE for functional self-care, mobility, lifting and ambulation/stair navigation   [] (89946)Reviewed/Progressed HEP activities related to improving balance, coordination, kinesthetic sense, posture, motor skill, proprioception of core, proximal hip and LE for self care, mobility, lifting, and ambulation/stair navigation      Manual Treatments:  PROM / STM / Oscillations-Mobs:  G-I, II, III, IV (PA's, Inf., Post.)  [x] (84445) Provided manual therapy to mobilize LE, proximal hip and/or LS spine soft tissue/joints for the purpose of modulating pain, promoting relaxation,  increasing ROM, reducing/eliminating soft tissue swelling/inflammation/restriction, improving soft tissue extensibility and allowing for proper ROM for normal function with self care, mobility, lifting and ambulation. Modalities:     [] GAME READY (VASO)- for significant edema, swelling, pain control. - knee x 15', CP ant knee. Long sit  - declined  Charges:  Timed Code Treatment Minutes: 60   Total Treatment Minutes: 75(indep stretches, bike)     [facility] time in/time out:   (and requires time in and out for each CPT code)    [] EVAL (LOW) 09319 (typically 20 minutes face-to-face)  [] EVAL (MOD) 21691 (typically 30 minutes face-to-face)  [] EVAL (HIGH) 77033 (typically 45 minutes face-to-face)  [] RE-EVAL     [x] RZ(48874) x 2  [] IONTO  [x] NMR (99748) x  1  [] VASO  [x] Manual (21088) x  1   [] Other:  [] TA x      [] Mech Traction (21232)  [] ES(attended) (73651)      [] ES (un) (26451):       GOALS: Patient stated goal: no pain and get back to baseball  [x]? Progressing: []? Met: []? Not Met: []? Adjusted     Therapist goals for Patient:   Short Term Goals: To be achieved in: 2 weeks  1.  Independent in HEP and progression per patient tolerance, in order to prevent progression <30days   [] Goals require adjustment due to lack of progress  [] Patient is not progressing as expected and requires additional follow up with physician  [] Other    Prognosis for POC: [x] Good [] Fair  [] Poor      Patient requires continued skilled intervention: [x] Yes  [] No    Treatment/Activity Tolerance:  [x] Patient able to complete treatment  [] Patient limited by fatigue  [] Patient limited by pain    [] Patient limited by other medical complications  [] Other:     ASSESSMENT: Pt continues to tolerate progressions in strengthening and proprioception as noted above with minimal c/o pain. Only noted pain with side shuffle on ladders and also with fem IR when in SLS positions, but this subsided after the exercise was completed. Femoral IR and adduction noted with step hold and with sit to stand RXX. Pt able to correct some with VC, but will benefit from continued proximal stabilization to correct this and reduce patellofemoral joint stress. Return to Play: (if applicable)   []  Stage 1: Intro to Strength   []  Stage 2: Return to Run and Strength   []  Stage 3: Return to Jump and Strength   []  Stage 4: Dynamic Strength and Agility   []  Stage 5: Sport Specific Training     []  Ready to Return to Play, Meets All Above Stages   []  Not Ready for Return to Sports   Comments:                               PLAN: decrease to 1 x 2 weeks  [x] Continue per plan of care [] Alter current plan (see comments above)  [] Plan of care initiated [] Hold pending MD visit [] Discharge      Electronically signed by:  Cristhian Navarro, PT, DPT     Note: If patient does not return for scheduled/ recommended follow up visits, this note will serve as a discharge from care along with most recent update on progress. Access Code: [email or web address]   URL: [email or web address]   Date: 10/19/2020   Prepared by: Cristhian Navarro     Exercises   Supine ITB Stretch with Strap - 3 sets - 30s hold - 1x daily - 7x weekly Supine Hamstring Stretch with Strap - 3 sets - 30s hold - 1x daily - 7x weekly   Supine Figure 4 Piriformis Stretch - 3 sets - 30s hold - 1x daily - 7x weekly   Seated Thoracic Lumbar Extension with Pectoralis Stretch - 10 reps - 1x daily - 7x weekly   Standing Gastroc Stretch - 3 sets - 30s hold - 2x daily - 7x weekly   Standing Quad Stretch with Table and Chair Support - 3 sets - 30s hold - 2x daily - 7x weekly   Sitting Heel Slide with Towel - 10 reps - 5s hold - 2x daily - 7x weekly   Quadriceps Mobilization with Foam Roll - 20 reps - 1x daily - 7x weekly   Sidelying IT Band Foam Roll Mobilization - 20 reps - 1x daily - 7x weekly   Supine Sciatic Nerve Glide - 20 reps - 2x daily - 7x weekly   Clamshell - 10 reps - 3 sets - 1x daily - 3x weekly   Lateral Step Down - 10 reps - 3 sets - 1x daily - 3x weekly   Sidelying Hip Abduction - 10 reps - 2 sets - 1x daily - 3x weekly   Single Leg Balance on Foam Pad - 10 reps - 10s hold - 1x daily - 3x weekly   Side Stepping with Resistance at Feet - 10 reps - 4 sets - 1x daily - 3x weekly   Lateral Lunge with Slider - 10 reps - 2 sets - 1x daily - 3x weekly   Forward T - 10 reps - 2 sets - 1x daily - 3x weekly   Long Sitting Ankle Eversion with Resistance - 20 reps - 1x daily - 3x weekly   Lateral Step Up - 10 reps - 2 sets - 1x daily - 3x weekly   Standing Hip Abduction on BOSU® Ball - 10 reps - 2 sets - 1x daily - 3x weekly

## 2020-11-18 ENCOUNTER — HOSPITAL ENCOUNTER (OUTPATIENT)
Dept: PHYSICAL THERAPY | Age: 60
Setting detail: THERAPIES SERIES
Discharge: HOME | End: 2020-11-18
Payer: COMMERCIAL

## 2020-11-18 PROCEDURE — 97110 THERAPEUTIC EXERCISES: CPT

## 2020-11-18 PROCEDURE — 97140 MANUAL THERAPY 1/> REGIONS: CPT

## 2020-11-18 NOTE — PLAN OF CARE
[facility],  [address]  Phone: [phone]  Fax [phone]    Physical Therapy Treatment Note/ Progress Report:           Date:  2020    Patient Name:  Keyur Irizarry    :  1960  MRN: [id number]  Restrictions/Precautions:    Medical/Treatment Diagnosis Information:  · Diagnosis: N96.36 (GQD-88-EN) - Plica syndrome of knee, left; S83.232D (ICD-10-CM) - Complex tear of medial meniscus of left knee as current injury, subsequent encounter; S83.272D (ICD-10-CM) - Complex tear of lateral meniscus of left knee as current injury, subsequent encounter ; M65.9 (ICD-10-CM) - Synovitis of left knee  · Treatment Diagnosis: M25.562 left knee pain; M25.662 Left knee stiffness; R26.2 Difficulty in walking; M25.462 Left knee effusion  Insurance/Certification information:  PT Insurance Information:  [employer]  - 3000D-MET-/10-$[id number]- NO AUTH  Physician Information:  Referring Practitioner: Dr. Tabitha Joseph  Has the plan of care been signed (Y/N):        [x]  Yes  []  No     Date of Patient follow up with Physician: not scheduled    Is this a Progress Report:     [x]  Yes  []  No        If Yes:  Date Range for reporting period:  Beginning   Ending 10/23    Progress report will be due (10 Rx or 30 days whichever is less):     Recertification will be due (POC Duration  / 90 days whichever is less):       Visit # Insurance Allowable Requires auth   26 31 (40, but 9 used prev)    [x]no        []yes:     Functional Scale: LEFS 64% disability  Date assessed:  20  LEFS 55% disability      20  LEFS 45% disability      20  LEFS 35% disability      10/22/20,     Therapy Diagnosis/Practice Pattern:E      Number of Comorbidities:  []0     []1-2    [x]3+    Latex Allergy:  [x]NO      []YES  Preferred Language for Healthcare:   [x]English       []other:      Pain level: eval 3-1010 Current pain 0-3/10    SUBJECTIVE:  Pt reports that on a day-to day basis, he is feeling less pain. Some days are worse than others, but overall he feels improved strength and less pain. He does still report pain in the medial knee with sit to stand at times, pivoting and pushing off of the left leg forcefully. He reports that he was able to squat to the ground and hold this position for an extended period. He has had pain and weakness with this to this point. OBJECTIVE:    Observation: knee   Test measurements:  Knee flexion 148, knee ext 0MMT knee extension L 46.8# 5-/5, R 47.3# 5-/5, Hip abduction L 40.1# 4+/5, R 43.1# 4+/5    RESTRICTIONS/PRECAUTIONS: A-fib, previous L,R knee arthroscopies; chronic LBP; hx prostate cancer  PROCEDURE PERFORMED 7/17:  Left knee diagnostic video arthroscopy,  arthroscopic partial medial meniscectomy, partial lateral meniscectomy,  medial plica excision, and anterior synovectomy and fat pad excision.     Exercises/Interventions:     Therapeutic Ex (50701) Sets/sec Notes/CUES HEP   Pt ed; decreasing amount of yardwork at a time- split tasks between days, not exercising through pain            Foam roller quads, IT band     Standing HS 3-way on steps 30\" R,Lindep X   Standing gastroc stretch on wedge 30\"t4zcypi X   Standing quad stretch oncage 30\"x2 R,Lindep X   pec stretch stretch cage     Supine figure 4 stretch     Heel slide seated c strap 5\"x10  X   Eccentric ankle eversion             Sit to stand chair RXX, LXX x10R,L OVL at knees X   ER step up 15x 8\"    KASSY hip ABD  Hip ext    60# heavy    HSC  \" Ecc 3x10  2x10 L55#   45#      Knee ext 90-40 3x1045#    bike 5'  seat 17- indep    Manual Intervention (83664)      IASTM quad, HS, IT band, superior/inf, bevel 45 deg, patellar framing   8'     Patellar mobs sup and inf, med, lat 20xea      ITB S      posterior tibial glide GIII 15\"x4     iASTM peroneus brevis and PF L      Patellar \"tent\" x 2 + medial glide using leukotape           NMR re-education (85680)  CUES NEEDED    BOSU DL squat hold 20\"x5     Cone reach fwd, lat , post lat 10x glute activation, reduce knee valgus    CC retro walk w/ A, lat, P toe taps R  CC walks lateral with A, L, P taps CL      SL deadlift UE support X   Hip abduction isometric     SWB squat FDL --> SL     Glider ext, abd/ext with black SB providing valgus force L knee     Agility ladder: fwd two feet  bwd   fwd Two in one out     Lateral two feet  bwd   Pain pushing off left for last few steps    Throwing 50% with softball to net     Step to hold R,L     Ball toss SLS YMB    Therapeutic Activity (79539)                                                Therapeutic Exercise and NMR EXR  [x] (07713) Provided verbal/tactile cueing for activities related to strengthening, flexibility, endurance, ROM for improvements in LE, proximal hip, and core control with self care, mobility, lifting, ambulation.  [] (33323) Provided verbal/tactile cueing for activities related to improving balance, coordination, kinesthetic sense, posture, motor skill, proprioception  to assist with LE, proximal hip, and core control in self care, mobility, lifting, ambulation and eccentric single leg control.      NMR and Therapeutic Activities:    [] (37586 or 65732) Provided verbal/tactile cueing for activities related to improving balance, coordination, kinesthetic sense, posture, motor skill, proprioception and motor activation to allow for proper function of core, proximal hip and LE with self care and ADLs  [] (09421) Gait Re-education- Provided training and instruction to the patient for proper LE, core and proximal hip recruitment and positioning and eccentric body weight control with ambulation re-education including up and down stairs     Home Exercise Program:    [x] (83038) Reviewed/Progressed HEP activities related to strengthening, flexibility, endurance, ROM of core, proximal hip and LE for functional self-care, mobility, lifting and ambulation/stair navigation   [] (67401)Reviewed/Progressed HEP activities related to improving balance, coordination, kinesthetic sense, posture, motor skill, proprioception of core, proximal hip and LE for self care, mobility, lifting, and ambulation/stair navigation      Manual Treatments:  PROM / STM / Oscillations-Mobs:  G-I, II, III, IV (PA's, Inf., Post.)  [x] (77788) Provided manual therapy to mobilize LE, proximal hip and/or LS spine soft tissue/joints for the purpose of modulating pain, promoting relaxation,  increasing ROM, reducing/eliminating soft tissue swelling/inflammation/restriction, improving soft tissue extensibility and allowing for proper ROM for normal function with self care, mobility, lifting and ambulation. Modalities:     [] GAME READY (VASO)- for significant edema, swelling, pain control. - knee x 15', CP ant knee. Long sit  - declined  Charges:  Timed Code Treatment Minutes: 45   Total Treatment Minutes: 70(indep stretches, bike)     BWC time in/time out:   (and requires time in and out for each CPT code)    [] EVAL (LOW) 50731 (typically 20 minutes face-to-face)  [] EVAL (MOD) 26174 (typically 30 minutes face-to-face)  [] EVAL (HIGH) 66518 (typically 45 minutes face-to-face)  [] RE-EVAL     [x] PC(85017) x 2  [] IONTO  [x] NMR (35939) x    [] VASO  [x] Manual (63404) x  1   [] Other:  [] TA x      [] Mech Traction (58186)  [] ES(attended) (01056)      [] ES (un) (26133):       GOALS: Patient stated goal: no pain and get back to baseball  [x]? Progressing: []? Met: []? Not Met: []? Adjusted     Therapist goals for Patient:   Short Term Goals: To be achieved in: 2 weeks  1. Independent in HEP and progression per patient tolerance, in order to prevent re-injury. []? Progressing: [x]? Met: []? Not Met: []? Adjusted  2. Patient will have a decrease in pain to facilitate improvement in movement, function, and ADLs as indicated by Functional Deficits. [x]? Progressing: []? Met: []?  Not Met: []? Adjusted     Long Term Goals: To be achieved in: 8 weeks  1. Disability index score of 35% or less for the LEFS to assist with reaching prior level of function. []? Progressing: [x]? Met: []? Not Met: []? Adjusted  2. Patient will demonstrate increased left knee AROM to 0-140 deg to allow for proper joint functioning for deeper squatting, kneeling, and stair ambulation. []? Progressing: [x]? Met: []? Not Met: []? Adjusted  3. Patient will demonstrate an increase in Strength to 5/5 for the left hip and knee musculature to return to normal CKC activities with good knee control, such as stairs, squatting, and light jogging. [x]? Progressing: []? Met: []? Not Met: []? Adjusted  4. Patient will return to at least 30 minutes of moderate physical activity (patient specific goal). []? Progressing: [x]? Met: []? Not Met: []? Adjusted  5. Patient will have improved ankle DF to 15 deg to promote normal tibial advancement and HS flexibility in the 90-90 position to 65 deg to reduce posterior pelvic tilt. [x]? Progressing: []? Met: []? Not Met: []? Adjusted        Progression Towards Functional goals:  [x] Patient is progressing as expected towards functional goals listed. [] Progression is slowed due to complexities listed. [] Progression has been slowed due to co-morbidities. [] Plan just implemented, too soon to assess goals progression  [] Other:     Overall Progression Towards Functional goals/ Treatment Progress Update:  [x] Patient is progressing as expected towards functional goals listed. [] Progression is slowed due to complexities/Impairments listed. [] Progression has been slowed due to co-morbidities.   [] Plan just implemented, too soon to assess goals progression <30days   [] Goals require adjustment due to lack of progress  [] Patient is not progressing as expected and requires additional follow up with physician  [] Other    Prognosis for POC: [x] Good [] Fair  [] Poor      Patient requires continued skilled intervention: [x] Yes  [] No    Treatment/Activity Tolerance:  [x] Patient able to complete treatment  [] Patient limited by fatigue  [] Patient limited by pain    [] Patient limited by other medical complications  [] Other:     ASSESSMENT: Pt has needed extensive strengthening to reduce patellofemoral joint pain to this point d/t VMO weakness and hip stabilizer weakness. He still demonstrates knee valgus and femoral IR during SL CKC activities and this is a big cause of the remaining knee pain. However, his awareness of this issue and ability to correct this over the past month has improved. Recommend that pt continue 1x/week over the next month, then progress to an indep HEP. Return to Play: (if applicable)   []  Stage 1: Intro to Strength   []  Stage 2: Return to Run and Strength   []  Stage 3: Return to Jump and Strength   []  Stage 4: Dynamic Strength and Agility   []  Stage 5: Sport Specific Training     []  Ready to Return to Play, Meets All Above Stages   []  Not Ready for Return to Sports   Comments:                               PLAN: decrease to 1 x 4 weels  [x] Continue per plan of care [] Alter current plan (see comments above)  [] Plan of care initiated [] Hold pending MD visit [] Discharge      Electronically signed by:  Jaycee Gray, PT, DPT     Note: If patient does not return for scheduled/ recommended follow up visits, this note will serve as a discharge from care along with most recent update on progress. Access Code: [email or web address]   URL: [email or web address]   Date: 10/19/2020   Prepared by: Jaycee Gray     Exercises   Supine ITB Stretch with Strap - 3 sets - 30s hold - 1x daily - 7x weekly   Supine Hamstring Stretch with Strap - 3 sets - 30s hold - 1x daily - 7x weekly   Supine Figure 4 Piriformis Stretch - 3 sets - 30s hold - 1x daily - 7x weekly   Seated Thoracic Lumbar Extension with Pectoralis Stretch - 10 reps - 1x daily - 7x weekly   Standing Gastroc Stretch - 3 sets - 30s hold - 2x daily - 7x weekly   Standing Quad Stretch with Table and Chair Support - 3 sets - 30s hold - 2x daily - 7x weekly   Sitting Heel Slide with Towel - 10 reps - 5s hold - 2x daily - 7x weekly   Quadriceps Mobilization with Foam Roll - 20 reps - 1x daily - 7x weekly   Sidelying IT Band Foam Roll Mobilization - 20 reps - 1x daily - 7x weekly   Supine Sciatic Nerve Glide - 20 reps - 2x daily - 7x weekly   Clamshell - 10 reps - 3 sets - 1x daily - 3x weekly   Lateral Step Down - 10 reps - 3 sets - 1x daily - 3x weekly   Sidelying Hip Abduction - 10 reps - 2 sets - 1x daily - 3x weekly   Single Leg Balance on Foam Pad - 10 reps - 10s hold - 1x daily - 3x weekly   Side Stepping with Resistance at Feet - 10 reps - 4 sets - 1x daily - 3x weekly   Lateral Lunge with Slider - 10 reps - 2 sets - 1x daily - 3x weekly   Forward T - 10 reps - 2 sets - 1x daily - 3x weekly   Long Sitting Ankle Eversion with Resistance - 20 reps - 1x daily - 3x weekly   Lateral Step Up - 10 reps - 2 sets - 1x daily - 3x weekly   Standing Hip Abduction on BOSU® Ball - 10 reps - 2 sets - 1x daily - 3x weekly

## 2020-11-30 NOTE — TELEPHONE ENCOUNTER
Spoke with patient. Patient is scheduled with Dr. Wendy Navarro for Loop Implant with [employer] on 12/11/20 @  at 12pm [facility], arrival time of 10:30am to the Cath Lab. Please have patient arrive to the main entrance of [facility] and check in with the registration desk. Please call patient regarding medication instructions. Remind patient to be NPO after midnight (8 hours prior). Do not apply lotions/creams on skin the day of procedure. COVID testing 12/7.

## 2020-12-03 ENCOUNTER — HOSPITAL ENCOUNTER (OUTPATIENT)
Dept: PHYSICAL THERAPY | Age: 60
Setting detail: THERAPIES SERIES
Discharge: HOME | End: 2020-12-03
Payer: COMMERCIAL

## 2020-12-07 ENCOUNTER — OFFICE VISIT (OUTPATIENT)
Dept: PRIMARY CARE CLINIC | Age: 60
End: 2020-12-07
Payer: COMMERCIAL

## 2020-12-07 PROCEDURE — 99211 OFF/OP EST MAY X REQ PHY/QHP: CPT | Performed by: NURSE PRACTITIONER

## 2020-12-07 NOTE — PATIENT INSTRUCTIONS
Advance Care Planning  People with COVID-19 may have no symptoms, mild symptoms, such as fever, cough, and shortness of breath or they may have more severe illness, developing severe and fatal pneumonia. As a result, Advance Care Planning with attention to naming a health care decision maker (someone you trust to make healthcare decisions for you if you could not speak for yourself) and sharing other health care preferences is important BEFORE a possible health crisis. Please contact your Primary Care Provider to discuss Advance Care Planning. Preventing the Spread of Coronavirus Disease 2019 in Homes and Residential Communities  For the most recent information go to [email or web address]    Prevention steps for People with confirmed or suspected COVID-19 (including persons under investigation) who do not need to be hospitalized  and   People with confirmed COVID-19 who were hospitalized and determined to be medically stable to go home    Your healthcare provider and public health staff will evaluate whether you can be cared for at home. If it is determined that you do not need to be hospitalized and can be isolated at home, you will be monitored by staff from your local or state health department. You should follow the prevention steps below until a healthcare provider or local or state health department says you can return to your normal activities. Stay home except to get medical care  People who are mildly ill with COVID-19 are able to isolate at home during their illness. You should restrict activities outside your home, except for getting medical care. Do not go to work, school, or public areas. Avoid using public transportation, ride-sharing, or taxis. Separate yourself from other people and animals in your home  People: As much as possible, you should stay in a specific room and away from other people in your home.  Also, you should use a separate before eating or preparing food. If soap and water are not readily available, use an alcohol-based hand  with at least 60% alcohol, covering all surfaces of your hands and rubbing them together until they feel dry. Soap and water are the best option if hands are visibly dirty. Avoid touching your eyes, nose, and mouth with unwashed hands. Avoid sharing personal household items  You should not share dishes, drinking glasses, cups, eating utensils, towels, or bedding with other people or pets in your home. After using these items, they should be washed thoroughly with soap and water. Clean all high-touch surfaces everyday  High touch surfaces include counters, tabletops, doorknobs, bathroom fixtures, toilets, phones, keyboards, tablets, and bedside tables. Also, clean any surfaces that may have blood, stool, or body fluids on them. Use a household cleaning spray or wipe, according to the label instructions. Labels contain instructions for safe and effective use of the cleaning product including precautions you should take when applying the product, such as wearing gloves and making sure you have good ventilation during use of the product. Monitor your symptoms  Seek prompt medical attention if your illness is worsening (e.g., difficulty breathing). Before seeking care, call your healthcare provider and tell them that you have, or are being evaluated for, COVID-19. Put on a facemask before you enter the facility. These steps will help the healthcare providers office to keep other people in the office or waiting room from getting infected or exposed. Ask your healthcare provider to call the local or state health department. Persons who are placed under active monitoring or facilitated self-monitoring should follow instructions provided by their local health department or occupational health professionals, as appropriate. When working with your local health department check their available hours.   If you

## 2020-12-08 LAB — SARS-COV-2: NOT DETECTED

## 2020-12-09 ENCOUNTER — HOSPITAL ENCOUNTER (OUTPATIENT)
Dept: PHYSICAL THERAPY | Age: 60
Setting detail: THERAPIES SERIES
Discharge: HOME | End: 2020-12-09
Payer: COMMERCIAL

## 2020-12-09 PROCEDURE — 97110 THERAPEUTIC EXERCISES: CPT

## 2020-12-09 PROCEDURE — 97140 MANUAL THERAPY 1/> REGIONS: CPT

## 2020-12-09 PROCEDURE — 97112 NEUROMUSCULAR REEDUCATION: CPT

## 2020-12-09 NOTE — PLAN OF CARE
[facility],  [address]  Phone: [phone]  Fax [phone]    Physical Therapy Treatment Note/ Progress Report:           Date:  2020    Patient Name:  Dakota Keys    :  1960  MRN: [id number]  Restrictions/Precautions:    Medical/Treatment Diagnosis Information:  · Diagnosis: L95.96 ([id number]) - Plica syndrome of knee, left; S83.232D (ICD-10-CM) - Complex tear of medial meniscus of left knee as current injury, subsequent encounter; S83.272D (ICD-10-CM) - Complex tear of lateral meniscus of left knee as current injury, subsequent encounter ; M65.9 (ICD-10-CM) - Synovitis of left knee  · Treatment Diagnosis: M25.562 left knee pain; M25.662 Left knee stiffness; R26.2 Difficulty in walking; M25.462 Left knee effusion  Insurance/Certification information:  PT Insurance Information:  BCBS  - [id number]/10-$[id number]- NO AUTH  Physician Information:  Referring Practitioner: Dr. Bhavesh Sanders  Has the plan of care been signed (Y/N):        [x]  Yes  []  No     Date of Patient follow up with Physician: not scheduled    Is this a Progress Report:     [x]  Yes  []  No        If Yes:  Date Range for reporting period:  Beginning   Ending 10/23    Progress report will be due (10 Rx or 30 days whichever is less): 10/71    Recertification will be due (POC Duration  / 90 days whichever is less):       Visit # Insurance Allowable Requires auth   27 31 (40, but 9 used prev)    [x]no        []yes:     Functional Scale: LEFS 64% disability  Date assessed:  20  LEFS 55% disability      20  LEFS 45% disability      20  LEFS 35% disability      10/22/20,     Therapy Diagnosis/Practice Pattern:E      Number of Comorbidities:  []0     []1-2    [x]3+    Latex Allergy:  [x]NO      []YES  Preferred Language for Healthcare:   [x]English       []other:      Pain level: eval 3-10/10 Current pain 0-3/10    SUBJECTIVE:  Pt reports that on a day-to day basis, he is feeling less pain. Some days, he feels no pain at all. Other days, he will have knee pain when he goes up the stairs one time and then not the next time. This is discouraging. Sometimes it feels like he is hyperextending his knee when walking. Despite this, he would like next session to be his last one and will continue with his HEP indep. OBJECTIVE:    Observation: knee   Test measurements:  Knee flexion 148, knee ext 0MMT knee extension L 46.8# 5-/5, R 47.3# 5-/5, Hip abduction L 40.1# 4+/5, R 43.1# 4+/5    RESTRICTIONS/PRECAUTIONS: A-fib, previous L,R knee arthroscopies; chronic LBP; hx prostate cancer  PROCEDURE PERFORMED 7/17:  Left knee diagnostic video arthroscopy,  arthroscopic partial medial meniscectomy, partial lateral meniscectomy,  medial plica excision, and anterior synovectomy and fat pad excision.     Exercises/Interventions:     Therapeutic Ex (70418) Sets/sec Notes/CUES HEP   Pt ed; force vector in relation to proper patellar motion/contribution to pain/continue to strengthen VMO and lengthen IT band; role of HS in eccentric control of knee ext during swing phase 8'     Self- patellar mobs 20x ea direction    Foam roller quads, IT band     Standing HS 3-way on steps 30\" R,Lindep X   Standing gastroc stretch on wedge 30\"t2ksjae X   Standing quad stretch oncage 30\"x2 R,Lindep X   pec stretch stretch cage     Supine figure 4 stretch     Heel slide seated c strap 5\"x10  X   Eccentric ankle eversion       Stool scoot- HS alternating 4x 40'     SWB HS curl ecc 2x10     Sit to stand chair RXX, LXX x10R,L OVL at knees X   ER step up 15x 8\"    KASSY hip ABD  Hip ext    60# heavy    HSC  \" Ecc 3x10  2x10 L55#   45#      LP DL  Ecc  # 3x10 DL    100#  2x10 L    Knee ext 90-40 45#    bike 5'  seat 17- indep    Manual Intervention (14404)      IASTM quad, HS, IT band, superior/inf, bevel 45 deg, patellar framing   8'     Patellar mobs sup and inf, med, lat 20xea      ITB S      posterior tibial glide GIII 15\"x4     iASTM peroneus brevis and PF L      Patellar \"tent\" x 2 + medial glide using leukotape           NMR re-education (24320)  CUES NEEDED    BOSU DL squat hold 20\"x5     BOSU lunge fwd, lateral 10x ea R,L     Cone reach fwd, lat , post lat  glute activation, reduce knee valgus    CC retro walk w/ A, lat, P toe taps R  CC walks lateral with A, L, P taps CL      SL deadlift UE support X   Hip abduction isometric     SWB squat FDL --> SL 5\"x10 R,L    Glider ext, abd/ext with black SB providing valgus force L knee     Agility ladder: fwd two feet  bwd   fwd Two in one out     Lateral two feet  bwd   Pain pushing off left for last few steps    Throwing 50% with softball to net     Step to hold R,L     Ball toss SLS YMB    Therapeutic Activity (34686)                                                Therapeutic Exercise and NMR EXR  [x] (89059) Provided verbal/tactile cueing for activities related to strengthening, flexibility, endurance, ROM for improvements in LE, proximal hip, and core control with self care, mobility, lifting, ambulation.  [] (25329) Provided verbal/tactile cueing for activities related to improving balance, coordination, kinesthetic sense, posture, motor skill, proprioception  to assist with LE, proximal hip, and core control in self care, mobility, lifting, ambulation and eccentric single leg control.      NMR and Therapeutic Activities:    [] (41120 or 11382) Provided verbal/tactile cueing for activities related to improving balance, coordination, kinesthetic sense, posture, motor skill, proprioception and motor activation to allow for proper function of core, proximal hip and LE with self care and ADLs  [] (04231) Gait Re-education- Provided training and instruction to the patient for proper LE, core and proximal hip recruitment and positioning and eccentric body weight control with ambulation re-education including up and down stairs     Home Exercise Program:    [x] (99436) Reviewed/Progressed HEP activities related to strengthening, flexibility, endurance, ROM of core, proximal hip and LE for functional self-care, mobility, lifting and ambulation/stair navigation   [] (74207)Reviewed/Progressed HEP activities related to improving balance, coordination, kinesthetic sense, posture, motor skill, proprioception of core, proximal hip and LE for self care, mobility, lifting, and ambulation/stair navigation      Manual Treatments:  PROM / STM / Oscillations-Mobs:  G-I, II, III, IV (PA's, Inf., Post.)  [x] (79845) Provided manual therapy to mobilize LE, proximal hip and/or LS spine soft tissue/joints for the purpose of modulating pain, promoting relaxation,  increasing ROM, reducing/eliminating soft tissue swelling/inflammation/restriction, improving soft tissue extensibility and allowing for proper ROM for normal function with self care, mobility, lifting and ambulation. Modalities:     [] GAME READY (VASO)- for significant edema, swelling, pain control. - knee x 15', CP ant knee. Long sit  - declined  Charges:  Timed Code Treatment Minutes: 45   Total Treatment Minutes: 65(indep stretches, bike)     [facility] time in/time out:   (and requires time in and out for each CPT code)    [] EVAL (LOW) 51145 (typically 20 minutes face-to-face)  [] EVAL (MOD) 24981 (typically 30 minutes face-to-face)  [] EVAL (HIGH) 29911 (typically 45 minutes face-to-face)  [] RE-EVAL     [x] FT(01466) x 1  [] IONTO  [x] NMR (37603) x 1   [] VASO  [x] Manual (20306) x  1   [] Other:  [] TA x      [] Mech Traction (07668)  [] ES(attended) (46234)      [] ES (un) (41815):       GOALS: Patient stated goal: no pain and get back to baseball  [x]? Progressing: []? Met: []? Not Met: []? Adjusted     Therapist goals for Patient:   Short Term Goals: To be achieved in: 2 weeks  1. Independent in HEP and progression per patient tolerance, in order to prevent re-injury.    []? Progressing: [x]? Met: []? Not Met: []? Adjusted  2. Patient will have a decrease in pain to facilitate improvement in movement, function, and ADLs as indicated by Functional Deficits. [x]? Progressing: []? Met: []? Not Met: []? Adjusted     Long Term Goals: To be achieved in: 8 weeks  1. Disability index score of 35% or less for the LEFS to assist with reaching prior level of function. []? Progressing: [x]? Met: []? Not Met: []? Adjusted  2. Patient will demonstrate increased left knee AROM to 0-140 deg to allow for proper joint functioning for deeper squatting, kneeling, and stair ambulation. []? Progressing: [x]? Met: []? Not Met: []? Adjusted  3. Patient will demonstrate an increase in Strength to 5/5 for the left hip and knee musculature to return to normal CKC activities with good knee control, such as stairs, squatting, and light jogging. [x]? Progressing: []? Met: []? Not Met: []? Adjusted  4. Patient will return to at least 30 minutes of moderate physical activity (patient specific goal). []? Progressing: [x]? Met: []? Not Met: []? Adjusted  5. Patient will have improved ankle DF to 15 deg to promote normal tibial advancement and HS flexibility in the 90-90 position to 65 deg to reduce posterior pelvic tilt. [x]? Progressing: []? Met: []? Not Met: []? Adjusted        Progression Towards Functional goals:  [x] Patient is progressing as expected towards functional goals listed. [] Progression is slowed due to complexities listed. [] Progression has been slowed due to co-morbidities. [] Plan just implemented, too soon to assess goals progression  [] Other:     Overall Progression Towards Functional goals/ Treatment Progress Update:  [x] Patient is progressing as expected towards functional goals listed. [] Progression is slowed due to complexities/Impairments listed. [] Progression has been slowed due to co-morbidities.   [] Plan just implemented, too soon to assess goals progression <30days [] Goals require adjustment due to lack of progress  [] Patient is not progressing as expected and requires additional follow up with physician  [] Other    Prognosis for POC: [x] Good [] Fair  [] Poor      Patient requires continued skilled intervention: [x] Yes  [] No    Treatment/Activity Tolerance:  [x] Patient able to complete treatment  [] Patient limited by fatigue  [] Patient limited by pain    [] Patient limited by other medical complications  [] Other:     ASSESSMENT: Pt has needed extensive strengthening to reduce patellofemoral joint pain to this point d/t VMO weakness and hip stabilizer weakness. He still demonstrates knee valgus and femoral IR during SL CKC activities and this is a big cause of the remaining knee pain. However, his awareness of this issue and ability to correct this over the past month has improved. Recommend that pt continue x1 visit, then will transition to an indep HEP. Return to Play: (if applicable)   []  Stage 1: Intro to Strength   []  Stage 2: Return to Run and Strength   []  Stage 3: Return to Jump and Strength   []  Stage 4: Dynamic Strength and Agility   []  Stage 5: Sport Specific Training     []  Ready to Return to Play, Meets All Above Stages   []  Not Ready for Return to Sports   Comments:                               PLAN: 1 more visit- update HEP and d/c  [x] Continue per plan of care [] Alter current plan (see comments above)  [] Plan of care initiated [] Hold pending MD visit [] Discharge      Electronically signed by:  Reyes Henderson, PT, DPT     Note: If patient does not return for scheduled/ recommended follow up visits, this note will serve as a discharge from care along with most recent update on progress. Access Code: [email or web address]   URL: [email or web address]   Date: 10/19/2020   Prepared by: Reyes Henderson     Exercises   Supine ITB Stretch with Strap - 3 sets - 30s hold - 1x daily - 7x weekly   Supine Hamstring Stretch with Strap - 3 sets - 30s hold - 1x daily - 7x weekly   Supine Figure 4 Piriformis Stretch - 3 sets - 30s hold - 1x daily - 7x weekly   Seated Thoracic Lumbar Extension with Pectoralis Stretch - 10 reps - 1x daily - 7x weekly   Standing Gastroc Stretch - 3 sets - 30s hold - 2x daily - 7x weekly   Standing Quad Stretch with Table and Chair Support - 3 sets - 30s hold - 2x daily - 7x weekly   Sitting Heel Slide with Towel - 10 reps - 5s hold - 2x daily - 7x weekly   Quadriceps Mobilization with Foam Roll - 20 reps - 1x daily - 7x weekly   Sidelying IT Band Foam Roll Mobilization - 20 reps - 1x daily - 7x weekly   Supine Sciatic Nerve Glide - 20 reps - 2x daily - 7x weekly   Clamshell - 10 reps - 3 sets - 1x daily - 3x weekly   Lateral Step Down - 10 reps - 3 sets - 1x daily - 3x weekly   Sidelying Hip Abduction - 10 reps - 2 sets - 1x daily - 3x weekly   Single Leg Balance on Foam Pad - 10 reps - 10s hold - 1x daily - 3x weekly   Side Stepping with Resistance at Feet - 10 reps - 4 sets - 1x daily - 3x weekly   Lateral Lunge with Slider - 10 reps - 2 sets - 1x daily - 3x weekly   Forward T - 10 reps - 2 sets - 1x daily - 3x weekly   Long Sitting Ankle Eversion with Resistance - 20 reps - 1x daily - 3x weekly   Lateral Step Up - 10 reps - 2 sets - 1x daily - 3x weekly   Standing Hip Abduction on BOSU® Ball - 10 reps - 2 sets - 1x daily - 3x weekly

## 2020-12-11 ENCOUNTER — HOSPITAL ENCOUNTER (OUTPATIENT)
Dept: CARDIAC CATH/INVASIVE PROCEDURES | Age: 60
Discharge: HOME | End: 2020-12-11
Attending: INTERNAL MEDICINE | Admitting: INTERNAL MEDICINE
Payer: COMMERCIAL

## 2020-12-11 VITALS — WEIGHT: 216.49 LBS | HEIGHT: 71 IN | BODY MASS INDEX: 30.31 KG/M2

## 2020-12-11 PROBLEM — Z45.09 ENCOUNTER FOR LOOP RECORDER CHECK: Status: ACTIVE | Noted: 2020-12-11

## 2020-12-11 LAB
EKG ATRIAL RATE: 49 BPM
EKG DIAGNOSIS: NORMAL
EKG P AXIS: 54 DEGREES
EKG P-R INTERVAL: 170 MS
EKG Q-T INTERVAL: 544 MS
EKG QRS DURATION: 132 MS
EKG QTC CALCULATION (BAZETT): 491 MS
EKG R AXIS: 28 DEGREES
EKG T AXIS: 116 DEGREES
EKG VENTRICULAR RATE: 49 BPM

## 2020-12-11 PROCEDURE — 33285 INSJ SUBQ CAR RHYTHM MNTR: CPT

## 2020-12-11 PROCEDURE — 33285 INSJ SUBQ CAR RHYTHM MNTR: CPT | Performed by: INTERNAL MEDICINE

## 2020-12-11 PROCEDURE — 93005 ELECTROCARDIOGRAM TRACING: CPT | Performed by: INTERNAL MEDICINE

## 2020-12-11 PROCEDURE — 93010 ELECTROCARDIOGRAM REPORT: CPT | Performed by: INTERNAL MEDICINE

## 2020-12-11 PROCEDURE — 2500000003 HC RX 250 WO HCPCS

## 2020-12-11 PROCEDURE — C1764 EVENT RECORDER, CARDIAC: HCPCS

## 2020-12-11 RX ORDER — SODIUM CHLORIDE 9 MG/ML
1000 INJECTION, SOLUTION INTRAVENOUS CONTINUOUS
Status: DISCONTINUED | OUTPATIENT
Start: 2020-12-11 | End: 2020-12-11 | Stop reason: HOSPADM

## 2020-12-11 NOTE — PROCEDURES
After informed consent was obtained, the pt was brought to the EP lab in the fasting state for ILR implantation. The left chest was prepped and draped in sterile fashion. A Medtronic [id number] ILR SN # [id number] was implanted in the 4th left parasternal ICS SQ. R wave sensing was documented at . 72 mV. The wound was closed with 4-0 vicryl suture and steri strips and covered with a dry sterile dressing. The pt tolerated the procedure well. There were no apparent complications. EBL < 5 cc.

## 2020-12-11 NOTE — H&P
Patient name:  Montana Michaels  YOB: 1960     Primary Care physician: Lu Pink MD     HISTORY OF PRESENT ILLNESS: The patient is a 61 y.o.  male with a history of HOCM, NSVT, PAF, RBBB, chronic pain, ADHD, and sleep apnea. He transferred service for HOCM from Dr. Nona Edge in 6/2014 and has historically declined ICD implant because no one in his family has had sudden cardiac death. Echo in 6/2014 showed an EF of 60-65% and a 2.0cm mid septum. He complained of chest pain and had a normal stress test in 4/2016. He complained of palpitations and was diagnosed with atrial fibrillation in 4/2016. A 2 week monitor worn in 6/2017 showed PAF and frequent PVCs. Echo in 10/2017 showed an EF of 55-60% and severe LVH. He was started on amiodarone in 10/2017. He was referred to Dr. Ruba Wong for HOCM. Cardiac MRI in 1/2018 showed HOCM without LVOT obstruction at rest and an EF of 63%. Echo in 10/2019 showed an EF of 55-60% and moderate LVH. Event monitor in 4/2020 showed rare PVCs and no NSVT. Amiodarone was decreased to 100mg po QD. Event monitor in 7/2020 showed rare PVCs and 4 beat NSVT.     Today he is being seen for afib and PVCs. EKG shows sinus dolores with a RBBB and HR of 57. He states he increased amiodarone back to 200mg per day one month ago due to daily palpitations. Continues to have frequent palpitations and occasional fluttering. Has some SOB and cough that he feels are related to allergies. Denies chest pain and dizziness. Reports minimal caffeine intake and reports compliance with CPAP.  Would like to stop Eliquis if possible.      Past Medical History:   has a past medical history of A-fib ([address] ADHD (attention deficit hyperactivity disorder), Carpal tunnel syndrome, Chronic low back pain, Chronic neck pain, Chronic sinusitis, Dental crown present, Epigastric hernia, Esophageal spasm, GERD (gastroesophageal reflux disease), Hyperlipidemia, Hypertrophic cardiomyopathy ([address] IHSS (idiopathic hypertrophic subaortic stenosis) ([facility] [address] Kidney stones, HUNTER on CPAP, Primary localized osteoarthrosis, shoulder region, Prostate cancer ([facility] [address] PVC's (premature ventricular contractions), RBBB (right bundle branch block), RLS (restless legs syndrome), Seasonal allergies, and Tachycardia.     Past Surgical History:   has a past surgical history that includes Knee arthroscopy (Right, 1982); Umbilical hernia repair; Colonoscopy (1/17/11); TURP (2012); Inguinal hernia repair (Bilateral, 2009, 2012,); Varicocelectomy (2000); Shoulder arthroscopy (Right, 12/31/2013); Endoscopy, colon, diagnostic; Upper gastrointestinal endoscopy (9/3); Elbow surgery (2015); Carpal tunnel release (Left); Knee arthroscopy (Left, 12/21/2017); Colonoscopy (N/A, 2/2/2020); and Knee arthroscopy (Left, 7/17/2020).    Home Medications:    Home Medications           Prior to Admission medications    Medication Sig Start Date End Date Taking? Authorizing Provider   ketorolac (TORADOL) 10 MG tablet Take 1 tablet by mouth every 6 hours as needed for Pain 10/6/20 10/6/21 Yes Nisha Faye MD   butalbital-APAP-caffeine -40 MG CAPS per capsule TK ONE C PO  Q 12 H PRF SEVERE HEADACHE 9/24/20   Yes Historical Provider, MD   atorvastatin (LIPITOR) 20 MG tablet TAKE 1 TABLET BY MOUTH ONE TIME A DAY 9/30/20   Yes Shonda Cates MD   verapamil (VERELAN) 240 MG extended release capsule TAKE 1 CAPSULE BY MOUTH EVERY NIGHT 9/2/20   Yes Shonda Cates MD   omeprazole (PRILOSEC) 40 MG delayed release capsule TAKE 1 CAPSULE BY MOUTH EVERY DAY 8/31/20   Yes Shonda Cates MD   verapamil (CALAN SR) 240 MG extended release tablet Take 240 mg by mouth daily     Yes Historical Provider, MD   amiodarone (CORDARONE) 200 MG tablet Take 0.5 tablets by mouth daily 7/1/20   Yes Kajal Reaves MD   ELIQUIS 5 MG TABS tablet TAKE 1 TABLET BY MOUTH TWICE A DAY 10/7/19   Yes PARI Wilde - CNP   clonazePAM (KLONOPIN) 1 MG tablet Take 1 mg by mouth daily. 4/30/14   Yes Historical Provider, MD   tamsulosin (FLOMAX) 0.4 MG capsule Take 1 capsule by mouth daily for 5 days 10/6/20 10/11/20   Akiko Cho MD           Allergies:  Niacin and related     Social History:   reports that he has never smoked. He has never used smokeless tobacco. He reports current alcohol use. He reports that he does not use drugs.      Family History: family history includes Cancer in his mother; Heart Disease in his maternal grandfather and maternal grandmother; High Blood Pressure in his father; High Cholesterol in his brother and mother; Prostate Cancer in his paternal uncle.     Review of Systems   All 14-point review of systems are completed and pertinent positives are mentioned in the history of present illness. Other systems are reviewed and are negative.      PHYSICAL EXAM:    Vital signs:    /70   Pulse 56   Ht 5' 11\" (1.803 m)   Wt 216 lb 8 oz (98.2 kg)   SpO2 98%   BMI 30.20 kg/m²       Constitutional and general appearance: alert, cooperative, no distress and appears stated age  HEENT: PERRL, no cervical lymphadenopathy. No masses palpable.  Normal oral mucosa  Respiratory:  · Normal excursion and expansion without use of accessory muscles  · Resp auscultation: Normal breath sounds without dullness or wheezing  Cardiovascular:  · The apical impulse is not displaced  · Gr 2/6 systolic murmur, left chest. Regular S1 and S2.  · Jugular venous pulsation Normal  · The carotid upstroke is normal in amplitude and contour without delay or bruit  · Peripheral pulses are symmetrical and full   Abdomen:  · No masses or tenderness  · Bowel sounds present  Extremities:  ·  No cyanosis or clubbing  ·  No lower extremity edema  ·  Skin: warm and dry  Neurological:  · Alert and oriented  · Moves all extremities well  · Flat affect      DATA:    ECG 10/21/2020:  SB with RBBB HR 57     Echo 07/2/0895:  Normal systolic function with an estimated ejection fraction of 55-60%.   Moderate concentric left ventricular hypertrophy ( septal wall is more   increased in size (1.6 cm) ).  No regional wall motion abnormalities are seen.   Normal left ventricular diastolic filling pressure.   The left atrium is severely dilated.   The right atrium is mildly dilated.   Mild aortic regurgitation.   Mild mitral and pulmonic regurgitation.     Echo 48/19/1813:  -- Systolic function appears grossly normal with an estimated ejection fraction of 55-60 %.   Left ventricular size is normal with severe asymetric left ventricular hypertrophy. Speckled appearance of myocardium, suggest rule out for restrictive cardiomyopathy and comparison with ECG voltage. Normal left ventricle diastolic filling pressure.   -- Moderately dilated left atrium with increased left atrial volume.     Cardiac MRI 1/10/2018:  1.  Normal left ventricular chamber size with a normal global LV systolic function. Calculated    ejection fraction of 60%. Asymmetric basal and mid septal hypertrophy with the maximum    end-diastolic myocardial thickness measuring up to 16 mm. Maximum LVOT velocity is 1.4 m/s. No    anterior systolic motion of the anterior mitral valve leaflet. Late gadolinium enhancement    imaging demonstrates areas of heterogeneous hyperenhancement predominantly throughout the basal    and mid anterior and septal myocardial segments. Collectively, these findings are consistent    with hypertrophic cardiomyopathy without dynamic LVOT obstruction at rest.   2.  Normal right ventricular size with a normal global RV systolic function. Calculated RV    ejection fraction of 63%. 3.  Mild mitral regurgitation with associated mild left atrial enlargement.         Stress test 4/2016:   Left ventricular ejection fraction of 60 %.    There is normal isotope uptake at stress and rest. There is no evidence of myocardial ischemia or scar.      CARDIOLOGY LABS:   CBC: No results for input(s): WBC, HGB, HCT, PLT in the last 72 hours.   BMP: No results for input(s): NA, K, CO2, BUN, CREATININE, LABGLOM, GLUCOSE in the last 72 hours. PT/INR: No results for input(s): PROTIME, INR in the last 72 hours. APTT:No results for input(s): APTT in the last 72 hours. FASTING LIPID PANEL:        Lab Results   Component Value Date     HDL 54 09/30/2020     HDL 51 12/19/2011     LDLCALC 115 09/30/2020     TRIG 85 09/30/2020      LIVER PROFILE:No results for input(s): AST, ALT, ALB in the last 72 hours.        Assessment:   1. Symptomatic paroxysmal atrial fibrillation: stable               -noted on monitor in 6/2017              -[id number] score 1 (cardiomyopathy)              -amiodarone started in 10/2017  2. Hypertrophic obstructive cardiomyopathy: known history              -management per Dr. Lawanda Borden, cardiac MRI done in 1/2018  3. Frequent PVCs and history of NSVT: stable               -symptomatic, noted on event monitors  4. Right bundle branch block: stable   5: HUNTER: reports compliance with CPAP     Plan:   1. Continue amiodarone, verapamil, and Eliquis  2. Annual CBC (due 10/2021)  3. BMP, LFT, and TSH every 6 months while on amiodarone (due 2/2021)  4. Will discuss increased amiodarone requirement, ongoing palpitations, and request to stop Eliquis with Dr. Myles Zapien   5.  ILR as a surveillance tool for AF prior to stopping [facility]     Dwain Jane MD  [facility]  [phone]

## 2020-12-16 ENCOUNTER — NURSE ONLY (OUTPATIENT)
Dept: CARDIOLOGY CLINIC | Age: 60
End: 2020-12-16

## 2020-12-16 ENCOUNTER — HOSPITAL ENCOUNTER (OUTPATIENT)
Dept: PHYSICAL THERAPY | Age: 60
Setting detail: THERAPIES SERIES
Discharge: HOME | End: 2020-12-16
Payer: COMMERCIAL

## 2020-12-16 ENCOUNTER — TELEPHONE (OUTPATIENT)
Dept: CARDIOLOGY CLINIC | Age: 60
End: 2020-12-16

## 2020-12-16 PROCEDURE — 97140 MANUAL THERAPY 1/> REGIONS: CPT

## 2020-12-16 PROCEDURE — 97110 THERAPEUTIC EXERCISES: CPT

## 2020-12-16 NOTE — TELEPHONE ENCOUNTER
Spoke with patient. He reports he was unaware he was in AF yesterday and was leaving work around 6 pm.  He reports he was asymptomatic, did not experience any dizziness or fatigue. He reports he has device check tomorrow.

## 2020-12-16 NOTE — TELEPHONE ENCOUNTER
Please let pt know he was in AFib 12/15 x 14 hrs then converted to sinus rhythm on 12/15 @ 6pm.    3.5 sec conversion pause at 6:00 pm. On 12/15

## 2020-12-16 NOTE — TELEPHONE ENCOUNTER
[facility]/linq alert for pause and AF recorded. linq implanted 12/11/20 by Dr Aspen Carrington for pAF and nsvt. He has a hx of pAF and is on eliquis and amio. post op check scheduled for 12/17/20. Please call pt to see if symptoms associated w/ pause recorded on 12/15 @ 2066. Pause 15-Dec-2020 17:59 00:00:03  AF 15-Dec-2020 @ 03:17 x 14 hrs and 44 min.    wadeb to review.

## 2020-12-16 NOTE — PROGRESS NOTES
[facility]/linq alert for pause and AF recorded. linq implanted 12/11/20 by Dr Janis Lee for pAF and nsvt. He has a hx of pAF and is on eliquis and amio. post op check scheduled for 12/17/20. Please call pt to see if symptoms associated w/ pause recorded on 12/15 @ 8784. Pause 15-Dec-2020 17:59 00:00:03  AF 15-Dec-2020 @ 03:17 x 14 hrs and 44 min.    jmb to review. See PACEART report under Cardiology tab.

## 2020-12-16 NOTE — FLOWSHEET NOTE
Pain level: eval 3-10/10 Current pain 0-3/10    SUBJECTIVE:  Pt reports that this week has been really good for pain. He feels that he is finally in a good spot. The only times that he has pain is when he fully extends his knee and pivots on it. OBJECTIVE:   ? Observation: knee   Test measurements:  Knee flexion 148, knee ext 0MMT knee extension L 50.3# 5/5, R 51.1# 5/5, Hip abduction L 36.6# 4+/5, R 37.3# 4+/5  ? HS L 27.2/4+ R 36.1 R/5    RESTRICTIONS/PRECAUTIONS: A-fib, previous L,R knee arthroscopies; chronic LBP; hx prostate cancer  PROCEDURE PERFORMED 7/17:  Left knee diagnostic video arthroscopy,  arthroscopic partial medial meniscectomy, partial lateral meniscectomy,  medial plica excision, and anterior synovectomy and fat pad excision.     Exercises/Interventions:     Therapeutic Ex (90719) Sets/sec Notes/CUES HEP   Pt ed; HEP 8'     Self- patellar mobs     Foam roller quads, IT band     Standing HS 3-way on steps 30\" R,Lindep X   Standing gastroc stretch on wedge 30\"c6ivdhb X   Standing quad stretch oncage 30\"x2 R,Lindep X   pec stretch stretch cage     Supine figure 4 stretch     Heel slide seated c strap 5\"x10  X   Eccentric ankle eversion       Stool scoot- HS alternating 4x 40'     Bridge walkout x10     Sit to stand chair RXX, LXX x10R,L OVL at knees X   ER step up 15x 8\"    KASSY hip ABD  Hip ext    60# heavy    HSC  \" Ecc 55#   45#      LP DL  Ecc  # 3x10 DL  100# 3x10 ECC  90#  2x10 L    Knee ext 90-40 45#    bike 5'  seat 17- indep    Manual Intervention (02325)      IASTM quad, HS, IT band, superior/inf, bevel 45 deg, patellar framing   8'     Patellar mobs sup and inf, med, lat 20xea      ITB S      posterior tibial glide GIII 15\"x4     iASTM peroneus brevis and PF L      Patellar \"tent\" x 2 + medial glide using leukotape           NMR re-education (51083)  CUES NEEDED    BOSU DL squat hold 25\"x5 GVL at knees    BOSU lunge fwd, lateral Cone reach fwd, lat , post lat  glute activation, reduce knee valgus    CC retro walk w/ A, lat, P toe taps R  CC walks lateral with A, L, P taps CL      SL deadlift UE support X   Hip abduction isometric     SWB squat FDL --> SL     Glider ext, abd/ext with black SB providing valgus force L knee     Agility ladder: fwd two feet  bwd   fwd Two in one out     Lateral two feet  bwd   Pain pushing off left for last few steps    Throwing 50% with softball to net     Step to hold R,L     Ball toss SLS YMB    Therapeutic Activity (27772)                                                Therapeutic Exercise and NMR EXR  [x] (82086) Provided verbal/tactile cueing for activities related to strengthening, flexibility, endurance, ROM for improvements in LE, proximal hip, and core control with self care, mobility, lifting, ambulation.  [] (31990) Provided verbal/tactile cueing for activities related to improving balance, coordination, kinesthetic sense, posture, motor skill, proprioception  to assist with LE, proximal hip, and core control in self care, mobility, lifting, ambulation and eccentric single leg control.      NMR and Therapeutic Activities:    [] (52337 or 67363) Provided verbal/tactile cueing for activities related to improving balance, coordination, kinesthetic sense, posture, motor skill, proprioception and motor activation to allow for proper function of core, proximal hip and LE with self care and ADLs  [] (49390) Gait Re-education- Provided training and instruction to the patient for proper LE, core and proximal hip recruitment and positioning and eccentric body weight control with ambulation re-education including up and down stairs     Home Exercise Program:    [x] (15518) Reviewed/Progressed HEP activities related to strengthening, flexibility, endurance, ROM of core, proximal hip and LE for functional self-care, mobility, lifting and ambulation/stair navigation Long Term Goals: To be achieved in: 8 weeks  1. Disability index score of 35% or less for the LEFS to assist with reaching prior level of function. []? Progressing: [x]? Met: []? Not Met: []? Adjusted  2. Patient will demonstrate increased left knee AROM to 0-140 deg to allow for proper joint functioning for deeper squatting, kneeling, and stair ambulation. []? Progressing: [x]? Met: []? Not Met: []? Adjusted  3. Patient will demonstrate an increase in Strength to 5/5 for the left hip and knee musculature to return to normal CKC activities with good knee control, such as stairs, squatting, and light jogging. [x]? Progressing: []? Met: []? Not Met: []? Adjusted  4. Patient will return to at least 30 minutes of moderate physical activity (patient specific goal). []? Progressing: [x]? Met: []? Not Met: []? Adjusted  5. Patient will have improved ankle DF to 15 deg to promote normal tibial advancement and HS flexibility in the 90-90 position to 65 deg to reduce posterior pelvic tilt. [x]? Progressing: []? Met: []? Not Met: []? Adjusted        Progression Towards Functional goals:  [x] Patient is progressing as expected towards functional goals listed. [] Progression is slowed due to complexities listed. [] Progression has been slowed due to co-morbidities. [] Plan just implemented, too soon to assess goals progression  [] Other:     Overall Progression Towards Functional goals/ Treatment Progress Update:  [x] Patient is progressing as expected towards functional goals listed. [] Progression is slowed due to complexities/Impairments listed. [] Progression has been slowed due to co-morbidities.   [] Plan just implemented, too soon to assess goals progression <30days   [] Goals require adjustment due to lack of progress  [] Patient is not progressing as expected and requires additional follow up with physician  [] Other    Prognosis for POC: [x] Good [] Fair  [] Poor Patient requires continued skilled intervention: [x] Yes  [] No    Treatment/Activity Tolerance:  [x] Patient able to complete treatment  [] Patient limited by fatigue  [] Patient limited by pain    [] Patient limited by other medical complications  [] Other:     ASSESSMENT: Pt has needed extensive strengthening to reduce patellofemoral joint pain to this point d/t VMO weakness and hip stabilizer weakness. Although pt still experiences some pain in his knee, he should be able to continue to improve his condition using his HEP. Return to Play: (if applicable)   []  Stage 1: Intro to Strength   []  Stage 2: Return to Run and Strength   []  Stage 3: Return to Jump and Strength   []  Stage 4: Dynamic Strength and Agility   []  Stage 5: Sport Specific Training     []  Ready to Return to Play, Meets All Above Stages   []  Not Ready for Return to Sports   Comments:                               PLAN:   [] Continue per plan of care [] Alter current plan (see comments above)  [] Plan of care initiated [] Hold pending MD visit [x] Discharge      Electronically signed by:  Javy Lisa, PT, DPT     Note: If patient does not return for scheduled/ recommended follow up visits, this note will serve as a discharge from care along with most recent update on progress. Access Code: [email or web address]   URL: [email or web address]   Date: 12/16/2020   Prepared by: Javy Lisa     Exercises   Supine ITB Stretch with Strap - 3 sets - 30s hold - 1x daily - 7x weekly   Supine Hamstring Stretch with Strap - 3 sets - 30s hold - 1x daily - 7x weekly   Supine Figure 4 Piriformis Stretch - 3 sets - 30s hold - 1x daily - 7x weekly   Seated Thoracic Lumbar Extension with Pectoralis Stretch - 10 reps - 1x daily - 7x weekly   Standing Gastroc Stretch - 3 sets - 30s hold - 2x daily - 7x weekly   Standing Quad Stretch with Table and Chair Support - 3 sets - 30s hold - 2x daily - 7x weekly Sitting Heel Slide with Towel - 10 reps - 5s hold - 2x daily - 7x weekly   Quadriceps Mobilization with Foam Roll - 20 reps - 1x daily - 7x weekly   Sidelying IT Band Foam Roll Mobilization - 20 reps - 1x daily - 7x weekly   Sidelying Hip Abduction - 10 reps - 2 sets - 1x daily - 3x weekly   Long Sitting Ankle Eversion with Resistance - 20 reps - 1x daily - 3x weekly   Lateral Step Down - 10 reps - 3 sets - 1x daily - 3x weekly   Lateral Step Up - 10 reps - 2 sets - 1x daily - 3x weekly   Single Leg Balance on Foam Pad - 10 reps - 10s hold - 1x daily - 3x weekly   Side Stepping with Resistance at Feet - 10 reps - 4 sets - 1x daily - 3x weekly   Lateral Lunge with Slider - 10 reps - 2 sets - 1x daily - 3x weekly   Forward T - 10 reps - 2 sets - 1x daily - 3x weekly   Stool Scoots - 10 reps - 4 sets - 1x daily - 7x weekly   Sit to Stand without Arm Support - 10 reps - 2 sets - 1x daily - 7x weekly

## 2020-12-17 ENCOUNTER — NURSE ONLY (OUTPATIENT)
Dept: CARDIOLOGY CLINIC | Age: 60
End: 2020-12-17
Payer: COMMERCIAL

## 2020-12-17 PROCEDURE — 93291 INTERROG DEV EVAL SCRMS IP: CPT | Performed by: INTERNAL MEDICINE

## 2021-01-20 ENCOUNTER — NURSE ONLY (OUTPATIENT)
Dept: CARDIOLOGY CLINIC | Age: 61
End: 2021-01-20
Payer: COMMERCIAL

## 2021-01-20 DIAGNOSIS — I48.0 PAROXYSMAL ATRIAL FIBRILLATION (HCC): ICD-10-CM

## 2021-01-20 DIAGNOSIS — Z45.09 ENCOUNTER FOR LOOP RECORDER CHECK: ICD-10-CM

## 2021-01-20 DIAGNOSIS — I49.3 PVC (PREMATURE VENTRICULAR CONTRACTION): ICD-10-CM

## 2021-01-20 PROCEDURE — G2066 INTER DEVC REMOTE 30D: HCPCS | Performed by: INTERNAL MEDICINE

## 2021-01-20 PROCEDURE — 93298 REM INTERROG DEV EVAL SCRMS: CPT | Performed by: INTERNAL MEDICINE

## 2021-02-24 ENCOUNTER — NURSE ONLY (OUTPATIENT)
Dept: CARDIOLOGY CLINIC | Age: 61
End: 2021-02-24
Payer: COMMERCIAL

## 2021-02-24 DIAGNOSIS — I48.91 ATRIAL FIBRILLATION, UNSPECIFIED TYPE (HCC): ICD-10-CM

## 2021-02-24 DIAGNOSIS — Z45.09 ENCOUNTER FOR LOOP RECORDER CHECK: ICD-10-CM

## 2021-02-24 DIAGNOSIS — I49.3 PVC (PREMATURE VENTRICULAR CONTRACTION): ICD-10-CM

## 2021-02-24 PROCEDURE — G2066 INTER DEVC REMOTE 30D: HCPCS | Performed by: INTERNAL MEDICINE

## 2021-02-24 PROCEDURE — 93298 REM INTERROG DEV EVAL SCRMS: CPT | Performed by: INTERNAL MEDICINE

## 2021-03-04 ENCOUNTER — APPOINTMENT (OUTPATIENT)
Dept: GENERAL RADIOLOGY | Age: 61
DRG: 389 | End: 2021-03-04
Payer: COMMERCIAL

## 2021-03-04 ENCOUNTER — APPOINTMENT (OUTPATIENT)
Dept: CT IMAGING | Age: 61
DRG: 389 | End: 2021-03-04
Payer: COMMERCIAL

## 2021-03-04 ENCOUNTER — HOSPITAL ENCOUNTER (INPATIENT)
Age: 61
LOS: 2 days | Discharge: HOME | DRG: 389 | End: 2021-03-06
Attending: EMERGENCY MEDICINE | Admitting: INTERNAL MEDICINE
Payer: COMMERCIAL

## 2021-03-04 DIAGNOSIS — K56.609 SBO (SMALL BOWEL OBSTRUCTION) (HCC): Primary | ICD-10-CM

## 2021-03-04 PROBLEM — I48.91 A-FIB (HCC): Status: ACTIVE | Noted: 2017-09-26

## 2021-03-04 LAB
A/G RATIO: 1.5 (ref 1.1–2.2)
ALBUMIN SERPL-MCNC: 4.4 G/DL (ref 3.4–5)
ALP BLD-CCNC: 115 U/L (ref 40–129)
ALT SERPL-CCNC: 20 U/L (ref 10–40)
AMORPHOUS: ABNORMAL /HPF
ANION GAP SERPL CALCULATED.3IONS-SCNC: 9 MMOL/L (ref 3–16)
AST SERPL-CCNC: 17 U/L (ref 15–37)
BACTERIA: ABNORMAL /HPF
BASOPHILS ABSOLUTE: 0 K/UL (ref 0–0.2)
BASOPHILS RELATIVE PERCENT: 0.2 %
BILIRUB SERPL-MCNC: 0.7 MG/DL (ref 0–1)
BILIRUBIN URINE: NEGATIVE
BLOOD, URINE: ABNORMAL
BUN BLDV-MCNC: 19 MG/DL (ref 7–20)
CALCIUM SERPL-MCNC: 9.3 MG/DL (ref 8.3–10.6)
CHLORIDE BLD-SCNC: 101 MMOL/L (ref 99–110)
CLARITY: CLEAR
CO2: 27 MMOL/L (ref 21–32)
COLOR: ABNORMAL
CREAT SERPL-MCNC: 1.2 MG/DL (ref 0.8–1.3)
EOSINOPHILS ABSOLUTE: 0 K/UL (ref 0–0.6)
EOSINOPHILS RELATIVE PERCENT: 0 %
EPITHELIAL CELLS, UA: ABNORMAL /HPF (ref 0–5)
GFR AFRICAN AMERICAN: >60
GFR NON-AFRICAN AMERICAN: >60
GLOBULIN: 3 G/DL
GLUCOSE BLD-MCNC: 127 MG/DL (ref 70–99)
GLUCOSE URINE: NEGATIVE MG/DL
HCT VFR BLD CALC: 41.6 % (ref 40.5–52.5)
HEMOGLOBIN: 13.6 G/DL (ref 13.5–17.5)
KETONES, URINE: NEGATIVE MG/DL
LACTIC ACID: 1.2 MMOL/L (ref 0.4–2)
LEUKOCYTE ESTERASE, URINE: ABNORMAL
LIPASE: 19 U/L (ref 13–60)
LYMPHOCYTES ABSOLUTE: 0.3 K/UL (ref 1–5.1)
LYMPHOCYTES RELATIVE PERCENT: 2.4 %
MCH RBC QN AUTO: 28.2 PG (ref 26–34)
MCHC RBC AUTO-ENTMCNC: 32.8 G/DL (ref 31–36)
MCV RBC AUTO: 86 FL (ref 80–100)
MICROSCOPIC EXAMINATION: YES
MONOCYTES ABSOLUTE: 0.8 K/UL (ref 0–1.3)
MONOCYTES RELATIVE PERCENT: 7 %
NEUTROPHILS ABSOLUTE: 9.8 K/UL (ref 1.7–7.7)
NEUTROPHILS RELATIVE PERCENT: 90.4 %
NITRITE, URINE: NEGATIVE
PDW BLD-RTO: 16.1 % (ref 12.4–15.4)
PH UA: 6 (ref 5–8)
PLATELET # BLD: 229 K/UL (ref 135–450)
PMV BLD AUTO: 8.8 FL (ref 5–10.5)
POTASSIUM SERPL-SCNC: 4.3 MMOL/L (ref 3.5–5.1)
PROTEIN UA: NEGATIVE MG/DL
RBC # BLD: 4.84 M/UL (ref 4.2–5.9)
RBC UA: ABNORMAL /HPF (ref 0–4)
SODIUM BLD-SCNC: 137 MMOL/L (ref 136–145)
SPECIFIC GRAVITY UA: 1.02 (ref 1–1.03)
TOTAL PROTEIN: 7.4 G/DL (ref 6.4–8.2)
TROPONIN: <0.01 NG/ML
URINE REFLEX TO CULTURE: ABNORMAL
URINE TYPE: ABNORMAL
UROBILINOGEN, URINE: 0.2 E.U./DL
WBC # BLD: 10.9 K/UL (ref 4–11)
WBC UA: ABNORMAL /HPF (ref 0–5)

## 2021-03-04 PROCEDURE — 2580000003 HC RX 258: Performed by: EMERGENCY MEDICINE

## 2021-03-04 PROCEDURE — 99285 EMERGENCY DEPT VISIT HI MDM: CPT

## 2021-03-04 PROCEDURE — 96374 THER/PROPH/DIAG INJ IV PUSH: CPT

## 2021-03-04 PROCEDURE — 87086 URINE CULTURE/COLONY COUNT: CPT

## 2021-03-04 PROCEDURE — 2580000003 HC RX 258: Performed by: NURSE PRACTITIONER

## 2021-03-04 PROCEDURE — 96376 TX/PRO/DX INJ SAME DRUG ADON: CPT

## 2021-03-04 PROCEDURE — 6360000002 HC RX W HCPCS: Performed by: NURSE PRACTITIONER

## 2021-03-04 PROCEDURE — 81001 URINALYSIS AUTO W/SCOPE: CPT

## 2021-03-04 PROCEDURE — 1200000000 HC SEMI PRIVATE

## 2021-03-04 PROCEDURE — 83605 ASSAY OF LACTIC ACID: CPT

## 2021-03-04 PROCEDURE — 84484 ASSAY OF TROPONIN QUANT: CPT

## 2021-03-04 PROCEDURE — 74177 CT ABD & PELVIS W/CONTRAST: CPT

## 2021-03-04 PROCEDURE — 85025 COMPLETE CBC W/AUTO DIFF WBC: CPT

## 2021-03-04 PROCEDURE — 83690 ASSAY OF LIPASE: CPT

## 2021-03-04 PROCEDURE — 2500000003 HC RX 250 WO HCPCS: Performed by: NURSE PRACTITIONER

## 2021-03-04 PROCEDURE — 6360000004 HC RX CONTRAST MEDICATION: Performed by: NURSE PRACTITIONER

## 2021-03-04 PROCEDURE — 93005 ELECTROCARDIOGRAM TRACING: CPT | Performed by: NURSE PRACTITIONER

## 2021-03-04 PROCEDURE — 80053 COMPREHEN METABOLIC PANEL: CPT

## 2021-03-04 PROCEDURE — 6370000000 HC RX 637 (ALT 250 FOR IP): Performed by: NURSE PRACTITIONER

## 2021-03-04 PROCEDURE — 96375 TX/PRO/DX INJ NEW DRUG ADDON: CPT

## 2021-03-04 PROCEDURE — 0D9670Z DRAINAGE OF STOMACH WITH DRAINAGE DEVICE, VIA NATURAL OR ARTIFICIAL OPENING: ICD-10-PCS | Performed by: EMERGENCY MEDICINE

## 2021-03-04 PROCEDURE — 74018 RADEX ABDOMEN 1 VIEW: CPT

## 2021-03-04 RX ORDER — 0.9 % SODIUM CHLORIDE 0.9 %
1000 INTRAVENOUS SOLUTION INTRAVENOUS ONCE
Status: COMPLETED | OUTPATIENT
Start: 2021-03-04 | End: 2021-03-04

## 2021-03-04 RX ORDER — ASPIRIN 81 MG/1
TABLET, CHEWABLE ORAL
Status: ON HOLD | COMMUNITY
End: 2021-03-04

## 2021-03-04 RX ORDER — CLONAZEPAM 0.5 MG/1
0.5 TABLET ORAL NIGHTLY PRN
Status: DISCONTINUED | OUTPATIENT
Start: 2021-03-05 | End: 2021-03-06 | Stop reason: HOSPADM

## 2021-03-04 RX ORDER — ONDANSETRON 2 MG/ML
4 INJECTION INTRAMUSCULAR; INTRAVENOUS EVERY 6 HOURS PRN
Status: DISCONTINUED | OUTPATIENT
Start: 2021-03-04 | End: 2021-03-06 | Stop reason: HOSPADM

## 2021-03-04 RX ORDER — AMIODARONE HYDROCHLORIDE 200 MG/1
200 TABLET ORAL DAILY
Status: DISCONTINUED | OUTPATIENT
Start: 2021-03-05 | End: 2021-03-06 | Stop reason: HOSPADM

## 2021-03-04 RX ORDER — PANTOPRAZOLE SODIUM 40 MG/1
40 TABLET, DELAYED RELEASE ORAL DAILY
Status: DISCONTINUED | OUTPATIENT
Start: 2021-03-05 | End: 2021-03-06 | Stop reason: HOSPADM

## 2021-03-04 RX ORDER — MAGNESIUM CHLORIDE 200 MG/ML
INJECTION INTRAVENOUS
Status: ON HOLD | COMMUNITY
End: 2021-03-04

## 2021-03-04 RX ORDER — SWAB
SWAB, NON-MEDICATED MISCELLANEOUS
Status: ON HOLD | COMMUNITY
End: 2021-03-04

## 2021-03-04 RX ORDER — ACETAMINOPHEN 650 MG/1
650 SUPPOSITORY RECTAL EVERY 6 HOURS PRN
Status: DISCONTINUED | OUTPATIENT
Start: 2021-03-04 | End: 2021-03-06 | Stop reason: HOSPADM

## 2021-03-04 RX ORDER — KETOROLAC TROMETHAMINE 30 MG/ML
15 INJECTION, SOLUTION INTRAMUSCULAR; INTRAVENOUS EVERY 6 HOURS PRN
Status: COMPLETED | OUTPATIENT
Start: 2021-03-04 | End: 2021-03-06

## 2021-03-04 RX ORDER — OMEPRAZOLE 20 MG/1
40 TABLET, DELAYED RELEASE ORAL DAILY
COMMUNITY
End: 2021-03-04

## 2021-03-04 RX ORDER — SODIUM CHLORIDE 0.9 % (FLUSH) 0.9 %
10 SYRINGE (ML) INJECTION PRN
Status: DISCONTINUED | OUTPATIENT
Start: 2021-03-04 | End: 2021-03-06 | Stop reason: HOSPADM

## 2021-03-04 RX ORDER — PROMETHAZINE HYDROCHLORIDE 25 MG/1
12.5 TABLET ORAL EVERY 6 HOURS PRN
Status: DISCONTINUED | OUTPATIENT
Start: 2021-03-04 | End: 2021-03-06 | Stop reason: HOSPADM

## 2021-03-04 RX ORDER — SODIUM CHLORIDE 0.9 % (FLUSH) 0.9 %
10 SYRINGE (ML) INJECTION EVERY 12 HOURS SCHEDULED
Status: DISCONTINUED | OUTPATIENT
Start: 2021-03-04 | End: 2021-03-06 | Stop reason: HOSPADM

## 2021-03-04 RX ORDER — ATORVASTATIN CALCIUM 10 MG/1
20 TABLET, FILM COATED ORAL NIGHTLY
Status: DISCONTINUED | OUTPATIENT
Start: 2021-03-04 | End: 2021-03-06 | Stop reason: HOSPADM

## 2021-03-04 RX ORDER — ACETAMINOPHEN 325 MG/1
650 TABLET ORAL EVERY 6 HOURS PRN
Status: DISCONTINUED | OUTPATIENT
Start: 2021-03-04 | End: 2021-03-06 | Stop reason: HOSPADM

## 2021-03-04 RX ORDER — POLYETHYLENE GLYCOL 3350 17 G/17G
17 POWDER, FOR SOLUTION ORAL DAILY PRN
Status: DISCONTINUED | OUTPATIENT
Start: 2021-03-04 | End: 2021-03-06 | Stop reason: HOSPADM

## 2021-03-04 RX ORDER — MORPHINE SULFATE 4 MG/ML
4 INJECTION, SOLUTION INTRAMUSCULAR; INTRAVENOUS ONCE
Status: COMPLETED | OUTPATIENT
Start: 2021-03-04 | End: 2021-03-04

## 2021-03-04 RX ORDER — SODIUM CHLORIDE 9 MG/ML
1000 INJECTION, SOLUTION INTRAVENOUS CONTINUOUS
Status: DISCONTINUED | OUTPATIENT
Start: 2021-03-04 | End: 2021-03-06

## 2021-03-04 RX ORDER — CYANOCOBALAMIN/FOLIC AC/VIT B6 1-2.2-25MG
TABLET ORAL
Status: ON HOLD | COMMUNITY
End: 2021-03-04

## 2021-03-04 RX ORDER — CLONAZEPAM 0.5 MG/1
TABLET ORAL
COMMUNITY
Start: 2021-02-08 | End: 2021-12-15 | Stop reason: DRUGHIGH

## 2021-03-04 RX ORDER — CLONAZEPAM 1 MG/1
1 TABLET ORAL DAILY
Status: DISCONTINUED | OUTPATIENT
Start: 2021-03-05 | End: 2021-03-06 | Stop reason: HOSPADM

## 2021-03-04 RX ORDER — ONDANSETRON 2 MG/ML
4 INJECTION INTRAMUSCULAR; INTRAVENOUS ONCE
Status: COMPLETED | OUTPATIENT
Start: 2021-03-04 | End: 2021-03-04

## 2021-03-04 RX ORDER — SODIUM CHLORIDE 9 MG/ML
INJECTION, SOLUTION INTRAVENOUS CONTINUOUS
Status: ACTIVE | OUTPATIENT
Start: 2021-03-04 | End: 2021-03-05

## 2021-03-04 RX ORDER — ASPIRIN 81 MG/1
81 TABLET, CHEWABLE ORAL DAILY
Status: DISCONTINUED | OUTPATIENT
Start: 2021-03-05 | End: 2021-03-06 | Stop reason: HOSPADM

## 2021-03-04 RX ORDER — MORPHINE SULFATE 2 MG/ML
2 INJECTION, SOLUTION INTRAMUSCULAR; INTRAVENOUS
Status: DISCONTINUED | OUTPATIENT
Start: 2021-03-04 | End: 2021-03-06 | Stop reason: HOSPADM

## 2021-03-04 RX ORDER — UBIDECARENONE 60 MG
CAPSULE ORAL DAILY
Status: ON HOLD | COMMUNITY
End: 2021-03-04

## 2021-03-04 RX ADMIN — BENZOCAINE: 200 SPRAY DENTAL; ORAL; PERIODONTAL at 20:39

## 2021-03-04 RX ADMIN — MORPHINE SULFATE 4 MG: 4 INJECTION, SOLUTION INTRAMUSCULAR; INTRAVENOUS at 19:38

## 2021-03-04 RX ADMIN — SODIUM CHLORIDE: 9 INJECTION, SOLUTION INTRAVENOUS at 23:56

## 2021-03-04 RX ADMIN — ONDANSETRON 4 MG: 2 INJECTION INTRAMUSCULAR; INTRAVENOUS at 18:40

## 2021-03-04 RX ADMIN — KETOROLAC TROMETHAMINE 15 MG: 30 INJECTION, SOLUTION INTRAMUSCULAR at 23:54

## 2021-03-04 RX ADMIN — SODIUM CHLORIDE 1000 ML: 9 INJECTION, SOLUTION INTRAVENOUS at 21:40

## 2021-03-04 RX ADMIN — FAMOTIDINE 20 MG: 10 INJECTION, SOLUTION INTRAVENOUS at 18:41

## 2021-03-04 RX ADMIN — SODIUM CHLORIDE 1000 ML: 9 INJECTION, SOLUTION INTRAVENOUS at 18:40

## 2021-03-04 RX ADMIN — MORPHINE SULFATE 4 MG: 4 INJECTION, SOLUTION INTRAMUSCULAR; INTRAVENOUS at 18:40

## 2021-03-04 RX ADMIN — IOPAMIDOL 75 ML: 755 INJECTION, SOLUTION INTRAVENOUS at 19:30

## 2021-03-04 ASSESSMENT — PAIN SCALES - GENERAL
PAINLEVEL_OUTOF10: 9
PAINLEVEL_OUTOF10: 0

## 2021-03-04 ASSESSMENT — PAIN DESCRIPTION - LOCATION: LOCATION: ABDOMEN

## 2021-03-05 LAB
ANION GAP SERPL CALCULATED.3IONS-SCNC: 8 MMOL/L (ref 3–16)
BASOPHILS ABSOLUTE: 0 K/UL (ref 0–0.2)
BASOPHILS RELATIVE PERCENT: 0.6 %
BUN BLDV-MCNC: 18 MG/DL (ref 7–20)
CALCIUM SERPL-MCNC: 8.3 MG/DL (ref 8.3–10.6)
CHLORIDE BLD-SCNC: 105 MMOL/L (ref 99–110)
CO2: 27 MMOL/L (ref 21–32)
CREAT SERPL-MCNC: 1 MG/DL (ref 0.8–1.3)
EKG ATRIAL RATE: 63 BPM
EKG DIAGNOSIS: NORMAL
EKG P AXIS: 58 DEGREES
EKG P-R INTERVAL: 164 MS
EKG Q-T INTERVAL: 462 MS
EKG QRS DURATION: 132 MS
EKG QTC CALCULATION (BAZETT): 472 MS
EKG R AXIS: 23 DEGREES
EKG T AXIS: 168 DEGREES
EKG VENTRICULAR RATE: 63 BPM
EOSINOPHILS ABSOLUTE: 0.1 K/UL (ref 0–0.6)
EOSINOPHILS RELATIVE PERCENT: 1.8 %
GFR AFRICAN AMERICAN: >60
GFR NON-AFRICAN AMERICAN: >60
GLUCOSE BLD-MCNC: 100 MG/DL (ref 70–99)
HCT VFR BLD CALC: 39.4 % (ref 40.5–52.5)
HEMOGLOBIN: 12.8 G/DL (ref 13.5–17.5)
LYMPHOCYTES ABSOLUTE: 0.6 K/UL (ref 1–5.1)
LYMPHOCYTES RELATIVE PERCENT: 10.7 %
MCH RBC QN AUTO: 28.5 PG (ref 26–34)
MCHC RBC AUTO-ENTMCNC: 32.5 G/DL (ref 31–36)
MCV RBC AUTO: 87.6 FL (ref 80–100)
MONOCYTES ABSOLUTE: 0.7 K/UL (ref 0–1.3)
MONOCYTES RELATIVE PERCENT: 13.3 %
NEUTROPHILS ABSOLUTE: 3.9 K/UL (ref 1.7–7.7)
NEUTROPHILS RELATIVE PERCENT: 73.6 %
PDW BLD-RTO: 16.3 % (ref 12.4–15.4)
PLATELET # BLD: 199 K/UL (ref 135–450)
PMV BLD AUTO: 8.8 FL (ref 5–10.5)
POTASSIUM REFLEX MAGNESIUM: 3.7 MMOL/L (ref 3.5–5.1)
RBC # BLD: 4.5 M/UL (ref 4.2–5.9)
SODIUM BLD-SCNC: 140 MMOL/L (ref 136–145)
URINE CULTURE, ROUTINE: NORMAL
WBC # BLD: 5.3 K/UL (ref 4–11)

## 2021-03-05 PROCEDURE — 2580000003 HC RX 258: Performed by: EMERGENCY MEDICINE

## 2021-03-05 PROCEDURE — 2580000003 HC RX 258: Performed by: NURSE PRACTITIONER

## 2021-03-05 PROCEDURE — 36415 COLL VENOUS BLD VENIPUNCTURE: CPT

## 2021-03-05 PROCEDURE — 85025 COMPLETE CBC W/AUTO DIFF WBC: CPT

## 2021-03-05 PROCEDURE — 80048 BASIC METABOLIC PNL TOTAL CA: CPT

## 2021-03-05 PROCEDURE — 6360000002 HC RX W HCPCS: Performed by: NURSE PRACTITIONER

## 2021-03-05 PROCEDURE — 99254 IP/OBS CNSLTJ NEW/EST MOD 60: CPT | Performed by: SURGERY

## 2021-03-05 PROCEDURE — 1200000000 HC SEMI PRIVATE

## 2021-03-05 PROCEDURE — 6370000000 HC RX 637 (ALT 250 FOR IP): Performed by: NURSE PRACTITIONER

## 2021-03-05 PROCEDURE — 93010 ELECTROCARDIOGRAM REPORT: CPT | Performed by: INTERNAL MEDICINE

## 2021-03-05 PROCEDURE — 2500000003 HC RX 250 WO HCPCS: Performed by: INTERNAL MEDICINE

## 2021-03-05 RX ORDER — DEXTROSE, SODIUM CHLORIDE, AND POTASSIUM CHLORIDE 5; .45; .15 G/100ML; G/100ML; G/100ML
INJECTION INTRAVENOUS CONTINUOUS
Status: DISCONTINUED | OUTPATIENT
Start: 2021-03-05 | End: 2021-03-06

## 2021-03-05 RX ADMIN — DEXTROSE MONOHYDRATE, SODIUM CHLORIDE, AND POTASSIUM CHLORIDE: 50; 4.5; 1.49 INJECTION, SOLUTION INTRAVENOUS at 21:16

## 2021-03-05 RX ADMIN — AMIODARONE HYDROCHLORIDE 200 MG: 200 TABLET ORAL at 08:08

## 2021-03-05 RX ADMIN — MORPHINE SULFATE 2 MG: 2 INJECTION, SOLUTION INTRAMUSCULAR; INTRAVENOUS at 08:50

## 2021-03-05 RX ADMIN — MORPHINE SULFATE 2 MG: 2 INJECTION, SOLUTION INTRAMUSCULAR; INTRAVENOUS at 00:12

## 2021-03-05 RX ADMIN — PANTOPRAZOLE SODIUM 40 MG: 40 TABLET, DELAYED RELEASE ORAL at 08:08

## 2021-03-05 RX ADMIN — MORPHINE SULFATE 2 MG: 2 INJECTION, SOLUTION INTRAMUSCULAR; INTRAVENOUS at 21:16

## 2021-03-05 RX ADMIN — KETOROLAC TROMETHAMINE 15 MG: 30 INJECTION, SOLUTION INTRAMUSCULAR at 21:16

## 2021-03-05 RX ADMIN — MORPHINE SULFATE 2 MG: 2 INJECTION, SOLUTION INTRAMUSCULAR; INTRAVENOUS at 12:30

## 2021-03-05 RX ADMIN — KETOROLAC TROMETHAMINE 15 MG: 30 INJECTION, SOLUTION INTRAMUSCULAR at 14:14

## 2021-03-05 RX ADMIN — MORPHINE SULFATE 2 MG: 2 INJECTION, SOLUTION INTRAMUSCULAR; INTRAVENOUS at 18:14

## 2021-03-05 RX ADMIN — KETOROLAC TROMETHAMINE 15 MG: 30 INJECTION, SOLUTION INTRAMUSCULAR at 08:08

## 2021-03-05 RX ADMIN — DEXTROSE MONOHYDRATE, SODIUM CHLORIDE, AND POTASSIUM CHLORIDE: 50; 4.5; 1.49 INJECTION, SOLUTION INTRAVENOUS at 14:07

## 2021-03-05 RX ADMIN — Medication 10 ML: at 08:08

## 2021-03-05 ASSESSMENT — PAIN SCALES - GENERAL
PAINLEVEL_OUTOF10: 10
PAINLEVEL_OUTOF10: 5
PAINLEVEL_OUTOF10: 7

## 2021-03-05 NOTE — ED NOTES
Bedside report given to Yuliya Harvey, [facility]. Pt in route to C3 via wheelchair at this time in stable condition. All belongings sent with pt. Care transferred.      Lakhwinder Samuel RN  03/04/21 9483

## 2021-03-05 NOTE — PLAN OF CARE
Problem: PAIN  Goal: Patient's pain/discomfort is manageable  3/5/2021 0919 by Maria Antonia Rose RN  Outcome: Ongoing     Pt is rating pain 6/10. Pt medicated with PRN Morphine as ordered. Will continue to monitor.

## 2021-03-05 NOTE — PROGRESS NOTES
Pt admitted to room 342 from ED. Pt oriented to room, call light, and telephone. Pt denies further needs or questions. Vital signs are stable. Pt is A/Ox4. Assesment is as charted. Pt with hypoactive bowel sounds. NG tube remains in place. Pt is c/o abdominal pain and a headache.

## 2021-03-05 NOTE — PROGRESS NOTES
Resident in bed at this time, alert and oriented and, vital signs stable, NG tube in place a nd draining brownish emesis.

## 2021-03-05 NOTE — H&P
Hospital Medicine History & Physical      PCP: Jeri Brown MD    Date of Admission: 3/4/2021    Date of Service: Pt seen/examined on 3/4/2021 and Admitted to Inpatient with expected LOS greater than two midnights due to medical therapy. Chief Complaint:  Abdominal pain    History Of Present Illness:      64 y.o. male, with PMH of atrial fibrillation, GERD and HLD, who presented to [facility] with abdominal pain. History obtained from the patient and review of EMR. The patient stated around 10 AM while working from home he endorsed abdominal cramping and nonbloody diarrhea. He stated the diarrhea resolved, but he continued to have upper abdominal pain that he described as a \"cramping and spasm\". The patient stated he went to lay down and while laying there he felt his stomach started to \"bubble\". He stated he had a small bowel obstruction 5-10 years ago and today symptoms are exactly the same as then. In the emergency room a CT abdomen pelvis was obtained that revealed a proximal small bowel obstruction with transition point in mid abdomen. The patient received multiple doses of morphine and 1 L of normal saline. An NG tube was placed and is to low continuous wall suction with yellow drainage. General surgery was consulted. The patient will be admitted for further evaluation and management. He denied any other associated symptoms as well as any aggravating and/or alleviating factors. At the time of this assessment, the patient was resting comfortably in bed. He currently denies any chest pain, back pain, abdominal pain, shortness of breath, numbness, tingling, N/V/C/D, fever and/or chills.      Past Medical History:          Diagnosis Date    A-fib [facility])     ADHD (attention deficit hyperactivity disorder)     Carpal tunnel syndrome 1/21/2015    Chronic low back pain     Chronic neck pain     Chronic sinusitis     Dr. Cyn She[facility] present     lower    Epigastric hernia     Esophageal spasm     GERD (gastroesophageal reflux disease)     Hyperlipidemia     Hypertrophic cardiomyopathy (HCC)     IHSS (idiopathic hypertrophic subaortic stenosis) (HCC)     Kidney stones     HUNTER on CPAP     Dr. Ana Aaron- DEONNA-PAP    Primary localized osteoarthrosis, shoulder region 10/18/2013    Prostate cancer [facility])     prostate ; s/p radiation treatment    PVC's (premature ventricular contractions)     RBBB (right bundle branch block)     RLS (restless legs syndrome)     Dr. Sharin Carrel Seasonal allergies     Tachycardia      Past Surgical History:          Procedure Laterality Date    CARPAL TUNNEL RELEASE Left     COLONOSCOPY  1/17/11    COLONOSCOPY N/A 2/2/2020    COLONOSCOPY WITH BIOPSY performed by Linus Chao MD at [facility]  2015    left    ENDOSCOPY, COLON, DIAGNOSTIC      INGUINAL HERNIA REPAIR Bilateral 2009, 2012,    INSERTABLE CARDIAC MONITOR Left 12/11/2020    INSERTABLE CARDIAC MONITOR  12/11/2020    Loop Implant    KNEE ARTHROSCOPY Right 1982    Right    KNEE ARTHROSCOPY Left 12/21/2017    KNEE ARTHROSCOPY Left 7/17/2020    VIDEO ARTHROSCOPY LEFT KNEE, CHONDROPLASTY, PLICA EXCISION, PARTIAL MEDIAL LATERAL MENISECTOMY -SLEEP APNEA- performed by Glo Pelaez MD at [place] [address] ARTHROSCOPY Right 12/31/2013    VIDEO ARTHROSCOPY RIGHT SHOULDER, SUBACROMIAL DECOMPRESSION, DISTAL CLAVICLE RESECTION, ROTATOR CUFF DEBRIDEMENT          TURP  2549    X 2    UMBILICAL HERNIA REPAIR      X 2    UPPER GASTROINTESTINAL ENDOSCOPY  9/3    VARICOCELECTOMY  2000     Medications Prior to Admission:      Prior to Admission medications    Medication Sig Start Date End Date Taking?  Authorizing Provider   aspirin 81 MG chewable tablet     Historical Provider, MD   Omega-3 Fatty Acids (FISH OIL CONCENTRATE) 1000 MG CAPS     Historical Provider, MD   folic acid-pyridoxine-cyanocobalamine (FOLGARD RX) 2.2-25-1 MG TABS tablet     Historical Provider, MD Magnesium Chloride 200 MG/ML SOLN     Historical Provider, MD   Potassium 99 MG TABS     Historical Provider, MD   Zinc Sulfate (ZINC 15 PO)     Historical Provider, MD   Cholecalciferol 25 MCG (1000 UT) CHEW Take 1,000 Units by mouth daily    Historical Provider, MD   clonazePAM (KLONOPIN) 0.5 MG tablet TAKE 1 TABLET BY MOUTH AT BEDTIME AS NEEDED 2/8/21   Historical Provider, MD   coenzyme Q10 60 MG CAPS Take by mouth daily    Historical Provider, MD   verapamil (VERELAN) 240 MG extended release capsule TAKE 1 CAPSULE BY MOUTH EVERY DAY AT NIGHT 2/22/21   Flaco Moore MD   omeprazole (PRILOSEC) 40 MG delayed release capsule TAKE 1 CAPSULE BY MOUTH EVERY DAY 12/21/20   Flaco Moore MD   apixaban (ELIQUIS) 5 MG TABS tablet TAKE 1 TABLET BY MOUTH TWICE A DAY 11/12/20   PARI Coleman CNP   amiodarone (CORDARONE) 200 MG tablet TAKE 1 TABLET BY MOUTH EVERY DAY 11/5/20   Romaine Bruno MD   ketorolac (TORADOL) 10 MG tablet Take 1 tablet by mouth every 6 hours as needed for Pain 10/6/20 10/6/21  Zara Samaniego MD   tamsulosin (FLOMAX) 0.4 MG capsule Take 1 capsule by mouth daily for 5 days 10/6/20 10/11/20  Zara Samaniego MD   butalbital-APAP-caffeine -40 MG CAPS per capsule TK ONE C PO  Q 12 H PRF SEVERE HEADACHE 9/24/20   Historical Provider, MD   atorvastatin (LIPITOR) 20 MG tablet TAKE 1 TABLET BY MOUTH ONE TIME A DAY 9/30/20   Flaco Moore MD   clonazePAM (KLONOPIN) 1 MG tablet Take 1 mg by mouth daily. 4/30/14   Historical Provider, MD     Allergies:  Niacin and related    Social History:      The patient currently lives at home    TOBACCO:   reports that he has never smoked. He has never used smokeless tobacco.  ETOH:   reports current alcohol use. E-Cigarettes/Vaping Use     Questions Responses    E-Cigarette/Vaping Use Never User    Start Date     Passive Exposure     Quit Date     Counseling Given     Comments         Family History:      Reviewed in detail.  Positive as follows:        Problem Relation Age of Onset    Cancer Mother         melenoma    High Cholesterol Mother     High Blood Pressure Father     High Cholesterol Brother     Heart Disease Maternal Grandmother     Heart Disease Maternal Grandfather     Prostate Cancer Paternal Uncle      REVIEW OF SYSTEMS:   Pertinent positives as noted in the HPI. All other systems reviewed and negative. PHYSICAL EXAM PERFORMED:    /87   Pulse 66   Temp 98 °F (36.7 °C) (Oral)   Resp 20   Wt 195 lb (88.5 kg)   SpO2 97%   BMI 27.20 kg/m²     General appearance:  Pleasant male in no apparent distress, appears stated age and cooperative. HEENT:   Pupils equal, round, and reactive to light. Extra ocular muscles intact. Conjunctivae/corneas clear. Neck: Supple, with full range of motion. No jugular venous distention. Trachea midline. Respiratory:  Normal respiratory effort. Clear to auscultation, bilaterally without Rales/Wheezes/Rhonchi. Cardiovascular:  Regular rate and rhythm with normal S1/S2 without murmurs, rubs or gallops. Abdomen: Soft, tender, non distended with normal bowel sounds. Ng tube to lcws yellow drainage  Musculoskeletal:  No clubbing, cyanosis or edema bilaterally. Full range of motion without deformity. Skin: Skin color, texture, turgor normal.  No significant rashes or lesions. Neurologic:  Neurovascularly intact.  Cranial nerves: II-XII intact, grossly non-focal.  Psychiatric:  Alert and oriented, thought content appropriate, normal insight  Capillary Refill: Brisk,< 3 seconds   Peripheral Pulses: +2 palpable, equal bilaterally     Labs:     Recent Labs     03/04/21  1826   WBC 10.9   HGB 13.6   HCT 41.6        Recent Labs     03/04/21  1826      K 4.3      CO2 27   BUN 19   CREATININE 1.2   CALCIUM 9.3     Recent Labs     03/04/21  1826   AST 17   ALT 20   BILITOT 0.7   ALKPHOS 115     Recent Labs     03/04/21  1826   TROPONINI <0.01     Urinalysis:      Lab Results Component Value Date    NITRU Negative 03/04/2021    WBCUA 6-9 03/04/2021    BACTERIA Rare 03/04/2021    RBCUA 3-4 03/04/2021    BLOODU TRACE-INTACT 03/04/2021    SPECGRAV 1.020 03/04/2021    GLUCOSEU Negative 03/04/2021     Radiology:     CXR: I have reviewed the CXR with the following interpretation: N/A    EKG:  I have reviewed the EKG with the following interpretation: The Ekg interpreted in the absence of a cardiologist shows normal sinus rhythm, rate 63. Right bundle branch block. No ST elevation and/or depression noted    CT ABDOMEN PELVIS W IV CONTRAST Additional Contrast? None   Final Result   1. Proximal small bowel obstruction with transition point in the mid abdomen. 2. Inflammatory changes of the distal descending and proximal sigmoid colon. Correlate with presentation for symptoms of diverticulitis. 3.  Other findings as described.          XR ABDOMEN (KUB) (SINGLE AP VIEW)    (Results Pending)     ASSESSMENT:    Active Hospital Problems    Diagnosis Date Noted    A-fib [facility]) [I48.91] 09/26/2017     Priority: High    Small bowel obstruction ([address] [K56.609] 02/21/2015     Priority: High    GERD (gastroesophageal reflux disease) [K21.9] 08/27/2010     PLAN:    Abdominal pain in setting of small bowel obstruction  -CT abdomen pelvis revealed: proximal small bowel obstruction with transition point in mid abdomen.   -1 L NS given in ED  -ng placed in ED - LCWS  -morphine given in ED  -MIVF  -PRN pain medication  -general surgery consulted in ED - appreciate recommendations in advance    Atrial fibrillation  -anticoagulated on eliquis  -continue amiodarone    HLD  -continue atorvastatin    GERD  -continue prilosec    DVT Prophylaxis: eliquis    Diet: No diet orders on file     Code Status: Prior    PT/OT Eval Status: No indication for need at this time    Dispo - 2-3 days pending clinical improvement     Sebastian Alvarado, PARI - CNP    Thank you Linda Martinez MD for the opportunity to be involved in this patient's care.  If you have any questions or concerns please feel free to contact me at [phone].  -------------------------------Dr. Krystal Vásquez---------------------------------

## 2021-03-05 NOTE — PROGRESS NOTES
Hospitalist Progress Note    Date of Admission: 3/4/2021    Chief Complaint: Abdominal pain    Hospital Course:     64 y.o. male, with PMH of atrial fibrillation, GERD and HLD, who presented to [facility] with abdominal pain. History obtained from the patient and review of EMR. The patient stated around 10 AM while working from home he endorsed abdominal cramping and nonbloody diarrhea. He stated the diarrhea resolved, but he continued to have upper abdominal pain that he described as a \"cramping and spasm\". The patient stated he went to lay down and while laying there he felt his stomach started to \"bubble\". He stated he had a small bowel obstruction 5-10 years ago and today symptoms are exactly the same as then. In the emergency room a CT abdomen pelvis was obtained that revealed a proximal small bowel obstruction with transition point in mid abdomen. The patient received multiple doses of morphine and 1 L of normal saline. An NG tube was placed and is to low continuous wall suction with yellow drainage. General surgery was consulted. Subjective: NGT to LWS. Abdominal pain and nausea improving. Labs:   Recent Labs     03/04/21 1826 03/05/21  0735   WBC 10.9 5.3   HGB 13.6 12.8*   HCT 41.6 39.4*    199     Recent Labs     03/04/21  1826 03/05/21  0736    140   K 4.3 3.7    105   CO2 27 27   BUN 19 18   CREATININE 1.2 1.0   CALCIUM 9.3 8.3     Recent Labs     03/04/21  1826   AST 17   ALT 20   BILITOT 0.7   ALKPHOS 115     No results for input(s): INR in the last 72 hours. Physical Exam Performed:    /77   Pulse 60   Temp 98.3 °F (36.8 °C) (Oral)   Resp 16   Ht 5' 11\" (1.803 m)   Wt 197 lb 14.4 oz (89.8 kg)   SpO2 95%   BMI 27.60 kg/m²     General appearance: No apparent distress, appears stated age and cooperative. HEENT: Pupils equal, round, and reactive to light. Conjunctivae/corneas clear. Neck: Supple, no jugular venous distention.  Trachea midline with full range of motion. Respiratory:  Normal respiratory effort. Clear to auscultation, bilaterally without Rales/Wheezes/Rhonchi. Cardiovascular: Regular rate and rhythm with normal S1/S2 without murmurs, rubs or gallops. Abdomen: Soft, non-tender, non-distended with normal bowel sounds. Musculoskelatal: No clubbing, cyanosis or edema bilaterally. Full range of motion without deformity. Neurologic:  Neurovascularly intact without any focal sensory/motor deficits. Cranial nerves: II-XII intact, grossly non-focal.  Psychiatric: Alert and oriented, thought content appropriate, normal insight  Skin: Skin color, texture, turgor normal.  No rashes or lesions. Capillary Refill: Brisk,< 3 seconds   Peripheral Pulses: +2 palpable, equal bilaterally       Assessment/Plan:    Active Hospital Problems    Diagnosis    A-fib [facility]) [I48.91]     Priority: High    Small bowel obstruction (HCC) [K56.609]     Priority: High    GERD (gastroesophageal reflux disease) [K21.9]     Abdominal pain in setting of small bowel obstruction  -CT abdomen pelvis revealed: proximal small bowel obstruction with transition point in mid abdomen.    -NGT to LWS  -GenSurg following  -MIVF  -PRN pain medication    Atrial fibrillation  -anticoagulated on eliquis  -continue amiodarone    HLD  -continue atorvastatin    GERD  -continue prilosec    DVT Prophylaxis: eliquis  Diet: Diet NPO Effective Now  Code Status: Full Code  PT/OT Eval Status: NA    Dispo - ?2-3 days     Trip Matthew MD

## 2021-03-05 NOTE — CONSULTS
Surgical History:        Procedure Laterality Date    CARPAL TUNNEL RELEASE Left     COLONOSCOPY  1/17/11    COLONOSCOPY N/A 2/2/2020    COLONOSCOPY WITH BIOPSY performed by Roni Mckeon MD at [facility]  2015    left    ENDOSCOPY, COLON, DIAGNOSTIC      INGUINAL HERNIA REPAIR Bilateral 2009, 2012,    INSERTABLE CARDIAC MONITOR Left 12/11/2020    INSERTABLE CARDIAC MONITOR  12/11/2020    Loop Implant    KNEE ARTHROSCOPY Right 1982    Right    KNEE ARTHROSCOPY Left 12/21/2017    KNEE ARTHROSCOPY Left 7/17/2020    VIDEO ARTHROSCOPY LEFT KNEE, CHONDROPLASTY, PLICA EXCISION, PARTIAL MEDIAL LATERAL MENISECTOMY -SLEEP APNEA- performed by Cora Harding MD at [facility] [address] ARTHROSCOPY Right 12/31/2013    VIDEO ARTHROSCOPY RIGHT SHOULDER, SUBACROMIAL DECOMPRESSION, DISTAL CLAVICLE RESECTION, ROTATOR CUFF DEBRIDEMENT          TURP  1002    X 2    UMBILICAL HERNIA REPAIR      X 2    UPPER GASTROINTESTINAL ENDOSCOPY  9/3    VARICOCELECTOMY  2000       Current Medications:   Current Facility-Administered Medications: amiodarone (CORDARONE) tablet 200 mg, 200 mg, Oral, Daily  apixaban (ELIQUIS) tablet 5 mg, 5 mg, Oral, BID  aspirin chewable tablet 81 mg, 81 mg, Oral, Daily  atorvastatin (LIPITOR) tablet 20 mg, 20 mg, Oral, Nightly  clonazePAM (KLONOPIN) tablet 0.5 mg, 0.5 mg, Oral, Nightly PRN  clonazePAM (KLONOPIN) tablet 1 mg, 1 mg, Oral, Daily  pantoprazole (PROTONIX) tablet 40 mg, 40 mg, Oral, Daily  sodium chloride flush 0.9 % injection 10 mL, 10 mL, Intravenous, 2 times per day  sodium chloride flush 0.9 % injection 10 mL, 10 mL, Intravenous, PRN  promethazine (PHENERGAN) tablet 12.5 mg, 12.5 mg, Oral, Q6H PRN **OR** ondansetron (ZOFRAN) injection 4 mg, 4 mg, Intravenous, Q6H PRN  polyethylene glycol (GLYCOLAX) packet 17 g, 17 g, Oral, Daily PRN  acetaminophen (TYLENOL) tablet 650 mg, 650 mg, Oral, Q6H PRN **OR** acetaminophen (TYLENOL) suppository 650 mg, 650 mg, Rectal, Q6H PRN  0.9 % sodium chloride infusion, , Intravenous, Continuous  morphine (PF) injection 2 mg, 2 mg, Intravenous, Q3H PRN  0.9 % sodium chloride infusion, 1,000 mL, Intravenous, Continuous  ketorolac (TORADOL) injection 15 mg, 15 mg, Intravenous, Q6H PRN  phenol 1.4 % mouth spray 1 spray, 1 spray, Mouth/Throat, Q2H PRN  Prior to Admission medications    Medication Sig Start Date End Date Taking? Authorizing Provider   Zinc Sulfate (ZINC 15 PO) daily    Yes Historical Provider, MD   Cholecalciferol 25 MCG (1000 UT) CHEW Take 1,000 Units by mouth daily   Yes Historical Provider, MD   clonazePAM (KLONOPIN) 0.5 MG tablet TAKE 1 TABLET BY MOUTH AT BEDTIME AS NEEDED 2/8/21  Yes Historical Provider, MD   verapamil (VERELAN) 240 MG extended release capsule TAKE 1 CAPSULE BY MOUTH EVERY DAY AT NIGHT 2/22/21  Yes Jay Sotelo MD   omeprazole (PRILOSEC) 40 MG delayed release capsule TAKE 1 CAPSULE BY MOUTH EVERY DAY 12/21/20  Yes Jay Sotelo MD   apixaban (ELIQUIS) 5 MG TABS tablet TAKE 1 TABLET BY MOUTH TWICE A DAY 11/12/20  Yes PARI Wood - CNP   amiodarone (CORDARONE) 200 MG tablet TAKE 1 TABLET BY MOUTH EVERY DAY 11/5/20  Yes Cecelia Parada MD   tamsulosin (FLOMAX) 0.4 MG capsule Take 1 capsule by mouth daily for 5 days 10/6/20 3/4/21 Yes Nataly Dunlap MD   butalbital-APAP-caffeine -40 MG CAPS per capsule TK ONE C PO  Q 12 H PRF SEVERE HEADACHE 9/24/20  Yes Historical Provider, MD   atorvastatin (LIPITOR) 20 MG tablet TAKE 1 TABLET BY MOUTH ONE TIME A DAY 9/30/20  Yes Jay Sotelo MD        Allergies:  Niacin and related    Social History:   TOBACCO:  Never used tobacco  ETOH:  Current alcohol usage:  Type of Drink(s):  beer. Frequency of use:  sometimes.     DRUGS:  Never used recreational drugs    Family History:        Problem Relation Age of Onset    Cancer Mother         melenoma    High Cholesterol Mother     High Blood Pressure Father     High Cholesterol Brother     Heart Disease Maternal Grandmother     Heart Disease Maternal Grandfather     Prostate Cancer Paternal Uncle        REVIEW OF SYSTEMS:  CONSTITUTIONAL:  positive for  fevers and chills  HEENT:  negative  RESPIRATORY:  negative  CARDIOVASCULAR:  negative  GASTROINTESTINAL:  negative except for nausea and abdominal pain  GENITOURINARY:  negative  HEMATOLOGIC/LYMPHATIC:  negative  NEUROLOGICAL:  Negative  * All other ROS reviewed and negative. PHYSICAL EXAM:  VITALS:  BP (!) 148/69   Pulse 60   Temp 99 °F (37.2 °C) (Oral)   Resp 16   Ht 5' 11\" (1.803 m)   Wt 197 lb 14.4 oz (89.8 kg)   SpO2 94%   BMI 27.60 kg/m²   24HR INTAKE/OUTPUT:    I/O last 3 completed shifts: In: 1000 [IV Piggyback:1000]  Out: 1000 [Urine:300; Emesis/NG output:700]  I/O this shift:  In: -   Out: 200 [Emesis/NG output:200]    CONSTITUTIONAL:  alert, no apparent distress and normal weight  EYES:  PERRL, sclera clear  ENT:  Normocephalic,atraumatic, without obvious abnormality  NECK:  supple, symmetrical, trachea midline  LUNGS: Resp effort easy and unlabored, symmetric chest rise  CARDIOVASCULAR:  NO JVD, regular rate and rhythm  ABDOMEN:  hypoactive bowel sounds, soft, non-distended, minimally tender in the upper abdomen, voluntary guarding absent, no masses palpated, NG in place with gastric output   MUSCULOSKELETAL: No clubbing or cyanosis, 0+ pitting edema lower extremities  NEUROLOGIC:  Mental Status Exam:  Level of Alertness:   awake  PSYCHIATRIC:   person, place, time  SKIN:  normal skin color, texture, turgor    DATA:    CBC:   Recent Labs     03/04/21  1826   WBC 10.9   HGB 13.6   HCT 41.6        BMP:    Recent Labs     03/04/21  1826      K 4.3      CO2 27   BUN 19   CREATININE 1.2   GLUCOSE 127*     Hepatic:   Recent Labs     03/04/21  1826   AST 17   ALT 20   BILITOT 0.7   ALKPHOS 115     Mag:    No results for input(s): MG in the last 72 hours. Phos:   No results for input(s): PHOS in the last 72 hours. INR: No results for input(s): INR in the last 72 hours. Radiology Review: Images personally reviewed by me. CT A/P 3/4/2021  1. Proximal small bowel obstruction with transition point in the mid abdomen. 2. Inflammatory changes of the distal descending and proximal sigmoid colon. Correlate with presentation for symptoms of diverticulitis. 3.  Other findings as described. IMPRESSION/RECOMMENDATIONS:    Mr. Danny Gao is a 64year old male who presented with abdominal pain and emesis. On admission lactic acid and WBC WNL. CT A/P obtained which showed SBO with transition point and fecalization of the small bowel. Recommend continued NG tube decompression. NPO and IV fluid hydration. Will obtain an AXR tomorrow morning to assess distention of the bowel. Hopefully his bowel obstruction will resolve with conservative management but there is a possibility if he does not start to open up in the next 1-2 days he may require a surgical intervention.      Electronically signed by Ronaldo York, [address] [facility]  [id number]

## 2021-03-05 NOTE — ED NOTES
18g NGT placed to R nostril, 65cm. Pt tolerated well. Immediate 200mL of yellow bile returned. KUB ordered for confirmation.      Derick Ramírez, PENELOPE  03/04/21 2036

## 2021-03-05 NOTE — CARE COORDINATION
CASE MANAGEMENT INITIAL ASSESSMENT      Reviewed chart and completed assessment with:patient  Explained Case Management role/services. Primary contact information:Starla Ariza [facility] [phone]    Health Care Decision Maker :     starla      Can this person be reached and be able to respond quickly, such as within a few minutes or hours? Yes      Admit date/status:3/4/21  Diagnosis:SBO   Is this a Readmission?:  No      Insurance:BCBS   Precert required for SNF: Yes       3 night stay required: No    Living arrangements, Adls, care needs, prior to admission:lives in bilevel home with wife and grandson    Sal Lang at home:  Walker__Cane__RTS__ BSC__Shower Chair__  02__ HHN__ CPAP__  BiPap__  Hospital Bed__ W/C___ Other__________    Services in the home and/or outpatient, prior to [address] (if applicable)   · Name:  · Address:  · Dialysis Schedule:  · Phone:  · Fax:    PT/OT recs:none    Hospital Exemption Notification (HEN):needed for SNF    Barriers to discharge:none    Plan/comments:spoke with patient. Plan on returning home at discharge. Has support of wife denied any DCP needs. Currently with NGT in place.  Will need return of GI fx prior to d/c. Stanley Encinas RN       ECOC on chart for MD signature

## 2021-03-05 NOTE — ED PROVIDER NOTES
COLONOSCOPY WITH BIOPSY performed by Maeve Chester MD at [facility]  2015    left    ENDOSCOPY, COLON, DIAGNOSTIC      INGUINAL HERNIA REPAIR Bilateral 2009, 2012,    INSERTABLE CARDIAC MONITOR Left 12/11/2020    INSERTABLE CARDIAC MONITOR  12/11/2020    Loop Implant    KNEE ARTHROSCOPY Right 1982    Right    KNEE ARTHROSCOPY Left 12/21/2017    KNEE ARTHROSCOPY Left 7/17/2020    VIDEO ARTHROSCOPY LEFT KNEE, CHONDROPLASTY, PLICA EXCISION, PARTIAL MEDIAL LATERAL MENISECTOMY -SLEEP APNEA- performed by Ines Starr MD at [place] [address] ARTHROSCOPY Right 12/31/2013    VIDEO ARTHROSCOPY RIGHT SHOULDER, SUBACROMIAL DECOMPRESSION, DISTAL CLAVICLE RESECTION, ROTATOR CUFF DEBRIDEMENT          TURP  4117    X 2    UMBILICAL HERNIA REPAIR      X 2    UPPER GASTROINTESTINAL ENDOSCOPY  9/3    VARICOCELECTOMY  2000     Family History   Problem Relation Age of Onset    Cancer Mother         melenoma    High Cholesterol Mother     High Blood Pressure Father     High Cholesterol Brother     Heart Disease Maternal Grandmother     Heart Disease Maternal Grandfather     Prostate Cancer Paternal Uncle      Social History     Socioeconomic History    Marital status:      Spouse name: Not on file    Number of children: Not on file    Years of education: Not on file    Highest education level: Not on file   Occupational History    Not on file   Social Needs    Financial resource strain: Not on file    Food insecurity     Worry: Not on file     Inability: Not on file    Transportation needs     Medical: Not on file     Non-medical: Not on file   Tobacco Use    Smoking status: Never Smoker    Smokeless tobacco: Never Used   Substance and Sexual Activity    Alcohol use:  Yes     Alcohol/week: 0.0 - 1.0 standard drinks    Drug use: No    Sexual activity: Yes     Partners: Female   Lifestyle    Physical activity     Days per week: Not on file     Minutes per session: Not on file    Stress: Not on file   Relationships    Social connections     Talks on phone: Not on file     Gets together: Not on file     Attends [employer] service: Not on file     Active member of club or organization: Not on file     Attends meetings of clubs or organizations: Not on file     Relationship status: Not on file    Intimate partner violence     Fear of current or ex partner: Not on file     Emotionally abused: Not on file     Physically abused: Not on file     Forced sexual activity: Not on file   Other Topics Concern    Not on file   Social History Narrative    Not on file     Current Facility-Administered Medications   Medication Dose Route Frequency Provider Last Rate Last Admin    0.9 % sodium chloride bolus  1,000 mL Intravenous Once PARI De Oliveira CNP 1,000 mL/hr at 03/04/21 1840 1,000 mL at 03/04/21 1840    iopamidol (ISOVUE-370) 76 % injection 75 mL  75 mL Intravenous ONCE PRN PARI De Oliveira CNP        sodium hyaluronate (viscosup) injection 20 mg  20 mg Intra-articular Once [place], [place]         Current Outpatient Medications   Medication Sig Dispense Refill    aspirin 81 MG chewable tablet       Omega-3 Fatty Acids (FISH OIL CONCENTRATE) 1000 MG CAPS       folic acid-pyridoxine-cyanocobalamine (FOLGARD RX) 2.2-25-1 MG TABS tablet       Magnesium Chloride 200 MG/ML SOLN       Potassium 99 MG TABS       Zinc Sulfate (ZINC 15 PO)       Cholecalciferol 25 MCG (1000 UT) CHEW Take 1,000 Units by mouth daily      clonazePAM (KLONOPIN) 0.5 MG tablet TAKE 1 TABLET BY MOUTH AT BEDTIME AS NEEDED      coenzyme Q10 60 MG CAPS Take by mouth daily      verapamil (VERELAN) 240 MG extended release capsule TAKE 1 CAPSULE BY MOUTH EVERY DAY AT NIGHT 90 capsule 0    omeprazole (PRILOSEC) 40 MG delayed release capsule TAKE 1 CAPSULE BY MOUTH EVERY DAY 90 capsule 1    apixaban (ELIQUIS) 5 MG TABS tablet TAKE 1 TABLET BY MOUTH TWICE A  tablet 2    amiodarone (CORDARONE) XRAY VIEW(S) OF THE ABDOMEN 3/4/2021 8:36 pm COMPARISON: Correlation was made to CT abdomen pelvis obtained on the same day. HISTORY: ORDERING SYSTEM PROVIDED HISTORY: NGT placement TECHNOLOGIST PROVIDED HISTORY: Reason for exam:->NGT placement Reason for Exam: ng Acuity: Acute Type of Exam: Initial FINDINGS: There is an enteric tube with the tip and the side hole below the left hemidiaphragm overlying the left upper quadrant, likely within the body of the stomach there is mild prominence of the gas-filled stomach and small bowel loops, which was better seen and evaluated on the CT abdomen pelvis obtained on the same day. Evaluation of the intra-abdominal free air is limited on this supine there is electronic device overlying the left upper abdomen. Enteric tube seen with the tip and the side hole below the left hemidiaphragm overlying the left upper quadrant, likely within the body of the stomach. Patient's known small-bowel obstruction is better seen and evaluated on the CT abdomen pelvis obtained on the same day     Ct Abdomen Pelvis W Iv Contrast Additional Contrast? None    Result Date: 3/4/2021  EXAMINATION: CT OF THE ABDOMEN AND PELVIS WITH CONTRAST 3/4/2021 7:20 pm TECHNIQUE: CT of the abdomen and pelvis was performed with the administration of intravenous contrast. Multiplanar reformatted images are provided for review. Dose modulation, iterative reconstruction, and/or weight based adjustment of the mA/kV was utilized to reduce the radiation dose to as low as reasonably achievable. COMPARISON: CT abdomen pelvis 10/06/2020. HISTORY: ORDERING SYSTEM PROVIDED HISTORY: abd pain n/d history of obstruction TECHNOLOGIST PROVIDED HISTORY: Reason for exam:->abd pain n/d history of obstruction Additional Contrast?->None Decision Support Exception->Emergency Medical Condition (MA) Reason for Exam: Upper left/right abdominal pain. Started with diarrhea. Pain and bloating starting today.  hx of obstruction Acuity: seen this patient in collaboration with supervising physician Dr. Nasim Arthur. We thoroughly discussed the history, physical exam, diagnostic testing and emergency department course. Jose M Matthews presented to the ED today with above noted complaints. Urinalysis shows trace leukocytes and 6-9 WBCs with rare bacteria. Urine culture pending. CBC shows no leukocytosis or anemia. CMP unremarkable no acute kidney injury, transaminitis, electrolyte abnormality. No signs of pancreatitis. Lactic acid 1.2. CT of the abdomen and pelvis shows a proximal small bowel obstruction with transition point in the mid abdomen. They also note inflammatory changes of the distal descending and proximal sigmoid colon correlate with presentation for symptoms of diverticulitis. I discussed the above findings with general surgeon [address] [place] [address] and I did not feel physical examination presented like diverticulitis we decided no antibiotic therapy at this time. Nasogastric tube was inserted patient symptoms greatly improved after nasogastric tube insertion. Patient received morphine for pain, with good relief. While in ED patient received   Medications   0.9 % sodium chloride bolus (1,000 mLs Intravenous New Bag 3/4/21 1840)   iopamidol (ISOVUE-370) 76 % injection 75 mL (has no administration in time range)   famotidine (PEPCID) injection 20 mg (20 mg Intravenous Given 3/4/21 1841)   ondansetron (ZOFRAN) injection 4 mg (4 mg Intravenous Given 3/4/21 1840)   morphine (PF) injection 4 mg (4 mg Intravenous Given 3/4/21 1840)           A discussion was had with the patient and/or their surrogate regarding diagnosis, diagnostic testing results, treatment/ plan of care. There was shared decision-making between myself as well as the patient and/or their surrogate and we are all in agreement with hospital admission.   There was an opportunity for questions and all questions were answered to the best of my ability and to the satisfaction of the patient and/or patient family.      Results for orders placed or performed during the hospital encounter of 03/04/21   CBC auto differential   Result Value Ref Range    WBC 10.9 4.0 - 11.0 K/uL    RBC 4.84 4.20 - 5.90 M/uL    Hemoglobin 13.6 13.5 - 17.5 g/dL    Hematocrit 41.6 40.5 - 52.5 %    MCV 86.0 80.0 - 100.0 fL    MCH 28.2 26.0 - 34.0 pg    MCHC 32.8 31.0 - 36.0 g/dL    RDW 16.1 (H) 12.4 - 15.4 %    Platelets 166 395 - 850 K/uL    MPV 8.8 5.0 - 10.5 fL    Neutrophils % 90.4 %    Lymphocytes % 2.4 %    Monocytes % 7.0 %    Eosinophils % 0.0 %    Basophils % 0.2 %    Neutrophils Absolute 9.8 (H) 1.7 - 7.7 K/uL    Lymphocytes Absolute 0.3 (L) 1.0 - 5.1 K/uL    Monocytes Absolute 0.8 0.0 - 1.3 K/uL    Eosinophils Absolute 0.0 0.0 - 0.6 K/uL    Basophils Absolute 0.0 0.0 - 0.2 K/uL   Comprehensive metabolic panel   Result Value Ref Range    Sodium 137 136 - 145 mmol/L    Potassium 4.3 3.5 - 5.1 mmol/L    Chloride 101 99 - 110 mmol/L    CO2 27 21 - 32 mmol/L    Anion Gap 9 3 - 16    Glucose 127 (H) 70 - 99 mg/dL    BUN 19 7 - 20 mg/dL    CREATININE 1.2 0.8 - 1.3 mg/dL    GFR Non-African American >60 >60    GFR African American >60 >60    Calcium 9.3 8.3 - 10.6 mg/dL    Total Protein 7.4 6.4 - 8.2 g/dL    Albumin 4.4 3.4 - 5.0 g/dL    Albumin/Globulin Ratio 1.5 1.1 - 2.2    Total Bilirubin 0.7 0.0 - 1.0 mg/dL    Alkaline Phosphatase 115 40 - 129 U/L    ALT 20 10 - 40 U/L    AST 17 15 - 37 U/L    Globulin 3.0 g/dL   Lipase   Result Value Ref Range    Lipase 19.0 13.0 - 60.0 U/L   Urine, reflex to culture    Specimen: Urine, clean catch   Result Value Ref Range    Color, UA DARK YELLOW (A) Straw/Yellow    Clarity, UA Clear Clear    Glucose, Ur Negative Negative mg/dL    Bilirubin Urine Negative Negative    Ketones, Urine Negative Negative mg/dL    Specific Gravity, UA 1.020 1.005 - 1.030    Blood, Urine TRACE-INTACT (A) Negative    pH, UA 6.0 5.0 - 8.0    Protein, UA Negative Negative mg/dL    Urobilinogen, Urine 0. 2 <2.0 E.U./dL    Nitrite, Urine Negative Negative    Leukocyte Esterase, Urine TRACE (A) Negative    Microscopic Examination YES     Urine Type NotGiven     Urine Reflex to Culture Not Indicated    Lactic acid, plasma   Result Value Ref Range    Lactic Acid 1.2 0.4 - 2.0 mmol/L   Microscopic Urinalysis   Result Value Ref Range    WBC, UA 6-9 (A) 0 - 5 /HPF    RBC, UA 3-4 0 - 4 /HPF    Epithelial Cells, UA 0-1 0 - 5 /HPF    Bacteria, UA Rare (A) None Seen /HPF    Amorphous, UA Rare /HPF   Troponin   Result Value Ref Range    Troponin <0.01 <0.01 ng/mL       I spoke with Theresa Laura CNP. We thoroughly discussed the history, physical exam, laboratory and imaging studies, as well as, emergency department course. Based upon that discussion, we've decided to admit Julissa Kuo for further observation and evaluation of Roya Galeana's abdominal pain. As I have deemed necessary from their history, physical and studies, I have considered and evaluated Julissa Kuo for the following diagnoses:  ACUTE APPENDICITIS, BOWEL OBSTRUCTION, CHOLECYSTITIS, DIVERTICULITIS, INCARCERATED HERNIA, PANCREATITIS, or PERFORATED BOWEL or ULCER. FINAL IMPRESSION  1. SBO (small bowel obstruction) (HCC)        Vitals:  Blood pressure 135/77, pulse 62, temperature 98 °F (36.7 °C), temperature source Oral, resp. rate 16, weight 195 lb (88.5 kg), SpO2 96 %. DISPOSITION  Patient was admitted to the hospital in stable condition. Comment: Please note this report has been produced using speech recognition software and may contain errors related to that system including errors in grammar, punctuation, and spelling, as well as words and phrases that may be inappropriate. If there are any questions or concerns please feel free to contact the dictating provider for clarification.             PARI Mckeon CNP  03/05/21 0004

## 2021-03-05 NOTE — ED PROVIDER NOTES
Emergency Department Provider Note     Location: [facility]  ED  3/4/2021    I independently performed a history and physical on Arelis Hernandez. All diagnostic, treatment, and disposition decisions were made by myself in conjunction with the mid-level provider. Briefly, this is a 64 y.o. male here for abdominal pain. Patient reports that earlier today he started having lower abdominal pain with some chills and an episode of diarrhea. He thought that he was getting a stomach flu so went and laid down. He then started having pain in his upper abdomen and started belching. He states that this felt pretty similar to when he had an obstruction in the past.      ED Triage Vitals [03/04/21 1816]   BP Temp Temp Source Pulse Resp SpO2 Height Weight   (!) 142/74 98 °F (36.7 °C) Oral 62 12 98 % -- 195 lb (88.5 kg)        Patient resting comfortably in no acute distress. Heart is regular rate and rhythm. Lungs clear to auscultation bilaterally. Abdomen is soft, distended, and diffusely tender. No peripheral edema noted. Patient seen and examined. Vital signs stable and within normal limits. Physical exam as documented above. CT scan shows obstruction with transition point. Also some inflammation concerning for possible diverticulitis. Patient has diffuse tenderness and has never had diverticulitis in the past.  Will consult surgery. NG tube placed with significant return of material.  Will admit for further work-up and management of bowel obstruction. EKG  The Ekg interpreted by me in the absence of a cardiologist shows. Normal sinus rhythm, rate 63, right bundle branch block, no STEMI      Xr Abdomen (kub) (single Ap View)    Result Date: 3/4/2021  EXAMINATION: ONE SUPINE XRAY VIEW(S) OF THE ABDOMEN 3/4/2021 8:36 pm COMPARISON: Correlation was made to CT abdomen pelvis obtained on the same day.  HISTORY: ORDERING SYSTEM PROVIDED HISTORY: NGT placement TECHNOLOGIST PROVIDED HISTORY: Reason for exam:->NGT placement Reason for Exam: ng Acuity: Acute Type of Exam: Initial FINDINGS: There is an enteric tube with the tip and the side hole below the left hemidiaphragm overlying the left upper quadrant, likely within the body of the stomach there is mild prominence of the gas-filled stomach and small bowel loops, which was better seen and evaluated on the CT abdomen pelvis obtained on the same day. Evaluation of the intra-abdominal free air is limited on this supine there is electronic device overlying the left upper abdomen. Enteric tube seen with the tip and the side hole below the left hemidiaphragm overlying the left upper quadrant, likely within the body of the stomach. Patient's known small-bowel obstruction is better seen and evaluated on the CT abdomen pelvis obtained on the same day     Ct Abdomen Pelvis W Iv Contrast Additional Contrast? None    Result Date: 3/4/2021  EXAMINATION: CT OF THE ABDOMEN AND PELVIS WITH CONTRAST 3/4/2021 7:20 pm TECHNIQUE: CT of the abdomen and pelvis was performed with the administration of intravenous contrast. Multiplanar reformatted images are provided for review. Dose modulation, iterative reconstruction, and/or weight based adjustment of the mA/kV was utilized to reduce the radiation dose to as low as reasonably achievable. COMPARISON: CT abdomen pelvis 10/06/2020. HISTORY: ORDERING SYSTEM PROVIDED HISTORY: abd pain n/d history of obstruction TECHNOLOGIST PROVIDED HISTORY: Reason for exam:->abd pain n/d history of obstruction Additional Contrast?->None Decision Support Exception->Emergency Medical Condition (MA) Reason for Exam: Upper left/right abdominal pain. Started with diarrhea. Pain and bloating starting today. hx of obstruction Acuity: Acute Type of Exam: Initial FINDINGS: Lower Chest: Lung bases demonstrate no focal consolidation or pleural effusion. Organs: Liver: Too small to characterize low attenuation focus.  Spleen: Unremarkable on this phase of enhancement. Pancreas: Unremarkable on this phase of enhancement. Adrenal glands: Unremarkable on this phase of enhancement. Kidneys: Too small to characterize low attenuation foci. Parapelvic cysts in the left kidney again visualized. Nonobstructive renal calculi measuring up to 0.3 cm. No ureteral calculus. No hydronephrosis. Gallbladder: Incompletely distended. GI/Bowel: Evaluation of the bowel and mesentery is limited due to lack of enteric contrast. Visualized esophagus/stomach: Unremarkable given lack of enteric contrast and degree of distension. Small bowel: Dilated proximal jejunum with transition point in the mid abdomen. Large bowel: Wall thickening of the distal descending colon and proximal sigmoid colon. Trace stranding surrounding thickened distal descending colon diverticulum. Appendix: Normal. Pelvis: Bladder is incompletely distended. Prostate and seminal vesicles appear unremarkable. Peritoneum/Retroperitoneum: Adenopathy: Suboptimal evaluation for adenopathy due to lack of enteric contrast. Abdominal aorta: No abdominal aortic aneurysm. Free fluid/air: No free fluid. No free air. Bones/Soft Tissues: Scattered degenerative changes noted in the visualized spine without spondylolisthesis. Postsurgical changes in the inguinal regions. 1. Proximal small bowel obstruction with transition point in the mid abdomen. 2. Inflammatory changes of the distal descending and proximal sigmoid colon. Correlate with presentation for symptoms of diverticulitis. 3.  Other findings as described.          Results for orders placed or performed during the hospital encounter of 03/04/21   CBC auto differential   Result Value Ref Range    WBC 10.9 4.0 - 11.0 K/uL    RBC 4.84 4.20 - 5.90 M/uL    Hemoglobin 13.6 13.5 - 17.5 g/dL    Hematocrit 41.6 40.5 - 52.5 %    MCV 86.0 80.0 - 100.0 fL    MCH 28.2 26.0 - 34.0 pg    MCHC 32.8 31.0 - 36.0 g/dL    RDW 16.1 (H) 12.4 - 15.4 %    Platelets 146 223 - 469 K/uL MPV 8.8 5.0 - 10.5 fL    Neutrophils % 90.4 %    Lymphocytes % 2.4 %    Monocytes % 7.0 %    Eosinophils % 0.0 %    Basophils % 0.2 %    Neutrophils Absolute 9.8 (H) 1.7 - 7.7 K/uL    Lymphocytes Absolute 0.3 (L) 1.0 - 5.1 K/uL    Monocytes Absolute 0.8 0.0 - 1.3 K/uL    Eosinophils Absolute 0.0 0.0 - 0.6 K/uL    Basophils Absolute 0.0 0.0 - 0.2 K/uL   Comprehensive metabolic panel   Result Value Ref Range    Sodium 137 136 - 145 mmol/L    Potassium 4.3 3.5 - 5.1 mmol/L    Chloride 101 99 - 110 mmol/L    CO2 27 21 - 32 mmol/L    Anion Gap 9 3 - 16    Glucose 127 (H) 70 - 99 mg/dL    BUN 19 7 - 20 mg/dL    CREATININE 1.2 0.8 - 1.3 mg/dL    GFR Non-African American >60 >60    GFR African American >60 >60    Calcium 9.3 8.3 - 10.6 mg/dL    Total Protein 7.4 6.4 - 8.2 g/dL    Albumin 4.4 3.4 - 5.0 g/dL    Albumin/Globulin Ratio 1.5 1.1 - 2.2    Total Bilirubin 0.7 0.0 - 1.0 mg/dL    Alkaline Phosphatase 115 40 - 129 U/L    ALT 20 10 - 40 U/L    AST 17 15 - 37 U/L    Globulin 3.0 g/dL   Lipase   Result Value Ref Range    Lipase 19.0 13.0 - 60.0 U/L   Urine, reflex to culture    Specimen: Urine, clean catch   Result Value Ref Range    Color, UA DARK YELLOW (A) Straw/Yellow    Clarity, UA Clear Clear    Glucose, Ur Negative Negative mg/dL    Bilirubin Urine Negative Negative    Ketones, Urine Negative Negative mg/dL    Specific Gravity, UA 1.020 1.005 - 1.030    Blood, Urine TRACE-INTACT (A) Negative    pH, UA 6.0 5.0 - 8.0    Protein, UA Negative Negative mg/dL    Urobilinogen, Urine 0.2 <2.0 E.U./dL    Nitrite, Urine Negative Negative    Leukocyte Esterase, Urine TRACE (A) Negative    Microscopic Examination YES     Urine Type NotGiven     Urine Reflex to Culture Not Indicated    Lactic acid, plasma   Result Value Ref Range    Lactic Acid 1.2 0.4 - 2.0 mmol/L   Microscopic Urinalysis   Result Value Ref Range    WBC, UA 6-9 (A) 0 - 5 /HPF    RBC, UA 3-4 0 - 4 /HPF    Epithelial Cells, UA 0-1 0 - 5 /HPF    Bacteria, UA

## 2021-03-05 NOTE — PROGRESS NOTES
Pt alert and oriented, VSS. C/o pain, mostly from NG tube, PRN toradol given, see eMAR. NG to CLWS. Active/hypoactive bowel sounds, pt passing some gas. Denies any further needs at this time. Will continue to monitor.

## 2021-03-06 ENCOUNTER — APPOINTMENT (OUTPATIENT)
Dept: GENERAL RADIOLOGY | Age: 61
DRG: 389 | End: 2021-03-06
Payer: COMMERCIAL

## 2021-03-06 VITALS
SYSTOLIC BLOOD PRESSURE: 127 MMHG | DIASTOLIC BLOOD PRESSURE: 87 MMHG | WEIGHT: 197.9 LBS | HEART RATE: 76 BPM | HEIGHT: 71 IN | TEMPERATURE: 98.4 F | BODY MASS INDEX: 27.71 KG/M2 | RESPIRATION RATE: 18 BRPM | OXYGEN SATURATION: 95 %

## 2021-03-06 PROCEDURE — 6370000000 HC RX 637 (ALT 250 FOR IP): Performed by: NURSE PRACTITIONER

## 2021-03-06 PROCEDURE — 99232 SBSQ HOSP IP/OBS MODERATE 35: CPT | Performed by: SURGERY

## 2021-03-06 PROCEDURE — 6360000002 HC RX W HCPCS: Performed by: NURSE PRACTITIONER

## 2021-03-06 PROCEDURE — 74022 RADEX COMPL AQT ABD SERIES: CPT

## 2021-03-06 RX ADMIN — KETOROLAC TROMETHAMINE 15 MG: 30 INJECTION, SOLUTION INTRAMUSCULAR at 05:59

## 2021-03-06 RX ADMIN — MORPHINE SULFATE 2 MG: 2 INJECTION, SOLUTION INTRAMUSCULAR; INTRAVENOUS at 01:31

## 2021-03-06 RX ADMIN — PANTOPRAZOLE SODIUM 40 MG: 40 TABLET, DELAYED RELEASE ORAL at 07:47

## 2021-03-06 RX ADMIN — AMIODARONE HYDROCHLORIDE 200 MG: 200 TABLET ORAL at 07:47

## 2021-03-06 RX ADMIN — APIXABAN 5 MG: 5 TABLET, FILM COATED ORAL at 08:58

## 2021-03-06 RX ADMIN — MORPHINE SULFATE 2 MG: 2 INJECTION, SOLUTION INTRAMUSCULAR; INTRAVENOUS at 05:59

## 2021-03-06 ASSESSMENT — PAIN SCALES - GENERAL: PAINLEVEL_OUTOF10: 10

## 2021-03-06 NOTE — PROGRESS NOTES
[facility] SURGERY    PATIENT NAME: Yesica Hoyt     TODAY'S DATE: 3/6/2021    CHIEF COMPLAINT: Abdominal pain    INTERVAL HISTORY/HPI:    Pt feeling much improved this AM. His main complaint is a headache. He states his abdominal pain has resolved. He is passing gas and is hungry this AM      OBJECTIVE:  VITALS:  /87   Pulse 76   Temp 98.4 °F (36.9 °C) (Oral)   Resp 16   Ht 5' 11\" (1.803 m)   Wt 197 lb 14.4 oz (89.8 kg)   SpO2 95%   BMI 27.60 kg/m²     INTAKE/OUTPUT:    I/O last 3 completed shifts: In: 2253 [I.V.:1773]  Out: 550 [Emesis/NG output:550]  I/O this shift: In: 816 [I.V.:816]  Out: -     CONSTITUTIONAL:  awake and alert  LUNGS:  Normal effort, symmetric chest rise, on RA  ABDOMEN:  normal bowel sounds, soft, non-distended, non-tender     Data:  CBC:   Recent Labs     03/04/21 1826 03/05/21  0735   WBC 10.9 5.3   HGB 13.6 12.8*   HCT 41.6 39.4*    199     BMP:    Recent Labs     03/04/21 1826 03/05/21  0736    140   K 4.3 3.7    105   CO2 27 27   BUN 19 18   CREATININE 1.2 1.0   GLUCOSE 127* 100*     Hepatic:   Recent Labs     03/04/21 1826   AST 17   ALT 20   BILITOT 0.7   ALKPHOS 115     Mag:    No results for input(s): MG in the last 72 hours. Phos:   No results for input(s): PHOS in the last 72 hours. INR: No results for input(s): INR in the last 72 hours. Radiology Review:  Personally reviewed. XR Acute Abd Series 3/6/2021  Mild bibasilar atelectasis.       No free air or air-filled dilated loops of bowel. ASSESSMENT AND PLAN:   Mr. Matt Ramírez is a 64year old male who presented with abdominal pain and emesis secondary to small bowel obstruction  - AXR reviewed this AM with resolution of obstruction  - NGT removed at bedside and diet advanced to clear liquids  - If patient is able to tolerate clear liquids may advance his diet later this afternoon    Electronically signed by Patricia Lamb DO

## 2021-03-06 NOTE — DISCHARGE SUMMARY
prilosec        Exam:   /87   Pulse 76   Temp 98.4 °F (36.9 °C) (Oral)   Resp 18   Ht 5' 11\" (1.803 m)   Wt 197 lb 14.4 oz (89.8 kg)   SpO2 95%   BMI 27.60 kg/m²   General appearance: No apparent distress, appears stated age and cooperative. HEENT: Pupils equal, round, and reactive to light. Conjunctivae/corneas clear. Neck: Supple, no jugular venous distention. Trachea midline with full range of motion. Respiratory:  Normal respiratory effort. Clear to auscultation, bilaterally without Rales/Wheezes/Rhonchi. Cardiovascular: Regular rate and rhythm with normal S1/S2 without murmurs, rubs or gallops. Abdomen: Soft, non-tender, non-distended with normal bowel sounds. Musculoskelatal: No clubbing, cyanosis or edema bilaterally. Full range of motion without deformity. Neurologic:  Neurovascularly intact without any focal sensory/motor deficits. Cranial nerves: II-XII intact, grossly non-focal.  Psychiatric: Alert and oriented, thought content appropriate, normal insight  Skin: Skin color, texture, turgor normal.  No rashes or lesions. Capillary Refill: Brisk,< 3 seconds   Peripheral Pulses: +2 palpable, equal bilaterally       Patient Discharge Instructions: Follow up:  1. Primary Care Provider Hamida Lewis MD in the next 1-2 weeks.       Discharge Medications:   Discharge Medication List as of 3/6/2021  4:32 PM        Discharge Medication List as of 3/6/2021  4:32 PM        Discharge Medication List as of 3/6/2021  4:32 PM      CONTINUE these medications which have NOT CHANGED    Details   Zinc Sulfate (ZINC 15 PO) daily Historical Med      Cholecalciferol 25 MCG (1000 UT) CHEW Take 1,000 Units by mouth dailyHistorical Med      clonazePAM (KLONOPIN) 0.5 MG tablet TAKE 1 TABLET BY MOUTH AT BEDTIME AS NEEDEDHistorical Med      verapamil (VERELAN) 240 MG extended release capsule TAKE 1 CAPSULE BY MOUTH EVERY DAY AT NIGHT, Disp-90 capsule, R-0Normal      omeprazole (PRILOSEC) 40 MG delayed release capsule TAKE 1 CAPSULE BY MOUTH EVERY DAY, Disp-90 capsule, R-1Normal      apixaban (ELIQUIS) 5 MG TABS tablet TAKE 1 TABLET BY MOUTH TWICE A DAY, Disp-180 tablet,R-2Normal      amiodarone (CORDARONE) 200 MG tablet TAKE 1 TABLET BY MOUTH EVERY DAY, Disp-90 tablet,R-1Normal      tamsulosin (FLOMAX) 0.4 MG capsule Take 1 capsule by mouth daily for 5 days, Disp-5 capsule, R-0Print      butalbital-APAP-caffeine -40 MG CAPS per capsule TK ONE C PO  Q 12 H PRF SEVERE HEADACHEHistorical Med      atorvastatin (LIPITOR) 20 MG tablet TAKE 1 TABLET BY MOUTH ONE TIME A DAY, Disp-90 tablet,R-1Normal           Discharge Medication List as of 3/6/2021  4:32 PM      STOP taking these medications       aspirin 81 MG chewable tablet Comments:   Reason for Stopping:         Omega-3 Fatty Acids (FISH OIL CONCENTRATE) 1000 MG CAPS Comments:   Reason for Stopping:         folic acid-pyridoxine-cyanocobalamine (FOLGARD RX) 2.2-25-1 MG TABS tablet Comments:   Reason for Stopping:         Magnesium Chloride 200 MG/ML SOLN Comments:   Reason for Stopping:         Potassium 99 MG TABS Comments:   Reason for Stopping:         ascorbic acid (VITAMIN C) 1000 MG tablet Comments:   Reason for Stopping:         coenzyme Q10 60 MG CAPS Comments:   Reason for Stopping:         omeprazole (PRILOSEC OTC) 20 MG tablet Comments:   Reason for Stopping:         ketorolac (TORADOL) 10 MG tablet Comments:   Reason for Stopping:                 Significant Test Results    Xr Abdomen (kub) (single Ap View)    Result Date: 3/4/2021  EXAMINATION: ONE SUPINE XRAY VIEW(S) OF THE ABDOMEN 3/4/2021 8:36 pm COMPARISON: Correlation was made to CT abdomen pelvis obtained on the same day.  HISTORY: ORDERING SYSTEM PROVIDED HISTORY: NGT placement TECHNOLOGIST PROVIDED HISTORY: Reason for exam:->NGT placement Reason for Exam: ng Acuity: Acute Type of Exam: Initial FINDINGS: There is an enteric tube with the tip and the side hole below the left hemidiaphragm overlying the left upper quadrant, likely within the body of the stomach there is mild prominence of the gas-filled stomach and small bowel loops, which was better seen and evaluated on the CT abdomen pelvis obtained on the same day. Evaluation of the intra-abdominal free air is limited on this supine there is electronic device overlying the left upper abdomen. Enteric tube seen with the tip and the side hole below the left hemidiaphragm overlying the left upper quadrant, likely within the body of the stomach. Patient's known small-bowel obstruction is better seen and evaluated on the CT abdomen pelvis obtained on the same day     Xr Acute Abd Series Chest 1 Vw    Result Date: 3/6/2021  EXAMINATION: TWO XRAY VIEWS OF THE ABDOMEN AND SINGLE  XRAY VIEW OF THE CHEST 3/6/2021 7:29 am COMPARISON: 03/04/2021 and 12/08/2015 HISTORY: ORDERING SYSTEM PROVIDED HISTORY: Evaluate distention of SB TECHNOLOGIST PROVIDED HISTORY: Reason for exam:->Evaluate distention of SB Reason for Exam: Evaluate distention of SB Type of Exam: Subsequent/Follow-up Relevant Medical/Surgical History: see epic FINDINGS: Loop recorder device is noted in the left chest.  Enteric tube tip and side port project over the stomach. Mild bibasilar atelectasis. No new focal consolidation, pleural effusion or pneumothorax. The cardiomediastinal silhouette is stable. No overt pulmonary edema. No free air on the upright image. Gas scattered throughout the nondilated bowel. No acute osseous abnormality. No pathologic calcifications. Mild bibasilar atelectasis. No free air or air-filled dilated loops of bowel. Ct Abdomen Pelvis W Iv Contrast Additional Contrast? None    Result Date: 3/4/2021  EXAMINATION: CT OF THE ABDOMEN AND PELVIS WITH CONTRAST 3/4/2021 7:20 pm TECHNIQUE: CT of the abdomen and pelvis was performed with the administration of intravenous contrast. Multiplanar reformatted images are provided for review.  Dose modulation, iterative reconstruction, and/or weight based adjustment of the mA/kV was utilized to reduce the radiation dose to as low as reasonably achievable. COMPARISON: CT abdomen pelvis 10/06/2020. HISTORY: ORDERING SYSTEM PROVIDED HISTORY: abd pain n/d history of obstruction TECHNOLOGIST PROVIDED HISTORY: Reason for exam:->abd pain n/d history of obstruction Additional Contrast?->None Decision Support Exception->Emergency Medical Condition (MA) Reason for Exam: Upper left/right abdominal pain. Started with diarrhea. Pain and bloating starting today. hx of obstruction Acuity: Acute Type of Exam: Initial FINDINGS: Lower Chest: Lung bases demonstrate no focal consolidation or pleural effusion. Organs: Liver: Too small to characterize low attenuation focus. Spleen: Unremarkable on this phase of enhancement. Pancreas: Unremarkable on this phase of enhancement. Adrenal glands: Unremarkable on this phase of enhancement. Kidneys: Too small to characterize low attenuation foci. Parapelvic cysts in the left kidney again visualized. Nonobstructive renal calculi measuring up to 0.3 cm. No ureteral calculus. No hydronephrosis. Gallbladder: Incompletely distended. GI/Bowel: Evaluation of the bowel and mesentery is limited due to lack of enteric contrast. Visualized esophagus/stomach: Unremarkable given lack of enteric contrast and degree of distension. Small bowel: Dilated proximal jejunum with transition point in the mid abdomen. Large bowel: Wall thickening of the distal descending colon and proximal sigmoid colon. Trace stranding surrounding thickened distal descending colon diverticulum. Appendix: Normal. Pelvis: Bladder is incompletely distended. Prostate and seminal vesicles appear unremarkable. Peritoneum/Retroperitoneum: Adenopathy: Suboptimal evaluation for adenopathy due to lack of enteric contrast. Abdominal aorta: No abdominal aortic aneurysm. Free fluid/air: No free fluid. No free air.  Bones/Soft Tissues: Scattered degenerative changes noted in the visualized spine without spondylolisthesis. Postsurgical changes in the inguinal regions. 1. Proximal small bowel obstruction with transition point in the mid abdomen. 2. Inflammatory changes of the distal descending and proximal sigmoid colon. Correlate with presentation for symptoms of diverticulitis. 3.  Other findings as described. Consults:     IP CONSULT TO GENERAL SURGERY  IP CONSULT TO HOSPITALIST    Labs: For convenience and continuity at follow-up the following most recent labs are provided:    Lab Results   Component Value Date    WBC 5.3 03/05/2021    HGB 12.8 03/05/2021    HCT 39.4 03/05/2021    MCV 87.6 03/05/2021     03/05/2021     03/05/2021    K 3.7 03/05/2021     03/05/2021    CO2 27 03/05/2021    BUN 18 03/05/2021    CREATININE 1.0 03/05/2021    CALCIUM 8.3 03/05/2021    PHOS 3.0 03/06/2018     01/03/2014    TROPONINI <0.01 03/04/2021    ALKPHOS 115 03/04/2021    ALT 20 03/04/2021    AST 17 03/04/2021    BILITOT 0.7 03/04/2021    BILIDIR <0.2 02/26/2020    LABALBU 4.4 03/04/2021    LDLCALC 115 09/30/2020    TRIG 85 09/30/2020    LABA1C 6.2 09/30/2020     Lab Results   Component Value Date    INR 1.18 (H) 02/02/2020    INR 1.14 02/01/2020    INR 0.96 01/03/2014         The patient was seen and examined on day of discharge and this discharge summary is in conjunction with any daily progress note from day of discharge. Time spent on discharge is more than 30 minutes in the examination, evaluation, counseling and review of medications and discharge plan. Signed:    Humberto Méndez MD   3/6/2021    Thank you Indu Edge MD for the opportunity to be involved in this patient's care. If you have any questions or concerns please feel free to contact my office [phone]. alert

## 2021-03-06 NOTE — PROGRESS NOTES
Assessment complete and charted earlier in shift. Spoke with PM NP last night regarding holding night time dose of eliquis- held to defer to AM MD. Pt voiced that his abd pain has resolved and was an intermittent spasm like pain to lower abd. Pt with continued c/o HA 10/10 as he had during AM shift. VSS. Heat packs, ice packs, and PRN meds provided. Call light within reach, will continue to monitor.

## 2021-03-06 NOTE — PROGRESS NOTES
Shift assessment updated as charted. Patient a/ox4. VSS. Patient reports flatus. Bowel sounds hypoactive. Patient denies needs. Will monitor.

## 2021-03-08 ENCOUNTER — CARE COORDINATION (OUTPATIENT)
Dept: CASE MANAGEMENT | Age: 61
End: 2021-03-08

## 2021-03-08 NOTE — ADT AUTH CERT
Intestinal Obstruction - Care Day 2 (3/5/2021) by Libra Lazo RN       Review Status Review Entered   Completed 3/8/2021 09:21      Criteria Review      Care Day: 2 Care Date: 3/5/2021 Level of Care: Inpatient Floor    Guideline Day 2    Level Of Care    (X) Floor    Clinical Status    (X) * Hypotension absent    3/8/2021 9:21 AM EST by Inna Corona      99. 0   16  60  148/69  94%ra    (X) * Nausea and vomiting absent or improved    ( ) * Pain absent or managed    3/8/2021 9:21 AM EST by Inna Corona      rates pain 10/10, 9/10    ( ) * Abdominal exam stable or improved    Activity    (X) Activity as tolerated    Routes    (X) IV fluids, medications    3/8/2021 9:21 AM EST by Inna Corona      cordarone 200mg qd  protonix 40mg qd  D5 0.45NS w/ 20 meq kcl ivf @ 75 cc/hr  toradol 15 mg given  x3 doses  mso4 2mg q 3 hrs prn is givn x5 doses    ( ) Diet as tolerated    3/8/2021 9:21 AM EST by Inna Corona      npo    Interventions    ( ) * NG tube absent    3/8/2021 9:21 AM EST by Inna Corona      NG tube draining brown liquid  connected to lis    (X) Surgical re-evaluation    3/8/2021 9:21 AM EST by Inna Corona      per General surg:  Recommend continued NG tube decompression. NPO and IV fluid hydration.  Will obtain an AXR tomorrow morning to assess distention of the bowel    (X) Possible follow-up abdominal imaging    * Milestone

## 2021-03-08 NOTE — CARE COORDINATION
[facility] Transitions Initial Follow Up Call    Call within 2 business days of discharge: Yes    Patient: Elder Lang Patient : 1960   MRN: [id number]  Reason for Admission: GERD  Discharge Date: 3/6/21 RARS: Readmission Risk Score: 11      Last Discharge [facility]       Complaint Diagnosis Description Type Department Provider    3/4/21 Abdominal Pain SBO (small bowel obstruction) [facility]) ED to Hosp-Admission (Discharged) (Juan Encinas) Jacqueline Valenzuela MD; Armando BATEMAN Sep... Spoke with: Elder Lang      Facility:[facility]    Challenges to be reviewed by the provider   Additional needs identified to be addressed with provider No  none             Method of communication with provider : phone    Advance Care Planning:   Does patient have an Advance Directive:  not on file. Was this a readmission? No  Patients top risk factors for readmission: medical condition    Care Transition Nurse (CTN) contacted the patient by telephone to perform post hospital discharge assessment. Verified name and  with patient as identifiers. Provided introduction to self, and explanation of the CTN role. CTN reviewed discharge instructions, medical action plan and red flags with patient who verbalized understanding. Patient given an opportunity to ask questions and does not have any further questions or concerns at this time. Were discharge instructions available to patient? Yes. Reviewed appropriate site of care based on symptoms and resources available to patient including: PCP and Specialist. The patient agrees to contact the PCP office for questions related to their healthcare. Medication reconciliation was not performed with patient, who verbalizes understanding of administration of home medications. Advised obtaining a 90-day supply of all daily and as-needed medications. Discussed follow-up appointments. If no appointment was previously scheduled, appointment scheduling offered: Yes.  Is follow up appointment scheduled within 7 days of discharge? Yes  patient declined follow up call needed based on severity of symptoms and risk factors. Patient answered call and verified . Patient feeling well and happy to be back home. Patient confirms that he has discharge instructions and aware that no new medications were prescribed. Patient has follow up appt with specialist scheduled. CTN introduced transition program and declined any further transition calls were needed. Episode closed due to patient's request.  CTN provided contact information for future needs. Care Transitions 24 Hour Call    Do you have any ongoing symptoms?: No  Do you have a copy of your discharge instructions?: Yes  Do you have all of your prescriptions and are they filled?: Yes  Have you been contacted by a [facility] Pharmacist?: No  Have you scheduled your follow up appointment?: Yes  How are you going to get to your appointment?: Car - family or friend to transport  Were you discharged with any Home Care or Post Acute Services: No  Post Acute Services:  Outpatient/Community Services (Comment: OP PT)  Do you feel like you have everything you need to keep you well at home?: Yes  Care Transitions Interventions         Follow Up  Future Appointments   Date Time Provider Tamika Romero   3/31/2021  7:20 AM SCHEDULE, Ami Room REMOTE TRANSMISSION Staci MENCHACA   2021  8:30 AM MD Staci Chun RN

## 2021-03-31 ENCOUNTER — NURSE ONLY (OUTPATIENT)
Dept: CARDIOLOGY CLINIC | Age: 61
End: 2021-03-31
Payer: COMMERCIAL

## 2021-03-31 DIAGNOSIS — I49.3 PVC (PREMATURE VENTRICULAR CONTRACTION): ICD-10-CM

## 2021-03-31 DIAGNOSIS — I48.91 ATRIAL FIBRILLATION, UNSPECIFIED TYPE (HCC): ICD-10-CM

## 2021-03-31 DIAGNOSIS — Z45.09 ENCOUNTER FOR LOOP RECORDER CHECK: ICD-10-CM

## 2021-03-31 PROCEDURE — G2066 INTER DEVC REMOTE 30D: HCPCS | Performed by: INTERNAL MEDICINE

## 2021-04-07 PROCEDURE — 93298 REM INTERROG DEV EVAL SCRMS: CPT | Performed by: INTERNAL MEDICINE

## 2021-04-09 ENCOUNTER — IMMUNIZATION (OUTPATIENT)
Dept: PRIMARY CARE CLINIC | Age: 61
End: 2021-04-09
Payer: COMMERCIAL

## 2021-04-09 PROCEDURE — 0011A COVID-19, MODERNA VACCINE 100MCG/0.5ML DOSE: CPT | Performed by: FAMILY MEDICINE

## 2021-04-09 PROCEDURE — 91301 COVID-19, MODERNA VACCINE 100MCG/0.5ML DOSE: CPT | Performed by: FAMILY MEDICINE

## 2021-04-19 ENCOUNTER — OFFICE VISIT (OUTPATIENT)
Dept: CARDIOLOGY CLINIC | Age: 61
End: 2021-04-19
Payer: COMMERCIAL

## 2021-04-19 ENCOUNTER — NURSE ONLY (OUTPATIENT)
Dept: CARDIOLOGY CLINIC | Age: 61
End: 2021-04-19

## 2021-04-19 VITALS
DIASTOLIC BLOOD PRESSURE: 74 MMHG | HEART RATE: 52 BPM | WEIGHT: 202 LBS | HEIGHT: 71 IN | BODY MASS INDEX: 28.28 KG/M2 | SYSTOLIC BLOOD PRESSURE: 108 MMHG

## 2021-04-19 DIAGNOSIS — I45.10 RIGHT BUNDLE BRANCH BLOCK: Primary | ICD-10-CM

## 2021-04-19 DIAGNOSIS — Z45.09 ENCOUNTER FOR LOOP RECORDER CHECK: ICD-10-CM

## 2021-04-19 DIAGNOSIS — I42.2 HYPERTROPHIC CARDIOMYOPATHY (HCC): ICD-10-CM

## 2021-04-19 DIAGNOSIS — I48.91 ATRIAL FIBRILLATION, UNSPECIFIED TYPE (HCC): ICD-10-CM

## 2021-04-19 DIAGNOSIS — M79.605 PAIN OF LEFT LOWER EXTREMITY: ICD-10-CM

## 2021-04-19 DIAGNOSIS — Z79.899 MEDICATION MANAGEMENT: ICD-10-CM

## 2021-04-19 DIAGNOSIS — I49.3 PVC (PREMATURE VENTRICULAR CONTRACTION): ICD-10-CM

## 2021-04-19 PROCEDURE — 93000 ELECTROCARDIOGRAM COMPLETE: CPT | Performed by: INTERNAL MEDICINE

## 2021-04-19 PROCEDURE — 99214 OFFICE O/P EST MOD 30 MIN: CPT | Performed by: INTERNAL MEDICINE

## 2021-04-19 RX ORDER — AMIODARONE HYDROCHLORIDE 100 MG/1
100 TABLET ORAL DAILY
Qty: 90 TABLET | Refills: 3 | Status: SHIPPED | OUTPATIENT
Start: 2021-04-19 | End: 2022-04-20

## 2021-04-19 RX ORDER — VERAPAMIL HYDROCHLORIDE 240 MG/1
CAPSULE, EXTENDED RELEASE ORAL
Qty: 90 CAPSULE | Refills: 3 | Status: SHIPPED | OUTPATIENT
Start: 2021-04-19 | End: 2022-04-20

## 2021-04-19 NOTE — PROGRESS NOTES
complained of a cough that he attributes to his season allergies as this occurs every year around the same time. He has been taking Verapamil for many years. He was told to take this to reduce the workload of his heart. When he does not take this, he feels spasms in his neck. He states that he has been taking Amiodarone for a few years and feels that it is now helping to control his symptoms related to his afib. His palpitations are reduced with it. He has noticed a sudden increase in his palpitations a few weeks prior, though attributes this to stress and allergies. On 7/1/2020, patient denied any dizziness or syncopal episodes  He stated that he does have the occasional flutter that occurs 2-3 times aweek. It lasts about half an hour. He had been noticing them mostly in the evening. He stated that he does not notice them when he does exercise. Event monitor from 4/20 demonstrates rare PVCs and no NSVT. He has not been very active lately due to knee pain. He is scheduled to get a scope in the near future with [facility] at [facility].  Patient's Amiodarone was decreased to 100mg QD at the conclusion of 7/1/2020 office visit. Patient wore a 7 day cardiac event monitor from 4/15/2020 to 4/22/2020 that demonstrated predominantly SB with an average Hr of 57 () BPM, <1% PACs and PVCs. He underwent a meniscus repair on his left knee via scope on 7/17/2020. Patient reported on 10/21/2020 that he increased in amiodarone back to 200mg QD during September 2020 due to increased palpitations. An ILR was discussed and agreed upon due to patient's ongoing palpitations and wanting to discontinue Eliquis. ILR implanted 12/11/2021. Today, 4/19/2021, patient's device interrogation demonstrates normal function, 1 episode of AF with conversion pauses 12/15/2020. He reports he is doing ok. He reports he has been experiencing fatigue, which is unchanged from prior visits.  He reports he experiences palpitations that lasts several seconds and resolve. He reports they are not bothersome. He reports he has had pain in the back of his left quad that has been occurring for about 1-2 weeks. He reports he was playing baseball the day prior and is unsure if this is related. He denies any edema. He reports that the pain persists with and without activity. He reports it hurts to put pressure on or stretch his left leg. He reports he is taking all medications as prescribed and tolerates them well. He denies any bleeding or bruising issue. Patient denies current edema, chest pain, sob, dizziness or syncope. Past Medical History:   has a past medical history of A-fib ([address] ADHD (attention deficit hyperactivity disorder), Carpal tunnel syndrome, Chronic low back pain, Chronic neck pain, Chronic sinusitis, Dental crown present, Epigastric hernia, Esophageal spasm, GERD (gastroesophageal reflux disease), Hyperlipidemia, Hypertrophic cardiomyopathy ([address] IHSS (idiopathic hypertrophic subaortic stenosis) ([address] Kidney stones, HUNTER on CPAP, Primary localized osteoarthrosis, shoulder region, Prostate cancer ([address] PVC's (premature ventricular contractions), RBBB (right bundle branch block), RLS (restless legs syndrome), Seasonal allergies, and Tachycardia. Surgical History:   has a past surgical history that includes Knee arthroscopy (Right, 1982); Umbilical hernia repair; Colonoscopy (1/17/11); TURP (2012); Inguinal hernia repair (Bilateral, 2009, 2012,); Varicocelectomy (2000); Shoulder arthroscopy (Right, 12/31/2013); Endoscopy, colon, diagnostic; Upper gastrointestinal endoscopy (9/3); Elbow surgery (2015); Carpal tunnel release (Left); Knee arthroscopy (Left, 12/21/2017); Colonoscopy (N/A, 2/2/2020); Knee arthroscopy (Left, 7/17/2020); Insertable Cardiac Monitor (Left, 12/11/2020); and Insertable Cardiac Monitor (12/11/2020). Social History:   reports that he has never smoked.  He has never used smokeless tobacco. Psychiatric/Behavioral: Negative. /74   Pulse 52   Ht 5' 11\" (1.803 m)   Wt 202 lb (91.6 kg)   BMI 28.17 kg/m²     Data:     ECHO 10/2/2019:      Summary   Normal systolic function with an estimated ejection fraction of 55-60%. Moderate concentric left ventricular hypertrophy ( septal wall is more   increased in size (1.6 cm) ). No regional wall motion abnormalities are seen. Normal left ventricular diastolic filling pressure. The left atrium is severely dilated. The right atrium is mildly dilated. Mild aortic regurgitation. Mild mitral and pulmonic regurgitation. Objective:  Physical Exam   Constitutional: He is oriented to person, place, and time. He appears well-developed and well-nourished. HENT:   Head: Normocephalic and atraumatic. Eyes: Pupils are equal, round, and reactive to light. Neck: Normal range of motion. Cardiovascular: Normal rate, regular rhythm and physiologically split 2nd heart sound, 2/6 VASHTI  Pulmonary/Chest: Effort normal and breath sounds normal.   Abdominal: Soft. No tenderness. Musculoskeletal: Normal range of motion. He exhibits no edema. Neurological: He is alert and oriented to person, place, and time. Skin: Skin is warm and dry. Psychiatric: He has a normal mood and affect. Assessment:  1. PAF- Eliquis for stroke prevention. Amiodarone decreased to 100mg QD 4/19/2021.   2. HCM without LVOT gradient- he does not have any major risk factors for SCA in HCM. He does however have > 20% scar burden by LGE in the basal septum. In some studies, this finding has a stronger correlation with risk than the major risk factors. His age (> 61 years) is associated with a strong reduction in risk. We discussed these issues and have decided to monitor for symptoms and defer ICD implantation at this time. 3. PVCs-  2 week monitor worn 4/2020 demonstrated <1% PVC burden. No PVCs noted per ECG 4/19/2021.  4. RBBB- Per ECG 4/19/2021. Plan:  1. Decrease amiodarone to 100mg QD.    -TSH and hepatic function panel ordered for medication management. 2. Bilateral venous duplex to assess left leg pain. 3. Remote device checks every month. 4. Follow up with EP NP in 6 months. QUALITY MEASURES  1. Tobacco Cessation Counseling: NA  2. Retake of BP if >140/90:   NA  3. Documentation to PCP/referring for new patient:  Sent to PCP at close of office visit  4. CAD patient on anti-platelet: NA  5. CAD patient on STATIN therapy:  NA  6. Patient with aFib on anticoagulation:  Yes      This note has been scribed in the presence of Cally Whiting MD by Denis Acosta RN.       I, Dr. Cally Whiting, personally performed the services described in this documentation as scribed by  Denis Acosta RN in my presence, and it is both accurate and complete.       Cally Whiting M.D.

## 2021-04-19 NOTE — PATIENT INSTRUCTIONS
Plan:  1. Decrease amiodarone to 100mg QD. 2. Bilateral venous duplex to assess left leg pain. 3. Remote device checks every month. 4. Follow up with EP NP in 6 months. Your provider has ordered testing for further evaluation. An order/prescription has been included in your paper work.  To schedule outpatient testing, contact Central Scheduling by calling [address] [place], [place] [address]Hamilton Thorne ([phone]).

## 2021-04-19 NOTE — LETTER
[address] [facility]  Phone: [phone]  Fax: [phone]     Danielle Paulino MD     4/21/2021     Jackie Carter, [address]     Patient: Lizeth Carolina   MR Number: <[id number]>   YOB: 1960   Date of Visit: 4/19/2021     Dear Dr. Jackie Carter     Today I saw our mutual patient named above. Below are the relevant portions of my assessment and plan of care. If you have questions, please do not hesitate to call me. I look forward to following Eddie Haile along with you. [facility]   Cardiac Follow Up    Date: 4/19/21  Patient Name: Lizeth Carolina  YOB: 1960    Primary Care Physician: Jackie Carter MD    CHIEF COMPLAINT:   Chief Complaint   Patient presents with    6 Month Follow-Up    Atrial Fibrillation    Cardiomyopathy     hypertrophic     Other     device management    Other     PVCs, RBBB         HPI:  Lizeth Carolina is a 64 y.o. male who presents for follow up evaluation of hypertrophic cardiomyopathy, PVCs and NSVT. He was previously evaluated for an ICD at [facility]. He has worn cardiac monitors in the past that have shown paroxysmal atrial fibrillation and PVCs. His monitor from 4/2016 showed symptoms associated with PAF. His Lexiscan from 4/2016 showed an adequate response to [place]. His monitor from 6/2017 showed PAF and frequent monomorphic PVCs. His echocardiogram from 10/2017 showed an EF of 55-60% with severe asymmetric left ventricular hypertrophy. His cardiac MRI showed normal LV chamber with an EF of 60%, Asymmetric basal and mid septal hypertrophy with the maximum end-diastolic myocardial thickness measuring up to 16 mm. Maximum LVOT velocity is 1.4 m/s. There is no LVOT gradient at rest.  There is some late gadolinium enhancement in the basal anteroseptal area. This was not quantified. In October 2019 his echo had similar findings along with a severely dilated left atrium. He then was evaluated by interventional cardiology who recommended ischemic testing. The patient refused any further testing at that time. In 4/2020, patient reported that his brother also has a mild case of septal wall thickening, but no history of unexplained syncope. He denied any unexplained syncope or dizziness. He generally is very active playing baseball. His activity levels have decreased recently due to his orthopedic issues and cough from his allergies. Prior to his knee pain, he was able to tolerate activity well without symptoms. He also complained of a cough that he attributes to his season allergies as this occurs every year around the same time. He has been taking Verapamil for many years. He was told to take this to reduce the workload of his heart. When he does not take this, he feels spasms in his neck. He states that he has been taking Amiodarone for a few years and feels that it is now helping to control his symptoms related to his afib. His palpitations are reduced with it. He has noticed a sudden increase in his palpitations a few weeks prior, though attributes this to stress and allergies. On 7/1/2020, patient denied any dizziness or syncopal episodes  He stated that he does have the occasional flutter that occurs 2-3 times aweek. It lasts about half an hour. He had been noticing them mostly in the evening. He stated that he does not notice them when he does exercise. Event monitor from 4/20 demonstrates rare PVCs and no NSVT. He has not been very active lately due to knee pain. He is scheduled to get a scope in the near future with [facility] at [facility].  Patient's Amiodarone was decreased to 100mg QD at the conclusion of 7/1/2020 office visit. Patient wore a 7 day cardiac event monitor from 4/15/2020 to 4/22/2020 that demonstrated predominantly SB with an average Hr of 57 () BPM, <1% PACs and PVCs.   He underwent a meniscus repair on his left knee via scope on 7/17/2020. Patient reported on 10/21/2020 that he increased in amiodarone back to 200mg QD during September 2020 due to increased palpitations. An ILR was discussed and agreed upon due to patient's ongoing palpitations and wanting to discontinue Eliquis. ILR implanted 12/11/2021. Today, 4/19/2021, patient's device interrogation demonstrates normal function, 1 episode of AF with conversion pauses 12/15/2020. He reports he is doing ok. He reports he has been experiencing fatigue, which is unchanged from prior visits. He reports he experiences palpitations that lasts several seconds and resolve. He reports they are not bothersome. He reports he has had pain in the back of his left quad that has been occurring for about 1-2 weeks. He reports he was playing baseball the day prior and is unsure if this is related. He denies any edema. He reports that the pain persists with and without activity. He reports it hurts to put pressure on or stretch his left leg. He reports he is taking all medications as prescribed and tolerates them well. He denies any bleeding or bruising issue. Patient denies current edema, chest pain, sob, dizziness or syncope. Past Medical History:   has a past medical history of A-fib ([address] ADHD (attention deficit hyperactivity disorder), Carpal tunnel syndrome, Chronic low back pain, Chronic neck pain, Chronic sinusitis, Dental crown present, Epigastric hernia, Esophageal spasm, GERD (gastroesophageal reflux disease), Hyperlipidemia, Hypertrophic cardiomyopathy ([address] IHSS (idiopathic hypertrophic subaortic stenosis) ([address] Kidney stones, HUNTER on CPAP, Primary localized osteoarthrosis, shoulder region, Prostate cancer ([address] PVC's (premature ventricular contractions), RBBB (right bundle branch block), RLS (restless legs syndrome), Seasonal allergies, and Tachycardia. Surgical History:   has a past surgical history that includes Knee arthroscopy (Right, 1982);  Umbilical hernia repair; Colonoscopy (1/17/11); TURP (2012); Inguinal hernia repair (Bilateral, 2009, 2012,); Varicocelectomy (2000); Shoulder arthroscopy (Right, 12/31/2013); Endoscopy, colon, diagnostic; Upper gastrointestinal endoscopy (9/3); Elbow surgery (2015); Carpal tunnel release (Left); Knee arthroscopy (Left, 12/21/2017); Colonoscopy (N/A, 2/2/2020); Knee arthroscopy (Left, 7/17/2020); Insertable Cardiac Monitor (Left, 12/11/2020); and Insertable Cardiac Monitor (12/11/2020). Social History:   reports that he has never smoked. He has never used smokeless tobacco. He reports current alcohol use of about 1.0 - 2.0 standard drinks of alcohol per week. He reports that he does not use drugs. Family History:  family history includes Cancer in his mother; Heart Disease in his maternal grandfather and maternal grandmother; High Blood Pressure in his father; High Cholesterol in his brother and mother; Prostate Cancer in his paternal uncle.      Home Medications:  Outpatient Encounter Medications as of 4/19/2021   Medication Sig Dispense Refill    Zinc Sulfate (ZINC 15 PO) daily       Cholecalciferol 25 MCG (1000 UT) CHEW Take 1,000 Units by mouth daily      clonazePAM (KLONOPIN) 0.5 MG tablet TAKE 1 TABLET BY MOUTH AT BEDTIME AS NEEDED      verapamil (VERELAN) 240 MG extended release capsule TAKE 1 CAPSULE BY MOUTH EVERY DAY AT NIGHT 90 capsule 0    omeprazole (PRILOSEC) 40 MG delayed release capsule TAKE 1 CAPSULE BY MOUTH EVERY DAY 90 capsule 1    apixaban (ELIQUIS) 5 MG TABS tablet TAKE 1 TABLET BY MOUTH TWICE A  tablet 2    amiodarone (CORDARONE) 200 MG tablet TAKE 1 TABLET BY MOUTH EVERY DAY 90 tablet 1    butalbital-APAP-caffeine -40 MG CAPS per capsule TK ONE C PO  Q 12 H PRF SEVERE HEADACHE      atorvastatin (LIPITOR) 20 MG tablet TAKE 1 TABLET BY MOUTH ONE TIME A DAY 90 tablet 1    tamsulosin (FLOMAX) 0.4 MG capsule Take 1 capsule by mouth daily for 5 days 5 capsule 0     Facility-Administered Encounter Medications as of 4/19/2021   Medication Dose Route Frequency Provider Last Rate Last Admin    sodium hyaluronate (viscosup) injection 20 mg  20 mg [facility]articular [facility] [place]           Allergies:  Niacin and related     Review of Systems   Constitutional: Negative. HENT: Negative. Eyes: Negative. Respiratory: Negative. Cardiovascular: Negative. Gastrointestinal: Negative. Genitourinary: Negative. Musculoskeletal: Negative. Skin: Negative. Neurological: Negative. Hematological: Negative. Psychiatric/Behavioral: Negative. /74   Pulse 52   Ht 5' 11\" (1.803 m)   Wt 202 lb (91.6 kg)   BMI 28.17 kg/m²     Data:     ECHO 10/2/2019:      Summary   Normal systolic function with an estimated ejection fraction of 55-60%. Moderate concentric left ventricular hypertrophy ( septal wall is more   increased in size (1.6 cm) ). No regional wall motion abnormalities are seen. Normal left ventricular diastolic filling pressure. The left atrium is severely dilated. The right atrium is mildly dilated. Mild aortic regurgitation. Mild mitral and pulmonic regurgitation. Objective:  Physical Exam   Constitutional: He is oriented to person, place, and time. He appears well-developed and well-nourished. HENT:   Head: Normocephalic and atraumatic. Eyes: Pupils are equal, round, and reactive to light. Neck: Normal range of motion. Cardiovascular: Normal rate, regular rhythm and physiologically split 2nd heart sound, 2/6 VASHTI  Pulmonary/Chest: Effort normal and breath sounds normal.   Abdominal: Soft. No tenderness. Musculoskeletal: Normal range of motion. He exhibits no edema. Neurological: He is alert and oriented to person, place, and time. Skin: Skin is warm and dry. Psychiatric: He has a normal mood and affect. Assessment:  1. PAF- Eliquis for stroke prevention.  Amiodarone decreased to 100mg QD 4/19/2021.   2. HCM without LVOT gradient- he does not have any major risk factors for SCA in HCM. He does however have > 20% scar burden by LGE in the basal septum. In some studies, this finding has a stronger correlation with risk than the major risk factors. His age (> 61 years) is associated with a strong reduction in risk. We discussed these issues and have decided to monitor for symptoms and defer ICD implantation at this time. 3. PVCs-  2 week monitor worn 4/2020 demonstrated <1% PVC burden. No PVCs noted per ECG 4/19/2021.  4. RBBB- Per ECG 4/19/2021. Plan:  1. Decrease amiodarone to 100mg QD.    -TSH and hepatic function panel ordered for medication management. 2. Bilateral venous duplex to assess left leg pain. 3. Remote device checks every month. 4. Follow up with EP NP in 6 months. QUALITY MEASURES  1. Tobacco Cessation Counseling: NA  2. Retake of BP if >140/90:   NA  3. Documentation to PCP/referring for new patient:  Sent to PCP at close of office visit  4. CAD patient on anti-platelet: NA  5. CAD patient on STATIN therapy:  NA  6. Patient with aFib on anticoagulation:  Yes      This note has been scribed in the presence of Joe Hills MD by Ajit Hill, PENELOPE.       I, Dr. Joe Hills, personally performed the services described in this documentation as scribed by  Ajit Hill RN in my presence, and it is both accurate and complete. Joe Hills M.D.              Sincerely,      Joe Hills MD

## 2021-05-05 ENCOUNTER — NURSE ONLY (OUTPATIENT)
Dept: CARDIOLOGY CLINIC | Age: 61
End: 2021-05-05
Payer: COMMERCIAL

## 2021-05-05 DIAGNOSIS — Z45.09 ENCOUNTER FOR LOOP RECORDER CHECK: ICD-10-CM

## 2021-05-05 DIAGNOSIS — I49.3 PVC (PREMATURE VENTRICULAR CONTRACTION): ICD-10-CM

## 2021-05-05 DIAGNOSIS — I48.91 ATRIAL FIBRILLATION, UNSPECIFIED TYPE (HCC): ICD-10-CM

## 2021-05-07 ENCOUNTER — IMMUNIZATION (OUTPATIENT)
Dept: PRIMARY CARE CLINIC | Age: 61
End: 2021-05-07
Payer: COMMERCIAL

## 2021-05-07 PROCEDURE — 0012A COVID-19, MODERNA VACCINE 100MCG/0.5ML DOSE: CPT | Performed by: FAMILY MEDICINE

## 2021-05-07 PROCEDURE — 91301 COVID-19, MODERNA VACCINE 100MCG/0.5ML DOSE: CPT | Performed by: FAMILY MEDICINE

## 2021-05-11 NOTE — PROGRESS NOTES
Remote interrogation of implanted cardiac event monitor shows normal function. ILR implanted 12/11/20 by Dr Della Topete for pAF and nsvt. He has a hx of pAF and is on eliquis and amio. AFib 12/15/20 x 14 hrs then converted to sinus rhythm on 12/15/20 @ 6pm.   3.5 sec conversion pause at 6:00 pm. On 12/15   No  arrhythmias recorded.   Follow up 1 month via [id number].

## 2021-05-12 ENCOUNTER — OFFICE VISIT (OUTPATIENT)
Dept: FAMILY MEDICINE CLINIC | Age: 61
End: 2021-05-12
Payer: COMMERCIAL

## 2021-05-12 VITALS
BODY MASS INDEX: 28.48 KG/M2 | WEIGHT: 203.4 LBS | OXYGEN SATURATION: 98 % | RESPIRATION RATE: 16 BRPM | HEART RATE: 55 BPM | HEIGHT: 71 IN | DIASTOLIC BLOOD PRESSURE: 68 MMHG | SYSTOLIC BLOOD PRESSURE: 112 MMHG

## 2021-05-12 DIAGNOSIS — Z87.19 HISTORY OF GI BLEED: ICD-10-CM

## 2021-05-12 DIAGNOSIS — E78.5 HYPERLIPIDEMIA, UNSPECIFIED HYPERLIPIDEMIA TYPE: ICD-10-CM

## 2021-05-12 DIAGNOSIS — I48.0 PAROXYSMAL ATRIAL FIBRILLATION (HCC): ICD-10-CM

## 2021-05-12 DIAGNOSIS — I42.2 HYPERTROPHIC CARDIOMYOPATHY (HCC): ICD-10-CM

## 2021-05-12 DIAGNOSIS — Z85.46 PERSONAL HISTORY OF MALIGNANT NEOPLASM OF PROSTATE: ICD-10-CM

## 2021-05-12 DIAGNOSIS — Z79.01 CURRENT USE OF LONG TERM ANTICOAGULATION: ICD-10-CM

## 2021-05-12 DIAGNOSIS — R73.9 ELEVATED BLOOD SUGAR: Primary | ICD-10-CM

## 2021-05-12 LAB
A/G RATIO: 2 (ref 1.1–2.2)
ALBUMIN SERPL-MCNC: 4.5 G/DL (ref 3.4–5)
ALP BLD-CCNC: 102 U/L (ref 40–129)
ALT SERPL-CCNC: 17 U/L (ref 10–40)
ANION GAP SERPL CALCULATED.3IONS-SCNC: 10 MMOL/L (ref 3–16)
AST SERPL-CCNC: 17 U/L (ref 15–37)
BILIRUB SERPL-MCNC: 0.4 MG/DL (ref 0–1)
BUN BLDV-MCNC: 17 MG/DL (ref 7–20)
CALCIUM SERPL-MCNC: 9.4 MG/DL (ref 8.3–10.6)
CHLORIDE BLD-SCNC: 107 MMOL/L (ref 99–110)
CHOLESTEROL, TOTAL: 177 MG/DL (ref 0–199)
CO2: 29 MMOL/L (ref 21–32)
CREAT SERPL-MCNC: 0.9 MG/DL (ref 0.8–1.3)
GFR AFRICAN AMERICAN: >60
GFR NON-AFRICAN AMERICAN: >60
GLOBULIN: 2.3 G/DL
GLUCOSE BLD-MCNC: 108 MG/DL (ref 70–99)
HBA1C MFR BLD: 6 %
HDLC SERPL-MCNC: 57 MG/DL (ref 40–60)
LDL CHOLESTEROL CALCULATED: 110 MG/DL
POTASSIUM SERPL-SCNC: 4.9 MMOL/L (ref 3.5–5.1)
SODIUM BLD-SCNC: 146 MMOL/L (ref 136–145)
TOTAL PROTEIN: 6.8 G/DL (ref 6.4–8.2)
TRIGL SERPL-MCNC: 51 MG/DL (ref 0–150)
TSH SERPL DL<=0.05 MIU/L-ACNC: 3.21 UIU/ML (ref 0.27–4.2)
VLDLC SERPL CALC-MCNC: 10 MG/DL

## 2021-05-12 PROCEDURE — 99214 OFFICE O/P EST MOD 30 MIN: CPT | Performed by: FAMILY MEDICINE

## 2021-05-12 PROCEDURE — 83036 HEMOGLOBIN GLYCOSYLATED A1C: CPT | Performed by: FAMILY MEDICINE

## 2021-05-12 RX ORDER — ATORVASTATIN CALCIUM 20 MG/1
TABLET, FILM COATED ORAL
Qty: 90 TABLET | Refills: 1 | Status: SHIPPED | OUTPATIENT
Start: 2021-05-12 | End: 2021-08-23

## 2021-05-12 ASSESSMENT — PATIENT HEALTH QUESTIONNAIRE - PHQ9
SUM OF ALL RESPONSES TO PHQ9 QUESTIONS 1 & 2: 0
SUM OF ALL RESPONSES TO PHQ QUESTIONS 1-9: 0
2. FEELING DOWN, DEPRESSED OR HOPELESS: 0

## 2021-05-12 NOTE — PROGRESS NOTES
Tahira Cobb presents for   Chief Complaint   Patient presents with    Hyperlipidemia     6 mnth f/u fasting    Hyperglycemia    Hypertension        ASSESSMENT:   Diagnosis Orders   1. Elevated blood sugar, today 6.0 improved from September 2020 when it was 6.2 POCT glycosylated hemoglobin (Hb A1C)   2. Hyperlipidemia, unspecified hyperlipidemia type, continue statin labs are pending Lipid Panel    Comprehensive Metabolic Panel    atorvastatin (LIPITOR) 20 MG tablet   3. Paroxysmal atrial fibrillation ([facility] [address] patient on Eliquis, Verelan and amiodarone. Patient needs thyroid lab work to monitor for hypothyroidism as a side effect of amiodarone TSH without Reflex   4. Hypertrophic cardiomyopathy ([facility] [address] stable patient saw cardiology 4/21 note reviewed    5. History of GI bleed, patient on PPI especially important since he needs to be on Eliquis no symptoms of bleeding currently    6. Current use of long term anticoagulation     7. Personal history of malignant neoplasm of prostate   patient follows up with urology every 6 to 12 months        Plan:  1)  Medication: continue current medication regimen unchanged, meds are tolerated and working continue as listed  2)  Recheck in 6 months, sooner should new symptoms or problems arise. 3) LLR          SUBJECTIVE:  Tahira Cobb is a 64 y.o. male who presents for evaluation of paroxysmal A. fib, cardiomyopathy, elevated blood sugar, history of GI bleed, prostate cancer, hypertension and hyperlipidemia. He indicates that he is feeling well and denies any symptoms referable to his elevated blood pressure. Specifically denies chest pain, palpitations, dyspnea, orthopnea, PND or peripheral edema or neuro sx. No anorexia, arthralgia, or leg cramps noted. Current medication regimen is as listed below. He denies any side effects of medication, and has been taking it regularly.    Lab Results   Component Value Date    LDLCALC 115 (H) 09/30/2020       Patient is doing well, no acute issues. His recent visits with cardiology were reviewed. Lab work was reviewed. Patient's last A1c was 6.2. He is diet controlled not on any medications. He has a history of GI bleed, he is on a PPI for protection, even on Eliquis for his PAF he has not had any evidence of bleeding. Patient follows up with urology every 6 to 12 months to monitor his prostate cancer. Patient is fasting today for the testing    Current Outpatient Medications   Medication Sig Dispense Refill    atorvastatin (LIPITOR) 20 MG tablet TAKE 1 TABLET BY MOUTH ONE TIME A DAY 90 tablet 1    amiodarone (PACERONE) 100 MG tablet Take 1 tablet by mouth daily 90 tablet 3    apixaban (ELIQUIS) 5 MG TABS tablet TAKE 1 TABLET BY MOUTH TWICE A  tablet 3    verapamil (VERELAN) 240 MG extended release capsule TAKE 1 CAPSULE BY MOUTH EVERY DAY AT NIGHT 90 capsule 3    Zinc Sulfate (ZINC 15 PO) daily       Cholecalciferol 25 MCG (1000 UT) CHEW Take 1,000 Units by mouth daily      clonazePAM (KLONOPIN) 0.5 MG tablet TAKE 1 TABLET BY MOUTH AT BEDTIME AS NEEDED      omeprazole (PRILOSEC) 40 MG delayed release capsule TAKE 1 CAPSULE BY MOUTH EVERY DAY 90 capsule 1    butalbital-APAP-caffeine -40 MG CAPS per capsule TK ONE C PO  Q 12 H PRF SEVERE HEADACHE      tamsulosin (FLOMAX) 0.4 MG capsule Take 1 capsule by mouth daily for 5 days 5 capsule 0     Current Facility-Administered Medications   Medication Dose Route Frequency Provider Last Rate Last Admin    sodium hyaluronate (viscosup) injection 20 mg  20 mg Intra-articular Once Cate Zarate, [address]           Allergies   Allergen Reactions    Niacin And Related      Extreme itching /stinging started at head to waist       Social History     Tobacco Use    Smoking status: Never Smoker    Smokeless tobacco: Never Used   Substance Use Topics    Alcohol use:  Yes     Alcohol/week: 1.0 - 2.0 standard drinks     Types: 1 Cans of beer per week       OBJECTIVE:   /68   Pulse

## 2021-05-14 PROCEDURE — G2066 INTER DEVC REMOTE 30D: HCPCS | Performed by: INTERNAL MEDICINE

## 2021-05-14 PROCEDURE — 93298 REM INTERROG DEV EVAL SCRMS: CPT | Performed by: INTERNAL MEDICINE

## 2021-05-18 ENCOUNTER — TELEPHONE (OUTPATIENT)
Dept: FAMILY MEDICINE CLINIC | Age: 61
End: 2021-05-18

## 2021-05-18 DIAGNOSIS — E78.5 HYPERLIPIDEMIA, UNSPECIFIED HYPERLIPIDEMIA TYPE: Primary | ICD-10-CM

## 2021-05-18 NOTE — TELEPHONE ENCOUNTER
Patient was told by his pharmacist that his heart medications could amplify the atorvastatin. Pharmacist asked patient if he had any muscle pains or discomfort, he does. He was advised to contact his PCP and advise.

## 2021-05-18 NOTE — TELEPHONE ENCOUNTER
Call pharmacy and find out exactly what medications are interfering each other.   The majority of his medicines the cardiologist prescribes

## 2021-05-19 RX ORDER — AMIODARONE HYDROCHLORIDE 200 MG/1
TABLET ORAL
Qty: 90 TABLET | Refills: 1 | OUTPATIENT
Start: 2021-05-19

## 2021-05-19 RX ORDER — PRAVASTATIN SODIUM 20 MG
20 TABLET ORAL EVERY EVENING
Qty: 90 TABLET | Refills: 1 | Status: SHIPPED | OUTPATIENT
Start: 2021-05-19 | End: 2021-10-12 | Stop reason: ALTCHOICE

## 2021-05-19 NOTE — TELEPHONE ENCOUNTER
Patient has a very significant cardiac history.   I suggest that cardiology be contacted to find out what adjustments, if any, they would like to make

## 2021-05-21 NOTE — TELEPHONE ENCOUNTER
Patient returned call. I advised of physician note, prescription for Pravachol sent to [facility]. He understood.

## 2021-06-09 ENCOUNTER — NURSE ONLY (OUTPATIENT)
Dept: CARDIOLOGY CLINIC | Age: 61
End: 2021-06-09

## 2021-06-09 DIAGNOSIS — I49.3 PVC (PREMATURE VENTRICULAR CONTRACTION): ICD-10-CM

## 2021-06-09 DIAGNOSIS — I48.91 ATRIAL FIBRILLATION, UNSPECIFIED TYPE (HCC): ICD-10-CM

## 2021-06-09 DIAGNOSIS — Z45.09 ENCOUNTER FOR LOOP RECORDER CHECK: ICD-10-CM

## 2021-06-09 NOTE — PROGRESS NOTES
Remote interrogation of implanted cardiac event monitor shows normal function. Patient has a history of AF and PVCs. Takes amiodarone, Eliquis, and verapamil. Patient's last device interrogation was on 5/5. Since last interrogation, no arrhythmias recorded. JANUSZ PENA to review. See Pace[facility] report under the Cardiology tab. Follow up 1 month via [facility].

## 2021-06-11 PROCEDURE — 93298 REM INTERROG DEV EVAL SCRMS: CPT | Performed by: INTERNAL MEDICINE

## 2021-06-11 PROCEDURE — G2066 INTER DEVC REMOTE 30D: HCPCS | Performed by: INTERNAL MEDICINE

## 2021-07-09 ENCOUNTER — OFFICE VISIT (OUTPATIENT)
Dept: ORTHOPEDIC SURGERY | Age: 61
End: 2021-07-09
Payer: COMMERCIAL

## 2021-07-09 VITALS — WEIGHT: 203 LBS | BODY MASS INDEX: 28.42 KG/M2 | HEIGHT: 71 IN

## 2021-07-09 DIAGNOSIS — M76.61 ACHILLES TENDINITIS OF BOTH LOWER EXTREMITIES: Primary | ICD-10-CM

## 2021-07-09 DIAGNOSIS — M76.62 ACHILLES TENDINITIS OF BOTH LOWER EXTREMITIES: Primary | ICD-10-CM

## 2021-07-09 PROCEDURE — 99203 OFFICE O/P NEW LOW 30 MIN: CPT | Performed by: ORTHOPAEDIC SURGERY

## 2021-07-09 NOTE — PATIENT INSTRUCTIONS
ACHILLES TENDON STRETCHING PROGRAM       Hello and welcome to the most important stretch that you can do for your lower legs and feet. The achilles tendon is 9 times stronger than any other lower leg tendon. When this tendon gets tight, it causes a cascade of gait changes that can cause injury to other tendons and joints in the foot. Our stretching program is aimed at treating the primary problem, achilles tendon tightness. In turn, the problems that you are experiencing, which are being caused or exacerbated by the tightness, will improve and resolve in time. Stretching your achilles tendon is the best maintenance you can do for your foot. Achilles tendon tightness develops gradually in all of us as time goes by. Some people have a genetic propensity to get tighter more quickly and at a younger age. Others may perform activities or sports that accelerate the tightness. The exact cause is not critical.  What is critical is diagnosing the tightness and treating it. Untreated achilles tendon tightness can lead to a host of problems including:  plantar fasciitis, achilles tendinitis, midfoot arthritis, metatarsalgia, and hammertoes to name just a few. The stretching program outlined below  has been validated hundreds and hundreds of times over the last 17 years of our practice. Try to follow the stretching protocol as closely as you can. It is designed to gradually improve your flexibility safely and comfortably. Some patients may require as little as 3 weeks to see improvement, while others may need upwards of 5-6 months to break through a long standing Achilles contracture. This stretching should be done 3 times a day and divided roughly throughout the day. You should begin the stretch at 15 seconds three times daily and gradually increase your stretching as explained later in the handout. Within 2 months you will be stretching 9 minutes a day.       The stretch can be performed on the edge is ultimately 3 minutes 3 times per day, EVERY DAY.  Do the stretching in one session or cluster. Think of it as doing three sets, and each time you are on the One Stretch being the repetitions in that particular set. After each set (15 sec., 3 min. , etc.) is completed, take a brief rest (brush your teeth, please), and then on to the next set. Complete all three sets and you are finished for the day. There is no benefit to spreading these exercises throughout the day, even though this would seem logical. However this method makes it easy to forget and quit.  A light to moderate stretch that you feel in your calf is as good as a hard intense stretch. Be Patient!  You can decide for yourself the length and number of sets you want to do, but this is our recommended stretching protocol. Suggested Starting Points and Weekly Progression  Most people start right out at 3 minutes 3 time per day. However, we recommend considering a more conservative, safer start using shorter times and building up especially considering your age, health status, and current state of fitness as shown in the graphic below. Of course if you find the start too slow you can advance as tolerated. The Finer Points: More Detail  Calf stretching is very important for a wide variety of people whether you have a problem or not. And there is absolutely no better or safer way to do calf stretching than on the One Stretch. The ultimate goal would be for us all to stretch our calves everyday and prevent the majority of foot and ankle problems before they occur. Now that would be a novel concept, wouldnt it. There are a whole variety of treatments for the problems that tight calves may cause, a few of which you may have already tried. These would include physical therapy, orthotics, rest, immobilization, injections, and non-steroidal anti-inflammatory medicines.  While these treatments may help the symptoms, none correct the actual cause, your calf that is too tight, and therefore, are usually not long-lasting. First of all, you must know that the primary cause of the majority of foot and ankle problems is due to our calves that have become too tight as we age. Usually the calf contracture is silent, so you are not aware. The calf muscle and Achilles tendon are a continuous structure on the back of the leg, which in turn causes increased stress on most areas of your foot and ankle when the calf is too tight. As people age, tightness (contracture) is almost inevitable. There are several reasons this occurs such as:  1. Decreased daily activities. As we become more sedentary, we have less daily stretch of the calf muscles. 2. Age-related decrease in the elasticity of the calf connective tissue. 3. Higher heeled shoes will put the calf in a shortened position and it tends to stay there. 4. Athletes/runners calves shorten for reasons outside the scope of this handout. Our treatment is aimed at solving the primary problem of calf contractures with simple static calf stretching. In turn, your problem, which is due to or aggravated by calf contractures, will improve or resolve completely. Simple calf stretching is the treatment of choice and will generally obtain satisfactory to complete relief in better than 95% of the patients. Some patients may require as little as 2 weeks to see improvement while others may need upwards of 5-6 months to break through a long standing calf contracture. In the beginning the amount you stretch will vary due to pain or soreness of the calf muscles, heels, or other problem we are treating. This stretch will be done with both feet, therefore you may experience increased new pain in both heels, but this new pain will resolve as well. This stretching should be done three times a day; building up to three minutes of hang time for each session or set as detailed on the chart above.  For the best consistency do the stretching sessions like sets or in a cluster; do your first stretch session and take a break for 1-2 minutes (e.g., take your shower) then the next stretch session and so on. That way you are done for the day and you are less likely to miss. With your back against the wall and your knees straight, place the arch of your feet on the One Stretch (it is best to wear tennis shoes or rubber soled shoes in order to gain better traction to secure your feet) and slowly relax your ankles, letting your heels go downward. If you are in the correct position you should feel a pulling or tightness in your upper calf muscle, below the knee. It is OK to feel some mild pain. The amount of time you start at is dependent on your age, fitness, and any current health issues (see chart above). For instance, if you are young, fit, and healthy you might start at 3 minutes right away. Of course it is always best to use caution when choosing your starting point. Do the three sessions per day gradually increasing the amount of hang time. At times you may need to stay at the same amount of hang time for a few extra days. As you become accustomed to that particular amount of hang time you will need to increase it gradually in the same manner until you reach the maximum of 3 minutes each session. This is a gradual process and although you may want to get right to three minutes of hang time it may not be advisable to do so as you may cause yourself unnecessary pain. Also, keep the intensity of your stretch reasonable. A light to moderate stretch that you feel in your calf is as good as a hard intense stretch. The length of time you stretch has been found to be much more important than the intensity of the stretch. Be Patient! Dont over do it.   The majority of patients will experience new or a slight increase in pain somewhere between 2-6 weeks after beginning the stretching program. This is definitely expected and is usually only minor increase in pain and should not be excruciating. If it becomes severe, please consult your physician. If you continue the stretching program and work through this pain, it typically begins to resolve within a few weeks. As you progress with the stretching program, your response will have an up-and-down course (you will have some good days and bad days)this is expected and normal. While not every patients symptoms are relieved completely, usually a satisfactory result is obtained within one to four months. Once you have obtained satisfactory relief, we recommend that the stretching is continued FOREVER.       ACHILLES TENDON STRETCHING PROGRAM      Week I  15 seconds 3 times/day Week II - -30 seconds 3 times/day  Week III - -1 minute 3 times/day  Week IV - -1 ½ minutes 3 times/day  Week V - -2 minutes 3 times/day  Week VI - -2 ½ minutes 3 times/day  Week VII 3 minutes 3 times/day   for life, never stop                         Then maintenance of 3 minutes 1-2 times/day for life

## 2021-07-09 NOTE — PROGRESS NOTES
[facility]  Outpatient Progress Note  Mookie Archibald MD    Patient Name: Trish Rosales MRN: [id number]   Age: 64 y.o. YOB: 1960   Sex: male      [address] Complaint   Patient presents with    Pain     bi-lateral feet         HISTORY OF PRESENT ILLNESS   Trish Rosales is a 64 y.o. male presents to the office today for evaluation of complaints of pain in the Achilles tendon bilaterally left greater than right. Symptoms present for approximately 1 month. Patient believes he may have injured himself running playing baseball. He has discontinued this activity because of the pain. He has had no previous evaluation or treatment for this problem.     Pain Assessment  Location of Pain: Foot  Location Modifiers: Right, Left  Severity of Pain: 5  Quality of Pain: Sharp, Throbbing, Aching  Duration of Pain: Persistent  Frequency of Pain: Constant  Aggravating Factors: Squatting, Standing, Walking, Stairs  Limiting Behavior: Some  Relieving Factors: Ice, Rest  Result of Injury: No  Work-Related Injury: No  Are there other pain locations you wish to document?: No    PAST MEDICAL HISTORY      Past Medical History:   Diagnosis Date    A-fib [facility])     ADHD (attention deficit hyperactivity disorder)     Carpal tunnel syndrome 1/21/2015    Chronic low back pain     Chronic neck pain     Chronic sinusitis     Dr. Madison Ochoa Dental crown present     lower    Epigastric hernia     Esophageal spasm     GERD (gastroesophageal reflux disease)     Hyperlipidemia     Hypertrophic cardiomyopathy (HCC)     IHSS (idiopathic hypertrophic subaortic stenosis) (HCC)     Kidney stones     HUNTER on CPAP     Dr. Marilynn Monk- A-PAP    Primary localized osteoarthrosis, shoulder region 10/18/2013    Prostate cancer [facility])     prostate ; s/p radiation treatment    PVC's (premature ventricular contractions)     RBBB (right bundle branch block)     RLS (restless legs syndrome)      Shalom    Seasonal allergies     Tachycardia        PAST SURGICAL HISTORY     Past Surgical History:   Procedure Laterality Date    CARPAL TUNNEL RELEASE Left     COLONOSCOPY  1/17/11    COLONOSCOPY N/A 2/2/2020    COLONOSCOPY WITH BIOPSY performed [facility] Bhumi Thornton MD at [facility]  2015    left    ENDOSCOPY, COLON, DIAGNOSTIC      INGUINAL HERNIA REPAIR Bilateral 2009, 2012,    INSERTABLE CARDIAC MONITOR Left 12/11/2020    INSERTABLE CARDIAC MONITOR  12/11/2020    Loop Implant    KNEE ARTHROSCOPY Right 1982    Right    KNEE ARTHROSCOPY Left 12/21/2017    KNEE ARTHROSCOPY Left 7/17/2020    VIDEO ARTHROSCOPY LEFT KNEE, CHONDROPLASTY, PLICA EXCISION, PARTIAL MEDIAL LATERAL MENISECTOMY -SLEEP APNEA- performed by Ronaldo Frazier MD at [place] [address] ARTHROSCOPY Right 12/31/2013    VIDEO ARTHROSCOPY RIGHT SHOULDER, SUBACROMIAL DECOMPRESSION, DISTAL CLAVICLE RESECTION, ROTATOR CUFF DEBRIDEMENT          TURP  2024    X 2    UMBILICAL HERNIA REPAIR      X 2    UPPER GASTROINTESTINAL ENDOSCOPY  9/3    VARICOCELECTOMY  2000       MEDICATIONS     Current Outpatient Medications   Medication Sig Dispense Refill    pravastatin (PRAVACHOL) 20 MG tablet Take 1 tablet by mouth every evening 90 tablet 1    atorvastatin (LIPITOR) 20 MG tablet TAKE 1 TABLET BY MOUTH ONE TIME A DAY 90 tablet 1    amiodarone (PACERONE) 100 MG tablet Take 1 tablet by mouth daily 90 tablet 3    apixaban (ELIQUIS) 5 MG TABS tablet TAKE 1 TABLET BY MOUTH TWICE A  tablet 3    verapamil (VERELAN) 240 MG extended release capsule TAKE 1 CAPSULE BY MOUTH EVERY DAY AT NIGHT 90 capsule 3    Zinc Sulfate (ZINC 15 PO) daily       Cholecalciferol 25 MCG (1000 UT) CHEW Take 1,000 Units by mouth daily      clonazePAM (KLONOPIN) 0.5 MG tablet TAKE 1 TABLET BY MOUTH AT BEDTIME AS NEEDED      omeprazole (PRILOSEC) 40 MG delayed release capsule TAKE 1 CAPSULE BY MOUTH EVERY DAY 90 capsule 1    tamsulosin (FLOMAX) 0.4 MG capsule Take 1 capsule by mouth daily for 5 days 5 capsule 0    butalbital-APAP-caffeine -40 MG CAPS per capsule TK ONE C PO  Q 12 H PRF SEVERE HEADACHE       Current Facility-Administered Medications   Medication Dose Route Frequency Provider Last Rate Last Admin    sodium hyaluronate (viscosup) injection 20 mg  20 mg Intra-articular Once HERLINDA Tracy           ALLERGIES     Allergies   Allergen Reactions    Niacin And Related      Extreme itching /stinging started at head to waist       FAMILY HISTORY     Family History   Problem Relation Age of Onset    Cancer Mother         melenoma    High Cholesterol Mother     High Blood Pressure Father     High Cholesterol Brother     Heart Disease Maternal Grandmother     Heart Disease Maternal Grandfather     Prostate Cancer Paternal Uncle        SOCIAL HISTORY     Social History     Socioeconomic History    Marital status:      Spouse name: Not on file    Number of children: Not on file    Years of education: Not on file    Highest education level: Not on file   Occupational History    Not on file   Tobacco Use    Smoking status: Never Smoker    Smokeless tobacco: Never Used   Vaping Use    Vaping Use: Never used   Substance and Sexual Activity    Alcohol use: Yes     Alcohol/week: 1.0 - 2.0 standard drinks     Types: 1 Cans of beer per week    Drug use: No    Sexual activity: Yes     Partners: Female   Other Topics Concern    Not on file   Social History Narrative    Not on file     Social Determinants of Health     Financial Resource Strain:     Difficulty of Paying Living Expenses:    Food Insecurity:     Worried About Running Out of Food in the Last Year:     [address] in the Last Year:    Transportation Needs:     Lack of Transportation (Medical):      Lack of Transportation (Non-Medical):    Physical Activity:     Days of Exercise per Week:     Minutes of Exercise per Session:    Stress:     Feeling of Stress :    Social Connections:     Frequency of Communication with Friends and Family:     Frequency of Social Gatherings with Friends and Family:     Attends Gnosticist Services:     Active Member of Clubs or Organizations:     Attends Club or Organization Meetings:     Marital Status:    Intimate Partner Violence:     Fear of Current or Ex-Partner:     Emotionally Abused:     Physically Abused:     Sexually Abused:        REVIEW OF SYSTEMS   General: no fever, chills, night sweats, anorexia, malaise, fatigue, or weight change  Hematologic:  no unexplained bleeding or bruising  HEENT:   no nasal congestion, rhinorrhea, sore throat, or facial pain  Respiratory:  no cough, dyspnea, or chest pain  Cardiovascular:  no angina, VALERA, PND, orthopnea, dependent edema, or palpitations  Gastrointestinal:  no nausea, vomiting, diarrhea, constipation, or abdominal pain  Genitourinary:  no urinary urgency, frequency, dysuria, or hematuria  Musculoskeletal: see HPI  Endocrine:  no heat or cold intolerance and no polyphagia, polydipsia, or polyuria  Skin:  no skin eruptions or changing lesions  Neurologic:  no focal weakness, numbness/tingling, tremor, or severe headache. See HPI. See HPI for pertinent positives. PHYSICAL EXAM   Vital Signs:   Vitals:    07/09/21 1040   Weight: 203 lb (92.1 kg)   Height: 5' 11\" (1.803 m)       General appearance: healthy, alert, no distress  Skin: Skin color, texture, turgor normal. No rashes or lesions  HEENT: atraumatic, normocephalic. PERRL  Respiratory: Unlabored breathing  Lymphatic: No adenopathy   Neuro: Alert and oriented, normal distal sensation, normal bilateral DTRs  Vascular: Normal distal capillary and distal pulses  Muskuloskeletal Exam: Bilateral ankle examination demonstrates no swelling erythema warmth ecchymosis or edema. Tenderness noted in the mid substance of the Achilles bilaterally.   Fusiform thickness within the mid substance of the left Achilles tendon and a small nodule  palpable in the mid substance of the right Achilles tendon. Moderate Achilles tightness noted bilaterally. The remainder the foot and ankle examination are normal    RADIOLOGY   X-rays obtained and reviewed in office:  Views bilateral feet 3 views    Impression: No acute bony abnormality    IMPRESSION     1. Achilles tendinitis of both lower extremities         PLAN   I had a lengthy discussion with patient today regarding diagnosis and treatment options and recommendations. I have recommended shoewear modification, rest, ice, topical diclofenac and an Achilles tendon stretching program.  I believe he will benefit from supervised physical therapy. FOLLOWUP     Return in about 3 months (around 10/9/2021). Orders Placed This Encounter   Procedures    XR FOOT RIGHT (MIN 3 VIEWS)     Standing Status:   Future     Number of Occurrences:   1     Standing Expiration Date:   7/8/2022     Order Specific Question:   Reason for exam:     Answer:   pain    XR FOOT LEFT (MIN 3 VIEWS)     Standing Status:   Future     Number of Occurrences:   1     Standing Expiration Date:   7/8/2022     Order Specific Question:   Reason for exam:     Answer:   pain    Ambulatory referral to Physical Therapy     Referral Priority:   Routine     Referral Type:   Eval and Treat     Referral Reason:   Specialty Services Required     Number of Visits Requested:   1      No orders of the defined types were placed in this encounter.       Patient was instructed on appropriate use of braces, participation in home exercise programs, healthy lifestyle choices and weight loss as appropriate     Kenney Noland MD

## 2021-07-14 ENCOUNTER — NURSE ONLY (OUTPATIENT)
Dept: CARDIOLOGY CLINIC | Age: 61
End: 2021-07-14
Payer: COMMERCIAL

## 2021-07-14 DIAGNOSIS — I48.91 ATRIAL FIBRILLATION, UNSPECIFIED TYPE (HCC): ICD-10-CM

## 2021-07-14 DIAGNOSIS — Z45.09 ENCOUNTER FOR LOOP RECORDER CHECK: ICD-10-CM

## 2021-07-14 PROCEDURE — G2066 INTER DEVC REMOTE 30D: HCPCS | Performed by: INTERNAL MEDICINE

## 2021-07-14 PROCEDURE — 93298 REM INTERROG DEV EVAL SCRMS: CPT | Performed by: INTERNAL MEDICINE

## 2021-07-15 ENCOUNTER — TELEPHONE (OUTPATIENT)
Dept: ORTHOPEDIC SURGERY | Age: 61
End: 2021-07-15

## 2021-07-17 ENCOUNTER — PATIENT MESSAGE (OUTPATIENT)
Dept: ORTHOPEDIC SURGERY | Age: 61
End: 2021-07-17

## 2021-07-19 NOTE — TELEPHONE ENCOUNTER
From: Dakota Keys  To: Anastacio Chavez MD  Sent: 7/17/2021 11:15 AM EDT  Subject: Prescription Question    Requesting a perscription for Prednisone for my achilles tendinitis. I was just in for a visit on Friday 7/9.

## 2021-07-20 ENCOUNTER — HOSPITAL ENCOUNTER (OUTPATIENT)
Dept: PHYSICAL THERAPY | Age: 61
Setting detail: THERAPIES SERIES
Discharge: HOME | End: 2021-07-20
Payer: COMMERCIAL

## 2021-07-20 PROCEDURE — 97161 PT EVAL LOW COMPLEX 20 MIN: CPT | Performed by: PHYSICAL THERAPIST

## 2021-07-20 PROCEDURE — 97140 MANUAL THERAPY 1/> REGIONS: CPT | Performed by: PHYSICAL THERAPIST

## 2021-07-20 PROCEDURE — 97110 THERAPEUTIC EXERCISES: CPT | Performed by: PHYSICAL THERAPIST

## 2021-07-20 RX ORDER — METHYLPREDNISOLONE 4 MG/1
TABLET ORAL
Qty: 1 KIT | Refills: 1 | Status: SHIPPED | OUTPATIENT
Start: 2021-07-20 | End: 2021-08-23 | Stop reason: ALTCHOICE

## 2021-07-20 NOTE — FLOWSHEET NOTE
[facility],  [address]  Phone: [phone]  Fax [phone]    Physical Therapy Treatment Note/ Progress Report:           Date:  2021    Patient Name:  Merlin Graver"  :  1960  MRN: [id number]  Restrictions/Precautions:    Medical/Treatment Diagnosis Information:  · Diagnosis: M76.61, M76.62 (ICD-10-CM) - Achilles tendinitis of both lower extremities  · Treatment Diagnosis: B ankle pain M25.571, H29.929  Insurance/Certification information:  PT Insurance Information:  BCBS - 3000D - 90/10 - $0CP-40PT/40OT - NO AUTH  Physician Information:  Referring Practitioner: Dr Pranay Gavin  Has the plan of care been signed (Y/N):        []  Yes  [x]  No     Date of Patient follow up with Physician: 10/8/21      Is this a Progress Report:     []  Yes  [x]  No        If Yes:  Date Range for reporting period:  Beginnin21  Ending:     Progress report will be due (10 Rx or 30 days whichever is less):        Recertification will be due (POC Duration  / 90 days whichever is less): 21      Visit # Insurance Allowable Auth Required   In-person 1 40 []  Yes []  No    Telehealth   []  Yes []  No    Total          Functional Scale: LEFS 43%    Date assessed:  21      Therapy Diagnosis/Practice Pattern:E, conservative      Number of Comorbidities:  []0     []1-2    [x]3+    Latex Allergy:  [x]NO      []YES  Preferred Language for Healthcare:   [x]English       []other:      Pain level:  eval -10/10     SUBJECTIVE:  See eval    OBJECTIVE: See eval   Observation:    Test measurements:      RESTRICTIONS/PRECAUTIONS: a-fib, PVC's, chronic LBP, B knee a-scopes  baseball  Exercises/Interventions:     Therapeutic Ex (31334) Sets/sec/reps Notes/CUES   Standing gastroc stretch 3D 2x30\" HEP   Seated soleus S x30\" HEP, can doing this while working   Standing 1/4 ht DHR x10 HEP, pain free range   Soleus pump 40# x20 HEP                                      Pt ed: eval findings, POC, HEP, activity mod, ice vs heat, self massage x8'    Manual Intervention (87850)     IASTM HGPro sweeping up/down, fanning to gastroc, brushing to achilles, convex side, bevel 45* x15' total R/L    TC distraction gr V  Post talar glide gr lll-lV  Calc distraction x1 R, x3 L  4\" x15 R/L  2x30\" R/L Cavitation R                       NMR re-education (22538)  CUES NEEDED                                                Therapeutic Activity (18330)                                         Therapeutic Exercise and NMR EXR  [x] (13029) Provided verbal/tactile cueing for activities related to strengthening, flexibility, endurance, ROM for improvements in LE, proximal hip, and core control with self care, mobility, lifting, ambulation. [x] (60643) Provided verbal/tactile cueing for activities related to improving balance, coordination, kinesthetic sense, posture, motor skill, proprioception  to assist with LE, proximal hip, and core control in self care, mobility, lifting, ambulation and eccentric single leg control.      NMR and Therapeutic Activities:    [] (15021 or 01136) Provided verbal/tactile cueing for activities related to improving balance, coordination, kinesthetic sense, posture, motor skill, proprioception and motor activation to allow for proper function of core, proximal hip and LE with self care and ADLs  [] (10971) Gait Re-education- Provided training and instruction to the patient for proper LE, core and proximal hip recruitment and positioning and eccentric body weight control with ambulation re-education including up and down stairs     Home Exercise Program:    [x] (02380) Reviewed/Progressed HEP activities related to strengthening, flexibility, endurance, ROM of core, proximal hip and LE for functional self-care, mobility, lifting and ambulation/stair navigation   [] (76762)Reviewed/Progressed HEP activities related to improving balance, coordination, kinesthetic sense, posture, motor skill, proprioception of core, proximal hip and LE for self care, mobility, lifting, and ambulation/stair navigation      Manual Treatments:  PROM / STM / Oscillations-Mobs:  G-I, II, III, IV (PA's, Inf., Post.)  [x] (24731) Provided manual therapy to mobilize LE, proximal hip and/or LS spine soft tissue/joints for the purpose of modulating pain, promoting relaxation,  increasing ROM, reducing/eliminating soft tissue swelling/inflammation/restriction, improving soft tissue extensibility and allowing for proper ROM for normal function with self care, mobility, lifting and ambulation. Modalities:  MHP to B calf x10'   [] GAME READY (VASO)- for significant edema, swelling, pain control. Charges  Timed Code Treatment Minutes: 35   Total Treatment Minutes: 60     [x] EVAL (LOW) 71562   [] EVAL (MOD) 90533  [] EVAL (HIGH) 23730   [] RE-EVAL     [x] IK(17906) x1     [] IONTO  [] NMR (46345) x     [] VASO  [x] Manual (13102) x1      [] Other:  [] TA x      [] Mech Traction (20207)  [] ES(attended) (17464)      [] ES (un) (88799):       GOALS:  Patient stated goal: able to run/play baseball without pain  [] Progressing: [] Met: [] Not Met: [] Adjusted    Therapist goals for Patient:   Short Term Goals: To be achieved in: 2 weeks  1. Independent in HEP and progression per patient tolerance, in order to prevent re-injury. [] Progressing: [] Met: [] Not Met: [] Adjusted  2. Patient will have a decrease in pain to facilitate improvement in movement, function, and ADLs as indicated by Functional Deficits. [] Progressing: [] Met: [] Not Met: [] Adjusted    Long Term Goals: To be achieved in: 8 weeks  1. Disability index score of 20% or less for the LEFS to assist with reaching prior level of function. [] Progressing: [] Met: [] Not Met: [] Adjusted  2.  Patient will demonstrate increased AROM to at least 5* DF B w/ knee extended to allow for proper 5: Sport Specific Training     []  Ready to Return to Play, [employer]   []  Not Ready for Return to Sports   Comments:                         PLAN: See eval  [] Continue per plan of care [] Alter current plan (see comments above)  [x] Plan of care initiated [] Hold pending MD visit [] Discharge      Electronically signed by:  General Aguilar, PT    Note: If patient does not return for scheduled/ recommended follow up visits, this note will serve as a discharge from care along with most recent update on progress. Access Code: [email or web address]  URL: [email or web address]   Date: 07/20/2021  Prepared by: General Aguilar     Exercises  Standing Gastroc Stretch on Foam 1/2 Roll - 2 x daily - 7 x weekly - 1-2 sets - 30 seconds hold  Seated Soleus Stretch - 2 x daily - 7 x weekly  Standing Bilateral Heel Raise on Step - 1-2 x daily - 7 x weekly - 2-3 sets - 10-15 reps  Seated Heel Raise - 1-2 x daily - 7 x weekly - 2-3 sets - 10-15 reps

## 2021-07-20 NOTE — PLAN OF CARE
[facility], [address], [address] [place] [address]  Phone: [phone]  Fax [phone]     Physical Therapy Certification    Dear Referring Practitioner: Dr Tank Baker,    We had the pleasure of evaluating the following patient for physical therapy services at [address] [place] [address]. A summary of our findings can be found in the initial assessment below. This includes our plan of care. If you have any questions or concerns regarding these findings, please do not hesitate to contact me at the office phone number checked above. Thank you for the referral.       Physician Signature:_______________________________Date:__________________  By signing above (or electronic signature), therapists plan is approved by physician    Patient: Joe Ambrocio   : 1960   MRN: [id number]  Referring Physician: Referring Practitioner: Dr Tank Baker      Evaluation Date: 2021      Medical Diagnosis Information:  Diagnosis: M76.61, M76.62 (ICD-10-CM) - Achilles tendinitis of both lower extremities   Treatment Diagnosis: B ankle pain M25.571, M25.572                                         Insurance information: PT Insurance Information:  BCBS - 3000D - 90/10 - $0CP-40PT/40OT - NO AUTH       Precautions/ Contra-indications: Afib, PVC's    C-SSRS Triggered by Intake questionnaire (Past 2 wk assessment):   [x] No, Questionnaire did not trigger screening.   [] Yes, Patient intake triggered further evaluation      [] C-SSRS Screening completed  [] PCP notified via Plan of Care  [] Emergency services notified     Latex Allergy:  [x]NO      []YES  Preferred Language for Healthcare:   [x]English       []other:    SUBJECTIVE: Patient stated complaint:Pt reports onset of B achilles pain a little over a month ago when running in baseball. He had played 3 games without injury, but then started feeling pain in his achilles then next day.   He tried to play another couple of games, but the pain kept getting worse. He has since stopped playing due to pain. He saw Dr Irene Runner on 7/9/21. X-rays were negative and he was dx'd with B achilles tendonitis.       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    Living Status/Prior Level of Function: Independent with ADLs and IADLs, lives at home with his wife, stairs in the home, enjoys playing baseball    OBJECTIVE:     ROM LEFT RIGHT   HIP Flex     HIP Abd     HIP Ext     HIP IR     HIP ER     Knee ext     Knee Flex     Ankle PF 52 42   Ankle DF 2 0   Ankle In 37 27   Ankle Ev 25 20   Strength  LEFT RIGHT   HIP Flexors     HIP Abductors     HIP Ext     Hip ER     Knee EXT (quad)     Knee Flex (HS)     Ankle DF 5 5   Ankle PF 5 seated 5 seated w/ pain   Ankle Inv 5 5   Ankle EV 5 5        Circumference  Mid apex  7 cm prox             Reflexes/Sensation: NT   []Dermatomes/Myotomes intact    []Reflexes equal and normal bilaterally   []Other:    Joint mobility:    []Normal    [x]Hypo--post talar glide B, R subtalar jt w/ cavitation with mobilization   []Hyper    Palpation: tenderness with swelling/bump mid achilles    Functional Mobility/Transfers: indep    Posture: slight increased toe out B, decreased lumbar lordosis/PPT, increased thoracic kyphosis    Bandages/Dressings/Incisions: NA    Gait: (include devices/WB status) decreased push off B    Orthopedic Special Tests: pain with DHR at max height, (-) kenroy                       [x] Patient history, allergies, meds reviewed. Medical chart reviewed. See intake form. Review Of Systems (ROS):  [x]Performed Review of systems (Integumentary, CardioPulmonary, Neurological) by intake and observation. Intake form has been scanned into medical record. Patient has been instructed to contact their primary care physician regarding ROS issues if not already being addressed at this time. Co-morbidities/Complexities (which will affect course of rehabilitation):   []None           Arthritic conditions   []Rheumatoid arthritis (M05.9)  [x]Osteoarthritis (M19.91)   Cardiovascular conditions   []Hypertension (I10)  [x]Hyperlipidemia (E78.5)  []Angina pectoris (I20)  []Atherosclerosis (I70)  X  a-fib, PVC's Musculoskeletal conditions   []Disc pathology   []Congenital spine pathologies   [x]Prior surgical intervention  []Osteoporosis (M81.8)  []Osteopenia (M85.8)   Endocrine conditions   []Hypothyroid (E03.9)  []Hyperthyroid Gastrointestinal conditions   []Constipation (K61.08)   Metabolic conditions   []Morbid obesity (E66.01)  []Diabetes type 1(E10.65) or 2 (E11.65)   []Neuropathy (G60.9)     Pulmonary conditions   []Asthma (J45)  []Coughing   []COPD (J44.9)   Psychological Disorders  []Anxiety (F41.9)  []Depression (F32.9)   [x]Other: ADHD   []Other:          Barriers to/and or personal factors that will affect rehab potential:              []Age  []Sex              []Motivation/Lack of Motivation                        [x]Co-Morbidities              []Cognitive Function, education/learning barriers              []Environmental, home barriers              []profession/work barriers  [x]past PT/medical experience  []other:  Justification:     Falls Risk Assessment (30 days):   [x] Falls Risk assessed and no intervention required.   [] Falls Risk assessed and Patient requires intervention due to being higher risk   TUG score (>12s at risk):     [] Falls education provided, including           ASSESSMENT:   Functional Impairments:     []Noted lumbar/proximal hip/LE joint hypomobility   [x]Decreased LE functional ROM   [x]Decreased core/proximal hip strength and neuromuscular control   [x]Decreased LE functional strength   [x]Reduced balance/proprioceptive control   []other:      Functional Activity Limitations (from functional questionnaire and intake)   []Reduced ability to tolerate prolonged functional positions   [x]Reduced ability or difficulty with changes of positions or transfers between positions   []Reduced ability to maintain good posture and demonstrate good body mechanics with sitting, bending, and lifting   []Reduced ability to sleep   [] Reduced ability or tolerance with driving and/or computer work   [x]Reduced ability to perform lifting, carrying tasks   [x]Reduced ability to squat   []Reduced ability to forward bend   [x]Reduced ability to ambulate prolonged functional periods/distances/surfaces   [x]Reduced ability to ascend/descend stairs   [x]Reduced ability to run, hop, cut or jump   []other:    Participation Restrictions   []Reduced participation in self care activities   [x]Reduced participation in home management activities   []Reduced participation in work activities   [x]Reduced participation in social activities. [x]Reduced participation in sport/recreation activities. Classification :    []Signs/symptoms consistent with post-surgical status including decreased ROM, strength and function.    []Signs/symptoms consistent with joint sprain/strain   []Signs/symptoms consistent with patella-femoral syndrome   []Signs/symptoms consistent with knee OA/hip OA   []Signs/symptoms consistent with internal derangement of knee/Hip   []Signs/symptoms consistent with functional hip weakness/NMR control      [x]Signs/symptoms consistent with tendinitis/tendinosis    []signs/symptoms consistent with pathology which may benefit from Dry needling      []other:      Prognosis/Rehab Potential: below)    GOALS:  Patient stated goal: able to run/play baseball without pain  [] Progressing: [] Met: [] Not Met: [] Adjusted    Therapist goals for Patient:   Short Term Goals: To be achieved in: 2 weeks  1. Independent in HEP and progression per patient tolerance, in order to prevent re-injury. [] Progressing: [] Met: [] Not Met: [] Adjusted  2. Patient will have a decrease in pain to facilitate improvement in movement, function, and ADLs as indicated by Functional Deficits. [] Progressing: [] Met: [] Not Met: [] Adjusted    Long Term Goals: To be achieved in: 8 weeks  1. Disability index score of 20% or less for the LEFS to assist with reaching prior level of function. [] Progressing: [] Met: [] Not Met: [] Adjusted  2. Patient will demonstrate increased AROM to at least 5* DF B w/ knee extended to allow for proper joint functioning as indicated by patients Functional Deficits. [] Progressing: [] Met: [] Not Met: [] Adjusted  3. Patient will demonstrate an increase in Strength to 4+/5 post-lat hip and at least 15 single heel raises allow for proper functional mobility as indicated by patients Functional Deficits. [] Progressing: [] Met: [] Not Met: [] Adjusted  4. Patient will return to reciprocal stairs without increased symptoms or restriction. [] Progressing: [] Met: [] Not Met: [] Adjusted  5. Pt will progress to [facility] for sport specific training. [] Progressing: [] Met: [] Not Met: [] Adjusted     Electronically signed by:  Remberto Carrizales PT    Access Code: [email or web address]  URL: [email or web address]   Date: 07/20/2021  Prepared by: Juan Manuel Soler    Exercises  Standing Gastroc Stretch on Foam 1/2 Roll - 2 x daily - 7 x weekly - 1-2 sets - 30 seconds hold  Seated Soleus Stretch - 2 x daily - 7 x weekly  Standing Bilateral Heel Raise on Step - 1-2 x daily - 7 x weekly - 2-3 sets - 10-15 reps  Seated Heel Raise - 1-2 x daily - 7 x weekly - 2-3 sets - 10-15 reps

## 2021-07-23 ENCOUNTER — HOSPITAL ENCOUNTER (OUTPATIENT)
Dept: PHYSICAL THERAPY | Age: 61
Setting detail: THERAPIES SERIES
Discharge: HOME | End: 2021-07-23
Payer: COMMERCIAL

## 2021-07-23 PROCEDURE — 97140 MANUAL THERAPY 1/> REGIONS: CPT

## 2021-07-23 PROCEDURE — 97110 THERAPEUTIC EXERCISES: CPT

## 2021-07-27 ENCOUNTER — HOSPITAL ENCOUNTER (OUTPATIENT)
Dept: PHYSICAL THERAPY | Age: 61
Setting detail: THERAPIES SERIES
Discharge: HOME | End: 2021-07-27
Payer: COMMERCIAL

## 2021-07-27 PROCEDURE — 97140 MANUAL THERAPY 1/> REGIONS: CPT | Performed by: PHYSICAL THERAPIST

## 2021-07-27 PROCEDURE — 97110 THERAPEUTIC EXERCISES: CPT | Performed by: PHYSICAL THERAPIST

## 2021-07-27 NOTE — FLOWSHEET NOTE
[facility],  [address] [place]  Phone: [phone]  Fax [phone]    Physical Therapy Treatment Note/ Progress Report:           Date:  2021    Patient Name:  Halima Serna"  :  1960  MRN: [id number]  Restrictions/Precautions:    Medical/Treatment Diagnosis Information:  · Diagnosis: M76.61, M76.62 (ICD-10-CM) - Achilles tendinitis of both lower extremities  · Treatment Diagnosis: B ankle pain M25.571, H57.850  Insurance/Certification information:  PT Insurance Information:  BCBS - 3000D - 90/10 - $0CP-40PT/40OT - NO AUTH  Physician Information:  Referring Practitioner: Dr Monico Petersen  Has the plan of care been signed (Y/N):        [x]  Yes  []  No     Date of Patient follow up with Physician: 10/8/21      Is this a Progress Report:     []  Yes  [x]  No        If Yes:  Date Range for reporting period:  Beginnin21  Ending:     Progress report will be due (10 Rx or 30 days whichever is less): 93       Recertification will be due (POC Duration  / 90 days whichever is less): 21      Visit # Insurance Allowable Auth Required   In-person 3 40 []  Yes []  No    Telehealth   []  Yes []  No    Total          Functional Scale: LEFS 43%    Date assessed:  21      Therapy Diagnosis/Practice Pattern:E, conservative      Number of Comorbidities:  []0     []1-2    [x]3+    Latex Allergy:  [x]NO      []YES  Preferred Language for Healthcare:   [x]English       []other:      Pain level:  eval 1-10/10 Worst since     SUBJECTIVE:  Pt reports he still gets pain when he first gets up, but states that he loosens up more quickly now.     OBJECTIVE:    Observation:    Test measurements:   Foot Assessment Left Right  Comments   NWB Relationships      calcaneus forefoot 1st ray WB Measurements (STJ) Sub-talar neutral standing squatting Rearfoot to forefoot Mobility Subtalar joint Longitudinal midtarsal ioint Oblique midtarsal joint calcaneo-cuboid 1st Ray Mobility NWB WB     RESTRICTIONS/PRECAUTIONS: a-fib, PVC's, chronic LBP, B knee a-scopes  baseball  Exercises/Interventions:     Therapeutic Ex (18749) Sets/sec/reps Notes/CUES   Self TCJ mob post for inc'd DF with strap pre-sport/steps if stiff  (also showed kneeling and on step for when not at home) x15 rocks R, L ea Blue tband issued, less knee discomfort w/ mob foot post vs ant   gastroc S  Soleus S     DHR ecc control 1/4 ht concentric 140# 3\" ecc x25 Cues gr toe   KASSY hip ext 75# x25 R/L Core cues                                      Foot/ankle NWB'ing/WB'ing assess time     Pt ed:   Ed about foot mechanics; explained measurements. concerns.  '                  D Scheddies   Manual Intervention (57166) 29' total    IASTM HGPro sweeping up/down, fanning to gastroc, brushing to achilles, convex side, bevel 45* x15' total R/L    TC distraction gr V  Post talar glide gr lll-lV  Calc distraction  Calc ev x1 R, x3 L  4\" x15 R/L  2x30\" R/L  4\" x15 R/L Cavitation R/L   Great toe dist + ext 10\"x4 R, L                        NMR re-education (98086)  CUES NEEDED                                                Therapeutic Activity (39615)                                         Therapeutic Exercise and NMR EXR  [x] (19575) Provided verbal/tactile cueing for activities related to strengthening, flexibility, endurance, ROM for improvements in LE, proximal hip, and core control with self care, mobility, lifting, ambulation. [x] (40626) Provided verbal/tactile cueing for activities related to improving balance, coordination, kinesthetic sense, posture, motor skill, proprioception  to assist with LE, proximal hip, and core control in self care, mobility, lifting, ambulation and eccentric single leg control.      NMR and Therapeutic Activities:    [] (27447 or ) Provided verbal/tactile cueing for activities related to improving balance, coordination, kinesthetic sense, posture, motor skill, proprioception and motor activation to allow for proper function of core, proximal hip and LE with self care and ADLs  [] (91909) Gait Re-education- Provided training and instruction to the patient for proper LE, core and proximal hip recruitment and positioning and eccentric body weight control with ambulation re-education including up and down stairs     Home Exercise Program:    [x] (69478) Reviewed/Progressed HEP activities related to strengthening, flexibility, endurance, ROM of core, proximal hip and LE for functional self-care, mobility, lifting and ambulation/stair navigation   [] (88937)Reviewed/Progressed HEP activities related to improving balance, coordination, kinesthetic sense, posture, motor skill, proprioception of core, proximal hip and LE for self care, mobility, lifting, and ambulation/stair navigation      Manual Treatments:  PROM / STM / Oscillations-Mobs:  G-I, II, III, IV (PA's, Inf., Post.)  [x] (37944) Provided manual therapy to mobilize LE, proximal hip and/or LS spine soft tissue/joints for the purpose of modulating pain, promoting relaxation,  increasing ROM, reducing/eliminating soft tissue swelling/inflammation/restriction, improving soft tissue extensibility and allowing for proper ROM for normal function with self care, mobility, lifting and ambulation. Modalities:  '    declined   [] GAME READY (VASO)- for significant edema, swelling, pain control.      Charges  Timed Code Treatment Minutes: 45   Total Treatment Minutes: 45     [] EVAL (LOW) 38377   [] EVAL (MOD) 33200  [] EVAL (HIGH) 01593   [] RE-EVAL     [x] GK(64611) x1    [] IONTO  [] NMR (35818) x     [] VASO  [x] Manual (79054) x2      [] Other:  [] TA x      [] Mech Traction (45066)  [] ES(attended) (55304)      [] ES (un) (55273):       GOALS:  Patient stated goal: able to run/play baseball without pain  [] Progressing: [] Met: [] Not Met: [] Adjusted    Therapist goals for Patient:   Short Term Goals: To be achieved in: 2 weeks  1. Independent in HEP and progression per patient tolerance, in order to prevent re-injury. [] Progressing: [] Met: [] Not Met: [] Adjusted  2. Patient will have a decrease in pain to facilitate improvement in movement, function, and ADLs as indicated by Functional Deficits. [] Progressing: [] Met: [] Not Met: [] Adjusted    Long Term Goals: To be achieved in: 8 weeks  1. Disability index score of 20% or less for the LEFS to assist with reaching prior level of function. [] Progressing: [] Met: [] Not Met: [] Adjusted  2. Patient will demonstrate increased AROM to at least 5* DF B w/ knee extended to allow for proper joint functioning as indicated by patients Functional Deficits. [] Progressing: [] Met: [] Not Met: [] Adjusted  3. Patient will demonstrate an increase in Strength to 4+/5 post-lat hip and at least 15 single heel raises allow for proper functional mobility as indicated by patients Functional Deficits. [] Progressing: [] Met: [] Not Met: [] Adjusted  4. Patient will return to reciprocal stairs without increased symptoms or restriction. [] Progressing: [] Met: [] Not Met: [] Adjusted  5. Pt will progress to [employer] for sport specific training. [] Progressing: [] Met: [] Not Met: [] Adjusted    Progression Towards Functional goals:  [] Patient is progressing as expected towards functional goals listed. [] Progression is slowed due to complexities listed. [] Progression has been slowed due to co-morbidities. [x] Plan just implemented, too soon to assess goals progression  [] Other:     Overall Progression Towards Functional goals/ Treatment Progress Update:  [] Patient is progressing as expected towards functional goals listed. [] Progression is slowed due to complexities/Impairments listed. [] Progression has been slowed due to co-morbidities.   [x] Plan just implemented, too soon to assess goals progression <30days   [] Goals require adjustment due to lack of progress  [] Patient is not progressing as expected and requires additional follow up with physician  [] Other    Prognosis for POC: [x] Good [] Fair  [] Poor      Patient requires continued skilled intervention: [x] Yes  [] No    Treatment/Activity Tolerance:  [x] Patient able to complete treatment  [] Patient limited by fatigue  [] Patient limited by pain    [] Patient limited by other medical complications  [] Other:     ASSESSMENT: continued stiffness with 1st ray, calc and talar mobs. Self mob was reviewed, as pt stated he had a hard time getting this to work at home. Able to initiate resistance training without reported pain. Cues for gr toe activation as noted. Return to Play: (if applicable)   []  Stage 1: Intro to Strength   []  Stage 2: Return to Run and Strength   []  Stage 3: Return to Jump and Strength   []  Stage 4: Dynamic Strength and Agility   []  Stage 5: Sport Specific Training     []  Ready to Return to Play, Meets All Above Stages   []  Not Ready for Return to Sports   Comments:                         PLAN:   [x] Continue per plan of care [] Alter current plan (see comments above)  [] Plan of care initiated [] Hold pending MD visit [] Discharge      Electronically signed by:  Demetrius Garza, PT , DPT  Note: If patient does not return for scheduled/ recommended follow up visits, this note will serve as a discharge from care along with most recent update on progress. Access Code: [email or web address]  URL: [email or web address]  Date: 07/23/2021  Prepared by: Marian Jacob    Exercises  Standing Gastroc Stretch on Foam 1/2 Roll - 2 x daily - 7 x weekly - 1-2 sets - 30 seconds hold  Seated Soleus Stretch - 2 x daily - 7 x weekly  Standing Bilateral Heel Raise on Step - 1-2 x daily - 7 x weekly - 2-3 sets - 10-15 reps  Seated Heel Raise - 1-2 x daily - 7 x weekly - 2-3 sets - 10-15 reps  Half Kneel Ankle Dorsiflexion Self-Mobilization - 2 x daily - 7 x weekly - 15 reps - 3 hold  Standing Ankle Self-Mobilization - 2 x daily - 7 x weekly - 15 reps - 3 hold

## 2021-07-29 ENCOUNTER — HOSPITAL ENCOUNTER (OUTPATIENT)
Dept: PHYSICAL THERAPY | Age: 61
Setting detail: THERAPIES SERIES
Discharge: HOME | End: 2021-07-29
Payer: COMMERCIAL

## 2021-07-29 NOTE — FLOWSHEET NOTE
[facility],  [address]        Physical Therapy  Cancellation/No-show Note  Patient Name:  Zaynab Gary  :  1960   Date:  2021  Cancelled visits to date: 0  No-shows to date: 1    For today's appointment patient:  []  Cancelled  []  Rescheduled appointment  [x]  No-show     Reason given by patient:  []  Patient ill  []  Conflicting appointment  []  No transportation    []  Conflict with work  [x]  No reason given  []  Other:     Comments:      Electronically signed by:  General Aguilar PT

## 2021-08-03 ENCOUNTER — HOSPITAL ENCOUNTER (OUTPATIENT)
Dept: PHYSICAL THERAPY | Age: 61
Setting detail: THERAPIES SERIES
Discharge: HOME | End: 2021-08-03
Payer: COMMERCIAL

## 2021-08-03 PROCEDURE — 97140 MANUAL THERAPY 1/> REGIONS: CPT | Performed by: PHYSICAL THERAPIST

## 2021-08-03 PROCEDURE — 97110 THERAPEUTIC EXERCISES: CPT | Performed by: PHYSICAL THERAPIST

## 2021-08-03 NOTE — FLOWSHEET NOTE
[facility],  [address]  Phone: [phone]  Fax [phone]    Physical Therapy Treatment Note/ Progress Report:           Date:  8/3/2021    Patient Name:  Devin Costa"  :  1960  MRN: [id number]  Restrictions/Precautions:    Medical/Treatment Diagnosis Information:  · Diagnosis: M76.61, M76.62 (ICD-10-CM) - Achilles tendinitis of both lower extremities  · Treatment Diagnosis: B ankle pain M25.571, V82.892  Insurance/Certification information:  PT Insurance Information:  BCBS - 3000D - 90/10 - $0CP-40PT/40OT - NO AUTH  Physician Information:  Referring Practitioner: Dr Gino Boone  Has the plan of care been signed (Y/N):        [x]  Yes  []  No     Date of Patient follow up with Physician: 10/8/21      Is this a Progress Report:     []  Yes  [x]  No        If Yes:  Date Range for reporting period:  Beginnin21  Ending:     Progress report will be due (10 Rx or 30 days whichever is less): 59       Recertification will be due (POC Duration  / 90 days whichever is less): 21      Visit # Insurance Allowable Auth Required   In-person 4 40 []  Yes []  No    Telehealth   []  Yes []  No    Total          Functional Scale: LEFS 43%    Date assessed:  21      Therapy Diagnosis/Practice Pattern:E, conservative      Number of Comorbidities:  []0     []1-2    [x]3+    Latex Allergy:  [x]NO      []YES  Preferred Language for Healthcare:   [x]English       []other:      Pain level:  eval 1-10/10 2/10 after walking    SUBJECTIVE:  Pt reports he was able to walk 1.5 miles since LV with only mild soreness (2/10) following. He still gets stiff very easily, but the pain is less severe in his achilles when this happens. He still reports a lot of back pain and stiffness following periods of inactivity and also ongoing swelling in his L knee.     OBJECTIVE:    Observation:    Test measurements: Foot Assessment Left Right  Comments   NWB Relationships      calcaneus forefoot 1st ray WB Measurements (STJ) Sub-talar neutral standing squatting Rearfoot to forefoot Mobility Subtalar joint Longitudinal midtarsal ioint Oblique midtarsal joint calcaneo-cuboid 1st Ray Mobility NWB WB     RESTRICTIONS/PRECAUTIONS: a-fib, PVC's, chronic LBP, B knee a-scopes  baseball  Exercises/Interventions:     Therapeutic Ex (97964) Sets/sec/reps Notes/CUES   Self TCJ mob post for inc'd DF with strap pre-sport/steps if stiff  (also showed kneeling and on step for when not at home) x Blue tband issued, less knee discomfort w/ mob foot post vs ant   gastroc S  Soleus S 30\"x3    DHR ecc control 1/4 ht concentric  Cues gr toe   KASSY hip ext  Core cues             Reformer: squats heels down  Squats heels up  Walking  DHR 3R1B x25   3R1B x25  2R1B x20 R/L  2R1B Pillow for head, foot bar extended out        Alter-G NV? Foot/ankle NWB'ing/WB'ing assess time     Pt ed:   Ed about foot mechanics; explained measurements. concerns.  '                    Manual Intervention (80494) 23' total    IASTM HGPro sweeping up/down, fanning to gastroc, brushing to achilles, convex side, bevel 45* x10' total R/L    TC distraction gr V  Post talar glide gr lll-lV  Calc distraction  Calc ev x1 R, x3 L  4\" x15 R/L  2x30\" R/L  4\" x15 R/L Cavitation R/   Great toe dist + ext 10\"x4 R, L Cavitation R/L                       NMR re-education (28784)  CUES NEEDED   FSU to stork SLS on air-ex 8x10\" R/L Gr toe cues, wall prn                                           Therapeutic Activity (11399)                                         Therapeutic Exercise and NMR EXR  [x] (72931) Provided verbal/tactile cueing for activities related to strengthening, flexibility, endurance, ROM for improvements in LE, proximal hip, and core control with self care, mobility, lifting, ambulation.   [x] (60051) Provided verbal/tactile cueing for activities related to improving balance, coordination, kinesthetic sense, posture, motor skill, proprioception  to assist with LE, proximal hip, and core control in self care, mobility, lifting, ambulation and eccentric single leg control. NMR and Therapeutic Activities:    [] (00696 or 16282) Provided verbal/tactile cueing for activities related to improving balance, coordination, kinesthetic sense, posture, motor skill, proprioception and motor activation to allow for proper function of core, proximal hip and LE with self care and ADLs  [] (63021) Gait Re-education- Provided training and instruction to the patient for proper LE, core and proximal hip recruitment and positioning and eccentric body weight control with ambulation re-education including up and down stairs     Home Exercise Program:    [x] (65230) Reviewed/Progressed HEP activities related to strengthening, flexibility, endurance, ROM of core, proximal hip and LE for functional self-care, mobility, lifting and ambulation/stair navigation   [] (61551)Reviewed/Progressed HEP activities related to improving balance, coordination, kinesthetic sense, posture, motor skill, proprioception of core, proximal hip and LE for self care, mobility, lifting, and ambulation/stair navigation      Manual Treatments:  PROM / STM / Oscillations-Mobs:  G-I, II, III, IV (PA's, Inf., Post.)  [x] (29232) Provided manual therapy to mobilize LE, proximal hip and/or LS spine soft tissue/joints for the purpose of modulating pain, promoting relaxation,  increasing ROM, reducing/eliminating soft tissue swelling/inflammation/restriction, improving soft tissue extensibility and allowing for proper ROM for normal function with self care, mobility, lifting and ambulation. Modalities:  '    declined   [] GAME READY (VASO)- for significant edema, swelling, pain control.      Charges  Timed Code Treatment Minutes: 45   Total Treatment Minutes: 45     [] EVAL (LOW) 73200   [] EVAL (MOD) 90069  [] EVAL (HIGH) 96041   [] RE-EVAL     [x] XH(56973) x1    [] IONTO  [] NMR (36598) x     [] VASO  [x] Manual (81198) x2      [] Other:  [] TA x      [] Mech Traction (15860)  [] ES(attended) (07624)      [] ES (un) (78181):       GOALS:  Patient stated goal: able to run/play baseball without pain  [] Progressing: [] Met: [] Not Met: [] Adjusted    Therapist goals for Patient:   Short Term Goals: To be achieved in: 2 weeks  1. Independent in HEP and progression per patient tolerance, in order to prevent re-injury. [] Progressing: [] Met: [] Not Met: [] Adjusted  2. Patient will have a decrease in pain to facilitate improvement in movement, function, and ADLs as indicated by Functional Deficits. [] Progressing: [] Met: [] Not Met: [] Adjusted    Long Term Goals: To be achieved in: 8 weeks  1. Disability index score of 20% or less for the LEFS to assist with reaching prior level of function. [] Progressing: [] Met: [] Not Met: [] Adjusted  2. Patient will demonstrate increased AROM to at least 5* DF B w/ knee extended to allow for proper joint functioning as indicated by patients Functional Deficits. [] Progressing: [] Met: [] Not Met: [] Adjusted  3. Patient will demonstrate an increase in Strength to 4+/5 post-lat hip and at least 15 single heel raises allow for proper functional mobility as indicated by patients Functional Deficits. [] Progressing: [] Met: [] Not Met: [] Adjusted  4. Patient will return to reciprocal stairs without increased symptoms or restriction. [] Progressing: [] Met: [] Not Met: [] Adjusted  5. Pt will progress to [facility] for sport specific training. [] Progressing: [] Met: [] Not Met: [] Adjusted    Progression Towards Functional goals:  [] Patient is progressing as expected towards functional goals listed. [] Progression is slowed due to complexities listed. [] Progression has been slowed due to co-morbidities.   [x] Plan just implemented, too soon to assess goals progression  [] Other: Overall Progression Towards Functional goals/ Treatment Progress Update:  [] Patient is progressing as expected towards functional goals listed. [] Progression is slowed due to complexities/Impairments listed. [] Progression has been slowed due to co-morbidities. [x] Plan just implemented, too soon to assess goals progression <30days   [] Goals require adjustment due to lack of progress  [] Patient is not progressing as expected and requires additional follow up with physician  [] Other    Prognosis for POC: [x] Good [] Fair  [] Poor      Patient requires continued skilled intervention: [x] Yes  [] No    Treatment/Activity Tolerance:  [x] Patient able to complete treatment  [] Patient limited by fatigue  [] Patient limited by pain    [] Patient limited by other medical complications  [] Other:     ASSESSMENT: trial of reformer for foot and calf strength and stretching was tolerated well without reported pain. Some cues for gr toe activation on foot bar and more so with addition of standing SLS. Decreased stability noted L LE. Return to Play: (if applicable)   []  Stage 1: Intro to Strength   []  Stage 2: Return to Run and Strength   []  Stage 3: Return to Jump and Strength   []  Stage 4: Dynamic Strength and Agility   []  Stage 5: Sport Specific Training     []  Ready to Return to Play, Meets All Above Stages   []  Not Ready for Return to Sports   Comments:                         PLAN: Trial Alter-G NV?, cont foot and ankle mobility and strength  [x] Continue per plan of care [] Alter current plan (see comments above)  [] Plan of care initiated [] Hold pending MD visit [] Discharge      Electronically signed by:  Giovanni Peterson PT ,   Note: If patient does not return for scheduled/ recommended follow up visits, this note will serve as a discharge from care along with most recent update on progress. Access Code: [email or web address]  URL: [email or web address]  Date: 07/23/2021  Prepared by: Cyndi Mobley    Exercises  Standing Gastroc Stretch on Foam 1/2 Roll - 2 x daily - 7 x weekly - 1-2 sets - 30 seconds hold  Seated Soleus Stretch - 2 x daily - 7 x weekly  Standing Bilateral Heel Raise on Step - 1-2 x daily - 7 x weekly - 2-3 sets - 10-15 reps  Seated Heel Raise - 1-2 x daily - 7 x weekly - 2-3 sets - 10-15 reps  Half Kneel Ankle Dorsiflexion Self-Mobilization - 2 x daily - 7 x weekly - 15 reps - 3 hold  Standing Ankle Self-Mobilization - 2 x daily - 7 x weekly - 15 reps - 3 hold

## 2021-08-05 ENCOUNTER — HOSPITAL ENCOUNTER (OUTPATIENT)
Dept: PHYSICAL THERAPY | Age: 61
Setting detail: THERAPIES SERIES
Discharge: HOME | End: 2021-08-05
Payer: COMMERCIAL

## 2021-08-05 PROCEDURE — 97750 PHYSICAL PERFORMANCE TEST: CPT

## 2021-08-05 PROCEDURE — 97140 MANUAL THERAPY 1/> REGIONS: CPT

## 2021-08-05 PROCEDURE — 97110 THERAPEUTIC EXERCISES: CPT

## 2021-08-05 NOTE — FLOWSHEET NOTE
[facility],  [address]  Phone: [phone]  Fax [phone]    Physical Therapy Treatment Note/ Progress Report:           Date:  2021    Patient Name:  Sridhar Sue"  :  1960  MRN: [id number]  Restrictions/Precautions:    Medical/Treatment Diagnosis Information:  · Diagnosis: M76.61, M76.62 (ICD-10-CM) - Achilles tendinitis of both lower extremities  · Treatment Diagnosis: B ankle pain M25.571, G38.677  Insurance/Certification information:  PT Insurance Information:  BCBS - 3000D - 90/10 - $[id number]/40OT - NO AUTH  Physician Information:  Referring Practitioner: Dr Zack Vaca  Has the plan of care been signed (Y/N):        [x]  Yes  []  No     Date of Patient follow up with Physician: 10/8/21      Is this a Progress Report:     []  Yes  [x]  No        If Yes:  Date Range for reporting period:  Beginnin21  Ending:     Progress report will be due (10 Rx or 30 days whichever is less):        Recertification will be due (POC Duration  / 90 days whichever is less): 21      Visit # Insurance Allowable Auth Required   In-person 5 40 []  Yes []  No    Telehealth   []  Yes []  No    Total          Functional Scale: LEFS 43%    Date assessed:  21      Therapy Diagnosis/Practice Pattern:E, conservative      Number of Comorbidities:  []0     []1-2    [x]3+    Latex Allergy:  [x]NO      []YES  Preferred Language for Healthcare:   [x]English       []other:      Pain level:  eval 1-10/10  \"moderate\" in am, 1/10 after walking;    SUBJECTIVE:  Pt still has pulling pain in Achilles R>L first thing in am when [address], but within 10' is having no discomfort with walking. He's still walking ~1.5 miles pain-free.      OBJECTIVE:    Observation:    Test measurements: taken with Dr. Tu Pires inserts today  Foot Assessment Left Right  Comments   NWB Relationships      calcaneus varus varus forefoot neutral neutral 1st ray neutral PF'd WB Measurements (STJ) Sub-talar neutral 4 pro 5 supstanding 5 pro 3 sup squatting 11 pro 5 pro Rearfoot to forefoot Mobility Subtalar joint Longitudinal midtarsal ioint Oblique midtarsal joint calcaneo-cuboid 1st Ray Mobility NWB WB     RESTRICTIONS/PRECAUTIONS: a-fib, PVC's, chronic LBP, B knee a-scopes  baseball  Exercises/Interventions:     Therapeutic Ex (24107) Sets/sec/reps Notes/CUES   Self TCJ mob post for inc'd DF with strap pre-sport/steps if stiff  (also showed kneeling and on step for when not at home) x Blue tband issued, less knee discomfort w/ mob foot post vs ant   gastroc S  Soleus S 30\"x3    DHR ecc control 1/4 ht concentric  Cues gr toe   KASSY hip ext  Core cues             Reformer: squats heels down  Squats heels up and in ER  Walking  DHR in neutral, IR, ER 3R1B x25   3R1B x20 ea  3R1B 2x20 R/L  2R1B Pillow for head, foot bar extended out        Alter-G   Walking  PF eccentrics  Walk on toes (1/2 range)  Walk/jog 30-45' alt 3.2-5.0 10' total  2' 75%  2x10 75%  NV  6 rounds 15' total (set-up/on/off)   NV add prox hip stability (hasn't done since knee rehab) NV         Foot/ankle NWB'ing/WB'ing assess time  Squat video on phone to show pt discussion about why he needs more support particularly L (gave rec for superfeet/powerstep vs stability shoe) 10'    Pt ed:   Ed about foot mechanics; explained measurements. concerns. Pre-WB'ing ankle AROM, then can do MWM/stretching once moving for the day; stretches following walking.   Can do walk/run pick-ups a few strides on flat level surfaces only for HEP '      5'                   Manual Intervention (31835) 23' total    IASTM HGPro sweeping up/down, fanning to gastroc, brushing to achilles, convex side, bevel 45* x10' total R/L    TC distraction gr V  Post talar glide gr lll-lV  Calc distraction  Calc ev x1 R, x3 L  4\" x15 R/L  2x30\" R/L  4\" x15 R/L Cavitation R/   Great toe dist + ext 10\"x4 R, L Cavitation R/L                       NMR re-education (74309)  CUES NEEDED   FSU to stork SLS on air-ex  Resume NV   Gr toe cues, wall prn                                           Therapeutic Activity (59011)                                         Therapeutic Exercise and NMR EXR  [x] (22434) Provided verbal/tactile cueing for activities related to strengthening, flexibility, endurance, ROM for improvements in LE, proximal hip, and core control with self care, mobility, lifting, ambulation. [x] (00850) Provided verbal/tactile cueing for activities related to improving balance, coordination, kinesthetic sense, posture, motor skill, proprioception  to assist with LE, proximal hip, and core control in self care, mobility, lifting, ambulation and eccentric single leg control.      NMR and Therapeutic Activities:    [x] (76998 or 66313) Provided verbal/tactile cueing for activities related to improving balance, coordination, kinesthetic sense, posture, motor skill, proprioception and motor activation to allow for proper function of core, proximal hip and LE with self care and ADLs  [x] (74612) Gait Re-education- Provided training and instruction to the patient for proper LE, core and proximal hip recruitment and positioning and eccentric body weight control with ambulation re-education including up and down stairs     Home Exercise Program:    [x] (77757) Reviewed/Progressed HEP activities related to strengthening, flexibility, endurance, ROM of core, proximal hip and LE for functional self-care, mobility, lifting and ambulation/stair navigation   [] (25006)Reviewed/Progressed HEP activities related to improving balance, coordination, kinesthetic sense, posture, motor skill, proprioception of core, proximal hip and LE for self care, mobility, lifting, and ambulation/stair navigation      Manual Treatments:  PROM / STM / Oscillations-Mobs:  G-I, II, III, IV (PA's, Inf., Post.)  [x] (57775) Provided manual therapy to mobilize LE, proximal hip and/or LS spine soft tissue/joints for the purpose of modulating pain, promoting relaxation,  increasing ROM, reducing/eliminating soft tissue swelling/inflammation/restriction, improving soft tissue extensibility and allowing for proper ROM for normal function with self care, mobility, lifting and ambulation. Modalities:  '    declined   [] GAME READY (VASO)- for significant edema, swelling, pain control. Charges  Timed Code Treatment Minutes: 70   Total Treatment Minutes: 75 (rest breaks)     [] EVAL (LOW) 94548   [] EVAL (MOD) 20878  [] EVAL (HIGH) 35545   [] RE-EVAL     [x] VJ(36071) x2    [] IONTO  [] NMR (22186) x     [] VASO  [x] Manual (99462) x2      [x] Other: physical performance assess x1  [] TA x      [] Mech Traction (96762)  [] ES(attended) (23778)      [] ES (un) (95659):       GOALS:  Patient stated goal: able to run/play baseball without pain  [] Progressing: [] Met: [] Not Met: [] Adjusted    Therapist goals for Patient:   Short Term Goals: To be achieved in: 2 weeks  1. Independent in HEP and progression per patient tolerance, in order to prevent re-injury. [] Progressing: [] Met: [] Not Met: [] Adjusted  2. Patient will have a decrease in pain to facilitate improvement in movement, function, and ADLs as indicated by Functional Deficits. [] Progressing: [] Met: [] Not Met: [] Adjusted    Long Term Goals: To be achieved in: 8 weeks  1. Disability index score of 20% or less for the LEFS to assist with reaching prior level of function. [] Progressing: [] Met: [] Not Met: [] Adjusted  2. Patient will demonstrate increased AROM to at least 5* DF B w/ knee extended to allow for proper joint functioning as indicated by patients Functional Deficits. [] Progressing: [] Met: [] Not Met: [] Adjusted  3.  Patient will demonstrate an increase in Strength to 4+/5 post-lat hip and at least 15 single heel raises allow for proper functional mobility as indicated by patients Functional Deficits. [] Progressing: [] Met: [] Not Met: [] Adjusted  4. Patient will return to reciprocal stairs without increased symptoms or restriction. [] Progressing: [] Met: [] Not Met: [] Adjusted  5. Pt will progress to [facility] for sport specific training. [] Progressing: [] Met: [] Not Met: [] Adjusted    Progression Towards Functional goals:  [x] Patient is progressing as expected towards functional goals listed. [] Progression is slowed due to complexities listed. [] Progression has been slowed due to co-morbidities. [] Plan just implemented, too soon to assess goals progression  [] Other:     Overall Progression Towards Functional goals/ Treatment Progress Update:  [x] Patient is progressing as expected towards functional goals listed. [] Progression is slowed due to complexities/Impairments listed. [] Progression has been slowed due to co-morbidities. [] Plan just implemented, too soon to assess goals progression <30days   [] Goals require adjustment due to lack of progress  [] Patient is not progressing as expected and requires additional follow up with physician  [] Other    Prognosis for POC: [x] Good [] Fair  [] Poor      Patient requires continued skilled intervention: [x] Yes  [] No    Treatment/Activity Tolerance:  [x] Patient able to complete treatment  [] Patient limited by fatigue  [] Patient limited by pain    [] Patient limited by other medical complications  [] Other:     ASSESSMENT: Checked pt's inserts, not enough support for L foot so gave recs for OTC inserts vs stability shoe in future. He was able to walk/jog on Alter G at reduced BW without pain but did have some tightness in R>L calf initially that resolved with inc'd reps. He continues to have valgus at L>R knee with squat and SLS; assist with midfoot support will aide this but will continue proximal stability.  Pt has concerns still about ache in low back, neck, hips, knees so asked him to discuss concerns with PCP, but ultimately recommend full body fitness plan (can do assess with PT vs use  or group fitness class format) to address strength/flexibilty/balance particularly core work. He is limited by cardiac conditions for some exercise. Return to Play: (if applicable)   []  Stage 1: Intro to Strength   []  Stage 2: Return to Run and Strength   []  Stage 3: Return to Jump and Strength   []  Stage 4: Dynamic Strength and Agility   []  Stage 5: Sport Specific Training     []  Ready to Return to Play, Meets All Above Stages   []  Not Ready for Return to Sports   Comments:                         PLAN: Continl Alter-G; pt to get OTC inserts. Cont foot and ankle mobility and strength, prox strength as time permits and for HEP  [x] Continue per plan of care [] Alter current plan (see comments above)  [] Plan of care initiated [] Hold pending MD visit [] Discharge      Electronically signed by:  Josias Burciaga, PT , DPT  Note: If patient does not return for scheduled/ recommended follow up visits, this note will serve as a discharge from care along with most recent update on progress. Access Code: [email or web address]  URL: [email or web address]  Date: 2021  Prepared by: Marino Thomas    Exercises  Standing Gastroc Stretch on Foam 1/2 Roll - 2 x daily - 7 x weekly - 1-2 sets - 30 seconds hold  Seated Soleus Stretch - 2 x daily - 7 x weekly  Standing Bilateral Heel Raise on Step - 1-2 x daily - 7 x weekly - 2-3 sets - 10-15 reps  Seated Heel Raise - 1-2 x daily - 7 x weekly - 2-3 sets - 10-15 reps  Half Kneel Ankle Dorsiflexion Self-Mobilization - 2 x daily - 7 x weekly - 15 reps - 3 hold  Standing Ankle Self-Mobilization - 2 x daily - 7 x weekly - 15 reps - 3 hold

## 2021-08-10 ENCOUNTER — HOSPITAL ENCOUNTER (OUTPATIENT)
Dept: PHYSICAL THERAPY | Age: 61
Setting detail: THERAPIES SERIES
Discharge: HOME | End: 2021-08-10
Payer: COMMERCIAL

## 2021-08-10 PROCEDURE — 97140 MANUAL THERAPY 1/> REGIONS: CPT | Performed by: PHYSICAL THERAPIST

## 2021-08-10 PROCEDURE — 97110 THERAPEUTIC EXERCISES: CPT | Performed by: PHYSICAL THERAPIST

## 2021-08-10 NOTE — FLOWSHEET NOTE
[facility],  [address] [place]  Phone: [phone]  Fax [phone]    Physical Therapy Treatment Note/ Progress Report:           Date:  8/10/2021    Patient Name:  Chidi Ely"  :  1960  MRN: [id number]  Restrictions/Precautions:    Medical/Treatment Diagnosis Information:  · Diagnosis: M76.61, M76.62 (ICD-10-CM) - Achilles tendinitis of both lower extremities  · Treatment Diagnosis: B ankle pain M25.571, N03.764  Insurance/Certification information:  PT Insurance Information:  BCBS - 3000D - 90/10 - $0CP-40PT/40OT - NO AUTH  Physician Information:  Referring Practitioner: Dr Ashish Glass  Has the plan of care been signed (Y/N):        [x]  Yes  []  No     Date of Patient follow up with Physician: 10/8/21      Is this a Progress Report:     []  Yes  [x]  No        If Yes:  Date Range for reporting period:  Beginnin21  Ending:     Progress report will be due (10 Rx or 30 days whichever is less): 34       Recertification will be due (POC Duration  / 90 days whichever is less): 21      Visit # Insurance Allowable Auth Required   In-person 6 40 []  Yes []  No    Telehealth   []  Yes []  No    Total          Functional Scale: LEFS 43%    Date assessed:  21      Therapy Diagnosis/Practice Pattern:E, conservative      Number of Comorbidities:  []0     []1-2    [x]3+    Latex Allergy:  [x]NO      []YES  Preferred Language for Healthcare:   [x]English       []other:      Pain level:  eval 1-10/10  \"moderate\" in am, 1/10 after walking;    SUBJECTIVE:  Pt notes onset of L gr toe MTP joint pain a couple of days ago. He states he gets this from time to time and is unsure of the cause. He states he enjoyed the Alter-G and denies any pain or problems following use LV.      OBJECTIVE:    Observation:    Test measurements:   Foot Assessment Left Right  Comments   NWB Relationships      calcaneus forefoot 1st ray WB Measurements (STJ) Sub-talar neutral standing squatting Rearfoot to forefoot Mobility Subtalar joint Longitudinal midtarsal ioint Oblique midtarsal joint calcaneo-cuboid 1st Ray Mobility NWB WB     RESTRICTIONS/PRECAUTIONS: a-fib, PVC's, chronic LBP, B knee a-scopes  baseball  Exercises/Interventions:     Therapeutic Ex (01871) Sets/sec/reps Notes/CUES   Self TCJ mob post for inc'd DF with strap pre-sport/steps if stiff  (also showed kneeling and on step for when not at home) x Blue tband issued, less knee discomfort w/ mob foot post vs ant   gastroc S  Soleus S 30\"x3    DHR ecc control 1/4 ht concentric  Cues gr toe   [facility] hip ext  Core cues             Reformer: squats heels down  Squats heels up and in ER  Walking  DHR in neutral, IR, ER  Pillow for head, foot bar extended out  Resume NV as time allows        Alter-G   Walking 2.0-2.5  PF eccentrics  Walk on toes (1/2 range)   Walk/jog 1' alt 3.2-5.0 12' total  2' 75%  2x10 75%  x1' 75%   5 rounds 15' total (set-up/on/off)  Shorts size S  Ht @ 13             Foot/ankle NWB'ing/WB'ing assess time  Squat video on phone to show pt discussion about why he needs more support particularly L (gave rec for superfeet/powerstep vs stability shoe)     Pt ed:  HEP--reviewed prox hip strengthening exercises previously issued for knee and importance of prox stability for LBP/knee and achilles,     '      x4'                   Manual Intervention (24427) 23' total    IASTM HGPro sweeping up/down, fanning to gastroc, brushing to achilles, convex side, bevel 45* x10' total R/L    TC distraction gr V  Post talar glide gr lll-lV  Calc distraction  Calc ev x1 R, x3 L  4\" x15 R/L  2x30\" R/L  4\" x15 R/L Cavitation R/   Great toe dist + ext, FHL S 10\"x4 B, 2x20\" B                        NMR re-education (61834)  CUES NEEDED   FSU to stork SLS on air-ex  Resume NV   Gr toe cues, wall prn   Air-ex TB hip series lat, post-lat, ext RVL @ shins x10 ea R/L Posture and tripod foot cues                                      Therapeutic Activity (92197)                                         Therapeutic Exercise and NMR EXR  [x] (57863) Provided verbal/tactile cueing for activities related to strengthening, flexibility, endurance, ROM for improvements in LE, proximal hip, and core control with self care, mobility, lifting, ambulation. [x] (01755) Provided verbal/tactile cueing for activities related to improving balance, coordination, kinesthetic sense, posture, motor skill, proprioception  to assist with LE, proximal hip, and core control in self care, mobility, lifting, ambulation and eccentric single leg control.      NMR and Therapeutic Activities:    [x] (34359 or 01672) Provided verbal/tactile cueing for activities related to improving balance, coordination, kinesthetic sense, posture, motor skill, proprioception and motor activation to allow for proper function of core, proximal hip and LE with self care and ADLs  [x] (58127) Gait Re-education- Provided training and instruction to the patient for proper LE, core and proximal hip recruitment and positioning and eccentric body weight control with ambulation re-education including up and down stairs     Home Exercise Program:    [x] (52376) Reviewed/Progressed HEP activities related to strengthening, flexibility, endurance, ROM of core, proximal hip and LE for functional self-care, mobility, lifting and ambulation/stair navigation   [] (67851)Reviewed/Progressed HEP activities related to improving balance, coordination, kinesthetic sense, posture, motor skill, proprioception of core, proximal hip and LE for self care, mobility, lifting, and ambulation/stair navigation      Manual Treatments:  PROM / STM / Oscillations-Mobs:  G-I, II, III, IV (PA's, Inf., Post.)  [x] (66574) Provided manual therapy to mobilize LE, proximal hip and/or LS spine soft tissue/joints for the purpose of modulating pain, promoting relaxation,  increasing restriction. [] Progressing: [] Met: [] Not Met: [] Adjusted  5. Pt will progress to [facility] for sport specific training. [] Progressing: [] Met: [] Not Met: [] Adjusted    Progression Towards Functional goals:  [x] Patient is progressing as expected towards functional goals listed. [] Progression is slowed due to complexities listed. [] Progression has been slowed due to co-morbidities. [] Plan just implemented, too soon to assess goals progression  [] Other:     Overall Progression Towards Functional goals/ Treatment Progress Update:  [x] Patient is progressing as expected towards functional goals listed. [] Progression is slowed due to complexities/Impairments listed. [] Progression has been slowed due to co-morbidities. [] Plan just implemented, too soon to assess goals progression <30days   [] Goals require adjustment due to lack of progress  [] Patient is not progressing as expected and requires additional follow up with physician  [] Other    Prognosis for POC: [x] Good [] Fair  [] Poor      Patient requires continued skilled intervention: [x] Yes  [] No    Treatment/Activity Tolerance:  [x] Patient able to complete treatment  [] Patient limited by fatigue  [] Patient limited by pain    [] Patient limited by other medical complications  [] Other:     ASSESSMENT: pt R>L hip quick to fatigue with addition of air-ex hip series. Good tolerance still to Alter-G exercises without achilles pain, but slight L knee soreness reported following jogging intervals. Calf fatigue without pain with toe walking. Continued mild tenderness R>L mid achilles tendon, though bump has receded.      Return to Play: (if applicable)   []  Stage 1: Intro to Strength   []  Stage 2: Return to Run and Strength   []  Stage 3: Return to Jump and Strength   []  Stage 4: Dynamic Strength and Agility   []  Stage 5: Sport Specific Training     []  Ready to Return to Play, Meets All Above Stages   []  Not Ready for Return to Sports   Comments:                         PLAN: Continl Alter-G; pt to get OTC inserts. Cont foot and ankle mobility and strength, prox strength as time permits and for HEP  [x] Continue per plan of care [] Alter current plan (see comments above)  [] Plan of care initiated [] Hold pending MD visit [] Discharge      Electronically signed by:  Crystal Kerns PT ,   Note: If patient does not return for scheduled/ recommended follow up visits, this note will serve as a discharge from care along with most recent update on progress. Access Code: [email or web address]  URL: [email or web address]  Date: 07/23/2021  Prepared by: Bhaskar Neves    Exercises  Standing Gastroc Stretch on Foam 1/2 Roll - 2 x daily - 7 x weekly - 1-2 sets - 30 seconds hold  Seated Soleus Stretch - 2 x daily - 7 x weekly  Standing Bilateral Heel Raise on Step - 1-2 x daily - 7 x weekly - 2-3 sets - 10-15 reps  Seated Heel Raise - 1-2 x daily - 7 x weekly - 2-3 sets - 10-15 reps  Half Kneel Ankle Dorsiflexion Self-Mobilization - 2 x daily - 7 x weekly - 15 reps - 3 hold  Standing Ankle Self-Mobilization - 2 x daily - 7 x weekly - 15 reps - 3 hold

## 2021-08-12 ENCOUNTER — HOSPITAL ENCOUNTER (OUTPATIENT)
Dept: PHYSICAL THERAPY | Age: 61
Setting detail: THERAPIES SERIES
Discharge: HOME | End: 2021-08-12
Payer: COMMERCIAL

## 2021-08-12 PROCEDURE — 97110 THERAPEUTIC EXERCISES: CPT

## 2021-08-12 PROCEDURE — 97112 NEUROMUSCULAR REEDUCATION: CPT

## 2021-08-12 NOTE — FLOWSHEET NOTE
[facility],  [address]  Phone: [phone]  Fax [phone]    Physical Therapy Treatment Note/ Progress Report:           Date:  2021    Patient Name:  Becky Croft"  :  1960  MRN: [id number]  Restrictions/Precautions:    Medical/Treatment Diagnosis Information:  · Diagnosis: M76.61, M76.62 (ICD-10-CM) - Achilles tendinitis of both lower extremities  · Treatment Diagnosis: B ankle pain M25.571, D20.168  Insurance/Certification information:  PT Insurance Information:  BCBS - 3000D - 90/10 - $0CP-40PT/40OT - NO AUTH  Physician Information:  Referring Practitioner: Dr Mayte Archibald  Has the plan of care been signed (Y/N):        [x]  Yes  []  No     Date of Patient follow up with Physician: 10/8/21      Is this a Progress Report:     []  Yes  [x]  No        If Yes:  Date Range for reporting period:  Beginnin21  Ending:     Progress report will be due (10 Rx or 30 days whichever is less):        Recertification will be due (POC Duration  / 90 days whichever is less): 21      Visit # Insurance Allowable Auth Required   In-person 7 40 []  Yes []  No    Telehealth   []  Yes []  No    Total          Functional Scale: LEFS 43%    Date assessed:  21      Therapy Diagnosis/Practice Pattern:E, conservative      Number of Comorbidities:  []0     []1-2    [x]3+    Latex Allergy:  [x]NO      []YES  Preferred Language for Healthcare:   [x]English       []other:      Pain level:  eval 1-10/10  \"moderate\" in am, 1/10 after walking;    SUBJECTIVE:  Pt still feels it takes ~10 min in am to loosen up his ankles and calf/Achilles when he's walking, but then stiffness goes away until he sits again; first [address] upon standing still bothers him.  His back bothers him the most.    OBJECTIVE:    Observation:    Test measurements: MMT SL PF x20 R, L (partial range)  Foot Assessment Left Right (96265)  CUES NEEDED   FSU to stork SLS on air-ex  Resume NV   Gr toe cues, wall prn   Air-ex TB hip series lat, post-lat, ext RVL @ shins x10 ea R/L Posture and tripod foot cues   AP rock 1/2 FR  x20 Fingertip balance on 1/2 wall   Tandem balance 1/2 FR (roll side down) x1' R, L back \" \" \" \"                            Therapeutic Activity (48728)                                         Therapeutic Exercise and NMR EXR  [x] (01471) Provided verbal/tactile cueing for activities related to strengthening, flexibility, endurance, ROM for improvements in LE, proximal hip, and core control with self care, mobility, lifting, ambulation. [x] (52657) Provided verbal/tactile cueing for activities related to improving balance, coordination, kinesthetic sense, posture, motor skill, proprioception  to assist with LE, proximal hip, and core control in self care, mobility, lifting, ambulation and eccentric single leg control.      NMR and Therapeutic Activities:    [x] (57790 or 08141) Provided verbal/tactile cueing for activities related to improving balance, coordination, kinesthetic sense, posture, motor skill, proprioception and motor activation to allow for proper function of core, proximal hip and LE with self care and ADLs  [x] (01739) Gait Re-education- Provided training and instruction to the patient for proper LE, core and proximal hip recruitment and positioning and eccentric body weight control with ambulation re-education including up and down stairs     Home Exercise Program:    [x] (58793) Reviewed/Progressed HEP activities related to strengthening, flexibility, endurance, ROM of core, proximal hip and LE for functional self-care, mobility, lifting and ambulation/stair navigation   [] (76545)Reviewed/Progressed HEP activities related to improving balance, coordination, kinesthetic sense, posture, motor skill, proprioception of core, proximal hip and LE for self care, mobility, lifting, and ambulation/stair in Strength to 4+/5 post-lat hip and at least 15 single heel raises allow for proper functional mobility as indicated by patients Functional Deficits. [] Progressing: [] Met: [] Not Met: [] Adjusted  4. Patient will return to reciprocal stairs without increased symptoms or restriction. [] Progressing: [] Met: [] Not Met: [] Adjusted  5. Pt will progress to [facility] for sport specific training. [] Progressing: [] Met: [] Not Met: [] Adjusted    Progression Towards Functional goals:  [x] Patient is progressing as expected towards functional goals listed. [] Progression is slowed due to complexities listed. [] Progression has been slowed due to co-morbidities. [] Plan just implemented, too soon to assess goals progression  [] Other:     Overall Progression Towards Functional goals/ Treatment Progress Update:  [x] Patient is progressing as expected towards functional goals listed. [] Progression is slowed due to complexities/Impairments listed. [] Progression has been slowed due to co-morbidities. [] Plan just implemented, too soon to assess goals progression <30days   [] Goals require adjustment due to lack of progress  [] Patient is not progressing as expected and requires additional follow up with physician  [] Other    Prognosis for POC: [x] Good [] Fair  [] Poor      Patient requires continued skilled intervention: [x] Yes  [] No    Treatment/Activity Tolerance:  [x] Patient able to complete treatment  [] Patient limited by fatigue  [] Patient limited by pain    [] Patient limited by other medical complications  [] Other:     ASSESSMENT: Pt has over-prontation still L>R and this correlates to L knee valgus in stance phase of walking/running that will benefit from addition of OTC orthotic. R Achilles soreness abolishes as he warms up, still c/o L medial knee discomfort and low back ache as more limiting factors in the am/when he first WB's.  Held manual tx this date as pt had no c/o pain following strength/balance/Alter G this session. 5/5 strength B PF pain-free once fully warmed up. Return to Play: (if applicable)   []  Stage 1: Intro to Strength   []  Stage 2: Return to Run and Strength   []  Stage 3: Return to Jump and Strength   []  Stage 4: Dynamic Strength and Agility   []  Stage 5: Sport Specific Training     []  Ready to Return to Play, Meets All Above Stages   []  Not Ready for Return to Sports   Comments:                         PLAN: Continl Alter-G; pt to get OTC inserts. Cont foot and ankle mobility and strength, prox strength as time permits and for HEP  [x] Continue per plan of care [] Alter current plan (see comments above)  [] Plan of care initiated [] Hold pending MD visit [] Discharge      Electronically signed by:  Adama Moulton, PT , DPT  Note: If patient does not return for scheduled/ recommended follow up visits, this note will serve as a discharge from care along with most recent update on progress. Access Code: [email or web address]  URL: [email or web address]  Date: 07/23/2021  Prepared by: Yari Mata    Exercises  Standing Gastroc Stretch on Foam 1/2 Roll - 2 x daily - 7 x weekly - 1-2 sets - 30 seconds hold  Seated Soleus Stretch - 2 x daily - 7 x weekly  Standing Bilateral Heel Raise on Step - 1-2 x daily - 7 x weekly - 2-3 sets - 10-15 reps  Seated Heel Raise - 1-2 x daily - 7 x weekly - 2-3 sets - 10-15 reps  Half Kneel Ankle Dorsiflexion Self-Mobilization - 2 x daily - 7 x weekly - 15 reps - 3 hold  Standing Ankle Self-Mobilization - 2 x daily - 7 x weekly - 15 reps - 3 hold

## 2021-08-16 NOTE — FLOWSHEET NOTE
Assumed care of patient, received bedside report from NOC RN. Patient is A&O X 4 and is complaining of no pain. D/C orders have been placed and patient will be leaving soon. Vital signs were stable overnight. POC discussed with patient and he verbalized understanding. Call light within reach and fall precautions in place. Bed locked and in lowest position.    [facility],  [address]  Phone: [phone]  Fax [phone]    Physical Therapy Treatment Note/ Progress Report:           Date:  2020    Patient Name:  German Herring    :  1960  MRN: [id number]  Restrictions/Precautions:    Medical/Treatment Diagnosis Information:  · Diagnosis: N82.35 (LHR-75-YL) - Plica syndrome of knee, left; S83.232D (ICD-10-CM) - Complex tear of medial meniscus of left knee as current injury, subsequent encounter; S83.272D (ICD-10-CM) - Complex tear of lateral meniscus of left knee as current injury, subsequent encounter ; M65.9 (ICD-10-CM) - Synovitis of left knee  · Treatment Diagnosis: M25.562 left knee pain; M25.662 Left knee stiffness; R26.2 Difficulty in walking; M25.462 Left knee effusion  Insurance/Certification information:  PT Insurance Information:  BCBS  - 3000D-MET-90/10-$0CP-40PT- NO AUTH  Physician Information:  Referring Practitioner: Dr. Ramakrishna Gomez  Has the plan of care been signed (Y/N):        []  Yes  [x]  No     Date of Patient follow up with Physician: not scheduled    Is this a Progress Report:     []  Yes  [x]  No        If Yes:  Date Range for reporting period:  Beginning 20  Ending    Progress report will be due (10 Rx or 30 days whichever is less):       Recertification will be due (POC Duration  / 90 days whichever is less):      Visit # Insurance Allowable Requires auth   7- PN NV 31 (40, but 9 used prev)    [x]no        []yes:     Functional Scale: LEFS 64% disability  Date assessed:  20      Therapy Diagnosis/Practice Pattern:E      Number of Comorbidities:  []0     []1-2    [x]3+    Latex Allergy:  [x]NO      []YES  Preferred Language for Healthcare:   [x]English       []other:      Pain level: eval 3-10/10 Current pain 3/10    SUBJECTIVE:  Pt reports that his knee is getting better.  Is able to go down stairs and up stairs much 5'    L hip joint mobs- inf and lateral distraction      KT medial tracking \"Y\" strip  No QDA          NMR re-education (35754)  CUES NEEDED    Step up to stork airex                                                      Therapeutic Activity (88618)                                                Therapeutic Exercise and NMR EXR  [x] (23633) Provided verbal/tactile cueing for activities related to strengthening, flexibility, endurance, ROM for improvements in LE, proximal hip, and core control with self care, mobility, lifting, ambulation.  [] (94660) Provided verbal/tactile cueing for activities related to improving balance, coordination, kinesthetic sense, posture, motor skill, proprioception  to assist with LE, proximal hip, and core control in self care, mobility, lifting, ambulation and eccentric single leg control.      NMR and Therapeutic Activities:    [] (45503 or 53241) Provided verbal/tactile cueing for activities related to improving balance, coordination, kinesthetic sense, posture, motor skill, proprioception and motor activation to allow for proper function of core, proximal hip and LE with self care and ADLs  [] (67825) Gait Re-education- Provided training and instruction to the patient for proper LE, core and proximal hip recruitment and positioning and eccentric body weight control with ambulation re-education including up and down stairs     Home Exercise Program:    [x] (51232) Reviewed/Progressed HEP activities related to strengthening, flexibility, endurance, ROM of core, proximal hip and LE for functional self-care, mobility, lifting and ambulation/stair navigation   [] (56298)Reviewed/Progressed HEP activities related to improving balance, coordination, kinesthetic sense, posture, motor skill, proprioception of core, proximal hip and LE for self care, mobility, lifting, and ambulation/stair navigation      Manual Treatments:  PROM / STM / Oscillations-Mobs:  G-I, II, III, IV (PA's, Inf., Post.)  [x] (89263) Provided manual therapy to mobilize LE, proximal hip and/or LS spine soft tissue/joints for the purpose of modulating pain, promoting relaxation,  increasing ROM, reducing/eliminating soft tissue swelling/inflammation/restriction, improving soft tissue extensibility and allowing for proper ROM for normal function with self care, mobility, lifting and ambulation. Modalities:     [] GAME READY (VASO)- for significant edema, swelling, pain control. - knee x 15', CP ant knee. Long sit  - declined  Charges:  Timed Code Treatment Minutes: 44   Total Treatment Minutes: 44     BWC time in/time out:   (and requires time in and out for each CPT code)    [] EVAL (LOW) 02030 (typically 20 minutes face-to-face)  [] EVAL (MOD) 20086 (typically 30 minutes face-to-face)  [] EVAL (HIGH) 23946 (typically 45 minutes face-to-face)  [] RE-EVAL     [x] HT(01986) x 2    [] IONTO  [] NMR (56359) x     [] VASO  [x] Manual (51939) x  1   [] Other:  [] TA x      [] Mech Traction (06991)  [] ES(attended) (34782)      [] ES (un) (73955):       GOALS: Patient stated goal: no pain and get back to baseball  []? Progressing: []? Met: []? Not Met: []? Adjusted     Therapist goals for Patient:   Short Term Goals: To be achieved in: 2 weeks  1. Independent in HEP and progression per patient tolerance, in order to prevent re-injury. []? Progressing: []? Met: []? Not Met: []? Adjusted  2. Patient will have a decrease in pain to facilitate improvement in movement, function, and ADLs as indicated by Functional Deficits. []? Progressing: []? Met: []? Not Met: []? Adjusted     Long Term Goals: To be achieved in: 8 weeks  1. Disability index score of 35% or less for the LEFS to assist with reaching prior level of function. []? Progressing: []? Met: []? Not Met: []? Adjusted  2. Patient will demonstrate increased left knee AROM to 0-140 deg to allow for proper joint functioning for deeper squatting, kneeling, and stair ambulation. []? Progressing: []? Met: []? Not Met: []? Adjusted  3. Patient will demonstrate an increase in Strength to 5/5 for the left hip and knee musculature to return to normal CKC activities with good knee control, such as stairs, squatting, and light jogging. []? Progressing: []? Met: []? Not Met: []? Adjusted  4. Patient will return to at least 30 minutes of moderate physical activity (patient specific goal). []? Progressing: []? Met: []? Not Met: []? Adjusted  5. Patient will have improved ankle DF to 15 deg to promote normal tibial advancement and HS flexibility in the 90-90 position to 65 deg to reduce posterior pelvic tilt. []? Progressing: []? Met: []? Not Met: []? Adjusted        Progression Towards Functional goals:  [x] Patient is progressing as expected towards functional goals listed. [] Progression is slowed due to complexities listed. [] Progression has been slowed due to co-morbidities. [] Plan just implemented, too soon to assess goals progression  [] Other:         Overall Progression Towards Functional goals/ Treatment Progress Update:  [] Patient is progressing as expected towards functional goals listed. [] Progression is slowed due to complexities/Impairments listed. [] Progression has been slowed due to co-morbidities. [x] Plan just implemented, too soon to assess goals progression <30days   [] Goals require adjustment due to lack of progress  [] Patient is not progressing as expected and requires additional follow up with physician  [] Other    Prognosis for POC: [x] Good [] Fair  [] Poor      Patient requires continued skilled intervention: [x] Yes  [] No    Treatment/Activity Tolerance:  [x] Patient able to complete treatment  [] Patient limited by fatigue  [] Patient limited by pain    [] Patient limited by other medical complications  [] Other:     ASSESSMENT: pt able to continue strengthening without increased pain. Responds to IASTM well.  Needs to continue to strengthen his quadriceps and hip ER's for control descending stairs. Return to Play: (if applicable)   []  Stage 1: Intro to Strength   []  Stage 2: Return to Run and Strength   []  Stage 3: Return to Jump and Strength   []  Stage 4: Dynamic Strength and Agility   []  Stage 5: Sport Specific Training     []  Ready to Return to Play, Meets All Above Stages   []  Not Ready for Return to Sports   Comments:                               PLAN: Pt to bring compression stocking NV, may need to swap for a smaller size  [x] Continue per plan of care [] Alter current plan (see comments above)  [] Plan of care initiated [] Hold pending MD visit [] Discharge      Electronically signed by:  Karen Mccurdy, PT, DPT     Note: If patient does not return for scheduled/ recommended follow up visits, this note will serve as a discharge from care along with most recent update on progress. Access Code: [email or web address]   URL: [employer]/   Date: 08/24/2020   Prepared by: Karen Mccurdy     Exercises   Standing Hamstring Stretch with Step - 3 sets - 30s hold - 2x daily - 7x weekly   Standing Gastroc Stretch - 3 sets - 30s hold - 2x daily - 7x weekly   Standing Quad Stretch with Table and Chair Support - 3 sets - 30s hold - 2x daily - 7x weekly   Sitting Heel Slide with Towel - 10 reps - 5s hold - 2x daily - 7x weekly   Long Sitting Quad Set - 10 reps - 10s hold - 1x daily - 7x weekly   Active Straight Leg Raise with Quad Set - 10 reps - 2 sets - 1x daily - 7x weekly   Supine Sciatic Nerve Glide - 20 reps - 2x daily - 7x weekly   Clamshell - 10 reps - 3 sets - 1x daily - 7x weekly   Sidelying Hip Abduction - 10 reps - 2 sets - 1x daily - 7x weekly   Standing Hamstring Curl with Chair Support - 10 reps - 2 sets - 1x daily - 7x weekly   Side Stepping with Resistance at Feet - 10 reps - 4 sets - 1x daily - 4x weekly

## 2021-08-17 ENCOUNTER — HOSPITAL ENCOUNTER (OUTPATIENT)
Dept: PHYSICAL THERAPY | Age: 61
Setting detail: THERAPIES SERIES
Discharge: HOME | End: 2021-08-17
Payer: COMMERCIAL

## 2021-08-17 PROCEDURE — 97110 THERAPEUTIC EXERCISES: CPT | Performed by: PHYSICAL THERAPIST

## 2021-08-17 PROCEDURE — 97140 MANUAL THERAPY 1/> REGIONS: CPT | Performed by: PHYSICAL THERAPIST

## 2021-08-17 NOTE — FLOWSHEET NOTE
[facility],  [address]  Phone: [phone]  Fax [phone]    Physical Therapy Treatment Note/ Progress Report:           Date:  2021    Patient Name:  Devin Costa"  :  1960  MRN: [id number]  Restrictions/Precautions:    Medical/Treatment Diagnosis Information:  · Diagnosis: M76.61, M76.62 (ICD-10-CM) - Achilles tendinitis of both lower extremities  · Treatment Diagnosis: B ankle pain M25.571, C34.653  Insurance/Certification information:  PT Insurance Information:  BCBS - 3000D - 90/10 - $[id number]/40OT - NO AUTH  Physician Information:  Referring Practitioner: Dr Gino Boone  Has the plan of care been signed (Y/N):        [x]  Yes  []  No     Date of Patient follow up with Physician: 10/8/21      Is this a Progress Report:     []  Yes  [x]  No        If Yes:  Date Range for reporting period:  Beginnin21  Ending:     Progress report will be due (10 Rx or 30 days whichever is less):        Recertification will be due (POC Duration  / 90 days whichever is less): 21      Visit # Insurance Allowable Auth Required   In-person 8 40 []  Yes []  No    Telehealth   []  Yes []  No    Total          Functional Scale: LEFS 43%    Date assessed:  21      Therapy Diagnosis/Practice Pattern:E, conservative      Number of Comorbidities:  []0     []1-2    [x]3+    Latex Allergy:  [x]NO      []YES  Preferred Language for Healthcare:   [x]English       []other:      Pain level:  eval 1-10/10  \"moderate\" in am, 1/10 after walking;    SUBJECTIVE:  Pt he is sore R>L achilles this morning. Notes he wasn't as active about doing his stretches yesterday. He also reports significant stiffness and discomfort in his back and plans to follow up with MD regarding.     OBJECTIVE: PN NV   Observation:    Test measurements: MMT SL PF x20 R, L (partial range)  Foot Assessment Left Right  Comments   NWB Relationships      calcaneus forefoot 1st ray WB Measurements (STJ) Sub-talar neutral standing squatting Rearfoot to forefoot Mobility Subtalar joint Longitudinal midtarsal ioint Oblique midtarsal joint calcaneo-cuboid 1st Ray Mobility NWB WB     RESTRICTIONS/PRECAUTIONS: a-fib, PVC's, chronic LBP, B knee a-scopes  baseball  Exercises/Interventions:     Therapeutic Ex (90488) Sets/sec/reps Notes/CUES   Self TCJ mob post for inc'd DF with strap pre-sport/steps if stiff  (also showed kneeling and on step for when not at home) x Blue tband issued, less knee discomfort w/ mob foot post vs ant   gastroc S  Soleus S 30\"x3    DHR ecc control 1/4 ht concentric  Cues gr toe   KASSY hip ext  Core cues             Reformer: squats heels down  Squats heels up and in ER  Walking  DHR in neutral, IR, ER  Pillow for head, foot bar extended out  Resume NV as time allows        Alter-G   Walking 2.0-2.5  PF eccentrics  Walk on toes (1/2 range)   Partial \"skip\" to simulate push-offs  Walk/jog 1' alt 3.2-5.0  SLS with 3D kick  15' total (set-up/on/off)  Shorts size S  Ht @ 13      95% last 2 rounds   SL calf raise     LP B  LP L  CP B  CP R, L 120# x30  90# x30  120# x30  90# x30              Foot/ankle NWB'ing/WB'ing assess time  Squat video on phone to show pt discussion about why he needs more support particularly L (gave rec for superfeet/powerstep vs stability shoe)     Pt ed:  HEP--reviewed prox hip strengthening exercises previously issued for knee and importance of prox stability for LBP/knee and achilles,     '                         Manual Intervention (60970) 25' total    IASTM HGPro sweeping up/down, fanning to gastroc, brushing to achilles, convex side, bevel 45* x10' total R/L    TC distraction gr V  Post talar glide gr lll-lV  Calc distraction  Calc ev x3 R, x3 L    1x30\" R/L  4\" x15 R/L    Great toe dist + ext, FHL S 10\"x4 B,                         NMR re-education (31223)  CUES NEEDED   FSU to stork SLS on air-ex Resume NV   Gr toe cues, wall prn   Air-ex TB hip series lat, post-lat, ext  Posture and tripod foot cues   AP rock 1/2 FR  x20 Fingertip balance on 1/2 wall   Tandem balance 1/2 FR (roll side down) x1' R, L back Light wall support prn                            Therapeutic Activity (09753)                                         Therapeutic Exercise and NMR EXR  [x] (80886) Provided verbal/tactile cueing for activities related to strengthening, flexibility, endurance, ROM for improvements in LE, proximal hip, and core control with self care, mobility, lifting, ambulation. [x] (89518) Provided verbal/tactile cueing for activities related to improving balance, coordination, kinesthetic sense, posture, motor skill, proprioception  to assist with LE, proximal hip, and core control in self care, mobility, lifting, ambulation and eccentric single leg control.      NMR and Therapeutic Activities:    [x] (75140 or 48671) Provided verbal/tactile cueing for activities related to improving balance, coordination, kinesthetic sense, posture, motor skill, proprioception and motor activation to allow for proper function of core, proximal hip and LE with self care and ADLs  [x] (51708) Gait Re-education- Provided training and instruction to the patient for proper LE, core and proximal hip recruitment and positioning and eccentric body weight control with ambulation re-education including up and down stairs     Home Exercise Program:    [x] (28991) Reviewed/Progressed HEP activities related to strengthening, flexibility, endurance, ROM of core, proximal hip and LE for functional self-care, mobility, lifting and ambulation/stair navigation   [] (80164)Reviewed/Progressed HEP activities related to improving balance, coordination, kinesthetic sense, posture, motor skill, proprioception of core, proximal hip and LE for self care, mobility, lifting, and ambulation/stair navigation      Manual Treatments:  PROM / STM / Oscillations-Mobs: G-I, II, III, IV (PA's, Inf., Post.)  [x] (67505) Provided manual therapy to mobilize LE, proximal hip and/or LS spine soft tissue/joints for the purpose of modulating pain, promoting relaxation,  increasing ROM, reducing/eliminating soft tissue swelling/inflammation/restriction, improving soft tissue extensibility and allowing for proper ROM for normal function with self care, mobility, lifting and ambulation. Modalities:  '    declined   [] GAME READY (VASO)- for significant edema, swelling, pain control. Charges  Timed Code Treatment Minutes: 45   Total Treatment Minutes: 45     [] EVAL (LOW) 46669   [] EVAL (MOD) 02225  [] EVAL (HIGH) 97424   [] RE-EVAL     [x] YD(15615) x2    [] IONTO  [x] NMR (65256) x    [] VASO  [x] Manual (74395) x 1     [] Other: physical performance assess   [] TA x      [] Mech Traction (41835)  [] ES(attended) (25706)      [] ES (un) (05434):       GOALS:  Patient stated goal: able to run/play baseball without pain  [] Progressing: [] Met: [] Not Met: [] Adjusted    Therapist goals for Patient:   Short Term Goals: To be achieved in: 2 weeks  1. Independent in HEP and progression per patient tolerance, in order to prevent re-injury. [] Progressing: [] Met: [] Not Met: [] Adjusted  2. Patient will have a decrease in pain to facilitate improvement in movement, function, and ADLs as indicated by Functional Deficits. [] Progressing: [] Met: [] Not Met: [] Adjusted    Long Term Goals: To be achieved in: 8 weeks  1. Disability index score of 20% or less for the LEFS to assist with reaching prior level of function. [] Progressing: [] Met: [] Not Met: [] Adjusted  2. Patient will demonstrate increased AROM to at least 5* DF B w/ knee extended to allow for proper joint functioning as indicated by patients Functional Deficits. [] Progressing: [] Met: [] Not Met: [] Adjusted  3.  Patient will demonstrate an increase in Strength to 4+/5 post-lat hip and at least 15 single heel raises allow for proper functional mobility as indicated by patients Functional Deficits. [] Progressing: [] Met: [] Not Met: [] Adjusted  4. Patient will return to reciprocal stairs without increased symptoms or restriction. [] Progressing: [] Met: [] Not Met: [] Adjusted  5. Pt will progress to [facility] for sport specific training. [] Progressing: [] Met: [] Not Met: [] Adjusted    Progression Towards Functional goals:  [x] Patient is progressing as expected towards functional goals listed. [] Progression is slowed due to complexities listed. [] Progression has been slowed due to co-morbidities. [] Plan just implemented, too soon to assess goals progression  [] Other:     Overall Progression Towards Functional goals/ Treatment Progress Update:  [x] Patient is progressing as expected towards functional goals listed. [] Progression is slowed due to complexities/Impairments listed. [] Progression has been slowed due to co-morbidities. [] Plan just implemented, too soon to assess goals progression <30days   [] Goals require adjustment due to lack of progress  [] Patient is not progressing as expected and requires additional follow up with physician  [] Other    Prognosis for POC: [x] Good [] Fair  [] Poor      Patient requires continued skilled intervention: [x] Yes  [] No    Treatment/Activity Tolerance:  [x] Patient able to complete treatment  [] Patient limited by fatigue  [] Patient limited by pain    [] Patient limited by other medical complications  [] Other:     ASSESSMENT: modified TE and held Alter-G this date due to general increased pain. Improving R calc and foot mobility, despite increased reported pain.   Improved control with tandem balance on 1/2 FR    Return to Play: (if applicable)   []  Stage 1: Intro to Strength   []  Stage 2: Return to Run and Strength   []  Stage 3: Return to Jump and Strength   []  Stage 4: Dynamic Strength and Agility   []  Stage 5: Sport Specific Training     []  Ready to Return to Play, Meets All Above Stages   []  Not Ready for Return to Sports   Comments:                         PLAN: Continl Alter-G; pt to get OTC inserts. Cont foot and ankle mobility and strength, prox strength as time permits and for HEP  [x] Continue per plan of care [] Alter current plan (see comments above)  [] Plan of care initiated [] Hold pending MD visit [] Discharge      Electronically signed by:  General Jeff, PT ,   Note: If patient does not return for scheduled/ recommended follow up visits, this note will serve as a discharge from care along with most recent update on progress. Access Code: [email or web address]  URL: [email or web address]  Date: 07/23/2021  Prepared by: Marcia Walsh    Exercises  Standing Gastroc Stretch on Foam 1/2 Roll - 2 x daily - 7 x weekly - 1-2 sets - 30 seconds hold  Seated Soleus Stretch - 2 x daily - 7 x weekly  Standing Bilateral Heel Raise on Step - 1-2 x daily - 7 x weekly - 2-3 sets - 10-15 reps  Seated Heel Raise - 1-2 x daily - 7 x weekly - 2-3 sets - 10-15 reps  Half Kneel Ankle Dorsiflexion Self-Mobilization - 2 x daily - 7 x weekly - 15 reps - 3 hold  Standing Ankle Self-Mobilization - 2 x daily - 7 x weekly - 15 reps - 3 hold

## 2021-08-18 ENCOUNTER — NURSE ONLY (OUTPATIENT)
Dept: CARDIOLOGY CLINIC | Age: 61
End: 2021-08-18
Payer: COMMERCIAL

## 2021-08-18 DIAGNOSIS — Z45.09 ENCOUNTER FOR LOOP RECORDER CHECK: ICD-10-CM

## 2021-08-18 DIAGNOSIS — I49.3 PVC (PREMATURE VENTRICULAR CONTRACTION): ICD-10-CM

## 2021-08-18 DIAGNOSIS — I48.91 ATRIAL FIBRILLATION, UNSPECIFIED TYPE (HCC): ICD-10-CM

## 2021-08-19 ENCOUNTER — HOSPITAL ENCOUNTER (OUTPATIENT)
Dept: PHYSICAL THERAPY | Age: 61
Setting detail: THERAPIES SERIES
Discharge: HOME | End: 2021-08-19
Payer: COMMERCIAL

## 2021-08-19 PROCEDURE — 97140 MANUAL THERAPY 1/> REGIONS: CPT

## 2021-08-19 PROCEDURE — 97112 NEUROMUSCULAR REEDUCATION: CPT

## 2021-08-19 PROCEDURE — 97110 THERAPEUTIC EXERCISES: CPT

## 2021-08-19 NOTE — PROGRESS NOTES
[facility],  [address]  Phone: [phone]  Fax [phone]    Physical Therapy Treatment Note/ Progress Report:           Date:  2021    Patient Name:  Alejandra Kennedy"  :  1960  MRN: [id number]  Restrictions/Precautions:    Medical/Treatment Diagnosis Information:  · Diagnosis: M76.61, M76.62 (ICD-10-CM) - Achilles tendinitis of both lower extremities  · Treatment Diagnosis: B ankle pain M25.571, G72.422  Insurance/Certification information:  PT Insurance Information:  BCBS - 3000D - 90/10 - $[id number]/40OT - NO AUTH  Physician Information:  Referring Practitioner: Dr Corwin Duckworth  Has the plan of care been signed (Y/N):        [x]  Yes  []  No     Date of Patient follow up with Physician: 10/8/21      Is this a Progress Report:     [x]  Yes  []  No        If Yes:  Date Range for reporting period:  Beginnin21  Endin21    Progress report will be due (10 Rx or 30 days whichever is less): 04       Recertification will be due (POC Duration  / 90 days whichever is less): 21      Visit # Insurance Allowable Auth Required   In-person 9-PN written 40 []  Yes []  No    Telehealth   []  Yes []  No    Total          Functional Scale: LEFS 43%    Date assessed:  21   Functional Scale: LEFS 23%    Date assessed:  21      Therapy Diagnosis/Practice Pattern:E, conservative      Number of Comorbidities:  []0     []1-2    [x]3+    Latex Allergy:  [x]NO      []YES  Preferred Language for Healthcare:   [x]English       []other:      Pain level:  eval 1-10/10  \"moderate\" in am, 1/10 after walking;    SUBJECTIVE:  Pt states he continues to be stiff for ~10 min walking, but after than no pain in either Achilles. When he does feel pulling, it's more in R>L. His back remains the biggest limitation, he can hardly move in the am and has to heavily use his hands for all ADLs.     OBJECTIVE:  Observation: Pt 10' late to tx  Test measurements: MMT SL PF x20 R, L (partial range); DF   Inv    Ever     FHB  ROM: Ankle DF knee st  R 6   L    4    Knee bent R 8  And L   6  Ankle PF R 52 L 54  Inv R 33 L38  Ever R 12  L 17  MMT hip abd 5/5 B    Foot Assessment Left Right  Comments   NWB Relationships      calcaneus forefoot 1st ray WB Measurements (STJ) Sub-talar neutral standing squatting Rearfoot to forefoot Mobility Subtalar joint Longitudinal midtarsal ioint Oblique midtarsal joint calcaneo-cuboid 1st Ray Mobility NWB WB     RESTRICTIONS/PRECAUTIONS: a-fib, PVC's, chronic LBP, B knee a-scopes  baseball  Exercises/Interventions:     Therapeutic Ex (25480) Sets/sec/reps Notes/CUES   Self TCJ mob post for inc'd DF with strap pre-sport/steps if stiff  (also showed kneeling and on step for when not at home) x 1' Verb review   Blue tband issued, less knee discomfort w/ mob foot post vs ant   gastroc S  Soleus S     DHR ecc control 1/4 ht concentric  Cues gr toe   KASSY hip ext  Core cues             Reformer: squats heels down  Squats heels up and in ER  Walking  DHR in neutral, IR, ER  Pillow for head, foot bar extended out  Resume NV as time allows        Alter-G   Walking 2.0-2.5  PF eccentrics  Walk on toes (1/2 range)   Partial \"skip\" to simulate push-offs  Walk/jog 1' alt 3.2-5.0  SLS with 3D kick  15' total (set-up/on/off)  Shorts size S  Ht @ 13      95% last 2 rounds   PF ecc off KASSY x20 R, L    LP B  LP L  CP B  CP R, L               Foot/ankle NWB'ing/WB'ing assess time  Squat video on phone to show pt discussion about why he needs more support particularly L (gave rec for superfeet/powerstep vs stability shoe)     Pt ed:  HEP--reviewed prox hip strengthening exercises previously issued for knee and importance of prox stability for LBP/knee and achilles,    1/week PT for ankles, can schedule back eval-get script from PCP (not sure Dr. Savannah Gold will want to have back POC under her guidance but he can ask.)  '                        5'                   Manual Intervention (66787) 8' total    IASTM HGPro sweeping up/down, fanning to gastroc, brushing to achilles, convex side, bevel 45*     TC distraction gr V  Post talar glide gr lll-lV  Calc distraction  Calc ev x3 R, x3 L    1x30\" R/L  4\" x15 R/L    Great toe dist + ext, FHL S 10\"x4 B,                         NMR re-education (62927)  CUES NEEDED   FSU to stork SLS on air-ex  Resume NV   Gr toe cues, wall prn   Air-ex TB hip series lat, post-lat, ext  Posture and tripod foot cues   AP rock 1/2 FR  x20 Fingertip balance on 1/2 wall   Tandem balance 1/2 FR (roll side down) x1' R, L back Light wall support prn   Agility work  Ladder drills: AP 1 in ea R, L lead  2in ea R, L lead  Jogging 1 in ea  ML 1 in ea R, L lead  2in ea R, L lead  Out/ins fwd/bwbd    Side-shuffling  Running/backpedal  Line hop AP, ML   20' total                20'x2  \" \"  15\" ea    3D SLS with rebounder toss R, L ea x10 ea R, L ea 2# MB                  Therapeutic Activity (46672)                                         Therapeutic Exercise and NMR EXR  [x] (23847) Provided verbal/tactile cueing for activities related to strengthening, flexibility, endurance, ROM for improvements in LE, proximal hip, and core control with self care, mobility, lifting, ambulation. [x] (68287) Provided verbal/tactile cueing for activities related to improving balance, coordination, kinesthetic sense, posture, motor skill, proprioception  to assist with LE, proximal hip, and core control in self care, mobility, lifting, ambulation and eccentric single leg control.      NMR and Therapeutic Activities:    [x] (87466 or 50645) Provided verbal/tactile cueing for activities related to improving balance, coordination, kinesthetic sense, posture, motor skill, proprioception and motor activation to allow for proper function of core, proximal hip and LE with self care and ADLs  [x] (13596) Gait Re-education- Provided training and instruction to the patient for proper LE, core and proximal hip recruitment and positioning and eccentric body weight control with ambulation re-education including up and down stairs     Home Exercise Program:    [x] (78846) Reviewed/Progressed HEP activities related to strengthening, flexibility, endurance, ROM of core, proximal hip and LE for functional self-care, mobility, lifting and ambulation/stair navigation   [] (64411)Reviewed/Progressed HEP activities related to improving balance, coordination, kinesthetic sense, posture, motor skill, proprioception of core, proximal hip and LE for self care, mobility, lifting, and ambulation/stair navigation      Manual Treatments:  PROM / STM / Oscillations-Mobs:  G-I, II, III, IV (PA's, Inf., Post.)  [x] (56138) Provided manual therapy to mobilize LE, proximal hip and/or LS spine soft tissue/joints for the purpose of modulating pain, promoting relaxation,  increasing ROM, reducing/eliminating soft tissue swelling/inflammation/restriction, improving soft tissue extensibility and allowing for proper ROM for normal function with self care, mobility, lifting and ambulation. Modalities:  '    declined   [] GAME READY (VASO)- for significant edema, swelling, pain control. Charges  Timed Code Treatment Minutes: 38   Total Treatment Minutes: 38     [] EVAL (LOW) 79550   [] EVAL (MOD) 49056  [] EVAL (HIGH) 53457   [] RE-EVAL     [x] SG(81904) x1    [] IONTO  [x] NMR (90556) x  1  [] VASO  [x] Manual (81112) x 1     [] Other: physical performance assess   [] TA x      [] Mech Traction (47121)  [] ES(attended) (68183)      [] ES (un) (77923):       GOALS:  Patient stated goal: able to run/play baseball without pain  [] Progressing: [] Met: [] Not Met: [] Adjusted    Therapist goals for Patient:   Short Term Goals: To be achieved in: 2 weeks  1. Independent in HEP and progression per patient tolerance, in order to prevent re-injury.    [] Progressing: [x] Met: [] Not Met: [] Adjusted  2. Patient will have a decrease in pain to facilitate improvement in movement, function, and ADLs as indicated by Functional Deficits. [] Progressing: [x] Met: [] Not Met: [] Adjusted    Long Term Goals: To be achieved in: 8 weeks  1. Disability index score of 20% or less for the LEFS to assist with reaching prior level of function. [x] Progressin%  [] Met: [] Not Met: [] Adjusted  2. Patient will demonstrate increased AROM to at least 5* DF B w/ knee extended to allow for proper joint functioning as indicated by patients Functional Deficits. [x] Progressing: [] Met: [] Not Met: [] Adjusted  3. Patient will demonstrate an increase in Strength to 4+/5 post-lat hip and at least 15 single heel raises allow for proper functional mobility as indicated by patients Functional Deficits. [] Progressing: [x] Met: [] Not Met: [] Adjusted  4. Patient will return to reciprocal stairs without increased symptoms or restriction. [x] Progressing: [] Met: [] Not Met: [] Adjusted  5. Pt will progress to [facility] for sport specific training. [] Progressing: [] Met: [x] Not Met: [] Adjusted    Progression Towards Functional goals:  [x] Patient is progressing as expected towards functional goals listed. [] Progression is slowed due to complexities listed. [] Progression has been slowed due to co-morbidities. [] Plan just implemented, too soon to assess goals progression  [] Other:     Overall Progression Towards Functional goals/ Treatment Progress Update:  [x] Patient is progressing as expected towards functional goals listed. [] Progression is slowed due to complexities/Impairments listed. [] Progression has been slowed due to co-morbidities.   [] Plan just implemented, too soon to assess goals progression <30days   [] Goals require adjustment due to lack of progress  [] Patient is not progressing as expected and requires additional follow up with physician  [] Other    Prognosis for POC: [x] Good [] Fair  [] Poor      Patient requires continued skilled intervention: [x] Yes  [] No    Treatment/Activity Tolerance:  [x] Patient able to complete treatment  [] Patient limited by fatigue  [] Patient limited by pain    [] Patient limited by other medical complications  [] Other:     ASSESSMENT: Pt had good [place]isco for agility work this date, gave ok to try light participation in softball game if he goes early to warm-up, do some hitting, and have teammate run the bases for him. He still has yet to purchase OTC inserts, so recommended to have this as well as he could add thin inserts into his spikes. He will dec PT for Achilles to 1x/week but start 2nd POC for his low back-plans to get script from PCP vs will ask Dr. Mercedes Self or may consider consultation with PMR MD.     Return to Play: (if applicable)   []  Stage 1: Intro to Strength   []  Stage 2: Return to Run and Strength   []  Stage 3: Return to Jump and Strength   []  Stage 4: Dynamic Strength and Agility   []  Stage 5: Sport Specific Training     []  Ready to Return to Play, Meets All Above Stages   []  Not Ready for Return to Sports   Comments:                         PLAN:Pt to get OTC inserts. Cont foot and ankle mobility and strength, prox strength as time permits and for HEP; add 2nd POC for l-spine. [x] Continue per plan of care [x] Alter current plan (see comments above)  [] Plan of care initiated [] Hold pending MD visit [] Discharge      Electronically signed by:  Va Braun, PT, DPT  Note: If patient does not return for scheduled/ recommended follow up visits, this note will serve as a discharge from care along with most recent update on progress. Access Code: [email or web address]  URL: [email or web address]  Date: 07/23/2021  Prepared by: Craig Bearded    Exercises  Standing Gastroc Stretch on Foam 1/2 Roll - 2 x daily - 7 x weekly - 1-2 sets - 30 seconds hold  Seated Soleus Stretch - 2 x daily - 7 x weekly  Standing Bilateral Heel Raise on Step - 1-2 x daily - 7 x weekly - 2-3 sets - 10-15 reps  Seated Heel Raise - 1-2 x daily - 7 x weekly - 2-3 sets - 10-15 reps  Half Kneel Ankle Dorsiflexion Self-Mobilization - 2 x daily - 7 x weekly - 15 reps - 3 hold  Standing Ankle Self-Mobilization - 2 x daily - 7 x weekly - 15 reps - 3 hold

## 2021-08-20 PROCEDURE — 93298 REM INTERROG DEV EVAL SCRMS: CPT | Performed by: INTERNAL MEDICINE

## 2021-08-20 PROCEDURE — G2066 INTER DEVC REMOTE 30D: HCPCS | Performed by: INTERNAL MEDICINE

## 2021-08-20 NOTE — PROGRESS NOTES
Remote transmission received for patients ILR. EP physician will review. See interrogation under the cardiology tab in the [address] [place] [address] [address] field for more details. Will continue to monitor remotely. ILR implanted 12/11/20 by Dr Teetee Baca for pAF and nsvt. He has a hx of pAF and is on eliquis and amio. AFib 12/15/20 x 14 hrs then converted to sinus rhythm on 12/15/20 @ 6pm.   3.5 sec conversion pause at 6:00 pm. On 12/15   No  arrhythmias recorded.

## 2021-08-23 ENCOUNTER — OFFICE VISIT (OUTPATIENT)
Dept: FAMILY MEDICINE CLINIC | Age: 61
End: 2021-08-23
Payer: COMMERCIAL

## 2021-08-23 VITALS
DIASTOLIC BLOOD PRESSURE: 64 MMHG | SYSTOLIC BLOOD PRESSURE: 112 MMHG | OXYGEN SATURATION: 98 % | HEART RATE: 49 BPM | HEIGHT: 71 IN | WEIGHT: 201.6 LBS | BODY MASS INDEX: 28.22 KG/M2

## 2021-08-23 DIAGNOSIS — G89.29 CHRONIC BILATERAL LOW BACK PAIN WITHOUT SCIATICA: Primary | ICD-10-CM

## 2021-08-23 DIAGNOSIS — M79.10 MYALGIA: ICD-10-CM

## 2021-08-23 DIAGNOSIS — M54.50 CHRONIC BILATERAL LOW BACK PAIN WITHOUT SCIATICA: Primary | ICD-10-CM

## 2021-08-23 LAB
C-REACTIVE PROTEIN: <3 MG/L (ref 0–5.1)
RHEUMATOID FACTOR: <10 IU/ML
TOTAL CK: 199 U/L (ref 39–308)

## 2021-08-23 PROCEDURE — 99214 OFFICE O/P EST MOD 30 MIN: CPT | Performed by: FAMILY MEDICINE

## 2021-08-23 ASSESSMENT — ENCOUNTER SYMPTOMS: BACK PAIN: 1

## 2021-08-23 NOTE — PROGRESS NOTES
[facility] Physical Therapy  -     BEKA    Myalgia  -     Cancel: BEKA Titer  -     C-Reactive Protein  -     Rheumatoid Factor  -     CK  -     BEKA      Patient was referred to physical therapy. We are retesting his inflammatory markers. We discussed that fibromyalgia is a consideration. He has never been on Cymbalta. First we will try physical therapy will rule out for inflammatory process. If he does not respond to therapy he can follow-up with orthopedics.  We will also consider trying him on Cymbalta

## 2021-08-24 ENCOUNTER — HOSPITAL ENCOUNTER (OUTPATIENT)
Dept: PHYSICAL THERAPY | Age: 61
Setting detail: THERAPIES SERIES
Discharge: HOME | End: 2021-08-24
Payer: COMMERCIAL

## 2021-08-24 LAB — ANTI-NUCLEAR ANTIBODY (ANA): NEGATIVE

## 2021-08-24 PROCEDURE — 97110 THERAPEUTIC EXERCISES: CPT | Performed by: PHYSICAL THERAPIST

## 2021-08-24 PROCEDURE — 97140 MANUAL THERAPY 1/> REGIONS: CPT | Performed by: PHYSICAL THERAPIST

## 2021-08-24 PROCEDURE — 97112 NEUROMUSCULAR REEDUCATION: CPT | Performed by: PHYSICAL THERAPIST

## 2021-08-24 NOTE — FLOWSHEET NOTE
[facility],  [address] [place]  Phone: [phone]  Fax [phone]    Physical Therapy Treatment Note/ Progress Report:           Date:  2021    Patient Name:  Katey Salazar"  :  1960  MRN: [id number]  Restrictions/Precautions:    Medical/Treatment Diagnosis Information:  · Diagnosis: M76.61, M76.62 (ICD-10-CM) - Achilles tendinitis of both lower extremities  · Treatment Diagnosis: B ankle pain M25.571, S59.421  Insurance/Certification information:  PT Insurance Information:  BCBS - 3000D - 90/10 - $[id number] - NO AUTH  Physician Information:  Referring Practitioner: Dr Reed Rodriguez  Has the plan of care been signed (Y/N):        [x]  Yes  []  No     Date of Patient follow up with Physician: 10/8/21      Is this a Progress Report:     [x]  Yes  []  No        If Yes:  Date Range for reporting period:  Beginnin21  Endin21    Progress report will be due (10 Rx or 30 days whichever is less):        Recertification will be due (POC Duration  / 90 days whichever is less): 21      Visit # Insurance Allowable Auth Required   In-person 10 40 []  Yes []  No    Telehealth   []  Yes []  No    Total          Functional Scale: LEFS 43%    Date assessed:  21   Functional Scale: LEFS 23%    Date assessed:  21      Therapy Diagnosis/Practice Pattern:E, conservative      Number of Comorbidities:  []0     []1-2    [x]3+    Latex Allergy:  [x]NO      []YES  Preferred Language for Healthcare:   [x]English       []other:      Pain level:  eval 1-10/10  \"moderate\" in am, 1/10 after walking;    SUBJECTIVE:  Pt states he saw his PCP yesterday and is being sent for blood work checking for inflammatory factors. She suggested he may have fibromyalgia. He did get a PT Rx to evaluate the back and would like to do this Thursday.   OBJECTIVE:    Observation:   Test measurements:     Foot Assessment Left Right  Comments   NWB Relationships      calcaneus forefoot 1st ray WB Measurements (STJ) Sub-talar neutral standing squatting Rearfoot to forefoot Mobility Subtalar joint Longitudinal midtarsal ioint Oblique midtarsal joint calcaneo-cuboid 1st Ray Mobility NWB WB     RESTRICTIONS/PRECAUTIONS: a-fib, PVC's, chronic LBP, B knee a-scopes  baseball  Exercises/Interventions:     Therapeutic Ex (42002) Sets/sec/reps Notes/CUES   Self TCJ mob post for inc'd DF with strap pre-sport/steps if stiff  (also showed kneeling and on step for when not at home) x 1' Verb review   Blue tband issued, less knee discomfort w/ mob foot post vs ant   gastroc S  Soleus S 30\"x3    DHR ecc control 1/4 ht concentric  Cues gr toe   KASSY hip ext  Core cues             Reformer: squats heels down  Squats heels up and in ER  Walking  DHR in neutral, IR, ER  Pillow for head, foot bar extended out  Resume NV as time allows        Alter-G   Walking 2.0-2.5  PF eccentrics  Walk on toes (1/2 range)   Partial \"skip\" to simulate push-offs  Walk/jog 1' alt 3.2-5.0  SLS with 3D kick  15' total (set-up/on/off)  Shorts size S  Ht @ 13      95% last 2 rounds   PF ecc off KASSY x20 R, L    LP B  LP L  CP B  CP R, L               Foot/ankle NWB'ing/WB'ing assess time  Squat video on phone to show pt discussion about why he needs more support particularly L (gave rec for superfeet/powerstep vs stability shoe)     Pt ed:  HEP--reviewed prox hip strengthening exercises previously issued for knee and importance of prox stability for LBP/knee and achilles,      '                                           Manual Intervention (54946) 8' total    IASTM HGPro sweeping up/down, fanning to gastroc, brushing to achilles, convex side, bevel 45*     TC distraction gr V  Post talar glide gr lll-lV  Calc distraction  Calc ev x3 R, x3 L    1x30\" R/L  4\" x15 R/L    Great toe dist + ext, FHL S 10\"x4 B,                         NMR re-education (33586)  CUES NEEDED FSU to stork SLS on air-ex  Resume NV   Gr toe cues, wall prn   Air-ex TB hip series lat, post-lat, ext    TB hip abd on 1/2 FR w/ flat down   RVL x20 R/L Posture and tripod foot cues   AP rock 1/2 FR   Fingertip balance on 1/2 wall   Tandem balance 1/2 FR (roll side down) x1' R, L back Light wall support prn   Agility work  Ladder drills: AP 1 in ea R, L lead  2in ea R, L lead  Jogging 1 in ea  ML 1 in ea R, L lead  2in ea R, L lead  Out/ins fwd/bwbd  Light skips    Side-shuffling  Running/backpedal  Line hop AP, ML   15' total                    \" \"  15\" ea    3D SLS with rebounder toss R, L ea x10 ea R, L ea 2# MB   LSD heel on/off 4\" x15 ea    Walking mini lunges fwd/bwd 1 lap ea         Therapeutic Activity (75333)                                         Therapeutic Exercise and NMR EXR  [x] (05080) Provided verbal/tactile cueing for activities related to strengthening, flexibility, endurance, ROM for improvements in LE, proximal hip, and core control with self care, mobility, lifting, ambulation. [x] (18089) Provided verbal/tactile cueing for activities related to improving balance, coordination, kinesthetic sense, posture, motor skill, proprioception  to assist with LE, proximal hip, and core control in self care, mobility, lifting, ambulation and eccentric single leg control.      NMR and Therapeutic Activities:    [x] (05691 or 66275) Provided verbal/tactile cueing for activities related to improving balance, coordination, kinesthetic sense, posture, motor skill, proprioception and motor activation to allow for proper function of core, proximal hip and LE with self care and ADLs  [x] (89688) Gait Re-education- Provided training and instruction to the patient for proper LE, core and proximal hip recruitment and positioning and eccentric body weight control with ambulation re-education including up and down stairs     Home Exercise Program:    [x] (63450) Reviewed/Progressed HEP activities related to strengthening, flexibility, endurance, ROM of core, proximal hip and LE for functional self-care, mobility, lifting and ambulation/stair navigation   [] (75487)Reviewed/Progressed HEP activities related to improving balance, coordination, kinesthetic sense, posture, motor skill, proprioception of core, proximal hip and LE for self care, mobility, lifting, and ambulation/stair navigation      Manual Treatments:  PROM / STM / Oscillations-Mobs:  G-I, II, III, IV (PA's, Inf., Post.)  [x] (06623) Provided manual therapy to mobilize LE, proximal hip and/or LS spine soft tissue/joints for the purpose of modulating pain, promoting relaxation,  increasing ROM, reducing/eliminating soft tissue swelling/inflammation/restriction, improving soft tissue extensibility and allowing for proper ROM for normal function with self care, mobility, lifting and ambulation. Modalities:  '    declined   [] GAME READY (VASO)- for significant edema, swelling, pain control. Charges  Timed Code Treatment Minutes: 45   Total Treatment Minutes: 45     [] EVAL (LOW) 14138   [] EVAL (MOD) 83059  [] EVAL (HIGH) 17451   [] RE-EVAL     [x] RX(20075) x1    [] IONTO  [x] NMR (73036) x  1  [] VASO  [x] Manual (46271) x 1     [] Other: physical performance assess   [] TA x      [] Mech Traction (70906)  [] ES(attended) (09893)      [] ES (un) (12801):       GOALS:  Patient stated goal: able to run/play baseball without pain  [] Progressing: [] Met: [] Not Met: [] Adjusted    Therapist goals for Patient:   Short Term Goals: To be achieved in: 2 weeks  1. Independent in HEP and progression per patient tolerance, in order to prevent re-injury. [] Progressing: [x] Met: [] Not Met: [] Adjusted  2. Patient will have a decrease in pain to facilitate improvement in movement, function, and ADLs as indicated by Functional Deficits. [] Progressing: [x] Met: [] Not Met: [] Adjusted    Long Term Goals: To be achieved in: 8 weeks  1.  Disability index score of 20% or less for the LEFS to assist with reaching prior level of function. [x] Progressin%  [] Met: [] Not Met: [] Adjusted  2. Patient will demonstrate increased AROM to at least 5* DF B w/ knee extended to allow for proper joint functioning as indicated by patients Functional Deficits. [x] Progressing: [] Met: [] Not Met: [] Adjusted  3. Patient will demonstrate an increase in Strength to 4+/5 post-lat hip and at least 15 single heel raises allow for proper functional mobility as indicated by patients Functional Deficits. [] Progressing: [x] Met: [] Not Met: [] Adjusted  4. Patient will return to reciprocal stairs without increased symptoms or restriction. [x] Progressing: [] Met: [] Not Met: [] Adjusted  5. Pt will progress to [facility] for sport specific training. [] Progressing: [] Met: [x] Not Met: [] Adjusted    Progression Towards Functional goals:  [x] Patient is progressing as expected towards functional goals listed. [] Progression is slowed due to complexities listed. [] Progression has been slowed due to co-morbidities. [] Plan just implemented, too soon to assess goals progression  [] Other:     Overall Progression Towards Functional goals/ Treatment Progress Update:  [x] Patient is progressing as expected towards functional goals listed. [] Progression is slowed due to complexities/Impairments listed. [] Progression has been slowed due to co-morbidities.   [] Plan just implemented, too soon to assess goals progression <30days   [] Goals require adjustment due to lack of progress  [] Patient is not progressing as expected and requires additional follow up with physician  [] Other    Prognosis for POC: [x] Good [] Fair  [] Poor      Patient requires continued skilled intervention: [x] Yes  [] No    Treatment/Activity Tolerance:  [x] Patient able to complete treatment  [] Patient limited by fatigue  [] Patient limited by pain    [] Patient limited by other medical complications  [] Other: ASSESSMENT: able to progress ladders and functional floor/balance activities without reported achilles pain. Mild L knee soreness with skips only. Posture cues with B hip abd. Return to Play: (if applicable)   []  Stage 1: Intro to Strength   []  Stage 2: Return to Run and Strength   []  Stage 3: Return to Jump and Strength   []  Stage 4: Dynamic Strength and Agility   []  Stage 5: Sport Specific Training     []  Ready to Return to Play, Meets All Above Stages   []  Not Ready for Return to Sports   Comments:                         PLAN:Pt to get OTC inserts. Cont foot and ankle mobility and strength, prox strength as time permits and for HEP; add 2nd POC for l-spine--eval 8/26/21  [x] Continue per plan of care [x] Alter current plan (see comments above)  [] Plan of care initiated [] Hold pending MD visit [] Discharge      Electronically signed by:  Demetrius Garza, PT,   Note: If patient does not return for scheduled/ recommended follow up visits, this note will serve as a discharge from care along with most recent update on progress. Access Code: [email or web address]  URL: [email or web address]  Date: 07/23/2021  Prepared by: Marian Jacob    Exercises  Standing Gastroc Stretch on Foam 1/2 Roll - 2 x daily - 7 x weekly - 1-2 sets - 30 seconds hold  Seated Soleus Stretch - 2 x daily - 7 x weekly  Standing Bilateral Heel Raise on Step - 1-2 x daily - 7 x weekly - 2-3 sets - 10-15 reps  Seated Heel Raise - 1-2 x daily - 7 x weekly - 2-3 sets - 10-15 reps  Half Kneel Ankle Dorsiflexion Self-Mobilization - 2 x daily - 7 x weekly - 15 reps - 3 hold  Standing Ankle Self-Mobilization - 2 x daily - 7 x weekly - 15 reps - 3 hold

## 2021-08-26 ENCOUNTER — HOSPITAL ENCOUNTER (OUTPATIENT)
Dept: PHYSICAL THERAPY | Age: 61
Setting detail: THERAPIES SERIES
Discharge: HOME | End: 2021-08-26
Payer: COMMERCIAL

## 2021-08-26 PROCEDURE — 97110 THERAPEUTIC EXERCISES: CPT | Performed by: PHYSICAL THERAPIST

## 2021-08-26 PROCEDURE — 97140 MANUAL THERAPY 1/> REGIONS: CPT | Performed by: PHYSICAL THERAPIST

## 2021-08-26 PROCEDURE — 97161 PT EVAL LOW COMPLEX 20 MIN: CPT | Performed by: PHYSICAL THERAPIST

## 2021-08-26 NOTE — PLAN OF CARE
[facility], [address], [address] [place] [address]  Phone: [phone]  Fax [phone]    Physical Therapy Certification    Dear Referring Practitioner: Annette Castillo,    We had the pleasure of evaluating the following patient for physical therapy services at [address] [place] [address]. A summary of our findings can be found in the initial assessment below. This includes our plan of care. If you have any questions or concerns regarding these findings, please do not hesitate to contact me at the office phone number checked above. Thank you for the referral.       Physician Signature:_______________________________Date:__________________  By signing above (or electronic signature), therapists plan is approved by physician    Patient: Blanco Payne   : 1960   MRN: [id number]  Referring Physician: Referring Practitioner: Annette Castillo      Evaluation Date: 2021      Medical Diagnosis Information:  Diagnosis: M54.5, G89.29 (ICD-10-CM) - Chronic bilateral low back pain without sciatica   Treatment Diagnosis: M54.5, G89.29 (ICD-10-CM) - Chronic bilateral low back pain without sciatica                                         Insurance information: PT Insurance Information: BCBS 40 PT (also being seen to achilles), no auth       Precautions/ Contra-indications: Afib, PVC's    C-SSRS Triggered by Intake questionnaire (Past 2 wk assessment):   [x] No, Questionnaire did not trigger screening.   [] Yes, Patient intake triggered further evaluation      [] C-SSRS Screening completed  [] PCP notified via Plan of Care  [] Emergency services notified     Latex Allergy:  [x]NO      []YES  Preferred Language for Healthcare:   [x]English       []other:    SUBJECTIVE: Patient stated complaint:Pt reports long standing history of LBP and stiffness that is worse in the mornings and he states he can hardly walk.   He states the pain does improve once he gets moving, but he also notes neck pain from a baseball injury several years ago that also continues to bother him and this gets worse as the day goes on with reports of frequent tension HA's by end of day.     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 [employer]    Living Status/Prior Level of Function: Independent with ADLs and IADLs, pt lives at home with his wife, enjoys playing baseball    OBJECTIVE:     ROM LEFT RIGHT   Cervical flex 45    Cervical ext 32    rotation 36 42   SB 15 20   LUMBAR FLEX 60    LUMBAR EXT 15    Sidebend 20 20   Rotation      LEFT RIGHT   HIP Flex     HIP Abd     HIP ER     HIP IR     Knee Flex     Knee ext     Hamstring FLEX ++ ++   Piriformis  + +             Strength  LEFT RIGHT   MfA     TrA     HIP Flexors 5 5   HIP Abductors 5 5   HIP ER     Hip IR     Knee EXT (quad) 5- 5   Knee Flex (HS)     Ankle DF     Ankle PF     Great Toe Ext       Reflexes/Sensation:    [x]Dermatomes/Myotomes intact    []UE Reflexes     [x]Normal biceps [x]Hypo brachioradialis and triceps      []Hyper   []LE Reflexes     []Normal [x]Hypo      []Hyper    X Clonus/Hoffmans: (-)   []Other:    Joint mobility:    []Normal    [x]Hypo--L>R C2-3 and C5-6-7- lateral glides, general lumbar PA's   []Hyper    Palpation: generalized myofascial tightness     Functional Mobility/Transfers: indep with increased pain and stiffness    Posture: L shoulder elevated, mild scoliosis, iliac crests level in sitting, decreased lumbar lordosis, sits in PPT    Bandages/Dressings/Incisions: NA    Gait: (include devices/WB status)     Orthopedic Special Tests: (-) vert artery, neural tension/slump, alar lig, landaverde, clonus                       [x] Patient history, allergies, meds reviewed. Medical chart reviewed. See intake form. Review Of Systems (ROS):  [x]Performed Review of systems (Integumentary, CardioPulmonary, Neurological) by intake and observation. Intake form has been scanned into medical record. Patient has been instructed to contact their primary care physician regarding ROS issues if not already being addressed at this time.       Co-morbidities/Complexities (which will affect course of rehabilitation):   []None           Arthritic conditions   []Rheumatoid arthritis (M05.9)  [x]Osteoarthritis (M19.91)   Cardiovascular conditions   []Hypertension (I10)  [x]Hyperlipidemia (E78.5)  []Angina pectoris (I20)  []Atherosclerosis (I70)  A-fib, PVC's Musculoskeletal conditions   []Disc pathology   []Congenital spine pathologies   [x]Prior surgical intervention  []Osteoporosis (M81.8)  []Osteopenia (M85.8)   Endocrine conditions   []Hypothyroid (E03.9)  []Hyperthyroid Gastrointestinal conditions   []Constipation (Z69.40)   Metabolic conditions   []Morbid obesity (E66.01)  []Diabetes type 1(E10.65) or 2 (E11.65)   []Neuropathy (G60.9)     Pulmonary conditions   []Asthma (J45)  []Coughing   []COPD (J44.9)   Psychological Disorders  []Anxiety (F41.9)  []Depression (F32.9)   [x]Other:ADHD   []Other:     B achilles tendonitis     Barriers to/and or personal factors that will affect rehab potential:              []Age  []Sex              []Motivation/Lack of Motivation                        [x]Co-Morbidities              []Cognitive Function, education/learning barriers              []Environmental, home barriers              []profession/work barriers  []past PT/medical experience  []other:  Justification:     Falls Risk Assessment (30 days):   [x] Falls Risk assessed and no intervention proximal hip activation, and HEP    Frequency/Duration:  2 days per week for 12 Weeks:  Interventions:  [x]  Therapeutic exercise including: strength training, ROM, for LE, Glutes and core   [x]  NMR activation and proprioception for glutes , LE and Core   [x]  Manual therapy as indicated for Hip complex, LE and spine to include: Dry Needling/IASTM, STM, PROM, Gr I-IV mobilizations, manipulation. [x]  Modalities as needed that may include: thermal agents, E-stim, Biofeedback, US, iontophoresis as indicated  [x]  Patient education on joint protection, postural re-education, activity modification, progression of HEP. HEP instruction: (see below)    GOALS:  Patient stated goal: less pain and more mobility in the mornings  [] Progressing: [] Met: [] Not Met: [] Adjusted    Therapist goals for Patient:   Short Term Goals: To be achieved in: 2 weeks  1. Independent in HEP and progression per patient tolerance, in order to prevent re-injury. [] Progressing: [] Met: [] Not Met: [] Adjusted  2. Patient will have a decrease in pain to facilitate improvement in movement, function, and ADLs as indicated by Functional Deficits. [] Progressing: [] Met: [] Not Met: [] Adjusted      Long Term Goals: To be achieved in: 12 weeks  1. Disability index score of 14% or less for the modODI  to assist with reaching prior level of function. [] Progressing: [] Met: [] Not Met: [] Adjusted  2. Patient will demonstrate increased AROM to WNL, good LS mobility, good hip ROM to allow for proper joint functioning as indicated by patients Functional Deficits. [] Progressing: [] Met: [] Not Met: [] Adjusted  3. Patient will demonstrate an increase in Strength to good proximal hip and core activation to allow for proper functional mobility as indicated by patients Functional Deficits. [] Progressing: [] Met: [] Not Met: [] Adjusted  4.  Patient will return to lifting 20# floor to waist with good mechanics without increased symptoms or restriction. [] Progressing: [] Met: [] Not Met: [] Adjusted  5. Pt will report at least 50% improved mobility and walking ability in the morning. [] Progressing: [] Met: [] Not Met: [] Adjusted       Electronically signed by:  Suki Holman PT    Access Code: [email or web address]  URL: [email or web address]  Date: 08/26/2021  Prepared by: Suki Holman    Exercises  Sidelying Thoracic Rotation with Open Book - 1 x daily - 7 x weekly - 3 sets - 10 reps  Supine Suboccipital Release with Tennis Balls - 1-2 x daily - 7 x weekly - 2-5 minutes as tolerated hold  Self trigger point release with tennis ball - 1 x daily - 7 x weekly  Quadruped Rocking Slow - 1 x daily - 7 x weekly - 1-2 sets - 5-10 reps  Seated Assisted Cervical Rotation with Towel - 1 x daily - 7 x weekly - 1-2 sets - 5-10 reps

## 2021-08-26 NOTE — FLOWSHEET NOTE
[facility],  [address]  Phone: [phone]  Fax [phone]    Physical Therapy Treatment Note/ Progress Report:           Date:  2021    Patient Name:  Nichelle Mustafa"  :  1960  MRN: [id number]  Restrictions/Precautions:    Medical/Treatment Diagnosis Information:  · Diagnosis: M54.5, G89.29 (ICD-10-CM) - Chronic bilateral low back pain without sciatica  · Treatment Diagnosis: M54.5, G89.29 (ICD-10-CM) - Chronic bilateral low back pain without sciatica  Insurance/Certification information:  PT Insurance Information: BC[employer] 40 PT (also being seen to [facility]), no auth  Physician Information:  Referring Practitioner: Kathy Saucedo  Has the plan of care been signed (Y/N):        []  Yes  [x]  No     Date of Patient follow up with Physician: prn      Is this a Progress Report:     []  Yes  [x]  No        If Yes:  Date Range for reporting period:  Beginnin24  Ending:     Progress report will be due (10 Rx or 30 days whichever is less): 3/05/44       Recertification will be due (POC Duration  / 90 days whichever is less): 12 weeks        Visit # Insurance Allowable Auth Required   In-person 1 40 (10 used for [facility]) []  Yes []  No    Telehealth   []  Yes []  No    Total            Functional Scale: modODI 24%    Date assessed:  21      Therapy Diagnosis/Practice Pattern:F, conservative      Number of Comorbidities:  []0     []1-2    [x]3+    Latex Allergy:  [x]NO      []YES  Preferred Language for Healthcare:   [x]English       []other:      Pain level:  eval 2-10/10    SUBJECTIVE:  See eval    OBJECTIVE: See eval   Observation:    Test measurements:      RESTRICTIONS/PRECAUTIONS: a-fib, PVC's, chronic LBP, B knee a-scopes    Exercises/Interventions:     Therapeutic Ex (22105) Sets/sec/Reps Notes/CUES   SL open book S x10 R/L HEP   Supine SOR w/ tennis balls, self massage w/ ball on wall reviewed HEP   Quadruped HH rocking x10 HEP   Self SNAG L cervical rotation x10 HEP                                      Pt ed: eval findings, POC, HEP, posture, spine anatomy and joint gliding x10'    Manual Intervention (80802) 28'    Seated CTJ thrust 2x    Supine SOR, man traction, upper cervical nods and lateral glides x15'    SL lumbar gapping x3' R/L                   NMR re-education (12415)  CUES NEEDED                                                Therapeutic Activity (47425)                                       Therapeutic Exercise and NMR EXR  [x] (22856) Provided verbal/tactile cueing for activities related to strengthening, flexibility, endurance, ROM  for improvements in proximal hip and core control with self care, mobility, lifting and ambulation. [x] (25904) Provided verbal/tactile cueing for activities related to improving balance, coordination, kinesthetic sense, posture, motor skill, proprioception  to assist with core control in self care, mobility, lifting, and ambulation.      Therapeutic Activities:    [] (15926 or 09254) Provided verbal/tactile cueing for activities related to improving balance, coordination, kinesthetic sense, posture, motor skill, proprioception and motor activation to allow for proper function  with self care and ADLs  [] (20394) Provided training and instruction to the patient for proper core and proximal hip recruitment and positioning with ambulation re-education     Home Exercise Program:    [x] (10475) Reviewed/Progressed HEP activities related to strengthening, flexibility, endurance, ROM of core, proximal hip and LE for functional self-care, mobility, lifting and ambulation   [] (92347) Reviewed/Progressed HEP activities related to improving balance, coordination, kinesthetic sense, posture, motor skill, proprioception of core, proximal hip and LE for self care, mobility, lifting, and ambulation      Manual Treatments:  PROM / STM / Oscillations-Mobs:  G-I, II, III, IV (Roz, Inf., Post.)  [x] (88866) Provided manual therapy to mobilize proximal hip and LS spine soft tissue/joints for the purpose of modulating pain, promoting relaxation,  increasing ROM, reducing/eliminating soft tissue swelling/inflammation/restriction, improving soft tissue extensibility and allowing for proper ROM for normal function with self care, mobility, lifting and ambulation. Modalities:   Pt declined    Charges  Timed Code Treatment Minutes: 40   Total Treatment Minutes: 60       [x] EVAL (LOW) 75082   [] EVAL (MOD) 54304   [] EVAL (HIGH) 75656   [] RE-EVAL     [x] HR(40616) x1     [] IONTO  [] NMR (35305) x     [] VASO  [x] Manual (11664) x 2     [] Other:  [] TA x      [] Mech Traction (45767)  [] ES(attended) (10401)      [] ES (un) (87804):     Goals:   Patient stated goal: less pain and more mobility in the mornings  []? Progressing: []? Met: []? Not Met: []? Adjusted     Therapist goals for Patient:   Short Term Goals: To be achieved in: 2 weeks  1. Independent in HEP and progression per patient tolerance, in order to prevent re-injury. []? Progressing: []? Met: []? Not Met: []? Adjusted  2. Patient will have a decrease in pain to facilitate improvement in movement, function, and ADLs as indicated by Functional Deficits. []? Progressing: []? Met: []? Not Met: []? Adjusted      Long Term Goals: To be achieved in: 12 weeks  1. Disability index score of 14% or less for the modODI  to assist with reaching prior level of function. []? Progressing: []? Met: []? Not Met: []? Adjusted  2. Patient will demonstrate increased AROM to WNL, good LS mobility, good hip ROM to allow for proper joint functioning as indicated by patients Functional Deficits. []? Progressing: []? Met: []? Not Met: []? Adjusted  3. Patient will demonstrate an increase in Strength to good proximal hip and core activation to allow for proper functional mobility as indicated by patients Functional Deficits.    []? Progressing: []? Met: []? Not Met: []? Adjusted  4. Patient will return to lifting 20# floor to waist with good mechanics without increased symptoms or restriction. []? Progressing: []? Met: []? Not Met: []? Adjusted  5. Pt will report at least 50% improved mobility and walking ability in the morning. []? Progressing: []? Met: []? Not Met: []? Adjusted     Progression Towards Functional goals:  [] Patient is progressing as expected towards functional goals listed. [] Progression is slowed due to complexities listed. [] Progression has been slowed due to co-morbidities. [x] Plan just implemented, too soon to assess goals progression  [] Other:     Overall Progression Towards Functional goals/ Treatment Progress Update:  [] Patient is progressing as expected towards functional goals listed. [] Progression is slowed due to complexities/Impairments listed. [] Progression has been slowed due to co-morbidities.   [x] Plan just implemented, too soon to assess goals progression <30days   [] Goals require adjustment due to lack of progress  [] Patient is not progressing as expected and requires additional follow up with physician  [] Other    Prognosis for POC: [x] Good [] Fair  [] Poor      Patient requires continued skilled intervention: [x] Yes  [] No    Treatment/Activity Tolerance:  [x] Patient able to complete treatment  [] Patient limited by fatigue  [] Patient limited by pain    [] Patient limited by other medical complications  [] Other:     ASSESSMENT: see harinder    Return to Play: (if applicable)   []  Stage 1: Intro to Strength   []  Stage 2: Return to Run and Strength   []  Stage 3: Return to Jump and Strength   []  Stage 4: Dynamic Strength and Agility   []  Stage 5: Sport Specific Training     []  Ready to Return to Play, Meets All Above Stages   []  Not Ready for Return to Sports   Comments:                         PLAN: See maryam pizano [facility] POC, LBP 2x/week  [] Continue per plan of care [] Alter current plan (see comments above)  [x] Plan of care initiated [] Hold pending MD visit [] Discharge      Electronically signed by:  Severo Combes, PT    Note: If patient does not return for scheduled/ recommended follow up visits, this note will serve as a discharge from care along with most recent update on progress. Access Code: [email or web address]  URL: [email or web address]  Date: 08/26/2021  Prepared by: Severo Combes     Exercises  Sidelying Thoracic Rotation with Open Book - 1 x daily - 7 x weekly - 3 sets - 10 reps  Supine Suboccipital Release with Tennis Balls - 1-2 x daily - 7 x weekly - 2-5 minutes as tolerated hold  Self trigger point release with tennis ball - 1 x daily - 7 x weekly  Quadruped Rocking Slow - 1 x daily - 7 x weekly - 1-2 sets - 5-10 reps  Seated Assisted Cervical Rotation with Towel - 1 x daily - 7 x weekly - 1-2 sets - 5-10 reps

## 2021-08-31 ENCOUNTER — HOSPITAL ENCOUNTER (OUTPATIENT)
Dept: PHYSICAL THERAPY | Age: 61
Setting detail: THERAPIES SERIES
Discharge: HOME | End: 2021-08-31
Payer: COMMERCIAL

## 2021-08-31 PROCEDURE — 97110 THERAPEUTIC EXERCISES: CPT | Performed by: PHYSICAL THERAPIST

## 2021-08-31 PROCEDURE — 97140 MANUAL THERAPY 1/> REGIONS: CPT | Performed by: PHYSICAL THERAPIST

## 2021-08-31 NOTE — FLOWSHEET NOTE
[facility],  [address]  Phone: [phone]  Fax [phone]    Physical Therapy Treatment Note/ Progress Report:           Date:  2021    Patient Name:  Chidi Ely"  :  1960  MRN: [id number]  Restrictions/Precautions:    Medical/Treatment Diagnosis Information:  · Diagnosis: M54.5, G89.29 (ICD-10-CM) - Chronic bilateral low back pain without sciatica  · Treatment Diagnosis: M54.5, G89.29 (ICD-10-CM) - Chronic bilateral low back pain without sciatica  Insurance/Certification information:  PT Insurance Information: BCBS 40 PT (also being seen to [facility]), no auth  Physician Information:  Referring Practitioner: Albert Braga  Has the plan of care been signed (Y/N):        [x]  Yes  []  No     Date of Patient follow up with Physician: prn      Is this a Progress Report:     []  Yes  [x]  No        If Yes:  Date Range for reporting period:  Beginnin24  Ending:     Progress report will be due (10 Rx or 30 days whichever is less): 33       Recertification will be due (POC Duration  / 90 days whichever is less): 12 weeks        Visit # Insurance Allowable Auth Required   In-person 2 40 (10 used for [facility]) []  Yes []  No    Telehealth   []  Yes []  No    Total            Functional Scale: modODI 24%    Date assessed:  21      Therapy Diagnosis/Practice Pattern:F, conservative      Number of Comorbidities:  []0     []1-2    [x]3+    Latex Allergy:  [x]NO      []YES  Preferred Language for Healthcare:   [x]English       []other:      Pain level:  eval 2-10/10    SUBJECTIVE:  Pt reports he did feel a little better after the eval.  He states he is very stiff again this morning. HEP is going and and he denies any questions at this time.     OBJECTIVE: See eval   Observation:    Test measurements:      RESTRICTIONS/PRECAUTIONS: a-fib, PVC's, chronic LBP, B knee activities related to strengthening, flexibility, endurance, ROM of core, proximal hip and LE for functional self-care, mobility, lifting and ambulation   [] (78975) Reviewed/Progressed HEP activities related to improving balance, coordination, kinesthetic sense, posture, motor skill, proprioception of core, proximal hip and LE for self care, mobility, lifting, and ambulation      Manual Treatments:  PROM / STM / Oscillations-Mobs:  G-I, II, III, IV (PA's, Inf., Post.)  [x] (99074) Provided manual therapy to mobilize proximal hip and LS spine soft tissue/joints for the purpose of modulating pain, promoting relaxation,  increasing ROM, reducing/eliminating soft tissue swelling/inflammation/restriction, improving soft tissue extensibility and allowing for proper ROM for normal function with self care, mobility, lifting and ambulation. Modalities:   Pt declined    Charges  Timed Code Treatment Minutes: 45   Total Treatment Minutes: 45       [] EVAL (LOW) 29238   [] EVAL (MOD) 15464   [] EVAL (HIGH) 19732   [] RE-EVAL     [x] WW(02622) x1     [] IONTO  [] NMR (70486) x     [] VASO  [x] Manual (03789) x 2     [] Other:  [] TA x      [] Mech Traction (04322)  [] ES(attended) (40806)      [] ES (un) (78044):     Goals:   Patient stated goal: less pain and more mobility in the mornings  []? Progressing: []? Met: []? Not Met: []? Adjusted     Therapist goals for Patient:   Short Term Goals: To be achieved in: 2 weeks  1. Independent in HEP and progression per patient tolerance, in order to prevent re-injury. []? Progressing: []? Met: []? Not Met: []? Adjusted  2. Patient will have a decrease in pain to facilitate improvement in movement, function, and ADLs as indicated by Functional Deficits. []? Progressing: []? Met: []? Not Met: []? Adjusted      Long Term Goals: To be achieved in: 12 weeks  1. Disability index score of 14% or less for the modODI  to assist with reaching prior level of function. []?  Progressing: []? Met: []? Not Met: []? Adjusted  2. Patient will demonstrate increased AROM to WNL, good LS mobility, good hip ROM to allow for proper joint functioning as indicated by patients Functional Deficits. []? Progressing: []? Met: []? Not Met: []? Adjusted  3. Patient will demonstrate an increase in Strength to good proximal hip and core activation to allow for proper functional mobility as indicated by patients Functional Deficits. []? Progressing: []? Met: []? Not Met: []? Adjusted  4. Patient will return to lifting 20# floor to waist with good mechanics without increased symptoms or restriction. []? Progressing: []? Met: []? Not Met: []? Adjusted  5. Pt will report at least 50% improved mobility and walking ability in the morning. []? Progressing: []? Met: []? Not Met: []? Adjusted     Progression Towards Functional goals:  [] Patient is progressing as expected towards functional goals listed. [] Progression is slowed due to complexities listed. [] Progression has been slowed due to co-morbidities. [x] Plan just implemented, too soon to assess goals progression  [] Other:     Overall Progression Towards Functional goals/ Treatment Progress Update:  [] Patient is progressing as expected towards functional goals listed. [] Progression is slowed due to complexities/Impairments listed. [] Progression has been slowed due to co-morbidities.   [x] Plan just implemented, too soon to assess goals progression <30days   [] Goals require adjustment due to lack of progress  [] Patient is not progressing as expected and requires additional follow up with physician  [] Other    Prognosis for POC: [x] Good [] Fair  [] Poor      Patient requires continued skilled intervention: [x] Yes  [] No    Treatment/Activity Tolerance:  [x] Patient able to complete treatment  [] Patient limited by fatigue  [] Patient limited by pain    [] Patient limited by other medical complications  [] Other:     ASSESSMENT: good tolerance to trial of MFD with minimal initial bruising noted. Improved L cervical AROM with less pinching following manuals. Pt requires frequent cues for proper posture when seated. Return to Play: (if applicable)   []  Stage 1: Intro to Strength   []  Stage 2: Return to Run and Strength   []  Stage 3: Return to Jump and Strength   []  Stage 4: Dynamic Strength and Agility   []  Stage 5: Sport Specific Training     []  Ready to Return to Play, Meets All Above Stages   []  Not Ready for Return to Sports   Comments:                         PLAN: See eval, hold [facility] POC, LBP 2x/week, manuals and posture control  [x] Continue per plan of care [] Alter current plan (see comments above)  [] Plan of care initiated [] Hold pending MD visit [] Discharge      Electronically signed by:  Felix Montez PT    Note: If patient does not return for scheduled/ recommended follow up visits, this note will serve as a discharge from care along with most recent update on progress. Access Code: [email or web address]  URL: [email or web address]  Date: 08/26/2021  Prepared by: Felix Montez     Exercises  Sidelying Thoracic Rotation with Open Book - 1 x daily - 7 x weekly - 3 sets - 10 reps  Supine Suboccipital Release with Tennis Balls - 1-2 x daily - 7 x weekly - 2-5 minutes as tolerated hold  Self trigger point release with tennis ball - 1 x daily - 7 x weekly  Quadruped Rocking Slow - 1 x daily - 7 x weekly - 1-2 sets - 5-10 reps  Seated Assisted Cervical Rotation with Towel - 1 x daily - 7 x weekly - 1-2 sets - 5-10 reps

## 2021-09-02 ENCOUNTER — HOSPITAL ENCOUNTER (OUTPATIENT)
Dept: PHYSICAL THERAPY | Age: 61
Setting detail: THERAPIES SERIES
Discharge: HOME | End: 2021-09-02
Payer: COMMERCIAL

## 2021-09-02 PROCEDURE — 97110 THERAPEUTIC EXERCISES: CPT

## 2021-09-02 PROCEDURE — 97140 MANUAL THERAPY 1/> REGIONS: CPT

## 2021-09-02 NOTE — FLOWSHEET NOTE
[facility],  [address]  Phone: [phone]  Fax [phone]    Physical Therapy Treatment Note/ Progress Report:           Date:  2021    Patient Name:  Jed Hernandez"  :  1960  MRN: [id number]  Restrictions/Precautions:    Medical/Treatment Diagnosis Information:  · Diagnosis: M54.5, G89.29 (ICD-10-CM) - Chronic bilateral low back pain without sciatica  · Treatment Diagnosis: M54.5, G89.29 (ICD-10-CM) - Chronic bilateral low back pain without sciatica  Insurance/Certification information:  PT Insurance Information: BCBS 40 PT (also being seen to [facility]), no auth  Physician Information:  Referring Practitioner: Chris Garcia  Has the plan of care been signed (Y/N):        [x]  Yes  []  No     Date of Patient follow up with Physician: prn      Is this a Progress Report:     []  Yes  [x]  No        If Yes:  Date Range for reporting period:  Beginnin24  Ending:     Progress report will be due (10 Rx or 30 days whichever is less):        Recertification will be due (POC Duration  / 90 days whichever is less): 12 weeks        Visit # Insurance Allowable Auth Required   In-person 3 40 (10 used for achilles) []  Yes []  No    Telehealth   []  Yes []  No    Total            Functional Scale: modODI 24%    Date assessed:  21      Therapy Diagnosis/Practice Pattern:F, conservative      Number of Comorbidities:  []0     []1-2    [x]3+    Latex Allergy:  [x]NO      []YES  Preferred Language for Healthcare:   [x]English       []other:      Pain level:  eval 2-10/10    SUBJECTIVE:  Pt reports he felt looser until 1/2 work day after LV, then neck and back seemed to stiffen up again.     OBJECTIVE:    Observation:    Test measurements:      RESTRICTIONS/PRECAUTIONS: a-fib, PVC's, chronic LBP, B knee a-scopes    Exercises/Interventions:     Therapeutic Ex (13428) Sets/sec/Reps Notes/CUES   SL open book S HEP   Supine SOR w/ tennis balls, self massage w/ ball on wall HEP   Quadruped HH rocking x10HEP   Self SNAG L cervical rotation HEP   Belt for 1st rib w/ UT S, scalene S    TB no money    1/2 FR:  horiz self mob (also showed in chair)   1/2 FR vertical:  pec S  pec minor S  Lat S \"backstroke\"  scap dep with breathing with scalene S   5\"x5    1'x2  30\"x2  15\"x4 R, L alt  5\"x5 R, L                        Pt ed: , HEP, posture,  MFD x5'    Manual Intervention (11363) 25'    Seated CTJ thrust  Seated L upper cervical rotation      Supine SOR, man traction, upper cervical nods and lateral glides R & L  Lower cervical lateral glides L to R,  x20'    SL lumbar gapping     Prone w/ pillow PA's L1-5, thoracic rotation mobs all gr lll-lV x5'    Trial MFD-6 cups (L4-5, T10, CTJ)  2-3 pumps each cup             NMR re-education (24274)  CUES NEEDED                                                Therapeutic Activity (93612)                                       Therapeutic Exercise and NMR EXR  [x] (79829) Provided verbal/tactile cueing for activities related to strengthening, flexibility, endurance, ROM  for improvements in proximal hip and core control with self care, mobility, lifting and ambulation. [x] (60381) Provided verbal/tactile cueing for activities related to improving balance, coordination, kinesthetic sense, posture, motor skill, proprioception  to assist with core control in self care, mobility, lifting, and ambulation.      Therapeutic Activities:    [] (27748 or 62219) Provided verbal/tactile cueing for activities related to improving balance, coordination, kinesthetic sense, posture, motor skill, proprioception and motor activation to allow for proper function  with self care and ADLs  [] (36570) Provided training and instruction to the patient for proper core and proximal hip recruitment and positioning with ambulation re-education     Home Exercise Program:    [x] (87023) Reviewed/Progressed HEP activities related to strengthening, flexibility, endurance, ROM of core, proximal hip and LE for functional self-care, mobility, lifting and ambulation   [] (76955) Reviewed/Progressed HEP activities related to improving balance, coordination, kinesthetic sense, posture, motor skill, proprioception of core, proximal hip and LE for self care, mobility, lifting, and ambulation      Manual Treatments:  PROM / STM / Oscillations-Mobs:  G-I, II, III, IV (PA's, Inf., Post.)  [x] (79122) Provided manual therapy to mobilize proximal hip and LS spine soft tissue/joints for the purpose of modulating pain, promoting relaxation,  increasing ROM, reducing/eliminating soft tissue swelling/inflammation/restriction, improving soft tissue extensibility and allowing for proper ROM for normal function with self care, mobility, lifting and ambulation. Modalities:   Pt declined    Charges  Timed Code Treatment Minutes: 45   Total Treatment Minutes: 45       [] EVAL (LOW) 22627   [] EVAL (MOD) 09565   [] EVAL (HIGH) 10554   [] RE-EVAL     [x] EY(91972) x1     [] IONTO  [] NMR (77279) x     [] VASO  [x] Manual (00702) x 2     [] Other:  [] TA x      [] Mech Traction (41170)  [] ES(attended) (55066)      [] ES (un) (60752):     Goals:   Patient stated goal: less pain and more mobility in the mornings  []? Progressing: []? Met: []? Not Met: []? Adjusted     Therapist goals for Patient:   Short Term Goals: To be achieved in: 2 weeks  1. Independent in HEP and progression per patient tolerance, in order to prevent re-injury. []? Progressing: []? Met: []? Not Met: []? Adjusted  2. Patient will have a decrease in pain to facilitate improvement in movement, function, and ADLs as indicated by Functional Deficits. []? Progressing: []? Met: []? Not Met: []? Adjusted      Long Term Goals: To be achieved in: 12 weeks  1. Disability index score of 14% or less for the modODI  to assist with reaching prior level of function. []?  Progressing: []? Met: []? Not Met: []? Adjusted  2. Patient will demonstrate increased AROM to WNL, good LS mobility, good hip ROM to allow for proper joint functioning as indicated by patients Functional Deficits. []? Progressing: []? Met: []? Not Met: []? Adjusted  3. Patient will demonstrate an increase in Strength to good proximal hip and core activation to allow for proper functional mobility as indicated by patients Functional Deficits. []? Progressing: []? Met: []? Not Met: []? Adjusted  4. Patient will return to lifting 20# floor to waist with good mechanics without increased symptoms or restriction. []? Progressing: []? Met: []? Not Met: []? Adjusted  5. Pt will report at least 50% improved mobility and walking ability in the morning. []? Progressing: []? Met: []? Not Met: []? Adjusted     Progression Towards Functional goals:  [] Patient is progressing as expected towards functional goals listed. [] Progression is slowed due to complexities listed. [] Progression has been slowed due to co-morbidities. [x] Plan just implemented, too soon to assess goals progression  [] Other:     Overall Progression Towards Functional goals/ Treatment Progress Update:  [] Patient is progressing as expected towards functional goals listed. [] Progression is slowed due to complexities/Impairments listed. [] Progression has been slowed due to co-morbidities.   [x] Plan just implemented, too soon to assess goals progression <30days   [] Goals require adjustment due to lack of progress  [] Patient is not progressing as expected and requires additional follow up with physician  [] Other    Prognosis for POC: [x] Good [] Fair  [] Poor      Patient requires continued skilled intervention: [x] Yes  [] No    Treatment/Activity Tolerance:  [x] Patient able to complete treatment  [] Patient limited by fatigue  [] Patient limited by pain    [] Patient limited by other medical complications  [] Other:     ASSESSMENT: Pt again had good francisco for manual tx. Showed self mob options with FR to aid mid-day stiffness. He will benefit from continued posture re-ed. Return to Play: (if applicable)   []  Stage 1: Intro to Strength   []  Stage 2: Return to Run and Strength   []  Stage 3: Return to Jump and Strength   []  Stage 4: Dynamic Strength and Agility   []  Stage 5: Sport Specific Training     []  Ready to Return to Play, Meets All Above Stages   []  Not Ready for Return to Sports   Comments:                         PLAN: See eval, hold a[facility]illes POC, LBP 2x/week, manuals and posture control  [x] Continue per plan of care [] Alter current plan (see comments above)  [] Plan of care initiated [] Hold pending MD visit [] Discharge      Electronically signed by:  Kamilah Lovett, PT, DPT    Note: If patient does not return for scheduled/ recommended follow up visits, this note will serve as a discharge from care along with most recent update on progress. Access Code: [email or web address]  URL: [email or web address]  Date: 09/02/2021  Prepared by: Cate Sensor    Exercises  Sidelying Thoracic Rotation with Open Book - 1 x daily - 7 x weekly - 3 sets - 10 reps  Supine Suboccipital Release with Tennis Balls - 1-2 x daily - 7 x weekly - 2-5 minutes as tolerated hold  Self trigger point release with tennis ball - 1 x daily - 7 x weekly  Quadruped Rocking Slow - 1 x daily - 7 x weekly - 1-2 sets - 5-10 reps  Seated Assisted Cervical Rotation with Towel - 1 x daily - 7 x weekly - 1-2 sets - 5-10 reps  Thoracic Extension Mobilization on Foam Roll - 1 x daily - 7 x weekly - 1 sets - 5 reps - 5 hold  Supine Chest Stretch on Foam Roll - 1 x daily - 7 x weekly - 1 sets - 5 reps - 5 hold  Thoracic Foam Roll Mobilization Backstroke - 1 x daily - 7 x weekly - 1 sets - 5 reps - 5 hold

## 2021-09-07 ENCOUNTER — HOSPITAL ENCOUNTER (OUTPATIENT)
Dept: PHYSICAL THERAPY | Age: 61
Setting detail: THERAPIES SERIES
Discharge: HOME | End: 2021-09-07
Payer: COMMERCIAL

## 2021-09-07 PROCEDURE — 97110 THERAPEUTIC EXERCISES: CPT | Performed by: PHYSICAL THERAPIST

## 2021-09-07 PROCEDURE — 97140 MANUAL THERAPY 1/> REGIONS: CPT | Performed by: PHYSICAL THERAPIST

## 2021-09-07 NOTE — FLOWSHEET NOTE
[facility], [address]  Phone: [phone]  Fax [phone]    Physical Therapy Treatment Note/ Progress Report:           Date:  2021    Patient Name:  Esequiel Mcknight"  :  1960  MRN: [id number]  Restrictions/Precautions:    Medical/Treatment Diagnosis Information:  · Diagnosis: M54.5, G89.29 (ICD-10-CM) - Chronic bilateral low back pain without sciatica  · Treatment Diagnosis: M54.5, G89.29 (ICD-10-CM) - Chronic bilateral low back pain without sciatica  Insurance/Certification information:  PT Insurance Information: BCBS 40 PT (also being seen to [facility]), no auth  Physician Information:  Referring Practitioner: Martinez Dalton  Has the plan of care been signed (Y/N):        [x]  Yes  []  No     Date of Patient follow up with Physician: prn      Is this a Progress Report:     []  Yes  [x]  No        If Yes:  Date Range for reporting period:  Beginnin24  Ending:     Progress report will be due (10 Rx or 30 days whichever is less): 3/07/90       Recertification will be due (POC Duration  / 90 days whichever is less): 12 weeks        Visit # Insurance Allowable Auth Required   In-person 4 40 (10 used for a[facility]) []  Yes []  No    Telehealth   []  Yes []  No    Total            Functional Scale: modODI 24%    Date assessed:  21      Therapy Diagnosis/Practice Pattern:F, conservative      Number of Comorbidities:  []0     []1-2    [x]3+    Latex Allergy:  [x]NO      []YES  Preferred Language for Healthcare:   [x]English       []other:      Pain level:  eval 2-10/10    SUBJECTIVE:  Pt reports felt better and was able to accomplish more around his house this weekend. He states mornings continue to be his worst time. He did try the roam roll stretches, but only once since LV. Had to cancel his Thursday appt due to scheduling conflict.     OBJECTIVE:    Observation:    Test measurements: RESTRICTIONS/PRECAUTIONS: a-fib, PVC's, chronic LBP, B knee a-scopes    Exercises/Interventions:     Therapeutic Ex (63310) Sets/sec/Reps Notes/CUES   SL open book S x10 R/LHEP   Supine SOR w/ tennis balls, self massage w/ ball on wall HEP   Quadruped HH rocking HEP   Self SNAG L cervical rotation HEP   Belt for 1st rib w/ UT S, scalene S    TB no money    1/2 FR:  horiz self mob (also showed in chair)   1/2 FR vertical:  pec S  pec minor S  Lat S \"backstroke\"  scap dep with breathing with scalene S    All to HEP   TB row B, staggered stance R/L  TB HAB  TB palof press Black x20 ea  Black x20  2 Black x20 R/L    Cage 3D lumbar flexion S 3x15\" ea              Pt ed: , HEP, posture,  MFD x5'    Manual Intervention (23654) 25'    Seated CTJ thrust  Seated L upper cervical rotation      Supine SOR, man traction, upper cervical nods and lateral glides R & L  Lower cervical lateral glides L to R,   2nd, 1st rib mobs L x15'    SL lumbar gapping x2' R/L    Prone w/ pillow PA's L1-5, thoracic rotation mobs all gr lll-lV x8'    Trial MFD-6 cups (L4-5, T10, CTJ)  2-3 pumps each cup             NMR re-education (36126)  CUES NEEDED                                                Therapeutic Activity (13487)                                       Therapeutic Exercise and NMR EXR  [x] (57512) Provided verbal/tactile cueing for activities related to strengthening, flexibility, endurance, ROM  for improvements in proximal hip and core control with self care, mobility, lifting and ambulation. [x] (56304) Provided verbal/tactile cueing for activities related to improving balance, coordination, kinesthetic sense, posture, motor skill, proprioception  to assist with core control in self care, mobility, lifting, and ambulation.      Therapeutic Activities:    [] (84714 or 35174) Provided verbal/tactile cueing for activities related to improving balance, coordination, kinesthetic sense, posture, motor skill, proprioception and motor activation to allow for proper function  with self care and ADLs  [] (54491) Provided training and instruction to the patient for proper core and proximal hip recruitment and positioning with ambulation re-education     Home Exercise Program:    [x] (80069) Reviewed/Progressed HEP activities related to strengthening, flexibility, endurance, ROM of core, proximal hip and LE for functional self-care, mobility, lifting and ambulation   [] (77635) Reviewed/Progressed HEP activities related to improving balance, coordination, kinesthetic sense, posture, motor skill, proprioception of core, proximal hip and LE for self care, mobility, lifting, and ambulation      Manual Treatments:  PROM / STM / Oscillations-Mobs:  G-I, II, III, IV (PA's, Inf., Post.)  [x] (67971) Provided manual therapy to mobilize proximal hip and LS spine soft tissue/joints for the purpose of modulating pain, promoting relaxation,  increasing ROM, reducing/eliminating soft tissue swelling/inflammation/restriction, improving soft tissue extensibility and allowing for proper ROM for normal function with self care, mobility, lifting and ambulation. Modalities:   Pt declined    Charges  Timed Code Treatment Minutes: 50   Total Treatment Minutes: 50       [] EVAL (LOW) 84896   [] EVAL (MOD) 59932   [] EVAL (HIGH) 04763   [] RE-EVAL     [x] GO(68225) x1     [] IONTO  [] NMR (07113) x     [] VASO  [x] Manual (86298) x 2     [] Other:  [] TA x      [] Mech Traction (31528)  [] ES(attended) (98713)      [] ES (un) (62563):     Goals:   Patient stated goal: less pain and more mobility in the mornings  []? Progressing: []? Met: []? Not Met: []? Adjusted     Therapist goals for Patient:   Short Term Goals: To be achieved in: 2 weeks  1. Independent in HEP and progression per patient tolerance, in order to prevent re-injury. []? Progressing: []? Met: []? Not Met: []? Adjusted  2.  Patient will have a decrease in pain to facilitate improvement in movement, function, and ADLs as indicated by Functional Deficits. []? Progressing: []? Met: []? Not Met: []? Adjusted      Long Term Goals: To be achieved in: 12 weeks  1. Disability index score of 14% or less for the modODI  to assist with reaching prior level of function. []? Progressing: []? Met: []? Not Met: []? Adjusted  2. Patient will demonstrate increased AROM to WNL, good LS mobility, good hip ROM to allow for proper joint functioning as indicated by patients Functional Deficits. []? Progressing: []? Met: []? Not Met: []? Adjusted  3. Patient will demonstrate an increase in Strength to good proximal hip and core activation to allow for proper functional mobility as indicated by patients Functional Deficits. []? Progressing: []? Met: []? Not Met: []? Adjusted  4. Patient will return to lifting 20# floor to waist with good mechanics without increased symptoms or restriction. []? Progressing: []? Met: []? Not Met: []? Adjusted  5. Pt will report at least 50% improved mobility and walking ability in the morning. []? Progressing: []? Met: []? Not Met: []? Adjusted     Progression Towards Functional goals:  [] Patient is progressing as expected towards functional goals listed. [] Progression is slowed due to complexities listed. [] Progression has been slowed due to co-morbidities. [x] Plan just implemented, too soon to assess goals progression  [] Other:     Overall Progression Towards Functional goals/ Treatment Progress Update:  [] Patient is progressing as expected towards functional goals listed. [] Progression is slowed due to complexities/Impairments listed. [] Progression has been slowed due to co-morbidities.   [x] Plan just implemented, too soon to assess goals progression <30days   [] Goals require adjustment due to lack of progress  [] Patient is not progressing as expected and requires additional follow up with physician  [] Other    Prognosis for POC: [x] Good [] Fair  [] Poor      Patient requires continued skilled intervention: [x] Yes  [] No    Treatment/Activity Tolerance:  [x] Patient able to complete treatment  [] Patient limited by fatigue  [] Patient limited by pain    [] Patient limited by other medical complications  [] Other:     ASSESSMENT: improved 1st rib mobility L following mobs, but tenderness reported during. Also pain reported with L3 and T12 prone PA's this visit on the L side. Posture cues needed with TB row and HAB. Return to Play: (if applicable)   []  Stage 1: Intro to Strength   []  Stage 2: Return to Run and Strength   []  Stage 3: Return to Jump and Strength   []  Stage 4: Dynamic Strength and Agility   []  Stage 5: Sport Specific Training     []  Ready to Return to Play, Meets All Above Stages   []  Not Ready for Return to Sports   Comments:                         PLAN: See eval, hold achilles POC, LBP 2x/week, manuals and posture control  [x] Continue per plan of care [] Alter current plan (see comments above)  [] Plan of care initiated [] Hold pending MD visit [] Discharge      Electronically signed by:  Cheri Kevin PT,     Note: If patient does not return for scheduled/ recommended follow up visits, this note will serve as a discharge from care along with most recent update on progress. Access Code: [email or web address]  URL: [email or web address]  Date: 09/02/2021  Prepared by: Craig Bearded    Exercises  Sidelying Thoracic Rotation with Open Book - 1 x daily - 7 x weekly - 3 sets - 10 reps  Supine Suboccipital Release with Tennis Balls - 1-2 x daily - 7 x weekly - 2-5 minutes as tolerated hold  Self trigger point release with tennis ball - 1 x daily - 7 x weekly  Quadruped Rocking Slow - 1 x daily - 7 x weekly - 1-2 sets - 5-10 reps  Seated Assisted Cervical Rotation with Towel - 1 x daily - 7 x weekly - 1-2 sets - 5-10 reps  Thoracic Extension Mobilization on Foam Roll - 1 x daily - 7 x weekly - 1 sets - 5 reps - 5 hold  Supine Chest Stretch on Foam Roll - 1 x daily - 7 x weekly - 1 sets - 5 reps - 5 hold  Thoracic Foam Roll Mobilization Backstroke - 1 x daily - 7 x weekly - 1 sets - 5 reps - 5 hold

## 2021-09-09 ENCOUNTER — APPOINTMENT (OUTPATIENT)
Dept: PHYSICAL THERAPY | Age: 61
End: 2021-09-09
Payer: COMMERCIAL

## 2021-09-09 ENCOUNTER — TELEPHONE (OUTPATIENT)
Dept: PULMONOLOGY | Age: 61
End: 2021-09-09

## 2021-09-09 LAB — PROSTATE SPECIFIC ANTIGEN: 0.44 NG/ML

## 2021-09-09 NOTE — TELEPHONE ENCOUNTER
Lm on pt's mobile. Also called pt's home and spoke with spouse, informing her of the cancellation and that the office will call back and reschedule. Please advise.

## 2021-09-14 ENCOUNTER — HOSPITAL ENCOUNTER (OUTPATIENT)
Dept: PHYSICAL THERAPY | Age: 61
Setting detail: THERAPIES SERIES
Discharge: HOME | End: 2021-09-14
Payer: COMMERCIAL

## 2021-09-14 PROCEDURE — 97140 MANUAL THERAPY 1/> REGIONS: CPT | Performed by: PHYSICAL THERAPIST

## 2021-09-14 PROCEDURE — 97110 THERAPEUTIC EXERCISES: CPT | Performed by: PHYSICAL THERAPIST

## 2021-09-14 NOTE — FLOWSHEET NOTE
[facility],  [address]  Phone: [phone]  Fax [phone]    Physical Therapy Treatment Note/ Progress Report:           Date:  2021    Patient Name:  Kaylee Burciaga"  :  1960  MRN: [id number]  Restrictions/Precautions:    Medical/Treatment Diagnosis Information:  · Diagnosis: M54.5, G89.29 (ICD-10-CM) - Chronic bilateral low back pain without sciatica  · Treatment Diagnosis: M54.5, G89.29 (ICD-10-CM) - Chronic bilateral low back pain without sciatica  Insurance/Certification information:  PT Insurance Information: [employer] 40 PT (also being seen to achilles), no auth  Physician Information:  Referring Practitioner: Vernon Marie  Has the plan of care been signed (Y/N):        [x]  Yes  []  No     Date of Patient follow up with Physician: prn      Is this a Progress Report:     []  Yes  [x]  No        If Yes:  Date Range for reporting period:  Beginnin24  Ending:     Progress report will be due (10 Rx or 30 days whichever is less): 22       Recertification will be due (POC Duration  / 90 days whichever is less): 12 weeks        Visit # Insurance Allowable Auth Required   In-person 5 40 (10 used for achilles) []  Yes []  No    Telehealth   []  Yes []  No    Total            Functional Scale: modODI 24%    Date assessed:  21      Therapy Diagnosis/Practice Pattern:F, conservative      Number of Comorbidities:  []0     []1-2    [x]3+    Latex Allergy:  [x]NO      []YES  Preferred Language for Healthcare:   [x]English       []other:      Pain level:  eval 2-10/10    SUBJECTIVE:  Pt reports he was sore in his UT from rib mobs following last session and also noted some R sided rib pain, maybe twisted too far stretching. He does note he was able to play in his baseball game yesterday and his B achilles felt the best they have in a long time and he was able to run the bases without issue.   He continues to note chronic stiffness in his whole body after sitting for as little at 10' minutes, even during his baseball game and this caused difficulty throwing after resting during innings.     OBJECTIVE:    Observation:    Test measurements:      RESTRICTIONS/PRECAUTIONS: a-fib, PVC's, chronic LBP, B knee a-scopes    Exercises/Interventions:     Therapeutic Ex (62443) Sets/sec/Reps Notes/CUES   SL open book S HEP   Supine SOR w/ tennis balls, self massage w/ ball on wall HEP   Quadruped HH rocking HEP   Self SNAG L cervical rotation HEP   Belt for 1st rib w/ UT S, scalene S    TB no money    1/2 FR:  horiz self mob (also showed in chair)   1/2 FR vertical:  pec S  pec minor S  Lat S \"backstroke\"  scap dep with breathing with scalene S    All to HEP   TB row B, staggered stance R/L  TB HAB  TB palof press     Cage 3D lumbar flexion S     Hip hikes 3\" x25 R/L    Tandem balance w/ MB diagonal lifts Floor 4# x10 ea R/L Tried 1/2 FR, but too unstable   Supine double knee to chest S 5x20\"         Pt ed: , HEP,  posture,   x5'    Manual Intervention (26873) 30'    Seated CTJ thrust  Seated L upper cervical rotation      Supine SOR, man traction, upper cervical nods and lateral glides R & L  Lower cervical lateral glides L to R,   2nd, 1st rib mobs L     SL lumbar gapping     Prone w/ pillow PA's L1-5, thoracic rotation mobs all gr lll-lV, thoracic PA's T1-T3, rib springing R T 6-7-8  Cross hand MFR, STM to PSM sacrum to T10  STM mid thoracic R>L x15'      x10'    x5'    Trial MFD-6 cups (L4-5, T10, CTJ)  2-3 pumps each cup             NMR re-education (73746)  CUES NEEDED                                                Therapeutic Activity (52769)                                       Therapeutic Exercise and NMR EXR  [x] (17196) Provided verbal/tactile cueing for activities related to strengthening, flexibility, endurance, ROM  for improvements in proximal hip and core control with self care, mobility, lifting and ambulation. [x] (35776) Provided verbal/tactile cueing for activities related to improving balance, coordination, kinesthetic sense, posture, motor skill, proprioception  to assist with core control in self care, mobility, lifting, and ambulation. Therapeutic Activities:    [] (48171 or 35077) Provided verbal/tactile cueing for activities related to improving balance, coordination, kinesthetic sense, posture, motor skill, proprioception and motor activation to allow for proper function  with self care and ADLs  [] (43237) Provided training and instruction to the patient for proper core and proximal hip recruitment and positioning with ambulation re-education     Home Exercise Program:    [x] (36623) Reviewed/Progressed HEP activities related to strengthening, flexibility, endurance, ROM of core, proximal hip and LE for functional self-care, mobility, lifting and ambulation   [] (73957) Reviewed/Progressed HEP activities related to improving balance, coordination, kinesthetic sense, posture, motor skill, proprioception of core, proximal hip and LE for self care, mobility, lifting, and ambulation      Manual Treatments:  PROM / STM / Oscillations-Mobs:  G-I, II, III, IV (PA's, Inf., Post.)  [x] (56153) Provided manual therapy to mobilize proximal hip and LS spine soft tissue/joints for the purpose of modulating pain, promoting relaxation,  increasing ROM, reducing/eliminating soft tissue swelling/inflammation/restriction, improving soft tissue extensibility and allowing for proper ROM for normal function with self care, mobility, lifting and ambulation.      Modalities:   Supine MHP w/ legs on 2 wedges x15'    Charges  Timed Code Treatment Minutes: 45   Total Treatment Minutes: 60       [] EVAL (LOW) 98359   [] EVAL (MOD) 12847   [] EVAL (HIGH) 61782   [] RE-EVAL     [x] LN(62116) x1     [] IONTO  [] NMR (32305) x     [] VASO  [x] Manual (44450) x 2     [] Other:  [] TA x      [] Mech Traction (73702)  [] ES(attended) (79331)      [] ES (un) (26394):     Goals:   Patient stated goal: less pain and more mobility in the mornings  []? Progressing: []? Met: []? Not Met: []? Adjusted     Therapist goals for Patient:   Short Term Goals: To be achieved in: 2 weeks  1. Independent in HEP and progression per patient tolerance, in order to prevent re-injury. []? Progressing: []? Met: []? Not Met: []? Adjusted  2. Patient will have a decrease in pain to facilitate improvement in movement, function, and ADLs as indicated by Functional Deficits. []? Progressing: []? Met: []? Not Met: []? Adjusted      Long Term Goals: To be achieved in: 12 weeks  1. Disability index score of 14% or less for the modODI  to assist with reaching prior level of function. []? Progressing: []? Met: []? Not Met: []? Adjusted  2. Patient will demonstrate increased AROM to WNL, good LS mobility, good hip ROM to allow for proper joint functioning as indicated by patients Functional Deficits. []? Progressing: []? Met: []? Not Met: []? Adjusted  3. Patient will demonstrate an increase in Strength to good proximal hip and core activation to allow for proper functional mobility as indicated by patients Functional Deficits. []? Progressing: []? Met: []? Not Met: []? Adjusted  4. Patient will return to lifting 20# floor to waist with good mechanics without increased symptoms or restriction. []? Progressing: []? Met: []? Not Met: []? Adjusted  5. Pt will report at least 50% improved mobility and walking ability in the morning. []? Progressing: []? Met: []? Not Met: []? Adjusted     Progression Towards Functional goals:  [] Patient is progressing as expected towards functional goals listed. [] Progression is slowed due to complexities listed. [] Progression has been slowed due to co-morbidities.   [x] Plan just implemented, too soon to assess goals progression  [] Other:     Overall Progression Towards Functional goals/ Treatment Progress Update:  [] Patient is progressing as expected towards functional goals listed. [] Progression is slowed due to complexities/Impairments listed. [] Progression has been slowed due to co-morbidities. [x] Plan just implemented, too soon to assess goals progression <30days   [] Goals require adjustment due to lack of progress  [] Patient is not progressing as expected and requires additional follow up with physician  [] Other    Prognosis for POC: [x] Good [] Fair  [] Poor      Patient requires continued skilled intervention: [x] Yes  [] No    Treatment/Activity Tolerance:  [x] Patient able to complete treatment  [] Patient limited by fatigue  [] Patient limited by pain    [] Patient limited by other medical complications  [] Other:     ASSESSMENT: increased time spent with thoracic and lumbar manuals due to reported increased tightness and pain over the past week. Cervical AROM rotation is improving. Pt continues to have difficulty with balance activities, attempting to engage core and hips and ankle control B. He voices frustration over the chronic stiffness in his whole body and feeling that his MD does not have any good recommendations as to where to go next at this time.     Return to Play: (if applicable)   []  Stage 1: Intro to Strength   []  Stage 2: Return to Run and Strength   []  Stage 3: Return to Jump and Strength   []  Stage 4: Dynamic Strength and Agility   []  Stage 5: Sport Specific Training     []  Ready to Return to Play, Meets All Above Stages   []  Not Ready for Return to Sports   Comments:                         PLAN: See maryam pizano achilles POC, LBP 2x/week, manuals and posture control  [x] Continue per plan of care [] Alter current plan (see comments above)  [] Plan of care initiated [] Hold pending MD visit [] Discharge      Electronically signed by:  Carlitos Whittington PT,     Note: If patient does not return for scheduled/ recommended follow up visits, this note will serve as a discharge from care along with most recent update on progress. Access Code: [email or web address]  URL: [email or web address]  Date: 09/02/2021  Prepared by: Nury Sportsman    Exercises  Sidelying Thoracic Rotation with Open Book - 1 x daily - 7 x weekly - 3 sets - 10 reps  Supine Suboccipital Release with Tennis Balls - 1-2 x daily - 7 x weekly - 2-5 minutes as tolerated hold  Self trigger point release with tennis ball - 1 x daily - 7 x weekly  Quadruped Rocking Slow - 1 x daily - 7 x weekly - 1-2 sets - 5-10 reps  Seated Assisted Cervical Rotation with Towel - 1 x daily - 7 x weekly - 1-2 sets - 5-10 reps  Thoracic Extension Mobilization on Foam Roll - 1 x daily - 7 x weekly - 1 sets - 5 reps - 5 hold  Supine Chest Stretch on Foam Roll - 1 x daily - 7 x weekly - 1 sets - 5 reps - 5 hold  Thoracic Foam Roll Mobilization Backstroke - 1 x daily - 7 x weekly - 1 sets - 5 reps - 5 hold

## 2021-09-16 ENCOUNTER — HOSPITAL ENCOUNTER (OUTPATIENT)
Dept: PHYSICAL THERAPY | Age: 61
Setting detail: THERAPIES SERIES
Discharge: HOME | End: 2021-09-16
Payer: COMMERCIAL

## 2021-09-16 PROCEDURE — 97110 THERAPEUTIC EXERCISES: CPT

## 2021-09-16 PROCEDURE — 97140 MANUAL THERAPY 1/> REGIONS: CPT

## 2021-09-16 NOTE — FLOWSHEET NOTE
lateral glides L to R,   2nd, 1st rib mobs L     Post and inf GHJ gr III mobs R, L to aid UQ posture1  PROM R, L flex, abd, ER, IR 1' ea R, L ea  5'    SL lumbar gapping x2' R/L    Prone w/ pillow PA's L1-5, thoracic rotation mobs all gr lll-lV, thoracic PA's T1-T3,   Cross hand MFR to TL spine  STM to PSM sacrum to T10  STM mid thoracic R>L x10'      x8'        Prone quad and hip IR/ER rotator S's x2 ea R, L 15\" ea    Trial MFD-6 cups (L4-5, T10, CTJ)  2-3 pumps each cup             NMR re-education (87480)  CUES NEEDED                                                Therapeutic Activity (60892)                                       Therapeutic Exercise and NMR EXR  [x] (76470) Provided verbal/tactile cueing for activities related to strengthening, flexibility, endurance, ROM  for improvements in proximal hip and core control with self care, mobility, lifting and ambulation. [x] (46897) Provided verbal/tactile cueing for activities related to improving balance, coordination, kinesthetic sense, posture, motor skill, proprioception  to assist with core control in self care, mobility, lifting, and ambulation.      Therapeutic Activities:    [] (22717 or 77124) Provided verbal/tactile cueing for activities related to improving balance, coordination, kinesthetic sense, posture, motor skill, proprioception and motor activation to allow for proper function  with self care and ADLs  [] (45974) Provided training and instruction to the patient for proper core and proximal hip recruitment and positioning with ambulation re-education     Home Exercise Program:    [x] (64423) Reviewed/Progressed HEP activities related to strengthening, flexibility, endurance, ROM of core, proximal hip and LE for functional self-care, mobility, lifting and ambulation   [] (30978) Reviewed/Progressed HEP activities related to improving balance, coordination, kinesthetic sense, posture, motor skill, proprioception of core, proximal hip and LE for self care, mobility, lifting, and ambulation      Manual Treatments:  PROM / STM / Oscillations-Mobs:  G-I, II, III, IV (PA's, Inf., Post.)  [x] (46730) Provided manual therapy to mobilize proximal hip and LS spine soft tissue/joints for the purpose of modulating pain, promoting relaxation,  increasing ROM, reducing/eliminating soft tissue swelling/inflammation/restriction, improving soft tissue extensibility and allowing for proper ROM for normal function with self care, mobility, lifting and ambulation. Modalities:       Charges  Timed Code Treatment Minutes: 48   Total Treatment Minutes: 48       [] EVAL (LOW) 70733   [] EVAL (MOD) 12855   [] EVAL (HIGH) 91986   [] RE-EVAL      [x] FI(89666) x1     [] IONTO  [] NMR (50345) x     [] VASO  [x] Manual (96683) x 2     [] Other:  [] TA x      [] Mech Traction (19387)  [] ES(attended) (27956)      [] ES (un) (42232):     Goals:   Patient stated goal: less pain and more mobility in the mornings  []? Progressing: []? Met: []? Not Met: []? Adjusted     Therapist goals for Patient:   Short Term Goals: To be achieved in: 2 weeks  1. Independent in HEP and progression per patient tolerance, in order to prevent re-injury. []? Progressing: []? Met: []? Not Met: []? Adjusted  2. Patient will have a decrease in pain to facilitate improvement in movement, function, and ADLs as indicated by Functional Deficits. []? Progressing: []? Met: []? Not Met: []? Adjusted      Long Term Goals: To be achieved in: 12 weeks  1. Disability index score of 14% or less for the modODI  to assist with reaching prior level of function. []? Progressing: []? Met: []? Not Met: []? Adjusted  2. Patient will demonstrate increased AROM to WNL, good LS mobility, good hip ROM to allow for proper joint functioning as indicated by patients Functional Deficits. []? Progressing: []? Met: []? Not Met: []? Adjusted  3.  Patient will demonstrate an increase in Strength to good proximal hip and core Therefore encouraged more active motion, frequent change in position for work, and to continue to engage in strength routines for posture, core, and LE fitness. He's been given exercises for all in past episodes of PT, but can continue to reinforce in current sessions. Return to Play: (if applicable)   []  Stage 1: Intro to Strength   []  Stage 2: Return to Run and Strength   []  Stage 3: Return to Jump and Strength   []  Stage 4: Dynamic Strength and Agility   []  Stage 5: Sport Specific Training     []  Ready to Return to Play, Meets All Above Stages   []  Not Ready for Return to Sports   Comments:                         PLAN: See eval, hold achilles POC, LBP 2x/week, manuals and posture control  [x] Continue per plan of care [] Alter current plan (see comments above)  [] Plan of care initiated [] Hold pending MD visit [] Discharge      Electronically signed by:  Kamilah Lovett PT,     Note: If patient does not return for scheduled/ recommended follow up visits, this note will serve as a discharge from care along with most recent update on progress. Access Code: [email or web address]  URL: [email or web address]  Date: 09/02/2021  Prepared by: Cate Sensor    Exercises  Sidelying Thoracic Rotation with Open Book - 1 x daily - 7 x weekly - 3 sets - 10 reps  Supine Suboccipital Release with Tennis Balls - 1-2 x daily - 7 x weekly - 2-5 minutes as tolerated hold  Self trigger point release with tennis ball - 1 x daily - 7 x weekly  Quadruped Rocking Slow - 1 x daily - 7 x weekly - 1-2 sets - 5-10 reps  Seated Assisted Cervical Rotation with Towel - 1 x daily - 7 x weekly - 1-2 sets - 5-10 reps  Thoracic Extension Mobilization on Foam Roll - 1 x daily - 7 x weekly - 1 sets - 5 reps - 5 hold  Supine Chest Stretch on Foam Roll - 1 x daily - 7 x weekly - 1 sets - 5 reps - 5 hold  Thoracic Foam Roll Mobilization Backstroke - 1 x daily - 7 x weekly - 1 sets - 5 reps - 5 hold

## 2021-09-21 ENCOUNTER — APPOINTMENT (OUTPATIENT)
Dept: PHYSICAL THERAPY | Age: 61
End: 2021-09-21
Payer: COMMERCIAL

## 2021-09-21 ENCOUNTER — HOSPITAL ENCOUNTER (OUTPATIENT)
Dept: PHYSICAL THERAPY | Age: 61
Setting detail: THERAPIES SERIES
Discharge: HOME | End: 2021-09-21
Payer: COMMERCIAL

## 2021-09-21 PROCEDURE — 97140 MANUAL THERAPY 1/> REGIONS: CPT

## 2021-09-21 PROCEDURE — 97110 THERAPEUTIC EXERCISES: CPT

## 2021-09-21 NOTE — FLOWSHEET NOTE
[facility],  [address]  Phone: [phone]  Fax [phone]    Physical Therapy Treatment Note/ Progress Report:           Date:  2021    Patient Name:  Kaylee Burciaga"  :  1960  MRN: [id number]  Restrictions/Precautions:    Medical/Treatment Diagnosis Information:  · Diagnosis: M54.5, G89.29 (ICD-10-CM) - Chronic bilateral low back pain without sciatica  · Treatment Diagnosis: M54.5, G89.29 (ICD-10-CM) - Chronic bilateral low back pain without sciatica  Insurance/Certification information:  PT Insurance Information: BCBS 40 PT (also being seen to [facility]), no auth  Physician Information:  Referring Practitioner: Vernon Marie  Has the plan of care been signed (Y/N):        [x]  Yes  []  No     Date of Patient follow up with Physician: prn      Is this a Progress Report:     []  Yes  [x]  No        If Yes:  Date Range for reporting period:  Beginnin24  Ending:     Progress report will be due (10 Rx or 30 days whichever is less):        Recertification will be due (POC Duration  / 90 days whichever is less): 12 weeks        Visit # Insurance Allowable Auth Required   In-person 7-PN NV 40 (10 used for achilles) []  Yes []  No    Telehealth   []  Yes []  No    Total            Functional Scale: modODI 24%    Date assessed:  21      Therapy Diagnosis/Practice Pattern:F, conservative      Number of Comorbidities:  []0     []1-2    [x]3+    Latex Allergy:  [x]NO      []YES  Preferred Language for Healthcare:   [x]English       []other:      Pain level:  eval 2-10/10    SUBJECTIVE:  Pt feels he's doing better with more frequent stretch breaks and standing up from his desk work.     OBJECTIVE:    Observation: pt 8' late to tx   Test measurements:      RESTRICTIONS/PRECAUTIONS: a-fib, PVC's, chronic LBP, B knee a-scopes    Exercises/Interventions:     Therapeutic Ex (05096) Sets/sec/Reps Notes/CUES   SL open book S HEP   Supine SOR w/ tennis balls, self massage w/ ball on wall HEP   Quadruped HH rocking HEP   Self SNAG L cervical rotation HEP   Belt for 1st rib w/ UT S, scalene S    TB no money    1/2 FR:  horiz self mob (also showed in chair)   1/2 FR vertical:  pec S  pec minor S  Lat S \"backstroke\"  scap dep with breathing with scalene S    All to HEP   TB row B, staggered stance R/L  TB HAB  TB palof press     Cage 3D lumbar flexion S     Hip hikes 3\" x25 R/L    Tandem balance w/ MB diagonal lifts Floor 4# x10 ea R/L Tried 1/2 FR, but too unstable   Standing hip flexor S     Supine double knee to chest S 5x20\"    Standing thoracic rot \"thread the needle\" UEs on table (or wall for HEP) x5 R, L    Standing cerv retraction on 2 towel roll  + no money  + UE flex  + wall ann 5\"X10  x10 ea   Red     Review of AROMs to get out of \"desk\" posture  Cervical ext, SB, rot  Thoracic rot  Shoulder horiz abd/pec S  Wrist flex/ext (elbow st)  Lumbar ext (hands on table for supp)  Hip flexor and HS S  Gastroc/soleus S 5' review    Pt ed: , HEP,  posture,  Ed re continued discussion with his PCP re his concerns he's expressed to PT. May need help from specialist but not sure of best referral at this time. He's also seeing sleep specialist, so suggested that this could also be a good source for information as sleep apnea can have effect on his pain, function. Also showed lumbar and cervical rolls to try in bed to help support his spine in more neutral positions (suggested his memory foam may be too soft.) Reviewed frequent pauses from sitting desk work every 15 min or so to perform some AROM/stretching. Also discussed role of mental health and relation to heightened pain levels.  x10'    Manual Intervention (48604) 20'    Seated CTJ thrust  Seated L upper cervical rotation  4x \"rocking\"    Supine SOR, man traction, s L to R,   2nd, 1st rib mobs L x5'    Post and inf GHJ gr III mobs R, L to aid UQ posture1  PROM R, L flex, abd, ER, IR 1' ea R, L ea  5'    SL lumbar gapping     Prone w/ pillow PA's L1-5, thoracic rotation mobs all gr lll-lV, thoracic PA's T1-T3,   Cross hand MFR to TL spine  STM to PSM sacrum to T10  STM mid thoracic R>L x10'              Prone quad and hip IR/ER rotator S's x2 ea R, L 15\" ea    Trial MFD-6 cups (L4-5, T10, CTJ)  2-3 pumps each cup             NMR re-education (07547)  CUES NEEDED                                                Therapeutic Activity (00240)                                       Therapeutic Exercise and NMR EXR  [x] (57049) Provided verbal/tactile cueing for activities related to strengthening, flexibility, endurance, ROM  for improvements in proximal hip and core control with self care, mobility, lifting and ambulation. [x] (65880) Provided verbal/tactile cueing for activities related to improving balance, coordination, kinesthetic sense, posture, motor skill, proprioception  to assist with core control in self care, mobility, lifting, and ambulation.      Therapeutic Activities:    [] (04393 or 22416) Provided verbal/tactile cueing for activities related to improving balance, coordination, kinesthetic sense, posture, motor skill, proprioception and motor activation to allow for proper function  with self care and ADLs  [] (99246) Provided training and instruction to the patient for proper core and proximal hip recruitment and positioning with ambulation re-education     Home Exercise Program:    [x] (07873) Reviewed/Progressed HEP activities related to strengthening, flexibility, endurance, ROM of core, proximal hip and LE for functional self-care, mobility, lifting and ambulation   [] (04506) Reviewed/Progressed HEP activities related to improving balance, coordination, kinesthetic sense, posture, motor skill, proprioception of core, proximal hip and LE for self care, mobility, lifting, and ambulation      Manual Treatments:  PROM / STM / Oscillations-Mobs:  G-I, II, III, IV []? Not Met: []? Adjusted  4. Patient will return to lifting 20# floor to waist with good mechanics without increased symptoms or restriction. []? Progressing: []? Met: []? Not Met: []? Adjusted  5. Pt will report at least 50% improved mobility and walking ability in the morning. []? Progressing: []? Met: []? Not Met: []? Adjusted     Progression Towards Functional goals:  [] Patient is progressing as expected towards functional goals listed. [] Progression is slowed due to complexities listed. [] Progression has been slowed due to co-morbidities. [x] Plan just implemented, too soon to assess goals progression  [] Other:     Overall Progression Towards Functional goals/ Treatment Progress Update:  [] Patient is progressing as expected towards functional goals listed. [] Progression is slowed due to complexities/Impairments listed. [] Progression has been slowed due to co-morbidities. [x] Plan just implemented, too soon to assess goals progression <30days   [] Goals require adjustment due to lack of progress  [] Patient is not progressing as expected and requires additional follow up with physician  [] Other    Prognosis for POC: [x] Good [] Fair  [] Poor      Patient requires continued skilled intervention: [x] Yes  [] No    Treatment/Activity Tolerance:  [x] Patient able to complete treatment  [] Patient limited by fatigue  [] Patient limited by pain    [] Patient limited by other medical complications  [] Other:     ASSESSMENT: PT continues to provide education on frequent AROM to break up prolonged sitting/desk work with specific mvmts to target full body. Also suggested considering standing desk option for home as he has indefinite remote work now. He responds well to mvmt, however this hasn't equated to improvement in pain levels for him. Have stressed importance of strength/postural stability as well to help maintain ROM/flexibility gains.      Return to Play: (if applicable)   []  Stage 1: Intro to Strength   []  Stage 2: Return to Run and Strength   []  Stage 3: Return to Jump and Strength   []  Stage 4: Dynamic Strength and Agility   []  Stage 5: Sport Specific Training     []  Ready to Return to Play, Meets All Above Stages   []  Not Ready for Return to Sports   Comments:                         PLAN: See eval, hold [facility] POC, LBP 2x/week, manuals and posture control  [x] Continue per plan of care [] Alter current plan (see comments above)  [] Plan of care initiated [] Hold pending MD visit [] Discharge      Electronically signed by:  Norris Lemons, PT, DPT    Note: If patient does not return for scheduled/ recommended follow up visits, this note will serve as a discharge from care along with most recent update on progress. Access Code: [email or web address]  URL: [employer]/  Date: 09/02/2021  Prepared by: Joanie Cage    Exercises  Sidelying Thoracic Rotation with Open Book - 1 x daily - 7 x weekly - 3 sets - 10 reps  Supine Suboccipital Release with Tennis Balls - 1-2 x daily - 7 x weekly - 2-5 minutes as tolerated hold  Self trigger point release with tennis ball - 1 x daily - 7 x weekly  Quadruped Rocking Slow - 1 x daily - 7 x weekly - 1-2 sets - 5-10 reps  Seated Assisted Cervical Rotation with Towel - 1 x daily - 7 x weekly - 1-2 sets - 5-10 reps  Thoracic Extension Mobilization on Foam Roll - 1 x daily - 7 x weekly - 1 sets - 5 reps - 5 hold  Supine Chest Stretch on Foam Roll - 1 x daily - 7 x weekly - 1 sets - 5 reps - 5 hold  Thoracic Foam Roll Mobilization Backstroke - 1 x daily - 7 x weekly - 1 sets - 5 reps - 5 hold

## 2021-09-22 ENCOUNTER — NURSE ONLY (OUTPATIENT)
Dept: CARDIOLOGY CLINIC | Age: 61
End: 2021-09-22
Payer: COMMERCIAL

## 2021-09-22 DIAGNOSIS — I49.3 PVC (PREMATURE VENTRICULAR CONTRACTION): ICD-10-CM

## 2021-09-22 DIAGNOSIS — I48.91 ATRIAL FIBRILLATION, UNSPECIFIED TYPE (HCC): ICD-10-CM

## 2021-09-22 DIAGNOSIS — I42.2 HYPERTROPHIC CARDIOMYOPATHY (HCC): ICD-10-CM

## 2021-09-22 DIAGNOSIS — Z45.09 ENCOUNTER FOR LOOP RECORDER CHECK: ICD-10-CM

## 2021-09-22 PROCEDURE — 93298 REM INTERROG DEV EVAL SCRMS: CPT | Performed by: INTERNAL MEDICINE

## 2021-09-22 PROCEDURE — G2066 INTER DEVC REMOTE 30D: HCPCS | Performed by: INTERNAL MEDICINE

## 2021-09-22 NOTE — PROGRESS NOTES
Remote interrogation of implanted cardiac event monitor shows normal function. Patient has a history of AF and PVCs. Takes amiodarone, Eliquis, and verapamil. Patient's last device interrogation was on 8/18. Since last interrogation, no arrhythmias recorded. JANUSZ PENA to review. See [facility] report under the Cardiology tab. Follow up 1 month via [facility].

## 2021-09-23 ENCOUNTER — HOSPITAL ENCOUNTER (OUTPATIENT)
Dept: PHYSICAL THERAPY | Age: 61
Setting detail: THERAPIES SERIES
Discharge: HOME | End: 2021-09-23
Payer: COMMERCIAL

## 2021-09-23 PROCEDURE — 97110 THERAPEUTIC EXERCISES: CPT

## 2021-09-23 PROCEDURE — 97140 MANUAL THERAPY 1/> REGIONS: CPT

## 2021-09-23 NOTE — PROGRESS NOTES
[facility],  [address]  Phone: [phone]  Fax [phone]    Physical Therapy Treatment Note/ Progress Report:           Date:  2021    Patient Name:  Halima Serna"  :  1960  MRN: [id number]  Restrictions/Precautions:    Medical/Treatment Diagnosis Information:  · Diagnosis: M54.5, G89.29 (ICD-10-CM) - Chronic bilateral low back pain without sciatica  · Treatment Diagnosis: M54.5, G89.29 (ICD-10-CM) - Chronic bilateral low back pain without sciatica  Insurance/Certification information:  PT Insurance Information: BCBS 40 PT (also being seen to [facility]), no auth  Physician Information:  Referring Practitioner: Amira Gibson  Has the plan of care been signed (Y/N):        [x]  Yes  []  No     Date of Patient follow up with Physician: prn      Is this a Progress Report:     []  Yes  [x]  No        If Yes:  Date Range for reporting period:  Beginnin24  Endin21    Progress report will be due (10 Rx or 30 days whichever is less):        Recertification will be due (POC Duration  / 90 days whichever is less): 12 weeks        Visit # Insurance Allowable Auth Required   In-person 8-PN written 40 (10 used for a[facility]) []  Yes []  No    Telehealth   []  Yes []  No    Total            Functional Scale: modODI 24%    Date assessed:  21  Functional Scale: modODI 26%    Date assessed: 21            Therapy Diagnosis/Practice Pattern:F, conservative      Number of Comorbidities:  []0     []1-2    [x]3+    Latex Allergy:  [x]NO      []YES  Preferred Language for Healthcare:   [x]English       []other:      Pain level:  eval 2-10/10    SUBJECTIVE:  Pt feels that despite inc'd frequency of mvmt/exercise, he has not had any benefit of pain relief.  He is going on vacation next week, then plans to discuss next steps with his PCP about trying to figure out why his pain levels are not changing. He did stop taking his statin med (at rec of MD) to see if this has affect on his pain levels/muscle/joint aches. OBJECTIVE:    Observation:    Test measurements:    ROM LEFT RIGHT   Cervical flex 45     Cervical ext 35     rotation 40 48   SB 20 25   LUMBAR FLEX 63     LUMBAR EXT 15     Sidebend 20 20   Rotation         LEFT RIGHT   HIP Flex       HIP Abd       HIP ER       HIP IR       Knee Flex       Knee ext       Hamstring FLEX ++ ++   Piriformis  + +             RESTRICTIONS/PRECAUTIONS: a-fib, PVC's, chronic LBP, B knee a-scopes    Exercises/Interventions:     Therapeutic Ex (38871) Sets/sec/Reps Notes/CUES   SL open book S HEP   Supine SOR w/ tennis balls, self massage w/ ball on wall HEP   Quadruped HH rocking with cups at lower ribs and lower lumbar PSs x10HEP   Self SNAG L cervical rotation HEP   Belt for 1st rib w/ UT S, scalene S    TB no money    1/2 FR:  horiz self mob (also showed in chair)   1/2 FR vertical:  pec S  pec minor S  Lat S \"backstroke\"  scap dep with breathing with scalene S    All to HEP   TB row B, staggered stance R/L  TB HAB  TB palof press     Cage 3D lumbar flexion S     Hip hikes 3\" x25 R/L    Tandem balance w/ MB diagonal lifts Floor 4# x10 ea R/L Tried 1/2 FR, but too unstable   Standing hip flexor S     Supine double knee to chest S 5x20\"    Standing thoracic rot \"thread the needle\" UEs on table (or wall for HEP) x5 R, L    Standing cerv retraction on 2 towel roll  + no money  + UE flex  + wall ann   Red     UE N flossing gen bias  LE N flossing: showed seated in slump and supine x15 R, L ea    Review of AROMs to get out of \"desk\" posture  Cervical ext, SB, rot  Thoracic rot  Shoulder horiz abd/pec S  Wrist flex/ext (elbow st)  Lumbar ext (hands on table for supp)  Hip flexor and HS S  Gastroc/soleus S    Pt ed: , HEP,  posture,  Ed re continued discussion with his PCP re his concerns he's expressed to PT.  May need help from specialist but not sure of best referral at this time. He's also seeing sleep specialist, so suggested that this could also be a good source for information as sleep apnea can have effect on his pain, function. Also showed lumbar and cervical rolls to try in bed to help support his spine in more neutral positions (suggested his memory foam may be too soft.) Reviewed frequent pauses from sitting desk work every 15 min or so to perform some AROM/stretching. Also discussed role of mental health and relation to heightened pain levels. Manual Intervention (29448) 30'    Seated CTJ thrust  Seated L upper cervical rotation  4x \"rocking\"x10    Supine SOR, man traction, lateral glides R & L  Lower cervical lateral glides L to R,   2nd, 1st rib mobs L x5'  10'    Post and inf GHJ gr III mobs R, L to aid UQ posture1  PROM R, L flex, abd, ER, IR '    SL lumbar gapping     Prone w/ pillow PA's L1-5, thoracic rotation mobs all gr lll-lV, thoracic PA's T1-T3,   Cross hand MFR to TL spine   then cupping (see there-ex with AROM in quadruped)  STM to PSM sacrum to T10  STM mid thoracic R>L x10'      x8        Prone quad and hip IR/ER rotator S's     Trial MFD-6 cups (L4-5, T10, CTJ)  2-3 pumps each cup             NMR re-education (46447)  CUES NEEDED                                                Therapeutic Activity (87632)                                       Therapeutic Exercise and NMR EXR  [x] (16530) Provided verbal/tactile cueing for activities related to strengthening, flexibility, endurance, ROM  for improvements in proximal hip and core control with self care, mobility, lifting and ambulation. [x] (28976) Provided verbal/tactile cueing for activities related to improving balance, coordination, kinesthetic sense, posture, motor skill, proprioception  to assist with core control in self care, mobility, lifting, and ambulation.      Therapeutic Activities:    [] (67636 or ) Provided verbal/tactile cueing for activities related to improving balance, coordination, kinesthetic sense, posture, motor skill, proprioception and motor activation to allow for proper function  with self care and ADLs  [] (53286) Provided training and instruction to the patient for proper core and proximal hip recruitment and positioning with ambulation re-education     Home Exercise Program:    [x] (50001) Reviewed/Progressed HEP activities related to strengthening, flexibility, endurance, ROM of core, proximal hip and LE for functional self-care, mobility, lifting and ambulation   [] (41397) Reviewed/Progressed HEP activities related to improving balance, coordination, kinesthetic sense, posture, motor skill, proprioception of core, proximal hip and LE for self care, mobility, lifting, and ambulation      Manual Treatments:  PROM / STM / Oscillations-Mobs:  G-I, II, III, IV (PA's, Inf., Post.)  [x] (01779) Provided manual therapy to mobilize proximal hip and LS spine soft tissue/joints for the purpose of modulating pain, promoting relaxation,  increasing ROM, reducing/eliminating soft tissue swelling/inflammation/restriction, improving soft tissue extensibility and allowing for proper ROM for normal function with self care, mobility, lifting and ambulation. Modalities:       Charges  Timed Code Treatment Minutes: 50   Total Treatment Minutes: 50       [] EVAL (LOW) 69088   [] EVAL (MOD) 96688   [] EVAL (HIGH) 52595   [] RE-EVAL      [x] OW(49056) x1     [] IONTO  [] NMR (85943) x     [] VASO  [x] Manual (28554) x 2     [] Other:  [] TA x      [] Mech Traction (35675)  [] ES(attended) (19887)      [] ES (un) (78923):     Goals:   Patient stated goal: less pain and more mobility in the mornings  []? Progressing: []? Met: [x]? Not Met: []? Adjusted     Therapist goals for Patient:   Short Term Goals: To be achieved in: 2 weeks  1. Independent in HEP and progression per patient tolerance, in order to prevent re-injury. [x]? Progressing: []? Met: []? Not Met: []? Adjusted  2. Patient will have a decrease in pain to facilitate improvement in movement, function, and ADLs as indicated by Functional Deficits. []? Progressing: []? Met: [x]? Not Met: []? Adjusted      Long Term Goals: To be achieved in: 12 weeks  1. Disability index score of 14% or less for the modODI  to assist with reaching prior level of function. []? Progressing: []? Met: [x]? Not Met: []? Adjusted  2. Patient will demonstrate increased AROM to WNL, good LS mobility, good hip ROM to allow for proper joint functioning as indicated by patients Functional Deficits. [x]? Progressing: []? Met: []? Not Met: []? Adjusted  3. Patient will demonstrate an increase in Strength to good proximal hip and core activation to allow for proper functional mobility as indicated by patients Functional Deficits. [x]? Progressing: []? Met: []? Not Met: []? Adjusted  4. Patient will return to lifting 20# floor to waist with good mechanics without increased symptoms or restriction. [x]? Progressing: []? Met: []? Not Met: []? Adjusted  5. Pt will report at least 50% improved mobility and walking ability in the morning. []? Progressing: []? Met: [x]? Not Met: []? Adjusted     Progression Towards Functional goals:  [] Patient is progressing as expected towards functional goals listed. [x] Progression is slowed due to complexities listed. [] Progression has been slowed due to co-morbidities. [] Plan just implemented, too soon to assess goals progression  [] Other:     Overall Progression Towards Functional goals/ Treatment Progress Update:  [] Patient is progressing as expected towards functional goals listed. [] Progression is slowed due to complexities/Impairments listed. [] Progression has been slowed due to co-morbidities.   [x] Plan just implemented, too soon to assess goals progression <30days   [] Goals require adjustment due to lack of progress  [] Patient is not progressing as expected and requires additional follow up with physician  [] Other    Prognosis for POC: [x] Good [] Fair  [] Poor      Patient requires continued skilled intervention: [x] Yes  [] No    Treatment/Activity Tolerance:  [x] Patient able to complete treatment  [] Patient limited by fatigue  [] Patient limited by pain    [] Patient limited by other medical complications  [] Other:     ASSESSMENT: Pt has not had improvement in pain nor function, despite consistent manual tx to address joint/muscle/fascial mobility, and to promote I with HEP for frequent AROM and flexibility and postural stability. D/t recurrent c/o pain, myalgia and joint stiffness, recommend pt have further conversation with his PCP about management of his s/s, even if this means needing to work with specialist for further work-up and his labs at  were WNL. Return to Play: (if applicable)   []  Stage 1: Intro to Strength   []  Stage 2: Return to Run and Strength   []  Stage 3: Return to Jump and Strength   []  Stage 4: Dynamic Strength and Agility   []  Stage 5: Sport Specific Training     []  Ready to Return to Play, Meets All Above Stages   []  Not Ready for Return to Sports   Comments:                         PLAN: F/u with MD, continue PT prn x6 more weeks. , rec massage therapy also. [x] Continue per plan of care [x] Alter current plan (see comments above)  [] Plan of care initiated [] Hold pending MD visit [] Discharge      Electronically signed by:  Chilo Onofre, PT, DPT    Note: If patient does not return for scheduled/ recommended follow up visits, this note will serve as a discharge from care along with most recent update on progress. Access Code: [email or web address]  URL: [email or web address]  Date: 09/02/2021  Prepared by: Stacy Hull    Exercises  Sidelying Thoracic Rotation with Open Book - 1 x daily - 7 x weekly - 3 sets - 10 reps  Supine Suboccipital Release with Tennis Balls - 1-2 x daily - 7 x weekly - 2-5 minutes as tolerated hold  Self trigger point

## 2021-10-04 DIAGNOSIS — K21.9 GASTROESOPHAGEAL REFLUX DISEASE WITHOUT ESOPHAGITIS: ICD-10-CM

## 2021-10-05 RX ORDER — OMEPRAZOLE 40 MG/1
CAPSULE, DELAYED RELEASE ORAL
Qty: 90 CAPSULE | Refills: 1 | Status: SHIPPED | OUTPATIENT
Start: 2021-10-05 | End: 2022-09-01 | Stop reason: SDUPTHER

## 2021-10-05 NOTE — TELEPHONE ENCOUNTER
Erica Norwood is requesting refill(s)   Last OV 08/23/21 (pertaining to medication)  LR 12/21/20 (per medication requested)  Next office visit scheduled or attempted Yes   If no, reason:  11/12/21

## 2021-10-11 NOTE — PROGRESS NOTES
[facility]   Electrophysiology  Note              Date:  October 12, 2021  Patient name: Hardik Browne  YOB: 1960    Primary Care physician: Bret Wilson MD    HISTORY OF PRESENT ILLNESS: The patient is a 64 y.o.  male with a history of HOCM, NSVT, PAF, RBBB, chronic pain, ADHD, and sleep apnea. He transferred service for HOCM from Dr. Guevara Johnson in 6/2014 and has historically declined ICD implant because no one in his family has had sudden cardiac death. Echo in 6/2014 showed an EF of 60-65% and a 2.0cm mid septum. He complained of chest pain and had a normal stress test in 4/2016. He complained of palpitations and was diagnosed with atrial fibrillation in 4/2016. A 2 week monitor worn in 6/2017 showed PAF and frequent PVCs. Echo in 10/2017 showed an EF of 55-60% and severe LVH. He was started on amiodarone in 10/2017. He was referred to Dr. Chinmay Hernández for HOCM. Cardiac MRI in 1/2018 showed HOCM without LVOT obstruction at rest and an EF of 63%. Echo in 10/2019 showed an EF of 55-60% and moderate LVH. Event monitor in 4/2020 showed rare PVCs and no NSVT. Amiodarone was decreased to 100mg po QD. Event monitor in 7/2020 showed rare PVCs and 4 beat NSVT. At his visit in 10/2020 he reported palpitations and self increased amiodarone. In 12/2020 he had a loop recorder implanted. Amiodarone was decreased 4/2021. Today he is being seen for afib and PVCs. EKG shows SB  with a RBBB and HR of 54. He complains of fatigue and occasional fluttering in chest (brief). Denies chest pain, SOB, and dizziness.      Device check today shows:   Brand: Medtronic  Normal function   Arrhythmias: none   Battery life good        Past Medical History:   has a past medical history of A-fib ([address] ADHD (attention deficit hyperactivity disorder), Carpal tunnel syndrome, Chronic low back pain, Chronic neck pain, Chronic sinusitis, Dental crown present, Epigastric hernia, Esophageal spasm, GERD (gastroesophageal reflux disease), Hyperlipidemia, Hypertrophic cardiomyopathy ([facility] [address] IHSS (idiopathic hypertrophic subaortic stenosis) ([facility] [address] Kidney stones, HUNTER on CPAP, Primary localized osteoarthrosis, shoulder region, Prostate cancer ([facility] [address] PVC's (premature ventricular contractions), RBBB (right bundle branch block), RLS (restless legs syndrome), Seasonal allergies, and Tachycardia. Past Surgical History:   has a past surgical history that includes Knee arthroscopy (Right, 1982); Umbilical hernia repair; Colonoscopy (1/17/11); TURP (2012); Inguinal hernia repair (Bilateral, 2009, 2012,); Varicocelectomy (2000); Shoulder arthroscopy (Right, 12/31/2013); Endoscopy, colon, diagnostic; Upper gastrointestinal endoscopy (9/3); Elbow surgery (2015); Carpal tunnel release (Left); Knee arthroscopy (Left, 12/21/2017); Colonoscopy (N/A, 2/2/2020); Knee arthroscopy (Left, 7/17/2020); Insertable Cardiac Monitor (Left, 12/11/2020); and Insertable Cardiac Monitor (12/11/2020). Home Medications:    Prior to Admission medications    Medication Sig Start Date End Date Taking?  Authorizing Provider   tamsulosin (FLOMAX) 0.4 MG capsule Take 0.4 mg by mouth daily   Yes Historical Provider, MD   omeprazole (PRILOSEC) 40 MG delayed release capsule TAKE 1 CAPSULE BY MOUTH EVERY DAY 10/5/21  Yes Rudy Agudelo MD   amiodarone (PACERONE) 100 MG tablet Take 1 tablet by mouth daily 4/19/21  Yes Dmitry Holt MD   apixaban (ELIQUIS) 5 MG TABS tablet TAKE 1 TABLET BY MOUTH TWICE A DAY 4/19/21  Yes Dmitry Holt MD   verapamil (VERELAN) 240 MG extended release capsule TAKE 1 CAPSULE BY MOUTH EVERY DAY AT NIGHT 4/19/21  Yes Dmitry Holt MD   Zinc Sulfate (ZINC 15 PO) daily    Yes Historical Provider, MD   Cholecalciferol 25 MCG (1000 UT) CHEW Take 1,000 Units by mouth daily   Yes Historical Provider, MD   clonazePAM (KLONOPIN) 0.5 MG tablet TAKE 1 TABLET BY MOUTH AT BEDTIME AS NEEDED 2/8/21  Yes Historical Provider, MD butalbital-APAP-caffeine -40 MG CAPS per capsule TK ONE C PO  Q 12 H PRF SEVERE HEADACHE 9/24/20  Yes Historical Provider, MD       Allergies:  Niacin and related    Social History:   reports that he has never smoked. He has never used smokeless tobacco. He reports current alcohol use of about 1.0 - 2.0 standard drinks of alcohol per week. He reports that he does not use drugs. Family History: family history includes Cancer in his mother; Heart Disease in his maternal grandfather and maternal grandmother; High Blood Pressure in his father; High Cholesterol in his brother and mother; Prostate Cancer in his paternal uncle. Review of Systems   All 14-point review of systems are completed and pertinent positives are mentioned in the history of present illness. Other systems are reviewed and are negative. PHYSICAL EXAM:    Vital signs:    BP (!) 110/54   Pulse 53   Ht 5' 11\" (1.803 m)   Wt 204 lb 8 oz (92.8 kg)   SpO2 98%   BMI 28.52 kg/m²      Constitutional and general appearance: alert, cooperative, no distress and appears stated age  HEENT: PERRL, no cervical lymphadenopathy. No masses palpable.  Normal oral mucosa  Respiratory:  · Normal excursion and expansion without use of accessory muscles  · Resp auscultation: Normal breath sounds without dullness or wheezing  Cardiovascular:  · The apical impulse is not displaced  · Gr 2/6 systolic murmur, left chest. Regular S1 and S2.  · Jugular venous pulsation Normal  · The carotid upstroke is normal in amplitude and contour without delay or bruit  · Peripheral pulses are symmetrical and full   Abdomen:  · No masses or tenderness  · Bowel sounds present  Extremities:  ·  No cyanosis or clubbing  ·  Chronic lower extremity edema since knee surgery (trace to 1+)  ·  Skin: warm and dry  Neurological:  · Alert and oriented  · Moves all extremities well  · Flat affect     DATA:    ECG 10/12/2021:  SB with RBBB HR 54    Echo 34/3/1546:  Normal systolic function with an estimated ejection fraction of 55-60%. Moderate concentric left ventricular hypertrophy ( septal wall is more   increased in size (1.6 cm) ). No regional wall motion abnormalities are seen. Normal left ventricular diastolic filling pressure. The left atrium is severely dilated. The right atrium is mildly dilated. Mild aortic regurgitation. Mild mitral and pulmonic regurgitation. Echo 18/98/9930:  -- Systolic function appears grossly normal with an estimated ejection fraction of 55-60 %.   Left ventricular size is normal with severe asymetric left ventricular hypertrophy. Speckled appearance of myocardium, suggest rule out for restrictive cardiomyopathy and comparison with ECG voltage. Normal left ventricle diastolic filling pressure.   -- Moderately dilated left atrium with increased left atrial volume. Cardiac MRI 1/10/2018:  1.  Normal left ventricular chamber size with a normal global LV systolic function. Calculated    ejection fraction of 60%. Asymmetric basal and mid septal hypertrophy with the maximum    end-diastolic myocardial thickness measuring up to 16 mm. Maximum LVOT velocity is 1.4 m/s. No    anterior systolic motion of the anterior mitral valve leaflet. Late gadolinium enhancement    imaging demonstrates areas of heterogeneous hyperenhancement predominantly throughout the basal    and mid anterior and septal myocardial segments. Collectively, these findings are consistent    with hypertrophic cardiomyopathy without dynamic LVOT obstruction at rest.   2.  Normal right ventricular size with a normal global RV systolic function. Calculated RV    ejection fraction of 63%. 3.  Mild mitral regurgitation with associated mild left atrial enlargement. Stress test 4/2016:   Left ventricular ejection fraction of 60 %.    There is normal isotope uptake at stress and rest. There is no evidence of myocardial ischemia or scar.      CARDIOLOGY LABS:   CBC: No results for input(s): WBC, HGB, HCT, PLT in the last 72 hours. BMP: No results for input(s): NA, K, CO2, BUN, CREATININE, LABGLOM, GLUCOSE in the last 72 hours. PT/INR: No results for input(s): PROTIME, INR in the last 72 hours. APTT:No results for input(s): APTT in the last 72 hours. FASTING LIPID PANEL:  Lab Results   Component Value Date    HDL 57 05/12/2021    HDL 51 12/19/2011    LDLCALC 110 05/12/2021    TRIG 51 05/12/2021     LIVER PROFILE:No results for input(s): AST, ALT, ALB in the last 72 hours. Assessment:   1. Symptomatic paroxysmal atrial fibrillation: stable    -noted on monitor in 6/2017   -[id number] score 1 (cardiomyopathy)   -amiodarone started in 10/2017  2. Status post loop recorder implant: 12/2020   -device check today shows normal function and no arrhythmias  3. Hypertrophic obstructive cardiomyopathy: known history   -management per Dr. John Knapp, cardiac MRI done in 1/2018  4. Frequent PVCs and history of NSVT: stable    -symptomatic, noted on event monitors  5. Right bundle branch block: stable   6: HUNTER: reports compliance with CPAP    Plan:   1. Continue amiodarone, verapamil, and Eliquis  2. Annual CBC (due 3/2022)  3. BMP, LFT, and TSH every 6 months while on amiodarone (due 11/2021, ordered)  4. Monthly device transmissions   5.  Follow up in 6 months     MDM: moderate     Cedrick Landeros, APRN-CNP  [address]  [phone]

## 2021-10-12 ENCOUNTER — OFFICE VISIT (OUTPATIENT)
Dept: CARDIOLOGY CLINIC | Age: 61
End: 2021-10-12
Payer: COMMERCIAL

## 2021-10-12 ENCOUNTER — NURSE ONLY (OUTPATIENT)
Dept: CARDIOLOGY CLINIC | Age: 61
End: 2021-10-12

## 2021-10-12 VITALS
BODY MASS INDEX: 28.63 KG/M2 | DIASTOLIC BLOOD PRESSURE: 54 MMHG | HEIGHT: 71 IN | OXYGEN SATURATION: 98 % | SYSTOLIC BLOOD PRESSURE: 110 MMHG | WEIGHT: 204.5 LBS | HEART RATE: 53 BPM

## 2021-10-12 DIAGNOSIS — I45.10 RIGHT BUNDLE BRANCH BLOCK: ICD-10-CM

## 2021-10-12 DIAGNOSIS — I48.0 PAROXYSMAL ATRIAL FIBRILLATION (HCC): Primary | ICD-10-CM

## 2021-10-12 DIAGNOSIS — Z45.09 ENCOUNTER FOR LOOP RECORDER CHECK: ICD-10-CM

## 2021-10-12 DIAGNOSIS — I49.3 PVC (PREMATURE VENTRICULAR CONTRACTION): ICD-10-CM

## 2021-10-12 PROCEDURE — 93000 ELECTROCARDIOGRAM COMPLETE: CPT | Performed by: NURSE PRACTITIONER

## 2021-10-12 PROCEDURE — 99214 OFFICE O/P EST MOD 30 MIN: CPT | Performed by: NURSE PRACTITIONER

## 2021-10-12 RX ORDER — TAMSULOSIN HYDROCHLORIDE 0.4 MG/1
0.4 CAPSULE ORAL DAILY
COMMUNITY

## 2021-10-12 NOTE — LETTER
[facility]   Electrophysiology  Note              Date:  October 12, 2021  Patient name: Lily Shabazz  YOB: 1960    Primary Care physician: Guy Mcmahon MD    HISTORY OF PRESENT ILLNESS: The patient is a 64 y.o.  male with a history of HOCM, NSVT, PAF, RBBB, chronic pain, ADHD, and sleep apnea. He transferred service for HOCM from Dr. Kriste Skiff in 6/2014 and has historically declined ICD implant because no one in his family has had sudden cardiac death. Echo in 6/2014 showed an EF of 60-65% and a 2.0cm mid septum. He complained of chest pain and had a normal stress test in 4/2016. He complained of palpitations and was diagnosed with atrial fibrillation in 4/2016. A 2 week monitor worn in 6/2017 showed PAF and frequent PVCs. Echo in 10/2017 showed an EF of 55-60% and severe LVH. He was started on amiodarone in 10/2017. He was referred to Dr. Susan Do for HOCM. Cardiac MRI in 1/2018 showed HOCM without LVOT obstruction at rest and an EF of 63%. Echo in 10/2019 showed an EF of 55-60% and moderate LVH. Event monitor in 4/2020 showed rare PVCs and no NSVT. Amiodarone was decreased to 100mg po QD. Event monitor in 7/2020 showed rare PVCs and 4 beat NSVT. At his visit in 10/2020 he reported palpitations and self increased amiodarone. In 12/2020 he had a loop recorder implanted. Amiodarone was decreased 4/2021. Today he is being seen for afib and PVCs. EKG shows SB  with a RBBB and HR of 54. He complains of fatigue and occasional fluttering in chest (brief). Denies chest pain, SOB, and dizziness.      Device check today shows:   Brand: Medtronic  Normal function   Arrhythmias: none   Battery life good        Past Medical History:   has a past medical history of A-fib ([address] ADHD (attention deficit hyperactivity disorder), Carpal tunnel syndrome, Chronic low back pain, Chronic neck pain, Chronic sinusitis, Dental crown present, Epigastric hernia, Esophageal spasm, GERD (gastroesophageal reflux disease), Hyperlipidemia, Hypertrophic cardiomyopathy ([facility] [address] IHSS (idiopathic hypertrophic subaortic stenosis) ([facility] [address] Kidney stones, HUNTER on CPAP, Primary localized osteoarthrosis, shoulder region, Prostate cancer ([facility] [address] PVC's (premature ventricular contractions), RBBB (right bundle branch block), RLS (restless legs syndrome), Seasonal allergies, and Tachycardia. Past Surgical History:   has a past surgical history that includes Knee arthroscopy (Right, 1982); Umbilical hernia repair; Colonoscopy (1/17/11); TURP (2012); Inguinal hernia repair (Bilateral, 2009, 2012,); Varicocelectomy (2000); Shoulder arthroscopy (Right, 12/31/2013); Endoscopy, colon, diagnostic; Upper gastrointestinal endoscopy (9/3); Elbow surgery (2015); Carpal tunnel release (Left); Knee arthroscopy (Left, 12/21/2017); Colonoscopy (N/A, 2/2/2020); Knee arthroscopy (Left, 7/17/2020); Insertable Cardiac Monitor (Left, 12/11/2020); and Insertable Cardiac Monitor (12/11/2020). Home Medications:    Prior to Admission medications    Medication Sig Start Date End Date Taking?  Authorizing Provider   tamsulosin (FLOMAX) 0.4 MG capsule Take 0.4 mg by mouth daily   Yes Historical Provider, MD   omeprazole (PRILOSEC) 40 MG delayed release capsule TAKE 1 CAPSULE BY MOUTH EVERY DAY 10/5/21  Yes Jake Bishop MD   amiodarone (PACERONE) 100 MG tablet Take 1 tablet by mouth daily 4/19/21  Yes Abbie Weber MD   apixaban (ELIQUIS) 5 MG TABS tablet TAKE 1 TABLET BY MOUTH TWICE A DAY 4/19/21  Yes Abbie Weber MD   verapamil (VERELAN) 240 MG extended release capsule TAKE 1 CAPSULE BY MOUTH EVERY DAY AT NIGHT 4/19/21  Yes Abbie Weber MD   Zinc Sulfate (ZINC 15 PO) daily    Yes Historical Provider, MD   Cholecalciferol 25 MCG (1000 UT) CHEW Take 1,000 Units by mouth daily   Yes Historical Provider, MD   clonazePAM (KLONOPIN) 0.5 MG tablet TAKE 1 TABLET BY MOUTH AT BEDTIME AS NEEDED 2/8/21  Yes Historical Provider, MD butalbital-APAP-caffeine -40 MG CAPS per capsule TK ONE C PO  Q 12 H PRF SEVERE HEADACHE 9/24/20  Yes Historical Provider, MD       Allergies:  Niacin and related    Social History:   reports that he has never smoked. He has never used smokeless tobacco. He reports current alcohol use of about 1.0 - 2.0 standard drinks of alcohol per week. He reports that he does not use drugs. Family History: family history includes Cancer in his mother; Heart Disease in his maternal grandfather and maternal grandmother; High Blood Pressure in his father; High Cholesterol in his brother and mother; Prostate Cancer in his paternal uncle. Review of Systems   All 14-point review of systems are completed and pertinent positives are mentioned in the history of present illness. Other systems are reviewed and are negative. PHYSICAL EXAM:    Vital signs:    BP (!) 110/54   Pulse 53   Ht 5' 11\" (1.803 m)   Wt 204 lb 8 oz (92.8 kg)   SpO2 98%   BMI 28.52 kg/m²      Constitutional and general appearance: alert, cooperative, no distress and appears stated age  HEENT: PERRL, no cervical lymphadenopathy. No masses palpable.  Normal oral mucosa  Respiratory:  · Normal excursion and expansion without use of accessory muscles  · Resp auscultation: Normal breath sounds without dullness or wheezing  Cardiovascular:  · The apical impulse is not displaced  · Gr 2/6 systolic murmur, left chest. Regular S1 and S2.  · Jugular venous pulsation Normal  · The carotid upstroke is normal in amplitude and contour without delay or bruit  · Peripheral pulses are symmetrical and full   Abdomen:  · No masses or tenderness  · Bowel sounds present  Extremities:  ·  No cyanosis or clubbing  ·  Chronic lower extremity edema since knee surgery (trace to 1+)  ·  Skin: warm and dry  Neurological:  · Alert and oriented  · Moves all extremities well  · Flat affect     DATA:    ECG 10/12/2021:  SB with RBBB HR 54    Echo 66/9/6100:  Normal systolic function with an estimated ejection fraction of 55-60%. Moderate concentric left ventricular hypertrophy ( septal wall is more   increased in size (1.6 cm) ). No regional wall motion abnormalities are seen. Normal left ventricular diastolic filling pressure. The left atrium is severely dilated. The right atrium is mildly dilated. Mild aortic regurgitation. Mild mitral and pulmonic regurgitation. Echo 69/32/3204:  -- Systolic function appears grossly normal with an estimated ejection fraction of 55-60 %.   Left ventricular size is normal with severe asymetric left ventricular hypertrophy. Speckled appearance of myocardium, suggest rule out for restrictive cardiomyopathy and comparison with ECG voltage. Normal left ventricle diastolic filling pressure.   -- Moderately dilated left atrium with increased left atrial volume. Cardiac MRI 1/10/2018:  1.  Normal left ventricular chamber size with a normal global LV systolic function. Calculated    ejection fraction of 60%. Asymmetric basal and mid septal hypertrophy with the maximum    end-diastolic myocardial thickness measuring up to 16 mm. Maximum LVOT velocity is 1.4 m/s. No    anterior systolic motion of the anterior mitral valve leaflet. Late gadolinium enhancement    imaging demonstrates areas of heterogeneous hyperenhancement predominantly throughout the basal    and mid anterior and septal myocardial segments. Collectively, these findings are consistent    with hypertrophic cardiomyopathy without dynamic LVOT obstruction at rest.   2.  Normal right ventricular size with a normal global RV systolic function. Calculated RV    ejection fraction of 63%. 3.  Mild mitral regurgitation with associated mild left atrial enlargement. Stress test 4/2016:   Left ventricular ejection fraction of 60 %.    There is normal isotope uptake at stress and rest. There is no evidence of myocardial ischemia or scar.      CARDIOLOGY LABS:   CBC: No results for input(s): WBC, HGB, HCT, PLT in the last 72 hours. BMP: No results for input(s): NA, K, CO2, BUN, CREATININE, LABGLOM, GLUCOSE in the last 72 hours. PT/INR: No results for input(s): PROTIME, INR in the last 72 hours. APTT:No results for input(s): APTT in the last 72 hours. FASTING LIPID PANEL:  Lab Results   Component Value Date    HDL 57 05/12/2021    HDL 51 12/19/2011    LDLCALC 110 05/12/2021    TRIG 51 05/12/2021     LIVER PROFILE:No results for input(s): AST, ALT, ALB in the last 72 hours. Assessment:   1. Symptomatic paroxysmal atrial fibrillation: stable    -noted on monitor in 6/2017   -[id number] score 1 (cardiomyopathy)   -amiodarone started in 10/2017  2. Status post loop recorder implant: 12/2020   -device check today shows normal function and no arrhythmias  3. Hypertrophic obstructive cardiomyopathy: known history   -management per Dr. Wu Swift, cardiac MRI done in 1/2018  4. Frequent PVCs and history of NSVT: stable    -symptomatic, noted on event monitors  5. Right bundle branch block: stable   6: HUNTER: reports compliance with CPAP    Plan:   1. Continue amiodarone, verapamil, and Eliquis  2. Annual CBC (due 3/2022)  3. BMP, LFT, and TSH every 6 months while on amiodarone (due 11/2021, ordered)  4. Monthly device transmissions   5.  Follow up in 6 months     MDM: moderate     Petty Ruby, APRN-CNP  [facility] [address]  [phone]

## 2021-10-12 NOTE — PROGRESS NOTES
Patient comes in for interrogation of their implanted loop recorder. Patient has a history of pAF, CM, and PVCs. Takes verapamil, amiodarone, and Eliquis. Patient's last device interrogation was on 9/22. Since last interrogation, no arrhythmias recorded. Patient will see NEL Rousseau today in office. See [employer] report under the Cardiology tab. We will follow the patient remotely.

## 2021-10-27 ENCOUNTER — NURSE ONLY (OUTPATIENT)
Dept: CARDIOLOGY CLINIC | Age: 61
End: 2021-10-27
Payer: COMMERCIAL

## 2021-10-27 DIAGNOSIS — I48.0 PAROXYSMAL ATRIAL FIBRILLATION (HCC): ICD-10-CM

## 2021-10-27 DIAGNOSIS — I49.3 PVC (PREMATURE VENTRICULAR CONTRACTION): ICD-10-CM

## 2021-10-27 DIAGNOSIS — Z45.09 ENCOUNTER FOR LOOP RECORDER CHECK: ICD-10-CM

## 2021-10-27 PROCEDURE — 93298 REM INTERROG DEV EVAL SCRMS: CPT | Performed by: INTERNAL MEDICINE

## 2021-10-27 PROCEDURE — G2066 INTER DEVC REMOTE 30D: HCPCS | Performed by: INTERNAL MEDICINE

## 2021-10-29 NOTE — PROGRESS NOTES
Remote transmission received for patients ILR.   EP physician will review.  See interrogation under the cardiology tab in the [address] [place] [address] [address] field for more details.  Will continue to monitor remotely. ILR implanted 12/11/20 by Dr Jeffery Griffith for pAF and nsvt. He has a hx of pAF and is on eliquis and amio. AFib 12/15/20 x 14 hrs then converted to sinus rhythm on 12/15/20 @ 6pm.   3.5 sec conversion pause at 6:00 pm. On 12/15/20.   Last interrogation 10/12/21. Nadine Steinberg recorded

## 2021-12-01 ENCOUNTER — NURSE ONLY (OUTPATIENT)
Dept: CARDIOLOGY CLINIC | Age: 61
End: 2021-12-01
Payer: COMMERCIAL

## 2021-12-01 DIAGNOSIS — I48.0 PAROXYSMAL ATRIAL FIBRILLATION (HCC): ICD-10-CM

## 2021-12-01 DIAGNOSIS — I49.3 PVC (PREMATURE VENTRICULAR CONTRACTION): ICD-10-CM

## 2021-12-01 DIAGNOSIS — Z45.09 ENCOUNTER FOR LOOP RECORDER CHECK: ICD-10-CM

## 2021-12-01 PROCEDURE — G2066 INTER DEVC REMOTE 30D: HCPCS | Performed by: INTERNAL MEDICINE

## 2021-12-01 PROCEDURE — 93298 REM INTERROG DEV EVAL SCRMS: CPT | Performed by: INTERNAL MEDICINE

## 2021-12-02 NOTE — PROGRESS NOTES
Remote transmission received for patients ILR.    EP physician will review.  See interrogation under the cardiology tab in the [address] [facility] [address] field for more details.  Will continue to monitor remotely. ILR implanted 12/11/20 by Dr Mary Kay Garrett for pAF and nsvt. He has a hx of pAF and is on eliquis and amio. AFib 12/15/20 x 14 hrs then converted to sinus rhythm on 12/15/20 @ 6pm.   3.5 sec conversion pause at 6:00 pm. On 12/15/20.   Luisa Smallwood  arrhythmias recorded

## 2021-12-06 ENCOUNTER — TELEPHONE (OUTPATIENT)
Dept: CARDIOLOGY CLINIC | Age: 61
End: 2021-12-06

## 2021-12-06 NOTE — TELEPHONE ENCOUNTER
RE: last remote transmission, per AGK:     Please check on patient to see how feeling. I can't see afib tracings unfortunately. Thanks.

## 2021-12-10 NOTE — TELEPHONE ENCOUNTER
Pt is experiencing some skipped beats and fluttering. This mostly happens at rest. Denies any other symptoms.

## 2021-12-13 NOTE — TELEPHONE ENCOUNTER
MITALIB- please review manual transmission that was sent on 12/10/21 due to heart fluttering and palpitations. Tripped walking up the stairs and hit a window ledge. Laceration noted to R forehead. Denies LOC/thinners. Denies head/neck/back pain

## 2021-12-13 NOTE — TELEPHONE ENCOUNTER
Current ECG: 10-Dec-2021 15:13:52-sinus w/ pac's noted. No sustained arrhythmias recorded. Remote transmission received for patients ILR. EP physician will review. See interrogation under the cardiology tab in the [address] [place] [address] [address] field for more details. Will continue to monitor remotely.

## 2021-12-15 ENCOUNTER — OFFICE VISIT (OUTPATIENT)
Dept: FAMILY MEDICINE CLINIC | Age: 61
End: 2021-12-15
Payer: COMMERCIAL

## 2021-12-15 ENCOUNTER — OFFICE VISIT (OUTPATIENT)
Dept: PULMONOLOGY | Age: 61
End: 2021-12-15
Payer: COMMERCIAL

## 2021-12-15 VITALS
WEIGHT: 205 LBS | RESPIRATION RATE: 18 BRPM | TEMPERATURE: 97.3 F | SYSTOLIC BLOOD PRESSURE: 123 MMHG | HEART RATE: 53 BPM | HEIGHT: 71 IN | DIASTOLIC BLOOD PRESSURE: 73 MMHG | BODY MASS INDEX: 28.7 KG/M2 | OXYGEN SATURATION: 98 %

## 2021-12-15 VITALS
DIASTOLIC BLOOD PRESSURE: 76 MMHG | SYSTOLIC BLOOD PRESSURE: 124 MMHG | OXYGEN SATURATION: 98 % | HEART RATE: 57 BPM | WEIGHT: 206.6 LBS | HEIGHT: 71 IN | BODY MASS INDEX: 28.92 KG/M2

## 2021-12-15 DIAGNOSIS — Z85.46 PERSONAL HISTORY OF MALIGNANT NEOPLASM OF PROSTATE: ICD-10-CM

## 2021-12-15 DIAGNOSIS — K21.9 GASTROESOPHAGEAL REFLUX DISEASE WITHOUT ESOPHAGITIS: ICD-10-CM

## 2021-12-15 DIAGNOSIS — G25.81 RLS (RESTLESS LEGS SYNDROME): ICD-10-CM

## 2021-12-15 DIAGNOSIS — J32.9 CHRONIC SINUSITIS, UNSPECIFIED LOCATION: ICD-10-CM

## 2021-12-15 DIAGNOSIS — Z87.19 HISTORY OF GI BLEED: ICD-10-CM

## 2021-12-15 DIAGNOSIS — I42.2 HYPERTROPHIC CARDIOMYOPATHY (HCC): ICD-10-CM

## 2021-12-15 DIAGNOSIS — G47.00 INSOMNIA, UNSPECIFIED TYPE: ICD-10-CM

## 2021-12-15 DIAGNOSIS — G47.33 OBSTRUCTIVE SLEEP APNEA: Primary | ICD-10-CM

## 2021-12-15 DIAGNOSIS — R73.9 ELEVATED BLOOD SUGAR: Primary | ICD-10-CM

## 2021-12-15 DIAGNOSIS — I48.0 PAROXYSMAL ATRIAL FIBRILLATION (HCC): ICD-10-CM

## 2021-12-15 DIAGNOSIS — G89.4 CHRONIC PAIN SYNDROME: ICD-10-CM

## 2021-12-15 DIAGNOSIS — R00.2 PALPITATIONS: ICD-10-CM

## 2021-12-15 DIAGNOSIS — G47.10 HYPERSOMNOLENCE: ICD-10-CM

## 2021-12-15 DIAGNOSIS — E78.5 HYPERLIPIDEMIA, UNSPECIFIED HYPERLIPIDEMIA TYPE: ICD-10-CM

## 2021-12-15 DIAGNOSIS — Z79.01 CURRENT USE OF LONG TERM ANTICOAGULATION: ICD-10-CM

## 2021-12-15 LAB — HBA1C MFR BLD: 6.1 %

## 2021-12-15 PROCEDURE — 99214 OFFICE O/P EST MOD 30 MIN: CPT | Performed by: INTERNAL MEDICINE

## 2021-12-15 PROCEDURE — 99214 OFFICE O/P EST MOD 30 MIN: CPT | Performed by: FAMILY MEDICINE

## 2021-12-15 PROCEDURE — 83036 HEMOGLOBIN GLYCOSYLATED A1C: CPT | Performed by: FAMILY MEDICINE

## 2021-12-15 ASSESSMENT — ENCOUNTER SYMPTOMS
RESPIRATORY NEGATIVE: 1
GASTROINTESTINAL NEGATIVE: 1
EYES NEGATIVE: 1
ALLERGIC/IMMUNOLOGIC NEGATIVE: 1

## 2021-12-15 NOTE — LETTER
visit [email or web address]    The ENT I refer to is Dr.L Sharol Lombard DO, Otolaryngologist.    RTC 1 year.                          Thank you for allowing me to assist in the care of the Hannah Alvares MD

## 2021-12-15 NOTE — PROGRESS NOTES
Adrianne West presents for   Chief Complaint   Patient presents with    Hyperlipidemia     pt is here for a f/u, is fasting for bw     Hypertension    Hyperglycemia    Gastroesophageal Reflux        ASSESSMENT:   Diagnosis Orders   1. Elevated blood sugar, A1c stable at 6.1 POCT glycosylated hemoglobin (Hb A1C)   2. Gastroesophageal reflux disease without esophagitis, continue PPI    3. Hyperlipidemia, unspecified hyperlipidemia type, patient has been off of his pravastatin for many months now I wanted to see if it made a difference with his myalgias. He says some days it seems so other days not. He wanted to see what his numbers look like off the medicine before restarting Comprehensive Metabolic Panel    Lipid Panel   4. Paroxysmal atrial fibrillation ([facility] continue Eliquis and verapamil and cardiology follow-up TSH without Reflex   5. Hypertrophic cardiomyopathy [facility]), continue cardiology follow-up    6. History of GI bleed, continue PPI since he is on Eliquis we will check a CBC CBC Auto Differential   7. Current use of long term anticoagulation     8. Personal history of malignant neoplasm of prostate, last PSA 0.44 continue follow-up with Emmie Austin 1634    9. Chronic pain syndrome, patient asking for referral to pain management Chyna Wells CNP, Pain Management, [place]        Plan:  1)  Medication: continue current medication regimen unchanged, medications are working and tolerated, continue as listed  2)  Recheck in 6 months, sooner should new symptoms or problems arise. 3) LLR          SUBJECTIVE:  Adrianne West is a 64 y.o. male who presents for evaluation of elevated blood sugar, PAF, hypertrophic cardiomyopathy, history of GI bleed, history of prostate cancer, and hyperlipidemia. He indicates that he is feeling well and denies any symptoms referable to his elevated blood pressure. Specifically denies chest pain,  dyspnea, orthopnea, PND or peripheral edema or neuro sx.   No anorexia, arthralgia, or leg cramps noted. Current medication regimen is as listed below. He denies any side effects of medication, and has been taking it regularly. Lab Results   Component Value Date    LDLCALC 110 (H) 05/12/2021       Patient is here for 6-month check. He just saw his pulmonologist earlier today. Patient's last A1c was 6.0 with a fasting glucose of 108, his A1c today is 6.1. Patient has stopped his cholesterol medicine due to myalgias. Patient previously was on Lipitor this was treated for pravastatin. Patient wanted to see what his numbers look like off the medication before restarting his statin. Patient follows up with cardiology for his PAF and his cardiomyopathy. He says he has been having more palpitations and has been working with cardiology to determine next steps there. Is any recent GI bleed. He is on a PPI. [facility] for follow-up of his prostate cancer his last PSA was 0.44. Patient is asking for referral to see pain management for the diffuse pains that he has especially in the morning when he wakes up.   He wants a consult to know what his options are    Current Outpatient Medications   Medication Sig Dispense Refill    clonazePAM (KLONOPIN) 1 MG tablet TAKE 1 TABLET BY MOUTH EVERY NIGHT AS NEEDED 30 tablet 3    tamsulosin (FLOMAX) 0.4 MG capsule Take 0.4 mg by mouth daily      omeprazole (PRILOSEC) 40 MG delayed release capsule TAKE 1 CAPSULE BY MOUTH EVERY DAY 90 capsule 1    amiodarone (PACERONE) 100 MG tablet Take 1 tablet by mouth daily 90 tablet 3    apixaban (ELIQUIS) 5 MG TABS tablet TAKE 1 TABLET BY MOUTH TWICE A  tablet 3    verapamil (VERELAN) 240 MG extended release capsule TAKE 1 CAPSULE BY MOUTH EVERY DAY AT NIGHT 90 capsule 3    Zinc Sulfate (ZINC 15 PO) daily       Cholecalciferol 25 MCG (1000 UT) CHEW Take 1,000 Units by mouth daily      butalbital-APAP-caffeine -40 MG CAPS per capsule TK ONE C PO  Q 12 H PRF SEVERE HEADACHE       Current Facility-Administered Medications   Medication Dose Route Frequency Provider Last Rate Last Admin    sodium hyaluronate (viscosup) injection 20 mg  20 mg Intra-artICUlar Once Sameer Pelayo           Allergies   Allergen Reactions    Niacin And Related      Extreme itching /stinging started at head to waist       Social History     Tobacco Use    Smoking status: Never Smoker    Smokeless tobacco: Never Used   Substance Use Topics    Alcohol use: Yes     Alcohol/week: 1.0 - 2.0 standard drink     Types: 1 Cans of beer per week       OBJECTIVE:   /76   Pulse 57   Ht 5' 11\" (1.803 m)   Wt 206 lb 9.6 oz (93.7 kg)   SpO2 98%   BMI 28.81 kg/m²   NAD  Neck no bruit or JVD  S1 and S2 normal, 1/6 murmurs, no clicks, gallops or rubs. Regular rate and rhythm. Chest is clear; no wheezes or rales. No edema or JVD. Neuro alert, no CN or motor deficits  Psych nl mood, thought and judgement                EMR Dragon/transcription disclaimer:  Much of this encounter note is electronic transcription/translation of spoken language to printed texts. The electronic translation of spoken language may be erroneous, or at times, nonsensical words or phrases may be inadvertently transcribed.   Although I have reviewed the note for such errors, some may still exist.

## 2021-12-15 NOTE — PROGRESS NOTES
MA Communication:   The following orders are received by verbal communication from Silvio Rooney MD    Orders include:  Order faxed to [phone]       1 year fu scheduled 12/14/22

## 2021-12-15 NOTE — PATIENT INSTRUCTIONS
ASSESSMENT/PLAN:  1. Obstructive sleep apnea  2. Insomnia, unspecified type  3. RLS (restless legs syndrome)  4. Hypersomnolence  5. Chronic sinusitis, unspecified location  6. Palpitations    Advised to avoid driving when too sleepy to function safely and given a discussion of the risks of untreated apnea such as accidents, cognitive impairment, mood impairment, high blood pressure, various cardiac diseases and stroke. Weight loss was encouraged. Weight is flucuating from 207 to 201 to 206 to 215 to 216 to 205  Try to lose weight    afib is controlled  Seen by cardiologist  Sinus good    RLS  Will continue  Klonopin 1mg at night (recommend taking 1/2 hour before bedtime)- will refill  Will change to 0.5 mg, will try to alternate between 1mg and 0.5 mg to see if any better sleep      Could consider later  mirapex or requip for the RLS    sinus  flonase as needed       Stopped the  nuvigil b/c of insomnia, no allergy, I have changed this  T[place] the [place] however it caused more sleepiness  And didn't like it, will stop this        Would avoid all amphetamines b/c of a fib        Set up date was 2/11/16  autopap is set at max of 11 and min of 9  Ramp is off  epr is at 3  Using nasal mask  Using 30/30 days  Using 8.5 h/night  90% pressure is 10.2 cwp  No leak, 23 lpm  No sleep apnea, ahi 0.5     Feeling the benefit of cpap/autopap  Will continue with cpap     cpap titration done 1/4/20  Wt was 207  cpap at 9 cwp best controlled    dme is apria    TSH - done by at [address] , was normal Done 2/26/20    Blood pressure controlled- at 123/73  Some issues with the afib and cardiac disease    Sleepiness is better        You may be a good candidate for Inspire upper airway stimulation to treat your moderate to severe obstructive sleep apnea. For more information visit www. inspiresleep. com    The ENT I refer to is Dr.L Vasyl Osorio DO, Otolaryngologist.    RTC 1 year.     Remember to bring a list of pulmonary medications and any CPAP or BiPAP machines to your next appointment with the office. Please keep all of your future appointments scheduled by [facility]. Out of respect for other patients and providers, you may be asked to reschedule your appointment if you arrive later than your scheduled appointment time. Appointments cancelled less than 24hrs in advance will be considered a no show. Patients with three missed appointments within 1 year or four missed appointments within 2 years can be dismissed from the practice. Please be aware that our physicians are required to work in the Intensive Care Unit at [facility].  Your appointment may need to be rescheduled if they are designated to work during your appointment time. You may receive a survey regarding the care you received during your visit. Your input is valuable to us. We encourage you to complete and return your survey. We hope you will choose us in the future for your healthcare needs. Pt instructed of all future appointment dates & times, including radiology, labs, procedures & referrals. If procedures were scheduled preparation instructions provided. Instructions on future appointments with [facility] were given.

## 2021-12-15 NOTE — PROGRESS NOTES
Keyla Fowler (:  1960) is a 64 y.o. male,Established patient, here for evaluation of the following chief complaint(s):  Follow-up (Sleep  former  [facility] pt) and Sleep Apnea         ASSESSMENT/PLAN:  1. Obstructive sleep apnea  2. Insomnia, unspecified type  3. RLS (restless legs syndrome)  4. Hypersomnolence  5. Chronic sinusitis, unspecified location  6. Palpitations    Advised to avoid driving when too sleepy to function safely and given a discussion of the risks of untreated apnea such as accidents, cognitive impairment, mood impairment, high blood pressure, various cardiac diseases and stroke. Weight loss was encouraged.      Weight is flucuating from 207 to 201 to 206 to 215 to 216 to 205  Try to lose weight    afib is controlled  Seen by cardiologist  Sinus good    RLS  Will continue  Klonopin 1mg at night (recommend taking 1/2 hour before bedtime)- will refill  Will change to 0.5 mg, will try to alternate between 1mg and 0.5 mg to see if any better sleep      Could consider later  mirapex or requip for the RLS    sinus  flonase as needed       Stopped the  nuvigil b/c of insomnia, no allergy, I have changed this  Tried the [place] park however it caused more sleepiness  And didn't like it, will stop this        Would avoid all amphetamines b/c of a fib        Set up date was 16  autopap is set at max of 11 and min of 9  Ramp is off  epr is at 3  Using nasal mask  Using 30/30 days  Using 8.5 h/night  90% pressure is 10.2 cwp  No leak, 23 lpm  No sleep apnea, ahi 0.5     Feeling the benefit of cpap/autopap  Will continue with cpap     cpap titration done 20  Wt was 207  cpap at 9 cwp best controlled    dme is apria    TSH - done by at [address] , was normal Done 20    Blood pressure controlled- at 123/73  Some issues with the afib and cardiac disease    Sleepiness is better        You may be a good candidate for Inspire upper airway stimulation to treat your moderate to severe obstructive sleep apnea. For more information visit www. [email or web address] [email or web address]. MAPPING    The ENT I refer to is Dr.L Jonny Swann DO, Otolaryngologist.    RTC 1 year. No follow-ups on file. [address] [facility]  [address]  SU[address] [facility] [address]  Dept: [phone]  Loc: [phone]     Diagnosis:  [x] HUNTER (ICD-10: G47.33)  o CSA (ICD-10: G47.31)  [] Complex Sleep Apnea (ICD-10: G47.37)  []  (ICD-10: G47.37)  [] Hypoxemia (ICD-10: R09.02)  [] COPD (J44.90)  [] Chronic Respiratory Failure with hypoxemia (J96.11)  [] Chronicr Respiratory Failure with hypercapnia (J96.12)  [] Restrictive Lung Disease (J98.4)      [] New Rx (Device Preference: _________________________)     [] Change Order       [] Replace ___________  [] Clinical assessments and may include IN-Check device, spirometry and ETCO2 PRN    [] Discontinue Order -  [] CPAP    [] BPAP    [] PAP    [] Oxygen   /   AMA?  [] Yes   [] No              Therapy    AHI: ________ ANNA: ________  LOW SpO2: ________%      DME:apria    DEVICE / SETTINGS RAMP / COMFORT INTERFACE   []  CPAP () Pressure    Ramp: _________ min's  [] Ramp to patient preference General PAP Supply Kit  Medicaid does not cover heated tubing  ([employer])  PAP Tubing:  [x] Heated ()- 1/3 mos                         [x] Regular ()  1/3 mos  [x] Disposable Water Chamber () - 1/6 mos  [x] Disposable Filter () - 2/mo  [x] Non-Disposable Filter () - 1/6 mos   []  BiLevel PAP ()           IPAP        []  BiLevel PAP with   ()       Backup Rate ()      EPAP Rate  [] Adjust FLEX to patient comfort       SUPPLEMENTAL OXYGEN  [] OXYGEN:      Liter/min: _________  [] Continuous        [] Nocturnal  [] Bleed into PAP Device      []  [facility] Kit    [x] Mask interface () - 1/3 mos  [] Nasal Cushion ()  2/mo  [x] Nasal Pillows ()  -2/mo  [x] Headgear ()  1/6 mos   []  AutoBiLevel () Pressure  ()      Support           []  ResMed® IVAPS EPAP  [] Overnight Oximetry on Room Air  [] Overnight Oximetry on PAP Therapy    Target Pt Rate  Min PS      Target Va  Patient Ht  Ti Max                Ti Min        Rise time  Max PS  Trigger  Cycle  Epap  Epap max  Epap min  The patient has a history of:  [] Excessive Daytime Sleepiness  [] Insomnia  [] Impaired Cognition  [] Ischemic Heart Disease  [] Hypertension  [] Mood Disorders          [] History of Stroke  Additional Orders:______________________________________________________________________________________________________________________________________________________________________________    [] Titrate to comfort Full Face Mask Kit    [] Full face mask ()  1/3 mos  [] Full Face Cushion ()  1/mo  [] Headgear ()  1/6 mos                                           [] Respironics® ASV Advanced ()     EPAP Min  PS Min      EPAP Max  PS Max   Additional Supplies    [x] Chin Strap ()  [] Heated Humidifier Kit ()  Mask Specifications:   [] Patient Preference      -or-            Brand:______________ Size:_______________   Type: __________________________________   [] Mask Refit:___________________________                                           Max Press  Ramp Time      Rate  Bi-flex: []1  []2  []3     [] Respironics® AVAPS: ()     IPAP Min  IPAP Max  Pressure Max  Epap max  Epap min  Rise time                      Avaps rate  EPAP   Additional Services    [] Annual PRN service and check of equipment  [] Routine service and check of equipment  [] Download and report compliance data   Tidal volume      Tigger                                     Rate                                         Inspiratory time                                                         The following equipment is Medically Necessary for the above stated patient. It is reasonable and necessary in reference to acceptable standards of medical practice for this condition, and is not prescribed as a convenience. Frequency of Use:    Daily                 Length of Need: 13 Months              o The patient requires BiLevel PAP and the following apply: []  The patient requires a Respiratory Assist Device (RAD) and the following apply:   o CPAP was tried and failed to meet therapeutic goals. [] CPAP was tried, but failed to meet therapeutic goals   o The prescribed mask interface has been properly fit, is the most comfortable to the patient and will be used with the BPAP device. [] The prescribed mask interface has been properly fit, is the most comfortable to the patient and will be used with the RAD.   o Current CPAP setting prevents patient from tolerating the therapy and lower CPAP settings fail to adequately control the symptoms of HUNTER, improve sleep quality, or reduce AHI to acceptable levels. [] Current CPAP setting prevent patient from tolerating the therapy and lower PAP settings fail to  adequately control HUNTER symptoms, improve sleep quality, or reduce AHI to acceptable levels.          [] There is significant improvement of the respiratory events on the RAD                                                                                                                                                                                                                                  Marquez Mcdonnell MD               NPI- [id number]     [facility] [phone]                    12/15/21       ____________________________                        _______________________           Physician Signature                                                         Date                                                   I have personally reviewed and summarized the old records   I have independently reviewed the images and reviewed with patient    I have reviewed the  lab tests, radiology reports and medication  I have downloaded and interpreted the cpap/bipap/pap data. I have made adjustments as described    Reviewed present meds and side effects. Continue present meds. Stay compliant. Call if worsens. Reviewed proper inhaler usage    Will ask for old records         Subjective   SUBJECTIVE/OBJECTIVE:  Transfer of care from [facility] to [facility] pulmonary  Initially seen by me at [facility] pulmonary and sleep on 12/15/2021    Last seen by me at [facility] on 9/2/2020    Alonzo Blevins is a 64y.o.   year old year old, male, with PMH significant for lanny and insomnia, that presents today for c/o f/u.          Still has a fib is controlled     Was taken adderall for the adhd  However now off     Tried nuvigil and had side effect- seemed to last too long     Still waking up at night  Some irregular heart beat at night  No headache     Some headache in am- still     No bloating  No burping  No chest pain  No ear popping     Pressure feels good    No rls at this time  At times will have rsl    No hallucinations  No paralysis  No cataplexy    Still very sleepy and fatigued  Depression? Stopped taking the nuvigil b/c of the insomnia  Now better    Still very exhausted at times    Has been taking klonopin 1/2 tablet    Tried the [employer] park however it caused more sleepiness  And didn't like it    Klonopin worked better    More issues with afib and cardiac issues  No sob  occ sob    No chest pain      Since last visit  Now with some insomnia  No rls  No plms    No chest pain    No bloating  No bupring    Wanting find another alternative      Review of Systems   Constitutional: Negative. HENT: Negative. Eyes: Negative. Respiratory: Negative. Cardiovascular: Negative. Gastrointestinal: Negative. Endocrine: Negative. Genitourinary: Negative. Musculoskeletal: Negative. Skin: Negative. Allergic/Immunologic: Negative. Neurological: Negative. Hematological: Negative.     Psychiatric/Behavioral: Negative. Vitals:    12/15/21 1304   BP: 123/73   Pulse: 53   Resp: 18   Temp: 97.3 °F (36.3 °C)   TempSrc: Temporal   SpO2: 98%   Weight: 205 lb (93 kg)   Height: 5' 11\" (1.803 m)       Objective   Physical Exam  Vitals and nursing note reviewed. Constitutional:       General: He is not in acute distress. Appearance: Normal appearance. He is not ill-appearing. HENT:      Head: Normocephalic and atraumatic. Right Ear: External ear normal.      Left Ear: External ear normal.      Nose: Nose normal.      Mouth/Throat:      Mouth: Mucous membranes are moist.      Pharynx: Oropharynx is clear. Comments: Mallampati 3  Eyes:      General: No scleral icterus. Extraocular Movements: Extraocular movements intact. Conjunctiva/sclera: Conjunctivae normal.      Pupils: Pupils are equal, round, and reactive to light. Cardiovascular:      Rate and Rhythm: Normal rate and regular rhythm. Pulses: Normal pulses. Heart sounds: Normal heart sounds. No murmur heard. No friction rub. Pulmonary:      Effort: No respiratory distress. Breath sounds: No stridor. No wheezing, rhonchi or rales. Chest:      Chest wall: No tenderness. Abdominal:      General: Abdomen is flat. Bowel sounds are normal. There is no distension. Tenderness: There is no abdominal tenderness. There is no guarding. Musculoskeletal:         General: No swelling or tenderness. Normal range of motion. Cervical back: Normal range of motion and neck supple. No rigidity. Skin:     General: Skin is warm and dry. Coloration: Skin is not jaundiced. Neurological:      General: No focal deficit present. Mental Status: He is alert and oriented to person, place, and time. Mental status is at baseline. Cranial Nerves: No cranial nerve deficit. Sensory: No sensory deficit. Motor: No weakness.       Gait: Gait normal.   Psychiatric:         Mood and Affect: Mood normal. Thought Content:  Thought content normal.         Judgment: Judgment normal.                  An electronic signature was used to authenticate this note.    --Staci Cho MD

## 2021-12-16 LAB
A/G RATIO: 2 (ref 1.1–2.2)
ALBUMIN SERPL-MCNC: 4.3 G/DL (ref 3.4–5)
ALP BLD-CCNC: 89 U/L (ref 40–129)
ALT SERPL-CCNC: 16 U/L (ref 10–40)
ANION GAP SERPL CALCULATED.3IONS-SCNC: 14 MMOL/L (ref 3–16)
AST SERPL-CCNC: 15 U/L (ref 15–37)
BASOPHILS ABSOLUTE: 0 K/UL (ref 0–0.2)
BASOPHILS RELATIVE PERCENT: 0.4 %
BILIRUB SERPL-MCNC: 0.3 MG/DL (ref 0–1)
BUN BLDV-MCNC: 15 MG/DL (ref 7–20)
CALCIUM SERPL-MCNC: 9.3 MG/DL (ref 8.3–10.6)
CHLORIDE BLD-SCNC: 103 MMOL/L (ref 99–110)
CHOLESTEROL, TOTAL: 258 MG/DL (ref 0–199)
CO2: 25 MMOL/L (ref 21–32)
CREAT SERPL-MCNC: 0.9 MG/DL (ref 0.8–1.3)
EOSINOPHILS ABSOLUTE: 0.1 K/UL (ref 0–0.6)
EOSINOPHILS RELATIVE PERCENT: 2.4 %
GFR AFRICAN AMERICAN: >60
GFR NON-AFRICAN AMERICAN: >60
GLUCOSE BLD-MCNC: 118 MG/DL (ref 70–99)
HCT VFR BLD CALC: 42.9 % (ref 40.5–52.5)
HDLC SERPL-MCNC: 49 MG/DL (ref 40–60)
HEMOGLOBIN: 13.6 G/DL (ref 13.5–17.5)
LDL CHOLESTEROL CALCULATED: 186 MG/DL
LYMPHOCYTES ABSOLUTE: 0.9 K/UL (ref 1–5.1)
LYMPHOCYTES RELATIVE PERCENT: 25 %
MCH RBC QN AUTO: 28.5 PG (ref 26–34)
MCHC RBC AUTO-ENTMCNC: 31.8 G/DL (ref 31–36)
MCV RBC AUTO: 89.6 FL (ref 80–100)
MONOCYTES ABSOLUTE: 0.3 K/UL (ref 0–1.3)
MONOCYTES RELATIVE PERCENT: 7.1 %
NEUTROPHILS ABSOLUTE: 2.4 K/UL (ref 1.7–7.7)
NEUTROPHILS RELATIVE PERCENT: 65.1 %
PDW BLD-RTO: 16.6 % (ref 12.4–15.4)
PLATELET # BLD: 203 K/UL (ref 135–450)
PMV BLD AUTO: 9 FL (ref 5–10.5)
POTASSIUM SERPL-SCNC: 4.6 MMOL/L (ref 3.5–5.1)
RBC # BLD: 4.78 M/UL (ref 4.2–5.9)
SODIUM BLD-SCNC: 142 MMOL/L (ref 136–145)
TOTAL PROTEIN: 6.5 G/DL (ref 6.4–8.2)
TRIGL SERPL-MCNC: 116 MG/DL (ref 0–150)
TSH SERPL DL<=0.05 MIU/L-ACNC: 2.95 UIU/ML (ref 0.27–4.2)
VLDLC SERPL CALC-MCNC: 23 MG/DL
WBC # BLD: 3.7 K/UL (ref 4–11)

## 2021-12-16 NOTE — TELEPHONE ENCOUNTER
Attempted to reach patient to relay JMB message. No arrhythmias noted on device transmission. Unable to reach patient, no VM set up.

## 2022-01-04 ENCOUNTER — TELEPHONE (OUTPATIENT)
Dept: FAMILY MEDICINE CLINIC | Age: 62
End: 2022-01-04

## 2022-01-04 DIAGNOSIS — G89.4 CHRONIC PAIN SYNDROME: Primary | ICD-10-CM

## 2022-01-04 NOTE — TELEPHONE ENCOUNTER
Patient called to request a referral for neck and back pain management. He would like to see Taras Aden at [facility]. Phone [phone] and fax [phone]. Dr. Kimberly Sparrow is requesting last three office notes pertaining to his pain and any imaging.

## 2022-01-04 NOTE — TELEPHONE ENCOUNTER
Signed the referral, I have not treated him for chronic pain so I do not know how helpful my notes will be

## 2022-01-05 ENCOUNTER — NURSE ONLY (OUTPATIENT)
Dept: CARDIOLOGY CLINIC | Age: 62
End: 2022-01-05
Payer: COMMERCIAL

## 2022-01-05 DIAGNOSIS — Z45.09 ENCOUNTER FOR LOOP RECORDER CHECK: ICD-10-CM

## 2022-01-05 DIAGNOSIS — I48.0 PAROXYSMAL ATRIAL FIBRILLATION (HCC): ICD-10-CM

## 2022-01-05 DIAGNOSIS — I49.3 PVC (PREMATURE VENTRICULAR CONTRACTION): ICD-10-CM

## 2022-01-06 PROCEDURE — 93298 REM INTERROG DEV EVAL SCRMS: CPT | Performed by: INTERNAL MEDICINE

## 2022-01-06 PROCEDURE — G2066 INTER DEVC REMOTE 30D: HCPCS | Performed by: INTERNAL MEDICINE

## 2022-01-06 NOTE — PROGRESS NOTES
Remote interrogation of implanted cardiac event monitor shows normal function. Patient has a history of pAF, CM, and PVCs. Takes verapamil, amiodarone, and Eliquis. Patient's last device interrogation was on 12/1. Since last interrogation, no arrhythmias recorded. JANUSZ PENA to review. See [employer] report under the Cardiology tab. Follow up 1 month via [employer].

## 2022-02-09 ENCOUNTER — NURSE ONLY (OUTPATIENT)
Dept: CARDIOLOGY CLINIC | Age: 62
End: 2022-02-09
Payer: COMMERCIAL

## 2022-02-09 DIAGNOSIS — Z45.09 ENCOUNTER FOR LOOP RECORDER CHECK: ICD-10-CM

## 2022-02-09 DIAGNOSIS — I49.3 PVC (PREMATURE VENTRICULAR CONTRACTION): ICD-10-CM

## 2022-02-09 DIAGNOSIS — I48.0 PAROXYSMAL ATRIAL FIBRILLATION (HCC): ICD-10-CM

## 2022-02-09 PROCEDURE — G2066 INTER DEVC REMOTE 30D: HCPCS | Performed by: INTERNAL MEDICINE

## 2022-02-09 PROCEDURE — 93298 REM INTERROG DEV EVAL SCRMS: CPT | Performed by: INTERNAL MEDICINE

## 2022-02-10 NOTE — PROGRESS NOTES
Remote transmission received for patients ILR.    EP physician will review.  See interrogation under the cardiology tab in the [address] [place] [address] [address] field for more details.  Will continue to monitor remotely. ILR implanted 12/11/20 by Dr Amy Ragsdale for pAF and nsvt. He has a hx of pAF and is on eliquis and amio. No new arrhythmias/events recorded. AFib 12/15/20 x 14 hrs then converted to sinus rhythm on 12/15/20 @ 6pm.   3.5 sec conversion pause at 6:00 pm. On 12/15/20.

## 2022-02-14 ENCOUNTER — TELEPHONE (OUTPATIENT)
Dept: CARDIOLOGY CLINIC | Age: 62
End: 2022-02-14

## 2022-02-14 DIAGNOSIS — I49.3 PVC (PREMATURE VENTRICULAR CONTRACTION): Primary | ICD-10-CM

## 2022-02-14 NOTE — TELEPHONE ENCOUNTER
Pt wanted to let NPAM know that he has been experiencing persistent PVC'S. Pt stated during one episode he was SOB. Denies any other symptoms. Pt has been doing his scheduled remote transmissions. I advised we would have called if there was anything negative on the transmission. Pt stated he is definitely feeling the PVC'S and feels his device may not be catching them? Please advise. Last OV 10/12/2021 with LILY.

## 2022-02-14 NOTE — LETTER
[address] Cardiology - [address] [facility] [address] [address]  Phone: [phone]  Fax: [phone]    PARI Santoyo CNP        February 25, 2022    Natan Josea  [address] [facility]      Dear Maria Guadalupe Mcgrath:        The [place] [address] has attempted to contact you several times. However we have been unsuccessful. Please contact the office at you earliest convenience. We would like to relay a message from your provider. If you have any questions or concerns, please don't hesitate to call.     Sincerely,        PARI Allred CNP

## 2022-02-14 NOTE — TELEPHONE ENCOUNTER
Last remote 2/9 w/ no arrhythmias recorded - checked [facility] website, last nightly update this AM w/ no events recorded since scheduled transmission. JMB please advise regarding persistent symptomatic PVCs per patient - also has SOB at times. Last OV 10/12/2021 with NPBB. Scheduled to f/u w/ NPAM in May.

## 2022-02-15 ENCOUNTER — TELEPHONE (OUTPATIENT)
Dept: CARDIOLOGY CLINIC | Age: 62
End: 2022-02-15

## 2022-02-15 NOTE — TELEPHONE ENCOUNTER
Pt needs cardiac clearance. Pt is having a injection therapy. Pt is currently taking Eliquis pt needs to hold medication prior to procedure. Please advise how long pt should hold Eliquis. Last OV 04/19/2021 ALE   Last OV 10/12/2021 with NPBB   EKG 10/12/2021  Please advise ALE   Thank you.

## 2022-02-15 NOTE — LETTER
[address] Cardiology - [address] [address] [address]  Phone: [phone]  Fax: [phone]    Renea Ruiz MD     Re: Carolann  1960    March 10, 2022    To whom it may concern,     Patient is at low cardiac risk for non cardiac procedure (epidural). He should hold Eliquis no more than 3 days. Please call our office if you have any questions.      Sincerely,        Renea Ruiz MD

## 2022-02-15 NOTE — TELEPHONE ENCOUNTER
His last monitor from 2020 demonstrated less than 1% PVC burden. He could wear another [employer] type 24-hour Holter monitor to quantify the PVC burden.

## 2022-02-17 NOTE — TELEPHONE ENCOUNTER
Pt called and stated he knew nothing about injection therapy. The pain management doctor has not told pt what type of injection is being used etc. Can we contact the pain management doctor and find out?

## 2022-02-18 NOTE — TELEPHONE ENCOUNTER
LMOM for pt to contact the office. He needs to have pain specialist contact the office verbally with clearance request or have them fax the office an official request to [phone].

## 2022-02-18 NOTE — TELEPHONE ENCOUNTER
Spoke to pt and relayed Amalia's message. V/U.  Pt will contact the pain management physician and see if they are going to call and request clearance or fax us a request.

## 2022-02-21 DIAGNOSIS — E78.5 HYPERLIPIDEMIA, UNSPECIFIED HYPERLIPIDEMIA TYPE: ICD-10-CM

## 2022-02-21 NOTE — TELEPHONE ENCOUNTER
Preston Theodore [phone] (home) [phone] (work)   is requesting refill(s) of medication pravastatin (PRAVACHOL) 20 MG tablet  to preferred pharmacy [facility], [address] 12/15/21 (pertaining to medication)   Last refill 5/19/21 (per medication requested)  Next office visit scheduled or attempted No

## 2022-02-22 RX ORDER — PRAVASTATIN SODIUM 20 MG
20 TABLET ORAL EVERY EVENING
Qty: 90 TABLET | Refills: 1 | Status: SHIPPED | OUTPATIENT
Start: 2022-02-22 | End: 2022-08-22 | Stop reason: SDUPTHER

## 2022-02-22 NOTE — TELEPHONE ENCOUNTER
Received second cardiac clearance fax. Waiting to hear what is being injected. ALE would like to know this.

## 2022-02-22 NOTE — TELEPHONE ENCOUNTER
Pt called asking if we heard from Dr Harmeet Oshea office. We have not. I called Dr Jourdan Crook office myself LM to tell us what injection is being done.

## 2022-03-09 ENCOUNTER — TELEPHONE (OUTPATIENT)
Dept: CARDIOLOGY CLINIC | Age: 62
End: 2022-03-09

## 2022-03-09 NOTE — TELEPHONE ENCOUNTER
Pt stated that for the past 3 days he has been experiencing rapid heart beats and then feels like he is skipping beats. Pt also stated that he has chest tightness and is occasionally sob.  P

## 2022-03-09 NOTE — TELEPHONE ENCOUNTER
Alexx Kenny left message for Dr Pablo Camilo office ([phone]) yesterday, still need to know what injection is. Called and left another message today for Dr Pablo Camilo office stating this is the 6th time we have requested a call back to let us know what kind of injection patient will be getting so cardiac clearance can be addressed appropriately. Also left detailed message for patient informing him of what's going on and that he may want to call Dr Gabriela Cope to help tone process along.     (there is another separate message addressing rapid HR, please also see that encounter if patient calls back).

## 2022-03-09 NOTE — TELEPHONE ENCOUNTER
Pt called check status of clearance. I relayed the last message to pt and pt stated that the [facility] and pain management center has sent over information regarding the injection. Its located in the media tab. Pt would like a call when clearance is approved.

## 2022-03-09 NOTE — TELEPHONE ENCOUNTER
03/09-Called ([phone]) unable to make contact, LM for pt to contact [facility].  Pt needs scheducled for an ECG

## 2022-03-10 NOTE — TELEPHONE ENCOUNTER
*The pt stated the injection is an epidural.      *he stated he also needs to know if it's ok that he takes a muscle relaxer Methocarbamol 750mg take 1 tab every 8 hrs. A side effect stated it could potentially slow the heart down is why pt is asking.

## 2022-03-10 NOTE — TELEPHONE ENCOUNTER
Patient would also like to know if it's ok that he takes a muscle relaxer Methocarbamol 750mg take 1 tab every 8 hrs. A side effect stated it could potentially slow the heart down is why pt is asking.

## 2022-03-10 NOTE — TELEPHONE ENCOUNTER
Informed patient of B message. Letter generated. Let another message for Dr Gema Nelson office at [phone] (confirmed this was the correct phone number, this is the 7th message our office has left on office VM). We need to know what fax number to send clearance to. Patient does not know fax #.

## 2022-03-10 NOTE — TELEPHONE ENCOUNTER
I spoke with pt about the ekg, he stated he does not need the ekg anymore and does not want to come into office for it. The pt proceeded to say that 's office informed him that they are receiving our calls, I informed pt that according to several messages in his chart that we have tried calling their office more than several times and left messages. *The pt stated the injection is an epidural.     *he stated he also needs to know if it's ok that he takes a muscle relaxer Methocarbamol 750mg take 1 tab every 8 hrs. A side effect stated it could potentially slow the heart down is why pt is asking. Please call pt back with response.

## 2022-03-16 ENCOUNTER — NURSE ONLY (OUTPATIENT)
Dept: CARDIOLOGY CLINIC | Age: 62
End: 2022-03-16
Payer: COMMERCIAL

## 2022-03-16 DIAGNOSIS — I49.3 PVC (PREMATURE VENTRICULAR CONTRACTION): ICD-10-CM

## 2022-03-16 DIAGNOSIS — Z45.09 ENCOUNTER FOR LOOP RECORDER CHECK: ICD-10-CM

## 2022-03-16 DIAGNOSIS — I48.0 PAROXYSMAL ATRIAL FIBRILLATION (HCC): ICD-10-CM

## 2022-03-16 PROCEDURE — G2066 INTER DEVC REMOTE 30D: HCPCS | Performed by: INTERNAL MEDICINE

## 2022-03-16 PROCEDURE — 93298 REM INTERROG DEV EVAL SCRMS: CPT | Performed by: INTERNAL MEDICINE

## 2022-03-17 NOTE — PROGRESS NOTES
Remote transmission received for patients ILR.    EP physician will review.  See interrogation under the cardiology tab in the [address] [facility] [place] [address] field for more details.  Will continue to monitor remotely. ILR implanted 12/11/20 by Dr Srinivasa Farley for pAF and nsvt. He has a hx of pAF and is on eliquis and amio. Longest AF (last 90 days): (ID# 4) 07-Mar-2022, Duration: 38:50:00. Since 2/8/22-% of Time in AT/AF 7.2%. AF 06-Mar-2022 15:01 18:28:00. Max V rate while in  bpm, median  61-75 bpm. No symptom activated events. AFib 12/15/20 x 14 hrs then converted to sinus rhythm on 12/15/20 @ 6pm.   3.5 sec conversion pause at 6:00 pm. On 12/15/20.

## 2022-04-11 ENCOUNTER — PATIENT MESSAGE (OUTPATIENT)
Dept: CARDIOLOGY CLINIC | Age: 62
End: 2022-04-11

## 2022-04-12 NOTE — TELEPHONE ENCOUNTER
JMB- any interactions with cyclobenzaprine and patient's AF medications? He takes amio, Eliquis, and verapamil.

## 2022-04-19 DIAGNOSIS — I48.91 ATRIAL FIBRILLATION, UNSPECIFIED TYPE (HCC): ICD-10-CM

## 2022-04-20 ENCOUNTER — NURSE ONLY (OUTPATIENT)
Dept: CARDIOLOGY CLINIC | Age: 62
End: 2022-04-20
Payer: COMMERCIAL

## 2022-04-20 DIAGNOSIS — I48.0 PAROXYSMAL ATRIAL FIBRILLATION (HCC): ICD-10-CM

## 2022-04-20 DIAGNOSIS — I49.3 PVC (PREMATURE VENTRICULAR CONTRACTION): ICD-10-CM

## 2022-04-20 DIAGNOSIS — Z45.09 ENCOUNTER FOR LOOP RECORDER CHECK: ICD-10-CM

## 2022-04-20 PROCEDURE — 93298 REM INTERROG DEV EVAL SCRMS: CPT | Performed by: INTERNAL MEDICINE

## 2022-04-20 PROCEDURE — G2066 INTER DEVC REMOTE 30D: HCPCS | Performed by: INTERNAL MEDICINE

## 2022-04-20 RX ORDER — AMIODARONE HYDROCHLORIDE 100 MG/1
100 TABLET ORAL DAILY
Qty: 90 TABLET | Refills: 3 | Status: SHIPPED | OUTPATIENT
Start: 2022-04-20 | End: 2022-07-27 | Stop reason: ALTCHOICE

## 2022-04-20 RX ORDER — VERAPAMIL HYDROCHLORIDE 240 MG/1
CAPSULE, EXTENDED RELEASE ORAL
Qty: 90 CAPSULE | Refills: 3 | Status: SHIPPED | OUTPATIENT
Start: 2022-04-20

## 2022-04-20 NOTE — TELEPHONE ENCOUNTER
Last OV 10/12/2021 NP[facility]  Last OV 04/19/2021  JMB   Upcoming OV 05/09/2022  CBC 12/15/2021  TSH 12/15/2021  CMP 12/15/2021

## 2022-04-23 NOTE — PROGRESS NOTES
Remote transmission received for patients ILR.    EP physician will review.  See interrogation under the cardiology tab in the [facility] [address] field for more details.  Will continue to monitor remotely. ILR implanted 12/11/20 by Dr Cassy Fuentes for pAF and nsvt. He has a hx of PAF and is on eliquis and amio. Last/Longest AF (last 90 days): (ID# 4) 07-Mar-2022, Duration: 38:50:00  Max V rate while in  bpm, median  61-75 bpm. No symptom activated events. AFib 12/15/20 x 14 hrs then converted to sinus rhythm on 12/15/20 @ 6pm.   3.5 sec conversion pause at 6:00 pm. On 12/15/20.

## 2022-05-06 NOTE — PROGRESS NOTES
[address]   Electrophysiology Outpatient Note              Date:  May 9, 2022  Patient name: Ely Harley  YOB: 1960    Primary Care physician: Alirio Murguia MD    HISTORY OF PRESENT ILLNESS: The patient is a 58 y.o.  male with a history of HOCM, NSVT, PAF, RBBB, chronic pain, ADHD and sleep apnea. In 6/2014, he transferred care from Dr. Alex Sotelo for HOCM. He had historically declined ICD implant because no one in his family has had sudden cardiac death. Echo showed an EF of 60-65% and a 2 cm mid septum. In 4/2016, he complained of chest pain and had a normal stress test.  He was diagnosed with AF. In 6/2017, he wore a 2-week monitor that showed PAF and frequent PVCs. In 10/2017, echo showed an EF of 55 to 60% and severe LVH. Amiodarone was started. He was referred to Dr. Rachel Montalvo for HOCM. In 1/2018, cardiac MRI showed HOCM without LVOT obstruction at rest and an EF of 63%. In 10/2019, echo showed an EF of 55 to 60% and moderate LVH. In 4/20/2020, an event monitor showed rare PVCs and no NSVT. Amiodarone was decreased to 100 mg daily. In 7/2020, event monitor showed rare PVCs and 4 beats of NSVT. In 10/2020, he reported palpitations and had increased his amiodarone dose. In 12/20/2020 he had a loop recorder implanted. In 4/20/2021,  amiodarone was decreased. Today he is being seen for AF. EKG shows SB with a LBBB and a HR of 57. Patient complains of an episode of chest pain/tightness while cutting the grass on Sunday. Episode lasted around 30 minutes and resolved on it's own. His HR was in the 90's at the time. He also reports he was symptomatic of AF episodes on ILR (see below). Today, he denies chest pain, palpitations, shortness of breath, and dizziness.       Device check today shows:   Brand: [employer]   Normal function  Arrhythmias: AF x 2 events, duration 38 hours and 18 hours (3/6 and 3/7)  Battery life good    Past Medical History:   has a past medical history of A-fib ([facility] [address] ADHD (attention deficit hyperactivity disorder), Carpal tunnel syndrome, Chronic low back pain, Chronic neck pain, Chronic sinusitis, Dental crown present, Epigastric hernia, Esophageal spasm, GERD (gastroesophageal reflux disease), Hyperlipidemia, Hypertrophic cardiomyopathy ([facility] [address] IHSS (idiopathic hypertrophic subaortic stenosis) ([facility] [address] Kidney stones, HUNTER on CPAP, Primary localized osteoarthrosis, shoulder region, Prostate cancer ([facility] [address] PVC's (premature ventricular contractions), RBBB (right bundle branch block), RLS (restless legs syndrome), Seasonal allergies, and Tachycardia. Past Surgical History:   has a past surgical history that includes Knee arthroscopy (Right, 1982); Umbilical hernia repair; Colonoscopy (1/17/11); TURP (2012); Inguinal hernia repair (Bilateral, 2009, 2012,); Varicocelectomy (2000); Shoulder arthroscopy (Right, 12/31/2013); Endoscopy, colon, diagnostic; Upper gastrointestinal endoscopy (9/3); Elbow surgery (2015); Carpal tunnel release (Left); Knee arthroscopy (Left, 12/21/2017); Colonoscopy (N/A, 2/2/2020); Knee arthroscopy (Left, 7/17/2020); Insertable Cardiac Monitor (Left, 12/11/2020); and Insertable Cardiac Monitor (12/11/2020). Home Medications:    Prior to Admission medications    Medication Sig Start Date End Date Taking?  Authorizing Provider   CYCLOBENZAPRINE HCL PO Take by mouth   Yes Historical Provider, MD   apixaban (ELIQUIS) 5 MG TABS tablet TAKE 1 TABLET BY MOUTH TWICE DAILY 4/20/22  Yes Ricki Melgar MD   amiodarone (PACERONE) 100 MG tablet TAKE 1 TABLET BY MOUTH DAILY 4/20/22  Yes Ricki Melgar MD   verapamil (VERELAN) 240 MG extended release capsule TAKE 1 CAPSULE BY MOUTH EVERY NIGHT 4/20/22  Yes Ricki Melgra MD   pravastatin (PRAVACHOL) 20 MG tablet Take 1 tablet by mouth every evening 2/22/22  Yes Lewis Mueller MD   clonazePAM (KLONOPIN) 1 MG tablet TAKE 1 TABLET BY MOUTH EVERY NIGHT AS NEEDED 11/24/21 5/9/22 Yes Bentley Petit MD   tamsulosin (FLOMAX) 0.4 MG capsule Take 0.4 mg by mouth daily   Yes Historical Provider, MD   omeprazole (PRILOSEC) 40 MG delayed release capsule TAKE 1 CAPSULE BY MOUTH EVERY DAY 10/5/21  Yes Luis M Titus MD   Zinc Sulfate (ZINC 15 PO) daily   Patient not taking: Reported on 5/9/2022    Historical Provider, MD   Cholecalciferol 25 MCG (1000 UT) CHEW Take 1,000 Units by mouth daily  Patient not taking: Reported on 5/9/2022    Historical Provider, MD   butalbital-APAP-caffeine -40 MG CAPS per capsule TK ONE C PO  Q 12 H PRF SEVERE HEADACHE  Patient not taking: Reported on 5/9/2022 9/24/20   Historical Provider, MD       Allergies:  Niacin and related    Social History:   reports that he has never smoked. He has never used smokeless tobacco. He reports current alcohol use of about 1.0 - 2.0 standard drink of alcohol per week. He reports that he does not use drugs. Family History: family history includes Cancer in his mother; Heart Disease in his maternal grandfather and maternal grandmother; High Blood Pressure in his father; High Cholesterol in his brother and mother; Prostate Cancer in his paternal uncle. All 14 point review of systems are completed and pertinent positives are mentioned in the history of present illness. Other systems are reviewed and are negative. PHYSICAL EXAM:    Vital signs:    /70   Pulse 58   Ht 5' 11\" (1.803 m)   Wt 208 lb (94.3 kg)   SpO2 97%   BMI 29.01 kg/m²      Constitutional and general appearance: alert, cooperative, no distress and appears stated age  HEENT: PERRL, no cervical lymphadenopathy. No masses palpable.  Normal oral mucosa  Respiratory:  · Normal excursion and expansion without use of accessory muscles  · Resp auscultation: Normal breath sounds without wheezing, rhonchi, and rales  Cardiovascular:  · The apical impulse is not displaced  · Heart tones are crisp and normal. regular S1 and S2.  · Jugular venous pulsation Normal  · The carotid upstroke is normal in amplitude and contour without delay or bruit  · Peripheral pulses are symmetrical and full   Abdomen:  · No masses or tenderness  · Bowel sounds present  Extremities:  ·  No cyanosis or clubbing  ·  No lower extremity edema  ·  Skin: warm and dry  Neurological:  · Alert and oriented  · Moves all extremities well  · No abnormalities of mood, affect, memory, mentation, or behavior are noted    DATA:    ECG 5/9/2022:  SB with LBBB 57 bpm     Echo 10/2019:     Summary   Normal systolic function with an estimated ejection fraction of 55-60%. Moderate concentric left ventricular hypertrophy ( septal wall is more   increased in size (1.6 cm) ). No regional wall motion abnormalities are seen. Normal left ventricular diastolic filling pressure. The left atrium is severely dilated. The right atrium is mildly dilated. Mild aortic regurgitation. Mild mitral and pulmonic regurgitation. All labs and testing reviewed. CARDIOLOGY LABS:   CBC: No results for input(s): WBC, HGB, HCT, PLT in the last 72 hours. BMP: No results for input(s): NA, K, CO2, BUN, CREATININE, LABGLOM, GLUCOSE in the last 72 hours. PT/INR: No results for input(s): PROTIME, INR in the last 72 hours. APTT:No results for input(s): APTT in the last 72 hours. FASTING LIPID PANEL:  Lab Results   Component Value Date    HDL 49 12/15/2021    HDL 51 12/19/2011    LDLCALC 186 12/15/2021    TRIG 116 12/15/2021     LIVER PROFILE:No results for input(s): AST, ALT, ALB in the last 72 hours.     IMPRESSION:    Patient Active Problem List   Diagnosis    GERD (gastroesophageal reflux disease)    Hyperlipidemia    Hypertrophic cardiomyopathy ([address]    PVC (premature ventricular contraction)    Right bundle branch block    Restless legs syndrome    Obstructive sleep apnea    Obesity    Personal history of malignant neoplasm of prostate    SBO (small bowel obstruction) (HCC)    Elevated blood sugar    Paroxysmal atrial fibrillation (HCC)    Current use of long term anticoagulation    History of GI bleed    Tremor    [employer] LINQ 12/11/2020 Dr Reddy Sotor:   Paroxsymal atrial fibrillation: stable   -PAF on most recent device interrogation    -[id number] score 0   -amiodarone started 10/2017  Chest pain  RBBB  PVC's: stable   NSVT: stable    -noted on event monitor 7/2020  HLD  S/P loop recorder implant    -device check per HPI  HOCM   -per cardiac MRI 2018 without LVOT obstruction   Chronic pain   Sleep apnea       Plan:   1. Continue Eliquis, amiodarone, and verapamil  2. Given age and long term side effect profile, would ultimately like to discontinue amiodarone. Will await results of stress test before deciding on alternative antiarrhythmic options. 3. Update stress test given complaints of chest pain   4. Annual labs due in June  5. Annual CXR due in June due to chronic amiodarone use  6.  Follow up in 3 months      Reviewed medications and plan with Dr. Nayeli Parrish, APRN-CNP  [facility]  [phone]

## 2022-05-09 ENCOUNTER — NURSE ONLY (OUTPATIENT)
Dept: CARDIOLOGY CLINIC | Age: 62
End: 2022-05-09

## 2022-05-09 ENCOUNTER — OFFICE VISIT (OUTPATIENT)
Dept: CARDIOLOGY CLINIC | Age: 62
End: 2022-05-09
Payer: COMMERCIAL

## 2022-05-09 VITALS
OXYGEN SATURATION: 97 % | WEIGHT: 208 LBS | HEART RATE: 58 BPM | HEIGHT: 71 IN | SYSTOLIC BLOOD PRESSURE: 116 MMHG | DIASTOLIC BLOOD PRESSURE: 70 MMHG | BODY MASS INDEX: 29.12 KG/M2

## 2022-05-09 DIAGNOSIS — Z45.09 ENCOUNTER FOR LOOP RECORDER CHECK: ICD-10-CM

## 2022-05-09 DIAGNOSIS — I49.3 PVC (PREMATURE VENTRICULAR CONTRACTION): Primary | ICD-10-CM

## 2022-05-09 DIAGNOSIS — I48.0 PAROXYSMAL ATRIAL FIBRILLATION (HCC): ICD-10-CM

## 2022-05-09 PROCEDURE — 93000 ELECTROCARDIOGRAM COMPLETE: CPT | Performed by: NURSE PRACTITIONER

## 2022-05-09 PROCEDURE — 99214 OFFICE O/P EST MOD 30 MIN: CPT | Performed by: NURSE PRACTITIONER

## 2022-05-09 NOTE — PROGRESS NOTES
Patient comes in for interrogation of their implanted loop recorder. Patient has a history of CM, pAF, and PVCs. Takes amiodarone, Eliquis, and verapamil. Patient's last device interrogation was on 4/20. Since last interrogation, 2 AF events recorded with overall burden measuring 1.1%. Patient will see NEL Lombardo in office today. See [employer] report under the Cardiology tab. We will follow the patient remotely.

## 2022-05-09 NOTE — PATIENT INSTRUCTIONS
NO changes today     Labs and CXR due in June due to amiodarone use. Ultimately would like to discontinue this medication. Stress test due to chest tightness. Please call [phone] to schedule.      Follow up in 3 months

## 2022-05-11 ENCOUNTER — TELEPHONE (OUTPATIENT)
Dept: CARDIOLOGY CLINIC | Age: 62
End: 2022-05-11

## 2022-05-11 NOTE — TELEPHONE ENCOUNTER
Pt stated that he noticed that he was having an irregular heart beat. Pt read his hr on his watch and it was in the 80s-90s throughout the day (05/11/2022). Pt stated his heart rate is racing and is sweating. Pt does not currently have an cp or sob. Pt will go to er if cp or sob occurs. Pt will track hr with his watch. Please advise.

## 2022-05-12 ENCOUNTER — OFFICE VISIT (OUTPATIENT)
Dept: CARDIOLOGY CLINIC | Age: 62
End: 2022-05-12
Payer: COMMERCIAL

## 2022-05-12 ENCOUNTER — NURSE ONLY (OUTPATIENT)
Dept: CARDIOLOGY CLINIC | Age: 62
End: 2022-05-12

## 2022-05-12 VITALS
HEIGHT: 71 IN | HEART RATE: 67 BPM | DIASTOLIC BLOOD PRESSURE: 66 MMHG | SYSTOLIC BLOOD PRESSURE: 104 MMHG | OXYGEN SATURATION: 96 % | WEIGHT: 208 LBS | BODY MASS INDEX: 29.12 KG/M2

## 2022-05-12 DIAGNOSIS — I48.0 PAROXYSMAL ATRIAL FIBRILLATION (HCC): Primary | ICD-10-CM

## 2022-05-12 PROCEDURE — 99215 OFFICE O/P EST HI 40 MIN: CPT | Performed by: NURSE PRACTITIONER

## 2022-05-12 NOTE — PATIENT INSTRUCTIONS
Stop amiodarone due to ineffectiveness     Schedule stress test [phone]    Follow up in August as scheduled. We can move this up if necessary dependant upon stress results.

## 2022-05-12 NOTE — PROGRESS NOTES
[address]   Electrophysiology Outpatient Note              Date:  May 12, 2022  Patient name: Montana Michaels  YOB: 1960    Primary Care physician: Lu Pink MD    HISTORY OF PRESENT ILLNESS: The patient is a 58 y.o.  male with a history of HOCM, NSVT, PAF, RBBB, chronic pain, ADHD and sleep apnea. In 6/2014, he transferred care from Dr. Nona Edge for HOCM. He had historically declined ICD implant because no one in his family has had sudden cardiac death. Echo showed an EF of 60-65% and a 2 cm mid septum. In 4/2016, he complained of chest pain and had a normal stress test.  He was diagnosed with AF. In 6/2017, he wore a 2-week monitor that showed PAF and frequent PVCs. In 10/2017, echo showed an EF of 55 to 60% and severe LVH. Amiodarone was started. He was referred to Dr. Ruba Wong for HOCM. In 1/2018, cardiac MRI showed HOCM without LVOT obstruction at rest and an EF of 63%. In 10/2019, echo showed an EF of 55 to 60% and moderate LVH. In 4/20/2020, an event monitor showed rare PVCs and no NSVT. Amiodarone was decreased to 100 mg daily. In 7/2020, event monitor showed rare PVCs and 4 beats of NSVT. In 10/2020, he reported palpitations and had increased his amiodarone dose. In 12/20/2020 he had a loop recorder implanted. In 4/20/2021,  amiodarone was decreased. Today he is being seen as an add on patient for AF. On 5/11/2022, he called the office with complaints of palpitations. He was asked to return to the office for an EKG. EKG this morning shows AF/AFL with a heart rate of 54. He complains of palpitations and fatigue since yesterday, sometimes associated with chest discomfort.       Past Medical History:   has a past medical history of A-fib ([address] ADHD (attention deficit hyperactivity disorder), Carpal tunnel syndrome, Chronic low back pain, Chronic neck pain, Chronic sinusitis, Dental crown present, Epigastric hernia, Esophageal spasm, GERD (gastroesophageal reflux disease), Hyperlipidemia, Hypertrophic cardiomyopathy ([facility] IHSS (idiopathic hypertrophic subaortic stenosis) ([facility] Kidney stones, HUNTER on CPAP, Primary localized osteoarthrosis, shoulder region, Prostate cancer ([facility] PVC's (premature ventricular contractions), RBBB (right bundle branch block), RLS (restless legs syndrome), Seasonal allergies, and Tachycardia. Past Surgical History:   has a past surgical history that includes Knee arthroscopy (Right, 1982); Umbilical hernia repair; Colonoscopy (1/17/11); TURP (2012); Inguinal hernia repair (Bilateral, 2009, 2012,); Varicocelectomy (2000); Shoulder arthroscopy (Right, 12/31/2013); Endoscopy, colon, diagnostic; Upper gastrointestinal endoscopy (9/3); Elbow surgery (2015); Carpal tunnel release (Left); Knee arthroscopy (Left, 12/21/2017); Colonoscopy (N/A, 2/2/2020); Knee arthroscopy (Left, 7/17/2020); Insertable Cardiac Monitor (Left, 12/11/2020); and Insertable Cardiac Monitor (12/11/2020). Home Medications:    Prior to Admission medications    Medication Sig Start Date End Date Taking?  Authorizing Provider   CYCLOBENZAPRINE HCL PO Take by mouth   Yes Historical Provider, MD   apixaban (ELIQUIS) 5 MG TABS tablet TAKE 1 TABLET BY MOUTH TWICE DAILY 4/20/22  Yes Montse Dougherty MD   amiodarone (PACERONE) 100 MG tablet TAKE 1 TABLET BY MOUTH DAILY 4/20/22  Yes Montse Dougherty MD   verapamil (VERELAN) 240 MG extended release capsule TAKE 1 CAPSULE BY MOUTH EVERY NIGHT 4/20/22  Yes Montse Dougherty MD   pravastatin (PRAVACHOL) 20 MG tablet Take 1 tablet by mouth every evening 2/22/22  Yes Juana Leonard MD   tamsulosin (FLOMAX) 0.4 MG capsule Take 0.4 mg by mouth daily   Yes Historical Provider, MD   omeprazole (PRILOSEC) 40 MG delayed release capsule TAKE 1 CAPSULE BY MOUTH EVERY DAY 10/5/21  Yes Juana Leonard MD   clonazePAM (KLONOPIN) 1 MG tablet TAKE 1 TABLET BY MOUTH EVERY NIGHT AS NEEDED 11/24/21 5/9/22  Fatoumata Fuentes MD   Zinc Sulfate (ZINC 15 PO) daily   Patient not taking: Reported on 5/9/2022    Historical Provider, MD   Cholecalciferol 25 MCG (1000 UT) CHEW Take 1,000 Units by mouth daily  Patient not taking: Reported on 5/9/2022    Historical Provider, MD falconbital-APAP-caffeine -40 MG CAPS per capsule TK ONE C PO  Q 12 H PRF SEVERE HEADACHE  Patient not taking: Reported on 5/9/2022 9/24/20   Historical Provider, MD       Allergies:  Niacin and related    Social History:   reports that he has never smoked. He has never used smokeless tobacco. He reports current alcohol use of about 1.0 - 2.0 standard drink of alcohol per week. He reports that he does not use drugs. Family History: family history includes Cancer in his mother; Heart Disease in his maternal grandfather and maternal grandmother; High Blood Pressure in his father; High Cholesterol in his brother and mother; Prostate Cancer in his paternal uncle. All 14 point review of systems are completed and pertinent positives are mentioned in the history of present illness. Other systems are reviewed and are negative. PHYSICAL EXAM:    Vital signs:    /66   Pulse 67   Ht 5' 11\" (1.803 m)   Wt 208 lb (94.3 kg)   SpO2 96%   BMI 29.01 kg/m²      Constitutional and general appearance: alert, cooperative, no distress and appears stated age  HEENT: PERRL, no cervical lymphadenopathy. No masses palpable.  Normal oral mucosa  Respiratory:  · Normal excursion and expansion without use of accessory muscles  · Resp auscultation: Normal breath sounds without wheezing, rhonchi, and rales  Cardiovascular:  · The apical impulse is not displaced  · Heart tones are crisp and normal. Irregular S1 and S2.  · Jugular venous pulsation Normal  · The carotid upstroke is normal in amplitude and contour without delay or bruit  · Peripheral pulses are symmetrical and full   Abdomen:  · No masses or tenderness  · Bowel sounds present  Extremities:  ·  No cyanosis or clubbing  · No lower extremity edema  ·  Skin: warm and dry  Neurological:  · Alert and oriented  · Moves all extremities well  · No abnormalities of mood, affect, memory, mentation, or behavior are noted    DATA:    ECG 5/12/2022:  AF/AFL 54 bpm     Echo 10/2019:     Summary   Normal systolic function with an estimated ejection fraction of 55-60%. Moderate concentric left ventricular hypertrophy ( septal wall is more   increased in size (1.6 cm) ). No regional wall motion abnormalities are seen. Normal left ventricular diastolic filling pressure. The left atrium is severely dilated. The right atrium is mildly dilated. Mild aortic regurgitation. Mild mitral and pulmonic regurgitation. All labs and testing reviewed. CARDIOLOGY LABS:   CBC: No results for input(s): WBC, HGB, HCT, PLT in the last 72 hours. BMP: No results for input(s): NA, K, CO2, BUN, CREATININE, LABGLOM, GLUCOSE in the last 72 hours. PT/INR: No results for input(s): PROTIME, INR in the last 72 hours. APTT:No results for input(s): APTT in the last 72 hours. FASTING LIPID PANEL:  Lab Results   Component Value Date    HDL 49 12/15/2021    HDL 51 12/19/2011    LDLCALC 186 12/15/2021    TRIG 116 12/15/2021     LIVER PROFILE:No results for input(s): AST, ALT, ALB in the last 72 hours. IMPRESSION:    Assessment:   Paroxsymal atrial fibrillation: stable   -XBP2TP9ycnd score 0   -amiodarone started 10/2017  RBBB: stable  PVC's: stable   NSVT: stable    -noted on event monitor 7/2020  S/P loop recorder implant    -device check per HPI  HLD  HOCM   -per cardiac MRI 2018 without LVOT obstruction   Chronic pain   Sleep apnea   GERD      Plan:   1. Stop amiodarone due to side effect profile and ineffectiveness   2. Continue Eliquis and verapamil   3. If AF is persistent, can consider cardioversion  4. Update stress test given complaints of chest pain at previous visit   4.  Can plan for alternative antiarrhythmic after sufficient amiodarone washout and stress test results. Also discussed ablation. Patient will think about this. 5. Follow up in August as scheduled, although this may need moved up dependant on stress results and duration of atrial arrhythmias.       MDM 40 minutes     PARI Rosales-CNP  [facility]  [phone]

## 2022-05-12 NOTE — TELEPHONE ENCOUNTER
Pt called into [facility] stating that [facility] contacted pt last night and asked for pt to come in for an ECG due to having an IHR.  Pt has been placed on lab schedule for this AM.

## 2022-05-25 ENCOUNTER — NURSE ONLY (OUTPATIENT)
Dept: CARDIOLOGY CLINIC | Age: 62
End: 2022-05-25
Payer: COMMERCIAL

## 2022-05-25 DIAGNOSIS — Z45.09 ENCOUNTER FOR LOOP RECORDER CHECK: ICD-10-CM

## 2022-05-25 DIAGNOSIS — I49.3 PVC (PREMATURE VENTRICULAR CONTRACTION): ICD-10-CM

## 2022-05-25 DIAGNOSIS — I48.0 PAROXYSMAL ATRIAL FIBRILLATION (HCC): ICD-10-CM

## 2022-05-25 NOTE — PROGRESS NOTES
Remote transmission received for patients ILR.    EP physician will review.  See interrogation under the cardiology tab in the [address] [facility] [address] field for more details.  Will continue to monitor remotely. ILR implanted 12/11/20 by Dr Sudha Wilde for pAF and nsvt. He has a hx of PAF and is on eliquis and amio. Conversion Pause 13-May-2022 @ 03:14 x 3 sec. Longest AF (last 90 days): (ID# 5) 11-May-2022, Duration: 47:40:00  No symptom activated events. % of Time in AT/AF 6.0% since 4/20/22.

## 2022-05-27 PROCEDURE — G2066 INTER DEVC REMOTE 30D: HCPCS | Performed by: INTERNAL MEDICINE

## 2022-05-27 PROCEDURE — 93298 REM INTERROG DEV EVAL SCRMS: CPT | Performed by: INTERNAL MEDICINE

## 2022-06-20 ENCOUNTER — NURSE ONLY (OUTPATIENT)
Dept: CARDIOLOGY CLINIC | Age: 62
End: 2022-06-20

## 2022-06-20 ENCOUNTER — TELEPHONE (OUTPATIENT)
Dept: CARDIOLOGY CLINIC | Age: 62
End: 2022-06-20

## 2022-06-20 ENCOUNTER — HOSPITAL ENCOUNTER (OUTPATIENT)
Dept: NON INVASIVE DIAGNOSTICS | Age: 62
Discharge: HOME | End: 2022-06-20
Payer: COMMERCIAL

## 2022-06-20 ENCOUNTER — HOSPITAL ENCOUNTER (OUTPATIENT)
Dept: NUCLEAR MEDICINE | Age: 62
Discharge: HOME | End: 2022-06-20
Payer: COMMERCIAL

## 2022-06-20 DIAGNOSIS — I48.0 PAROXYSMAL ATRIAL FIBRILLATION (HCC): ICD-10-CM

## 2022-06-20 DIAGNOSIS — Z45.09 ENCOUNTER FOR LOOP RECORDER CHECK: ICD-10-CM

## 2022-06-20 LAB
LV EF: 63 %
LVEF MODALITY: NORMAL

## 2022-06-20 PROCEDURE — A9502 TC99M TETROFOSMIN: HCPCS | Performed by: NURSE PRACTITIONER

## 2022-06-20 PROCEDURE — 3430000000 HC RX DIAGNOSTIC RADIOPHARMACEUTICAL: Performed by: NURSE PRACTITIONER

## 2022-06-20 PROCEDURE — 93017 CV STRESS TEST TRACING ONLY: CPT

## 2022-06-20 PROCEDURE — 78452 HT MUSCLE IMAGE SPECT MULT: CPT

## 2022-06-20 PROCEDURE — 6360000002 HC RX W HCPCS: Performed by: NURSE PRACTITIONER

## 2022-06-20 RX ADMIN — TETROFOSMIN 11.5 MILLICURIE: 1.38 INJECTION, POWDER, LYOPHILIZED, FOR SOLUTION INTRAVENOUS at 07:09

## 2022-06-20 RX ADMIN — REGADENOSON 0.4 MG: 0.08 INJECTION, SOLUTION INTRAVENOUS at 08:43

## 2022-06-20 RX ADMIN — TETROFOSMIN 31 MILLICURIE: 1.38 INJECTION, POWDER, LYOPHILIZED, FOR SOLUTION INTRAVENOUS at 08:43

## 2022-06-20 NOTE — PROGRESS NOTES
Remote transmission received for patients ILR.    EP physician will review.  See interrogation under the cardiology tab in the [address] [place] [address] [address] field for more details.  Will continue to monitor remotely. ILR implanted 12/11/20 by Dr Aspen Carrington for pAF and nsvt. He has a hx of PAF and is on eliquis and amio. Pause 19-Jun-2022 17:56 00:00:04  Pause 18-Jun-2022 08:45 00:00:03    AF 15-Jamie-2022 @23:21 x 90 hrs  3 sec Pause on 16-Jun-2022 @ 05:33    Conversion Pause 13-May-2022 @ 03:14 x 3 sec.     11-May-2022, Duration: 47:40:00

## 2022-06-20 NOTE — PROGRESS NOTES
A MYOVIEW LEXISCAN stress test was completed on this patient as ordered. The patient tolerated the procedure well. Awaiting stress imaging at this time.

## 2022-06-21 ENCOUNTER — TELEPHONE (OUTPATIENT)
Dept: CARDIOLOGY CLINIC | Age: 62
End: 2022-06-21

## 2022-06-21 NOTE — TELEPHONE ENCOUNTER
Pt calling in to return the call to [facility]. Relayed message to pt regarding test results. Pt v/u. However pt stated that he would like to know \"what the next step is?'\" Pt can be reached at this phone number [phone]. Please advise & thank you.

## 2022-06-21 NOTE — TELEPHONE ENCOUNTER
Spoke with pt, relayed message from [facility] pt v/u. And would like to kepp appt in aug pt stated he feels fine. Closing encounter.

## 2022-06-21 NOTE — TELEPHONE ENCOUNTER
----- Message from PARI Mayorga CNP sent at 6/20/2022  4:49 PM EDT -----  Please let patient know that his stress test is normal.

## 2022-06-21 NOTE — TELEPHONE ENCOUNTER
Spoke with pt, pt stated that the only time he felt something was wrong or going on is when he went out to dinner with family on Sunday evening around 8 pm. Pt state that he felt like he was going to pass out but after that he has not felt anything but being out of rhythm. Pt also stated that he feels fine today.

## 2022-06-21 NOTE — TELEPHONE ENCOUNTER
It looks like there are currently 2 separate encounters for this patient. Dr. Lavinia Hood had asked that someone give the patient a call to see how he is feeling due to recent pauses on a remote device interrogation. I would like to see how he is feeling as well. Amiodarone was stopped in May. His stress test is normal and so we can discuss alternative antiarrhythmic medications. We just needed appropriate washout of the amiodarone. He has an appointment with me in August.  He is feeling poorly we could move this up.

## 2022-06-23 NOTE — TELEPHONE ENCOUNTER
OK to overbook with AARON July 13th @ 4PM. Please call patient to schedule to discuss device findings/symptoms.

## 2022-06-23 NOTE — TELEPHONE ENCOUNTER
6/23/22  at [phone] informing pt to call back to schedule with wadeb for 7/13/22 at 4p with device ck.

## 2022-06-23 NOTE — TELEPHONE ENCOUNTER
This is already an overbook appt so unfortunately there is no other options for that day. The next available option would be July 27th at 3:15PM. If this does not work for patient he may want to move his schedule around as ALE's schedule is very limited.

## 2022-06-24 NOTE — TELEPHONE ENCOUNTER
06/24-Called ([phone]) unable to make contact w/pt. LM for pt to contact [address] [place] [address] Drive.

## 2022-06-29 ENCOUNTER — NURSE ONLY (OUTPATIENT)
Dept: CARDIOLOGY CLINIC | Age: 62
End: 2022-06-29

## 2022-06-29 DIAGNOSIS — Z45.09 ENCOUNTER FOR LOOP RECORDER CHECK: ICD-10-CM

## 2022-06-29 NOTE — PROGRESS NOTES
Remote transmission received for patients ILR.    EP physician will review.  See interrogation under the cardiology tab in the [address] [facility] [address] field for more details.  Will continue to monitor remotely. ILR implanted 12/11/20 by Dr Jessica Carrizales for PAF/NSVT. He has a hx of PAF and is on eliquis and amio. End of 31-day monitoring period 6/29/22. Alexey Anguiano % of Time in AT/AF 10.8%  Longest AF (last 90 days): (ID# 7) 15-Jamie-2022, Duration: 90:38:00    Ashish Back MD-  These are brief pauses in AF, suggestive of transient AV block, not conversion pauses.  Did he have symptoms with these pauses? Please call pt and ask if he was symptomatic w/ pauses on   6/19/22 @ 7453, (per pt felt like going to pass out around 8pm on 6/19/22). 6/18/22 @ 0845,   6/16/22 @ 0533. Per [facility] schedule OV/device check to discuss.  (OV 7/27/22)

## 2022-07-04 DIAGNOSIS — G47.00 INSOMNIA, UNSPECIFIED TYPE: ICD-10-CM

## 2022-07-05 RX ORDER — CLONAZEPAM 1 MG/1
TABLET ORAL
Qty: 30 TABLET | Refills: 0 | Status: SHIPPED | OUTPATIENT
Start: 2022-07-05 | End: 2022-08-16

## 2022-07-27 ENCOUNTER — OFFICE VISIT (OUTPATIENT)
Dept: CARDIOLOGY CLINIC | Age: 62
End: 2022-07-27
Payer: COMMERCIAL

## 2022-07-27 ENCOUNTER — NURSE ONLY (OUTPATIENT)
Dept: CARDIOLOGY CLINIC | Age: 62
End: 2022-07-27

## 2022-07-27 VITALS
HEIGHT: 71 IN | WEIGHT: 197 LBS | BODY MASS INDEX: 27.58 KG/M2 | HEART RATE: 52 BPM | DIASTOLIC BLOOD PRESSURE: 68 MMHG | OXYGEN SATURATION: 98 % | SYSTOLIC BLOOD PRESSURE: 112 MMHG

## 2022-07-27 DIAGNOSIS — I48.0 PAROXYSMAL ATRIAL FIBRILLATION (HCC): Primary | ICD-10-CM

## 2022-07-27 DIAGNOSIS — Z45.09 ENCOUNTER FOR LOOP RECORDER CHECK: ICD-10-CM

## 2022-07-27 PROCEDURE — 99214 OFFICE O/P EST MOD 30 MIN: CPT | Performed by: INTERNAL MEDICINE

## 2022-07-27 NOTE — PATIENT INSTRUCTIONS
Plan:  1. Monthly remote device checks. 2. Stay off of amiodarone and monitor AF burden off of medication. 3. Can consider alternative antiarrhythmic based on information from device. 4. Follow up in 6 months.

## 2022-07-27 NOTE — PROGRESS NOTES
[facility]   Cardiac Follow Up    Date: 7/27/22  Patient Name: Dangelo Rey  YOB: 1960    Primary Care Physician: Shama Figueroa MD    CHIEF COMPLAINT:   Chief Complaint   Patient presents with    Follow-up    Device Check    Atrial Fibrillation           HPI:  Dangelo Rey is a 58 y.o. male who presents for follow up evaluation of hypertrophic cardiomyopathy, PVCs and NSVT. He was previously evaluated for an ICD at [facility]. He has worn cardiac monitors in the past that have shown paroxysmal atrial fibrillation and PVCs. His monitor from 4/2016 showed symptoms associated with PAF. His Lexiscan from 4/2016 showed an adequate response to Bernice Fruits. His monitor from 6/2017 showed PAF and frequent monomorphic PVCs. His echocardiogram from 10/2017 showed an EF of 55-60% with severe asymmetric left ventricular hypertrophy. His cardiac MRI showed normal LV chamber with an EF of 60%, Asymmetric basal and mid septal hypertrophy with the maximum end-diastolic myocardial thickness measuring up to 16 mm. Maximum LVOT velocity is 1.4 m/s. There is no LVOT gradient at rest.  There is some late gadolinium enhancement in the basal anteroseptal area. This was not quantified. In October 2019 his echo had similar findings along with a severely dilated left atrium. He then was evaluated by interventional cardiology who recommended ischemic testing. The patient refused any further testing at that time. In 4/2020, patient reported that his brother also has a mild case of septal wall thickening, but no history of unexplained syncope. He denied any unexplained syncope or dizziness. He generally is very active playing baseball. His activity levels have decreased recently due to his orthopedic issues and cough from his allergies. Prior to his knee pain, he was able to tolerate activity well without symptoms.  He also complained of a cough that he attributes to his season allergies as this occurs every physiologically split  Gastrointestinal: Negative. Genitourinary: Negative. Musculoskeletal: Negative. Skin: Negative. Neurological: Negative. Hematological: Negative. Psychiatric/Behavioral: Negative. /68   Pulse 52   Ht 5' 11\" (1.803 m)   Wt 197 lb (89.4 kg)   SpO2 98%   BMI 27.48 kg/m²     Data:     ECHO 10/2/2019:      Summary   Normal systolic function with an estimated ejection fraction of 55-60%. Moderate concentric left ventricular hypertrophy ( septal wall is more   increased in size (1.6 cm) ). No regional wall motion abnormalities are seen. Normal left ventricular diastolic filling pressure. The left atrium is severely dilated. The right atrium is mildly dilated. Mild aortic regurgitation. Mild mitral and pulmonic regurgitation. Objective:  Physical Exam   Constitutional: He is oriented to person, place, and time. He appears well-developed and well-nourished. HENT:   Head: Normocephalic and atraumatic. Eyes: Pupils are equal, round, and reactive to light. Neck: Normal range of motion. Cardiovascular: Normal rate, regular rhythm and physiologically split 2nd heart sound, 2/6 VASHTI  Pulmonary/Chest: Effort normal and breath sounds normal.   Abdominal: Soft. No tenderness. Musculoskeletal: Normal range of motion. He exhibits no edema. Neurological: He is alert and oriented to person, place, and time. Skin: Skin is warm and dry. Psychiatric: He has a normal mood and affect. Assessment:  1. PAF- Eliquis for stroke prevention. Amiodarone stopped in 5/2022. Frequency and duration decreased. 2. HCM without LVOT gradient- he does not have any major risk factors for SCA in HCM. He does however have > 20% scar burden by LGE in the basal septum. In some studies, this finding has a stronger correlation with risk than the major risk factors. His age (> 61 years) is associated with a strong reduction in risk.  We discussed these issues and have decided to monitor for symptoms and defer ICD implantation at this time. 3. PVCs-  2 week monitor worn 4/2020 demonstrated <1% PVC burden. No PVCs noted per ECG 4/19/2021.  4. RBBB- Per ECG 4/19/2021.   5. AV block only in AF. Plan:  1. Monthly remote device checks. 2. Stay off of amiodarone and monitor AF burden off of medication. 3. Can consider alternative antiarrhythmic based on information from device. 4. Follow up in 6 months. QUALITY MEASURES  1. Tobacco Cessation Counseling: NA  2. Retake of BP if >140/90:   NA  3. Documentation to PCP/referring for new patient:  Sent to PCP at close of office visit  4. CAD patient on anti-platelet: NA  5. CAD patient on STATIN therapy:  NA  6. Patient with aFib on anticoagulation:  Yes      I, Dami Sanchez RN, am scribing for and in the presence of Dr. Sara Gordon. 07/27/22 4:22 PM   Viky Childers, Griselda Childers RN        I, Dr. Sara Gordon, personally performed the services described in this documentation as scribed by Dami Sanchez RN in my presence, and it is both accurate and complete.        Sara Gordon M.D.

## 2022-08-03 ENCOUNTER — NURSE ONLY (OUTPATIENT)
Dept: CARDIOLOGY CLINIC | Age: 62
End: 2022-08-03
Payer: COMMERCIAL

## 2022-08-03 DIAGNOSIS — I48.0 PAROXYSMAL ATRIAL FIBRILLATION (HCC): Primary | ICD-10-CM

## 2022-08-03 DIAGNOSIS — Z45.09 ENCOUNTER FOR LOOP RECORDER CHECK: ICD-10-CM

## 2022-08-03 PROCEDURE — G2066 INTER DEVC REMOTE 30D: HCPCS | Performed by: INTERNAL MEDICINE

## 2022-08-03 PROCEDURE — 93298 REM INTERROG DEV EVAL SCRMS: CPT | Performed by: INTERNAL MEDICINE

## 2022-08-08 NOTE — PROGRESS NOTES
Remote transmission received for patients ILR. EP physician will review. See interrogation under the cardiology tab in the [facility] [address] field for more details. Will continue to monitor remotely. ILR implanted 12/11/20 by Dr Jj Jeff for PAF/NSVT. He has a hx of PAF and is on eliquis. Hx brief pauses in AF, suggestive of transient AV block (OV [facility] 7/27/22). End of 31-day monitoring period 8/3/22. No  arrhythmias recorded. Longest AF (last 90 days): (ID# 7) 15-Jamie-2022, Duration: 90:38:00    Arsenio Blackburn MD-  These are brief pauses in AF, suggestive of transient AV block, not conversion pauses. Did he have symptoms with these pauses? Please call pt and ask if he was symptomatic w/ pauses on   6/19/22 @ 0357, (per pt felt like going to pass out around 8pm on 6/19/22). 6/18/22 @ 0845,   6/16/22 @ 0533. Per B schedule- OV/device check to discuss. (OV 7/27/22). Stay off of amiodarone and monitor AF burden off of medication. Can consider alternative antiarrhythmic based on information from device.

## 2022-08-15 DIAGNOSIS — G47.00 INSOMNIA, UNSPECIFIED TYPE: ICD-10-CM

## 2022-08-16 RX ORDER — CLONAZEPAM 1 MG/1
TABLET ORAL
Qty: 30 TABLET | Refills: 2 | Status: SHIPPED | OUTPATIENT
Start: 2022-08-16 | End: 2022-10-31

## 2022-08-22 ENCOUNTER — OFFICE VISIT (OUTPATIENT)
Dept: FAMILY MEDICINE CLINIC | Age: 62
End: 2022-08-22
Payer: COMMERCIAL

## 2022-08-22 VITALS
HEART RATE: 55 BPM | BODY MASS INDEX: 27.27 KG/M2 | DIASTOLIC BLOOD PRESSURE: 74 MMHG | HEIGHT: 71 IN | WEIGHT: 194.8 LBS | SYSTOLIC BLOOD PRESSURE: 116 MMHG | OXYGEN SATURATION: 98 %

## 2022-08-22 DIAGNOSIS — I48.0 PAROXYSMAL ATRIAL FIBRILLATION (HCC): ICD-10-CM

## 2022-08-22 DIAGNOSIS — Z79.01 CURRENT USE OF LONG TERM ANTICOAGULATION: ICD-10-CM

## 2022-08-22 DIAGNOSIS — R73.9 ELEVATED BLOOD SUGAR: Primary | ICD-10-CM

## 2022-08-22 DIAGNOSIS — Z85.46 PERSONAL HISTORY OF MALIGNANT NEOPLASM OF PROSTATE: ICD-10-CM

## 2022-08-22 DIAGNOSIS — G89.4 CHRONIC PAIN SYNDROME: ICD-10-CM

## 2022-08-22 DIAGNOSIS — Z87.19 HISTORY OF GI BLEED: ICD-10-CM

## 2022-08-22 DIAGNOSIS — K21.9 GASTROESOPHAGEAL REFLUX DISEASE WITHOUT ESOPHAGITIS: ICD-10-CM

## 2022-08-22 DIAGNOSIS — E78.5 HYPERLIPIDEMIA, UNSPECIFIED HYPERLIPIDEMIA TYPE: ICD-10-CM

## 2022-08-22 DIAGNOSIS — I42.2 HYPERTROPHIC CARDIOMYOPATHY (HCC): ICD-10-CM

## 2022-08-22 LAB
A/G RATIO: 2 (ref 1.1–2.2)
ALBUMIN SERPL-MCNC: 4.4 G/DL (ref 3.4–5)
ALP BLD-CCNC: 109 U/L (ref 40–129)
ALT SERPL-CCNC: 18 U/L (ref 10–40)
ANION GAP SERPL CALCULATED.3IONS-SCNC: 10 MMOL/L (ref 3–16)
AST SERPL-CCNC: 16 U/L (ref 15–37)
BILIRUB SERPL-MCNC: 0.7 MG/DL (ref 0–1)
BUN BLDV-MCNC: 17 MG/DL (ref 7–20)
CALCIUM SERPL-MCNC: 9 MG/DL (ref 8.3–10.6)
CHLORIDE BLD-SCNC: 108 MMOL/L (ref 99–110)
CHOLESTEROL, TOTAL: 209 MG/DL (ref 0–199)
CO2: 27 MMOL/L (ref 21–32)
CREAT SERPL-MCNC: 1 MG/DL (ref 0.8–1.3)
GFR AFRICAN AMERICAN: >60
GFR NON-AFRICAN AMERICAN: >60
GLUCOSE BLD-MCNC: 86 MG/DL (ref 70–99)
HBA1C MFR BLD: 5.6 %
HDLC SERPL-MCNC: 52 MG/DL (ref 40–60)
LDL CHOLESTEROL CALCULATED: 139 MG/DL
POTASSIUM SERPL-SCNC: 4.2 MMOL/L (ref 3.5–5.1)
SODIUM BLD-SCNC: 145 MMOL/L (ref 136–145)
TOTAL PROTEIN: 6.6 G/DL (ref 6.4–8.2)
TRIGL SERPL-MCNC: 92 MG/DL (ref 0–150)
VLDLC SERPL CALC-MCNC: 18 MG/DL

## 2022-08-22 PROCEDURE — 83036 HEMOGLOBIN GLYCOSYLATED A1C: CPT | Performed by: FAMILY MEDICINE

## 2022-08-22 PROCEDURE — 99214 OFFICE O/P EST MOD 30 MIN: CPT | Performed by: FAMILY MEDICINE

## 2022-08-22 RX ORDER — PRAVASTATIN SODIUM 20 MG
20 TABLET ORAL EVERY EVENING
Qty: 90 TABLET | Refills: 1 | Status: SHIPPED | OUTPATIENT
Start: 2022-08-22

## 2022-08-22 RX ORDER — VITAMIN B COMPLEX
1 CAPSULE ORAL DAILY
COMMUNITY

## 2022-08-22 ASSESSMENT — PATIENT HEALTH QUESTIONNAIRE - PHQ9
SUM OF ALL RESPONSES TO PHQ QUESTIONS 1-9: 0
9. THOUGHTS THAT YOU WOULD BE BETTER OFF DEAD, OR OF HURTING YOURSELF: 0
8. MOVING OR SPEAKING SO SLOWLY THAT OTHER PEOPLE COULD HAVE NOTICED. OR THE OPPOSITE, BEING SO FIGETY OR RESTLESS THAT YOU HAVE BEEN MOVING AROUND A LOT MORE THAN USUAL: 0
SUM OF ALL RESPONSES TO PHQ QUESTIONS 1-9: 0
5. POOR APPETITE OR OVEREATING: 0
4. FEELING TIRED OR HAVING LITTLE ENERGY: 0
1. LITTLE INTEREST OR PLEASURE IN DOING THINGS: 0
3. TROUBLE FALLING OR STAYING ASLEEP: 0
SUM OF ALL RESPONSES TO PHQ QUESTIONS 1-9: 0
6. FEELING BAD ABOUT YOURSELF - OR THAT YOU ARE A FAILURE OR HAVE LET YOURSELF OR YOUR FAMILY DOWN: 0
SUM OF ALL RESPONSES TO PHQ9 QUESTIONS 1 & 2: 0
2. FEELING DOWN, DEPRESSED OR HOPELESS: 0
SUM OF ALL RESPONSES TO PHQ QUESTIONS 1-9: 0
10. IF YOU CHECKED OFF ANY PROBLEMS, HOW DIFFICULT HAVE THESE PROBLEMS MADE IT FOR YOU TO DO YOUR WORK, TAKE CARE OF THINGS AT HOME, OR GET ALONG WITH OTHER PEOPLE: 0
7. TROUBLE CONCENTRATING ON THINGS, SUCH AS READING THE NEWSPAPER OR WATCHING TELEVISION: 0

## 2022-08-22 NOTE — PROGRESS NOTES
Nomi Li presents for   Chief Complaint   Patient presents with    Hyperlipidemia     Pt states that he is here for a f/u, is fasting for bw        ASSESSMENT:   Diagnosis Orders   1. Elevated blood sugar, improved A1c today's 5.6 POCT glycosylated hemoglobin (Hb A1C)      2. Hyperlipidemia, unspecified hyperlipidemia type, labs are pending continue Pravachol 20 mg pravastatin (PRAVACHOL) 20 MG tablet    Lipid Panel    Comprehensive Metabolic Panel      3. Gastroesophageal reflux disease without esophagitis, stable continue Prilosec 40 mg       4. Paroxysmal atrial fibrillation ([address] today in sinus rhythm continue verapamil and Eliquis 5 mg twice a day. Continue follow-up with cardiology       5. Hypertrophic cardiomyopathy [facility]), continue cardiology follow-up       6. History of GI bleed, continue PPI Prilosec 40 mg       7. Current use of long term anticoagulation, Eliquis 5 mg twice a day this is for his paroxysmal A. fib       8. Personal history of malignant neoplasm of prostate, follow-up with urology       9. Chronic pain syndrome, patient's pain management doctor left town suddenly. We will try physical medicine referral [address] [place] [address] , Orthopedic Surgery (PM&R), [facility]           Plan:  1)  Medication: continue current medication regimen unchanged, medications are working and tolerated, continue as listed  2)  Recheck in 6 months, sooner should new symptoms or problems arise. 3) LLR          SUBJECTIVE:  Nomi Li is a 58 y.o. male who presents for evaluation of elevated blood sugar, paroxysmal A. fib, hypertrophic cardiomyopathy, GERD, history of GI bleed, history of prostate cancer and hyperlipidemia. He indicates that he is feeling well and denies any symptoms referable to his elevated blood lipids   specifically denies chest pain, palpitations, dyspnea, orthopnea, PND or peripheral edema or neuro sx. No anorexia,  or leg cramps noted.  Current medication regimen is as listed below. He denies any side effects of medication, and has been taking it regularly. Lab Results   Component Value Date    LDLCALC 186 (H) 12/15/2021       Patient saw cardiology in July 2022, that note was reviewed. Patient is no longer on amiodarone. He has been taking his Pravachol 20 mg regularly we will assess his lipids today. He has paroxysmal A. fib for which he takes Eliquis 5 mg twice a day. He is on verapamil to 40 mg for atrial fibrillation and hypertension. Patient has lost weight since his last appointment. His last A1c was 6.1, today it is 5.6. Patient needs chronic anticoagulation on Eliquis for his paroxysmal A. fib, he does have a history of GI bleed. He continues on Prilosec 40 mg for PPI coverage. Follows up with urology for monitoring his history of prostate cancer. Patient had a pain management doctor, Dr. Aylssa Ashley who left the area very quickly. Patient does not have a replacement. He did receive a epidural injection which she said he got 1 to 2 months of relief. He has not any medicine currently. At one time he was seen in psychiatry and was on a variety of medicines. Patient wants to understand why he hurts from head to toe and is extremely stiff. I think he would benefit more from a physical medicine rehab referral then pain management.   Referral made to Dr. Pratibha Ryder    Current Outpatient Medications   Medication Sig Dispense Refill    Cyanocobalamin (B-12 PO) Take by mouth      b complex vitamins capsule Take 1 capsule by mouth daily      pravastatin (PRAVACHOL) 20 MG tablet Take 1 tablet by mouth every evening 90 tablet 1    clonazePAM (KLONOPIN) 1 MG tablet TAKE 1 TABLET BY MOUTH EVERY NIGHT AS NEEDED 30 tablet 2    CYCLOBENZAPRINE HCL PO Take by mouth Pt takes as needed      apixaban (ELIQUIS) 5 MG TABS tablet TAKE 1 TABLET BY MOUTH TWICE DAILY 180 tablet 3    verapamil (VERELAN) 240 MG extended release capsule TAKE 1 CAPSULE BY MOUTH EVERY NIGHT 90 capsule 3

## 2022-08-23 ENCOUNTER — PATIENT MESSAGE (OUTPATIENT)
Dept: FAMILY MEDICINE CLINIC | Age: 62
End: 2022-08-23

## 2022-08-23 NOTE — TELEPHONE ENCOUNTER
Pt was seen 08/22 and you referred him to Dr. Corey Gonzales  I reached out to patient via [place] and left a voicemail to see if he was able to schedule with them

## 2022-08-24 NOTE — TELEPHONE ENCOUNTER
Patient was advised that we are unable to treat his pain with prednisone he is requesting. I asked him to elaborate further about the pain management not treating whole body pain and he says he told them that it is his entire body that is in pain and they said they treat only individual areas and work with patients on a particular area where they are experiencing the pain and he says this does him no good because it is his entire body. He says he will have to figure something else out because he is in a lot of pain everywhere.

## 2022-08-29 ENCOUNTER — NURSE ONLY (OUTPATIENT)
Dept: CARDIOLOGY CLINIC | Age: 62
End: 2022-08-29

## 2022-09-01 DIAGNOSIS — K21.9 GASTROESOPHAGEAL REFLUX DISEASE WITHOUT ESOPHAGITIS: ICD-10-CM

## 2022-09-01 RX ORDER — OMEPRAZOLE 40 MG/1
40 CAPSULE, DELAYED RELEASE ORAL DAILY
Qty: 90 CAPSULE | Refills: 1 | Status: SHIPPED | OUTPATIENT
Start: 2022-09-01

## 2022-09-01 NOTE — TELEPHONE ENCOUNTER
Yoko Moran is requesting refill(s) omeprazole  Last OV 8/22/22 (pertaining to medication)  LR 10/5/21 (per medication requested)  Next office visit scheduled or attempted Yes   If no, reason:  2/27/23

## 2022-09-05 NOTE — PROGRESS NOTES
Remote transmission received for patients ILR. EP physician will review. See interrogation under the cardiology tab in the [address] [place] [address] [address] field for more details. Will continue to monitor remotely. ILR implanted 12/11/20 by Dr Meggan Rodriguez for PAF/NSVT. He has a hx of PAF and is on eliquis. Hx brief pauses in AF, suggestive of transient AV block (OV JMB 7/27/22). Transient AVB 8/28/22 @ 0705 x 3 sec w/ median HR of 54 bpm.  % of Time in AT/.0% since 8/25/22. Per B schedule- OV/device check to discuss. (OV 7/27/22). Stay off of amiodarone and monitor AF burden off of medication. Can consider alternative antiarrhythmic based on information from device.

## 2022-09-07 ENCOUNTER — NURSE ONLY (OUTPATIENT)
Dept: CARDIOLOGY CLINIC | Age: 62
End: 2022-09-07

## 2022-09-07 DIAGNOSIS — Z45.09 ENCOUNTER FOR LOOP RECORDER CHECK: ICD-10-CM

## 2022-09-07 DIAGNOSIS — I48.0 PAF (PAROXYSMAL ATRIAL FIBRILLATION) (HCC): Primary | ICD-10-CM

## 2022-10-04 ENCOUNTER — TELEPHONE (OUTPATIENT)
Dept: CARDIOLOGY CLINIC | Age: 62
End: 2022-10-04

## 2022-10-04 DIAGNOSIS — Z45.09 ENCOUNTER FOR LOOP RECORDER CHECK: ICD-10-CM

## 2022-10-04 NOTE — TELEPHONE ENCOUNTER
Spoke with the patient and walked him through sending a manual transmission. He stated that [place] and his watch kept notifying him that the was in AF. He states that this has been occurring for months. He states that he can feel his heart stopping and restarting. He feels very fatigued. He states that last week his legs, knees, and feet were swelling. Have we received this transmission?

## 2022-10-06 NOTE — TELEPHONE ENCOUNTER
Ron Rodriguez MD  [facility], [place] 13 hours ago (5:47 PM)     Shakila Aguilar  His AF burden has increased over the last month. HR control has been excellent. He needs an OV to discuss AF management. Please schedule an office visit with Cedric Beyer at 10:15am for 10/10/2022 to discuss afib management. Thank you.

## 2022-10-06 NOTE — TELEPHONE ENCOUNTER
10/06/22-Called phone number [phone], no answer, lm for pt to return call to schedule an office visit with Louie Pedraza at 10:15am for 10/10/2022 to discuss afib management.

## 2022-10-10 ENCOUNTER — OFFICE VISIT (OUTPATIENT)
Dept: CARDIOLOGY CLINIC | Age: 62
End: 2022-10-10
Payer: COMMERCIAL

## 2022-10-10 ENCOUNTER — NURSE ONLY (OUTPATIENT)
Dept: CARDIOLOGY CLINIC | Age: 62
End: 2022-10-10

## 2022-10-10 VITALS
HEART RATE: 65 BPM | HEIGHT: 71 IN | BODY MASS INDEX: 28 KG/M2 | WEIGHT: 200 LBS | DIASTOLIC BLOOD PRESSURE: 68 MMHG | OXYGEN SATURATION: 98 % | SYSTOLIC BLOOD PRESSURE: 120 MMHG

## 2022-10-10 DIAGNOSIS — I45.10 RIGHT BUNDLE BRANCH BLOCK: ICD-10-CM

## 2022-10-10 DIAGNOSIS — I48.0 PAROXYSMAL ATRIAL FIBRILLATION (HCC): ICD-10-CM

## 2022-10-10 DIAGNOSIS — Z45.09 ENCOUNTER FOR LOOP RECORDER CHECK: ICD-10-CM

## 2022-10-10 DIAGNOSIS — M79.10 MUSCLE PAIN: Primary | ICD-10-CM

## 2022-10-10 DIAGNOSIS — I49.3 PVC (PREMATURE VENTRICULAR CONTRACTION): ICD-10-CM

## 2022-10-10 PROCEDURE — 99214 OFFICE O/P EST MOD 30 MIN: CPT | Performed by: INTERNAL MEDICINE

## 2022-10-10 NOTE — PROGRESS NOTES
Patient comes in for interrogation of their implanted loop recorder. Patient has a history of hypertrophic CM, pAF, and PVCs. Takes Eliquis and verapamil. Patient's last device interrogation was on 10/4. Since last interrogation, overall AT/AF burden since 12/2020 is 57.8%, however recent burden appears to be 100%. 1 pause event recorded x 3 seconds. Patient will see Dr. Dominick Anderson today in office. See Paceart report under the Cardiology tab. We will follow the patient remotely.

## 2022-10-10 NOTE — PATIENT INSTRUCTIONS
Plan:  Discussed dofetilide or sotalol. Require hospital stay. Discussed catheter ablation for AF. Plan to start dofetilide. Call to set this up. Ask for Naoma Flair. Stop pravastatin for 2 weeks to see if this improves muscle pain. Keep follow up with [facility] as scheduled.

## 2022-10-11 ENCOUNTER — TELEPHONE (OUTPATIENT)
Dept: CARDIOLOGY CLINIC | Age: 62
End: 2022-10-11

## 2022-10-11 NOTE — PROGRESS NOTES
End of 31-day monitoring period 9/7. Pt currently in AF. % of Time in AT/AF 43.2% since 8/3/22. AF-RVR recorded. Pauses/AVB noted in AF. Remote transmission received for patients ILR. EP physician will review. See interrogation under the cardiology tab in the [address] [place] [address] [address] field for more details. Will continue to monitor remotely. ILR implanted 12/11/20 by Dr Bonny Jones for PAF/NSVT. He has a hx of PAF and is on eliquis. Hx brief pauses in AF, suggestive of transient AV block (OV JMB 7/27/22). Transient AVB 8/28/22 @ 0705 x 3 sec w/ median HR of 54 bpm.  % of Time in AT/.0% since 8/25/22. Per [facility] schedule- OV/device check to discuss. (OV 7/27/22). Stay off of amiodarone and monitor AF burden off of medication. Can consider alternative antiarrhythmic based on information from device.

## 2022-10-11 NOTE — TELEPHONE ENCOUNTER
Pt stated is calling i for a status on the [facility] admission. Pt stated that the week of 10/24/2022 would be good for him. Please advise.

## 2022-10-13 NOTE — TELEPHONE ENCOUNTER
Spoke with the patient. He is scheduled for 10/31/2022. Spoke with the transfer center and they confirmed the date and time.

## 2022-10-20 ENCOUNTER — PATIENT MESSAGE (OUTPATIENT)
Dept: CARDIOLOGY CLINIC | Age: 62
End: 2022-10-20

## 2022-10-21 NOTE — TELEPHONE ENCOUNTER
From: Alyson Ba To: Dr. Giselle Peralta: 10/20/2022 7:44 PM EDT  Subject: Insurance Preapproval    Have you received preapproval from my insurance for my hospital stay?

## 2022-10-31 ENCOUNTER — HOSPITAL ENCOUNTER (INPATIENT)
Age: 62
LOS: 3 days | Discharge: HOME | DRG: 310 | End: 2022-11-03
Attending: INTERNAL MEDICINE | Admitting: INTERNAL MEDICINE
Payer: COMMERCIAL

## 2022-10-31 PROBLEM — I48.91 ATRIAL FIBRILLATION (HCC): Status: ACTIVE | Noted: 2022-10-31

## 2022-10-31 PROBLEM — I48.91 ATRIAL FIBRILLATION, UNSPECIFIED TYPE (HCC): Status: ACTIVE | Noted: 2022-10-31

## 2022-10-31 LAB
ANION GAP SERPL CALCULATED.3IONS-SCNC: 7 MMOL/L (ref 3–16)
BUN BLDV-MCNC: 18 MG/DL (ref 7–20)
CALCIUM SERPL-MCNC: 8.7 MG/DL (ref 8.3–10.6)
CHLORIDE BLD-SCNC: 106 MMOL/L (ref 99–110)
CO2: 26 MMOL/L (ref 21–32)
CREAT SERPL-MCNC: 1 MG/DL (ref 0.8–1.3)
EKG ATRIAL RATE: 81 BPM
EKG DIAGNOSIS: NORMAL
EKG Q-T INTERVAL: 450 MS
EKG QRS DURATION: 132 MS
EKG QTC CALCULATION (BAZETT): 418 MS
EKG R AXIS: 15 DEGREES
EKG T AXIS: 173 DEGREES
EKG VENTRICULAR RATE: 52 BPM
GFR SERPL CREATININE-BSD FRML MDRD: >60 ML/MIN/{1.73_M2}
GLUCOSE BLD-MCNC: 107 MG/DL (ref 70–99)
MAGNESIUM: 1.9 MG/DL (ref 1.8–2.4)
POTASSIUM SERPL-SCNC: 4.1 MMOL/L (ref 3.5–5.1)
SODIUM BLD-SCNC: 139 MMOL/L (ref 136–145)

## 2022-10-31 PROCEDURE — 83735 ASSAY OF MAGNESIUM: CPT

## 2022-10-31 PROCEDURE — 36415 COLL VENOUS BLD VENIPUNCTURE: CPT

## 2022-10-31 PROCEDURE — 6370000000 HC RX 637 (ALT 250 FOR IP): Performed by: NURSE PRACTITIONER

## 2022-10-31 PROCEDURE — 80048 BASIC METABOLIC PNL TOTAL CA: CPT

## 2022-10-31 PROCEDURE — 6360000002 HC RX W HCPCS: Performed by: NURSE PRACTITIONER

## 2022-10-31 PROCEDURE — 2060000000 HC ICU INTERMEDIATE R&B

## 2022-10-31 PROCEDURE — 2580000003 HC RX 258: Performed by: NURSE PRACTITIONER

## 2022-10-31 PROCEDURE — 93005 ELECTROCARDIOGRAM TRACING: CPT | Performed by: NURSE PRACTITIONER

## 2022-10-31 PROCEDURE — 93010 ELECTROCARDIOGRAM REPORT: CPT | Performed by: INTERNAL MEDICINE

## 2022-10-31 PROCEDURE — 6370000000 HC RX 637 (ALT 250 FOR IP): Performed by: INTERNAL MEDICINE

## 2022-10-31 RX ORDER — CYCLOBENZAPRINE HCL 10 MG
10 TABLET ORAL 2 TIMES DAILY PRN
Status: DISCONTINUED | OUTPATIENT
Start: 2022-10-31 | End: 2022-11-03 | Stop reason: HOSPADM

## 2022-10-31 RX ORDER — POLYETHYLENE GLYCOL 3350 17 G/17G
17 POWDER, FOR SOLUTION ORAL DAILY PRN
Status: DISCONTINUED | OUTPATIENT
Start: 2022-10-31 | End: 2022-11-03 | Stop reason: HOSPADM

## 2022-10-31 RX ORDER — ACETAMINOPHEN 325 MG/1
650 TABLET ORAL EVERY 6 HOURS PRN
Status: DISCONTINUED | OUTPATIENT
Start: 2022-10-31 | End: 2022-11-03 | Stop reason: HOSPADM

## 2022-10-31 RX ORDER — MAGNESIUM SULFATE 1 G/100ML
1000 INJECTION INTRAVENOUS ONCE
Status: COMPLETED | OUTPATIENT
Start: 2022-10-31 | End: 2022-10-31

## 2022-10-31 RX ORDER — HYDROCODONE BITARTRATE AND ACETAMINOPHEN 5; 325 MG/1; MG/1
1 TABLET ORAL EVERY 6 HOURS PRN
Status: DISCONTINUED | OUTPATIENT
Start: 2022-10-31 | End: 2022-11-03 | Stop reason: HOSPADM

## 2022-10-31 RX ORDER — VERAPAMIL HYDROCHLORIDE 240 MG/1
240 TABLET, FILM COATED, EXTENDED RELEASE ORAL NIGHTLY
Status: DISCONTINUED | OUTPATIENT
Start: 2022-10-31 | End: 2022-11-01

## 2022-10-31 RX ORDER — VITAMIN B COMPLEX
1000 TABLET ORAL DAILY
Status: DISCONTINUED | OUTPATIENT
Start: 2022-10-31 | End: 2022-11-03 | Stop reason: HOSPADM

## 2022-10-31 RX ORDER — CHOLECALCIFEROL (VITAMIN D3) 10 MCG
1 TABLET ORAL DAILY
Status: DISCONTINUED | OUTPATIENT
Start: 2022-10-31 | End: 2022-11-03 | Stop reason: HOSPADM

## 2022-10-31 RX ORDER — TAMSULOSIN HYDROCHLORIDE 0.4 MG/1
0.4 CAPSULE ORAL DAILY
Status: DISCONTINUED | OUTPATIENT
Start: 2022-10-31 | End: 2022-11-03 | Stop reason: HOSPADM

## 2022-10-31 RX ORDER — SODIUM CHLORIDE 9 MG/ML
INJECTION, SOLUTION INTRAVENOUS PRN
Status: DISCONTINUED | OUTPATIENT
Start: 2022-10-31 | End: 2022-11-03 | Stop reason: HOSPADM

## 2022-10-31 RX ORDER — BUTALBITAL, ACETAMINOPHEN AND CAFFEINE 50; 325; 40 MG/1; MG/1; MG/1
1 TABLET ORAL EVERY 12 HOURS PRN
Status: DISCONTINUED | OUTPATIENT
Start: 2022-10-31 | End: 2022-11-03 | Stop reason: HOSPADM

## 2022-10-31 RX ORDER — DOFETILIDE 0.25 MG/1
500 CAPSULE ORAL ONCE
Status: COMPLETED | OUTPATIENT
Start: 2022-10-31 | End: 2022-10-31

## 2022-10-31 RX ORDER — SODIUM CHLORIDE 0.9 % (FLUSH) 0.9 %
5-40 SYRINGE (ML) INJECTION EVERY 12 HOURS SCHEDULED
Status: DISCONTINUED | OUTPATIENT
Start: 2022-10-31 | End: 2022-11-03 | Stop reason: HOSPADM

## 2022-10-31 RX ORDER — SODIUM CHLORIDE 0.9 % (FLUSH) 0.9 %
5-40 SYRINGE (ML) INJECTION PRN
Status: DISCONTINUED | OUTPATIENT
Start: 2022-10-31 | End: 2022-11-03 | Stop reason: HOSPADM

## 2022-10-31 RX ORDER — ACETAMINOPHEN 650 MG/1
650 SUPPOSITORY RECTAL EVERY 6 HOURS PRN
Status: DISCONTINUED | OUTPATIENT
Start: 2022-10-31 | End: 2022-11-03 | Stop reason: HOSPADM

## 2022-10-31 RX ORDER — PANTOPRAZOLE SODIUM 40 MG/1
40 TABLET, DELAYED RELEASE ORAL
Status: DISCONTINUED | OUTPATIENT
Start: 2022-11-01 | End: 2022-11-03 | Stop reason: HOSPADM

## 2022-10-31 RX ORDER — PRAVASTATIN SODIUM 20 MG
20 TABLET ORAL EVERY EVENING
Status: DISCONTINUED | OUTPATIENT
Start: 2022-10-31 | End: 2022-11-03 | Stop reason: HOSPADM

## 2022-10-31 RX ADMIN — DOFETILIDE 500 MCG: 0.25 CAPSULE ORAL at 12:32

## 2022-10-31 RX ADMIN — DOFETILIDE 500 MCG: 0.25 CAPSULE ORAL at 23:06

## 2022-10-31 RX ADMIN — TAMSULOSIN HYDROCHLORIDE 0.4 MG: 0.4 CAPSULE ORAL at 20:01

## 2022-10-31 RX ADMIN — MAGNESIUM SULFATE HEPTAHYDRATE 1000 MG: 1 INJECTION, SOLUTION INTRAVENOUS at 17:27

## 2022-10-31 RX ADMIN — APIXABAN 5 MG: 5 TABLET, FILM COATED ORAL at 20:01

## 2022-10-31 RX ADMIN — SODIUM CHLORIDE, PRESERVATIVE FREE 10 ML: 5 INJECTION INTRAVENOUS at 14:23

## 2022-10-31 RX ADMIN — VERAPAMIL HYDROCHLORIDE 240 MG: 240 TABLET, FILM COATED, EXTENDED RELEASE ORAL at 20:01

## 2022-10-31 RX ADMIN — HYDROCODONE BITARTRATE AND ACETAMINOPHEN 1 TABLET: 5; 325 TABLET ORAL at 20:57

## 2022-10-31 ASSESSMENT — PAIN SCALES - GENERAL
PAINLEVEL_OUTOF10: 3
PAINLEVEL_OUTOF10: 8

## 2022-10-31 ASSESSMENT — PAIN DESCRIPTION - LOCATION: LOCATION: HEAD

## 2022-10-31 ASSESSMENT — LIFESTYLE VARIABLES
HOW MANY STANDARD DRINKS CONTAINING ALCOHOL DO YOU HAVE ON A TYPICAL DAY: 1 OR 2
HOW OFTEN DO YOU HAVE A DRINK CONTAINING ALCOHOL: MONTHLY OR LESS

## 2022-10-31 NOTE — H&P
Patient Name: Cherry Terry  YOB: 1960     Primary Care Physician: Gray Dougherty MD     CHIEF COMPLAINT:        Chief Complaint   Patient presents with    Follow-up    Device Check    Atrial Fibrillation    Other       PVC, RBBB      HPI:  Cherry Terry is a 58 y.o. male who presents for follow up evaluation of hypertrophic cardiomyopathy, PVCs and NSVT. He was previously evaluated for an ICD at [address] [place] [address]. He has worn cardiac monitors in the past that have shown paroxysmal atrial fibrillation and PVCs. His monitor from 4/2016 showed symptoms associated with PAF. His Lexiscan from 4/2016 showed an adequate response to Mitch Resides. His monitor from 6/2017 showed PAF and frequent monomorphic PVCs. His echocardiogram from 10/2017 showed an EF of 55-60% with severe asymmetric left ventricular hypertrophy. His cardiac MRI showed normal LV chamber with an EF of 60%, Asymmetric basal and mid septal hypertrophy with the maximum end-diastolic myocardial thickness measuring up to 16 mm. Maximum LVOT velocity is 1.4 m/s. There is no LVOT gradient at rest.  There is some late gadolinium enhancement in the basal anteroseptal area. This was not quantified. In October 2019 his echo had similar findings along with a severely dilated left atrium. He then was evaluated by interventional cardiology who recommended ischemic testing. The patient refused any further testing at that time. In 4/2020, patient reported that his brother also has a mild case of septal wall thickening, but no history of unexplained syncope. He denied any unexplained syncope or dizziness. He generally is very active playing baseball. His activity levels have decreased recently due to his orthopedic issues and cough from his allergies. Prior to his knee pain, he was able to tolerate activity well without symptoms.  He also complained of a cough that he attributes to his season allergies as this occurs every year around the same time. He has been taking Verapamil for many years. He was told to take this to reduce the workload of his heart. When he does not take this, he feels spasms in his neck. He states that he has been taking Amiodarone for a few years and feels that it is now helping to control his symptoms related to his afib. His palpitations are reduced with it. He has noticed a sudden increase in his palpitations a few weeks prior, though attributes this to stress and allergies. On 7/1/2020, patient denied any dizziness or syncopal episodes  He stated that he does have the occasional flutter that occurs 2-3 times aweek. It lasts about half an hour. He had been noticing them mostly in the evening. He stated that he does not notice them when he does exercise. Event monitor from 4/20 demonstrates rare PVCs and no NSVT. He has not been very active lately due to knee pain. He is scheduled to get a scope in the near future with [facility] at [facility].  Patient's Amiodarone was decreased to 100mg QD at the conclusion of 7/1/2020 office visit. Patient wore a 7 day cardiac event monitor from 4/15/2020 to 4/22/2020 that demonstrated predominantly SB with an average Hr of 57 () BPM, <1% PACs and PVCs. He underwent a meniscus repair on his left knee via scope on 7/17/2020. Patient reported on 10/21/2020 that he increased in amiodarone back to 200mg QD during September 2020 due to increased palpitations. An ILR was discussed and agreed upon due to patient's ongoing palpitations and wanting to discontinue Eliquis. ILR implanted 12/11/2021. On 4/19/2021, patient's device interrogation demonstrates normal function, 1 episode of AF with conversion pauses 12/15/2020. He reported he is doing ok. He reports he has been experiencing fatigue, which is unchanged from prior visits. He reports he experiences palpitations that lasts several seconds and resolve.  He reports they are not bothersome. He reports he has had pain in the back of his left quad that has been occurring for about 1-2 weeks. He reports he was playing baseball the day prior and is unsure if this is related. Patient was seen in 5/2022 and had complaints of chest pain. Stress test was normal.               On 7/27/2022, Device interrogation demonstrated AVB in AF. He reports that he thinks he can feel his AF. He felt the pause that happened on 6/19.               10/10/2022, Device interrogation demonstrated 100% AF burden. He feels fatigued and palpitations. He reports that he has not been feeling well since stopping amiodarone. He is SOB with activity. He has increased muscle pains. He has left side pain that lasts hours, happening every day. Does not radiate. Happens at rest. Pain is constant He is taking his medications as prescribed. Denies recent issues with bleeding or bruising. Patient denies current edema, chest pain, dizziness or syncope. Past Medical History:   has a past medical history of A-fib ([address] ADHD (attention deficit hyperactivity disorder), Carpal tunnel syndrome, Chronic low back pain, Chronic neck pain, Chronic sinusitis, Dental crown present, Epigastric hernia, Esophageal spasm, GERD (gastroesophageal reflux disease), Hyperlipidemia, Hypertrophic cardiomyopathy ([address] IHSS (idiopathic hypertrophic subaortic stenosis) ([address] Kidney stones, HUNTER on CPAP, Primary localized osteoarthrosis, shoulder region, Prostate cancer ([address] PVC's (premature ventricular contractions), RBBB (right bundle branch block), RLS (restless legs syndrome), Seasonal allergies, and Tachycardia. Surgical History:   has a past surgical history that includes Knee arthroscopy (Right, 1982); Umbilical hernia repair; Colonoscopy (1/17/11); TURP (2012); Inguinal hernia repair (Bilateral, 2009, 2012,); Varicocelectomy (2000); Shoulder arthroscopy (Right, 12/31/2013);  Endoscopy, colon, diagnostic; Upper gastrointestinal endoscopy (9/3); Elbow surgery (2015); Carpal tunnel release (Left); Knee arthroscopy (Left, 12/21/2017); Colonoscopy (N/A, 2/2/2020); Knee arthroscopy (Left, 7/17/2020); Insertable Cardiac Monitor (Left, 12/11/2020); and Insertable Cardiac Monitor (12/11/2020). Social History:   reports that he has never smoked. He has never used smokeless tobacco. He reports current alcohol use of about 1.0 - 2.0 standard drink per week. He reports that he does not use drugs. Family History:  family history includes Cancer in his mother; Heart Disease in his maternal grandfather and maternal grandmother; High Blood Pressure in his father; High Cholesterol in his brother and mother; Prostate Cancer in his paternal uncle.       Home Medications:  Encounter Medications          Outpatient Encounter Medications as of 10/10/2022   Medication Sig Dispense Refill    omeprazole (PRILOSEC) 40 MG delayed release capsule Take 1 capsule by mouth daily 90 capsule 1    Cyanocobalamin (B-12 PO) Take by mouth        b complex vitamins capsule Take 1 capsule by mouth daily        pravastatin (PRAVACHOL) 20 MG tablet Take 1 tablet by mouth every evening 90 tablet 1    clonazePAM (KLONOPIN) 1 MG tablet TAKE 1 TABLET BY MOUTH EVERY NIGHT AS NEEDED 30 tablet 2    CYCLOBENZAPRINE HCL PO Take by mouth Pt takes as needed        apixaban (ELIQUIS) 5 MG TABS tablet TAKE 1 TABLET BY MOUTH TWICE DAILY 180 tablet 3    verapamil (VERELAN) 240 MG extended release capsule TAKE 1 CAPSULE BY MOUTH EVERY NIGHT 90 capsule 3    tamsulosin (FLOMAX) 0.4 MG capsule Take 0.4 mg by mouth daily        Zinc Sulfate (ZINC 15 PO) daily        Cholecalciferol 25 MCG (1000 UT) CHEW Take 1,000 Units by mouth daily        butalbital-APAP-caffeine -40 MG CAPS per capsule TK ONE C PO  Q 12 H PRF SEVERE HEADACHE                    Facility-Administered Encounter Medications as of 10/10/2022   Medication Dose Route Frequency Provider Last Rate Last Admin    sodium hyaluronate (viscosup) injection 20 mg  20 mg Intra-artICUlar Once [place], [place]                Allergies:  Niacin and related      Review of Systems   Constitutional: Negative. HENT: Negative. Eyes: Negative. Respiratory: Negative. Cardiovascular: physiologically split  Gastrointestinal: Negative. Genitourinary: Negative. Musculoskeletal: Negative. Skin: Negative. Neurological: Negative. Hematological: Negative. Psychiatric/Behavioral: Negative. /68   Pulse 65   Ht 5' 11\" (1.803 m)   Wt 200 lb (90.7 kg)   SpO2 98%   BMI 27.89 kg/m²      Data:   ECG 10/10/22: Personally reviewed. ECHO 10/2/2019:      Summary   Normal systolic function with an estimated ejection fraction of 55-60%. Moderate concentric left ventricular hypertrophy ( septal wall is more   increased in size (1.6 cm) ). No regional wall motion abnormalities are seen. Normal left ventricular diastolic filling pressure. The left atrium is severely dilated. The right atrium is mildly dilated. Mild aortic regurgitation. Mild mitral and pulmonic regurgitation. Objective:  Physical Exam   Constitutional: He is oriented to person, place, and time. He appears well-developed and well-nourished. HENT:   Head: Normocephalic and atraumatic. Eyes: Pupils are equal, round, and reactive to light. Neck: Normal range of motion. Cardiovascular: Normal rate, regular rhythm and physiologically split 2nd heart sound, 2/6 VASHTI  Pulmonary/Chest: Effort normal and breath sounds normal.   Abdominal: Soft. No tenderness. Musculoskeletal: Normal range of motion. He exhibits no edema. Neurological: He is alert and oriented to person, place, and time. Skin: Skin is warm and dry. Psychiatric: He has a normal mood and affect. Assessment:  1. PAF- Eliquis for stroke prevention. Amiodarone stopped in 5/2022. Frequency and duration decreased. Remains symptomatic with the AF.   We discussed further treatment options including pharmacologic therapy and catheter ablation. Plan to start dofetilide. 2. HCM without LVOT gradient- he does not have any major risk factors for SCA in HCM. He does however have > 20% scar burden by LGE in the basal septum. In some studies, this finding has a stronger correlation with risk than the major risk factors. His age (> 61 years) is associated with a strong reduction in risk. We discussed these issues and have decided to monitor for symptoms and defer ICD implantation at this time. 3. PVCs-  2 week monitor worn 4/2020 demonstrated <1% PVC burden. No PVCs noted per ECG 4/19/2021.  4. RBBB- Per ECG 4/19/2021.   5. AV block only in AF. 6. Myalgias- trial off of statin     Plan:  Discussed dofetilide initiation. Require 3-day hospital stay. Discussed catheter ablation for AF. Plan to start dofetilide. Stop pravastatin for 2 weeks to see if this improves muscle pain. Keep follow up with [facility] as scheduled.       Hunter Butcher M.D.

## 2022-10-31 NOTE — CARE COORDINATION
Chart reviewed. Pt from home with spouse. IPTA. Has insurance and PCP. Here for Tikosyn dosing only. NN identified. Please consult if needs arise.

## 2022-10-31 NOTE — TELEPHONE ENCOUNTER
The patient called the office because had had not received a bed number yet. I called the transfer center and they stated that they are preparing a room for the patient now, and they will call the patient with a bed number.  Patient notified and V/U.

## 2022-11-01 LAB
ANION GAP SERPL CALCULATED.3IONS-SCNC: 8 MMOL/L (ref 3–16)
BUN BLDV-MCNC: 21 MG/DL (ref 7–20)
CALCIUM SERPL-MCNC: 8.2 MG/DL (ref 8.3–10.6)
CHLORIDE BLD-SCNC: 105 MMOL/L (ref 99–110)
CO2: 25 MMOL/L (ref 21–32)
CREAT SERPL-MCNC: 0.9 MG/DL (ref 0.8–1.3)
EKG ATRIAL RATE: 50 BPM
EKG DIAGNOSIS: NORMAL
EKG Q-T INTERVAL: 470 MS
EKG QRS DURATION: 122 MS
EKG QTC CALCULATION (BAZETT): 445 MS
EKG R AXIS: 16 DEGREES
EKG T AXIS: 145 DEGREES
EKG VENTRICULAR RATE: 54 BPM
GFR SERPL CREATININE-BSD FRML MDRD: >60 ML/MIN/{1.73_M2}
GLUCOSE BLD-MCNC: 108 MG/DL (ref 70–99)
MAGNESIUM: 2.1 MG/DL (ref 1.8–2.4)
POTASSIUM REFLEX MAGNESIUM: 4 MMOL/L (ref 3.5–5.1)
SODIUM BLD-SCNC: 138 MMOL/L (ref 136–145)

## 2022-11-01 PROCEDURE — 93005 ELECTROCARDIOGRAM TRACING: CPT | Performed by: INTERNAL MEDICINE

## 2022-11-01 PROCEDURE — 99233 SBSQ HOSP IP/OBS HIGH 50: CPT | Performed by: NURSE PRACTITIONER

## 2022-11-01 PROCEDURE — 80048 BASIC METABOLIC PNL TOTAL CA: CPT

## 2022-11-01 PROCEDURE — 2580000003 HC RX 258: Performed by: NURSE PRACTITIONER

## 2022-11-01 PROCEDURE — 83735 ASSAY OF MAGNESIUM: CPT

## 2022-11-01 PROCEDURE — 93010 ELECTROCARDIOGRAM REPORT: CPT | Performed by: INTERNAL MEDICINE

## 2022-11-01 PROCEDURE — 6370000000 HC RX 637 (ALT 250 FOR IP): Performed by: NURSE PRACTITIONER

## 2022-11-01 PROCEDURE — 2060000000 HC ICU INTERMEDIATE R&B

## 2022-11-01 PROCEDURE — 6370000000 HC RX 637 (ALT 250 FOR IP): Performed by: INTERNAL MEDICINE

## 2022-11-01 PROCEDURE — 36415 COLL VENOUS BLD VENIPUNCTURE: CPT

## 2022-11-01 RX ORDER — DOFETILIDE 0.25 MG/1
500 CAPSULE ORAL ONCE
Status: COMPLETED | OUTPATIENT
Start: 2022-11-01 | End: 2022-11-01

## 2022-11-01 RX ORDER — DOFETILIDE 0.25 MG/1
500 CAPSULE ORAL EVERY 12 HOURS SCHEDULED
Status: DISCONTINUED | OUTPATIENT
Start: 2022-11-01 | End: 2022-11-01

## 2022-11-01 RX ADMIN — NEPHROCAP 1 MG: 1 CAP ORAL at 09:25

## 2022-11-01 RX ADMIN — HYDROCODONE BITARTRATE AND ACETAMINOPHEN 1 TABLET: 5; 325 TABLET ORAL at 06:40

## 2022-11-01 RX ADMIN — SODIUM CHLORIDE, PRESERVATIVE FREE 10 ML: 5 INJECTION INTRAVENOUS at 09:25

## 2022-11-01 RX ADMIN — DOFETILIDE 500 MCG: 0.25 CAPSULE ORAL at 12:38

## 2022-11-01 RX ADMIN — DOFETILIDE 500 MCG: 0.25 CAPSULE ORAL at 23:28

## 2022-11-01 RX ADMIN — BUTALBITAL, ACETAMINOPHEN, AND CAFFEINE 1 TABLET: 50; 325; 40 TABLET ORAL at 12:38

## 2022-11-01 RX ADMIN — APIXABAN 5 MG: 5 TABLET, FILM COATED ORAL at 09:25

## 2022-11-01 RX ADMIN — Medication 1000 UNITS: at 09:25

## 2022-11-01 ASSESSMENT — PAIN SCALES - GENERAL
PAINLEVEL_OUTOF10: 6
PAINLEVEL_OUTOF10: 8
PAINLEVEL_OUTOF10: 2
PAINLEVEL_OUTOF10: 8

## 2022-11-01 ASSESSMENT — PAIN DESCRIPTION - LOCATION
LOCATION: HEAD

## 2022-11-01 NOTE — DISCHARGE INSTRUCTIONS
FOLLOW-UP APPOINTMENTS    [facility] OFFICE - Keep follow-up appointment on January 30th at 9:15am with Kisha Aguilar [facility], [facility]. [facility],  [facility], [address] [place] [address] [address], [address] [facility], [address] [place] [address]. Office #: [phone]. If you are unable to make this appointment, please call to reschedule. Directions to [place] [address] towards [place]. [address] [facility] exit. Right off exit. Cross over [employer]. Right on [address]. Left into hospital. Follow the signs to the emergency room ( turn left toward the Emergency room). Go right at the first stop sign. Just past the Emergency room at the second stop sign turn right and go up the ramp and park on the top level if possible. Go in the glass doors of the [facility] we on the top level of the garage [address]. As soon as you get in the door turn left and our office is the one with the glass doors.

## 2022-11-01 NOTE — PROGRESS NOTES
After night meds were given, patient stated he took his home dose of flomax and verapamil. Tikosyn given as prescribed. Will continue to monitor.

## 2022-11-01 NOTE — PROGRESS NOTES
[facility]     Electrophysiology                                     Progress Note    Admission date:  10/31/2022    Reason for follow up visit: AF    HPI/CC: Charla Maloney was admitted on 10/31/2022 to start Tikosyn for atrial fibrillation. He converted to sinus rhythm at 0402 this morning. EKG in sinus showed a prolonged QTc. This morning's dose was held. EKG at 1100 showed that prolonged QTc had resolved and Tikosyn was resumed. Rhythm has been sinus/SB. Subjective: He complains of a headache and some disorientation. Reports he did not sleep well last night. He reports that he can tell he is no longer in AF as he does not feel fluttering or palpitations. Vitals:  Blood pressure 121/81, pulse 59, temperature 98.1 °F (36.7 °C), temperature source Oral, resp. rate 16, height 5' 11\" (1.803 m), weight 200 lb (90.7 kg), SpO2 98 %.   Temp  Av.1 °F (36.7 °C)  Min: 97.6 °F (36.4 °C)  Max: 98.7 °F (37.1 °C)  Pulse  Av.8  Min: 53  Max: 66  BP  Min: 98/63  Max: 132/76  SpO2  Av.8 %  Min: 95 %  Max: 98 %    24 hour I/O  No intake or output data in the 24 hours ending 22 1105  Current Facility-Administered Medications   Medication Dose Route Frequency Provider Last Rate Last Admin    [Held by provider] dofetilide (TIKOSYN) capsule 500 mcg  500 mcg Oral 2 times per day PARI Zheng - CNP        apixaban (ELIQUIS) tablet 5 mg  5 mg Oral BID Socorro Sanches APRN - CNP   5 mg at 22 0925    b complex-C-folic acid (NEPHROCAPS) capsule 1 mg  1 capsule Oral Daily Socorro Sanches APRN - CNP   1 mg at 22 0925    butalbital-APAP-caffeine -40 MG per capsule 1 capsule  1 capsule Oral Q12H PRN Socorro Sanches APRN - CNP        Vitamin D (CHOLECALCIFEROL) tablet 1,000 Units  1,000 Units Oral Daily Socorro Sanches APRN - CNP   1,000 Units at 22 0925    cyclobenzaprine (FLEXERIL) tablet 10 mg  10 mg Oral BID PRN PARI Zheng CNP        pantoprazole (PROTONIX) tablet 40 mg  40 mg Oral QAM AC Socorro Sanches APRN - CNP        pravastatin (PRAVACHOL) tablet 20 mg  20 mg Oral QPM Socorro Sanches APRN - CNP        tamsulosin (FLOMAX) capsule 0.4 mg  0.4 mg Oral Daily Socorro Sanches APRN - CNP   0.4 mg at 10/31/22 2001    sodium chloride flush 0.9 % injection 5-40 mL  5-40 mL IntraVENous 2 times per day Socorro Malmedahl, APRN - CNP   10 mL at 11/01/22 0925    sodium chloride flush 0.9 % injection 5-40 mL  5-40 mL IntraVENous PRN Socorro Malmedahl, APRN - CNP        0.9 % sodium chloride infusion   IntraVENous PRN Socorro Rileyahl, APRN - CNP        acetaminophen (TYLENOL) tablet 650 mg  650 mg Oral Q6H PRN Socorro Leel, APRN - CNP        Or    acetaminophen (TYLENOL) suppository 650 mg  650 mg Rectal Q6H PRN Socorro Leel, APRN - CNP        polyethylene glycol (GLYCOLAX) packet 17 g  17 g Oral Daily PRN Socorro Leel, APRN - CNP        HYDROcodone-acetaminophen (NORCO) 5-325 MG per tablet 1 tablet  1 tablet Oral Q6H PRN Michelle Givens MD   1 tablet at 11/01/22 0640       Objective:     Telemetry monitor: SR/SB    Physical Exam:  Constitutional and general appearance: alert, cooperative, no distress, and appears stated age  HEENT: PERRL, no cervical lymphadenopathy. No masses palpable. Normal oral mucosa  Respiratory:  Normal excursion and expansion without use of accessory muscles  Resp auscultation: Normal breath sounds without wheezing, rhonchi, and rales  Cardiovascular:   The apical impulse is not displaced  Heart tones are crisp and normal. regular S1 and S2.  Jugular venous pulsation Normal  The carotid upstroke is normal in amplitude and contour without delay or bruit  Peripheral pulses are symmetrical and full   Abdomen:  No masses or tenderness  Bowel sounds present  Extremities:   No cyanosis or clubbing   trace lower extremity edema, L > R   Skin: warm and dry  Neurological:  Alert and oriented  Moves all extremities well  No abnormalities of mood, affect, memory, mentation, or behavior are noted    Data      Stress 6/2022:   Summary    Normal myocardial perfusion during rest and stress with normal left    ventricular function/wall motion. The left ventricular size is mildly dilated. The estimated left ventricular function is 63%. Echo 10/2019:      Summary   Normal systolic function with an estimated ejection fraction of 55-60%. Moderate concentric left ventricular hypertrophy ( septal wall is more   increased in size (1.6 cm) ). No regional wall motion abnormalities are seen. Normal left ventricular diastolic filling pressure. The left atrium is severely dilated. The right atrium is mildly dilated. Mild aortic regurgitation. Mild mitral and pulmonic regurgitation. All labs and testing reviewed.   Lab Review     Renal Profile:   Lab Results   Component Value Date/Time    CREATININE 0.9 11/01/2022 05:06 AM    BUN 21 11/01/2022 05:06 AM     11/01/2022 05:06 AM    K 4.0 11/01/2022 05:06 AM     11/01/2022 05:06 AM    CO2 25 11/01/2022 05:06 AM     CBC:    Lab Results   Component Value Date/Time    WBC 3.7 12/15/2021 02:52 PM    RBC 4.78 12/15/2021 02:52 PM    HGB 13.6 12/15/2021 02:52 PM    HCT 42.9 12/15/2021 02:52 PM    MCV 89.6 12/15/2021 02:52 PM    RDW 16.6 12/15/2021 02:52 PM     12/15/2021 02:52 PM     BNP:    Lab Results   Component Value Date/Time     01/03/2014 04:00 PM     Fasting Lipid Panel:    Lab Results   Component Value Date/Time    CHOL 209 08/22/2022 02:51 PM    HDL 52 08/22/2022 02:51 PM    HDL 51 12/19/2011 10:03 AM    TRIG 92 08/22/2022 02:51 PM     Cardiac Enzymes:  CK/MbTroponin  Lab Results   Component Value Date/Time    CKTOTAL 199 08/23/2021 11:33 AM    TROPONINI <0.01 03/04/2021 06:26 PM     PT/ INR   Lab Results   Component Value Date/Time    INR 1.18 02/02/2020 04:58 AM    INR 1.14 02/01/2020 02:17 PM    INR 0.96 01/03/2014 04:00 PM    PROTIME 13.7 02/02/2020 04:58 AM    PROTIME 13.2 02/01/2020 02:17 PM    PROTIME 10.8 01/03/2014 04:00 PM     PTT No results found for: PTT   Lab Results   Component Value Date/Time    MG 2.10 11/01/2022 05:06 AM      Lab Results   Component Value Date/Time    TSH 2.95 12/15/2021 02:52 PM       Assessment:  Paroxsymal atrial fibrillation (formerly persistent): stable              -PAF on most recent device interrogation               -[id number] score 0              -amiodarone started 10/2017, stopped 5/2022 due to age     -AF recurrent 8/25/2022 (noted on ILR)    -Tikosyn started 10/31/2022 with conversion to SR after 2 doses   Prolonged QTc: resolved   RBBB  PVC's: stable   NSVT: stable               -noted on event monitor 7/2020  HLD  S/P loop recorder implant               -device check per HPI  HOCM              -per cardiac MRI 2018 without LVOT obstruction   Chronic pain   Sleep apnea   Headache       Plan:   1. Continue Tikosyn 500 mcg po BID  2. Continue Eliquis   3. Verapamil discontinued   4. Daily BMP and magnesium  5. Keep K+ over 4 and mag over 2  6. EKG 2 hours after the first 5 doses  6.  Continuous telemetry monitoring    EKG and Tikosyn dosing reviewed with Dr. Heather Carmona    Disposition: 1-2 days     PARI Cruz-CNP  [facility]  [phone]

## 2022-11-02 LAB
ANION GAP SERPL CALCULATED.3IONS-SCNC: 6 MMOL/L (ref 3–16)
BUN BLDV-MCNC: 20 MG/DL (ref 7–20)
CALCIUM SERPL-MCNC: 8.5 MG/DL (ref 8.3–10.6)
CHLORIDE BLD-SCNC: 106 MMOL/L (ref 99–110)
CO2: 29 MMOL/L (ref 21–32)
CREAT SERPL-MCNC: 0.9 MG/DL (ref 0.8–1.3)
EKG ATRIAL RATE: 58 BPM
EKG ATRIAL RATE: 60 BPM
EKG ATRIAL RATE: 66 BPM
EKG ATRIAL RATE: 67 BPM
EKG ATRIAL RATE: 67 BPM
EKG DIAGNOSIS: NORMAL
EKG P AXIS: 56 DEGREES
EKG P AXIS: 60 DEGREES
EKG P AXIS: 61 DEGREES
EKG P AXIS: 75 DEGREES
EKG P AXIS: 79 DEGREES
EKG P-R INTERVAL: 156 MS
EKG P-R INTERVAL: 158 MS
EKG P-R INTERVAL: 160 MS
EKG P-R INTERVAL: 168 MS
EKG P-R INTERVAL: 192 MS
EKG Q-T INTERVAL: 438 MS
EKG Q-T INTERVAL: 468 MS
EKG Q-T INTERVAL: 478 MS
EKG Q-T INTERVAL: 498 MS
EKG Q-T INTERVAL: 514 MS
EKG QRS DURATION: 122 MS
EKG QRS DURATION: 124 MS
EKG QRS DURATION: 126 MS
EKG QRS DURATION: 128 MS
EKG QRS DURATION: 128 MS
EKG QTC CALCULATION (BAZETT): 459 MS
EKG QTC CALCULATION (BAZETT): 478 MS
EKG QTC CALCULATION (BAZETT): 494 MS
EKG QTC CALCULATION (BAZETT): 504 MS
EKG QTC CALCULATION (BAZETT): 526 MS
EKG R AXIS: 23 DEGREES
EKG R AXIS: 25 DEGREES
EKG R AXIS: 28 DEGREES
EKG R AXIS: 33 DEGREES
EKG R AXIS: 37 DEGREES
EKG T AXIS: 109 DEGREES
EKG T AXIS: 121 DEGREES
EKG T AXIS: 122 DEGREES
EKG T AXIS: 75 DEGREES
EKG T AXIS: 81 DEGREES
EKG VENTRICULAR RATE: 58 BPM
EKG VENTRICULAR RATE: 60 BPM
EKG VENTRICULAR RATE: 66 BPM
EKG VENTRICULAR RATE: 67 BPM
EKG VENTRICULAR RATE: 67 BPM
GFR SERPL CREATININE-BSD FRML MDRD: >60 ML/MIN/{1.73_M2}
GLUCOSE BLD-MCNC: 104 MG/DL (ref 70–99)
MAGNESIUM: 2 MG/DL (ref 1.8–2.4)
POTASSIUM REFLEX MAGNESIUM: 4.1 MMOL/L (ref 3.5–5.1)
SODIUM BLD-SCNC: 141 MMOL/L (ref 136–145)

## 2022-11-02 PROCEDURE — 6370000000 HC RX 637 (ALT 250 FOR IP): Performed by: NURSE PRACTITIONER

## 2022-11-02 PROCEDURE — 83735 ASSAY OF MAGNESIUM: CPT

## 2022-11-02 PROCEDURE — 93010 ELECTROCARDIOGRAM REPORT: CPT | Performed by: INTERNAL MEDICINE

## 2022-11-02 PROCEDURE — 2060000000 HC ICU INTERMEDIATE R&B

## 2022-11-02 PROCEDURE — 80048 BASIC METABOLIC PNL TOTAL CA: CPT

## 2022-11-02 PROCEDURE — 99233 SBSQ HOSP IP/OBS HIGH 50: CPT | Performed by: NURSE PRACTITIONER

## 2022-11-02 PROCEDURE — 93005 ELECTROCARDIOGRAM TRACING: CPT | Performed by: INTERNAL MEDICINE

## 2022-11-02 PROCEDURE — 93005 ELECTROCARDIOGRAM TRACING: CPT | Performed by: NURSE PRACTITIONER

## 2022-11-02 PROCEDURE — 36415 COLL VENOUS BLD VENIPUNCTURE: CPT

## 2022-11-02 RX ORDER — DOFETILIDE 0.25 MG/1
500 CAPSULE ORAL EVERY 12 HOURS SCHEDULED
Status: DISCONTINUED | OUTPATIENT
Start: 2022-11-02 | End: 2022-11-02

## 2022-11-02 RX ORDER — DOFETILIDE 0.25 MG/1
250 CAPSULE ORAL EVERY 12 HOURS SCHEDULED
Status: DISCONTINUED | OUTPATIENT
Start: 2022-11-02 | End: 2022-11-02

## 2022-11-02 RX ORDER — DOFETILIDE 0.25 MG/1
500 CAPSULE ORAL EVERY 12 HOURS SCHEDULED
Status: DISCONTINUED | OUTPATIENT
Start: 2022-11-02 | End: 2022-11-03

## 2022-11-02 RX ADMIN — TAMSULOSIN HYDROCHLORIDE 0.4 MG: 0.4 CAPSULE ORAL at 20:31

## 2022-11-02 RX ADMIN — DOFETILIDE 500 MCG: 0.25 CAPSULE ORAL at 22:24

## 2022-11-02 RX ADMIN — APIXABAN 5 MG: 5 TABLET, FILM COATED ORAL at 20:31

## 2022-11-02 RX ADMIN — Medication 1000 UNITS: at 10:20

## 2022-11-02 RX ADMIN — DOFETILIDE 500 MCG: 0.25 CAPSULE ORAL at 10:20

## 2022-11-02 ASSESSMENT — PAIN SCALES - GENERAL
PAINLEVEL_OUTOF10: 0
PAINLEVEL_OUTOF10: 0

## 2022-11-02 NOTE — PROGRESS NOTES
Patient has his own medications (from home) in his room and took his own morning meds without informing RN. Eva Carvalho NP, made aware.

## 2022-11-02 NOTE — PLAN OF CARE
Problem: Discharge Planning  Goal: Discharge to home or other facility with appropriate resources  11/2/2022 1139 by Blanche Valdez RN  Outcome: Progressing  Flowsheets (Taken 11/2/2022 0945)  Discharge to home or other facility with appropriate resources:   Identify barriers to discharge with patient and caregiver   Arrange for needed discharge resources and transportation as appropriate   Identify discharge learning needs (meds, wound care, etc)   Arrange for interpreters to assist at discharge as needed   Refer to discharge planning if patient needs post-hospital services based on physician order or complex needs related to functional status, cognitive ability or social support system  11/2/2022 0545 by Daniele Mcpherson RN  Outcome: Progressing     Problem: Pain  Goal: Verbalizes/displays adequate comfort level or baseline comfort level  11/2/2022 1139 by Blanche Valdez RN  Outcome: Progressing  11/2/2022 0545 by Daniele Mcpherson RN  Outcome: Progressing

## 2022-11-02 NOTE — PROGRESS NOTES
[facility]     Electrophysiology                                     Progress Note    Admission date:  10/31/2022    Reason for follow up visit: AF    HPI/CC: Chauncey Ryder. was admitted on 10/31/2022 to start Tikosyn for atrial fibrillation. He converted to sinus rhythm at 0402 this morning. EKG in sinus showed a prolonged QTc. This morning's dose was held. EKG at 1100 showed that prolonged QTc had resolved and Tikosyn was resumed. Today, QTc prolonged to 507 ms after 5th dose however it was discovered that patient has been taking his home medications here in the hospital which includes verapamil which was stopped at admission. Rhythm has been sinus. Subjective: He reports his headache has subsided. He reports he feels better in sinus. Denies chest pain, palpitations, shortness of breath, and dizziness. Vitals:  Blood pressure 128/73, pulse 63, temperature 97.6 °F (36.4 °C), resp. rate 16, height 5' 11\" (1.803 m), weight 197 lb 6.4 oz (89.5 kg), SpO2 97 %.   Temp  Av.1 °F (36.7 °C)  Min: 97.6 °F (36.4 °C)  Max: 99 °F (37.2 °C)  Pulse  Av  Min: 61  Max: 77  BP  Min: 103/64  Max: 130/80  SpO2  Av.6 %  Min: 94 %  Max: 97 %    24 hour I/O    Intake/Output Summary (Last 24 hours) at 2022 1455  Last data filed at 2022 0541  Gross per 24 hour   Intake 240 ml   Output --   Net 240 ml     Current Facility-Administered Medications   Medication Dose Route Frequency Provider Last Rate Last Admin    dofetilide (TIKOSYN) capsule 250 mcg  250 mcg Oral 2 times per day Socorro Sanches APRN - CNP        apixaban (ELIQUIS) tablet 5 mg  5 mg Oral BID PARI Zheng - CNP   5 mg at 22 0925    b complex-C-folic acid (NEPHROCAPS) capsule 1 mg  1 capsule Oral Daily Socorro Sanches APRN - CNP   1 mg at 22    butalbital-acetaminophen-caffeine (FIORICET, ESGIC) per tablet 1 tablet  1 tablet Oral Q12H PRN PARI Zheng - CNP   1 tablet at 22 1238    Vitamin D (CHOLECALCIFEROL) tablet 1,000 Units  1,000 Units Oral Daily Socorro Leel, APRN - CNP   1,000 Units at 11/02/22 1020    cyclobenzaprine (FLEXERIL) tablet 10 mg  10 mg Oral BID PRN Socorro Malmedahl, APRN - CNP        pantoprazole (PROTONIX) tablet 40 mg  40 mg Oral QAM AC Socorro Malmedahl, APRN - CNP        pravastatin (PRAVACHOL) tablet 20 mg  20 mg Oral QPM Socorro Malmedahl, APRN - CNP        tamsulosin (FLOMAX) capsule 0.4 mg  0.4 mg Oral Daily Socorro Malmedahl, APRN - CNP   0.4 mg at 10/31/22 2001    sodium chloride flush 0.9 % injection 5-40 mL  5-40 mL IntraVENous 2 times per day Socorro Malmedahl, APRN - CNP   10 mL at 11/01/22 0925    sodium chloride flush 0.9 % injection 5-40 mL  5-40 mL IntraVENous PRN Socorro Malmedahl, APRN - CNP        0.9 % sodium chloride infusion   IntraVENous PRN Socorro Malmedahl, APRN - CNP        acetaminophen (TYLENOL) tablet 650 mg  650 mg Oral Q6H PRN Socorro Malmedahl, APRN - CNP        Or    acetaminophen (TYLENOL) suppository 650 mg  650 mg Rectal Q6H PRN Socorro Malmedahl, APRN - CNP        polyethylene glycol (GLYCOLAX) packet 17 g  17 g Oral Daily PRN Socorro Malmedahl, APRN - CNP        HYDROcodone-acetaminophen (NORCO) 5-325 MG per tablet 1 tablet  1 tablet Oral Q6H PRN Daniele Duenas MD   1 tablet at 11/01/22 0640       Objective:     Telemetry monitor: SR/SB    Physical Exam:  Constitutional and general appearance: alert, cooperative, no distress, and appears stated age  HEENT: PERRL, no cervical lymphadenopathy. No masses palpable. Normal oral mucosa  Respiratory:  Normal excursion and expansion without use of accessory muscles  Resp auscultation: Normal breath sounds without wheezing, rhonchi, and rales  Cardiovascular:   The apical impulse is not displaced  Heart tones are crisp and normal. regular S1 and S2.  Jugular venous pulsation Normal  The carotid upstroke is normal in amplitude and contour without delay or bruit  Peripheral pulses are symmetrical and full   Abdomen:  No masses or tenderness  Bowel sounds present  Extremities:   No cyanosis or clubbing   trace lower extremity edema, L > R   Skin: warm and dry  Neurological:  Alert and oriented  Moves all extremities well  No abnormalities of mood, affect, memory, mentation, or behavior are noted    Data      Stress 6/2022:   Summary    Normal myocardial perfusion during rest and stress with normal left    ventricular function/wall motion. The left ventricular size is mildly dilated. The estimated left ventricular function is 63%. Echo 10/2019:      Summary   Normal systolic function with an estimated ejection fraction of 55-60%. Moderate concentric left ventricular hypertrophy ( septal wall is more   increased in size (1.6 cm) ). No regional wall motion abnormalities are seen. Normal left ventricular diastolic filling pressure. The left atrium is severely dilated. The right atrium is mildly dilated. Mild aortic regurgitation. Mild mitral and pulmonic regurgitation. All labs and testing reviewed.   Lab Review     Renal Profile:   Lab Results   Component Value Date/Time    CREATININE 0.9 11/02/2022 05:12 AM    BUN 20 11/02/2022 05:12 AM     11/02/2022 05:12 AM    K 4.1 11/02/2022 05:12 AM     11/02/2022 05:12 AM    CO2 29 11/02/2022 05:12 AM     CBC:    Lab Results   Component Value Date/Time    WBC 3.7 12/15/2021 02:52 PM    RBC 4.78 12/15/2021 02:52 PM    HGB 13.6 12/15/2021 02:52 PM    HCT 42.9 12/15/2021 02:52 PM    MCV 89.6 12/15/2021 02:52 PM    RDW 16.6 12/15/2021 02:52 PM     12/15/2021 02:52 PM     BNP:    Lab Results   Component Value Date/Time     01/03/2014 04:00 PM     Fasting Lipid Panel:    Lab Results   Component Value Date/Time    CHOL 209 08/22/2022 02:51 PM    HDL 52 08/22/2022 02:51 PM    HDL 51 12/19/2011 10:03 AM    TRIG 92 08/22/2022 02:51 PM     Cardiac Enzymes:  CK/MbTroponin  Lab Results   Component Value Date/Time    CKTOTAL 199 08/23/2021 11:33 AM    TROPONINI <0.01 03/04/2021 06:26 PM     PT/ INR   Lab Results   Component Value Date/Time    INR 1.18 02/02/2020 04:58 AM    INR 1.14 02/01/2020 02:17 PM    INR 0.96 01/03/2014 04:00 PM    PROTIME 13.7 02/02/2020 04:58 AM    PROTIME 13.2 02/01/2020 02:17 PM    PROTIME 10.8 01/03/2014 04:00 PM     PTT No results found for: PTT   Lab Results   Component Value Date/Time    MG 2.00 11/02/2022 05:12 AM      Lab Results   Component Value Date/Time    TSH 2.95 12/15/2021 02:52 PM       Assessment:  Paroxsymal atrial fibrillation (formerly persistent): stable              -PAF on most recent device interrogation               -[id number] score 0              -amiodarone started 10/2017, stopped 5/2022 due to age     -AF recurrent 8/25/2022 (noted on ILR)    -Tikosyn started 10/31/2022 with conversion to SR after 2 doses   Prolonged QTc: resolved   RBBB  PVC's: stable   NSVT: stable               -noted on event monitor 7/2020  HLD  S/P loop recorder implant               -device check per HPI  HOCM              -per cardiac MRI 2018 without LVOT obstruction   Chronic pain   Sleep apnea   Headache       Plan:   1. EKG at 1500. If QTc duration has decreased, can consider Tikosyn 500 mcg this evening and discharge tomorrow. 2. Continue Eliquis   3. Patient understands that he is not to take his home medications here in the hospital and he is not to resume verapamil at discharge. 4. Daily BMP and magnesium  5. Keep K+ over 4 and mag over 2  6. EKG 2 hours after the first 5 doses  7.  Continuous telemetry monitoring    EKG and Tikosyn dosing reviewed with Dr. Rah Ya    Disposition: tomorrow     PARI Bernstein-CNP  [facility]  [phone]

## 2022-11-03 ENCOUNTER — APPOINTMENT (OUTPATIENT)
Dept: CARDIAC CATH/INVASIVE PROCEDURES | Age: 62
DRG: 310 | End: 2022-11-03
Attending: INTERNAL MEDICINE
Payer: COMMERCIAL

## 2022-11-03 VITALS
DIASTOLIC BLOOD PRESSURE: 94 MMHG | HEART RATE: 66 BPM | TEMPERATURE: 97.6 F | SYSTOLIC BLOOD PRESSURE: 132 MMHG | RESPIRATION RATE: 16 BRPM | BODY MASS INDEX: 27.64 KG/M2 | OXYGEN SATURATION: 98 % | HEIGHT: 71 IN | WEIGHT: 197.4 LBS

## 2022-11-03 LAB
ANION GAP SERPL CALCULATED.3IONS-SCNC: 7 MMOL/L (ref 3–16)
BUN BLDV-MCNC: 17 MG/DL (ref 7–20)
CALCIUM SERPL-MCNC: 8.8 MG/DL (ref 8.3–10.6)
CHLORIDE BLD-SCNC: 106 MMOL/L (ref 99–110)
CO2: 30 MMOL/L (ref 21–32)
CREAT SERPL-MCNC: 1 MG/DL (ref 0.8–1.3)
EKG ATRIAL RATE: 267 BPM
EKG ATRIAL RATE: 69 BPM
EKG DIAGNOSIS: NORMAL
EKG DIAGNOSIS: NORMAL
EKG Q-T INTERVAL: 430 MS
EKG Q-T INTERVAL: 474 MS
EKG QRS DURATION: 134 MS
EKG QRS DURATION: 138 MS
EKG QTC CALCULATION (BAZETT): 464 MS
EKG QTC CALCULATION (BAZETT): 485 MS
EKG R AXIS: 25 DEGREES
EKG R AXIS: 31 DEGREES
EKG T AXIS: 113 DEGREES
EKG T AXIS: 155 DEGREES
EKG VENTRICULAR RATE: 63 BPM
EKG VENTRICULAR RATE: 70 BPM
GFR SERPL CREATININE-BSD FRML MDRD: >60 ML/MIN/{1.73_M2}
GLUCOSE BLD-MCNC: 104 MG/DL (ref 70–99)
MAGNESIUM: 2 MG/DL (ref 1.8–2.4)
POTASSIUM REFLEX MAGNESIUM: 4.5 MMOL/L (ref 3.5–5.1)
SODIUM BLD-SCNC: 143 MMOL/L (ref 136–145)

## 2022-11-03 PROCEDURE — 93005 ELECTROCARDIOGRAM TRACING: CPT | Performed by: INTERNAL MEDICINE

## 2022-11-03 PROCEDURE — 92960 CARDIOVERSION ELECTRIC EXT: CPT

## 2022-11-03 PROCEDURE — 93010 ELECTROCARDIOGRAM REPORT: CPT | Performed by: INTERNAL MEDICINE

## 2022-11-03 PROCEDURE — 6370000000 HC RX 637 (ALT 250 FOR IP): Performed by: NURSE PRACTITIONER

## 2022-11-03 PROCEDURE — 92960 CARDIOVERSION ELECTRIC EXT: CPT | Performed by: INTERNAL MEDICINE

## 2022-11-03 PROCEDURE — 83735 ASSAY OF MAGNESIUM: CPT

## 2022-11-03 PROCEDURE — 5A2204Z RESTORATION OF CARDIAC RHYTHM, SINGLE: ICD-10-PCS | Performed by: INTERNAL MEDICINE

## 2022-11-03 PROCEDURE — 36415 COLL VENOUS BLD VENIPUNCTURE: CPT

## 2022-11-03 PROCEDURE — 80048 BASIC METABOLIC PNL TOTAL CA: CPT

## 2022-11-03 PROCEDURE — 6360000002 HC RX W HCPCS

## 2022-11-03 PROCEDURE — 99239 HOSP IP/OBS DSCHRG MGMT >30: CPT | Performed by: NURSE PRACTITIONER

## 2022-11-03 PROCEDURE — 2500000003 HC RX 250 WO HCPCS

## 2022-11-03 PROCEDURE — 2580000003 HC RX 258: Performed by: NURSE PRACTITIONER

## 2022-11-03 PROCEDURE — 99152 MOD SED SAME PHYS/QHP 5/>YRS: CPT | Performed by: INTERNAL MEDICINE

## 2022-11-03 RX ORDER — VERAPAMIL HYDROCHLORIDE 240 MG/1
240 TABLET, FILM COATED, EXTENDED RELEASE ORAL NIGHTLY
Status: DISCONTINUED | OUTPATIENT
Start: 2022-11-03 | End: 2022-11-03 | Stop reason: HOSPADM

## 2022-11-03 RX ADMIN — PANTOPRAZOLE SODIUM 40 MG: 40 TABLET, DELAYED RELEASE ORAL at 09:08

## 2022-11-03 RX ADMIN — DOFETILIDE 500 MCG: 0.25 CAPSULE ORAL at 09:08

## 2022-11-03 RX ADMIN — Medication 1000 UNITS: at 09:08

## 2022-11-03 RX ADMIN — SODIUM CHLORIDE, PRESERVATIVE FREE 10 ML: 5 INJECTION INTRAVENOUS at 09:08

## 2022-11-03 RX ADMIN — APIXABAN 5 MG: 5 TABLET, FILM COATED ORAL at 09:08

## 2022-11-03 RX ADMIN — NEPHROCAP 1 MG: 1 CAP ORAL at 09:15

## 2022-11-03 NOTE — CARE COORDINATION
11/3/22 Pt from home with spouse, JOYCE, here for Tikosyn dosing, was anticipating dc today, looks like pt went into A-flutter yesterday evening and is NPO today? Please notify CM should any needs arise.

## 2022-11-03 NOTE — PLAN OF CARE
Problem: Discharge Planning  Goal: Discharge to home or other facility with appropriate resources  11/3/2022 1245 by Diaz Butler RN  Outcome: Adequate for Discharge  11/3/2022 1048 by Diaz Butler RN  Outcome: Progressing  Flowsheets (Taken 11/3/2022 0830)  Discharge to home or other facility with appropriate resources: Identify barriers to discharge with patient and caregiver     Problem: Pain  Goal: Verbalizes/displays adequate comfort level or baseline comfort level  11/3/2022 1245 by Diaz Butler RN  Outcome: Adequate for Discharge  11/3/2022 1048 by Diaz Butler RN  Outcome: Progressing     Problem: Cardiovascular - Adult  Goal: Maintains optimal cardiac output and hemodynamic stability  11/3/2022 1245 by Diaz Butler RN  Outcome: Adequate for Discharge  11/3/2022 1048 by Diaz Butler RN  Outcome: Progressing  Goal: Absence of cardiac dysrhythmias or at baseline  11/3/2022 1245 by Diaz Butler RN  Outcome: Adequate for Discharge  11/3/2022 1048 by Diaz Butler RN  Outcome: Progressing

## 2022-11-03 NOTE — PROCEDURES
Anesthesia: versed 1 mg, brevital 45 mg  Complications: none apparent  Findings: successful conversion to sinus rhythm with 150 J synchronized CV shock with early recurrence of AF. A 2nd 150 J synchronized shock was administered with successful conversion to SR and prompt recurrence of AF. Uneventful recovery. Plan to D/C dofetilide and consider PVI.

## 2022-11-03 NOTE — DISCHARGE SUMMARY
Patient ID:  Contreras Rehman  [id number]  58 y.o.  1960    Admit date: 10/31/2022    Discharge date and time: 11/3/2022    Admitting Physician: Nick Perkins MD     Discharge NP: Williams Moon CNP    Admission Diagnoses: Atrial fibrillation [facility]) [I48.91]  Atrial fibrillation, unspecified type [facility]) [I48.91]    Discharge Diagnoses: SAME    Admission Condition: fair    Discharged Condition: good    Hospital Course: Contreras Payne was admitted on 10/31/2022 to start Tikosyn for atrial fibrillation. He converted to sinus rhythm after 2 doses however reverted to atrial fibrillation on 11/2/2022. On 11/3/2022, he had a cardioversion but converted back to AF after a minute. He complains of palpitations but denies chest pain, shortness of breath and dizziness.          ASSESSMENT:   Persistent atrial fibrillation: stable              -[id number] score 0              -amiodarone started 10/2017, stopped 5/2022 due to age                -AF recurrent 8/25/2022 (noted on ILR)               -Tikosyn started 10/31/2022 with conversion to SR after 2 doses but AF recurrent 11/2/2022   -s/p unsuccessful DCCV 11/3/2022  RBBB  PVC's: stable   NSVT: stable               -noted on event monitor 7/2020  HLD  S/P loop recorder implant               -device check per HPI  HOCM              -per cardiac MRI 2018 without LVOT obstruction   Chronic pain   Sleep apnea   Headache     PLAN:   Continue Eliquis   Resume verapamil tomorrow   Will arrange for patient to follow up with Dr. Everett Mao or Dr. Wagner Santana to discuss ablation      Discharge Exam:  BP (!) 132/94   Pulse 66   Temp 97.6 °F (36.4 °C) (Oral)   Resp 16   Ht 5' 11\" (1.803 m)   Wt 197 lb 6.4 oz (89.5 kg)   SpO2 98%   BMI 27.53 kg/m²     General Appearance:    Alert, cooperative, no distress, appears stated age   Head:    Normocephalic, without obvious abnormality, atraumatic   Eyes:    PERRL, conjunctiva/corneas clear, EOM's intact        Ears:    deferred Nose:   Nares normal, septum midline, mucosa normal, no drainage    or sinus tenderness   Throat:   Lips, mucosa, and tongue normal; teeth and gums normal   Neck:   Supple, symmetrical, trachea midline, no adenopathy;        thyroid:  No enlargement/tenderness/nodules; no carotid    bruit or JVD   Back:     Symmetric, no curvature, ROM normal, no CVA tenderness   Lungs:     Clear to auscultation bilaterally, respirations unlabored   Chest wall:    No tenderness or deformity   Heart:    Irregular rate and rhythm , S1 and S2 normal, no murmur, rub   or gallop   Abdomen:     Soft, non-tender, bowel sounds active all four quadrants,     no masses, no organomegaly   Genitalia:    deferred   Rectal:    deferred    Extremities:   Extremities normal, atraumatic, no cyanosis or edema   Pulses:   2+ and symmetric all extremities   Skin:   Skin color, texture, turgor normal, no rashes or lesions   Lymph nodes:   Cervical, supraclavicular, and axillary nodes normal   Neurologic:   CNII-XII intact.  Normal strength, sensation and reflexes       throughout     Disposition: home    Patient Instructions:      Medication List        CONTINUE taking these medications      apixaban 5 MG Tabs tablet  Commonly known as: Eliquis  TAKE 1 TABLET BY MOUTH TWICE DAILY     b complex vitamins capsule     B-12 PO     clonazePAM 1 MG tablet  Commonly known as: KLONOPIN  TAKE 1 TABLET BY MOUTH EVERY NIGHT AS NEEDED     omeprazole 40 MG delayed release capsule  Commonly known as: PRILOSEC  Take 1 capsule by mouth daily     tamsulosin 0.4 MG capsule  Commonly known as: FLOMAX     verapamil 240 MG extended release capsule  Commonly known as: VERELAN  TAKE 1 CAPSULE BY MOUTH EVERY NIGHT            STOP taking these medications      CYCLOBENZAPRINE HCL PO     methylPREDNISolone 4 MG tablet  Commonly known as: MEDROL DOSEPACK     ZINC 15 PO            ASK your doctor about these medications      butalbital-APAP-caffeine -40 MG Caps per capsule Cholecalciferol 25 MCG (1000 UT) Chew     pravastatin 20 MG tablet  Commonly known as: Pravachol  Take 1 tablet by mouth every evening              Activity: as tolerated  Diet: cardiac diet        PARI Zheng-MATT  [employer]  [phone]     Time spent on discharge of patient: 35 minutes, including plan of care, patient education, and care coordination.

## 2022-11-03 NOTE — SEDATION DOCUMENTATION
Sedation start time: 1022  Sedation stop time: 1034  Mallampati: 3  Pre and post Ruth score: 10/10  Medication given: IV Versed 1 mg, IV Brevital 45 mg  Pre-Procedure Assessment / Plan:  ASA: Class 2 - A normal healthy patient with mild systemic disease  Level of Sedation Plan:Deep sedation  Post Procedure plan: Return to same level of care     An independent trained observer pushed medications at my direction. We monitored the patient's level of consciousness and vital signs/physiologic status throughout the procedure duration (see start and start times above).

## 2022-11-03 NOTE — PLAN OF CARE
Problem: Discharge Planning  Goal: Discharge to home or other facility with appropriate resources  Outcome: Progressing  Flowsheets (Taken 11/3/2022 0830)  Discharge to home or other facility with appropriate resources: Identify barriers to discharge with patient and caregiver     Problem: Pain  Goal: Verbalizes/displays adequate comfort level or baseline comfort level  Outcome: Progressing     Problem: Cardiovascular - Adult  Goal: Maintains optimal cardiac output and hemodynamic stability  Outcome: Progressing  Goal: Absence of cardiac dysrhythmias or at baseline  Outcome: Progressing

## 2022-11-04 ENCOUNTER — TELEPHONE (OUTPATIENT)
Dept: CARDIOLOGY CLINIC | Age: 62
End: 2022-11-04

## 2022-11-04 NOTE — TELEPHONE ENCOUNTER
----- Message from PARI Stephen - CNP sent at 11/3/2022 11:59 AM EDT -----  Regarding: Foolow up  Please add on to [address] [place],[address] or [facility] schedule to discuss ablation. Should be ok to overbook. Failed tikosyn. Thanks.      [place]

## 2022-11-07 ENCOUNTER — TELEPHONE (OUTPATIENT)
Dept: CARDIOLOGY CLINIC | Age: 62
End: 2022-11-07

## 2022-11-07 ENCOUNTER — OFFICE VISIT (OUTPATIENT)
Dept: CARDIOLOGY CLINIC | Age: 62
End: 2022-11-07
Payer: COMMERCIAL

## 2022-11-07 VITALS
SYSTOLIC BLOOD PRESSURE: 116 MMHG | BODY MASS INDEX: 28.08 KG/M2 | DIASTOLIC BLOOD PRESSURE: 82 MMHG | HEART RATE: 70 BPM | WEIGHT: 200.6 LBS | HEIGHT: 71 IN | OXYGEN SATURATION: 98 %

## 2022-11-07 DIAGNOSIS — I48.19 PERSISTENT ATRIAL FIBRILLATION (HCC): Primary | ICD-10-CM

## 2022-11-07 DIAGNOSIS — I48.0 PAF (PAROXYSMAL ATRIAL FIBRILLATION) (HCC): ICD-10-CM

## 2022-11-07 DIAGNOSIS — I42.2 HYPERTROPHIC CARDIOMYOPATHY (HCC): ICD-10-CM

## 2022-11-07 DIAGNOSIS — I48.91 ATRIAL FIBRILLATION, UNSPECIFIED TYPE (HCC): Primary | ICD-10-CM

## 2022-11-07 DIAGNOSIS — I49.3 PVC (PREMATURE VENTRICULAR CONTRACTION): ICD-10-CM

## 2022-11-07 DIAGNOSIS — Z45.09 ENCOUNTER FOR LOOP RECORDER CHECK: ICD-10-CM

## 2022-11-07 PROCEDURE — 99214 OFFICE O/P EST MOD 30 MIN: CPT | Performed by: INTERNAL MEDICINE

## 2022-11-07 PROCEDURE — 93000 ELECTROCARDIOGRAM COMPLETE: CPT | Performed by: INTERNAL MEDICINE

## 2022-11-07 RX ORDER — AMIODARONE HYDROCHLORIDE 200 MG/1
200 TABLET ORAL DAILY
Qty: 7 TABLET | Refills: 0
Start: 2022-11-07 | End: 2022-11-14

## 2022-11-07 ASSESSMENT — ENCOUNTER SYMPTOMS
LEFT EYE: 0
RIGHT EYE: 0
HEMATEMESIS: 0
WHEEZING: 0
SHORTNESS OF BREATH: 0
HEMATOCHEZIA: 0
STRIDOR: 0

## 2022-11-07 NOTE — PATIENT INSTRUCTIONS
Plan:     Discussed risks and benefits of ablation for atrial fibrillation. Patient would like to proceed. We will call you to set up a date and time. Need CT scan prior- this will determine cryotherapy versus radiofrequency. We will call you to set this part up. Start Amiodarone 200 mg daily one week prior to procedure. Continue Eliquis. Follow up after procedure.

## 2022-11-07 NOTE — PROGRESS NOTES
Assessment:     1. Atrial fibrillation: patient has Persistent atrial fibrillation. Associated symptoms: Dizziness, Dyspnea on exertion, Fatigue, and Palpitations     History of cardioversion: Had electrical cardioversion in the past (multiple)  History of AF ablation: No history of atrial fibrillation ablation in the past  History of heart surgery/procedure: yes, cardioversion    Current use of anti-arrhythmic drugs: Not currently on anti-arrhythmic drugs  Previous use of anti-arrhythmic drugs: amiodarone and dofetilide    Overall LV function: Normal left ventricular systolic function  Size of left atrium: Severely dilated LA size  Significant cardiac valvular disease: No significant valve disease  Family history of atrial fibrillation: Unknown    Alcohol consumption: Mild alcohol intake  Caffeine consumption: Mild caffeine intake  Smoking status: non-smoker  Obstructive sleep apnea: No/low suspicion  Exercise status: Occasional exercise, not routine    I had a detailed discussion with the patient about issues related to atrial fibrillation (including etiology, disease progression patterns, stroke risk and rate control issues). Patient had his questions answered to his satisfaction. Patient has hypertrophic cardiomyopathy. Stroke risk is elevated  Longterm anticoagulation is: recommended  Current anticoagulation: Apixaban  Bleeding issues reported: no  Renal function: Normal    In setting of HCM, and now persistent atrial fibrillation, patient is quite symptomatic. He has now failed multiple anti-arrhythmic drug therapy options (most recently dofetilide). Reasonable to proceed to option of AF ablation. I had a discussion with the patient about the option of atrial fibrillation ablation. This included the benefits, risks and alternatives of the procedure and the anticipated success rates.   The details of how the procedure was done were also discussed along with anticipated hospital stay duration and what to expect after being discharged home. In terms of possible risks, we discussed risks that include bleeding, infection, vascular injury, injury to cardiac structures and injury to surrounding structures in the chest.  We also discussed the fact that more life-threatening complications such as stroke, heart attack arm much less common along with life-threatening complications. The patient had an opportunity to ask questions and was satisfied with explanations. Decided to proceed with the option of ablation. The procedure will be scheduled in the near future. Would start on amiodarone one week prior to procedure to maintain sinus rhythm during post ablation blanking period. May need to ease up on verapamil as amiodarone is given. 2. Good blood pressure control today. 3. Hypertrophic, non-obstructive cardiomyopathy: prior cardiac MRI indicating \"late gadolinium enhancement imaging demonstrates areas of heterogeneous hyperenhancement predominantly throughout the basal and mid anterior and septal myocardial segments\". Warrants close monitoring. No personal history of syncope. No noted ventricular arrhythmias. Plan:     Discussed risks and benefits of ablation for atrial fibrillation. Patient would like to proceed. We will call you to set up a date and time. Need CT scan prior- this will determine cryotherapy versus radiofrequency. We will call you to set this part up. Start Amiodarone 200 mg daily one week prior to procedure. Continue Eliquis. Follow up after procedure. Subjective:       Patient ID: Rowena Sutherland. is a 58 y.o. male. Chief Complaint:  Chief Complaint   Patient presents with    New Patient    Atrial Fibrillation       HPI    Patient is a pleasant 58 y.o. male with a PMH significant for PAF, HCM without LVOT gradient. PVC's. An ILR was placed in 12/2021 for ongoing palpitations. Patient was hospitalized on 10/31/2022 for Tikosyn.  Patient underwent DCCV on 11/3/2022 and converted back to AF after one minute. Office Visit (11-7-2022):  Presents today to discuss ablation option for his atrial fibrillation. He reports that he thinks he has been in AF for a few months now. He is fatigued, lightheaded, and has palpitations. He plays baseball and long distance runs and has been unable to do so recently. He is taking his medications as prescribed. Denies recent issues with bleeding or bruising. Patient denies current edema, chest pain, shortness of breath, or syncope. Review of Systems  Review of Systems   Constitutional: Positive for malaise/fatigue. Negative for weight gain and weight loss. HENT:  Negative for nosebleeds and stridor. Eyes:  Negative for vision loss in left eye and vision loss in right eye. Cardiovascular:  Positive for dyspnea on exertion and palpitations. Negative for chest pain, leg swelling and syncope. Respiratory:  Negative for shortness of breath and wheezing. Hematologic/Lymphatic: Negative for bleeding problem. Does not bruise/bleed easily. Skin:  Negative for itching and rash. Musculoskeletal:  Negative for joint pain and joint swelling. Gastrointestinal:  Negative for hematemesis and hematochezia. Genitourinary:  Negative for dysuria and hematuria. Neurological:  Negative for dizziness and light-headedness. Psychiatric/Behavioral:  Negative for altered mental status. The patient is not nervous/anxious.         Past Medical History:   Diagnosis Date    A-fib [facility])     ADHD (attention deficit hyperactivity disorder)     Carpal tunnel syndrome 1/21/2015    Chronic low back pain     Chronic neck pain     Chronic sinusitis     Dr. Everardo jamil present     lower    Epigastric hernia     Esophageal spasm     GERD (gastroesophageal reflux disease)     Hyperlipidemia     Hypertrophic cardiomyopathy (HCC)     IHSS (idiopathic hypertrophic subaortic stenosis) ([facility] [address]     Kidney stones     HUNTER on CPAP     Dr. Sergio Knight- A-PAP    Primary localized osteoarthrosis, shoulder region 10/18/2013    Prostate cancer ([facility] [address]     prostate ; s/p radiation treatment    PVC's (premature ventricular contractions)     RBBB (right bundle branch block)     RLS (restless legs syndrome)     Dr. Silvino Gross    Seasonal allergies     Tachycardia          Social History     Socioeconomic History    Marital status:      Spouse name: Not on file    Number of children: Not on file    Years of education: Not on file    Highest education level: Not on file   Occupational History    Not on file   Tobacco Use    Smoking status: Never    Smokeless tobacco: Never   Vaping Use    Vaping Use: Never used   Substance and Sexual Activity    Alcohol use: Yes     Alcohol/week: 1.0 - 2.0 standard drink     Types: 1 Cans of beer per week     Comment: occasionally    Drug use: No    Sexual activity: Yes     Partners: Female   Other Topics Concern    Not on file   Social History Narrative    Not on file     Social Determinants of Health     Financial Resource Strain: Not on file   Food Insecurity: Not on file   Transportation Needs: Not on file   Physical Activity: Not on file   Stress: Not on file   Social Connections: Not on file   Intimate Partner Violence: Not on file   Housing Stability: Not on file         Family History   Problem Relation Age of Onset    Cancer Mother         melenoma    High Cholesterol Mother     High Blood Pressure Father     High Cholesterol Brother     Heart Disease Maternal Grandmother     Heart Disease Maternal Grandfather     Prostate Cancer Paternal Uncle          Objective:       /82   Pulse 70   Ht 5' 11\" (1.803 m)   Wt 200 lb 9.6 oz (91 kg)   SpO2 98%   BMI 27.98 kg/m²     Physical Exam  Constitutional:       Appearance: Normal appearance. HENT:      Head: Normocephalic and atraumatic. Nose: Nose normal. No rhinorrhea. Eyes:      General: No scleral icterus.      Conjunctiva/sclera: Conjunctivae normal.   Cardiovascular: Rate and Rhythm: Normal rate. Rhythm irregular. Heart sounds: No murmur heard. Pulmonary:      Effort: Pulmonary effort is normal.      Breath sounds: Normal breath sounds. Abdominal:      General: There is no distension. Musculoskeletal:         General: Normal range of motion. Cervical back: Normal range of motion and neck supple. Skin:     General: Skin is warm and dry. Neurological:      General: No focal deficit present. Mental Status: He is alert and oriented to person, place, and time. Psychiatric:         Mood and Affect: Mood normal.         Behavior: Behavior normal.           ECG Interpretation:  (Date: 11/2/2022)  Rhythm: Atrial fibrillation  Rate: 63 BPM  PAC's / PVC's present: None        Echocardiogram  (Date: 10/2019)        Summary   Normal systolic function with an estimated ejection fraction of 55-60%. Moderate concentric left ventricular hypertrophy ( septal wall is more   increased in size (1.6 cm) ). No regional wall motion abnormalities are seen. Normal left ventricular diastolic filling pressure. The left atrium is severely dilated. The right atrium is mildly dilated. Mild aortic regurgitation. Mild mitral and pulmonic regurgitation. Stress Test (Date: 6/20/2022)   Summary  Normal myocardial perfusion during rest and stress with normal left  ventricular function/wall motion. The left ventricular size is mildly dilated. The estimated left ventricular function is 63%.      Current Medications     Current Outpatient Medications   Medication Sig Dispense Refill    omeprazole (PRILOSEC) 40 MG delayed release capsule Take 1 capsule by mouth daily 90 capsule 1    Cyanocobalamin (B-12 PO) Take by mouth      b complex vitamins capsule Take 1 capsule by mouth daily      pravastatin (PRAVACHOL) 20 MG tablet Take 1 tablet by mouth every evening 90 tablet 1    clonazePAM (KLONOPIN) 1 MG tablet TAKE 1 TABLET BY MOUTH EVERY NIGHT AS NEEDED (Patient taking differently: Take 0.5 mg by mouth daily.) 30 tablet 2    apixaban (ELIQUIS) 5 MG TABS tablet TAKE 1 TABLET BY MOUTH TWICE DAILY 180 tablet 3    verapamil (VERELAN) 240 MG extended release capsule TAKE 1 CAPSULE BY MOUTH EVERY NIGHT 90 capsule 3    tamsulosin (FLOMAX) 0.4 MG capsule Take 0.4 mg by mouth daily      Cholecalciferol 25 MCG (1000 UT) CHEW Take 1,000 Units by mouth daily (Patient not taking: No sig reported)      butalbital-APAP-caffeine -40 MG CAPS per capsule TK ONE C PO  Q 12 H PRF SEVERE HEADACHE (Patient not taking: No sig reported)       Current Facility-Administered Medications   Medication Dose Route Frequency Provider Last Rate Last Admin    sodium hyaluronate (viscosup) injection 20 mg  20 mg Intra-artICUlar Once Sameer Rivera               Lab Review     Lab Results   Component Value Date/Time     11/03/2022 05:11 AM    K 4.5 11/03/2022 05:11 AM     11/03/2022 05:11 AM    CO2 30 11/03/2022 05:11 AM    BUN 17 11/03/2022 05:11 AM    CREATININE 1.0 11/03/2022 05:11 AM    GLUCOSE 104 11/03/2022 05:11 AM    GLUCOSE 83 05/22/2012 04:40 PM    CALCIUM 8.8 11/03/2022 05:11 AM        Lab Results   Component Value Date    WBC 3.7 (L) 12/15/2021    HGB 13.6 12/15/2021    HCT 42.9 12/15/2021    MCV 89.6 12/15/2021     12/15/2021       Lab Results   Component Value Date    TSHFT4 3.89 02/26/2020    TSH 2.95 12/15/2021    TSHREFLEX 2.51 04/15/2016       BNP   Date Value Ref Range Status   01/03/2014 395 (H) <100 pg/mL Final       I, Viky Childers RN, am scribing for and in the presence of Dr. Angelic Ornelas.  11/07/22 4:16 PM     Bárbara Adan RN

## 2022-11-09 ENCOUNTER — TELEPHONE (OUTPATIENT)
Dept: CARDIOLOGY CLINIC | Age: 62
End: 2022-11-09

## 2022-11-09 DIAGNOSIS — Z01.818 PRE-OP EXAM: Primary | ICD-10-CM

## 2022-11-09 NOTE — TELEPHONE ENCOUNTER
----- Message from Josue Casillas MD sent at 11/7/2022  5:59 PM EST -----  Regarding: Verapamil  In addition to other instructions we gave patient (including to start amiodarone 200 mg daily one week before AF ablation), please also ask him to stop verapamil 3 days prior to ablation (to avoid excess bradycardia with both amiodarone and verapamil).      Thank you  Gui Schroeder

## 2022-11-09 NOTE — TELEPHONE ENCOUNTER
Patient was seen in office 11/7/2022. AF ablation discussed and agreed upon by Jaycee Banks and patient. Medications not discussed. Patient will need covid testing prior. Thank you! Cardiac CTA has been ordered to determine cryo versus RFCA.

## 2022-11-09 NOTE — TELEPHONE ENCOUNTER
11/09 pt called and stated he scheduled CT, stated he wants to move forward w/ scheduling ablation, please advise

## 2022-11-10 ENCOUNTER — TELEPHONE (OUTPATIENT)
Dept: CARDIOLOGY CLINIC | Age: 62
End: 2022-11-10

## 2022-11-10 NOTE — TELEPHONE ENCOUNTER
Spoke with patient. Patient is scheduled with Dr. Carlos Johnson for FATIMAH and Dr. Garrett Coburn for Afib Ablation with [facility] and anesthesia on 12/5/22 at [facility], arrival time of 6:30am to the Cath Lab. Please have patient arrive to the main entrance of [facility] and check in with the registration desk. Please call patient regarding medication instructions. Remind patient to be NPO after midnight (8 hours prior). Do not apply lotions/creams on skin the day of procedure. Please notify me if Cryo is needed.

## 2022-11-10 NOTE — TELEPHONE ENCOUNTER
----- Message from Nicko Lenz MD sent at 11/7/2022  5:59 PM EST -----  Regarding: Verapamil  In addition to other instructions we gave patient (including to start amiodarone 200 mg daily one week before AF ablation), please also ask him to stop verapamil 3 days prior to ablation (to avoid excess bradycardia with both amiodarone and verapamil). Thank you  [address] with the patient and relayed message.  He V/U.

## 2022-11-10 NOTE — TELEPHONE ENCOUNTER
----- Message from Gamal Guillory MD sent at 11/7/2022  5:59 PM EST -----  Regarding: Verapamil  In addition to other instructions we gave patient (including to start amiodarone 200 mg daily one week before AF ablation), please also ask him to stop verapamil 3 days prior to ablation (to avoid excess bradycardia with both amiodarone and verapamil).      Thank you  Marily Medina

## 2022-11-15 NOTE — PROGRESS NOTES
Patient comes in for interrogation of their implanted loop recorder. Patient has a history of AF and PVCs. Takes amiodarone, Eliquis, and verapamil. Patient's last device interrogation was on 3/31. Since last interrogation, no arrhythmias recorded. Patient will see Dr. Ashish Umanzor today in office. See Pace[facility] report under the Cardiology tab. We will follow the patient remotely. Parents and 10 month old sister

## 2022-11-16 ENCOUNTER — NURSE ONLY (OUTPATIENT)
Dept: CARDIOLOGY CLINIC | Age: 62
End: 2022-11-16
Payer: COMMERCIAL

## 2022-11-16 DIAGNOSIS — Z45.09 ENCOUNTER FOR LOOP RECORDER CHECK: ICD-10-CM

## 2022-11-16 DIAGNOSIS — I48.91 ATRIAL FIBRILLATION, UNSPECIFIED TYPE (HCC): Primary | ICD-10-CM

## 2022-11-16 PROCEDURE — G2066 INTER DEVC REMOTE 30D: HCPCS | Performed by: INTERNAL MEDICINE

## 2022-11-16 PROCEDURE — 93298 REM INTERROG DEV EVAL SCRMS: CPT | Performed by: INTERNAL MEDICINE

## 2022-11-17 NOTE — PROGRESS NOTES
We received a remote transmission from patient's monitor at home. Remote Linq report shows AF and pauses. EP physician to review. We will continue to monitor remotely. Implanted for AF management. Pt is on Eliquis. End of 31-day monitoring period 11-16-22.

## 2022-11-21 ENCOUNTER — HOSPITAL ENCOUNTER (OUTPATIENT)
Dept: CT IMAGING | Age: 62
Discharge: HOME | End: 2022-11-21
Payer: COMMERCIAL

## 2022-11-21 DIAGNOSIS — I48.91 ATRIAL FIBRILLATION, UNSPECIFIED TYPE (HCC): ICD-10-CM

## 2022-11-21 PROCEDURE — 75574 CT ANGIO HRT W/3D IMAGE: CPT

## 2022-11-21 PROCEDURE — 6360000004 HC RX CONTRAST MEDICATION: Performed by: INTERNAL MEDICINE

## 2022-11-21 RX ADMIN — IOPAMIDOL 100 ML: 755 INJECTION, SOLUTION INTRAVENOUS at 07:53

## 2022-11-30 ENCOUNTER — TELEPHONE (OUTPATIENT)
Dept: CARDIOLOGY CLINIC | Age: 62
End: 2022-11-30

## 2022-11-30 NOTE — LETTER
[address] Cardiology - [address] [place] [id number] [facility]  Phone: [phone]  Fax: [phone]    Sujit Melara MD        December 1, 2022    [address]   1960  [address]      To whom it may concern:     [address] is schedule for an ablation procedure on 12/5/22. Following this ablation, we need the patient to remain on anticoagulation without interruption for three months. Therefore, I would not advise the planned injection to be done until March 2023 when 90 days have elapsed from the ablation date. If you have any questions or concerns, please don't hesitate to call.     Sincerely,        Sujit Melara MD

## 2022-11-30 NOTE — TELEPHONE ENCOUNTER
Dr. Amol Moore with pain management requesting cardiac clearance for cervical epidural steroid injection.  The injection has not been scheduled yet, pt will need to hold Eliquis 3-5 days prior to injection     Last OV 11/7/22  Upcoming ablation 12/5/22    Fax number to send letter: [phone]

## 2022-12-01 NOTE — TELEPHONE ENCOUNTER
Spoke to pt to relay message. V/U     Her ablation procedure is schedule for Monday. Following ablation, we need her anticoagulation to continue WITHOUT INTERRUPTION for 3 months. Therefore, would not advise the planned injection to be done until March 2023 when 90 days have elapsed from the ablation date.      Thank you   KA     Letter created and faxed

## 2022-12-02 ENCOUNTER — ANESTHESIA EVENT (OUTPATIENT)
Dept: CARDIAC CATH/INVASIVE PROCEDURES | Age: 62
DRG: 274 | End: 2022-12-02
Payer: COMMERCIAL

## 2022-12-02 NOTE — TELEPHONE ENCOUNTER
Spoke with the patient and relayed procedure and medication instructions. He V/U. He did not want his instructions sent through email or [address] Medications to hold:   Hold Eliquis the day prior to the procedure. Hold b complex and vitamin b 12 the morning of the procedure.

## 2022-12-05 ENCOUNTER — ANESTHESIA (OUTPATIENT)
Dept: CARDIAC CATH/INVASIVE PROCEDURES | Age: 62
DRG: 274 | End: 2022-12-05
Payer: COMMERCIAL

## 2022-12-05 ENCOUNTER — HOSPITAL ENCOUNTER (INPATIENT)
Dept: CARDIAC CATH/INVASIVE PROCEDURES | Age: 62
LOS: 2 days | Discharge: HOME | DRG: 274 | End: 2022-12-07
Attending: INTERNAL MEDICINE | Admitting: INTERNAL MEDICINE
Payer: COMMERCIAL

## 2022-12-05 DIAGNOSIS — Z98.890 STATUS POST ABLATION OF ATRIAL FLUTTER: Primary | ICD-10-CM

## 2022-12-05 DIAGNOSIS — Z86.79 STATUS POST ABLATION OF ATRIAL FLUTTER: Primary | ICD-10-CM

## 2022-12-05 LAB
ABO/RH: NORMAL
ANION GAP SERPL CALCULATED.3IONS-SCNC: 8 MMOL/L (ref 3–16)
ANTIBODY SCREEN: NORMAL
BUN BLDV-MCNC: 22 MG/DL (ref 7–20)
CALCIUM SERPL-MCNC: 9 MG/DL (ref 8.3–10.6)
CHLORIDE BLD-SCNC: 104 MMOL/L (ref 99–110)
CO2: 28 MMOL/L (ref 21–32)
CREAT SERPL-MCNC: 1 MG/DL (ref 0.8–1.3)
EKG ATRIAL RATE: 344 BPM
EKG ATRIAL RATE: 93 BPM
EKG DIAGNOSIS: NORMAL
EKG DIAGNOSIS: NORMAL
EKG Q-T INTERVAL: 370 MS
EKG Q-T INTERVAL: 416 MS
EKG QRS DURATION: 132 MS
EKG QRS DURATION: 134 MS
EKG QTC CALCULATION (BAZETT): 449 MS
EKG QTC CALCULATION (BAZETT): 491 MS
EKG R AXIS: 32 DEGREES
EKG R AXIS: 52 DEGREES
EKG T AXIS: 160 DEGREES
EKG T AXIS: 243 DEGREES
EKG VENTRICULAR RATE: 106 BPM
EKG VENTRICULAR RATE: 70 BPM
GFR SERPL CREATININE-BSD FRML MDRD: >60 ML/MIN/{1.73_M2}
GLUCOSE BLD-MCNC: 115 MG/DL (ref 70–99)
HCT VFR BLD CALC: 43.5 % (ref 40.5–52.5)
HEMOGLOBIN: 14.3 G/DL (ref 13.5–17.5)
LV EF: 55 %
LVEF MODALITY: NORMAL
MCH RBC QN AUTO: 29.1 PG (ref 26–34)
MCHC RBC AUTO-ENTMCNC: 32.8 G/DL (ref 31–36)
MCV RBC AUTO: 88.6 FL (ref 80–100)
PDW BLD-RTO: 14.6 % (ref 12.4–15.4)
PLATELET # BLD: 244 K/UL (ref 135–450)
PMV BLD AUTO: 7.6 FL (ref 5–10.5)
POTASSIUM REFLEX MAGNESIUM: 3.9 MMOL/L (ref 3.5–5.1)
RBC # BLD: 4.91 M/UL (ref 4.2–5.9)
SODIUM BLD-SCNC: 140 MMOL/L (ref 136–145)
WBC # BLD: 4.1 K/UL (ref 4–11)

## 2022-12-05 PROCEDURE — 0W9D3ZZ DRAINAGE OF PERICARDIAL CAVITY, PERCUTANEOUS APPROACH: ICD-10-PCS | Performed by: INTERNAL MEDICINE

## 2022-12-05 PROCEDURE — 86850 RBC ANTIBODY SCREEN: CPT

## 2022-12-05 PROCEDURE — 80048 BASIC METABOLIC PNL TOTAL CA: CPT

## 2022-12-05 PROCEDURE — 2709999900 HC NON-CHARGEABLE SUPPLY

## 2022-12-05 PROCEDURE — 6360000002 HC RX W HCPCS: Performed by: INTERNAL MEDICINE

## 2022-12-05 PROCEDURE — B24BZZ4 ULTRASONOGRAPHY OF HEART WITH AORTA, TRANSESOPHAGEAL: ICD-10-PCS | Performed by: INTERNAL MEDICINE

## 2022-12-05 PROCEDURE — 6370000000 HC RX 637 (ALT 250 FOR IP): Performed by: THORACIC SURGERY (CARDIOTHORACIC VASCULAR SURGERY)

## 2022-12-05 PROCEDURE — C1769 GUIDE WIRE: HCPCS

## 2022-12-05 PROCEDURE — 93462 L HRT CATH TRNSPTL PUNCTURE: CPT

## 2022-12-05 PROCEDURE — 93312 ECHO TRANSESOPHAGEAL: CPT

## 2022-12-05 PROCEDURE — 93010 ELECTROCARDIOGRAM REPORT: CPT | Performed by: INTERNAL MEDICINE

## 2022-12-05 PROCEDURE — 33017 PRCRD DRG 6YR+ W/O CGEN CAR: CPT

## 2022-12-05 PROCEDURE — 02K83ZZ MAP CONDUCTION MECHANISM, PERCUTANEOUS APPROACH: ICD-10-PCS | Performed by: INTERNAL MEDICINE

## 2022-12-05 PROCEDURE — 86901 BLOOD TYPING SEROLOGIC RH(D): CPT

## 2022-12-05 PROCEDURE — 93005 ELECTROCARDIOGRAM TRACING: CPT | Performed by: INTERNAL MEDICINE

## 2022-12-05 PROCEDURE — C1730 CATH, EP, 19 OR FEW ELECT: HCPCS

## 2022-12-05 PROCEDURE — 6360000002 HC RX W HCPCS: Performed by: ANESTHESIOLOGY

## 2022-12-05 PROCEDURE — 3700000000 HC ANESTHESIA ATTENDED CARE

## 2022-12-05 PROCEDURE — 86923 COMPATIBILITY TEST ELECTRIC: CPT

## 2022-12-05 PROCEDURE — 6360000002 HC RX W HCPCS

## 2022-12-05 PROCEDURE — 2500000003 HC RX 250 WO HCPCS: Performed by: NURSE ANESTHETIST, CERTIFIED REGISTERED

## 2022-12-05 PROCEDURE — C1893 INTRO/SHEATH, FIXED,NON-PEEL: HCPCS

## 2022-12-05 PROCEDURE — C2630 CATH, EP, COOL-TIP: HCPCS

## 2022-12-05 PROCEDURE — 4A0234Z MEASUREMENT OF CARDIAC ELECTRICAL ACTIVITY, PERCUTANEOUS APPROACH: ICD-10-PCS | Performed by: INTERNAL MEDICINE

## 2022-12-05 PROCEDURE — 93308 TTE F-UP OR LMTD: CPT

## 2022-12-05 PROCEDURE — 93662 INTRACARDIAC ECG (ICE): CPT | Performed by: INTERNAL MEDICINE

## 2022-12-05 PROCEDURE — 85027 COMPLETE CBC AUTOMATED: CPT

## 2022-12-05 PROCEDURE — 93653 COMPRE EP EVAL TX SVT: CPT | Performed by: INTERNAL MEDICINE

## 2022-12-05 PROCEDURE — 86900 BLOOD TYPING SEROLOGIC ABO: CPT

## 2022-12-05 PROCEDURE — 2000000000 HC ICU R&B

## 2022-12-05 PROCEDURE — C1759 CATH, INTRA ECHOCARDIOGRAPHY: HCPCS

## 2022-12-05 PROCEDURE — 2500000003 HC RX 250 WO HCPCS

## 2022-12-05 PROCEDURE — 02583ZZ DESTRUCTION OF CONDUCTION MECHANISM, PERCUTANEOUS APPROACH: ICD-10-PCS | Performed by: INTERNAL MEDICINE

## 2022-12-05 PROCEDURE — 93662 INTRACARDIAC ECG (ICE): CPT

## 2022-12-05 PROCEDURE — 4A023FZ MEASUREMENT OF CARDIAC RHYTHM, PERCUTANEOUS APPROACH: ICD-10-PCS | Performed by: INTERNAL MEDICINE

## 2022-12-05 PROCEDURE — 2700000000 HC OXYGEN THERAPY PER DAY

## 2022-12-05 PROCEDURE — 3700000001 HC ADD 15 MINUTES (ANESTHESIA)

## 2022-12-05 PROCEDURE — 2720000010 HC SURG SUPPLY STERILE

## 2022-12-05 PROCEDURE — C1894 INTRO/SHEATH, NON-LASER: HCPCS

## 2022-12-05 PROCEDURE — 6370000000 HC RX 637 (ALT 250 FOR IP): Performed by: INTERNAL MEDICINE

## 2022-12-05 PROCEDURE — 93653 COMPRE EP EVAL TX SVT: CPT

## 2022-12-05 PROCEDURE — C1713 ANCHOR/SCREW BN/BN,TIS/BN: HCPCS

## 2022-12-05 PROCEDURE — 93320 DOPPLER ECHO COMPLETE: CPT

## 2022-12-05 PROCEDURE — 2580000003 HC RX 258: Performed by: ANESTHESIOLOGY

## 2022-12-05 PROCEDURE — B246ZZZ ULTRASONOGRAPHY OF RIGHT AND LEFT HEART: ICD-10-PCS | Performed by: INTERNAL MEDICINE

## 2022-12-05 PROCEDURE — 2580000003 HC RX 258: Performed by: NURSE ANESTHETIST, CERTIFIED REGISTERED

## 2022-12-05 PROCEDURE — 6360000002 HC RX W HCPCS: Performed by: NURSE ANESTHETIST, CERTIFIED REGISTERED

## 2022-12-05 PROCEDURE — 36415 COLL VENOUS BLD VENIPUNCTURE: CPT

## 2022-12-05 PROCEDURE — 85347 COAGULATION TIME ACTIVATED: CPT

## 2022-12-05 PROCEDURE — 93312 ECHO TRANSESOPHAGEAL: CPT | Performed by: INTERNAL MEDICINE

## 2022-12-05 PROCEDURE — 93462 L HRT CATH TRNSPTL PUNCTURE: CPT | Performed by: INTERNAL MEDICINE

## 2022-12-05 PROCEDURE — 2580000003 HC RX 258

## 2022-12-05 RX ORDER — CLONAZEPAM 0.5 MG/1
0.5 TABLET ORAL NIGHTLY
Status: DISCONTINUED | OUTPATIENT
Start: 2022-12-05 | End: 2022-12-07 | Stop reason: HOSPADM

## 2022-12-05 RX ORDER — FENTANYL CITRATE 50 UG/ML
50 INJECTION, SOLUTION INTRAMUSCULAR; INTRAVENOUS ONCE
Status: COMPLETED | OUTPATIENT
Start: 2022-12-05 | End: 2022-12-05

## 2022-12-05 RX ORDER — PROPOFOL 10 MG/ML
INJECTION, EMULSION INTRAVENOUS PRN
Status: DISCONTINUED | OUTPATIENT
Start: 2022-12-05 | End: 2022-12-05 | Stop reason: SDUPTHER

## 2022-12-05 RX ORDER — MIDAZOLAM HYDROCHLORIDE 1 MG/ML
1 INJECTION INTRAMUSCULAR; INTRAVENOUS EVERY 5 MIN PRN
Status: DISCONTINUED | OUTPATIENT
Start: 2022-12-05 | End: 2022-12-06

## 2022-12-05 RX ORDER — SODIUM CHLORIDE 0.9 % (FLUSH) 0.9 %
5-40 SYRINGE (ML) INJECTION PRN
Status: DISCONTINUED | OUTPATIENT
Start: 2022-12-05 | End: 2022-12-07 | Stop reason: HOSPADM

## 2022-12-05 RX ORDER — ACETAMINOPHEN 325 MG/1
650 TABLET ORAL EVERY 4 HOURS PRN
Status: DISCONTINUED | OUTPATIENT
Start: 2022-12-05 | End: 2022-12-07 | Stop reason: HOSPADM

## 2022-12-05 RX ORDER — FENTANYL CITRATE 50 UG/ML
INJECTION, SOLUTION INTRAMUSCULAR; INTRAVENOUS
Status: COMPLETED
Start: 2022-12-05 | End: 2022-12-05

## 2022-12-05 RX ORDER — SODIUM CHLORIDE 9 MG/ML
INJECTION, SOLUTION INTRAVENOUS PRN
Status: DISCONTINUED | OUTPATIENT
Start: 2022-12-05 | End: 2022-12-07 | Stop reason: HOSPADM

## 2022-12-05 RX ORDER — ONDANSETRON 2 MG/ML
4 INJECTION INTRAMUSCULAR; INTRAVENOUS EVERY 10 MIN PRN
Status: DISCONTINUED | OUTPATIENT
Start: 2022-12-05 | End: 2022-12-06

## 2022-12-05 RX ORDER — PROTAMINE SULFATE 10 MG/ML
INJECTION, SOLUTION INTRAVENOUS
Status: COMPLETED | OUTPATIENT
Start: 2022-12-05 | End: 2022-12-05

## 2022-12-05 RX ORDER — SODIUM CHLORIDE, SODIUM LACTATE, POTASSIUM CHLORIDE, CALCIUM CHLORIDE 600; 310; 30; 20 MG/100ML; MG/100ML; MG/100ML; MG/100ML
INJECTION, SOLUTION INTRAVENOUS CONTINUOUS
Status: DISCONTINUED | OUTPATIENT
Start: 2022-12-05 | End: 2022-12-07

## 2022-12-05 RX ORDER — TAMSULOSIN HYDROCHLORIDE 0.4 MG/1
0.4 CAPSULE ORAL DAILY
Status: DISCONTINUED | OUTPATIENT
Start: 2022-12-05 | End: 2022-12-07 | Stop reason: HOSPADM

## 2022-12-05 RX ORDER — HYDRALAZINE HYDROCHLORIDE 20 MG/ML
5 INJECTION INTRAMUSCULAR; INTRAVENOUS
Status: DISCONTINUED | OUTPATIENT
Start: 2022-12-05 | End: 2022-12-06

## 2022-12-05 RX ORDER — SUCCINYLCHOLINE/SOD CL,ISO/PF 100 MG/5ML
SYRINGE (ML) INTRAVENOUS PRN
Status: DISCONTINUED | OUTPATIENT
Start: 2022-12-05 | End: 2022-12-05 | Stop reason: SDUPTHER

## 2022-12-05 RX ORDER — COLCHICINE 0.6 MG/1
0.6 TABLET ORAL 2 TIMES DAILY
Status: DISCONTINUED | OUTPATIENT
Start: 2022-12-05 | End: 2022-12-07

## 2022-12-05 RX ORDER — KETOROLAC TROMETHAMINE 30 MG/ML
30 INJECTION, SOLUTION INTRAMUSCULAR; INTRAVENOUS EVERY 6 HOURS PRN
Status: DISCONTINUED | OUTPATIENT
Start: 2022-12-05 | End: 2022-12-07

## 2022-12-05 RX ORDER — ONDANSETRON 2 MG/ML
INJECTION INTRAMUSCULAR; INTRAVENOUS PRN
Status: DISCONTINUED | OUTPATIENT
Start: 2022-12-05 | End: 2022-12-05 | Stop reason: SDUPTHER

## 2022-12-05 RX ORDER — ROCURONIUM BROMIDE 10 MG/ML
INJECTION, SOLUTION INTRAVENOUS PRN
Status: DISCONTINUED | OUTPATIENT
Start: 2022-12-05 | End: 2022-12-05 | Stop reason: SDUPTHER

## 2022-12-05 RX ORDER — DIPHENHYDRAMINE HYDROCHLORIDE 50 MG/ML
12.5 INJECTION INTRAMUSCULAR; INTRAVENOUS
Status: DISCONTINUED | OUTPATIENT
Start: 2022-12-05 | End: 2022-12-06

## 2022-12-05 RX ORDER — OXYCODONE HYDROCHLORIDE 5 MG/1
10 TABLET ORAL PRN
Status: ACTIVE | OUTPATIENT
Start: 2022-12-05 | End: 2022-12-05

## 2022-12-05 RX ORDER — SODIUM CHLORIDE 9 MG/ML
25 INJECTION, SOLUTION INTRAVENOUS PRN
Status: DISCONTINUED | OUTPATIENT
Start: 2022-12-05 | End: 2022-12-06

## 2022-12-05 RX ORDER — SODIUM CHLORIDE 9 MG/ML
INJECTION, SOLUTION INTRAVENOUS CONTINUOUS PRN
Status: DISCONTINUED | OUTPATIENT
Start: 2022-12-05 | End: 2022-12-05 | Stop reason: SDUPTHER

## 2022-12-05 RX ORDER — SODIUM CHLORIDE 0.9 % (FLUSH) 0.9 %
5-40 SYRINGE (ML) INJECTION EVERY 12 HOURS SCHEDULED
Status: DISCONTINUED | OUTPATIENT
Start: 2022-12-05 | End: 2022-12-05

## 2022-12-05 RX ORDER — PHENYLEPHRINE HCL IN 0.9% NACL 1 MG/10 ML
SYRINGE (ML) INTRAVENOUS PRN
Status: DISCONTINUED | OUTPATIENT
Start: 2022-12-05 | End: 2022-12-05 | Stop reason: SDUPTHER

## 2022-12-05 RX ORDER — OXYCODONE HYDROCHLORIDE 5 MG/1
5 TABLET ORAL PRN
Status: ACTIVE | OUTPATIENT
Start: 2022-12-05 | End: 2022-12-05

## 2022-12-05 RX ORDER — SODIUM CHLORIDE 0.9 % (FLUSH) 0.9 %
5-40 SYRINGE (ML) INJECTION PRN
Status: DISCONTINUED | OUTPATIENT
Start: 2022-12-05 | End: 2022-12-05

## 2022-12-05 RX ORDER — FAMOTIDINE 10 MG/ML
20 INJECTION, SOLUTION INTRAVENOUS ONCE
Status: DISCONTINUED | OUTPATIENT
Start: 2022-12-05 | End: 2022-12-06

## 2022-12-05 RX ORDER — SODIUM CHLORIDE 0.9 % (FLUSH) 0.9 %
5-40 SYRINGE (ML) INJECTION EVERY 12 HOURS SCHEDULED
Status: DISCONTINUED | OUTPATIENT
Start: 2022-12-05 | End: 2022-12-07 | Stop reason: HOSPADM

## 2022-12-05 RX ORDER — HEPARIN SODIUM 1000 [USP'U]/ML
INJECTION, SOLUTION INTRAVENOUS; SUBCUTANEOUS
Status: COMPLETED | OUTPATIENT
Start: 2022-12-05 | End: 2022-12-05

## 2022-12-05 RX ORDER — MEPERIDINE HYDROCHLORIDE 50 MG/ML
12.5 INJECTION INTRAMUSCULAR; INTRAVENOUS; SUBCUTANEOUS EVERY 5 MIN PRN
Status: DISCONTINUED | OUTPATIENT
Start: 2022-12-05 | End: 2022-12-06

## 2022-12-05 RX ORDER — EPINEPHRINE 1 MG/ML
INJECTION, SOLUTION, CONCENTRATE INTRAVENOUS PRN
Status: DISCONTINUED | OUTPATIENT
Start: 2022-12-05 | End: 2022-12-05 | Stop reason: SDUPTHER

## 2022-12-05 RX ORDER — DEXAMETHASONE SODIUM PHOSPHATE 4 MG/ML
INJECTION, SOLUTION INTRA-ARTICULAR; INTRALESIONAL; INTRAMUSCULAR; INTRAVENOUS; SOFT TISSUE PRN
Status: DISCONTINUED | OUTPATIENT
Start: 2022-12-05 | End: 2022-12-05 | Stop reason: SDUPTHER

## 2022-12-05 RX ORDER — FENTANYL CITRATE 50 UG/ML
INJECTION, SOLUTION INTRAMUSCULAR; INTRAVENOUS PRN
Status: DISCONTINUED | OUTPATIENT
Start: 2022-12-05 | End: 2022-12-05 | Stop reason: SDUPTHER

## 2022-12-05 RX ORDER — MIDAZOLAM HYDROCHLORIDE 1 MG/ML
INJECTION INTRAMUSCULAR; INTRAVENOUS PRN
Status: DISCONTINUED | OUTPATIENT
Start: 2022-12-05 | End: 2022-12-05 | Stop reason: SDUPTHER

## 2022-12-05 RX ORDER — HEPARIN SODIUM 10000 [USP'U]/100ML
INJECTION, SOLUTION INTRAVENOUS CONTINUOUS PRN
Status: COMPLETED | OUTPATIENT
Start: 2022-12-05 | End: 2022-12-05

## 2022-12-05 RX ADMIN — PROPOFOL 200 MG: 10 INJECTION, EMULSION INTRAVENOUS at 08:24

## 2022-12-05 RX ADMIN — HYDROMORPHONE HYDROCHLORIDE 0.25 MG: 1 INJECTION, SOLUTION INTRAMUSCULAR; INTRAVENOUS; SUBCUTANEOUS at 13:08

## 2022-12-05 RX ADMIN — PROTAMINE SULFATE 50 MG: 10 INJECTION, SOLUTION INTRAVENOUS at 10:52

## 2022-12-05 RX ADMIN — FENTANYL CITRATE 50 MCG: 50 INJECTION, SOLUTION INTRAMUSCULAR; INTRAVENOUS at 13:57

## 2022-12-05 RX ADMIN — ROCURONIUM BROMIDE 10 MG: 10 SOLUTION INTRAVENOUS at 11:41

## 2022-12-05 RX ADMIN — COLCHICINE 0.6 MG: 0.6 TABLET, FILM COATED ORAL at 14:47

## 2022-12-05 RX ADMIN — SUGAMMADEX 200 MG: 100 INJECTION, SOLUTION INTRAVENOUS at 12:19

## 2022-12-05 RX ADMIN — Medication 100 MCG: at 08:42

## 2022-12-05 RX ADMIN — SODIUM CHLORIDE, PRESERVATIVE FREE 10 ML: 5 INJECTION INTRAVENOUS at 20:09

## 2022-12-05 RX ADMIN — ROCURONIUM BROMIDE 15 MG: 10 SOLUTION INTRAVENOUS at 08:24

## 2022-12-05 RX ADMIN — SODIUM CHLORIDE: 9 INJECTION, SOLUTION INTRAVENOUS at 08:09

## 2022-12-05 RX ADMIN — HEPARIN SODIUM 1400 UNITS/HR: 10000 INJECTION, SOLUTION INTRAVENOUS at 10:22

## 2022-12-05 RX ADMIN — ROCURONIUM BROMIDE 50 MG: 10 SOLUTION INTRAVENOUS at 10:49

## 2022-12-05 RX ADMIN — TAMSULOSIN HYDROCHLORIDE 0.4 MG: 0.4 CAPSULE ORAL at 22:25

## 2022-12-05 RX ADMIN — Medication 100 MCG: at 09:05

## 2022-12-05 RX ADMIN — HYDROMORPHONE HYDROCHLORIDE 0.5 MG: 1 INJECTION, SOLUTION INTRAMUSCULAR; INTRAVENOUS; SUBCUTANEOUS at 20:08

## 2022-12-05 RX ADMIN — KETOROLAC TROMETHAMINE 30 MG: 30 INJECTION, SOLUTION INTRAMUSCULAR; INTRAVENOUS at 15:41

## 2022-12-05 RX ADMIN — EPINEPHRINE 50 MCG: 1 INJECTION, SOLUTION, CONCENTRATE INTRAVENOUS at 10:55

## 2022-12-05 RX ADMIN — Medication 10 ML: at 13:13

## 2022-12-05 RX ADMIN — EPINEPHRINE 50 MCG: 1 INJECTION, SOLUTION, CONCENTRATE INTRAVENOUS at 10:57

## 2022-12-05 RX ADMIN — ONDANSETRON 4 MG: 2 INJECTION INTRAMUSCULAR; INTRAVENOUS at 08:29

## 2022-12-05 RX ADMIN — SODIUM CHLORIDE: 9 INJECTION, SOLUTION INTRAVENOUS at 10:52

## 2022-12-05 RX ADMIN — EPINEPHRINE 50 MCG: 1 INJECTION, SOLUTION, CONCENTRATE INTRAVENOUS at 10:50

## 2022-12-05 RX ADMIN — HEPARIN SODIUM 3000 UNITS: 1000 INJECTION, SOLUTION INTRAVENOUS; SUBCUTANEOUS at 10:43

## 2022-12-05 RX ADMIN — COLCHICINE 0.6 MG: 0.6 TABLET, FILM COATED ORAL at 20:07

## 2022-12-05 RX ADMIN — KETOROLAC TROMETHAMINE 30 MG: 30 INJECTION, SOLUTION INTRAMUSCULAR; INTRAVENOUS at 21:37

## 2022-12-05 RX ADMIN — Medication 160 MG: at 08:24

## 2022-12-05 RX ADMIN — FENTANYL CITRATE 25 MCG: 50 INJECTION INTRAMUSCULAR; INTRAVENOUS at 12:33

## 2022-12-05 RX ADMIN — HEPARIN SODIUM 5000 UNITS: 1000 INJECTION, SOLUTION INTRAVENOUS; SUBCUTANEOUS at 10:21

## 2022-12-05 RX ADMIN — CLONAZEPAM 0.5 MG: 0.5 TABLET ORAL at 22:25

## 2022-12-05 RX ADMIN — HEPARIN SODIUM 7000 UNITS: 1000 INJECTION, SOLUTION INTRAVENOUS; SUBCUTANEOUS at 09:18

## 2022-12-05 RX ADMIN — PROTAMINE SULFATE 15 MG: 10 INJECTION, SOLUTION INTRAVENOUS at 11:40

## 2022-12-05 RX ADMIN — MUPIROCIN: 20 OINTMENT TOPICAL at 20:06

## 2022-12-05 RX ADMIN — FENTANYL CITRATE 100 MCG: 50 INJECTION INTRAMUSCULAR; INTRAVENOUS at 09:30

## 2022-12-05 RX ADMIN — MIDAZOLAM HYDROCHLORIDE 2 MG: 2 INJECTION, SOLUTION INTRAMUSCULAR; INTRAVENOUS at 08:11

## 2022-12-05 RX ADMIN — FENTANYL CITRATE 25 MCG: 50 INJECTION INTRAMUSCULAR; INTRAVENOUS at 12:36

## 2022-12-05 RX ADMIN — EPINEPHRINE 50 MCG: 1 INJECTION, SOLUTION, CONCENTRATE INTRAVENOUS at 10:53

## 2022-12-05 RX ADMIN — FENTANYL CITRATE 50 MCG: 50 INJECTION INTRAMUSCULAR; INTRAVENOUS at 12:48

## 2022-12-05 RX ADMIN — DEXAMETHASONE SODIUM PHOSPHATE 10 MG: 4 INJECTION, SOLUTION INTRAMUSCULAR; INTRAVENOUS at 08:29

## 2022-12-05 RX ADMIN — FENTANYL CITRATE 100 MCG: 50 INJECTION INTRAMUSCULAR; INTRAVENOUS at 08:24

## 2022-12-05 RX ADMIN — PHENYLEPHRINE HYDROCHLORIDE 25 MCG/MIN: 10 INJECTION INTRAVENOUS at 08:51

## 2022-12-05 RX ADMIN — FENTANYL CITRATE 50 MCG: 50 INJECTION INTRAMUSCULAR; INTRAVENOUS at 10:28

## 2022-12-05 ASSESSMENT — PAIN SCALES - GENERAL
PAINLEVEL_OUTOF10: 10
PAINLEVEL_OUTOF10: 10
PAINLEVEL_OUTOF10: 4
PAINLEVEL_OUTOF10: 8
PAINLEVEL_OUTOF10: 10

## 2022-12-05 ASSESSMENT — PAIN DESCRIPTION - LOCATION
LOCATION: CHEST

## 2022-12-05 ASSESSMENT — PAIN DESCRIPTION - DESCRIPTORS
DESCRIPTORS: ACHING
DESCRIPTORS: STABBING
DESCRIPTORS: STABBING

## 2022-12-05 ASSESSMENT — PAIN - FUNCTIONAL ASSESSMENT
PAIN_FUNCTIONAL_ASSESSMENT: ACTIVITIES ARE NOT PREVENTED
PAIN_FUNCTIONAL_ASSESSMENT: PREVENTS OR INTERFERES SOME ACTIVE ACTIVITIES AND ADLS
PAIN_FUNCTIONAL_ASSESSMENT: PREVENTS OR INTERFERES SOME ACTIVE ACTIVITIES AND ADLS

## 2022-12-05 ASSESSMENT — PAIN DESCRIPTION - PAIN TYPE
TYPE: SURGICAL PAIN
TYPE: SURGICAL PAIN

## 2022-12-05 ASSESSMENT — PAIN DESCRIPTION - FREQUENCY: FREQUENCY: CONTINUOUS

## 2022-12-05 NOTE — ANESTHESIA PRE PROCEDURE
Department of Anesthesiology  Preprocedure Note       Name:  Beverlie Blizzard. Age:  58 y.o.  :  1960                                          MRN:  [id number]         Date:  2022      Surgeon: * Surgery not found *    Procedure:     Medications prior to admission:   Prior to Admission medications    Medication Sig Start Date End Date Taking? Authorizing Provider   amiodarone (CORDARONE) 200 MG tablet Take 1 tablet by mouth daily for 7 days 22  Gamal Guillory MD   omeprazole (PRILOSEC) 40 MG delayed release capsule Take 1 capsule by mouth daily 22   Britt Montiel MD   Cyanocobalamin (B-12 PO) Take by mouth    Historical Provider, MD   b complex vitamins capsule Take 1 capsule by mouth daily    Historical Provider, MD   pravastatin (PRAVACHOL) 20 MG tablet Take 1 tablet by mouth every evening 22   Britt Montiel MD   clonazePAM (KLONOPIN) 1 MG tablet TAKE 1 TABLET BY MOUTH EVERY NIGHT AS NEEDED  Patient taking differently: Take 0.5 mg by mouth daily.  22  Florina Carvalho MD   apixaban (ELIQUIS) 5 MG TABS tablet TAKE 1 TABLET BY MOUTH TWICE DAILY 22   Debbi Feliz MD   verapamil (VERELAN) 240 MG extended release capsule TAKE 1 CAPSULE BY MOUTH EVERY NIGHT 22   Debbi Feliz MD   tamsulosin (FLOMAX) 0.4 MG capsule Take 0.4 mg by mouth daily    Historical Provider, MD   Cholecalciferol 25 MCG (1000 UT) CHEW Take 1,000 Units by mouth daily  Patient not taking: No sig reported    Historical Provider, MD   butalbital-APAP-caffeine -40 MG CAPS per capsule TK ONE C PO  Q 12 H PRF SEVERE HEADACHE  Patient not taking: No sig reported 20   Historical Provider, MD       Current medications:    Current Outpatient Medications   Medication Sig Dispense Refill    amiodarone (CORDARONE) 200 MG tablet Take 1 tablet by mouth daily for 7 days 7 tablet 0    omeprazole (PRILOSEC) 40 MG delayed release capsule Take 1 capsule by mouth daily 90 capsule 1    Cyanocobalamin (B-12 PO) Take by mouth      b complex vitamins capsule Take 1 capsule by mouth daily      pravastatin (PRAVACHOL) 20 MG tablet Take 1 tablet by mouth every evening 90 tablet 1    clonazePAM (KLONOPIN) 1 MG tablet TAKE 1 TABLET BY MOUTH EVERY NIGHT AS NEEDED (Patient taking differently: Take 0.5 mg by mouth daily.) 30 tablet 2    apixaban (ELIQUIS) 5 MG TABS tablet TAKE 1 TABLET BY MOUTH TWICE DAILY 180 tablet 3    verapamil (VERELAN) 240 MG extended release capsule TAKE 1 CAPSULE BY MOUTH EVERY NIGHT 90 capsule 3    tamsulosin (FLOMAX) 0.4 MG capsule Take 0.4 mg by mouth daily      Cholecalciferol 25 MCG (1000 UT) CHEW Take 1,000 Units by mouth daily (Patient not taking: No sig reported)      butalbital-APAP-caffeine -40 MG CAPS per capsule TK ONE C PO  Q 12 H PRF SEVERE HEADACHE (Patient not taking: No sig reported)       Current Facility-Administered Medications   Medication Dose Route Frequency Provider Last Rate Last Admin    sodium hyaluronate (viscosup) injection 20 mg  20 mg Intra-artICUlar Once Doneta [place], [place]           Allergies:     Allergies   Allergen Reactions    Niacin And Related      Extreme itching /stinging started at head to waist       Problem List:    Patient Active Problem List   Diagnosis Code    GERD (gastroesophageal reflux disease) K21.9    Hyperlipidemia E78.5    Hypertrophic cardiomyopathy ([facility]) I42.2    PVC (premature ventricular contraction) I49.3    Right bundle branch block I45.10    Restless legs syndrome G25.81    Obstructive sleep apnea G47.33    Obesity E66.9    Personal history of malignant neoplasm of prostate Z85.46    SBO (small bowel obstruction) ([facility]) K56.609    Elevated blood sugar R73.9    PAF (paroxysmal atrial fibrillation) ([facility]) I48.0    Current use of long term anticoagulation Z79.01    History of GI bleed Z87.19    Tremor R25.1    medtronic LINQ 12/11/2020 Dr Gila Bowers Z45.09    Atrial fibrillation ([facility] [address] I48.91  Atrial fibrillation, unspecified type ([facility] [address] I48.91       Past Medical History:        Diagnosis Date    A-fib [facility])     ADHD (attention deficit hyperactivity disorder)     Carpal tunnel syndrome 1/21/2015    Chronic low back pain     Chronic neck pain     Chronic sinusitis     Dr. Jennifer jamil present     lower    Epigastric hernia     Esophageal spasm     GERD (gastroesophageal reflux disease)     Hyperlipidemia     Hypertrophic cardiomyopathy (HCC)     IHSS (idiopathic hypertrophic subaortic stenosis) ([facility] [address]     Kidney stones     HUNTER on CPAP     Dr. Danyelle Tenorio- A-PAP    Primary localized osteoarthrosis, shoulder region 10/18/2013    Prostate cancer [facility])     prostate ; s/p radiation treatment    PVC's (premature ventricular contractions)     RBBB (right bundle branch block)     RLS (restless legs syndrome)     Dr. Ana María Bolden Seasonal allergies     Tachycardia        Past Surgical History:        Procedure Laterality Date    CARPAL TUNNEL RELEASE Left     COLONOSCOPY  01/17/2011    COLONOSCOPY N/A 02/02/2020    COLONOSCOPY WITH BIOPSY performed by Shanelle Johnson MD at [facility]  2015    left    ENDOSCOPY, COLON, DIAGNOSTIC      INGUINAL HERNIA REPAIR Bilateral 2009, 2012,    INSERTABLE CARDIAC MONITOR Left 12/11/2020    INSERTABLE CARDIAC MONITOR  12/11/2020    Loop Implant    KNEE ARTHROSCOPY Right 1982    Right    KNEE ARTHROSCOPY Left 12/21/2017    KNEE ARTHROSCOPY Left 07/17/2020    VIDEO ARTHROSCOPY LEFT KNEE, CHONDROPLASTY, PLICA EXCISION, PARTIAL MEDIAL LATERAL MENISECTOMY -SLEEP APNEA- performed by Chip Kearney MD at [place] [address] ARTHROSCOPY Right 12/31/2013    VIDEO ARTHROSCOPY RIGHT SHOULDER, SUBACROMIAL DECOMPRESSION, DISTAL CLAVICLE RESECTION, ROTATOR CUFF DEBRIDEMENT          TURP  6757    X 2    UMBILICAL HERNIA REPAIR      X 2    UPPER GASTROINTESTINAL ENDOSCOPY  09/2003    VARICOCELECTOMY  2000       Social History:    Social History     Tobacco Use    Smoking status: Never    Smokeless tobacco: Never   Substance Use Topics    Alcohol use: Yes     Alcohol/week: 1.0 - 2.0 standard drink     Types: 1 Cans of beer per week     Comment: occasionally                                Counseling given: Not Answered      Vital Signs (Current): There were no vitals filed for this visit. BP Readings from Last 3 Encounters:   11/07/22 116/82   11/03/22 (!) 132/94   10/10/22 120/68       NPO Status:                                                                                 BMI:   Wt Readings from Last 3 Encounters:   11/07/22 200 lb 9.6 oz (91 kg)   11/02/22 197 lb 6.4 oz (89.5 kg)   10/10/22 200 lb (90.7 kg)     There is no height or weight on file to calculate BMI.    CBC:   Lab Results   Component Value Date/Time    WBC 3.7 12/15/2021 02:52 PM    RBC 4.78 12/15/2021 02:52 PM    HGB 13.6 12/15/2021 02:52 PM    HCT 42.9 12/15/2021 02:52 PM    MCV 89.6 12/15/2021 02:52 PM    RDW 16.6 12/15/2021 02:52 PM     12/15/2021 02:52 PM       CMP:   Lab Results   Component Value Date/Time     11/03/2022 05:11 AM    K 4.5 11/03/2022 05:11 AM     11/03/2022 05:11 AM    CO2 30 11/03/2022 05:11 AM    BUN 17 11/03/2022 05:11 AM    CREATININE 1.0 11/03/2022 05:11 AM    GFRAA >60 08/22/2022 02:51 PM    GFRAA >60 03/04/2013 09:07 AM    AGRATIO 2.0 08/22/2022 02:51 PM    LABGLOM >60 11/03/2022 05:11 AM    GLUCOSE 104 11/03/2022 05:11 AM    GLUCOSE 83 05/22/2012 04:40 PM    PROT 6.6 08/22/2022 02:51 PM    PROT 7.1 03/04/2013 09:07 AM    CALCIUM 8.8 11/03/2022 05:11 AM    BILITOT 0.7 08/22/2022 02:51 PM    ALKPHOS 109 08/22/2022 02:51 PM    AST 16 08/22/2022 02:51 PM    ALT 18 08/22/2022 02:51 PM       POC Tests: No results for input(s): POCGLU, POCNA, POCK, POCCL, POCBUN, POCHEMO, POCHCT in the last 72 hours.     Coags:   Lab Results   Component Value Date/Time    PROTIME 13.7 02/02/2020 04:58 AM    INR 1.18 02/02/2020 04:58 AM    APTT 29.0 03/01/2011 11:28 AM       HCG (If Applicable): No results found for: PREGTESTUR, PREGSERUM, HCG, HCGQUANT     ABGs: No results found for: PHART, PO2ART, MKQ8RRX, KVL4EQD, BEART, G7FRVZSB     Type & Screen (If Applicable):  No results found for: LABABO, LABRH    Drug/Infectious Status (If Applicable):  No results found for: HIV, HEPCAB    COVID-19 Screening (If Applicable):   Lab Results   Component Value Date/Time    COVID19 Not Detected 12/07/2020 11:00 AM           Anesthesia Evaluation  Patient summary reviewed and Nursing notes reviewed no history of anesthetic complications:   Airway: Mallampati: II  TM distance: >3 FB   Neck ROM: full  Mouth opening: > = 3 FB   Dental: normal exam         Pulmonary:   (+) sleep apnea:                             Cardiovascular:    (+) valvular problems/murmurs (IHSS):, dysrhythmias: atrial fibrillation, CHF (cardiomyopathy):,                   Neuro/Psych:   (+) neuromuscular disease:, psychiatric history (ADHD):            GI/Hepatic/Renal: Neg GI/Hepatic/Renal ROS  (+) GERD: well controlled,      (-) liver disease and no renal disease       Endo/Other:    (+) malignancy/cancer (prostate). (-) diabetes mellitus               Abdominal:             Vascular: negative vascular ROS. Other Findings:           Anesthesia Plan      general     ASA 3     (I discussed with the patient the risks and benefits of PIV, general anesthesia, IV Narcotics, PACU. All questions were answered the patient agrees with the plan)  Induction: intravenous. arterial line  MIPS: Prophylactic antiemetics administered. Anesthetic plan and risks discussed with patient. Plan discussed with CRNA.                     Renetta Zeng MD   12/4/2022

## 2022-12-05 NOTE — CARE COORDINATION
CASE MANAGEMENT INITIAL ASSESSMENT      Reviewed chart and completed assessment with patient:and spouse at bedside  Family present: yes  Explained Case Management role/services. yes    Primary contact information:    Health Care Decision Maker :   Primary Decision Maker: Barbara Joel - Spouse - [phone]          Can this person be reached and be able to respond quickly, such as within a few minutes or hours? Yes  Who would be your back-up decision maker? Name None named  Phone Number:    Admit date/status:today- IP  Diagnosis:A Fib    Is this a Readmission?:  No      Insurance:Igo   Precert required for SNF: Yes       3 night stay required: No    Living arrangements, Adls, care needs, prior to admission:Lives w spouse in bilevel home w mult KEITH. No DME currently in use. Durable Medical Equipment at home:  Walker__Cane__RTS__ BSC__Shower Chair__  02__ HHN__ CPAP__  BiPap__  Hospital Bed__ W/C___ Other none_____    Services in the home and/or outpatient, prior to admission:none    Current PCP: Alphia Claude, MD                               Medications: Prescription coverage? Yes Will pt require financial assistance with medications No     Transportation needs: family         PT/OT recs:none    Hospital Exemption Notification (HEN):na    Barriers to discharge:none known    Plan/comments: Spouse states pt has been struggling prior to admission- CM will follow for DME or [address] needs etc    ECOC on chart for MD signature   Jayce Bowles RN

## 2022-12-05 NOTE — ANESTHESIA POSTPROCEDURE EVALUATION
Department of Anesthesiology  Postprocedure Note    Patient: Renata Nunez. MRN: [id number]  YOB: 1960  Date of evaluation: 12/5/2022      Procedure Summary     Date: 12/05/22 Room / Location: [facility] Cardiac Cath Lab    Anesthesia Start: 0809 Anesthesia Stop: 3567    Procedure: FATIMAH WITH ABLATION Diagnosis:       Paroxysmal atrial fibrillation      Status post ablation of atrial flutter      Paroxysmal atrial fibrillation    Scheduled Providers:  Responsible Provider: Darrius Moore MD    Anesthesia Type: general ASA Status: 3          Anesthesia Type: No value filed. Ruth Phase I:      Ruth Phase II:        Anesthesia Post Evaluation    Comments: Postoperative Anesthesia Note    Name:    Renata Nunez.   MRN:      [id number]    Patient Vitals in the past 12 hrs:  12/05/22 1500, BP:(!) 145/70, Pulse:(!) 101, Resp:21, SpO2:95 %  12/05/22 1427, Resp:20  12/05/22 1400, BP:119/80, Pulse:89, Resp:10, SpO2:96 %  12/05/22 1357, Resp:26  12/05/22 1330, BP:127/81, Pulse:97, Resp:22, SpO2:98 %  12/05/22 1315, BP:135/85, Pulse:94, Resp:15, SpO2:97 %  12/05/22 1300, BP:134/81, Pulse:83, Resp:16, SpO2:98 %  12/05/22 1245, BP:115/66, Temp:(!) 96.1 °F (35.6 °C), Temp src:Oral, Pulse:87, Resp:16, SpO2:98 %  12/05/22 0656, Height:5' 11\" (1.803 m), Weight:203 lb 4.8 oz (92.2 kg)     LABS:    CBC  Lab Results       Component                Value               Date/Time                  WBC                      4.1                 12/05/2022 06:57 AM        HGB                      14.3                12/05/2022 06:57 AM        HCT                      43.5                12/05/2022 06:57 AM        PLT                      244                 12/05/2022 06:57 AM   RENAL  Lab Results       Component                Value               Date/Time                  NA                       140                 12/05/2022 06:57 AM        K                        3.9                 12/05/2022 06:57 AM        CL 104                 12/05/2022 06:57 AM        CO2                      28                  12/05/2022 06:57 AM        BUN                      22 (H)              12/05/2022 06:57 AM        CREATININE               1.0                 12/05/2022 06:57 AM        GLUCOSE                  115 (H)             12/05/2022 06:57 AM        GLUCOSE                  83                  05/22/2012 04:40 PM   COAGS  Lab Results       Component                Value               Date/Time                  PROTIME                  13.7 (H)            02/02/2020 04:58 AM        INR                      1.18 (H)            02/02/2020 04:58 AM        APTT                     29.0                03/01/2011 11:28 AM     Intake & Output: In: 1700 (I.V.:1700)  Out: -     Nausea & Vomiting:  No    Level of Consciousness:  Awake    Pain Assessment:  Adequate analgesia    Anesthesia Complications:  No apparent anesthetic complications    SUMMARY      Vital signs stable  OK to discharge from Stage I post anesthesia care.   Care transferred from Anesthesiology department on discharge from perioperative area

## 2022-12-05 NOTE — PROCEDURES
PROCEDURES PERFORMED:  1. Electrophysiology study  2. 3D mapping of the left atrium  3. Transseptal puncture through an intact septum [24310]  4. Ablation of cavo-tricuspid isthmus dependent for presumed typical atrial flutter  5. Intracardiac echocardiography [65454]  6. Emergent pericardiocentesis    : José Miguel Collier MD    Assistant: None    Complications: pericardial effusion with tamponade physiology    Estimated blood loss: less than 100 ml    Anesthesia: general anesthesia    Medications used for induction/testing: none    Other medications: None    Preoperative Diagnosis: atrial fibrillation and atrial flutter    Postoperative Diagnosis: atrial fibrillation and atrial flutter    History: This is a 58 y.o. male with history of hypertrophic cardiomyopathy and persistent atrial fibrillation presenting for ablation of atrial fibrillation. DETAILS OF PROCEDURE:     The patient was brought to the electrophysiology laboratory in stable condition. He was in a fasting, non-sedated state. The risks, benefits and alternatives of the procedure were discussed with the patient and his family in details. The risks including, but not limited to, vascular injury, bleeding, infection, radiation exposure, injury to cardiac and surrounding structures, injury to the normal conduction system of the heart, stroke, myocardial infarction and death were all discussed. The patient considered the various treatment options and decided to proceed with the EP study and attempted ablation procedure. Written informed consent was obtained and placed on the chart. A sign-in and a subsequent time-out protocol were completed to confirm the identity of the patient and the procedure to be performed. Transesophageal echocardiography:      FATIMAH was performed prior to the ablation procedure to assure the absence of any intracardiac thrombi or other contraindications to proceeding with ablation.   The full FATIMAH report is dictated separately. Briefly, the FATIMAH probe was advanced into position without difficulty once the patient was intubated and under general anesthesia. Multiplane imaging of the left atrium and  The left atrial appendage was performed and demonstrated absence of thrombi. No other contraindication to proceeding with ablation was noted. The FATIMAH probe was withdrawn without difficulty. The patient was prepped and draped in a sterile fashion. Lidocaine/bupivicaine mixture was administered to the right and left groin. Using a modified Seldinger technique (under ultrasound guidance), venous access was obtained and sheaths were placed as detailed below. Catheters were then placed under fluoroscopic guidance as detailed below. Sheath and Catheter Placement:            1. Right femoral vein: Sheath size: 8 F  Catheter type: Ablation Location: Left atrium    2. Right femoral vein:       Sheath size: 7 F  Catheter type: Quadripolar Location: His Bundle    3. Left femoral vein:         Sheath size: 10 F  Catheter type:  ICE catheter  Location:  Right atrium    4. Right femoral vein:         Sheath size: 6 F  Catheter type: Decapolar  Location:  Coronary sinus        Baseline parameters (ms)(Preablation)     QRS duration:                91   QT interval:                    397   Baseline cycle length:    485       HV interval                     46    Patient presented to EP lab in atrial fibrillation. We decided to proceed with trans-septal puncture and mapping of the left atrium. Transseptal puncture: Following limited electrophysiologic study, a [employer] [place] trans-septal RF wire sheath was advanced over the pigtail 0.035 RF wire into the superior vena cava. It was advanced through the 8F sheath in the right femoral vein under fluoroscopic guidance. The system was withdrawn until the apparatus was in contact with the foramen ovale.   Prior to transseptal puncture, heparin was initiated and infused to maintain an ACT > 350 seconds. Under fluoroscopic and ICE image guidance the left atrium was cannulated and sheath advanced. The RF wire was exchanged for a stiff 0.32 wire (Agilis large curl sheath) which was parked into the left inferior pulmonary vein. The [place] sheath was withdrawn and exchanged for the large curl Agilis steerable sheath. An HD grid multi-electrode catheter was advanced into the left atrium and sequentially positioned in the ostium of each of the pulmonary veins under fluoroscopic, electroanatomic, electrophysiologic and ultrasound guidance. Examination of the catheter signals demonstrated conduction of electrical activity in all the pulmonary veins (One common left pulmonary veins and 2 right pulmonary veins). 3D Electroanatomical Mapping: Three-dimensional electroanatomical mapping:  Using the [employer] (Abbott) three-dimensional electroanatomical map, a shell map of the left atrium and the pulmonary veins was created by dragging the ablation catheter and the multi-electrode catheter in different areas of the left atrium and in the pulmonary veins. The map was used for aiding the navigation process and to reduce fluoroscopy use. Intracardiac echocardiography:    A baseline intracardiac echocardiography study was performed. During the procedure, intracardiac echocardiography was used for transseptal puncture, monitoring of catheter placement during radiofrequency lesions, and monitoring for complications. After mapping of the left atrium was complete, and while getting ready to start pulmonary vein isolation by wide-circumference area ablation [facility]), we noted the presence of a significant pericardial effusion. This coincided with a drop in end tidal CO2 by anesthesia. Decision made to proceed with emergent pericardiocentesis. Protamine (50 mg followed later by 15 mg) was administered to reverse heparin anticoagulation. Heparin drip was also stopped.      Emergent pericardiocentesis:  The area of the sub-xiphoid region was cleansed with chlorhexidine. Lidocaine 1% was used as a local anesthetic in the sub-xiphoid area. Using a specialized needle, and under fluoroscopic and echo guidance, the pericardial space was accessed. Low pressure dark blood was aspirated. A long wire was advanced and noted to wrap around the pericardial space area in the left anterior oblique projection. The needle was removed and a dilator used to create a pathway for the drainage catheter. A pig-tail pericardial drain catheter was then advanced without difficulty into the pericardial space. The catheter was used to aspirate blood from the pericardium. Nearly all blood aspirated (approximately 550 cc) was returned to patient via right femoral vein access sheath. Patient's hemodynamic status improved rapidly. Echocardiographic imaging confirmed a marked decrease in the size of the pericardial effusion. Once patient's stability was ensured, the pericardial drain was sutured and secured in place. There were no acute procedural complications from the pericardiocentesis. When we confirmed absence of any ongoing bleeding issues, the decision was made to complete ablation of the cavo-tricuspid isthmus dependent (which was planned as a sub-set of the overall approach to this patient's AF ablation. Electroanatomical Mapping:  Using the HD Grid catheter, a shell map of the right atrium was created during atrial fibrillation using the [employer] electroanatomical mapping system. The locations of the His Bundle electrograms, the coronary sinus ostium and the tricuspid valve annulus were noted on the 3-D electroanatomical map. The system was also used to maddi ablation lesions. Ablation:  Radiofrequency lesions were delivered (TactiCath F/J, Abbott) in a linear fashion across the cavotricuspid isthmus (30 W to LSI of 5.0). This proceeded from the level of the tricuspid valve until the inferior vena cava. Pacing to confirm block was not possible due to ongoing atrial fibrillation (and inability to cardiovert due to the pericardial bleeding issues). At the end of the cavo-tricuspid isthmus dependent ablation, all catheters were withdrawn. Intracardiac echocardiography (and also trans-thoracic echocardiogram) were used to document the absence of any residual significant effusion. Sheaths were pulled and hemostasis achieved with manual compression. There was no bleeding noted from any of the access sites. The patient was extubated and was transported to the PACU in stable condition. SUMMARY:     1. Mapping of the left atrium  2. Ablation of the cavo-tricuspid isthmus dependent for presumed typical atrial flutter  3. Limited EP study  4. Emergent pericardiocentesis     RECOMMENDATIONS:     1. Admit to the ICU  2.  Call cardiology team if patient becomes hypotensive, febrile, unstable or if there is change in mental status. 3.  Hold anticoagulation for time being  4. Bed rest x 5 hours with legs straight after sheaths are removed.   5.  Transthoracic echocardiogram later today to re-assess pericardial effusion

## 2022-12-05 NOTE — H&P
I have reviewed seen, interviewed and examined the patient. There has been no change in the findings since our last office visit dated 11/7/2022.

## 2022-12-06 LAB
ANION GAP SERPL CALCULATED.3IONS-SCNC: 7 MMOL/L (ref 3–16)
BUN BLDV-MCNC: 20 MG/DL (ref 7–20)
CALCIUM SERPL-MCNC: 8.8 MG/DL (ref 8.3–10.6)
CHLORIDE BLD-SCNC: 108 MMOL/L (ref 99–110)
CO2: 25 MMOL/L (ref 21–32)
CREAT SERPL-MCNC: 1 MG/DL (ref 0.8–1.3)
EKG ATRIAL RATE: 187 BPM
EKG DIAGNOSIS: NORMAL
EKG Q-T INTERVAL: 396 MS
EKG QRS DURATION: 132 MS
EKG QTC CALCULATION (BAZETT): 465 MS
EKG R AXIS: 35 DEGREES
EKG T AXIS: 127 DEGREES
EKG VENTRICULAR RATE: 83 BPM
GFR SERPL CREATININE-BSD FRML MDRD: >60 ML/MIN/{1.73_M2}
GLUCOSE BLD-MCNC: 151 MG/DL (ref 70–99)
HCT VFR BLD CALC: 39.9 % (ref 40.5–52.5)
HEMOGLOBIN: 13.3 G/DL (ref 13.5–17.5)
MCH RBC QN AUTO: 29.3 PG (ref 26–34)
MCHC RBC AUTO-ENTMCNC: 33.3 G/DL (ref 31–36)
MCV RBC AUTO: 87.9 FL (ref 80–100)
PDW BLD-RTO: 14.8 % (ref 12.4–15.4)
PLATELET # BLD: 218 K/UL (ref 135–450)
PMV BLD AUTO: 7.5 FL (ref 5–10.5)
POC ACT LR: 165 SEC
POC ACT LR: 182 SEC
POC ACT LR: 206 SEC
POC ACT LR: 249 SEC
POC ACT LR: 264 SEC
POC ACT LR: 292 SEC
POC ACT LR: 310 SEC
POTASSIUM SERPL-SCNC: 5 MMOL/L (ref 3.5–5.1)
RBC # BLD: 4.53 M/UL (ref 4.2–5.9)
SODIUM BLD-SCNC: 140 MMOL/L (ref 136–145)
WBC # BLD: 9.8 K/UL (ref 4–11)

## 2022-12-06 PROCEDURE — 36415 COLL VENOUS BLD VENIPUNCTURE: CPT

## 2022-12-06 PROCEDURE — 80048 BASIC METABOLIC PNL TOTAL CA: CPT

## 2022-12-06 PROCEDURE — 6370000000 HC RX 637 (ALT 250 FOR IP): Performed by: INTERNAL MEDICINE

## 2022-12-06 PROCEDURE — 99233 SBSQ HOSP IP/OBS HIGH 50: CPT

## 2022-12-06 PROCEDURE — 6360000002 HC RX W HCPCS: Performed by: ANESTHESIOLOGY

## 2022-12-06 PROCEDURE — 2580000003 HC RX 258: Performed by: ANESTHESIOLOGY

## 2022-12-06 PROCEDURE — 2000000000 HC ICU R&B

## 2022-12-06 PROCEDURE — 6360000002 HC RX W HCPCS: Performed by: INTERNAL MEDICINE

## 2022-12-06 PROCEDURE — 85027 COMPLETE CBC AUTOMATED: CPT

## 2022-12-06 PROCEDURE — 93005 ELECTROCARDIOGRAM TRACING: CPT | Performed by: INTERNAL MEDICINE

## 2022-12-06 PROCEDURE — 99222 1ST HOSP IP/OBS MODERATE 55: CPT | Performed by: NURSE PRACTITIONER

## 2022-12-06 PROCEDURE — 6370000000 HC RX 637 (ALT 250 FOR IP)

## 2022-12-06 PROCEDURE — 93010 ELECTROCARDIOGRAM REPORT: CPT | Performed by: INTERNAL MEDICINE

## 2022-12-06 PROCEDURE — 93308 TTE F-UP OR LMTD: CPT

## 2022-12-06 RX ORDER — PANTOPRAZOLE SODIUM 40 MG/1
40 TABLET, DELAYED RELEASE ORAL
Status: DISCONTINUED | OUTPATIENT
Start: 2022-12-06 | End: 2022-12-07 | Stop reason: HOSPADM

## 2022-12-06 RX ADMIN — MUPIROCIN: 20 OINTMENT TOPICAL at 20:37

## 2022-12-06 RX ADMIN — TAMSULOSIN HYDROCHLORIDE 0.4 MG: 0.4 CAPSULE ORAL at 20:35

## 2022-12-06 RX ADMIN — KETOROLAC TROMETHAMINE 30 MG: 30 INJECTION, SOLUTION INTRAMUSCULAR; INTRAVENOUS at 04:30

## 2022-12-06 RX ADMIN — COLCHICINE 0.6 MG: 0.6 TABLET, FILM COATED ORAL at 20:35

## 2022-12-06 RX ADMIN — APIXABAN 5 MG: 5 TABLET, FILM COATED ORAL at 20:35

## 2022-12-06 RX ADMIN — CLONAZEPAM 0.5 MG: 0.5 TABLET ORAL at 20:35

## 2022-12-06 RX ADMIN — HYDROMORPHONE HYDROCHLORIDE 0.5 MG: 1 INJECTION, SOLUTION INTRAMUSCULAR; INTRAVENOUS; SUBCUTANEOUS at 02:33

## 2022-12-06 RX ADMIN — SODIUM CHLORIDE, PRESERVATIVE FREE 10 ML: 5 INJECTION INTRAVENOUS at 20:37

## 2022-12-06 RX ADMIN — COLCHICINE 0.6 MG: 0.6 TABLET, FILM COATED ORAL at 09:07

## 2022-12-06 RX ADMIN — MUPIROCIN: 20 OINTMENT TOPICAL at 17:35

## 2022-12-06 RX ADMIN — KETOROLAC TROMETHAMINE 30 MG: 30 INJECTION, SOLUTION INTRAMUSCULAR; INTRAVENOUS at 22:57

## 2022-12-06 RX ADMIN — PANTOPRAZOLE SODIUM 40 MG: 40 TABLET, DELAYED RELEASE ORAL at 17:35

## 2022-12-06 RX ADMIN — KETOROLAC TROMETHAMINE 30 MG: 30 INJECTION, SOLUTION INTRAMUSCULAR; INTRAVENOUS at 13:18

## 2022-12-06 ASSESSMENT — PAIN SCALES - GENERAL
PAINLEVEL_OUTOF10: 7
PAINLEVEL_OUTOF10: 6
PAINLEVEL_OUTOF10: 0
PAINLEVEL_OUTOF10: 10
PAINLEVEL_OUTOF10: 5
PAINLEVEL_OUTOF10: 0
PAINLEVEL_OUTOF10: 3
PAINLEVEL_OUTOF10: 5
PAINLEVEL_OUTOF10: 0

## 2022-12-06 ASSESSMENT — PAIN DESCRIPTION - ONSET
ONSET: ON-GOING

## 2022-12-06 ASSESSMENT — PAIN DESCRIPTION - DESCRIPTORS
DESCRIPTORS: DISCOMFORT
DESCRIPTORS: ACHING
DESCRIPTORS: DISCOMFORT
DESCRIPTORS: DISCOMFORT

## 2022-12-06 ASSESSMENT — PAIN DESCRIPTION - FREQUENCY
FREQUENCY: CONTINUOUS

## 2022-12-06 ASSESSMENT — PAIN DESCRIPTION - ORIENTATION
ORIENTATION: LEFT
ORIENTATION: LEFT
ORIENTATION: MID
ORIENTATION: LEFT

## 2022-12-06 ASSESSMENT — PAIN DESCRIPTION - LOCATION
LOCATION: CHEST

## 2022-12-06 ASSESSMENT — PAIN DESCRIPTION - PAIN TYPE
TYPE: SURGICAL PAIN

## 2022-12-06 NOTE — CONSULTS
Department of Cardiovascular & Thoracic Surgery  History and Physical          DIAGNOSIS:  Pericardial effusion     CHIEF COMPLAINT:  No chief complaint on file. History Obtained From:  patient, electronic medical record    HISTORY OF PRESENT ILLNESS:      The patient is a 58 y.o. male with significant past medical history of GERD, HUNTER, HLD, hypertrophic cardiomyopathy, and atrial fibrillation who presented to [facility] for ablation. This was complicated by pericardial effusion with concern for tamponade physiology. Emergent pericardiocentesis was performed with approximately 550 mL of blood aspirated. The patient was then hemodynamically stable. Follow-up echo showed trivial pericardial effusion.      Past Medical History:        Diagnosis Date    A-fib [facility])     ADHD (attention deficit hyperactivity disorder)     Carpal tunnel syndrome 1/21/2015    Chronic low back pain     Chronic neck pain     Chronic sinusitis     Dr. Nasrin Riojas crown present     lower    Epigastric hernia     Esophageal spasm     GERD (gastroesophageal reflux disease)     Hyperlipidemia     Hypertrophic cardiomyopathy (HCC)     IHSS (idiopathic hypertrophic subaortic stenosis) ([address]     Kidney stones     HUNTER on CPAP     Dr. Diannah Najjar- A-PAP    Primary localized osteoarthrosis, shoulder region 10/18/2013    Prostate cancer [facility])     prostate ; s/p radiation treatment    PVC's (premature ventricular contractions)     RBBB (right bundle branch block)     RLS (restless legs syndrome)     Dr. Diannah Najjar    Seasonal allergies     Tachycardia        Past Surgical History:        Procedure Laterality Date    CARPAL TUNNEL RELEASE Left     COLONOSCOPY  01/17/2011    COLONOSCOPY N/A 02/02/2020    COLONOSCOPY WITH BIOPSY performed by Eitan Sorto MD at [facility] 20 2015    left    ENDOSCOPY, COLON, DIAGNOSTIC      INGUINAL HERNIA REPAIR Bilateral 2009, 2012,    INSERTABLE CARDIAC MONITOR Left 12/11/2020    INSERTABLE CARDIAC MONITOR  12/11/2020    Loop Implant    KNEE ARTHROSCOPY Right 1982    Right    KNEE ARTHROSCOPY Left 12/21/2017    KNEE ARTHROSCOPY Left 07/17/2020    VIDEO ARTHROSCOPY LEFT KNEE, CHONDROPLASTY, PLICA EXCISION, PARTIAL MEDIAL LATERAL MENISECTOMY -SLEEP APNEA- performed by Danielle Forrest MD at [address] [place] ARTHROSCOPY Right 12/31/2013    VIDEO ARTHROSCOPY RIGHT SHOULDER, SUBACROMIAL DECOMPRESSION, DISTAL CLAVICLE RESECTION, ROTATOR CUFF DEBRIDEMENT          TURP  7632    X 2    UMBILICAL HERNIA REPAIR      X 2    UPPER GASTROINTESTINAL ENDOSCOPY  09/2003    VARICOCELECTOMY  2000       Medications Prior to Admission:   Medications Prior to Admission: amiodarone (CORDARONE) 200 MG tablet, Take 1 tablet by mouth daily for 7 days  omeprazole (PRILOSEC) 40 MG delayed release capsule, Take 1 capsule by mouth daily  Cyanocobalamin (B-12 PO), Take by mouth  b complex vitamins capsule, Take 1 capsule by mouth daily  pravastatin (PRAVACHOL) 20 MG tablet, Take 1 tablet by mouth every evening  clonazePAM (KLONOPIN) 1 MG tablet, TAKE 1 TABLET BY MOUTH EVERY NIGHT AS NEEDED (Patient taking differently: Take 0.5 mg by mouth daily.)  apixaban (ELIQUIS) 5 MG TABS tablet, TAKE 1 TABLET BY MOUTH TWICE DAILY  verapamil (VERELAN) 240 MG extended release capsule, TAKE 1 CAPSULE BY MOUTH EVERY NIGHT  tamsulosin (FLOMAX) 0.4 MG capsule, Take 0.4 mg by mouth daily  Cholecalciferol 25 MCG (1000 UT) CHEW, Take 1,000 Units by mouth daily (Patient not taking: No sig reported)  butalbital-APAP-caffeine -40 MG CAPS per capsule, TK ONE C PO  Q 12 H PRF SEVERE HEADACHE (Patient not taking: No sig reported)    Allergies:  Niacin and related    Social History:    TOBACCO:   reports that he has never smoked. He has never used smokeless tobacco.  ETOH:   reports current alcohol use of about 1.0 - 2.0 standard drink per week. CAFFEINE ABUSE:  No  DRUGS:   reports no history of drug use.   LIFESTYLE:     MARITAL STATUS:   OCCUPATION:       Family History:        Problem Relation Age of Onset    Cancer Mother         melenoma    High Cholesterol Mother     High Blood Pressure Father     High Cholesterol Brother     Heart Disease Maternal Grandmother     Heart Disease Maternal Grandfather     Prostate Cancer Paternal Uncle        REVIEW OF SYSTEMS:      Constitutional:  No night sweats, headaches, weight loss. Eyes:  No glaucoma, cataracts. ENMT:  No nosebleeds, deviated septum. Cardiac:  + Atrial fibrillation, + chest pain. Vascular:  No claudication, varicosities. GI:  No PUD, heartburn. :  No kidney stones, frequent UTIs  Musculoskeletal:  No arthritis, gout. Respiratory:  No SOB, emphysema, asthma. + HUNTER  Integumentary:  No dermatitis, itching, rash. Neurological:  No stroke, TIAs, seizures. Psychiatric:  No depression, anxiety. Endocrine: No diabetes, thyroid issues. Hematologic:  No bleeding, easy bruising. Immunologic:  No known cancer, steroid therapies. PHYSICAL EXAM:    VITALS:  BP (!) 97/55   Pulse 86   Temp 97.9 °F (36.6 °C) (Oral)   Resp 15   Ht 5' 11\" (1.803 m)   Wt 212 lb 1.3 oz (96.2 kg)   SpO2 93%   BMI 29.58 kg/m²     Constitutional:   Well developed and nourished male. No acute distress. Overweight. Eyes:  lids and lashes normal, pupils equal, round and reactive to light, extra ocular muscles intact, sclera clear, conjunctiva normal    Head/ENT:   normal teeth, gums, & palate. Moist mucus membranes. No cyanosis or pallor. Neck:  supple, symmetrical, trachea midline, no lymphadenopathy, no jugular venous distension, no carotid bruits and MASSES:  no masses. Lungs:  no increased work of breathing, good air exchange, no retractions and clear to auscultation. No tactile fremitus. Cardiovascular:  irregularly irregular rhythm and S1, S2 normal.   Apical impulse in 5th intercostal space.     Pulses:  Right dorsalis pedis 2, Left dorsalis pedis 2, Right posterior tibial 2, Left Posterior tibial 2, Right radial 2, and Left radial 2. Abdomen:  normal bowel sounds, non-tender. No hepatosplenomegaly or masses. Musculoskeletal:  Back is straight and non-tender, full ROM of upper and lower extremities. No kyphosis or scoliosis. Extremities:  Warm, pink, no clubbing, cyanosis, petechiae, ischemia, or deformities. Trace peripheral edema.     Skin: no rashes, no petechiae, no nodules, no jaundice, no purpura, no wounds    Neurological/Psychiatric: oriented, grossly non-focal, normal mood    DATA:  EK22   Atrial fibrillationRight bundle branch blockT wave abnormality, consider lateral ischemia or digitalis effectAbnormal ECGWhen compared with ECG of 05-DEC-2022 15:56,Previous ECG has undetermined rhythm, needs reviewNon-specific change in ST segment in Inferior leads         CBC:   Lab Results   Component Value Date/Time    WBC 9.8 2022 04:20 AM    RBC 4.53 2022 04:20 AM    HGB 13.3 2022 04:20 AM    HCT 39.9 2022 04:20 AM    MCV 87.9 2022 04:20 AM    MCH 29.3 2022 04:20 AM    MCHC 33.3 2022 04:20 AM    RDW 14.8 2022 04:20 AM     2022 04:20 AM    MPV 7.5 2022 04:20 AM     CMP:    Lab Results   Component Value Date/Time     2022 04:20 AM    K 5.0 2022 04:20 AM    K 3.9 2022 06:57 AM     2022 04:20 AM    CO2 25 2022 04:20 AM    BUN 20 2022 04:20 AM    CREATININE 1.0 2022 04:20 AM    GFRAA >60 2022 02:51 PM    GFRAA >60 2013 09:07 AM    AGRATIO 2.0 2022 02:51 PM    LABGLOM >60 2022 04:20 AM    GLUCOSE 151 2022 04:20 AM    GLUCOSE 83 2012 04:40 PM    PROT 6.6 2022 02:51 PM    PROT 7.1 2013 09:07 AM    LABALBU 4.4 2022 02:51 PM    CALCIUM 8.8 2022 04:20 AM    BILITOT 0.7 2022 02:51 PM    ALKPHOS 109 2022 02:51 PM    AST 16 2022 02:51 PM    ALT 18 2022 02:51 PM     PT/INR: Lab Results   Component Value Date/Time    PROTIME 13.7 02/02/2020 04:58 AM    INR 1.18 02/02/2020 04:58 AM     PTT:    Lab Results   Component Value Date/Time    APTT 29.0 03/01/2011 11:28 AM     CXRAY:  12/8/15  FINDINGS:   The heart is upper normal size. There is no focal airspace disease. Pulmonary vessels are normal.  Bones are unremarkable. Impression   IMPRESSION:   No acute cardiac or pulmonary disease. TTE:   12/5/2022 0805   Summary   Limited for pericardial effusion. Trace pericardial effusion is present, circumferential. There is no chamber   collapse. Respiratory variation/IVC imaging not obtained. Compared to previous study earlier today pericardial effusion is unchanged. 12/5/22 1253   Summary   Limited for pericardial effusion. Trace pericardial effusion is present, circumferential. There is no chamber   collapse. Respiratory variation/IVC imaging not obtained. Compared to previous study earlier today pericardial effusion is unchanged. 12/6/22   Summary   Limited for pericardial effusion. No obvious evidence of pericardial effusion. CTA cardiac: 11/21/2022    Impression   Left atrial enlargement. No stenosis at the pulmonary vein origins. There   is a left common pulmonary vein on the left. No thrombus in the left atrial   appendage       Subtle septal thickening is seen in the lungs suggesting mild fluid overload         CT Chest: No recent study available in EMR for review    ASSESSMENT AND PLAN:    Mr. Melany Willis is a 71-year-old male who underwent ablation yesterday for atrial fibrillation. This was complicated by pericardial effusion. He underwent emergent pericardiocentesis with about 550 mL of fluid drained. He has remained hemodynamically stable. Serial echoes show no significant pericardial effusion. No surgical intervention indicated at this time.   However, should effusion reaccumulate please notify CTS for consideration of pericardial ezekiel.     Elinor Davila, PARI - CNP  12/6/2022  9:32 AM

## 2022-12-06 NOTE — PLAN OF CARE
Problem: Discharge Planning  Goal: Discharge to home or other facility with appropriate resources  12/6/2022 1036 by Jaylen Zuluaga RN  Outcome: Progressing     Problem: Pain  Goal: Verbalizes/displays adequate comfort level or baseline comfort level  12/6/2022 1036 by Jaylen Zuluaga RN  Outcome: Progressing  Flowsheets (Taken 12/6/2022 0400 by Cyn Heller RN)  Verbalizes/displays adequate comfort level or baseline comfort level: Encourage patient to monitor pain and request assistance

## 2022-12-06 NOTE — PLAN OF CARE
Problem: Discharge Planning  Goal: Discharge to home or other facility with appropriate resources  Outcome: Progressing  Flowsheets  Taken 12/5/2022 2000 by Speedy Brar RN  Discharge to home or other facility with appropriate resources: Identify barriers to discharge with patient and caregiver  Taken 12/5/2022 1245 by Neptali Alvarez RN  Discharge to home or other facility with appropriate resources:   Identify barriers to discharge with patient and caregiver   Identify discharge learning needs (meds, wound care, etc)     Problem: Pain  Goal: Verbalizes/displays adequate comfort level or baseline comfort level  Outcome: Progressing  Flowsheets (Taken 12/5/2022 2000)  Verbalizes/displays adequate comfort level or baseline comfort level:   Encourage patient to monitor pain and request assistance   Assess pain using appropriate pain scale

## 2022-12-06 NOTE — FLOWSHEET NOTE
[phone] Pt reported a loose BM with blood. Pt stated, my GI doctor said its my diverticulitis and there's nothing to do except let it run its course. \"     7789 Page sent to Dr. Riley Matthews about the above.

## 2022-12-06 NOTE — PROGRESS NOTES
[address]     Electrophysiology                                     Progress Note    Admission date:  2022    Reason for follow up visit: AF    HPI/CC: Kalie Batres. was admitted on 2022 with EPS ablation of cavo-tricuspid isthmus dependent for presumed typical atrial flutter. Procedure was complicated by pericardial effusion with tamponade physiology. Emergent pericardiocentesis performed with approximately 550 mL blood aspirated. Today limited echo showed no obvious evidence of pericardial effusion. Rhythm has been AF with rates in the 90's. Subjective: He reports chest pain with deep breaths and moving. When he feels this it is a 10/10. If he is not moving pain is a 5/10. He denies dizziness and shortness of breath. He reports a portion of his mid thigh is numb. Vitals:  Blood pressure (!) 97/55, pulse 86, temperature 97.9 °F (36.6 °C), temperature source Oral, resp. rate 15, height 5' 11\" (1.803 m), weight 212 lb 1.3 oz (96.2 kg), SpO2 93 %.   Temp  Av.7 °F (36.5 °C)  Min: 96.1 °F (35.6 °C)  Max: 98.3 °F (36.8 °C)  Pulse  Av  Min: 82  Max: 121  BP  Min: 89/61  Max: 153/126  SpO2  Av.6 %  Min: 91 %  Max: 99 %    24 hour I/O    Intake/Output Summary (Last 24 hours) at 2022 0926  Last data filed at 2022 0600  Gross per 24 hour   Intake 1720 ml   Output 1150 ml   Net 570 ml     Current Facility-Administered Medications   Medication Dose Route Frequency Provider Last Rate Last Admin    perflutren lipid microspheres (DEFINITY) injection 1.5 mL  1.5 mL IntraVENous ONCE PRN Charlotte Lemus MD        lactated ringers infusion   IntraVENous Continuous Yashira Mazariegos MD        0.9 % sodium chloride infusion   IntraVENous PRN Yashira Mazariegos MD        0.9 % sodium chloride infusion   IntraVENous PRN Charlotte Lemus MD        sodium chloride flush 0.9 % injection 5-40 mL  5-40 mL IntraVENous 2 times per day Kraig Shaw MD   10 mL at 12/05/22 2009    sodium chloride flush 0.9 % injection 5-40 mL  5-40 mL IntraVENous PRN Darrius Moore MD        mupirocin [facility]) 2 % ointment   Nasal BID William Whitley MD   Given at 12/05/22 2006    colchicine (COLCRYS) tablet 0.6 mg  0.6 mg Oral BID Saúl Sands MD   0.6 mg at 12/06/22 1551    perflutren lipid microspheres (DEFINITY) injection 1.5 mL  1.5 mL IntraVENous ONCE PRN Saúl Sands MD        ketorolac (TORADOL) injection 30 mg  30 mg IntraVENous Q6H PRN Saúl Sands MD   30 mg at 12/06/22 0430    0.9 % sodium chloride infusion   IntraVENous PRN Saúl Sands MD        acetaminophen (TYLENOL) tablet 650 mg  650 mg Oral Q4H PRN Saúl Sands MD        [facility]) capsule 0.4 mg  0.4 mg Oral Daily Saúl Sands MD   0.4 mg at 12/05/22 2225    clonazePAM (KLONOPIN) tablet 0.5 mg  0.5 mg Oral Nightly Saúl Sands MD   0.5 mg at 12/05/22 2225       Objective:     Telemetry monitor: AF    Physical Exam:  Constitutional and general appearance: alert, cooperative, no distress, and appears stated age  HEENT: PERRL, no cervical lymphadenopathy. No masses palpable. Normal oral mucosa  Respiratory:  Normal excursion and expansion without use of accessory muscles  Resp auscultation: Normal breath sounds without wheezing, rhonchi, and rales  Cardiovascular: The apical impulse is not displaced  Heart tones are crisp and normal. irregular S1 and S2.  Jugular venous pulsation Normal  The carotid upstroke is normal in amplitude and contour without delay or bruit  Peripheral pulses are symmetrical and full   Abdomen:  No masses or tenderness  Bowel sounds present  Extremities:   No cyanosis or clubbing   No lower extremity edema   Skin: warm and dry  Bilateral femoral groin sites are soft, left groin site with mild oozing. Dressing reapplied.  Pulses palpable   Neurological:  Alert and oriented  Moves all extremities well  No abnormalities of mood, affect, memory, mentation, or behavior are noted    Data  Echo 12/06/2022:   Conclusions      Summary   Limited for pericardial effusion. No obvious evidence of pericardial effusion. Limited echo 12/05/2022:  Summary  Limited study for pericardial effusion. Trace pericardial effusion, visualized anteriorly and posteriorly. No chamber collapse. Respiratory variation and IVC imaging not  performed. No significant progression compared with prior studies earlier this day. Echo 10/2019:   Conclusions   Summary   Normal systolic function with an estimated ejection fraction of 55-60%. Moderate concentric left ventricular hypertrophy ( septal wall is more   increased in size (1.6 cm) ). No regional wall motion abnormalities are seen. Normal left ventricular diastolic filling pressure. The left atrium is severely dilated. The right atrium is mildly dilated. Mild aortic regurgitation. Mild mitral and pulmonic regurgitation. All labs and testing reviewed.   Lab Review     Renal Profile:   Lab Results   Component Value Date/Time    CREATININE 1.0 12/06/2022 04:20 AM    BUN 20 12/06/2022 04:20 AM     12/06/2022 04:20 AM    K 5.0 12/06/2022 04:20 AM    K 3.9 12/05/2022 06:57 AM     12/06/2022 04:20 AM    CO2 25 12/06/2022 04:20 AM     CBC:    Lab Results   Component Value Date/Time    WBC 9.8 12/06/2022 04:20 AM    RBC 4.53 12/06/2022 04:20 AM    HGB 13.3 12/06/2022 04:20 AM    HCT 39.9 12/06/2022 04:20 AM    MCV 87.9 12/06/2022 04:20 AM    RDW 14.8 12/06/2022 04:20 AM     12/06/2022 04:20 AM     BNP:    Lab Results   Component Value Date/Time     01/03/2014 04:00 PM     Fasting Lipid Panel:    Lab Results   Component Value Date/Time    CHOL 209 08/22/2022 02:51 PM    HDL 52 08/22/2022 02:51 PM    HDL 51 12/19/2011 10:03 AM    TRIG 92 08/22/2022 02:51 PM     Cardiac Enzymes:  CK/MbTroponin  Lab Results   Component Value Date/Time    CKTOTAL 199 08/23/2021 11:33 AM    TROPONINI <0.01 03/04/2021 06:26 PM     PT/ INR Lab Results   Component Value Date/Time    INR 1.18 02/02/2020 04:58 AM    INR 1.14 02/01/2020 02:17 PM    INR 0.96 01/03/2014 04:00 PM    PROTIME 13.7 02/02/2020 04:58 AM    PROTIME 13.2 02/01/2020 02:17 PM    PROTIME 10.8 01/03/2014 04:00 PM     PTT No results found for: PTT   Lab Results   Component Value Date/Time    MG 2.00 11/03/2022 05:11 AM      Lab Results   Component Value Date/Time    TSH 2.95 12/15/2021 02:52 PM     Assessment:  Paroxsymal atrial fibrillation: stable              -[id number] score 0              -amiodarone started 10/2017, stopped in 5/2022   -s/p EPS ablation of cavo-tricuspid isthmus dependent for presumed typical atrial flutter 12/05/2022  RBBB: stable  PVC's: stable   NSVT: stable               -noted on event monitor 7/2020  S/P loop recorder implant   HLD  HOCM              -per cardiac MRI 2018 without LVOT obstruction   Chronic pain   Sleep apnea   GERD          Plan:   Restart Eliquis 5 mg PO BID tonight due to stable echocardiogram today  2. Continue scheduled colchicine   3. Continue PRN IV Toradol and tylenol for pain management. Will plan to transition to naproxen at discharge  4. Start PPI in anticipation of naproxen use with Eliquis   5. Will plan to restart verapamil when blood pressure has stabilized  6. Call the cardiology team if patient becomes hypotensive, unstable or if there is change in mental status   7. Will proceed with left sided atrial fibrillation ablation in a few weeks  8.  Okay to transfer out of the ICU to either A2 or C4    Discussed with Dr. Bjorn Artis       Disposition: home on 12/07/2022-12/08/2022       Barbra Pascal, APRN-CNP  [facility]  [phone]

## 2022-12-06 NOTE — H&P
Internal Medicine History and Physical    Pt evaluated on:  12/6/2022    CHIEF COMPLAINT:  pt had persistent afib with complication of hemopericardium during EP study    History of Present Illness: This is a 58 y.o. WM with PMx sig for afib, GERD, hypertrophic cardiomyopathy and HUNTER on CPAP who presents with complication of pericardial effusion s/p pericardial window. He is still c/o some chest tightness and shortness of breath and has been very anxious overnight. Repeat ECHO this AM shows minimal pericardial effusion.     Past Medical History:   Diagnosis Date    A-fib [facility])     ADHD (attention deficit hyperactivity disorder)     Carpal tunnel syndrome 1/21/2015    Chronic low back pain     Chronic neck pain     Chronic sinusitis     Dr. Raúl Lynch crown present     lower    Epigastric hernia     Esophageal spasm     GERD (gastroesophageal reflux disease)     Hyperlipidemia     Hypertrophic cardiomyopathy (HCC)     IHSS (idiopathic hypertrophic subaortic stenosis) ([facility]     Kidney stones     HUNTER on CPAP     Dr. Jay Quinn- A-PAP    Primary localized osteoarthrosis, shoulder region 10/18/2013    Prostate cancer [facility])     prostate ; s/p radiation treatment    PVC's (premature ventricular contractions)     RBBB (right bundle branch block)     RLS (restless legs syndrome)     Dr. Jay Quinn    Seasonal allergies     Tachycardia      Active Ambulatory Problems     Diagnosis Date Noted    GERD (gastroesophageal reflux disease) 08/27/2010    Hyperlipidemia 08/27/2010    Hypertrophic cardiomyopathy ([facility] 08/27/2010    PVC (premature ventricular contraction) 03/25/2014    Right bundle branch block 01/12/2013    Restless legs syndrome 10/23/2013    Obstructive sleep apnea 10/23/2013    Obesity 01/12/2013    Personal history of malignant neoplasm of prostate 12/10/2013    SBO (small bowel obstruction) ([facility] 02/21/2015    Elevated blood sugar 04/07/2017    PAF (paroxysmal atrial fibrillation) ([facility] 09/26/2017 Current use of long term anticoagulation 02/01/2020    History of GI bleed 09/30/2020    Tremor 09/30/2020    medtronic LINQ 12/11/2020 Dr Ilsa Larkin 12/11/2020    Atrial fibrillation ([address] 10/31/2022    Atrial fibrillation, unspecified type ([address] 10/31/2022     Resolved Ambulatory Problems     Diagnosis Date Noted    Primary localized osteoarthrosis of shoulder region 10/18/2013    Supraspinatus sprain 10/18/2013    Disorder of bursae and tendons in shoulder region 10/18/2013    Prostate cancer ([address]     Pneumonia due to gram-negative bacteria ([address] 01/04/2014    Shortness of breath 01/04/2014    Pleuritic chest pain 01/04/2014    Cough 01/04/2014    T7 sclerotic lesion 01/04/2014    Sleep apnea 03/25/2014    Pain in Achilles tendon 08/08/2014    Premature beats 01/12/2013    Malignant neoplasm of prostate ([address] 03/29/2012    Palpitations 01/12/2013    Mixed hyperlipidemia 01/12/2013    Hypertrophic obstructive cardiomyopathy ([address] 01/12/2013    Elevated prostate specific antigen (PSA) 02/21/2012    Chronic sinusitis 10/23/2013    Chest pain 01/12/2013    Achilles tendon disorder 09/12/2014    Carpal tunnel syndrome 01/21/2015    Iliotibial band syndrome 04/17/2015    Patellar tendinitis 06/03/2015    Multiple joint complaints 10/05/2015    Lateral epicondylitis of right elbow 08/18/2016    DDD (degenerative disc disease), lumbar 03/08/2017    Fatigue 04/07/2017    Tear of medial meniscus of left knee, current 01/03/2018    Rotator cuff tendinitis, left 01/03/2018    Palpitations 12/14/2018    GIB (gastrointestinal bleeding) 02/01/2020     Past Medical History:   Diagnosis Date    A-fib [facility])     ADHD (attention deficit hyperactivity disorder)     Chronic low back pain     Chronic neck pain     Dental crown present     Epigastric hernia     Esophageal spasm     IHSS (idiopathic hypertrophic subaortic stenosis) ([address]     Kidney stones     HUNTER on CPAP     Primary localized osteoarthrosis, shoulder region 10/18/2013 PVC's (premature ventricular contractions)     RBBB (right bundle branch block)     RLS (restless legs syndrome)     Seasonal allergies     Tachycardia          Past Medical History:   Diagnosis Date    A-fib [facility])     ADHD (attention deficit hyperactivity disorder)     Carpal tunnel syndrome 1/21/2015    Chronic low back pain     Chronic neck pain     Chronic sinusitis     Dr. Benita jamil present     lower    Epigastric hernia     Esophageal spasm     GERD (gastroesophageal reflux disease)     Hyperlipidemia     Hypertrophic cardiomyopathy (HCC)     IHSS (idiopathic hypertrophic subaortic stenosis) ([address]     Kidney stones     HUNTER on CPAP     Dr. Tarah Joy- A-PAP    Primary localized osteoarthrosis, shoulder region 10/18/2013    Prostate cancer [facility])     prostate ; s/p radiation treatment    PVC's (premature ventricular contractions)     RBBB (right bundle branch block)     RLS (restless legs syndrome)     Dr. Tarah Joy    Seasonal allergies     Tachycardia      Past Surgical History:   Procedure Laterality Date    CARPAL TUNNEL RELEASE Left     COLONOSCOPY  01/17/2011    COLONOSCOPY N/A 02/02/2020    COLONOSCOPY WITH BIOPSY performed by Delmer Mortimer, MD at [facility] 20 2015    left    ENDOSCOPY, COLON, DIAGNOSTIC      INGUINAL HERNIA REPAIR Bilateral 2009, 2012,    INSERTABLE CARDIAC MONITOR Left 12/11/2020    INSERTABLE CARDIAC MONITOR  12/11/2020    Loop Implant    KNEE ARTHROSCOPY Right 1982    Right    KNEE ARTHROSCOPY Left 12/21/2017    KNEE ARTHROSCOPY Left 07/17/2020    VIDEO ARTHROSCOPY LEFT KNEE, CHONDROPLASTY, PLICA EXCISION, PARTIAL MEDIAL LATERAL MENISECTOMY -SLEEP APNEA- performed by Awilda Melgoza MD at [address] [place] [address] [facility] ARTHROSCOPY Right 12/31/2013    VIDEO ARTHROSCOPY RIGHT SHOULDER, SUBACROMIAL DECOMPRESSION, DISTAL CLAVICLE RESECTION, ROTATOR CUFF DEBRIDEMENT          TURP  2789    X 2    UMBILICAL HERNIA REPAIR      X 2    UPPER GASTROINTESTINAL ENDOSCOPY 09/2003    VARICOCELECTOMY  2000         Medications Prior to Admission:    Medications Prior to Admission: amiodarone (CORDARONE) 200 MG tablet, Take 1 tablet by mouth daily for 7 days  omeprazole (PRILOSEC) 40 MG delayed release capsule, Take 1 capsule by mouth daily  Cyanocobalamin (B-12 PO), Take by mouth  b complex vitamins capsule, Take 1 capsule by mouth daily  pravastatin (PRAVACHOL) 20 MG tablet, Take 1 tablet by mouth every evening  clonazePAM (KLONOPIN) 1 MG tablet, TAKE 1 TABLET BY MOUTH EVERY NIGHT AS NEEDED (Patient taking differently: Take 0.5 mg by mouth daily.)  apixaban (ELIQUIS) 5 MG TABS tablet, TAKE 1 TABLET BY MOUTH TWICE DAILY  verapamil (VERELAN) 240 MG extended release capsule, TAKE 1 CAPSULE BY MOUTH EVERY NIGHT  tamsulosin (FLOMAX) 0.4 MG capsule, Take 0.4 mg by mouth daily  Cholecalciferol 25 MCG (1000 UT) CHEW, Take 1,000 Units by mouth daily (Patient not taking: No sig reported)  butalbital-APAP-caffeine -40 MG CAPS per capsule, TK ONE C PO  Q 12 H PRF SEVERE HEADACHE (Patient not taking: No sig reported)  Current Facility-Administered Medications   Medication Dose Route Frequency Provider Last Rate Last Admin    perflutren lipid microspheres (DEFINITY) injection 1.5 mL  1.5 mL IntraVENous ONCE PRN Jens Oseguera MD        apixaban (ELIQUIS) tablet 5 mg  5 mg Oral BID PARI Shipley - CNP        lactated ringers infusion   IntraVENous Continuous Estela Osborn MD        0.9 % sodium chloride infusion   IntraVENous PRN Estela Osborn MD        0.9 % sodium chloride infusion   IntraVENous PRN Jens Oseguera MD        sodium chloride flush 0.9 % injection 5-40 mL  5-40 mL IntraVENous 2 times per day Aniyah Moulton MD   10 mL at 12/05/22 2009    sodium chloride flush 0.9 % injection 5-40 mL  5-40 mL IntraVENous PRN Aniyah Moulton MD        mupirocin Daja Montiel) 2 % ointment   Nasal BID Doron Aviles MD   Given at 12/05/22 2006    colchicine (COLCRYS) tablet 0.6 mg  0.6 mg Oral BID Jens Oseguera MD   0.6 mg at 12/06/22 0483    perflutren lipid microspheres (DEFINITY) injection 1.5 mL  1.5 mL IntraVENous ONCE PRN Jens Oseguera MD        ketorolac (TORADOL) injection 30 mg  30 mg IntraVENous Q6H PRN Jens Oseguera MD   30 mg at 12/06/22 0430    0.9 % sodium chloride infusion   IntraVENous PRN Jens Oseguera MD        acetaminophen (TYLENOL) tablet 650 mg  650 mg Oral Q4H PRN Jens Oseguera MD        tamsulosin [facility]) capsule 0.4 mg  0.4 mg Oral Daily Jens Oseguera MD   0.4 mg at 12/05/22 2225    clonazePAM (KLONOPIN) tablet 0.5 mg  0.5 mg Oral Nightly Jens Oseguera MD   0.5 mg at 12/05/22 2225       Prior to Admission medications    Medication Sig Start Date End Date Taking? Authorizing Provider   amiodarone (CORDARONE) 200 MG tablet Take 1 tablet by mouth daily for 7 days 11/7/22 12/5/22  Jens Oseguera MD   omeprazole (PRILOSEC) 40 MG delayed release capsule Take 1 capsule by mouth daily 9/1/22   Zaira Scott MD   Cyanocobalamin (B-12 PO) Take by mouth    Historical Provider, MD   b complex vitamins capsule Take 1 capsule by mouth daily    Historical Provider, MD   pravastatin (PRAVACHOL) 20 MG tablet Take 1 tablet by mouth every evening 8/22/22   Zaira Scott MD   clonazePAM (KLONOPIN) 1 MG tablet TAKE 1 TABLET BY MOUTH EVERY NIGHT AS NEEDED  Patient taking differently: Take 0.5 mg by mouth daily.  8/16/22 12/5/22  Ender Singletary MD   apixaban (ELIQUIS) 5 MG TABS tablet TAKE 1 TABLET BY MOUTH TWICE DAILY 4/20/22   Tevin Cruz MD   verapamil (VERELAN) 240 MG extended release capsule TAKE 1 CAPSULE BY MOUTH EVERY NIGHT 4/20/22   Tevin Cruz MD   tamsulosin (FLOMAX) 0.4 MG capsule Take 0.4 mg by mouth daily    Historical Provider, MD   Cholecalciferol 25 MCG (1000 UT) CHEW Take 1,000 Units by mouth daily  Patient not taking: No sig reported    Historical Provider, MD   butalbital-APAP-caffeine -40 MG CAPS per capsule TK ONE C PO  Q 12 H PRF SEVERE HEADACHE  Patient not taking: No sig reported 9/24/20   Historical Provider, MD         Allergies:    Niacin and related    Social History:      reports that he has never smoked. He has never used smokeless tobacco. He reports current alcohol use of about 1.0 - 2.0 standard drink per week. He reports that he does not use drugs. Family History:   Family History   Problem Relation Age of Onset    Cancer Mother         melenoma    High Cholesterol Mother     High Blood Pressure Father     High Cholesterol Brother     Heart Disease Maternal Grandmother     Heart Disease Maternal Grandfather     Prostate Cancer Paternal Uncle        REVIEW OF SYSTEMS:  As above in the HPI. All other review of systems were asked and were negative except for reyna echest tightness and shortness of breath and anxiety. PHYSICAL EXAM:    Vitals:  BP (!) 97/55   Pulse 86   Temp 98.5 °F (36.9 °C) (Oral)   Resp 16   Ht 5' 11\" (1.803 m)   Wt 212 lb 1.3 oz (96.2 kg)   SpO2 93%   BMI 29.58 kg/m²     General:  NAD  HEENT:  Normocephalic, atraumatic. Pupils equal, round, reactive to light. No scleral icterus. No conjunctival injection. Normal lips, teeth, and gums. No nasal discharge. Neck:  Supple  Heart:  Normal s1/s2, IRR, no murmurs, gallops, or rubs. no leg edema  Lungs:  clear bilaterally, no wheeze, no rales, no rhonchi, no use of accessory muscles  Abd: bowel sounds present, soft, nontender, nondistended, no masses  Extrem:  No clubbing, cyanosis,  no edema, 2+ pedal pulses,  brisk capillary refill  Skin:  Warm and dry, no open lesions or rash,  normal color/perfusion  Psych:  A&O x 3, appropriate mood and affect. mild anxiety  Neuro: grossly intact, moves all four extremities.      Breast: deferred  Rectal: deferred  Genitalia:  deferred    LABS:  Lab Results   Component Value Date/Time    WBC 9.8 12/06/2022 04:20 AM    RBC 4.53 12/06/2022 04:20 AM    HGB 13.3 12/06/2022 04:20 AM    HCT 39.9 12/06/2022 04:20 AM    MCV 87.9 12/06/2022 04:20 AM    MCH 29.3 12/06/2022 04:20 AM    MCHC 33.3 12/06/2022 04:20 AM    RDW 14.8 12/06/2022 04:20 AM     12/06/2022 04:20 AM    MPV 7.5 12/06/2022 04:20 AM     Lab Results   Component Value Date/Time     12/06/2022 04:20 AM    K 5.0 12/06/2022 04:20 AM    K 3.9 12/05/2022 06:57 AM     12/06/2022 04:20 AM    CO2 25 12/06/2022 04:20 AM    BUN 20 12/06/2022 04:20 AM    CREATININE 1.0 12/06/2022 04:20 AM    GFRAA >60 08/22/2022 02:51 PM    GFRAA >60 03/04/2013 09:07 AM    AGRATIO 2.0 08/22/2022 02:51 PM    LABGLOM >60 12/06/2022 04:20 AM    GLUCOSE 151 12/06/2022 04:20 AM    GLUCOSE 83 05/22/2012 04:40 PM    PROT 6.6 08/22/2022 02:51 PM    PROT 7.1 03/04/2013 09:07 AM    LABALBU 4.4 08/22/2022 02:51 PM    CALCIUM 8.8 12/06/2022 04:20 AM    BILITOT 0.7 08/22/2022 02:51 PM    ALKPHOS 109 08/22/2022 02:51 PM    AST 16 08/22/2022 02:51 PM    ALT 18 08/22/2022 02:51 PM     Lab Results   Component Value Date/Time    PROTIME 13.7 02/02/2020 04:58 AM    INR 1.18 02/02/2020 04:58 AM     No results for input(s): CKTOTAL, CKMB, CKMBINDEX, TROPONINI in the last 72 hours.   Lab Results   Component Value Date/Time    NITRU Negative 03/04/2021 06:46 PM    COLORU DARK YELLOW 03/04/2021 06:46 PM    PHUR 6.0 03/04/2021 06:46 PM    WBCUA 6-9 03/04/2021 06:46 PM    RBCUA 3-4 03/04/2021 06:46 PM    MUCUS Rare 10/06/2020 04:33 AM    BACTERIA Rare 03/04/2021 06:46 PM    CLARITYU Clear 03/04/2021 06:46 PM    SPECGRAV 1.020 03/04/2021 06:46 PM    LEUKOCYTESUR TRACE 03/04/2021 06:46 PM    UROBILINOGEN 0.2 03/04/2021 06:46 PM    BILIRUBINUR Negative 03/04/2021 06:46 PM    BILIRUBINUR 0 07/09/2018 09:49 AM    BLOODU TRACE-INTACT 03/04/2021 06:46 PM    GLUCOSEU Negative 03/04/2021 06:46 PM    KETUA Negative 03/04/2021 06:46 PM    AMORPHOUS Rare 03/04/2021 06:46 PM     Lab Results   Component Value Date/Time    MG 2.00 11/03/2022 05:11 AM    PHOS 3.0 03/06/2018 09:52 AM     Lab Results   Component Value Date/Time    LABA1C 5.6 08/22/2022 02:48 PM     Lab Results   Component Value Date/Time    TSH 2.95 12/15/2021 02:52 PM    AVHHCPXT88 626 12/17/2009 10:57 AM    FOLATE 13.89 12/17/2009 10:57 AM    FERRITIN 60.4 03/04/2013 02:10 PM     Lab Results   Component Value Date/Time    TRIG 92 08/22/2022 02:51 PM    HDL 52 08/22/2022 02:51 PM    HDL 51 12/19/2011 10:03 AM    LDLCALC 139 08/22/2022 02:51 PM    LABVLDL 18 08/22/2022 02:51 PM     Lab Results   Component Value Date/Time    LIPASE 19.0 03/04/2021 06:26 PM     Lab Results   Component Value Date/Time     01/03/2014 04:00 PM     Lab Results   Component Value Date/Time    LACTA 1.2 03/04/2021 06:26 PM     No results found for: PHART, PH, ZDQ5KHU, PCO2, PO2ART, PO2, XQU7GQK, HCO3, BEART, BE, THGBART, THB, IPD3DRU, R2UIJESF, O2SAT  No results found for: [facility], BARBSCNU, LABBENZ, CANNAB, COCAINESCRN, LABMETH, OPIATESCREENURINE, PHENCYCLIDINESCREENURINE, PPXUR, ETOH  No results found for: [facility]    Lab Results   Component Value Date/Time    VITD25 33.1 04/07/2017 08:44 AM           RADIOLOGY:  CTA CARDIAC W C STRC MORP W CONTRAST    Result Date: 11/21/2022  EXAMINATION: CTA OF THE CORONARY ARTERIES 11/21/2022 7:38 am TECHNIQUE: Coronary CT angiogram was performed after the bolus administration of intravenous contrast with retrospective cardiac gating. Multiplanar reformatted images were created on a separate workstation by the radiologist. Automated exposure control, iterative reconstruction, and/or weight based adjustment of the mA/kV was utilized to reduce the radiation dose to as low as reasonably achievable. COMPARISON: None.  HISTORY: ORDERING SYSTEM PROVIDED HISTORY: Atrial fibrillation, unspecified type [facility]) TECHNOLOGIST PROVIDED HISTORY: STAT Creatinine as needed:->Yes Reason for exam:->pulmonary vein visualization protocol Reason for Exam: afib, HR 66-86 final scan FINDINGS: Left atrium: AP dimension of left atrium at the level of the aortic valve measures 5.5 cm. Orifice of right inferior pulmonary vein measures 28.7 x 26.8 mm Orifice of right superior pulmonary vein measures 30 mm x 27 mm. Common pulmonary vein is seen on the left, with an orifice measuring 41.1 mm x 17.8 mm. This gives rise to a left superior pulmonary vein with an orifice measuring 22.9 mm x 23.3 mm and a left inferior pulmonary vein with an orifice measuring 14.2 mm x 16.5 mm. There is mixing of unopacified and opacified blood in the left atrial appendage. No thrombus identified in the appendage. Mediastinum: No central pulmonary embolus identified. Small mediastinal and hilar nodes are noted. No intimal flap is seen in the aorta. Ascending aorta measures 3.1 cm. No aortic valve calcification noted. Coronary artery origins appear normal.  There is trace calcification seen in the left anterior descending coronary artery Lungs/Pleura: No focal consolidation is seen on limited images of the lungs. Subtle septal thickening is suggested at the lung bases. Upper Abdomen: Limited images of upper abdomen are unremarkable. Soft Tissues/Bones: No acute bone or soft tissue abnormality. Mild spurring is seen in the spine     Left atrial enlargement. No stenosis at the pulmonary vein origins. There is a left common pulmonary vein on the left. No thrombus in the left atrial appendage Subtle septal thickening is seen in the lungs suggesting mild fluid overload     Repeat ECHO today:   Summary    Limited for pericardial effusion. No obvious evidence of pericardial effusion.      EKG:  Afib, RBBB, rate 85    PHYSICIAN CERTIFICATION    I certify that Rowena Sutherland. is expected to be hospitalized for >2 midnights based on the following assessment and plan:      ASSESSMENT:      Patient Active Problem List   Diagnosis    GERD (gastroesophageal reflux disease)    Hyperlipidemia    Hypertrophic cardiomyopathy ([address]    PVC (premature ventricular contraction)    Right bundle branch block    Restless legs syndrome    Obstructive sleep apnea    Obesity    Personal history of malignant neoplasm of prostate    SBO (small bowel obstruction) ([facility])    Elevated blood sugar    PAF (paroxysmal atrial fibrillation) ([facility])    Current use of long term anticoagulation    History of GI bleed    Tremor    medtronic LINQ 12/11/2020 Dr Rah Ya    Atrial fibrillation [facility])    Atrial fibrillation, unspecified type [facility])    Status post ablation of atrial flutter       Principal Problem:    Status post ablation of atrial flutter  Resolved Problems:    * No resolved hospital problems.  *      PLAN:     Atrial fib/flutter - still present - procedure had to be stopped after pericardial effusion noted  Pericardial effusion with tamponade -   Anxiety  Mild hypotension - monitor closely      DVT prophylaxis Yes:  Eliquis  GI prophylaxis none  Antibiotic prophylaxis:  No    Code status Full code        Please note that over 50 minutes was spent in evaluating the patient, review of records and results, discussion with staff/family, etc.    Electronically signed by Robb Thakkar MD on 12/6/2022 at 12:35 PM

## 2022-12-07 ENCOUNTER — APPOINTMENT (OUTPATIENT)
Dept: GENERAL RADIOLOGY | Age: 62
DRG: 274 | End: 2022-12-07
Attending: INTERNAL MEDICINE
Payer: COMMERCIAL

## 2022-12-07 VITALS
BODY MASS INDEX: 29.48 KG/M2 | TEMPERATURE: 98.3 F | HEIGHT: 71 IN | RESPIRATION RATE: 18 BRPM | SYSTOLIC BLOOD PRESSURE: 149 MMHG | WEIGHT: 210.6 LBS | OXYGEN SATURATION: 95 % | HEART RATE: 100 BPM | DIASTOLIC BLOOD PRESSURE: 91 MMHG

## 2022-12-07 LAB
ANION GAP SERPL CALCULATED.3IONS-SCNC: 9 MMOL/L (ref 3–16)
BUN BLDV-MCNC: 20 MG/DL (ref 7–20)
CALCIUM SERPL-MCNC: 8.7 MG/DL (ref 8.3–10.6)
CHLORIDE BLD-SCNC: 107 MMOL/L (ref 99–110)
CO2: 25 MMOL/L (ref 21–32)
CREAT SERPL-MCNC: 0.7 MG/DL (ref 0.8–1.3)
GFR SERPL CREATININE-BSD FRML MDRD: >60 ML/MIN/{1.73_M2}
GLUCOSE BLD-MCNC: 111 MG/DL (ref 70–99)
HCT VFR BLD CALC: 39.7 % (ref 40.5–52.5)
HEMOGLOBIN: 12.8 G/DL (ref 13.5–17.5)
MCH RBC QN AUTO: 28.7 PG (ref 26–34)
MCHC RBC AUTO-ENTMCNC: 32.4 G/DL (ref 31–36)
MCV RBC AUTO: 88.6 FL (ref 80–100)
PDW BLD-RTO: 14.5 % (ref 12.4–15.4)
PLATELET # BLD: 221 K/UL (ref 135–450)
PMV BLD AUTO: 7.8 FL (ref 5–10.5)
POTASSIUM SERPL-SCNC: 4.1 MMOL/L (ref 3.5–5.1)
RBC # BLD: 4.48 M/UL (ref 4.2–5.9)
SODIUM BLD-SCNC: 141 MMOL/L (ref 136–145)
WBC # BLD: 6.5 K/UL (ref 4–11)

## 2022-12-07 PROCEDURE — 2580000003 HC RX 258: Performed by: ANESTHESIOLOGY

## 2022-12-07 PROCEDURE — 71045 X-RAY EXAM CHEST 1 VIEW: CPT

## 2022-12-07 PROCEDURE — 85027 COMPLETE CBC AUTOMATED: CPT

## 2022-12-07 PROCEDURE — 6360000002 HC RX W HCPCS

## 2022-12-07 PROCEDURE — 6370000000 HC RX 637 (ALT 250 FOR IP)

## 2022-12-07 PROCEDURE — 99239 HOSP IP/OBS DSCHRG MGMT >30: CPT

## 2022-12-07 PROCEDURE — 36415 COLL VENOUS BLD VENIPUNCTURE: CPT

## 2022-12-07 PROCEDURE — 80048 BASIC METABOLIC PNL TOTAL CA: CPT

## 2022-12-07 PROCEDURE — 6370000000 HC RX 637 (ALT 250 FOR IP): Performed by: INTERNAL MEDICINE

## 2022-12-07 RX ORDER — FUROSEMIDE 10 MG/ML
20 INJECTION INTRAMUSCULAR; INTRAVENOUS ONCE
Status: COMPLETED | OUTPATIENT
Start: 2022-12-07 | End: 2022-12-07

## 2022-12-07 RX ORDER — COLCHICINE 0.6 MG/1
0.6 TABLET ORAL EVERY OTHER DAY
Qty: 15 TABLET | Refills: 0 | Status: ON HOLD | OUTPATIENT
Start: 2022-12-12 | End: 2022-12-28 | Stop reason: HOSPADM

## 2022-12-07 RX ORDER — PYRAZINAMIDE 500 MG/1
1 TABLET ORAL EVERY 4 HOURS PRN
Qty: 30 TABLET | Refills: 0 | Status: SHIPPED | OUTPATIENT
Start: 2022-12-07 | End: 2022-12-12

## 2022-12-07 RX ORDER — COLCHICINE 0.6 MG/1
0.6 TABLET ORAL DAILY
Qty: 3 TABLET | Refills: 0 | Status: ON HOLD | OUTPATIENT
Start: 2022-12-08 | End: 2022-12-28 | Stop reason: HOSPADM

## 2022-12-07 RX ORDER — NAPROXEN 250 MG/1
250 TABLET ORAL 2 TIMES DAILY WITH MEALS
Qty: 10 TABLET | Refills: 0 | Status: ON HOLD | OUTPATIENT
Start: 2022-12-07 | End: 2022-12-28 | Stop reason: HOSPADM

## 2022-12-07 RX ORDER — COLCHICINE 0.6 MG/1
0.6 TABLET ORAL DAILY
Status: DISCONTINUED | OUTPATIENT
Start: 2022-12-08 | End: 2022-12-07 | Stop reason: HOSPADM

## 2022-12-07 RX ORDER — VERAPAMIL HYDROCHLORIDE 240 MG/1
240 TABLET, FILM COATED, EXTENDED RELEASE ORAL NIGHTLY
Status: DISCONTINUED | OUTPATIENT
Start: 2022-12-07 | End: 2022-12-07 | Stop reason: HOSPADM

## 2022-12-07 RX ORDER — NAPROXEN 250 MG/1
250 TABLET ORAL 2 TIMES DAILY WITH MEALS
Status: DISCONTINUED | OUTPATIENT
Start: 2022-12-07 | End: 2022-12-07 | Stop reason: HOSPADM

## 2022-12-07 RX ADMIN — COLCHICINE 0.6 MG: 0.6 TABLET, FILM COATED ORAL at 08:59

## 2022-12-07 RX ADMIN — MUPIROCIN: 20 OINTMENT TOPICAL at 09:01

## 2022-12-07 RX ADMIN — SODIUM CHLORIDE, PRESERVATIVE FREE 10 ML: 5 INJECTION INTRAVENOUS at 09:01

## 2022-12-07 RX ADMIN — NAPROXEN 250 MG: 250 TABLET ORAL at 12:31

## 2022-12-07 RX ADMIN — APIXABAN 5 MG: 5 TABLET, FILM COATED ORAL at 08:59

## 2022-12-07 RX ADMIN — FUROSEMIDE 20 MG: 10 INJECTION, SOLUTION INTRAMUSCULAR; INTRAVENOUS at 14:25

## 2022-12-07 RX ADMIN — PANTOPRAZOLE SODIUM 40 MG: 40 TABLET, DELAYED RELEASE ORAL at 05:31

## 2022-12-07 ASSESSMENT — PAIN SCALES - GENERAL
PAINLEVEL_OUTOF10: 0

## 2022-12-07 NOTE — CARE COORDINATION
LOS 2. Care managed by [facility], [address] S/P Ablation on Mon. From home w spouse and IPTA. Prev HUNTER w CPAP. Following. Roopa Patel RN     5897 Has DC order. Spoke to pt at bedside- denies needs.   Roopa Patel RN

## 2022-12-07 NOTE — DISCHARGE SUMMARY
Patient ID:  Ishmael Mcfadden  [id number]  58 y.o.  1960    Admit date: 12/5/2022    Discharge date and time: 12/7/2022    Admitting Physician: Cooper Zapien MD     Discharge NP: Nina Pierre, APRN-CNP    Admission Diagnoses: Paroxysmal atrial fibrillation [I48.0]  Status post ablation of atrial flutter [Z98.890, Z86.79]    Discharge Diagnoses: SAME    Admission Condition: fair    Discharged Condition: good    Hospital Course: Ishmael Pena was admitted on 12/05/2022 with EPS and planned atrial fibrillation and atrial flutter ablation. Procedure was complicated by pericardial effusion with tamponade physiology. Emergent pericardiocentesis performed with approximately 550 mL blood aspirated. Still was able to perform ablation of cavo-tricuspid isthmus for presumed typical atrial flutter. On 12/06/2022 limited echo showed no obvious evidence of pericardial effusion. Today CXR showed mild pulmonary vascular congestion. 20 mg IV lasix given x1. Rhythm has been AF with rates in the 90's. He reports ongoing chest pain with deep breaths. Pain is worse with activity. He does feel that his breathing is better. He denies dizziness. Consults: CT surgery, hospitalist     Assessment:   Paroxsymal atrial fibrillation: stable              -[id number] score 0              -amiodarone started 10/2017, stopped in 5/2022              -s/p EPS ablation of cavo-tricuspid isthmus dependent for presumed typical atrial flutter 12/05/2022  RBBB: stable  PVC's: stable   NSVT: stable               -noted on event monitor 7/2020  S/P loop recorder implant   HLD  HOCM              -per cardiac MRI 2018 without LVOT obstruction   Chronic pain   Sleep apnea: compliant with CPAP   GERD  Pericardial effusion: resolved   -s/p emergent pericardiocentesis performed with approximately 550 mL blood aspirated.   -On 12/06/2022 limited echo showed no obvious evidence of pericardial effusion.         Plan:   Start colchicine 0.6 mg daily for 3 days. On Monday 12/12/2022, start colchicine 0.6 mg every other day for inflammation due to pericardial effusion   Start naproxen 250 mg BID with meals for inflammation due to pericardial effusion   Acetaminophen-codeine q4 hours PRN for chest pain for 5 days   We discussed the importance of taking daily omeprazole due to Eliquis and naproxen use. He was educated to call the office if GI bleeding occurs.    Continue verapamil and pravastatin    Post procedure instructions reviewed  Will arrange for left sided atrial fibrillation ablation on 12/27/2022 with Dr. Kathy Carranza       Discharge Exam:  BP (!) 159/93   Pulse 100   Temp 98.3 °F (36.8 °C) (Oral)   Resp 18   Ht 5' 11\" (1.803 m)   Wt 210 lb 9.6 oz (95.5 kg)   SpO2 95%   BMI 29.37 kg/m²     General Appearance:    Alert, cooperative, no distress, appears stated age   Head:    Normocephalic, without obvious abnormality, atraumatic   Eyes:    PERRL, conjunctiva/corneas clear, EOM's intact        Ears:    deferred   Nose:   Nares normal, septum midline, mucosa normal, no drainage    or sinus tenderness   Throat:   Lips, mucosa, and tongue normal; teeth and gums normal   Neck:   Supple, symmetrical, trachea midline, no adenopathy;        thyroid:  No enlargement/tenderness/nodules; no carotid    bruit or JVD   Back:     Symmetric, no curvature, ROM normal, no CVA tenderness   Lungs:     Clear to auscultation bilaterally, respirations unlabored   Chest wall:    No tenderness or deformity   Heart:    Irregular rate and rhythm, S1 and S2 normal, no murmur, rub   or gallop; AF on tele   Abdomen:     Soft, non-tender, bowel sounds active all four quadrants,     no masses, no organomegaly   Genitalia:    deferred   Rectal:    deferred    Extremities:   Extremities normal, atraumatic, no cyanosis or edema; bilateral femoral sites stable (no sutures present)   Pulses:   2+ and symmetric all extremities   Skin:   Skin color, texture, turgor normal, no rashes or lesions Lymph nodes:   Cervical, supraclavicular, and axillary nodes normal   Neurologic:   CNII-XII intact. Normal strength, sensation and reflexes       throughout     Disposition: home    Patient Instructions:      Medication List        START taking these medications      acetaminophen-codeine 300-30 MG per tablet  Commonly known as: TYLENOL/CODEINE #3  Take 1 tablet by mouth every 4 hours as needed for Pain for up to 5 days. Intended supply: 5 days. Take lowest dose possible to manage pain     * colchicine 0.6 MG tablet  Commonly known as: COLCRYS  Take 1 tablet by mouth daily for 3 days  Start taking on: December 8, 2022     * colchicine 0.6 MG tablet  Commonly known as: Colcrys  Take 1 tablet by mouth every other day  Start taking on: December 12, 2022     naproxen 250 MG tablet  Commonly known as: NAPROSYN  Take 1 tablet by mouth 2 times daily (with meals) for 5 days           * This list has 2 medication(s) that are the same as other medications prescribed for you. Read the directions carefully, and ask your doctor or other care provider to review them with you. CHANGE how you take these medications      clonazePAM 1 MG tablet  Commonly known as: KLONOPIN  TAKE 1 TABLET BY MOUTH EVERY NIGHT AS NEEDED  What changed: See the new instructions.             CONTINUE taking these medications      apixaban 5 MG Tabs tablet  Commonly known as: Eliquis  TAKE 1 TABLET BY MOUTH TWICE DAILY     b complex vitamins capsule     B-12 PO     omeprazole 40 MG delayed release capsule  Commonly known as: PRILOSEC  Take 1 capsule by mouth daily     pravastatin 20 MG tablet  Commonly known as: Pravachol  Take 1 tablet by mouth every evening     tamsulosin 0.4 MG capsule  Commonly known as: FLOMAX     verapamil 240 MG extended release capsule  Commonly known as: VERELAN  TAKE 1 CAPSULE BY MOUTH EVERY NIGHT            STOP taking these medications      amiodarone 200 MG tablet  Commonly known as: CORDARONE butalbital-APAP-caffeine -40 MG Caps per capsule            ASK your doctor about these medications      Cholecalciferol 25 MCG (1000 UT) Chew               Where to Get Your Medications        These medications were sent to [address] [facility] [address] [id number]      Phone: [phone]   acetaminophen-codeine 300-30 MG per tablet  colchicine 0.6 MG tablet  colchicine 0.6 MG tablet  naproxen 250 MG tablet       Post procedure instructions given  Activity: as tolerated  Diet: cardiac diet    PARI Swift-CNP  [facility]  [phone]     Time spent on discharge of patient: >35 minutes, including plan of care, patient education, and care coordination

## 2022-12-07 NOTE — PROGRESS NOTES
Hospitalist Progress Note      PCP: Liz Cedeno MD    Date of Admission: 12/5/2022    Chief Complaint: pericardial effusion    Hospital Course: This is a 58 y.o. WM with PMx sig for afib, GERD, hypertrophic cardiomyopathy and HUNTER on CPAP who presents with complication of pericardial effusion s/p pericardial window. He is still c/o some chest tightness and shortness of breath and has been very anxious overnight. Repeat ECHO this AM shows minimal pericardial effusion. Subjective: feeling much improved today. Still having some mild lingering chest pain and SOB. Medications:  Reviewed    Infusion Medications    lactated ringers      sodium chloride      sodium chloride      sodium chloride       Scheduled Medications    verapamil  240 mg Oral Nightly    apixaban  5 mg Oral BID    pantoprazole  40 mg Oral QAM AC    sodium chloride flush  5-40 mL IntraVENous 2 times per day    mupirocin   Nasal BID    colchicine  0.6 mg Oral BID    tamsulosin  0.4 mg Oral Daily    clonazePAM  0.5 mg Oral Nightly     PRN Meds: perflutren lipid microspheres, sodium chloride, sodium chloride, sodium chloride flush, perflutren lipid microspheres, ketorolac, sodium chloride, acetaminophen      Intake/Output Summary (Last 24 hours) at 12/7/2022 1111  Last data filed at 12/7/2022 0500  Gross per 24 hour   Intake 360 ml   Output 1200 ml   Net -840 ml       Physical Exam Performed:    BP (!) 153/83   Pulse (!) 101   Temp 97.5 °F (36.4 °C) (Oral)   Resp 18   Ht 5' 11\" (1.803 m)   Wt 210 lb 9.6 oz (95.5 kg)   SpO2 95%   BMI 29.37 kg/m²     General appearance: No apparent distress, appears stated age and cooperative. HEENT: Pupils equal, round, and reactive to light. Conjunctivae/corneas clear. Neck: Supple, with full range of motion. No jugular venous distention. Trachea midline. Respiratory:  Normal respiratory effort. Clear to auscultation, bilaterally without Rales/Wheezes/Rhonchi.   Cardiovascular: Regular rate and rhythm with normal S1/S2 without murmurs, rubs or gallops. Abdomen: Soft, non-tender, non-distended with normal bowel sounds. Musculoskeletal: No clubbing, cyanosis or edema bilaterally. Full range of motion without deformity. Skin: Skin color, texture, turgor normal.  No rashes or lesions. Neurologic:  Neurovascularly intact without any focal sensory/motor deficits. Cranial nerves: II-XII intact, grossly non-focal.  Psychiatric: Alert and oriented, thought content appropriate, normal insight  Capillary Refill: Brisk, 3 seconds, normal   Peripheral Pulses: +2 palpable, equal bilaterally       Labs:   Recent Labs     12/05/22  0657 12/06/22  0420 12/07/22  0855   WBC 4.1 9.8 6.5   HGB 14.3 13.3* 12.8*   HCT 43.5 39.9* 39.7*    218 221     Recent Labs     12/05/22  0657 12/06/22  0420 12/07/22  0855    140 141   K 3.9 5.0 4.1    108 107   CO2 28 25 25   BUN 22* 20 20   CREATININE 1.0 1.0 0.7*   CALCIUM 9.0 8.8 8.7     No results for input(s): AST, ALT, BILIDIR, BILITOT, ALKPHOS in the last 72 hours. No results for input(s): INR in the last 72 hours. No results for input(s): Rosina Coombes in the last 72 hours.     Urinalysis:      Lab Results   Component Value Date/Time    NITRU Negative 03/04/2021 06:46 PM    WBCUA 6-9 03/04/2021 06:46 PM    BACTERIA Rare 03/04/2021 06:46 PM    RBCUA 3-4 03/04/2021 06:46 PM    BLOODU TRACE-INTACT 03/04/2021 06:46 PM    SPECGRAV 1.020 03/04/2021 06:46 PM    GLUCOSEU Negative 03/04/2021 06:46 PM       Radiology:  No orders to display       None    Assessment/Plan:    Active Hospital Problems    Diagnosis     Status post ablation of atrial flutter [Z98.890, Z86.79]      Priority: Medium     Pericardial effusion with tamponade  - occurred during ablation  - s/p pericardiocentesis  - continue colchicine, toradol  - no recurrence noted on echo    Afib  - cardiology consulted  - rate controlled  - continue verapamil  - on eliquis for [facility]    HTN  - restarting verapamil    Anxiety  - mood stable  - continue home regimen    DVT Prophylaxis: eliquis  Diet: ADULT DIET; Regular; Low Fat/Low Chol/High Fiber/2 gm Na  Code Status: Full Code  PT/OT Eval Status: not ordered    Dispo - per primary service.  No medical barriers to discharge      Mirian Burris MD

## 2022-12-08 ENCOUNTER — TELEPHONE (OUTPATIENT)
Dept: CARDIOLOGY CLINIC | Age: 62
End: 2022-12-08

## 2022-12-08 ENCOUNTER — OFFICE VISIT (OUTPATIENT)
Dept: PULMONOLOGY | Age: 62
End: 2022-12-08
Payer: COMMERCIAL

## 2022-12-08 VITALS
SYSTOLIC BLOOD PRESSURE: 130 MMHG | TEMPERATURE: 97.5 F | OXYGEN SATURATION: 95 % | DIASTOLIC BLOOD PRESSURE: 83 MMHG | HEART RATE: 71 BPM | RESPIRATION RATE: 16 BRPM | WEIGHT: 202 LBS | HEIGHT: 71 IN | BODY MASS INDEX: 28.28 KG/M2

## 2022-12-08 DIAGNOSIS — G47.33 OBSTRUCTIVE SLEEP APNEA: Primary | ICD-10-CM

## 2022-12-08 DIAGNOSIS — G47.00 INSOMNIA, UNSPECIFIED TYPE: ICD-10-CM

## 2022-12-08 DIAGNOSIS — J32.9 CHRONIC SINUSITIS, UNSPECIFIED LOCATION: ICD-10-CM

## 2022-12-08 DIAGNOSIS — G47.10 HYPERSOMNOLENCE: ICD-10-CM

## 2022-12-08 DIAGNOSIS — G25.81 RLS (RESTLESS LEGS SYNDROME): ICD-10-CM

## 2022-12-08 DIAGNOSIS — I48.0 PAF (PAROXYSMAL ATRIAL FIBRILLATION) (HCC): Primary | ICD-10-CM

## 2022-12-08 PROCEDURE — 99214 OFFICE O/P EST MOD 30 MIN: CPT | Performed by: INTERNAL MEDICINE

## 2022-12-08 ASSESSMENT — ENCOUNTER SYMPTOMS
GASTROINTESTINAL NEGATIVE: 1
RESPIRATORY NEGATIVE: 1
ALLERGIC/IMMUNOLOGIC NEGATIVE: 1
EYES NEGATIVE: 1

## 2022-12-08 NOTE — TELEPHONE ENCOUNTER
Spoke to pt to get more information     Pt was hospitalized 12/5-12/7 and had an ablation with complications. Pt stated he noticed today (12/8/22) that his incision site was partially open. Pt is unsure wether or not the incision was open at the time of discharge. Pt denies redness, bleeding, and site is not warm to the touch. Pt did report clear fluid seeping from incision site.

## 2022-12-08 NOTE — TELEPHONE ENCOUNTER
MD Maria Teresa Massey, [place] 36 minutes ago (3:31 PM)     KA  There is no incision, only multiple catheter insertion site (small holes). These will close on their own. He should just try to keep the area clean. Same pain medications used for his chest pain will suffice for any discomfort at the groin access sites. I spoke with the patient and relayed KXA message.  He V/U.

## 2022-12-08 NOTE — PROGRESS NOTES
MA Communication:   The following orders are received by verbal communication from Esa Smith MD    Orders include:  Orders faxed to Pt Aides       31-90 day fu (to be scheduled)

## 2022-12-08 NOTE — PATIENT INSTRUCTIONS
ASSESSMENT/PLAN:  1. Obstructive sleep apnea  2. Insomnia, unspecified type  3. RLS (restless legs syndrome)  4. Hypersomnolence  5. Chronic sinusitis, unspecified location        Weight is flucuating from 207 to 201 to 206 to 215 to 216 to 205  Try to lose weight    afib is controlled  Seen by cardiologist      RLS/insomnia  Will continue  Klonopin 1mg at night (recommend taking 1/2 hour before bedtime)- will refill  Will change to 0.5 mg, will try to alternate between 1mg and 0.5 mg to see if any better sleep      Could consider later  mirapex or requip for the RLS    sinus  Controlled at this time  flonase as needed     Hypersomnolence  Stopped the  nuvigil b/c of insomnia, no allergy, I have changed this  Tried the [place] however it caused more sleepiness  And didn't like it, will stop this        Would avoid all amphetamines b/c of a fib      Obstructive sleep apnea  Advised to avoid driving when too sleepy to function safely and given a discussion of the risks of untreated apnea such as accidents, cognitive impairment, mood impairment, high blood pressure, various cardiac diseases and stroke. Weight loss was encouraged. Set up date was 2/11/16  autopap is set at max of 11 and min of 9  Ramp is off  epr is at 3  Using nasal mask    Unable to download as this was a 3G network issue  Using 30/30 days  Using 8.5 h/night  90% pressure is 10.2 cwp  No leak, 23 lpm  No sleep apnea, ahi 0.5     Feeling the benefit of cpap/autopap  Will continue with cpap        The PAP therapy is not working properly. It is too old to repair or replace the parts. The patient will need new PAP therapy. The patient has been feeling benefit from PAP therapy and will need to continue.       Cpap/Bipap is too old to fix and is not functioning properly  Patient has benefited from PAP use but now needs a new one  It is beyond repair and will need new replacement  Patient can not do without pap therapy        cpap titration done 1/4/20  Wt was 207  cpap at 9 cwp best controlled    Dme will be patient aids    TSH - done by at [address] , was normal Done 2/26/20    Blood pressure controlled-   Some issues with the afib and cardiac disease              RTC 3 months    Remember to bring a list of pulmonary medications and any CPAP or BiPAP machines to your next appointment with the office. Please keep all of your future appointments scheduled by [facility] [address], [place] [facility] office. Out of respect for other patients and providers, you may be asked to reschedule your appointment if you arrive later than your scheduled appointment time. Appointments cancelled less than 24hrs in advance will be considered a no show. Patients with three missed appointments within 1 year or four missed appointments within 2 years can be dismissed from the practice. Please be aware that our physicians are required to work in the Intensive Care Unit at [facility].  Your appointment may need to be rescheduled if they are designated to work during your appointment time. You may receive a survey regarding the care you received during your visit. Your input is valuable to us. We encourage you to complete and return your survey. We hope you will choose us in the future for your healthcare needs. Pt instructed of all future appointment dates & times, including radiology, labs, procedures & referrals. If procedures were scheduled preparation instructions provided. Instructions on future appointments with [facility] were given.      Patient Aids:  [phone]

## 2022-12-08 NOTE — TELEPHONE ENCOUNTER
Pt presented himself in office today to ask about his next scheduled procedure date. When expressed to pt that it has not been scheduled just yet. Pt then asked for advice on the incision site from procedure. Stated that it is open, but not bleeding, & painful. I expressed to pt that if he could, to visit ED for immediate attention, however I will send a message back regarding the incision site. & if there is anything pt can do to protect the area. Please advise & thank you.

## 2022-12-08 NOTE — TELEPHONE ENCOUNTER
Spoke with patient. Patient is scheduled with Dr. Jeaneth Bowens for FATIMAH and Dr. Gavin Cruz for Afib Ablation (left side) on 12/27/22 at [facility], arrival time of 6:30am to the Cath Lab. Please have patient arrive to the main entrance of [facility] and check in with the registration desk. Please call patient regarding medication instructions. Remind patient to be NPO after midnight (8 hours prior). Do not apply lotions/creams on skin the day of procedure. COVID testing - does patient need tested?

## 2022-12-08 NOTE — LETTER
[facility]  [address] [place] [id number]  Phone: [phone]  Fax: [phone]    Ramone Wayne MD        December 8, 2022     Patient: Chauncey Ryder. YOB: 1960   Date of Visit: 12/8/2022 12/8/22        Chauncey Ryder. I have seen this patient in the office today and wanted to communicate my findings and recommendations. Patient Instructions        ASSESSMENT/PLAN:  1. Obstructive sleep apnea  2. Insomnia, unspecified type  3. RLS (restless legs syndrome)  4. Hypersomnolence  5. Chronic sinusitis, unspecified location        Weight is flucuating from 207 to 201 to 206 to 215 to 216 to 205  Try to lose weight    afib is controlled  Seen by cardiologist      RLS/insomnia  Will continue  Klonopin 1mg at night (recommend taking 1/2 hour before bedtime)- will refill  Will change to 0.5 mg, will try to alternate between 1mg and 0.5 mg to see if any better sleep      Could consider later  mirapex or requip for the RLS    sinus  Controlled at this time  flonase as needed     Hypersomnolence  Stopped the  nuvigil b/c of insomnia, no allergy, I have changed this  Tried the [place] however it caused more sleepiness  And didn't like it, will stop this        Would avoid all amphetamines b/c of a fib      Obstructive sleep apnea  Advised to avoid driving when too sleepy to function safely and given a discussion of the risks of untreated apnea such as accidents, cognitive impairment, mood impairment, high blood pressure, various cardiac diseases and stroke. Weight loss was encouraged.            Set up date was 2/11/16  autopap is set at max of 11 and min of 9  Ramp is off  epr is at 3  Using nasal mask    Unable to download as this was a 3G network issue  Using 30/30 days  Using 8.5 h/night  90% pressure is 10.2 cwp  No leak, 23 lpm  No sleep apnea, ahi 0.5     Feeling the benefit of cpap/autopap  Will continue with cpap        The PAP therapy is not working properly. It is too old to repair or replace the parts. The patient will need new PAP therapy. The patient has been feeling benefit from PAP therapy and will need to continue. Cpap/Bipap is too old to fix and is not functioning properly  Patient has benefited from PAP use but now needs a new one  It is beyond repair and will need new replacement  Patient can not do without pap therapy        cpap titration done 1/4/20  Wt was 207  cpap at 9 cwp best controlled    Dme will be patient aids    TSH - done by at [address] [facility] , was normal Done 2/26/20    Blood pressure controlled-   Some issues with the afib and cardiac disease              RTC 3 months                   Thank you for allowing me to assist in the care of the [employer].                    MD Dheeraj Marquez MD

## 2022-12-08 NOTE — TELEPHONE ENCOUNTER
Pt presented himself in office today to ask about the time for his next procedure that is scheduled for 12/27/22. However I did not see a procedure scheduled at this time. I told the pt that I would reach out to you to ask about the ablation needing to be scheduled.

## 2022-12-09 LAB
BLOOD BANK DISPENSE STATUS: NORMAL
BLOOD BANK DISPENSE STATUS: NORMAL
BLOOD BANK PRODUCT CODE: NORMAL
BLOOD BANK PRODUCT CODE: NORMAL
BPU ID: NORMAL
BPU ID: NORMAL
DESCRIPTION BLOOD BANK: NORMAL
DESCRIPTION BLOOD BANK: NORMAL

## 2022-12-13 NOTE — PROGRESS NOTES
Physician Progress Note      PATIENT:               Moises Marx  CSN #:                  [id number]  :                       1960  ADMIT DATE:       2022 6:27 AM  DISCH DATE:        2022 5:06 PM  RESPONDING  PROVIDER #:        Luther Gonzales          QUERY TEXT:    Pt admitted for planned ablation on 22. Pt noted to have CXR on    showing mild pulmonary vascular congestion and pt received Lasix 20 mg IVP on   Discharge summary. If possible, please document in the progress notes and   discharge summary if you are evaluating and/or treating any of the following: The medical record reflects the following:  Risk Factors: AFib, Ablation procedure with pericardial effusion complication,   HOCM  Clinical Indicators: per DCS -\" Today CXR showed mild pulmonary vascular   congestion. 20 mg IV lasix given x1. Rhythm has been AF with rates in the 90's. He reports ongoing chest pain with deep breaths. Pain is worse with activity. He does feel that his breathing is better\"  Limited Echo 22- EF 55%  Treatment: CXR, Lasix 20 mg IVP x1 prior to discharge on , labs,   supportive monitored care    Thank you, Florentin Eldridge RN, [email or web address]  Options provided:  -- Noncardiogenic acute pulmonary edema due to, please specify. -- Acute pulmonary edema due to acute heart failure,  -- Other - I will add my own diagnosis  -- Disagree - Not applicable / Not valid  -- Disagree - Clinically unable to determine / Unknown  -- Refer to Clinical Documentation Reviewer    PROVIDER RESPONSE TEXT:    Provider is clinically unable to determine a response to this query.     Query created by: Jo Ann Carson on 2022 9:44 AM      Electronically signed by:  Luther Gonzales 2022 12:40 PM

## 2022-12-15 NOTE — PROGRESS NOTES
Physician Progress Note      PATIENTEveline Reasons  [facility] #:                  [id number]  :                       1960  ADMIT DATE:       2022 6:27 AM  DISCH DATE:        2022 5:06 PM  RESPONDING  PROVIDER #:        Tano Kwok MD          QUERY TEXT:    Pt admitted for planned ablation on 22. Pt noted to have CXR on    showing mild pulmonary vascular congestion and pt received Lasix 20 mg IVP per   Discharge summary. Pt has CHF hx listed on anesthesia Op note. If possible,   please document in the progress notes and discharge summary if you are   evaluating and/or treating any of the following: The medical record reflects the following:  Risk Factors: AFib, Ablation procedure with pericardial effusion complication,   HOCM  Clinical Indicators: per DCS -\" Today CXR showed mild pulmonary vascular   congestion. 20 mg IV lasix given x1. Rhythm has been AF with rates in the 90's. He reports ongoing chest pain with deep breaths. Pain is worse with activity. He does feel that his breathing is better\"  Limited Echo 22- EF 55%  Treatment: CXR, Lasix 20 mg IVP x1 prior to discharge on , labs, Echo,   ablation, supportive monitored care    Thank you, Chance Figueroa RN, BSN  [email or web address]  Options provided:  -- Noncardiogenic acute pulmonary edema  -- Acute pulmonary edema due to acute on chronic HFpEF  -- Other - I will add my own diagnosis  -- Disagree - Not applicable / Not valid  -- Disagree - Clinically unable to determine / Unknown  -- Refer to Clinical Documentation Reviewer    PROVIDER RESPONSE TEXT:    Provider is clinically unable to determine a response to this query.     Query created by: Leighann Cardenas on 2022 4:40 AM      Electronically signed by:  Tano Kwok MD 12/15/2022 1:40 PM

## 2022-12-20 NOTE — TELEPHONE ENCOUNTER
Would you like the patient to start Amiodarone 1 week prior and hold verapamil three days prior to the procedure?

## 2022-12-20 NOTE — TELEPHONE ENCOUNTER
Angelic Ornelas MD  You 1 hour ago (11:31 AM)     KA  That if fine. Thank you    I spoke with the patient and relayed KXA message. He V/U. I explained to the patient that since we need paper proof of  a negative result that the test would need to obtained at a [facility] facility.  He V/U.

## 2022-12-20 NOTE — TELEPHONE ENCOUNTER
Pt returned call. Message given. Pt stated that if possible he would like to do an at home covid test. Pt stated that he also had previous medication instructions to hold verapamil 3 days prior and to start amiodarone 1 week prior. Please advise.

## 2022-12-20 NOTE — TELEPHONE ENCOUNTER
I attempted to call the patient to relay procedure and medication instructions. LMOV. Order for COVID testing placed. Needs to obtain within 6 days of the procedure    Medications to hold:   Hold Eliquis the day prior to procedure   Hold b complex and vitamin b 12 the AM of the procedure.

## 2022-12-21 ENCOUNTER — NURSE ONLY (OUTPATIENT)
Dept: CARDIOLOGY CLINIC | Age: 62
End: 2022-12-21
Payer: COMMERCIAL

## 2022-12-21 DIAGNOSIS — I49.3 PVC (PREMATURE VENTRICULAR CONTRACTION): ICD-10-CM

## 2022-12-21 DIAGNOSIS — Z45.09 ENCOUNTER FOR LOOP RECORDER CHECK: ICD-10-CM

## 2022-12-21 DIAGNOSIS — I48.0 PAF (PAROXYSMAL ATRIAL FIBRILLATION) (HCC): Primary | ICD-10-CM

## 2022-12-21 DIAGNOSIS — I48.91 ATRIAL FIBRILLATION, UNSPECIFIED TYPE (HCC): ICD-10-CM

## 2022-12-21 PROCEDURE — G2066 INTER DEVC REMOTE 30D: HCPCS | Performed by: INTERNAL MEDICINE

## 2022-12-21 PROCEDURE — 93298 REM INTERROG DEV EVAL SCRMS: CPT | Performed by: INTERNAL MEDICINE

## 2022-12-22 NOTE — PROGRESS NOTES
Remote interrogation of implanted cardiac event monitor shows normal function. Patient has a history of hypertrophic CM, pAF, and PVCs. Takes Eliquis and verapamil. Patient's last device interrogation was on 11/16. Since last interrogation, AT/AR burden measures 100%. Ac BOUDREAUX MD to review. See Paceart report under the Cardiology tab. Follow up 1 month via [facility].

## 2022-12-27 ENCOUNTER — HOSPITAL ENCOUNTER (OUTPATIENT)
Dept: CARDIAC CATH/INVASIVE PROCEDURES | Age: 62
Setting detail: OBSERVATION
Discharge: HOME | DRG: 273 | End: 2022-12-28
Attending: INTERNAL MEDICINE | Admitting: INTERNAL MEDICINE
Payer: COMMERCIAL

## 2022-12-27 ENCOUNTER — TELEPHONE (OUTPATIENT)
Dept: CARDIOLOGY | Age: 62
End: 2022-12-27

## 2022-12-27 ENCOUNTER — ANESTHESIA EVENT (OUTPATIENT)
Dept: CARDIAC CATH/INVASIVE PROCEDURES | Age: 62
End: 2022-12-27
Payer: COMMERCIAL

## 2022-12-27 ENCOUNTER — ANESTHESIA (OUTPATIENT)
Dept: CARDIAC CATH/INVASIVE PROCEDURES | Age: 62
End: 2022-12-27
Payer: COMMERCIAL

## 2022-12-27 DIAGNOSIS — I48.91 ATRIAL FIBRILLATION, UNSPECIFIED TYPE (HCC): ICD-10-CM

## 2022-12-27 PROBLEM — Z86.79 STATUS POST ABLATION OF ATRIAL FIBRILLATION: Status: ACTIVE | Noted: 2022-12-27

## 2022-12-27 PROBLEM — Z98.890 STATUS POST ABLATION OF ATRIAL FIBRILLATION: Status: ACTIVE | Noted: 2022-12-27

## 2022-12-27 LAB
ABO/RH: NORMAL
ANION GAP SERPL CALCULATED.3IONS-SCNC: 9 MMOL/L (ref 3–16)
ANTIBODY SCREEN: NORMAL
BUN BLDV-MCNC: 23 MG/DL (ref 7–20)
CALCIUM SERPL-MCNC: 9.2 MG/DL (ref 8.3–10.6)
CHLORIDE BLD-SCNC: 108 MMOL/L (ref 99–110)
CHOLESTEROL, TOTAL: 150 MG/DL (ref 0–199)
CO2: 28 MMOL/L (ref 21–32)
CREAT SERPL-MCNC: 1 MG/DL (ref 0.8–1.3)
EKG ATRIAL RATE: 277 BPM
EKG DIAGNOSIS: NORMAL
EKG Q-T INTERVAL: 412 MS
EKG QRS DURATION: 132 MS
EKG QTC CALCULATION (BAZETT): 475 MS
EKG R AXIS: 22 DEGREES
EKG T AXIS: 208 DEGREES
EKG VENTRICULAR RATE: 80 BPM
GFR SERPL CREATININE-BSD FRML MDRD: >60 ML/MIN/{1.73_M2}
GLUCOSE BLD-MCNC: 115 MG/DL (ref 70–99)
HCT VFR BLD CALC: 42.9 % (ref 40.5–52.5)
HDLC SERPL-MCNC: 42 MG/DL (ref 40–60)
HEMOGLOBIN: 14.1 G/DL (ref 13.5–17.5)
INR BLD: 1.01 (ref 0.87–1.14)
LDL CHOLESTEROL CALCULATED: 96 MG/DL
LV EF: 55 %
LVEF MODALITY: NORMAL
MCH RBC QN AUTO: 29.1 PG (ref 26–34)
MCHC RBC AUTO-ENTMCNC: 33 G/DL (ref 31–36)
MCV RBC AUTO: 88.2 FL (ref 80–100)
PDW BLD-RTO: 14.6 % (ref 12.4–15.4)
PLATELET # BLD: 262 K/UL (ref 135–450)
PMV BLD AUTO: 7.3 FL (ref 5–10.5)
POC ACT LR: 267 SEC
POTASSIUM REFLEX MAGNESIUM: 4.1 MMOL/L (ref 3.5–5.1)
PROTHROMBIN TIME: 13.2 SEC (ref 11.7–14.5)
RBC # BLD: 4.87 M/UL (ref 4.2–5.9)
SODIUM BLD-SCNC: 145 MMOL/L (ref 136–145)
TRIGL SERPL-MCNC: 60 MG/DL (ref 0–150)
VLDLC SERPL CALC-MCNC: 12 MG/DL
WBC # BLD: 3.8 K/UL (ref 4–11)

## 2022-12-27 PROCEDURE — 7100000000 HC PACU RECOVERY - FIRST 15 MIN

## 2022-12-27 PROCEDURE — 93657 TX L/R ATRIAL FIB ADDL: CPT | Performed by: INTERNAL MEDICINE

## 2022-12-27 PROCEDURE — 02583ZZ DESTRUCTION OF CONDUCTION MECHANISM, PERCUTANEOUS APPROACH: ICD-10-PCS | Performed by: INTERNAL MEDICINE

## 2022-12-27 PROCEDURE — 93657 TX L/R ATRIAL FIB ADDL: CPT

## 2022-12-27 PROCEDURE — 93005 ELECTROCARDIOGRAM TRACING: CPT | Performed by: INTERNAL MEDICINE

## 2022-12-27 PROCEDURE — 6360000002 HC RX W HCPCS: Performed by: INTERNAL MEDICINE

## 2022-12-27 PROCEDURE — 86900 BLOOD TYPING SEROLOGIC ABO: CPT

## 2022-12-27 PROCEDURE — 86901 BLOOD TYPING SEROLOGIC RH(D): CPT

## 2022-12-27 PROCEDURE — 2709999900 HC NON-CHARGEABLE SUPPLY

## 2022-12-27 PROCEDURE — 7100000001 HC PACU RECOVERY - ADDTL 15 MIN

## 2022-12-27 PROCEDURE — 2500000003 HC RX 250 WO HCPCS: Performed by: NURSE ANESTHETIST, CERTIFIED REGISTERED

## 2022-12-27 PROCEDURE — 6360000002 HC RX W HCPCS

## 2022-12-27 PROCEDURE — B246ZZZ ULTRASONOGRAPHY OF RIGHT AND LEFT HEART: ICD-10-PCS | Performed by: INTERNAL MEDICINE

## 2022-12-27 PROCEDURE — 94761 N-INVAS EAR/PLS OXIMETRY MLT: CPT

## 2022-12-27 PROCEDURE — C2630 CATH, EP, COOL-TIP: HCPCS

## 2022-12-27 PROCEDURE — 80061 LIPID PANEL: CPT

## 2022-12-27 PROCEDURE — 93325 DOPPLER ECHO COLOR FLOW MAPG: CPT

## 2022-12-27 PROCEDURE — 6370000000 HC RX 637 (ALT 250 FOR IP): Performed by: INTERNAL MEDICINE

## 2022-12-27 PROCEDURE — 85347 COAGULATION TIME ACTIVATED: CPT

## 2022-12-27 PROCEDURE — 6360000002 HC RX W HCPCS: Performed by: NURSE ANESTHETIST, CERTIFIED REGISTERED

## 2022-12-27 PROCEDURE — B24BZZ4 ULTRASONOGRAPHY OF HEART WITH AORTA, TRANSESOPHAGEAL: ICD-10-PCS | Performed by: INTERNAL MEDICINE

## 2022-12-27 PROCEDURE — 3700000001 HC ADD 15 MINUTES (ANESTHESIA)

## 2022-12-27 PROCEDURE — 93312 ECHO TRANSESOPHAGEAL: CPT

## 2022-12-27 PROCEDURE — C1766 INTRO/SHEATH,STRBLE,NON-PEEL: HCPCS

## 2022-12-27 PROCEDURE — 93656 COMPRE EP EVAL ABLTJ ATR FIB: CPT | Performed by: INTERNAL MEDICINE

## 2022-12-27 PROCEDURE — 85027 COMPLETE CBC AUTOMATED: CPT

## 2022-12-27 PROCEDURE — C1713 ANCHOR/SCREW BN/BN,TIS/BN: HCPCS

## 2022-12-27 PROCEDURE — 93656 COMPRE EP EVAL ABLTJ ATR FIB: CPT

## 2022-12-27 PROCEDURE — 4A023FZ MEASUREMENT OF CARDIAC RHYTHM, PERCUTANEOUS APPROACH: ICD-10-PCS | Performed by: INTERNAL MEDICINE

## 2022-12-27 PROCEDURE — C1759 CATH, INTRA ECHOCARDIOGRAPHY: HCPCS

## 2022-12-27 PROCEDURE — 86850 RBC ANTIBODY SCREEN: CPT

## 2022-12-27 PROCEDURE — 85610 PROTHROMBIN TIME: CPT

## 2022-12-27 PROCEDURE — G0378 HOSPITAL OBSERVATION PER HR: HCPCS

## 2022-12-27 PROCEDURE — 2720000010 HC SURG SUPPLY STERILE

## 2022-12-27 PROCEDURE — C1894 INTRO/SHEATH, NON-LASER: HCPCS

## 2022-12-27 PROCEDURE — 02K83ZZ MAP CONDUCTION MECHANISM, PERCUTANEOUS APPROACH: ICD-10-PCS | Performed by: INTERNAL MEDICINE

## 2022-12-27 PROCEDURE — 93320 DOPPLER ECHO COMPLETE: CPT

## 2022-12-27 PROCEDURE — 2580000003 HC RX 258

## 2022-12-27 PROCEDURE — 4A0234Z MEASUREMENT OF CARDIAC ELECTRICAL ACTIVITY, PERCUTANEOUS APPROACH: ICD-10-PCS | Performed by: INTERNAL MEDICINE

## 2022-12-27 PROCEDURE — C1730 CATH, EP, 19 OR FEW ELECT: HCPCS

## 2022-12-27 PROCEDURE — 2700000000 HC OXYGEN THERAPY PER DAY

## 2022-12-27 PROCEDURE — 80048 BASIC METABOLIC PNL TOTAL CA: CPT

## 2022-12-27 PROCEDURE — 2500000003 HC RX 250 WO HCPCS

## 2022-12-27 PROCEDURE — 3700000000 HC ANESTHESIA ATTENDED CARE

## 2022-12-27 PROCEDURE — 2580000003 HC RX 258: Performed by: NURSE ANESTHETIST, CERTIFIED REGISTERED

## 2022-12-27 RX ORDER — HEPARIN SODIUM 1000 [USP'U]/ML
INJECTION, SOLUTION INTRAVENOUS; SUBCUTANEOUS
Status: COMPLETED | OUTPATIENT
Start: 2022-12-27 | End: 2022-12-27

## 2022-12-27 RX ORDER — ONDANSETRON 2 MG/ML
INJECTION INTRAMUSCULAR; INTRAVENOUS PRN
Status: DISCONTINUED | OUTPATIENT
Start: 2022-12-27 | End: 2022-12-27 | Stop reason: SDUPTHER

## 2022-12-27 RX ORDER — FENTANYL CITRATE 50 UG/ML
INJECTION, SOLUTION INTRAMUSCULAR; INTRAVENOUS PRN
Status: DISCONTINUED | OUTPATIENT
Start: 2022-12-27 | End: 2022-12-27 | Stop reason: SDUPTHER

## 2022-12-27 RX ORDER — FENTANYL CITRATE 50 UG/ML
25 INJECTION, SOLUTION INTRAMUSCULAR; INTRAVENOUS ONCE
Status: COMPLETED | OUTPATIENT
Start: 2022-12-27 | End: 2022-12-27

## 2022-12-27 RX ORDER — SODIUM CHLORIDE 0.9 % (FLUSH) 0.9 %
5-40 SYRINGE (ML) INJECTION PRN
Status: DISCONTINUED | OUTPATIENT
Start: 2022-12-27 | End: 2022-12-28 | Stop reason: HOSPADM

## 2022-12-27 RX ORDER — LORAZEPAM 0.5 MG/1
0.5 TABLET ORAL
Status: DISCONTINUED | OUTPATIENT
Start: 2022-12-27 | End: 2022-12-27

## 2022-12-27 RX ORDER — ROCURONIUM BROMIDE 10 MG/ML
INJECTION, SOLUTION INTRAVENOUS PRN
Status: DISCONTINUED | OUTPATIENT
Start: 2022-12-27 | End: 2022-12-27 | Stop reason: SDUPTHER

## 2022-12-27 RX ORDER — COLCHICINE 0.6 MG/1
0.6 TABLET ORAL EVERY OTHER DAY
Status: DISCONTINUED | OUTPATIENT
Start: 2022-12-27 | End: 2022-12-28 | Stop reason: HOSPADM

## 2022-12-27 RX ORDER — HEPARIN SODIUM 10000 [USP'U]/100ML
INJECTION, SOLUTION INTRAVENOUS CONTINUOUS PRN
Status: COMPLETED | OUTPATIENT
Start: 2022-12-27 | End: 2022-12-27

## 2022-12-27 RX ORDER — ACETAMINOPHEN 325 MG/1
650 TABLET ORAL EVERY 4 HOURS PRN
Status: DISCONTINUED | OUTPATIENT
Start: 2022-12-27 | End: 2022-12-28 | Stop reason: HOSPADM

## 2022-12-27 RX ORDER — PRAVASTATIN SODIUM 20 MG
20 TABLET ORAL EVERY EVENING
Status: DISCONTINUED | OUTPATIENT
Start: 2022-12-27 | End: 2022-12-28 | Stop reason: HOSPADM

## 2022-12-27 RX ORDER — SODIUM CHLORIDE 0.9 % (FLUSH) 0.9 %
5-40 SYRINGE (ML) INJECTION EVERY 12 HOURS SCHEDULED
Status: DISCONTINUED | OUTPATIENT
Start: 2022-12-27 | End: 2022-12-28 | Stop reason: HOSPADM

## 2022-12-27 RX ORDER — FENTANYL CITRATE 50 UG/ML
25 INJECTION, SOLUTION INTRAMUSCULAR; INTRAVENOUS
Status: DISCONTINUED | OUTPATIENT
Start: 2022-12-27 | End: 2022-12-28 | Stop reason: HOSPADM

## 2022-12-27 RX ORDER — SODIUM CHLORIDE 9 MG/ML
INJECTION, SOLUTION INTRAVENOUS PRN
Status: DISCONTINUED | OUTPATIENT
Start: 2022-12-27 | End: 2022-12-28 | Stop reason: HOSPADM

## 2022-12-27 RX ORDER — ONDANSETRON 2 MG/ML
4 INJECTION INTRAMUSCULAR; INTRAVENOUS EVERY 6 HOURS PRN
Status: DISCONTINUED | OUTPATIENT
Start: 2022-12-27 | End: 2022-12-28 | Stop reason: HOSPADM

## 2022-12-27 RX ORDER — PANTOPRAZOLE SODIUM 40 MG/1
40 TABLET, DELAYED RELEASE ORAL
Status: DISCONTINUED | OUTPATIENT
Start: 2022-12-27 | End: 2022-12-28 | Stop reason: HOSPADM

## 2022-12-27 RX ORDER — ASPIRIN 325 MG
325 TABLET ORAL ONCE
Status: DISCONTINUED | OUTPATIENT
Start: 2022-12-27 | End: 2022-12-28 | Stop reason: HOSPADM

## 2022-12-27 RX ORDER — PROPOFOL 10 MG/ML
INJECTION, EMULSION INTRAVENOUS PRN
Status: DISCONTINUED | OUTPATIENT
Start: 2022-12-27 | End: 2022-12-27 | Stop reason: SDUPTHER

## 2022-12-27 RX ORDER — CLONAZEPAM 0.5 MG/1
0.5 TABLET ORAL DAILY
Status: DISCONTINUED | OUTPATIENT
Start: 2022-12-27 | End: 2022-12-28 | Stop reason: HOSPADM

## 2022-12-27 RX ORDER — SUCRALFATE 1 G/1
1 TABLET ORAL EVERY 8 HOURS SCHEDULED
Status: DISCONTINUED | OUTPATIENT
Start: 2022-12-27 | End: 2022-12-28 | Stop reason: HOSPADM

## 2022-12-27 RX ORDER — LIDOCAINE HYDROCHLORIDE 20 MG/ML
INJECTION, SOLUTION INFILTRATION; PERINEURAL PRN
Status: DISCONTINUED | OUTPATIENT
Start: 2022-12-27 | End: 2022-12-27 | Stop reason: SDUPTHER

## 2022-12-27 RX ORDER — DEXAMETHASONE SODIUM PHOSPHATE 4 MG/ML
INJECTION, SOLUTION INTRA-ARTICULAR; INTRALESIONAL; INTRAMUSCULAR; INTRAVENOUS; SOFT TISSUE PRN
Status: DISCONTINUED | OUTPATIENT
Start: 2022-12-27 | End: 2022-12-27 | Stop reason: SDUPTHER

## 2022-12-27 RX ORDER — TAMSULOSIN HYDROCHLORIDE 0.4 MG/1
0.4 CAPSULE ORAL DAILY
Status: DISCONTINUED | OUTPATIENT
Start: 2022-12-27 | End: 2022-12-28 | Stop reason: HOSPADM

## 2022-12-27 RX ORDER — AMIODARONE HYDROCHLORIDE 200 MG/1
200 TABLET ORAL 2 TIMES DAILY
Status: DISCONTINUED | OUTPATIENT
Start: 2022-12-27 | End: 2022-12-28 | Stop reason: HOSPADM

## 2022-12-27 RX ORDER — EPHEDRINE SULFATE 50 MG/ML
INJECTION INTRAVENOUS PRN
Status: DISCONTINUED | OUTPATIENT
Start: 2022-12-27 | End: 2022-12-27 | Stop reason: SDUPTHER

## 2022-12-27 RX ORDER — SODIUM CHLORIDE 9 MG/ML
INJECTION, SOLUTION INTRAVENOUS CONTINUOUS PRN
Status: DISCONTINUED | OUTPATIENT
Start: 2022-12-27 | End: 2022-12-27 | Stop reason: SDUPTHER

## 2022-12-27 RX ORDER — CLONAZEPAM 1 MG/1
1 TABLET ORAL
Status: DISCONTINUED | OUTPATIENT
Start: 2022-12-27 | End: 2022-12-28 | Stop reason: HOSPADM

## 2022-12-27 RX ADMIN — SODIUM CHLORIDE: 9 INJECTION, SOLUTION INTRAVENOUS at 08:59

## 2022-12-27 RX ADMIN — EPHEDRINE SULFATE 10 MG: 50 INJECTION INTRAVENOUS at 11:14

## 2022-12-27 RX ADMIN — CLONAZEPAM 0.5 MG: 0.5 TABLET ORAL at 22:40

## 2022-12-27 RX ADMIN — HEPARIN SODIUM 3000 UNITS: 1000 INJECTION, SOLUTION INTRAVENOUS; SUBCUTANEOUS at 11:11

## 2022-12-27 RX ADMIN — HEPARIN SODIUM 1400 UNITS/HR: 10000 INJECTION, SOLUTION INTRAVENOUS at 10:18

## 2022-12-27 RX ADMIN — FENTANYL CITRATE 25 MCG: 50 INJECTION, SOLUTION INTRAMUSCULAR; INTRAVENOUS at 20:26

## 2022-12-27 RX ADMIN — ONDANSETRON 4 MG: 2 INJECTION INTRAMUSCULAR; INTRAVENOUS at 09:25

## 2022-12-27 RX ADMIN — FENTANYL CITRATE 50 MCG: 50 INJECTION INTRAMUSCULAR; INTRAVENOUS at 14:32

## 2022-12-27 RX ADMIN — HEPARIN SODIUM 7000 UNITS: 1000 INJECTION, SOLUTION INTRAVENOUS; SUBCUTANEOUS at 10:16

## 2022-12-27 RX ADMIN — DEXAMETHASONE SODIUM PHOSPHATE 10 MG: 4 INJECTION, SOLUTION INTRAMUSCULAR; INTRAVENOUS at 09:25

## 2022-12-27 RX ADMIN — EPHEDRINE SULFATE 10 MG: 50 INJECTION INTRAVENOUS at 10:01

## 2022-12-27 RX ADMIN — HEPARIN SODIUM 2000 UNITS: 1000 INJECTION, SOLUTION INTRAVENOUS; SUBCUTANEOUS at 14:28

## 2022-12-27 RX ADMIN — HEPARIN SODIUM 2000 UNITS: 1000 INJECTION, SOLUTION INTRAVENOUS; SUBCUTANEOUS at 12:08

## 2022-12-27 RX ADMIN — SODIUM CHLORIDE: 9 INJECTION, SOLUTION INTRAVENOUS at 13:49

## 2022-12-27 RX ADMIN — HEPARIN SODIUM 2000 UNITS: 1000 INJECTION, SOLUTION INTRAVENOUS; SUBCUTANEOUS at 11:46

## 2022-12-27 RX ADMIN — ROCURONIUM BROMIDE 30 MG: 10 SOLUTION INTRAVENOUS at 09:16

## 2022-12-27 RX ADMIN — EPHEDRINE SULFATE 10 MG: 50 INJECTION INTRAVENOUS at 10:14

## 2022-12-27 RX ADMIN — FENTANYL CITRATE 50 MCG: 50 INJECTION INTRAMUSCULAR; INTRAVENOUS at 09:16

## 2022-12-27 RX ADMIN — FENTANYL CITRATE 50 MCG: 50 INJECTION INTRAMUSCULAR; INTRAVENOUS at 09:49

## 2022-12-27 RX ADMIN — ONDANSETRON 4 MG: 2 INJECTION INTRAMUSCULAR; INTRAVENOUS at 20:27

## 2022-12-27 RX ADMIN — HEPARIN SODIUM 1000 UNITS: 1000 INJECTION, SOLUTION INTRAVENOUS; SUBCUTANEOUS at 12:41

## 2022-12-27 RX ADMIN — FENTANYL CITRATE 25 MCG: 50 INJECTION, SOLUTION INTRAMUSCULAR; INTRAVENOUS at 17:04

## 2022-12-27 RX ADMIN — LIDOCAINE HYDROCHLORIDE 80 MG: 20 INJECTION, SOLUTION INFILTRATION; PERINEURAL at 09:16

## 2022-12-27 RX ADMIN — EPHEDRINE SULFATE 20 MG: 50 INJECTION INTRAVENOUS at 13:37

## 2022-12-27 RX ADMIN — HEPARIN SODIUM 7000 UNITS: 1000 INJECTION, SOLUTION INTRAVENOUS; SUBCUTANEOUS at 09:59

## 2022-12-27 RX ADMIN — PROPOFOL 150 MG: 10 INJECTION, EMULSION INTRAVENOUS at 09:16

## 2022-12-27 RX ADMIN — HEPARIN SODIUM 2000 UNITS: 1000 INJECTION, SOLUTION INTRAVENOUS; SUBCUTANEOUS at 10:29

## 2022-12-27 RX ADMIN — SUGAMMADEX 100 MG: 100 INJECTION, SOLUTION INTRAVENOUS at 14:48

## 2022-12-27 RX ADMIN — PHENYLEPHRINE HYDROCHLORIDE 40 MCG/MIN: 10 INJECTION INTRAVENOUS at 09:24

## 2022-12-27 ASSESSMENT — PAIN DESCRIPTION - LOCATION
LOCATION: BACK
LOCATION: BACK

## 2022-12-27 ASSESSMENT — PAIN SCALES - GENERAL
PAINLEVEL_OUTOF10: 0
PAINLEVEL_OUTOF10: 10
PAINLEVEL_OUTOF10: 10

## 2022-12-27 ASSESSMENT — PAIN DESCRIPTION - ORIENTATION: ORIENTATION: LOWER

## 2022-12-27 NOTE — ANESTHESIA PRE PROCEDURE
MD Rolando        0.9 % sodium chloride infusion   IntraVENous PRN Annalisa Galdamez MD        aspirin tablet 325 mg  325 mg Oral Once Annalisa Galdamez MD        ondansetron [facility]) injection 4 mg  4 mg IntraVENous Q6H PRN Annalisa Galdamez MD        LORazepam (ATIVAN) tablet 0.5 mg  0.5 mg Oral Once PRN Annalisa Galdamez MD           Allergies:     Allergies   Allergen Reactions    Niacin And Related      Extreme itching /stinging started at head to waist       Problem List:    Patient Active Problem List   Diagnosis Code    GERD (gastroesophageal reflux disease) K21.9    Hyperlipidemia E78.5    Hypertrophic cardiomyopathy (HCC) I42.2    PVC (premature ventricular contraction) I49.3    Right bundle branch block I45.10    Restless legs syndrome G25.81    Obstructive sleep apnea G47.33    Obesity E66.9    Personal history of malignant neoplasm of prostate Z85.46    SBO (small bowel obstruction) ([facility]) K56.609    Elevated blood sugar R73.9    PAF (paroxysmal atrial fibrillation) ([facility]) I48.0    Current use of long term anticoagulation Z79.01    History of GI bleed Z87.19    Tremor R25.1   Harjitanjum Abad medtronic LINQ 12/11/2020 Dr Ebenezer Juarez Z45.09    [facility] I48.91    Atrial fibrillation, unspecified type ([facility] I48.91    Status post ablation of atrial flutter Z98.890, Z86.79       Past Medical History:        Diagnosis Date    A-fib ([facility]     ADHD (attention deficit hyperactivity disorder)     Carpal tunnel syndrome 1/21/2015    Chronic low back pain     Chronic neck pain     Chronic sinusitis     Dr. Can Torres Dental crown present     lower    Epigastric hernia     Esophageal spasm     GERD (gastroesophageal reflux disease)     Hyperlipidemia     Hypertrophic cardiomyopathy (HCC)     IHSS (idiopathic hypertrophic subaortic stenosis) ([facility])     Kidney stones     HUNTER on CPAP     Dr. Mer TORRES    Primary localized osteoarthrosis, shoulder region 10/18/2013    Prostate cancer ([facility] prostate ; s/p radiation treatment    PVC's (premature ventricular contractions)     RBBB (right bundle branch block)     RLS (restless legs syndrome)     Dr. Wolf Porras Seasonal allergies     Tachycardia        Past Surgical History:        Procedure Laterality Date    CARDIAC ELECTROPHYSIOLOGY STUDY AND ABLATION      CARPAL TUNNEL RELEASE Left     COLONOSCOPY  01/17/2011    COLONOSCOPY N/A 02/02/2020    COLONOSCOPY WITH BIOPSY performed by García Biggs MD at [place] [address]    left    ENDOSCOPY, COLON, DIAGNOSTIC      INGUINAL HERNIA REPAIR Bilateral 2009, 2012,    INSERTABLE CARDIAC MONITOR Left 12/11/2020    INSERTABLE CARDIAC MONITOR  12/11/2020    Loop Implant    KNEE ARTHROSCOPY Right 1982    Right    KNEE ARTHROSCOPY Left 12/21/2017    KNEE ARTHROSCOPY Left 07/17/2020    VIDEO ARTHROSCOPY LEFT KNEE, CHONDROPLASTY, PLICA EXCISION, PARTIAL MEDIAL LATERAL MENISECTOMY -SLEEP APNEA- performed by Vivek Morales MD at [place] [address] ARTHROSCOPY Right 12/31/2013    VIDEO ARTHROSCOPY RIGHT SHOULDER, SUBACROMIAL DECOMPRESSION, DISTAL CLAVICLE RESECTION, ROTATOR CUFF DEBRIDEMENT          TURP  6123    X 2    UMBILICAL HERNIA REPAIR      X 2    UPPER GASTROINTESTINAL ENDOSCOPY  09/2003    VARICOCELECTOMY  2000       Social History:    Social History     Tobacco Use    Smoking status: Never     Passive exposure: Never    Smokeless tobacco: Never   Substance Use Topics    Alcohol use:  Yes     Alcohol/week: 1.0 - 2.0 standard drink     Types: 1 Cans of beer per week     Comment: occasionally                                Counseling given: Not Answered      Vital Signs (Current):   Vitals:    12/27/22 0648   Weight: 202 lb (91.6 kg)   Height: 5' 11\" (1.803 m)                                              BP Readings from Last 3 Encounters:   12/08/22 130/83   12/07/22 (!) 149/91   11/07/22 116/82       NPO Status: BMI:   Wt Readings from Last 3 Encounters:   12/27/22 202 lb (91.6 kg)   12/08/22 202 lb (91.6 kg)   12/07/22 210 lb 9.6 oz (95.5 kg)     Body mass index is 28.17 kg/m². CBC:   Lab Results   Component Value Date/Time    WBC 3.8 12/27/2022 07:06 AM    RBC 4.87 12/27/2022 07:06 AM    HGB 14.1 12/27/2022 07:06 AM    HCT 42.9 12/27/2022 07:06 AM    MCV 88.2 12/27/2022 07:06 AM    RDW 14.6 12/27/2022 07:06 AM     12/27/2022 07:06 AM       CMP:   Lab Results   Component Value Date/Time     12/27/2022 07:06 AM    K 4.1 12/27/2022 07:06 AM     12/27/2022 07:06 AM    CO2 28 12/27/2022 07:06 AM    BUN 23 12/27/2022 07:06 AM    CREATININE 1.0 12/27/2022 07:06 AM    GFRAA >60 08/22/2022 02:51 PM    GFRAA >60 03/04/2013 09:07 AM    AGRATIO 2.0 08/22/2022 02:51 PM    LABGLOM >60 12/27/2022 07:06 AM    GLUCOSE 115 12/27/2022 07:06 AM    GLUCOSE 83 05/22/2012 04:40 PM    PROT 6.6 08/22/2022 02:51 PM    PROT 7.1 03/04/2013 09:07 AM    CALCIUM 9.2 12/27/2022 07:06 AM    BILITOT 0.7 08/22/2022 02:51 PM    ALKPHOS 109 08/22/2022 02:51 PM    AST 16 08/22/2022 02:51 PM    ALT 18 08/22/2022 02:51 PM       POC Tests: No results for input(s): POCGLU, POCNA, POCK, POCCL, POCBUN, POCHEMO, POCHCT in the last 72 hours.     Coags:   Lab Results   Component Value Date/Time    PROTIME 13.2 12/27/2022 07:06 AM    INR 1.01 12/27/2022 07:06 AM    APTT 29.0 03/01/2011 11:28 AM       HCG (If Applicable): No results found for: PREGTESTUR, PREGSERUM, HCG, HCGQUANT     ABGs: No results found for: PHART, PO2ART, NME8GZI, JSV9LWK, BEART, J1AYELQG     Type & Screen (If Applicable):  No results found for: LABABO, LABRH    Drug/Infectious Status (If Applicable):  No results found for: HIV, HEPCAB    COVID-19 Screening (If Applicable):   Lab Results   Component Value Date/Time    COVID19 Not Detected 12/07/2020 11:00 AM           Anesthesia Evaluation  Patient summary reviewed no history of anesthetic complications:   Airway: Mallampati: II  TM distance: >3 FB   Neck ROM: limited  Mouth opening: > = 3 FB   Dental: normal exam         Pulmonary:normal exam  breath sounds clear to auscultation  (+) sleep apnea:      (-) COPD and asthma                           Cardiovascular:  Exercise tolerance: good (>4 METS),   (+) dysrhythmias: atrial fibrillation,     (-) hypertension, CAD,  angina and  VALERA      Rhythm: regular  Rate: normal                 ROS comment: HOCM     Neuro/Psych:   (+) neuromuscular disease:, psychiatric history:   (-) seizures and TIA           GI/Hepatic/Renal:   (+) GERD:,      (-) liver disease and no renal disease       Endo/Other:        (-) diabetes mellitus               Abdominal:             Vascular: negative vascular ROS. Other Findings:           Anesthesia Plan      general     ASA 4     (I discussed with the patient the risks and benefits of PIV, anesthesia, IV Narcotics, PACU. All questions were answered the patient agrees with the plan and wishes to proceed)  Induction: intravenous.                             Anabell Coello MD   12/27/2022

## 2022-12-27 NOTE — TELEPHONE ENCOUNTER
Patient needs a one month HSFU s/p ablation and device check with Dr. Harry Templeton for about 1 month.   Patient currently has an appointment with Jack Rueda CNP and device check on 1/30/23 which needs to be rescheduled with  Maycol Hobson MD

## 2022-12-27 NOTE — PROCEDURES
PROCEDURES PERFORMED:    1. Electrophysiology study  2. 3D mapping of the left atrium  3. Transseptal puncture through an intact septum [62214]  4. Ablation of atrial fibrillation  5. Intracardiac echocardiography   6. Left atrial roof line ablation  7. Left atrial posterior wall ablation  8. Re-ablation of cavo-tricuspid isthmus     : Alberta Dugan MD    Assistant: None    Complications: None    Estimated blood loss: less than 50 ml    Anesthesia: general anesthesia    Medications used for induction/testing: none    Other medications: None    Preoperative Diagnosis: atrial fibrillation     Postoperative Diagnosis: atrial fibrillation     History: This is a 58 y.o. male with history of persistent symptomatic atrial fibrillation presenting for ablation of atrial fibrillation. DETAILS OF PROCEDURE:   The patient was brought to the electrophysiology laboratory in stable condition. He was in a fasting, non-sedated state. The risks, benefits and alternatives of the procedure were discussed with the patient details. The risks including, but not limited to, vascular injury, bleeding, infection, radiation exposure, injury to cardiac and surrounding structures, injury to the normal conduction system of the heart, stroke, myocardial infarction and death were all discussed. The patient considered the various treatment options and decided to proceed with the EP study and attempted ablation procedure. Written informed consent was obtained and placed on the chart. A sign-in and a subsequent time-out protocol were completed to confirm the identity of the patient and the procedure to be performed. Transesophageal echocardiography:    FATIMAH was performed prior to the ablation procedure to assure the absence of any intracardiac thrombi or other contraindications to proceeding with ablation. The full FATIMAH report is dictated separately.   Briefly, the FATIMAH probe was advanced into position without difficulty once the patient was intubated and under general anesthesia. Multiplane imaging of the left atrium and  The left atrial appendage was performed and demonstrated absence of thrombi. No other contraindication to proceeding with ablation was noted. The FATIMAH probe was withdrawn without difficulty. The patient was prepped and draped in a sterile fashion. Lidocaine/bupivicaine mixture was administered to the right and left groin. Using a modified Seldinger technique (under ultrasound guidance), venous access was obtained and sheaths were placed as detailed below. Catheters were then placed under fluoroscopic guidance as detailed below. Sheath and Catheter Placement:            1. Right femoral vein: Sheath size: 8 F  Catheter type: Ablation Location: Left atrium    2. Right femoral vein:       Sheath size: 8 F  Catheter type: HD Grid Location: Left atrium    3. Left femoral vein:         Sheath size: 10 F  Catheter type:  ICE catheter  Location:  Right atrium    4. Left femoral vein:         Sheath size: 7 F  Catheter type: Decapolar  Location:  Coronary sinus    5. Left femoral vein:         Sheath size: 6 F        A small caliber arterial line in the radial artery was obtained by the anesthesia service. Baseline parameters (ms)(Preablation)    Patient was in atrial fibrillation       QRS duration:                86   QT interval:                    352   Baseline cycle length:    variable (approx 520)       HV interval:  44      Intracardiac echocardiography:    A baseline intracardiac echocardiography study was performed. During the procedure, intracardiac echocardiography was used for transseptal puncture, monitoring of catheter placement during radiofrequency lesions, and monitoring for complications. Transseptal puncture: Following limited electrophysiologic study, a Viky Ping trans-septal RF wire sheath was advanced over the pigtail 0.035 RF wire into the superior vena cava.  It was advanced through the 8F sheath in the right femoral vein under fluoroscopic guidance. The system was withdrawn until the apparatus was in contact with the foramen ovale. Prior to transseptal puncture, heparin was initiated and infused to maintain an ACT > 350 seconds. Under fluoroscopic and ICE image guidance the left atrium was cannulated and sheath advanced. The RF wire was then withdrawn. [facility] deflectable sheath was kept in place in the left atrium. The HD Grid catheter was sequentially positioned in the ostium of each of the pulmonary veins under fluoroscopic, electroanatomic, electrophysiologic and ultrasound guidance. Examination of the Lasso catheter signals demonstrated conduction of electrical activity in all the pulmonary veins (1 common left pulmonary vein and 2 right pulmonary veins). 3D Electroanatomical Mapping:  three-dimensional electroanatomical mapping:  Using the [place] three-dimensional electroanatomical map, a shell map of the left atrium and the pulmonary veins was created by dragging the ablation catheter and the HD Grid catheter in different areas of the left atrium and in the pulmonary veins. The map was used for aiding the navigation process and to reduce fluoroscopy use. It was also used to guide ablation lesions and to maddi those lesions. Ablation:    The TactiCath F/J ablation catheter was advanced into position near the left-sided pulmonary veins. A wide circumferential ablation line was performed to encompass the ostia of both the left superior and left inferior pulmonary veins. The settings for ablation were 45 vaz, to LSI of 5.0 on anterior wall and 4.0-4.5 on posterior wall. An esophageal temperature probe was placed to avoid excess heating of the esophagus. Ablation lesions were continued until a large circumference ablation line was created, both anterior and posterior to the left-sided pulmonary veins.   After ablation was completed, entrance and exit block from each of the veins was checked and confirmed. A wide circumference ablation line was similarly done around the right-sided pulmonary veins. Given continuation of the atrial fibrillation, we decided to perform a left atrial roof line (by ablating along the roof in a linear fashion to connect the LCPV and the RSPV. Similarly, we performed an ablation line connecting the inferior aspect of the LCPV with the RIPV. This led to isolation of the posterior wall. Given continuation of atrial fibrillation, and need to assess whether veins and lines are complete, we performed a synchronized 150 J single external shock which successfully restored sinus rhythm. Exit and entrance block from the right-sided pulmonary veins was then checked and confirmed. Veins with insufficient block were further ablated until block was achieved. Following confirmation of pulmonary vein isolation, the HD Grid and the ablation catheter and sheaths were removed from the left atrium. All catheters were now in the right atrium. Intracardiac echocardiography was used to document the absence of any significant effusion or any other complication. When in the right atrium, we assessed conduction along the cavo-tricuspid isthmus line. There appeared to be some conduction along the middle of the line (which was ablated earlier in the month while patient was in atrial fibrillation and couldn't assess properly for block). After ablation of that area (30 W, LSI 5.0), block was achieved. Atrioventricular marcello function:    Incremental pacing was performed from the high right atrium and right ventricular apex and the antegrade and retrograde atrioventricular (AV) Wenckebach cycle length was determined. Antegrade AV Wenckebach was 440 msec. VA Wenckebach was 390 msec.     AH interval:  86    HV interval:  44      Baseline parameters (ms)(Post-ablation)    RI interval:   130      QRS duration:                85   QT interval:                    413   Baseline cycle length:    785    Protamine was NOT given to reverse anticoagulation (to avoid sensitivity reaction due to getting protamine a few weeks ago). Catheters were removed under fluoroscopic guidance. Sheaths were sutured in placed to be pulled with manual compression once ACT below 180 seconds. There was no bleeding noted from any of the access sites. The patient was extubated and was transported to the recovery area in stable condition. SUMMARY:     1. Successful isolation of the pulmonary veins for ablation of atrial fibrillation  2. Left atrial roof line  3. Left atrial posterior wall ablation  4. Additional ablation of cavo-tricuspid isthmus     RECOMMENDATIONS:     1. Admit to the telemetry floor. 2.  Call cardiology team if the patient becomes hypotensive, febrile, unstable or if there is a change in mental status. 3.  Restart anticoagulation tonight if no bleeding issues noted   4. Bed rest x6 hours with legs straight after sheaths are removed. 5.  Transthoracic echocardiogram in the morning. 6.  Continue current medications. 8.  Follow up in the electrophysiology clinic in 4-6 weeks.

## 2022-12-27 NOTE — ANESTHESIA POSTPROCEDURE EVALUATION
Department of Anesthesiology  Postprocedure Note    Patient: Dontae Herman MRN: [id number]  YOB: 1960  Date of evaluation: 12/27/2022      Procedure Summary     Date: 12/27/22 Room / Location: [facility] Cardiac Cath Lab    Anesthesia Start: 2365 Anesthesia Stop: 8497    Procedure: FATIMAH WITH ABLATION Diagnosis:       Paroxysmal atrial fibrillation      Status post ablation of atrial fibrillation    Scheduled Providers:  Responsible Provider: Noam Burgess MD    Anesthesia Type: general ASA Status: 4          Anesthesia Type: No value filed. Ruth Phase I: Ruth Score: 9    Ruth Phase II:        Anesthesia Post Evaluation    Comments: Postoperative Anesthesia Note    Name:    Dontae Red.   MRN:      [id number]    Patient Vitals in the past 12 hrs:  12/27/22 1714, BP:115/75  12/27/22 1644, BP:117/79  12/27/22 1613, BP:111/76, Temp:98.4 °F (36.9 °C), Temp src:Oral, Pulse:95, Resp:18, SpO2:95 %  12/27/22 1552, BP:113/77, Temp:97 °F (36.1 °C), Temp src:Temporal, Pulse:96, Resp:18, SpO2:95 %  12/27/22 1547, BP:116/81, Pulse:97, Resp:17, SpO2:95 %  12/27/22 1542, BP:113/82, Pulse:96, Resp:15, SpO2:97 %  12/27/22 1537, BP:112/77, Pulse:93, Resp:17, SpO2:95 %  12/27/22 1533, BP:110/85, Temp:96.8 °F (36 °C), Temp src:Temporal, Pulse:99, Resp:15, SpO2:97 %  12/27/22 0648, Height:5' 11\" (1.803 m), Weight:202 lb (91.6 kg)     LABS:    CBC  Lab Results       Component                Value               Date/Time                  WBC                      3.8 (L)             12/27/2022 07:06 AM        HGB                      14.1                12/27/2022 07:06 AM        HCT                      42.9                12/27/2022 07:06 AM        PLT                      262                 12/27/2022 07:06 AM   RENAL  Lab Results       Component                Value               Date/Time                  NA                       145                 12/27/2022 07:06 AM        K                        4.1 12/27/2022 07:06 AM        CL                       108                 12/27/2022 07:06 AM        CO2                      28                  12/27/2022 07:06 AM        BUN                      23 (H)              12/27/2022 07:06 AM        CREATININE               1.0                 12/27/2022 07:06 AM        GLUCOSE                  115 (H)             12/27/2022 07:06 AM        GLUCOSE                  83                  05/22/2012 04:40 PM   COAGS  Lab Results       Component                Value               Date/Time                  PROTIME                  13.2                12/27/2022 07:06 AM        INR                      1.01                12/27/2022 07:06 AM        APTT                     29.0                03/01/2011 11:28 AM     Intake & Output:  @01BKUM@    Nausea & Vomiting:  No    Level of Consciousness:  Awake    Pain Assessment:  Adequate analgesia    Anesthesia Complications:  No apparent anesthetic complications    SUMMARY      Vital signs stable  OK to discharge from Stage I post anesthesia care.   Care transferred from Anesthesiology department on discharge from perioperative area

## 2022-12-27 NOTE — PROGRESS NOTES
Pt brought to PACU. Report obtained from cath lab RN and anesthesia. Pt placed on monitor and O2 at 97% on 4 lpm. ACT @ 4pm. To be sent to floor on discharge.  Dariela Pino RN

## 2022-12-28 VITALS
BODY MASS INDEX: 30.06 KG/M2 | SYSTOLIC BLOOD PRESSURE: 125 MMHG | HEIGHT: 71 IN | OXYGEN SATURATION: 97 % | WEIGHT: 214.73 LBS | RESPIRATION RATE: 17 BRPM | HEART RATE: 92 BPM | DIASTOLIC BLOOD PRESSURE: 81 MMHG | TEMPERATURE: 97.5 F

## 2022-12-28 LAB
ANION GAP SERPL CALCULATED.3IONS-SCNC: 10 MMOL/L (ref 3–16)
BUN BLDV-MCNC: 17 MG/DL (ref 7–20)
CALCIUM SERPL-MCNC: 8.8 MG/DL (ref 8.3–10.6)
CHLORIDE BLD-SCNC: 109 MMOL/L (ref 99–110)
CO2: 24 MMOL/L (ref 21–32)
CREAT SERPL-MCNC: 0.9 MG/DL (ref 0.8–1.3)
EKG ATRIAL RATE: 102 BPM
EKG DIAGNOSIS: NORMAL
EKG P AXIS: 69 DEGREES
EKG P-R INTERVAL: 158 MS
EKG Q-T INTERVAL: 368 MS
EKG QRS DURATION: 126 MS
EKG QTC CALCULATION (BAZETT): 479 MS
EKG R AXIS: 38 DEGREES
EKG T AXIS: 133 DEGREES
EKG VENTRICULAR RATE: 102 BPM
GFR SERPL CREATININE-BSD FRML MDRD: >60 ML/MIN/{1.73_M2}
GLUCOSE BLD-MCNC: 120 MG/DL (ref 70–99)
HCT VFR BLD CALC: 40.3 % (ref 40.5–52.5)
HEMOGLOBIN: 12.8 G/DL (ref 13.5–17.5)
LV EF: 55 %
LVEF MODALITY: NORMAL
MCH RBC QN AUTO: 28.2 PG (ref 26–34)
MCHC RBC AUTO-ENTMCNC: 31.7 G/DL (ref 31–36)
MCV RBC AUTO: 89.1 FL (ref 80–100)
PDW BLD-RTO: 15.4 % (ref 12.4–15.4)
PLATELET # BLD: 240 K/UL (ref 135–450)
PMV BLD AUTO: 7.8 FL (ref 5–10.5)
POC ACT LR: 159 SEC
POC ACT LR: 258 SEC
POC ACT LR: 305 SEC
POC ACT LR: 315 SEC
POC ACT LR: 327 SEC
POC ACT LR: 328 SEC
POC ACT LR: 333 SEC
POC ACT LR: 354 SEC
POC ACT LR: 361 SEC
POC ACT LR: 372 SEC
POC ACT LR: 375 SEC
POTASSIUM SERPL-SCNC: 3.9 MMOL/L (ref 3.5–5.1)
RBC # BLD: 4.52 M/UL (ref 4.2–5.9)
SODIUM BLD-SCNC: 143 MMOL/L (ref 136–145)
WBC # BLD: 8.7 K/UL (ref 4–11)

## 2022-12-28 PROCEDURE — 93312 ECHO TRANSESOPHAGEAL: CPT | Performed by: INTERNAL MEDICINE

## 2022-12-28 PROCEDURE — G0378 HOSPITAL OBSERVATION PER HR: HCPCS

## 2022-12-28 PROCEDURE — 99215 OFFICE O/P EST HI 40 MIN: CPT | Performed by: NURSE PRACTITIONER

## 2022-12-28 PROCEDURE — 6360000002 HC RX W HCPCS: Performed by: INTERNAL MEDICINE

## 2022-12-28 PROCEDURE — 6370000000 HC RX 637 (ALT 250 FOR IP): Performed by: INTERNAL MEDICINE

## 2022-12-28 PROCEDURE — 93308 TTE F-UP OR LMTD: CPT

## 2022-12-28 PROCEDURE — 2580000003 HC RX 258: Performed by: INTERNAL MEDICINE

## 2022-12-28 PROCEDURE — 85027 COMPLETE CBC AUTOMATED: CPT

## 2022-12-28 PROCEDURE — 80048 BASIC METABOLIC PNL TOTAL CA: CPT

## 2022-12-28 PROCEDURE — 93010 ELECTROCARDIOGRAM REPORT: CPT | Performed by: INTERNAL MEDICINE

## 2022-12-28 PROCEDURE — 36415 COLL VENOUS BLD VENIPUNCTURE: CPT

## 2022-12-28 RX ORDER — VERAPAMIL HYDROCHLORIDE 120 MG/1
CAPSULE, EXTENDED RELEASE ORAL
Qty: 30 CAPSULE | Refills: 3 | Status: SHIPPED | OUTPATIENT
Start: 2023-01-04

## 2022-12-28 RX ORDER — AMIODARONE HYDROCHLORIDE 200 MG/1
TABLET ORAL
Qty: 35 TABLET | Refills: 3 | Status: SHIPPED | OUTPATIENT
Start: 2022-12-28

## 2022-12-28 RX ORDER — PANTOPRAZOLE SODIUM 40 MG/1
40 TABLET, DELAYED RELEASE ORAL
Qty: 30 TABLET | Refills: 3 | Status: SHIPPED | OUTPATIENT
Start: 2022-12-28

## 2022-12-28 RX ORDER — SUCRALFATE 1 G/1
1 TABLET ORAL EVERY 8 HOURS SCHEDULED
Qty: 120 TABLET | Refills: 3 | Status: SHIPPED | OUTPATIENT
Start: 2022-12-28

## 2022-12-28 RX ADMIN — SUCRALFATE 1 G: 1 TABLET ORAL at 04:54

## 2022-12-28 RX ADMIN — TAMSULOSIN HYDROCHLORIDE 0.4 MG: 0.4 CAPSULE ORAL at 08:45

## 2022-12-28 RX ADMIN — APIXABAN 5 MG: 5 TABLET, FILM COATED ORAL at 08:45

## 2022-12-28 RX ADMIN — Medication 10 ML: at 08:46

## 2022-12-28 RX ADMIN — PANTOPRAZOLE SODIUM 40 MG: 40 TABLET, DELAYED RELEASE ORAL at 04:55

## 2022-12-28 RX ADMIN — AMIODARONE HYDROCHLORIDE 200 MG: 200 TABLET ORAL at 08:45

## 2022-12-28 RX ADMIN — FENTANYL CITRATE 25 MCG: 50 INJECTION, SOLUTION INTRAMUSCULAR; INTRAVENOUS at 00:33

## 2022-12-28 RX ADMIN — ACETAMINOPHEN 325MG 650 MG: 325 TABLET ORAL at 02:08

## 2022-12-28 RX ADMIN — FENTANYL CITRATE 25 MCG: 50 INJECTION, SOLUTION INTRAMUSCULAR; INTRAVENOUS at 03:13

## 2022-12-28 ASSESSMENT — PAIN SCALES - GENERAL
PAINLEVEL_OUTOF10: 9
PAINLEVEL_OUTOF10: 10
PAINLEVEL_OUTOF10: 6
PAINLEVEL_OUTOF10: 8
PAINLEVEL_OUTOF10: 2

## 2022-12-28 ASSESSMENT — PAIN DESCRIPTION - LOCATION
LOCATION: BACK
LOCATION: BACK
LOCATION: HEAD

## 2022-12-28 NOTE — TELEPHONE ENCOUNTER
12/28 Per pts appt desk, pt is currently admitted.  Will call back at a later time to schedule appt for 1/30/23 @ 11:45 with [facility] instead of [facility].

## 2022-12-28 NOTE — TELEPHONE ENCOUNTER
Since pt is admitted. Changed appt to correct appt with kxa and cancelled the npam appt. This will reflect on pt's discharge summary and in mychart.

## 2022-12-28 NOTE — TELEPHONE ENCOUNTER
Please schedule this patient at 1145 on 01/30/2023 with KXA. Please cancel NPAM appointment for 01/30/2023. Thank you.

## 2022-12-28 NOTE — DISCHARGE SUMMARY
Patient ID:  Saintclair Benedict  [id number]  58 y.o.  1960    Admit date: 12/27/2022    Discharge date and time: 12/28/2022    Admitting Physician: Arlen Hodge MD     Discharge NP: PARI Maya-CNP    Admission Diagnoses: Paroxysmal atrial fibrillation [I48.0]  Status post ablation of atrial fibrillation [Z98.890, Z86.79]    Discharge Diagnoses: SAME    Admission Condition: fair    Discharged Condition: good    Hospital Course: Saintclair Benedict. was admitted on 12/27/2022 and had an elective RFCA of atrial fibrillation. Post procedure, amiodarone was resumed. Echo showed trivial pericardial effusion. Rhythm has been sinus/sinus tachycardia. He complains of headache and back pain. Denies chest pain, palpitations, dizziness or shortness of breath. Has eaten, ambulated, and voided.        Assessment:   Paroxysmal (formerly persistent) atrial fibrillation: stable              -[id number] score 0   -s/p RFCA of AF 12/27/2022              -amiodarone started 10/2017 (stopped 5/2022 due to age), resumed post procedure               -AF recurrent 8/25/2022 (noted on ILR)               -Tikosyn started 10/31/2022 with conversion to SR after 2 doses but AF recurrent 11/2/2022              -s/p unsuccessful DCCV 11/3/2022  Presumed typical atrial flutter: stable   -s/p EPS and RFCA of CTI dependent AFL 12/5/2022  Pericardial effusion: resolved    -s/p emergent pericardiocentesis 12/5/2022 (noted post transseptal puncture and procedure was aborted)   RBBB  PVC's: stable   NSVT: stable               -noted on event monitor 7/2020  HLD  S/P loop recorder implant               -device check per HPI  HOCM              -per cardiac MRI 2018 without LVOT obstruction   Chronic pain   Sleep apnea   Headache     Plan:   Continue Eliquis  Amiodarone 200 mg PO BID x 7 days then 200 mg PO daily    Resume reduced dose verapamil 120 mg PO daily in one week   Pantoprazole 40 mg PO BID x 30 days post ablation  Carafate 1 gram PO TID x 2 weeks post ablation   Post procedure instructions reviewed  Follow up in office on 1/30/2022      Discharge Exam:  /81   Pulse 92   Temp 97.5 °F (36.4 °C) (Oral)   Resp 17   Ht 5' 11\" (1.803 m)   Wt 214 lb 11.7 oz (97.4 kg)   SpO2 97%   BMI 29.95 kg/m²     General Appearance:    Alert, cooperative, no distress, appears stated age   Head:    Normocephalic, without obvious abnormality, atraumatic   Eyes:    PERRL, conjunctiva/corneas clear, EOM's intact        Ears:    deferred   Nose:   Nares normal, septum midline, mucosa normal, no drainage    or sinus tenderness   Throat:   Lips, mucosa, and tongue normal; teeth and gums normal   Neck:   Supple, symmetrical, trachea midline, no adenopathy;        thyroid:  No enlargement/tenderness/nodules; no carotid    bruit or JVD   Back:     Symmetric, no curvature, ROM normal, no CVA tenderness   Lungs:     Clear to auscultation bilaterally, respirations unlabored   Chest wall:    No tenderness or deformity   Heart:    Regular rate and rhythm, S1 and S2 normal, no murmur, rub   or gallop; NSR  on tele   Abdomen:     Soft, non-tender, bowel sounds active all four quadrants,     no masses, no organomegaly   Genitalia:    deferred   Rectal:    deferred    Extremities:   Extremities normal, atraumatic, no cyanosis or edema; bilateral  femoral sites stable (no sutures present)    Pulses:   2+ and symmetric all extremities   Skin:   Skin color, texture, turgor normal, no rashes or lesions   Lymph nodes:   Cervical, supraclavicular, and axillary nodes normal   Neurologic:   CNII-XII intact.  Normal strength, sensation and reflexes       throughout     Disposition: home    Patient Instructions:      Medication List        START taking these medications      amiodarone 200 MG tablet  Commonly known as: CORDARONE  Take amiodarone 200 mg PO BID x 7 days then 200 mg PO daily     pantoprazole 40 MG tablet  Commonly known as: PROTONIX  Take 1 tablet by mouth 2 times daily (before meals)  Replaces: omeprazole 40 MG delayed release capsule     sucralfate 1 GM tablet  Commonly known as: CARAFATE  Take 1 tablet by mouth every 8 hours            CHANGE how you take these medications      clonazePAM 1 MG tablet  Commonly known as: KLONOPIN  TAKE 1 TABLET BY MOUTH EVERY NIGHT AS NEEDED  What changed: See the new instructions. verapamil 120 MG extended release capsule  Commonly known as: VERELAN  TAKE 1 CAPSULE BY MOUTH EVERY NIGHT  Start taking on: January 4, 2023  What changed:   medication strength  These instructions start on January 4, 2023. If you are unsure what to do until then, ask your doctor or other care provider.             CONTINUE taking these medications      apixaban 5 MG Tabs tablet  Commonly known as: Eliquis  TAKE 1 TABLET BY MOUTH TWICE DAILY     b complex vitamins capsule     B-12 PO     pravastatin 20 MG tablet  Commonly known as: Pravachol  Take 1 tablet by mouth every evening     tamsulosin 0.4 MG capsule  Commonly known as: FLOMAX            STOP taking these medications      colchicine 0.6 MG tablet  Commonly known as: Colcrys     naproxen 250 MG tablet  Commonly known as: NAPROSYN     omeprazole 40 MG delayed release capsule  Commonly known as: PRILOSEC  Replaced by: pantoprazole 40 MG tablet               Where to Get Your Medications        These medications were sent to [address] [facility]      Phone: [phone]   amiodarone 200 MG tablet  pantoprazole 40 MG tablet  sucralfate 1 GM tablet  verapamil 120 MG extended release capsule         Post procedure instructions given  Activity: as tolerated  Diet: cardiac diet    Socorro Sanches, PARI-MATT  [facility] [address]  [phone]

## 2022-12-29 DIAGNOSIS — G47.00 INSOMNIA, UNSPECIFIED TYPE: ICD-10-CM

## 2022-12-29 LAB
POC ACT LR: >400 SEC

## 2022-12-30 ENCOUNTER — APPOINTMENT (OUTPATIENT)
Dept: GENERAL RADIOLOGY | Age: 62
DRG: 273 | End: 2022-12-30
Payer: COMMERCIAL

## 2022-12-30 ENCOUNTER — HOSPITAL ENCOUNTER (INPATIENT)
Age: 62
LOS: 1 days | Discharge: HOME | DRG: 273 | End: 2022-12-31
Attending: EMERGENCY MEDICINE | Admitting: INTERNAL MEDICINE
Payer: COMMERCIAL

## 2022-12-30 ENCOUNTER — TELEPHONE (OUTPATIENT)
Dept: CARDIOLOGY CLINIC | Age: 62
End: 2022-12-30

## 2022-12-30 ENCOUNTER — APPOINTMENT (OUTPATIENT)
Dept: CT IMAGING | Age: 62
DRG: 273 | End: 2022-12-30
Payer: COMMERCIAL

## 2022-12-30 DIAGNOSIS — I26.99 OTHER ACUTE PULMONARY EMBOLISM WITHOUT ACUTE COR PULMONALE (HCC): ICD-10-CM

## 2022-12-30 DIAGNOSIS — J81.0 ACUTE PULMONARY EDEMA (HCC): Primary | ICD-10-CM

## 2022-12-30 DIAGNOSIS — R77.8 ELEVATED TROPONIN: ICD-10-CM

## 2022-12-30 PROBLEM — R06.02 SOB (SHORTNESS OF BREATH): Status: ACTIVE | Noted: 2022-12-30

## 2022-12-30 PROBLEM — I31.4 PERICARDIAL EFFUSION WITH CARDIAC TAMPONADE: Status: ACTIVE | Noted: 2022-12-30

## 2022-12-30 PROBLEM — I31.39 PERICARDIAL EFFUSION WITH CARDIAC TAMPONADE: Status: ACTIVE | Noted: 2022-12-30

## 2022-12-30 PROBLEM — J81.1 PULMONARY EDEMA: Status: ACTIVE | Noted: 2022-12-30

## 2022-12-30 LAB
A/G RATIO: 1.5 (ref 1.1–2.2)
ALBUMIN SERPL-MCNC: 3.8 G/DL (ref 3.4–5)
ALP BLD-CCNC: 85 U/L (ref 40–129)
ALT SERPL-CCNC: 36 U/L (ref 10–40)
ANION GAP SERPL CALCULATED.3IONS-SCNC: 8 MMOL/L (ref 3–16)
AST SERPL-CCNC: 32 U/L (ref 15–37)
BASOPHILS ABSOLUTE: 0 K/UL (ref 0–0.2)
BASOPHILS RELATIVE PERCENT: 0.9 %
BILIRUB SERPL-MCNC: 0.8 MG/DL (ref 0–1)
BUN BLDV-MCNC: 19 MG/DL (ref 7–20)
C-REACTIVE PROTEIN: 32.1 MG/L (ref 0–5.1)
CALCIUM SERPL-MCNC: 9.1 MG/DL (ref 8.3–10.6)
CHLORIDE BLD-SCNC: 104 MMOL/L (ref 99–110)
CO2: 27 MMOL/L (ref 21–32)
CREAT SERPL-MCNC: 1 MG/DL (ref 0.8–1.3)
EOSINOPHILS ABSOLUTE: 0.2 K/UL (ref 0–0.6)
EOSINOPHILS RELATIVE PERCENT: 3.2 %
GFR SERPL CREATININE-BSD FRML MDRD: >60 ML/MIN/{1.73_M2}
GLUCOSE BLD-MCNC: 128 MG/DL (ref 70–99)
HCT VFR BLD CALC: 36.7 % (ref 40.5–52.5)
HEMOGLOBIN: 12.2 G/DL (ref 13.5–17.5)
LYMPHOCYTES ABSOLUTE: 0.9 K/UL (ref 1–5.1)
LYMPHOCYTES RELATIVE PERCENT: 17.9 %
MAGNESIUM: 1.7 MG/DL (ref 1.8–2.4)
MCH RBC QN AUTO: 29 PG (ref 26–34)
MCHC RBC AUTO-ENTMCNC: 33.2 G/DL (ref 31–36)
MCV RBC AUTO: 87.3 FL (ref 80–100)
MONOCYTES ABSOLUTE: 0.7 K/UL (ref 0–1.3)
MONOCYTES RELATIVE PERCENT: 13.1 %
NEUTROPHILS ABSOLUTE: 3.4 K/UL (ref 1.7–7.7)
NEUTROPHILS RELATIVE PERCENT: 64.9 %
PDW BLD-RTO: 14.9 % (ref 12.4–15.4)
PLATELET # BLD: 192 K/UL (ref 135–450)
PMV BLD AUTO: 7.5 FL (ref 5–10.5)
POTASSIUM REFLEX MAGNESIUM: 3.5 MMOL/L (ref 3.5–5.1)
PRO-BNP: 5555 PG/ML (ref 0–124)
RBC # BLD: 4.2 M/UL (ref 4.2–5.9)
SEDIMENTATION RATE, ERYTHROCYTE: 14 MM/HR (ref 0–20)
SODIUM BLD-SCNC: 139 MMOL/L (ref 136–145)
TOTAL PROTEIN: 6.4 G/DL (ref 6.4–8.2)
TROPONIN: 0.66 NG/ML
TROPONIN: 0.71 NG/ML
WBC # BLD: 5.3 K/UL (ref 4–11)

## 2022-12-30 PROCEDURE — 96374 THER/PROPH/DIAG INJ IV PUSH: CPT

## 2022-12-30 PROCEDURE — 1200000000 HC SEMI PRIVATE

## 2022-12-30 PROCEDURE — 84484 ASSAY OF TROPONIN QUANT: CPT

## 2022-12-30 PROCEDURE — 6360000004 HC RX CONTRAST MEDICATION: Performed by: PHYSICIAN ASSISTANT

## 2022-12-30 PROCEDURE — 71260 CT THORAX DX C+: CPT | Performed by: PHYSICIAN ASSISTANT

## 2022-12-30 PROCEDURE — 83880 ASSAY OF NATRIURETIC PEPTIDE: CPT

## 2022-12-30 PROCEDURE — 71045 X-RAY EXAM CHEST 1 VIEW: CPT

## 2022-12-30 PROCEDURE — 83735 ASSAY OF MAGNESIUM: CPT

## 2022-12-30 PROCEDURE — 99285 EMERGENCY DEPT VISIT HI MDM: CPT

## 2022-12-30 PROCEDURE — 6360000002 HC RX W HCPCS: Performed by: INTERNAL MEDICINE

## 2022-12-30 PROCEDURE — 6360000002 HC RX W HCPCS: Performed by: PHYSICIAN ASSISTANT

## 2022-12-30 PROCEDURE — 80053 COMPREHEN METABOLIC PANEL: CPT

## 2022-12-30 PROCEDURE — 86140 C-REACTIVE PROTEIN: CPT

## 2022-12-30 PROCEDURE — 85025 COMPLETE CBC W/AUTO DIFF WBC: CPT

## 2022-12-30 PROCEDURE — 93005 ELECTROCARDIOGRAM TRACING: CPT | Performed by: EMERGENCY MEDICINE

## 2022-12-30 PROCEDURE — 85652 RBC SED RATE AUTOMATED: CPT

## 2022-12-30 RX ORDER — FUROSEMIDE 10 MG/ML
40 INJECTION INTRAMUSCULAR; INTRAVENOUS ONCE
Status: COMPLETED | OUTPATIENT
Start: 2022-12-30 | End: 2022-12-30

## 2022-12-30 RX ORDER — METHYLPREDNISOLONE SODIUM SUCCINATE 40 MG/ML
40 INJECTION, POWDER, LYOPHILIZED, FOR SOLUTION INTRAMUSCULAR; INTRAVENOUS EVERY 12 HOURS
Status: DISCONTINUED | OUTPATIENT
Start: 2022-12-30 | End: 2022-12-31 | Stop reason: HOSPADM

## 2022-12-30 RX ADMIN — IOPAMIDOL 75 ML: 755 INJECTION, SOLUTION INTRAVENOUS at 20:50

## 2022-12-30 RX ADMIN — FUROSEMIDE 40 MG: 10 INJECTION, SOLUTION INTRAMUSCULAR; INTRAVENOUS at 21:48

## 2022-12-30 RX ADMIN — METHYLPREDNISOLONE SODIUM SUCCINATE 40 MG: 40 INJECTION, POWDER, FOR SOLUTION INTRAMUSCULAR; INTRAVENOUS at 23:12

## 2022-12-30 ASSESSMENT — PAIN DESCRIPTION - PAIN TYPE: TYPE: ACUTE PAIN

## 2022-12-30 ASSESSMENT — PAIN DESCRIPTION - LOCATION: LOCATION: CHEST

## 2022-12-30 ASSESSMENT — PAIN - FUNCTIONAL ASSESSMENT
PAIN_FUNCTIONAL_ASSESSMENT: NONE - DENIES PAIN
PAIN_FUNCTIONAL_ASSESSMENT: NONE - DENIES PAIN

## 2022-12-30 NOTE — TELEPHONE ENCOUNTER
Pt called stating he has been sob since being home from his ablation on 12/27/22 with KXA. I spoke with PM/RN Stephanie Garcia she advise the pt go to the ED to be evaluated since it's going on day 3 from procedure, but that this  message will still be sent to [facility] for any other suggestions/how proceed. Pt v/u. Pt can be reached at [phone].

## 2022-12-30 NOTE — TELEPHONE ENCOUNTER
Micheal Danielle is OOT. Routing to Micheal Danielle for his return and to his partner ALE for advice now. The patient was already advised by the practice manager to report to the ER.

## 2022-12-31 VITALS
TEMPERATURE: 98.2 F | WEIGHT: 203 LBS | HEIGHT: 71 IN | DIASTOLIC BLOOD PRESSURE: 66 MMHG | HEART RATE: 65 BPM | OXYGEN SATURATION: 95 % | RESPIRATION RATE: 18 BRPM | BODY MASS INDEX: 28.42 KG/M2 | SYSTOLIC BLOOD PRESSURE: 120 MMHG

## 2022-12-31 PROBLEM — R77.8 ELEVATED TROPONIN: Status: ACTIVE | Noted: 2022-12-31

## 2022-12-31 PROBLEM — R79.89 ELEVATED TROPONIN: Status: ACTIVE | Noted: 2022-12-31

## 2022-12-31 LAB
A/G RATIO: 1.4 (ref 1.1–2.2)
ALBUMIN SERPL-MCNC: 3.8 G/DL (ref 3.4–5)
ALP BLD-CCNC: 88 U/L (ref 40–129)
ALT SERPL-CCNC: 36 U/L (ref 10–40)
ANION GAP SERPL CALCULATED.3IONS-SCNC: 11 MMOL/L (ref 3–16)
AST SERPL-CCNC: 25 U/L (ref 15–37)
BILIRUB SERPL-MCNC: 0.7 MG/DL (ref 0–1)
BUN BLDV-MCNC: 18 MG/DL (ref 7–20)
CALCIUM SERPL-MCNC: 9.2 MG/DL (ref 8.3–10.6)
CHLORIDE BLD-SCNC: 103 MMOL/L (ref 99–110)
CO2: 27 MMOL/L (ref 21–32)
CREAT SERPL-MCNC: 0.9 MG/DL (ref 0.8–1.3)
EKG ATRIAL RATE: 73 BPM
EKG ATRIAL RATE: 97 BPM
EKG DIAGNOSIS: NORMAL
EKG DIAGNOSIS: NORMAL
EKG P AXIS: 62 DEGREES
EKG P AXIS: 83 DEGREES
EKG P-R INTERVAL: 146 MS
EKG P-R INTERVAL: 182 MS
EKG Q-T INTERVAL: 368 MS
EKG Q-T INTERVAL: 450 MS
EKG QRS DURATION: 130 MS
EKG QRS DURATION: 140 MS
EKG QTC CALCULATION (BAZETT): 467 MS
EKG QTC CALCULATION (BAZETT): 495 MS
EKG R AXIS: 26 DEGREES
EKG R AXIS: 54 DEGREES
EKG T AXIS: 119 DEGREES
EKG T AXIS: 143 DEGREES
EKG VENTRICULAR RATE: 73 BPM
EKG VENTRICULAR RATE: 97 BPM
GFR SERPL CREATININE-BSD FRML MDRD: >60 ML/MIN/{1.73_M2}
GLUCOSE BLD-MCNC: 159 MG/DL (ref 70–99)
LV EF: 55 %
LVEF MODALITY: NORMAL
POTASSIUM REFLEX MAGNESIUM: 3.9 MMOL/L (ref 3.5–5.1)
SODIUM BLD-SCNC: 141 MMOL/L (ref 136–145)
TOTAL PROTEIN: 6.5 G/DL (ref 6.4–8.2)
TROPONIN: 0.44 NG/ML

## 2022-12-31 PROCEDURE — 84484 ASSAY OF TROPONIN QUANT: CPT

## 2022-12-31 PROCEDURE — 6370000000 HC RX 637 (ALT 250 FOR IP): Performed by: INTERNAL MEDICINE

## 2022-12-31 PROCEDURE — 99222 1ST HOSP IP/OBS MODERATE 55: CPT | Performed by: INTERNAL MEDICINE

## 2022-12-31 PROCEDURE — 93010 ELECTROCARDIOGRAM REPORT: CPT | Performed by: INTERNAL MEDICINE

## 2022-12-31 PROCEDURE — 6360000002 HC RX W HCPCS: Performed by: PHYSICIAN ASSISTANT

## 2022-12-31 PROCEDURE — 80053 COMPREHEN METABOLIC PANEL: CPT

## 2022-12-31 PROCEDURE — 2580000003 HC RX 258: Performed by: INTERNAL MEDICINE

## 2022-12-31 PROCEDURE — 93308 TTE F-UP OR LMTD: CPT

## 2022-12-31 PROCEDURE — 6360000002 HC RX W HCPCS: Performed by: INTERNAL MEDICINE

## 2022-12-31 PROCEDURE — 94660 CPAP INITIATION&MGMT: CPT

## 2022-12-31 PROCEDURE — 36415 COLL VENOUS BLD VENIPUNCTURE: CPT

## 2022-12-31 RX ORDER — ACETAMINOPHEN 650 MG/1
650 SUPPOSITORY RECTAL EVERY 6 HOURS PRN
Status: DISCONTINUED | OUTPATIENT
Start: 2022-12-31 | End: 2022-12-31 | Stop reason: HOSPADM

## 2022-12-31 RX ORDER — ACETAMINOPHEN 325 MG/1
650 TABLET ORAL EVERY 6 HOURS PRN
Status: DISCONTINUED | OUTPATIENT
Start: 2022-12-31 | End: 2022-12-31 | Stop reason: HOSPADM

## 2022-12-31 RX ORDER — ONDANSETRON 2 MG/ML
4 INJECTION INTRAMUSCULAR; INTRAVENOUS EVERY 6 HOURS PRN
Status: DISCONTINUED | OUTPATIENT
Start: 2022-12-31 | End: 2022-12-31 | Stop reason: HOSPADM

## 2022-12-31 RX ORDER — SODIUM CHLORIDE 0.9 % (FLUSH) 0.9 %
5-40 SYRINGE (ML) INJECTION EVERY 12 HOURS SCHEDULED
Status: DISCONTINUED | OUTPATIENT
Start: 2022-12-31 | End: 2022-12-31 | Stop reason: HOSPADM

## 2022-12-31 RX ORDER — ONDANSETRON 4 MG/1
4 TABLET, ORALLY DISINTEGRATING ORAL EVERY 8 HOURS PRN
Status: DISCONTINUED | OUTPATIENT
Start: 2022-12-31 | End: 2022-12-31 | Stop reason: HOSPADM

## 2022-12-31 RX ORDER — SODIUM CHLORIDE 9 MG/ML
INJECTION, SOLUTION INTRAVENOUS PRN
Status: DISCONTINUED | OUTPATIENT
Start: 2022-12-31 | End: 2022-12-31 | Stop reason: HOSPADM

## 2022-12-31 RX ORDER — FUROSEMIDE 10 MG/ML
40 INJECTION INTRAMUSCULAR; INTRAVENOUS 2 TIMES DAILY
Status: DISCONTINUED | OUTPATIENT
Start: 2022-12-31 | End: 2022-12-31

## 2022-12-31 RX ORDER — AMIODARONE HYDROCHLORIDE 200 MG/1
200 TABLET ORAL 2 TIMES DAILY
Status: DISCONTINUED | OUTPATIENT
Start: 2022-12-31 | End: 2022-12-31 | Stop reason: HOSPADM

## 2022-12-31 RX ORDER — SODIUM CHLORIDE 0.9 % (FLUSH) 0.9 %
5-40 SYRINGE (ML) INJECTION PRN
Status: DISCONTINUED | OUTPATIENT
Start: 2022-12-31 | End: 2022-12-31 | Stop reason: HOSPADM

## 2022-12-31 RX ORDER — CLONAZEPAM 0.5 MG/1
0.5 TABLET ORAL DAILY
Status: DISCONTINUED | OUTPATIENT
Start: 2022-12-31 | End: 2022-12-31 | Stop reason: HOSPADM

## 2022-12-31 RX ORDER — CHOLECALCIFEROL (VITAMIN D3) 10 MCG
1 TABLET ORAL DAILY
Status: DISCONTINUED | OUTPATIENT
Start: 2022-12-31 | End: 2022-12-31 | Stop reason: HOSPADM

## 2022-12-31 RX ORDER — SUCRALFATE 1 G/1
1 TABLET ORAL EVERY 8 HOURS SCHEDULED
Status: DISCONTINUED | OUTPATIENT
Start: 2022-12-31 | End: 2022-12-31 | Stop reason: HOSPADM

## 2022-12-31 RX ORDER — TAMSULOSIN HYDROCHLORIDE 0.4 MG/1
0.4 CAPSULE ORAL NIGHTLY
Status: DISCONTINUED | OUTPATIENT
Start: 2022-12-31 | End: 2022-12-31 | Stop reason: HOSPADM

## 2022-12-31 RX ORDER — PANTOPRAZOLE SODIUM 40 MG/1
40 TABLET, DELAYED RELEASE ORAL
Status: DISCONTINUED | OUTPATIENT
Start: 2022-12-31 | End: 2022-12-31 | Stop reason: HOSPADM

## 2022-12-31 RX ORDER — FUROSEMIDE 10 MG/ML
40 INJECTION INTRAMUSCULAR; INTRAVENOUS 2 TIMES DAILY
Status: DISCONTINUED | OUTPATIENT
Start: 2022-12-31 | End: 2022-12-31 | Stop reason: HOSPADM

## 2022-12-31 RX ORDER — TAMSULOSIN HYDROCHLORIDE 0.4 MG/1
0.4 CAPSULE ORAL DAILY
Status: DISCONTINUED | OUTPATIENT
Start: 2022-12-31 | End: 2022-12-31

## 2022-12-31 RX ORDER — FUROSEMIDE 10 MG/ML
INJECTION INTRAMUSCULAR; INTRAVENOUS
Status: DISCONTINUED
Start: 2022-12-31 | End: 2022-12-31 | Stop reason: HOSPADM

## 2022-12-31 RX ORDER — PRAVASTATIN SODIUM 20 MG
20 TABLET ORAL EVERY EVENING
Status: DISCONTINUED | OUTPATIENT
Start: 2022-12-31 | End: 2022-12-31 | Stop reason: HOSPADM

## 2022-12-31 RX ORDER — POLYETHYLENE GLYCOL 3350 17 G/17G
17 POWDER, FOR SOLUTION ORAL DAILY PRN
Status: DISCONTINUED | OUTPATIENT
Start: 2022-12-31 | End: 2022-12-31 | Stop reason: HOSPADM

## 2022-12-31 RX ADMIN — AMIODARONE HYDROCHLORIDE 200 MG: 200 TABLET ORAL at 09:17

## 2022-12-31 RX ADMIN — METHYLPREDNISOLONE SODIUM SUCCINATE 40 MG: 40 INJECTION, POWDER, FOR SOLUTION INTRAMUSCULAR; INTRAVENOUS at 11:18

## 2022-12-31 RX ADMIN — APIXABAN 5 MG: 5 TABLET, FILM COATED ORAL at 09:17

## 2022-12-31 RX ADMIN — TAMSULOSIN HYDROCHLORIDE 0.4 MG: 0.4 CAPSULE ORAL at 01:08

## 2022-12-31 RX ADMIN — PANTOPRAZOLE SODIUM 40 MG: 40 TABLET, DELAYED RELEASE ORAL at 06:09

## 2022-12-31 RX ADMIN — Medication 10 ML: at 09:17

## 2022-12-31 RX ADMIN — AMIODARONE HYDROCHLORIDE 200 MG: 200 TABLET ORAL at 01:08

## 2022-12-31 RX ADMIN — PRAVASTATIN SODIUM 20 MG: 20 TABLET ORAL at 01:31

## 2022-12-31 RX ADMIN — SUCRALFATE 1 G: 1 TABLET ORAL at 06:09

## 2022-12-31 RX ADMIN — FUROSEMIDE 40 MG: 10 INJECTION, SOLUTION INTRAMUSCULAR; INTRAVENOUS at 14:55

## 2022-12-31 RX ADMIN — NEPHROCAP 1 MG: 1 CAP ORAL at 09:17

## 2022-12-31 RX ADMIN — FUROSEMIDE 40 MG: 10 INJECTION, SOLUTION INTRAMUSCULAR; INTRAVENOUS at 09:17

## 2022-12-31 RX ADMIN — SUCRALFATE 1 G: 1 TABLET ORAL at 14:16

## 2022-12-31 ASSESSMENT — PAIN SCALES - GENERAL
PAINLEVEL_OUTOF10: 0

## 2022-12-31 NOTE — ED PROVIDER NOTES
I independently examined and evaluated Isha Sanford. .    In brief, patient is a 61-year-old male with recent cardiac ablation who presents to the emergency department for evaluation of intermittent chest pains, shortness of breath, and cough. Focused exam revealed male satting >90% on room air. Initial troponin elevated at 0.71. recently had an ablation. 3 hour troponin ordered and pending. Cardiology consulted who recommends no anticoagulation. Recommends lasix as bnp 5555. Patient admitted to the hospital.     The Ekg interpreted by me shows  Normal sinus rhythm with a rate of 73  Possible left atrial argument  Right bundle branch block  QTc is prolonged at 502  ST Segments: Nonspecific ST changes    All diagnostic, treatment, and disposition decisions were made by myself in conjunction with the advanced practice provider/resident physician. I personally saw the patient and performed a substantive portion of the visit including aspects of the medical decision making. I personally saw the patient and independently provided 10 minutes of non-concurrent critical care out of the total shared critical care time provided. Comment: Please note this report has been produced using speech recognition software and may contain errors related to that system including errors in grammar, punctuation, and spelling, as well as words and phrases that may be inappropriate. If there are any questions or concerns please feel free to contact the dictating provider for clarification. For all further details of the patient's emergency department visit, please see the advanced practice provider's documentation.        Kiley Bustos MD  01/04/23 9429

## 2022-12-31 NOTE — ED NOTES
12/31/22 12:48 AM  [phone] Hospital or Facility: [facility] From: Malachi Sprague RE: MARQUITA WERO 1960 RM: 18-18 Per pt, takes verapamil and flomax nightly and is requesting both meds tonight. Can pt have meds please?  Need Callback: NO CALLBACK REQ B3 TELEMETRY NEW ADMISSION  [id number] Garcia Richey, RN  12/31/22 7939

## 2022-12-31 NOTE — DISCHARGE INSTRUCTIONS
Your information:  Name: Shanti William. : 1960    Your instructions:    Contact your primary care doctor or cardiologist about ordering the Venous doppler studies on your legs. What to do after you leave the hospital:    Recommended diet: regular diet, watch how much fluids you drink. No more than 64 ounces/day. Recommended activity: activity as tolerated        The following personal items were collected during your admission and were returned to you:    Belongings  Dental Appliances: None  Vision - Corrective Lenses: None  Hearing Aid: None  Clothing: Socks, Footwear, Jacket/Coat, Pants, Shirt, Sweater  Jewelry: Ring, Watch  Body Piercings Removed: No  Electronic Devices: Cell Phone  Weapons (Notify Protective Services/Security): None  Other Valuables: Wallet, Money  Home Medications: None  Patient approves for provider to speak to responsible person post operatively: Yes    Information obtained by:  By signing below, I understand that if any problems occur once I leave the hospital I am to contact my primary care doctor. I understand and acknowledge receipt of the instructions indicated above.

## 2022-12-31 NOTE — PROGRESS NOTES
12/31/22 0331   NIV Type   $NIV $Daily Charge   Equipment Type V60   Mode Bilevel   Mask Type Nasal mask   Mask Size Large   Settings/Measurements   PIP Observed 8 cm H20   IPAP 8 cmH20   CPAP/EPAP 5 cmH2O   Vt (Measured) 540 mL   Resp 15   FiO2  21 %   Minute Volume (L/min) 8 Liters   Mask Leak (lpm) 7 lpm   Comfort Level Good   Using Accessory Muscles No   SpO2 95   Patient's Home Machine No

## 2022-12-31 NOTE — ED NOTES
Call placed to [facility] @ 1920  Re: SOB with elevated troponin after ablation on Tuesday per Zeinab Mckinney @ [address]  12/30/22 1926

## 2022-12-31 NOTE — PROGRESS NOTES
Patient given discharge instructions and voiced understanding. Patient discharged with all belongings. To car via wheelchair. Home with spouse.

## 2022-12-31 NOTE — CONSULTS
[facility]  [phone]      Attending Physician: Loly Del Toro MD  Reason for Consultation/Chief Complaint: sob    Subjective   History of Present Illness:  Prasad Guallpa is a 58 y.o. patient who presented to the hospital with complaints of states that he had shortness of breath after the procedure for his A. fib ablation. He states after he went home he continued to feel poorly. He does remember taking all of his Eliquis without missing any doses states he started coughing and this got worse and therefore he came into the house    Past Medical History:   has a past medical history of A-fib ([address] ADHD (attention deficit hyperactivity disorder), Carpal tunnel syndrome, Chronic low back pain, Chronic neck pain, Chronic sinusitis, Dental crown present, Epigastric hernia, Esophageal spasm, GERD (gastroesophageal reflux disease), Hyperlipidemia, Hypertrophic cardiomyopathy ([address] IHSS (idiopathic hypertrophic subaortic stenosis) ([address] Kidney stones, HUNTER on CPAP, Primary localized osteoarthrosis, shoulder region, Prostate cancer ([address] PVC's (premature ventricular contractions), RBBB (right bundle branch block), RLS (restless legs syndrome), Seasonal allergies, and Tachycardia. Surgical History:   has a past surgical history that includes Knee arthroscopy (Right, 1982); Umbilical hernia repair; Colonoscopy (01/17/2011); TURP (2012); Inguinal hernia repair (Bilateral, 2009, 2012,); Varicocelectomy (2000); Shoulder arthroscopy (Right, 12/31/2013); Endoscopy, colon, diagnostic; Upper gastrointestinal endoscopy (09/2003); Elbow surgery (2015); Carpal tunnel release (Left); Knee arthroscopy (Left, 12/21/2017); Colonoscopy (N/A, 02/02/2020); Knee arthroscopy (Left, 07/17/2020); Insertable Cardiac Monitor (Left, 12/11/2020); Insertable Cardiac Monitor (12/11/2020); and Cardiac electrophysiology study and ablation. Social History:   reports that he has never smoked.  He has never been exposed to tobacco smoke. He has never used smokeless tobacco. He reports current alcohol use of about 1.0 - 2.0 standard drink per week. He reports that he does not use drugs. Family History:  family history includes Cancer in his mother; Heart Disease in his maternal grandfather and maternal grandmother; High Blood Pressure in his father; High Cholesterol in his brother and mother; Prostate Cancer in his paternal uncle. Home Medications:  Were reviewed and are listed in nursing record and/or below  Prior to Admission medications    Medication Sig Start Date End Date Taking? Authorizing Provider   amiodarone (CORDARONE) 200 MG tablet Take amiodarone 200 mg PO BID x 7 days then 200 mg PO daily 12/28/22   PARI Zheng CNP   pantoprazole (PROTONIX) 40 MG tablet Take 1 tablet by mouth 2 times daily (before meals) 12/28/22   PARI Zheng CNP   sucralfate (CARAFATE) 1 GM tablet Take 1 tablet by mouth every 8 hours 12/28/22   PARI Zheng CNP   verapamil (VERELAN) 120 MG extended release capsule TAKE 1 CAPSULE BY MOUTH EVERY NIGHT  Patient not taking: Reported on 12/31/2022 1/4/23   PARI Zheng CNP   Cyanocobalamin (B-12 PO) Take by mouth    Historical Provider, MD   b complex vitamins capsule Take 1 capsule by mouth daily    Historical Provider, MD   pravastatin (PRAVACHOL) 20 MG tablet Take 1 tablet by mouth every evening 8/22/22   Danial Hazel MD   clonazePAM (KLONOPIN) 1 MG tablet TAKE 1 TABLET BY MOUTH EVERY NIGHT AS NEEDED  Patient taking differently: Take 0.5 mg by mouth daily.  8/16/22 12/27/22  Brendon Leach MD   apixaban (ELIQUIS) 5 MG TABS tablet TAKE 1 TABLET BY MOUTH TWICE DAILY 4/20/22   Kindra Hernandez MD   tamsulosin (FLOMAX) 0.4 MG capsule Take 0.4 mg by mouth daily    Historical Provider, MD        CURRENT Medications:  amiodarone (CORDARONE) tablet 200 mg, BID  apixaban (ELIQUIS) tablet 5 mg, BID  b complex-C-folic acid (NEPHROCAPS) capsule 1 mg, Daily  clonazePAM (KLONOPIN) tablet 0.5 mg, Daily  pantoprazole (PROTONIX) tablet 40 mg, BID AC  pravastatin (PRAVACHOL) tablet 20 mg, QPM  sucralfate (CARAFATE) tablet 1 g, 3 times per day  sodium chloride flush 0.9 % injection 5-40 mL, 2 times per day  sodium chloride flush 0.9 % injection 5-40 mL, PRN  0.9 % sodium chloride infusion, PRN  ondansetron (ZOFRAN-ODT) disintegrating tablet 4 mg, Q8H PRN   Or  ondansetron (ZOFRAN) injection 4 mg, Q6H PRN  polyethylene glycol (GLYCOLAX) packet 17 g, Daily PRN  acetaminophen (TYLENOL) tablet 650 mg, Q6H PRN   Or  acetaminophen (TYLENOL) suppository 650 mg, Q6H PRN  furosemide (LASIX) injection 40 mg, BID  perflutren lipid microspheres (DEFINITY) injection 1.5 mL, ONCE PRN  tamsulosin (FLOMAX) capsule 0.4 mg, Nightly  methylPREDNISolone sodium (SOLU-MEDROL) injection 40 mg, Q12H        Allergies:  Niacin and related     Review of Systems:   A 14 point review of symptoms completed. Pertinent positives identified in the HPI, all other review of symptoms negative as below.       Objective   PHYSICAL EXAM:    Vitals:    12/31/22 1136   BP: 120/66   Pulse: 65   Resp:    Temp: 98.2 °F (36.8 °C)   SpO2: 95%    Weight: 203 lb (92.1 kg)    Vitals:    12/31/22 0225 12/31/22 0331 12/31/22 0828 12/31/22 1136   BP: (!) 146/98  118/74 120/66   Pulse: 77  70 65   Resp: 20 15 18    Temp: 98.6 °F (37 °C)  98.4 °F (36.9 °C) 98.2 °F (36.8 °C)   TempSrc: Oral  Oral    SpO2: 95%  97% 95%   Weight:       Height:              General Appearance:  Alert, cooperative, no distress, appears stated age   Head:  Normocephalic, without obvious abnormality, atraumatic   Eyes:  PERRL, conjunctiva/corneas clear   Nose: Nares normal, no drainage or sinus tenderness   Throat: Lips, mucosa, and tongue normal   Neck: Supple, symmetrical, trachea midline, no adenopathy, thyroid: not enlarged, symmetric, no tenderness/mass/nodules, no carotid bruit or 8cm JVD   Lungs:   Crackles in the bed, respirations unlabored   Chest Wall:  No deformity or tenderness   Heart:  Regular rate and rhythm, S1, S2 normal, no murmur, rub or gallop, fixed split s2   Abdomen:   Soft, non-tender, bowel sounds active all four quadrants,  no masses, no organomegaly   Extremities: Extremities normal, atraumatic, no cyanosis 1+ bilateral lower extremity pitting edema with positive sock line   Pulses: 2+ and symmetric   Skin: Skin color, texture, turgor normal, no rashes or lesions   Pysch: Normal mood and affect   Neurologic: Normal gross motor and sensory exam.         Labs   CBC:   Lab Results   Component Value Date/Time    WBC 5.3 2022 06:17 PM    RBC 4.20 2022 06:17 PM    HGB 12.2 2022 06:17 PM    HCT 36.7 2022 06:17 PM    MCV 87.3 2022 06:17 PM    RDW 14.9 2022 06:17 PM     2022 06:17 PM     CMP:  Lab Results   Component Value Date/Time     2022 06:05 AM    K 3.9 2022 06:05 AM     2022 06:05 AM    CO2 27 2022 06:05 AM    BUN 18 2022 06:05 AM    CREATININE 0.9 2022 06:05 AM    GFRAA >60 2022 02:51 PM    GFRAA >60 2013 09:07 AM    AGRATIO 1.4 2022 06:05 AM    LABGLOM >60 2022 06:05 AM    GLUCOSE 159 2022 06:05 AM    GLUCOSE 83 2012 04:40 PM    PROT 6.5 2022 06:05 AM    PROT 7.1 2013 09:07 AM    CALCIUM 9.2 2022 06:05 AM    BILITOT 0.7 2022 06:05 AM    ALKPHOS 88 2022 06:05 AM    AST 25 2022 06:05 AM    ALT 36 2022 06:05 AM     PT/INR:  No results found for: PTINR  HgBA1c:  Lab Results   Component Value Date    LABA1C 5.6 2022     Lab Results   Component Value Date    CKTOTAL 199 2021    TROPONINI 0.44 (H) 2022         Cardiac Data     Last EK2022 194 NSR with RBBB, NSST changes    Echo:    LV systolic function is normal with ejection fraction estimated at 55%. No regional wall motion abnormalities.    There is a trivial posterior pericardial effusion noted. CARDIAC MRI 1/10/2018         LEFT VENTRICLE: The left ventricle is normal in size. The left ventricular function is low    normal. No regional wall motion abnormality. Asymmetric basal and mid septal hypertrophy with    the maximum end-diastolic myocardial thickness measuring up to 16    mm. Maximum LVOT velocity is 1.4 m/s. RIGHT VENTRICLE: The right ventricle is normal in size. The right ventricular ejection fraction    is normal. No regional wall motion abnormality. ATRIA: Mild left atrial enlargement. FIRST PASS PERFUSION AND DELAYED ENHANCEMENT: Areas of late gadolinium enhancement    predominantly involving the basal and mid anterior and septal myocardial segments. PERICARDIUM AND PLEURA: Pericardial thickness was normal. No pericardial or pleural effusions. VALVES: No significant valvular abnormality. No anterior systolic motion of the anterior mitral    valve leaflet. Qp:Qs: No evidence of shunt. Stress Test:    Cath:    Studies:   CXR;  Stable cardiomegaly with mild central pulmonary congestion or pulmonary   artery hypertension which is more prominent. Hazy perihilar and bibasilar atelectasis vs small infiltrates or scarring   which is less prominent. CTPA:  1. Acute small segmental pulmonary embolus in the left lower lobe. 2. Findings of pulmonary edema with interlobular septal thickening and small   pleural effusions. Findings were discussed with Thor Cannon at 9:34 pm on 12/30/2022. Assessment and Plan      1. SOB/pulm edema  2. Acute PE: new, small  3. Elevated trop: downtrending from recent Afib  4. Hypertrophic cardiomyopathy w/o obstruction  5. PAF: currently in NSR   - s/p RFCA 12/27/2022   -12/8/2022 planned ablation but complicated by hemopericardium and emergent pericardiocentesis   - On AMio, verapamil,   5. Hx of RBBB  6. Hx of PVCs   7. HLD        PLAN  1.   Patient does have mild fluid overload but not in decompensated heart failure. Last echocardiogram without any evidence of pericardial effusion   -Continue gentle diuresis   -Ins and outs, daily weight   Compression hose of bilateral lower extremities during daytime hours  2. We will do bilateral lower extremity venous Dopplers to rule out DVT. -If this is negative and given patient has remained on anticoagulation suspect to the PE to potentially be due to A. fib ablation venous cease and possible thromboembolism from the procedure  3. Ok to d/c tropnins, no s/s of ACS  4. IF SOB remains, eval PFO/shunting    Discussed with patient and nurse if he is able to ambulate without desaturation, significant shortness of breath or tachycardia may be able to go home and follow-up with EP            Patient Active Problem List   Diagnosis    GERD (gastroesophageal reflux disease)    Hyperlipidemia    Hypertrophic cardiomyopathy (HCC)    PVC (premature ventricular contraction)    Right bundle branch block    Restless legs syndrome    Obstructive sleep apnea    Obesity    Personal history of malignant neoplasm of prostate    SBO (small bowel obstruction) (HCC)    Elevated blood sugar    PAF (paroxysmal atrial fibrillation) ([facility]    Current use of long term anticoagulation    History of GI bleed    Tremor    medtronic LINQ 12/11/2020 Dr Tyler Sanchez    Atrial fibrillation [facility])    Atrial fibrillation, unspecified type [facility])    Status post ablation of atrial flutter    Status post ablation of atrial fibrillation    SOB (shortness of breath)    Pericardial effusion with cardiac tamponade 12/2022    Pulmonary edema    Acute pulmonary embolism ([facility]           Thank you for allowing us to participate in the care of Ruby Awad. . Please call me with any questions [phone].     Raúl Herrera MD, [address] Cardiologist  [facility]  [phone] [facility]  [phone] [address] [place] [address]  12/31/2022 12:36 PM    I will address the patient's cardiac risk factors and adjusted pharmacologic treatment as needed. In addition, I have reinforced the need for patient directed risk factor modification. All questions and concerns were addressed to the patient/family. Alternatives to my treatment were discussed. The note was completed using EMR. Every effort was made to ensure accuracy; however, inadvertent computerized transcription errors may be present.

## 2022-12-31 NOTE — H&P
Hospital Medicine History & Physical      PCP: Garrison Mariscal MD    Date of Admission: 12/30/2022    Date of Service: Pt seen/examined on 12/31/2022 and Admitted to Inpatient with expected LOS greater than two midnights due to medical therapy. Chief Complaint: Shortness of breath      History Of Present Illness:    58 y.o. male who presented to [facility] with above complaints  Patient with PMH of PAF on Eliquis, s/p RFCA 35/8/5677 complicated by pericardial tamponade requiring pericardiocentesis, repeat RFCA 12/27/2022, followed by Dr. Erma Hull presented to the ED today with complaints of shortness of breath. Since the procedure he has had worsening shortness of breath, worse with exertion, minimal cough, no fevers or chills. Reports mild chest discomfort and pain in the left axillary region. Patient is also complaining of back pain but thinks it is from lying down on the table for the procedure. Denies any leg swelling. Denies any abdominal pain, nausea vomiting or diarrhea. Denies any lightheadedness, palpitations or syncope. Patient reports compliance with Eliquis and reports that he does not think he missed out any pills, but reports he has to confirm this by checking his pillbox.     Past Medical History:          Diagnosis Date    A-fib [facility])     ADHD (attention deficit hyperactivity disorder)     Carpal tunnel syndrome 01/21/2015    Chronic low back pain     Chronic neck pain     Chronic sinusitis     Dr. Natalia jamil present     lower    Epigastric hernia     Esophageal spasm     GERD (gastroesophageal reflux disease)     Hyperlipidemia     Hypertrophic cardiomyopathy (HCC)     IHSS (idiopathic hypertrophic subaortic stenosis) ([facility] [address]     Kidney stones     HUNTER on CPAP     Dr. Liana Robert- A-PAP    Primary localized osteoarthrosis, shoulder region 10/18/2013    Prostate cancer [facility])     prostate ; s/p radiation treatment    PVC's (premature ventricular contractions)     RBBB (right bundle branch block)     RLS (restless legs syndrome)     Dr. Alicja Lamb    Seasonal allergies     Tachycardia        Past Surgical History:          Procedure Laterality Date    CARDIAC ELECTROPHYSIOLOGY STUDY AND ABLATION      CARPAL TUNNEL RELEASE Left     COLONOSCOPY  01/17/2011    COLONOSCOPY N/A 02/02/2020    COLONOSCOPY WITH BIOPSY performed by Sunita Sawyer MD at [facility] 20 2015    left    ENDOSCOPY, COLON, DIAGNOSTIC      INGUINAL HERNIA REPAIR Bilateral 2009, 2012,    INSERTABLE CARDIAC MONITOR Left 12/11/2020    INSERTABLE CARDIAC MONITOR  12/11/2020    Loop Implant    KNEE ARTHROSCOPY Right 1982    Right    KNEE ARTHROSCOPY Left 12/21/2017    KNEE ARTHROSCOPY Left 07/17/2020    VIDEO ARTHROSCOPY LEFT KNEE, CHONDROPLASTY, PLICA EXCISION, PARTIAL MEDIAL LATERAL MENISECTOMY -SLEEP APNEA- performed by Abimbola Ray MD at [address] [place] [address] [facility] Right 12/31/2013    VIDEO ARTHROSCOPY RIGHT SHOULDER, SUBACROMIAL DECOMPRESSION, DISTAL CLAVICLE RESECTION, ROTATOR CUFF DEBRIDEMENT          TURP  3926    X 2    UMBILICAL HERNIA REPAIR      X 2    UPPER GASTROINTESTINAL ENDOSCOPY  09/2003    VARICOCELECTOMY  2000       Medications Prior to Admission:      Prior to Admission medications    Medication Sig Start Date End Date Taking?  Authorizing Provider   amiodarone (CORDARONE) 200 MG tablet Take amiodarone 200 mg PO BID x 7 days then 200 mg PO daily 12/28/22   PARI Zheng CNP   pantoprazole (PROTONIX) 40 MG tablet Take 1 tablet by mouth 2 times daily (before meals) 12/28/22   PARI Zheng CNP   sucralfate (CARAFATE) 1 GM tablet Take 1 tablet by mouth every 8 hours 12/28/22   PARI Zheng CNP   verapamil (VERELAN) 120 MG extended release capsule TAKE 1 CAPSULE BY MOUTH EVERY NIGHT  Patient not taking: Reported on 12/31/2022 1/4/23   PARI Zheng CNP   Cyanocobalamin (B-12 PO) Take by mouth    Historical Provider, MD   b complex vitamins capsule Take 1 capsule by mouth daily    Historical Provider, MD   pravastatin (PRAVACHOL) 20 MG tablet Take 1 tablet by mouth every evening 8/22/22   Harry Perez MD   clonazePAM (KLONOPIN) 1 MG tablet TAKE 1 TABLET BY MOUTH EVERY NIGHT AS NEEDED  Patient taking differently: Take 0.5 mg by mouth daily. 8/16/22 12/27/22  Wilhelmenia Litten, MD   apixaban (ELIQUIS) 5 MG TABS tablet TAKE 1 TABLET BY MOUTH TWICE DAILY 4/20/22   Steve Blevins MD   tamsulosin (FLOMAX) 0.4 MG capsule Take 0.4 mg by mouth daily    Historical Provider, MD       Allergies:  Niacin and related    Social History:      The patient currently lives at home    TOBACCO:   reports that he has never smoked. He has never been exposed to tobacco smoke. He has never used smokeless tobacco.  ETOH:   reports current alcohol use of about 1.0 - 2.0 standard drink per week. E-cigarette/Vaping       Questions Responses    E-cigarette/Vaping Use Never User    Start Date     Passive Exposure     Quit Date     Counseling Given     Comments               Family History:      Reviewed and negative in regards to presenting illness/complaint. Problem Relation Age of Onset    Cancer Mother         melenoma    High Cholesterol Mother     High Blood Pressure Father     High Cholesterol Brother     Heart Disease Maternal Grandmother     Heart Disease Maternal Grandfather     Prostate Cancer Paternal Uncle        REVIEW OF SYSTEMS COMPLETED:   Pertinent positives as noted in the HPI. All other systems reviewed and negative. PHYSICAL EXAM PERFORMED:    BP (!) 146/98   Pulse 77   Temp 98.6 °F (37 °C) (Oral)   Resp 15   Ht 5' 11\" (1.803 m)   Wt 203 lb (92.1 kg)   SpO2 95%   BMI 28.31 kg/m²     General appearance:  No apparent distress, appears stated age and cooperative. HEENT:  Normal cephalic, atraumatic without obvious deformity. Pupils equal, round, and reactive to light. Extra ocular muscles intact. Conjunctivae/corneas clear.   Neck: Supple, with full range of motion. No jugular venous distention. Trachea midline. Respiratory:  Normal respiratory effort. Clear to auscultation, bilaterally without Rales/Wheezes/Rhonchi. Cardiovascular:  Regular rate and rhythm with normal S1/S2 without murmurs, rubs or gallops. Abdomen: Soft, non-tender, non-distended with normal bowel sounds. Musculoskeletal:  No clubbing, cyanosis or edema bilaterally. Full range of motion without deformity. Skin: Skin color, texture, turgor normal.  No rashes or lesions. Neurologic:  Neurovascularly intact without any focal sensory/motor deficits. Cranial nerves: II-XII intact, grossly non-focal.  Psychiatric:  Alert and oriented, thought content appropriate, normal insight  Capillary Refill: Brisk,3 seconds, normal  Peripheral Pulses: +2 palpable, equal bilaterally       Labs:     Recent Labs     12/28/22  0521 12/30/22 1817   WBC 8.7 5.3   HGB 12.8* 12.2*   HCT 40.3* 36.7*    192     Recent Labs     12/28/22  0521 12/30/22 1817    139   K 3.9 3.5    104   CO2 24 27   BUN 17 19   CREATININE 0.9 1.0   CALCIUM 8.8 9.1     Recent Labs     12/30/22  1817   AST 32   ALT 36   BILITOT 0.8   ALKPHOS 85     No results for input(s): INR in the last 72 hours. Recent Labs     12/30/22  1817 12/30/22  2131   TROPONINI 0.71* 0.66*       Urinalysis:      Lab Results   Component Value Date/Time    NITRU Negative 03/04/2021 06:46 PM    WBCUA 6-9 03/04/2021 06:46 PM    BACTERIA Rare 03/04/2021 06:46 PM    RBCUA 3-4 03/04/2021 06:46 PM    BLOODU TRACE-INTACT 03/04/2021 06:46 PM    SPECGRAV 1.020 03/04/2021 06:46 PM    GLUCOSEU Negative 03/04/2021 06:46 PM       Radiology:     I personally reviewed the CT chest pulmonary, chest x-ray and also reviewed the radiologist interpretation.   Of significance is acute segmental PE in the left lower lobe, and pulmonary edema    EKG:  I have reviewed the EKG with the following interpretation: Unchanged from prior, NSR, RBBB, no acute ischemic changes    CT CHEST PULMONARY EMBOLISM W CONTRAST   Preliminary Result   1. Acute small segmental pulmonary embolus in the left lower lobe. 2. Findings of pulmonary edema with interlobular septal thickening and small   pleural effusions. Findings were discussed with Maura Crew at 9:34 pm on 12/30/2022. XR CHEST PORTABLE   Final Result   Stable cardiomegaly with mild central pulmonary congestion or pulmonary   artery hypertension which is more prominent. Hazy perihilar and bibasilar atelectasis vs small infiltrates or scarring   which is less prominent. Consults:    IP CONSULT TO CARDIOLOGY  IP CONSULT TO CARDIOLOGY    ASSESSMENT:PLAN:    Active Hospital Problems    Diagnosis Date Noted    SOB (shortness of breath) [R06.02] 12/30/2022     Priority: High    Pulmonary edema [J81.1] 12/30/2022     Priority: High    Acute pulmonary embolism ([facility] [address] [I26.99] 12/30/2022     Priority: High    Pericardial effusion with cardiac tamponade 12/2022 [I31.39, I31.4] 12/30/2022     Priority: Medium    Status post ablation of atrial flutter [Z98.890, Z86.79] 12/05/2022     Priority: Medium    PAF (paroxysmal atrial fibrillation) ([facility] [address] [I48.0] 09/26/2017    Personal history of malignant neoplasm of prostate [Z85.46] 12/10/2013    Hyperlipidemia [E78.5] 08/27/2010     Shortness of breath s/p RFCA  Due to pulmonary edema and acute PE  -CT chest report reviewed and documented as above, shows pulmonary edema and acute PE in the left lower lobe.   No evidence of pericardial effusion/pericardial tamponade clinically  -Cardiology was consulted, recommend continuing Eliquis for acute PE which is rather small in size and Lasix for his pulmonary edema    Pulmonary edema s/p A. fib ablation  NCPE [noncardiogenic pulmonary edema] post A. fib ablation, known rare complication unknown mechanism, usually supportive care and responds to diuretics/steroids  -Patient without history of CHF  -Start IV Lasix 40 Mg twice daily, strict I's/O and daily weights, monitor urine output  -Start IV Solu-Medrol 40 mg twice daily  -Supplemental O2 to keep sats >92%  -Consider repeat chest x-rays to monitor progress  -Limited 2D echo ordered to assess LV EF    Acute pulmonary embolism  Unclear etiology. Potential cause could be a rare complication of A. fib ablation. CT showing small segmental PE in the left lower lobe, no evidence of RV strain on CT  -Per cardiology resume Eliquis, no heparin needed  -If not a complication of A. fib ablation, have to entertain the idea of Eliquis failure due to primary VTE on Eliquis  -We will get cardiology input regarding this  -Hemodynamically stable, not hypoxemic  -Limited 2D echo ordered to assess RV    Elevated troponin -likely due to post ablation. No anginal symptoms, enzymes are downtrending    PAF  S/p ablation 24/1/7489 complicated by tamponade s/p pericardiocentesis  -S/p ablation 12/27/2022 by EP  Continue Eliquis, amiodarone  Verapamil to be started on 1/4/2023  Continue PPI, Carafate post ablation    HUNTER-nightly BiPAP ordered    Hx of prostate CA-continue Flomax    Hyperlipidemia-statin resumed    DVT Prophylaxis: Eliquis  Diet: Diet NPO  Code Status: Full Code    PT/OT Eval Status: Ambulatory    Dispo -IP stay       Sasha Delgado MD    Thank you Bret Wilson MD for the opportunity to be involved in this patient's care. If you have any questions or concerns please feel free to contact me at [phone].

## 2022-12-31 NOTE — ED NOTES
Call placed to [address] [place] [address] Cardiology @ [phone]  Re: follow up.  Patient with pleural effusion and new segmental PE per Rosa Bean Erps @ [phone]     Britt Cousins  12/30/22 [address]

## 2022-12-31 NOTE — DISCHARGE SUMMARY
Hospital Medicine Discharge Summary    Patient ID: Don Meyers Patient's PCP: Paulo Ann MD    Admit Date: 12/30/2022     Discharge Date: 12/31/2022      Admitting Provider: Pratibha Chen MD     Discharge Provider: HERLINDA Shore     Discharge Diagnoses: Active Hospital Problems    Diagnosis     PAF (paroxysmal atrial fibrillation) ([facility]) [I48.0]      Priority: High    Elevated troponin [R77.8]      Priority: Medium    SOB (shortness of breath) [R06.02]      Priority: Medium    Pericardial effusion with cardiac tamponade 12/2022 [I31.39, I31.4]      Priority: Medium    Pulmonary edema [J81.1]      Priority: Medium    Acute pulmonary embolism ([address] [I26.99]      Priority: Medium    Status post ablation of atrial flutter [Z98.890, Z86.79]      Priority: Medium    Personal history of malignant neoplasm of prostate [Z85.46]     RBBB [I45.10]     Hyperlipidemia [E78.5]        The patient was seen and examined on day of discharge and this discharge summary is in conjunction with any daily progress note from day of discharge. Hospital Course:   58 y.o. male who presented to [facility] with above complaints  Patient with PMH of PAF on Eliquis, s/p RFCA 68/0/9977 complicated by pericardial tamponade requiring pericardiocentesis, repeat RFCA 12/27/2022, followed by Dr. Blaire Cervantes presented to the ED today with complaints of shortness of breath. Since the procedure he has had worsening shortness of breath, worse with exertion, minimal cough, no fevers or chills. Reports mild chest discomfort and pain in the left axillary region. Patient is also complaining of back pain but thinks it is from lying down on the table for the procedure. Denies any leg swelling. Denies any abdominal pain, nausea vomiting or diarrhea. Denies any lightheadedness, palpitations or syncope.   Patient reports compliance with Eliquis and reports that he does not think he missed out any pills, but reports he has to confirm this by checking his pillbox. Shortness of breath s/p RFCA  Due to pulmonary edema and acute PE  -CT chest report reviewed and documented as above, shows pulmonary edema and acute PE in the left lower lobe. No evidence of pericardial effusion/pericardial tamponade clinically  -Cardiology was consulted, recommend continuing Eliquis for acute PE which is rather small in size and Lasix for his pulmonary edema     Pulmonary edema s/p A. fib ablation  NCPE [noncardiogenic pulmonary edema] post A. fib ablation, known rare complication unknown mechanism, usually supportive care and responds to diuretics/steroids  -Patient without history of CHF  -Limited 2D echo did not show pericardial effusion and no RV strain. LVEF 55%     Acute pulmonary embolism  Unclear etiology. Potential cause could be a rare complication of A. fib ablation. CT showing small segmental PE in the left lower lobe, no evidence of RV strain on CT  -Per cardiology resume Eliquis, no heparin needed  -If not a complication of A. fib ablation  -Hemodynamically stable, not hypoxemic upon discharge        Elevated troponin -likely due to post ablation. No anginal symptoms, enzymes downtrended     PAF  S/p ablation 63/1/5781 complicated by tamponade s/p pericardiocentesis  -S/p ablation 12/27/2022 by EP  Continue Eliquis, amiodarone  Verapamil to be started on 1/4/2023  Continue PPI, Carafate post ablation     HUNTER-nightly BiPAP ordered     Hx of prostate CA-continue Flomax     Hyperlipidemia-statin resumed         Physical Exam Performed:     /66   Pulse 65   Temp 98.2 °F (36.8 °C)   Resp 18   Ht 5' 11\" (1.803 m)   Wt 203 lb (92.1 kg)   SpO2 95%   BMI 28.31 kg/m²       General appearance:  No apparent distress, appears stated age and cooperative. HEENT:  Normal cephalic, atraumatic without obvious deformity. Pupils equal, round, and reactive to light. Extra ocular muscles intact. Conjunctivae/corneas clear.   Neck: Supple, with full range of motion. No jugular venous distention. Trachea midline. Respiratory:  Normal respiratory effort. Clear to auscultation, bilaterally without Rales/Wheezes/Rhonchi. Cardiovascular:  Regular rate and rhythm with normal S1/S2 without murmurs, rubs or gallops. Abdomen: Soft, non-tender, non-distended with normal bowel sounds. Musculoskeletal:  No clubbing, cyanosis or edema bilaterally. Full range of motion without deformity. Skin: Skin color, texture, turgor normal.  No rashes or lesions. Neurologic:  Neurovascularly intact without any focal sensory/motor deficits. Cranial nerves: II-XII intact, grossly non-focal.  Psychiatric:  Alert and oriented, thought content appropriate, normal insight  Capillary Refill: Brisk,< 3 seconds   Peripheral Pulses: +2 palpable, equal bilaterally       Labs: For convenience and continuity at follow-up the following most recent labs are provided:      CBC:    Lab Results   Component Value Date/Time    WBC 5.3 12/30/2022 06:17 PM    HGB 12.2 12/30/2022 06:17 PM    HCT 36.7 12/30/2022 06:17 PM     12/30/2022 06:17 PM       Renal:    Lab Results   Component Value Date/Time     12/31/2022 06:05 AM    K 3.9 12/31/2022 06:05 AM     12/31/2022 06:05 AM    CO2 27 12/31/2022 06:05 AM    BUN 18 12/31/2022 06:05 AM    CREATININE 0.9 12/31/2022 06:05 AM    CALCIUM 9.2 12/31/2022 06:05 AM    PHOS 3.0 03/06/2018 09:52 AM         Significant Diagnostic Studies    Radiology:   CT CHEST PULMONARY EMBOLISM W CONTRAST   Preliminary Result   1. Acute small segmental pulmonary embolus in the left lower lobe. 2. Findings of pulmonary edema with interlobular septal thickening and small   pleural effusions. Findings were discussed with Rene Back at 9:34 pm on 12/30/2022. XR CHEST PORTABLE   Final Result   Stable cardiomegaly with mild central pulmonary congestion or pulmonary   artery hypertension which is more prominent.       Hazy perihilar and bibasilar atelectasis vs small infiltrates or scarring   which is less prominent. VL Extremity Venous Bilateral    (Results Pending)          Consults:     IP CONSULT TO CARDIOLOGY  IP CONSULT TO CARDIOLOGY    Disposition:  Home     Condition at Discharge: Stable    Discharge Instructions/Follow-up:    Follow-up with EP as soon as possible   Follow-up with PCP in 1 week     Code Status:  Prior FULL    Activity: activity as tolerated    Diet: regular diet and low fat, low cholesterol diet      Discharge Medications:     Discharge Medication List as of 12/31/2022  3:04 PM             Details   amiodarone (CORDARONE) 200 MG tablet Take amiodarone 200 mg PO BID x 7 days then 200 mg PO daily, Disp-35 tablet, R-3Normal      pantoprazole (PROTONIX) 40 MG tablet Take 1 tablet by mouth 2 times daily (before meals), Disp-30 tablet, R-3Normal      sucralfate (CARAFATE) 1 GM tablet Take 1 tablet by mouth every 8 hours, Disp-120 tablet, R-3Normal      verapamil (VERELAN) 120 MG extended release capsule TAKE 1 CAPSULE BY MOUTH EVERY NIGHT, Disp-30 capsule, R-3Normal      Cyanocobalamin (B-12 PO) Take by mouthHistorical Med      b complex vitamins capsule Take 1 capsule by mouth dailyHistorical Med      pravastatin (PRAVACHOL) 20 MG tablet Take 1 tablet by mouth every evening, Disp-90 tablet, R-1Normal      clonazePAM (KLONOPIN) 1 MG tablet TAKE 1 TABLET BY MOUTH EVERY NIGHT AS NEEDED, Disp-30 tablet, R-2Normal      apixaban (ELIQUIS) 5 MG TABS tablet TAKE 1 TABLET BY MOUTH TWICE DAILY, Disp-180 tablet, R-3Normal      tamsulosin (FLOMAX) 0.4 MG capsule Take 0.4 mg by mouth dailyHistorical Med             Time Spent on discharge: 32 minutes in the examination, evaluation, counseling and review of medications and discharge plan. Signed:    HERLINDA Fuentes   1/4/2023      Thank you Piper Nielsen MD for the opportunity to be involved in this patient's care.  If you have any questions or concerns, please feel free to contact me at [phone].

## 2022-12-31 NOTE — ED PROVIDER NOTES
**ADVANCED PRACTICE PROVIDER, I HAVE EVALUATED THIS [facility]  ED  EMERGENCY DEPARTMENT ENCOUNTER      Pt Name: Daisy Silva. TTY:[id number]  Birthdate 1960  Date of evaluation: 12/30/2022  Provider: HERLINDA Mckeon  Note Started: 9:36 PM EST 12/30/2022        Chief Complaint:    Chief Complaint   Patient presents with    Shortness of Breath     Pt had ablation on Tuesday, pt reports short of breath still, worse today  at times chest pain. Nursing Notes, Past Medical Hx, Past Surgical Hx, Social Hx, Allergies, and Family Hx were all reviewed and agreed with or any disagreements were addressed in the HPI.    HPI: (Location, Duration, Timing, Severity, Quality, Assoc Sx, Context, Modifying factors)    Chief Complaint of shortness of breath. This is a  58 y.o. male who presents via private vehicle complaining of shortness of breath. The patient states he had an ablation on Tuesday. He states that initially the ablation was scheduled for 12/5 however there was a complication due to pericardial effusion and they were and were unable to complete the procedure therefore he had an ablation on the 27th. He states since the procedure he has continued to have shortness of breath and chest discomfort. He is also complaining of back pain but states he thinks that that is from the 10-hour procedure of laying on his back. He states last night the symptoms had significantly improved however today they returned which is why he came into the emergency department today.     PastMedical/Surgical History:      Diagnosis Date    A-fib [facility])     ADHD (attention deficit hyperactivity disorder)     Carpal tunnel syndrome 1/21/2015    Chronic low back pain     Chronic neck pain     Chronic sinusitis     Dr. Jefe jamil present     lower    Epigastric hernia     Esophageal spasm     GERD (gastroesophageal reflux disease)     Hyperlipidemia     Hypertrophic cardiomyopathy ([facility] [address]     IHSS (idiopathic hypertrophic subaortic stenosis) ([facility] [address]     Kidney stones     HUNTER on CPAP     Dr. Jordy Wilson- A-PAP    Primary localized osteoarthrosis, shoulder region 10/18/2013    Prostate cancer [facility])     prostate ; s/p radiation treatment    PVC's (premature ventricular contractions)     RBBB (right bundle branch block)     RLS (restless legs syndrome)     Dr. Jordy Wilson    Seasonal allergies     Tachycardia          Procedure Laterality Date    CARDIAC ELECTROPHYSIOLOGY STUDY AND ABLATION      CARPAL TUNNEL RELEASE Left     COLONOSCOPY  01/17/2011    COLONOSCOPY N/A 02/02/2020    COLONOSCOPY WITH BIOPSY performed by Briseyda Bishop MD at [facility] 20 2015    left    ENDOSCOPY, COLON, DIAGNOSTIC      INGUINAL HERNIA REPAIR Bilateral 2009, 2012,    INSERTABLE CARDIAC MONITOR Left 12/11/2020    INSERTABLE CARDIAC MONITOR  12/11/2020    Loop Implant    KNEE ARTHROSCOPY Right 1982    Right    KNEE ARTHROSCOPY Left 12/21/2017    KNEE ARTHROSCOPY Left 07/17/2020    VIDEO ARTHROSCOPY LEFT KNEE, CHONDROPLASTY, PLICA EXCISION, PARTIAL MEDIAL LATERAL MENISECTOMY -SLEEP APNEA- performed by Kev Borden MD at [address] [place] [address] [facility] ARTHROSCOPY Right 12/31/2013    VIDEO ARTHROSCOPY RIGHT SHOULDER, SUBACROMIAL DECOMPRESSION, DISTAL CLAVICLE RESECTION, ROTATOR CUFF DEBRIDEMENT          TURP  3995    X 2    UMBILICAL HERNIA REPAIR      X 2    UPPER GASTROINTESTINAL ENDOSCOPY  09/2003    VARICOCELECTOMY  2000       Medications:  Previous Medications    AMIODARONE (CORDARONE) 200 MG TABLET    Take amiodarone 200 mg PO BID x 7 days then 200 mg PO daily    APIXABAN (ELIQUIS) 5 MG TABS TABLET    TAKE 1 TABLET BY MOUTH TWICE DAILY    B COMPLEX VITAMINS CAPSULE    Take 1 capsule by mouth daily    CLONAZEPAM (KLONOPIN) 1 MG TABLET    TAKE 1 TABLET BY MOUTH EVERY NIGHT AS NEEDED    CYANOCOBALAMIN (B-12 PO)    Take by mouth    PANTOPRAZOLE (PROTONIX) 40 MG TABLET    Take 1 tablet by mouth 2 times daily (before meals)    PRAVASTATIN (PRAVACHOL) 20 MG TABLET    Take 1 tablet by mouth every evening    SUCRALFATE (CARAFATE) 1 GM TABLET    Take 1 tablet by mouth every 8 hours    TAMSULOSIN (FLOMAX) 0.4 MG CAPSULE    Take 0.4 mg by mouth daily    VERAPAMIL (VERELAN) 120 MG EXTENDED RELEASE CAPSULE    TAKE 1 CAPSULE BY MOUTH EVERY NIGHT         Review of Systems:  (2-9 systems needed)  Review of Systems   Constitutional:  Negative for chills and fever. HENT:  Negative for congestion, nosebleeds and sore throat. Eyes:  Negative for visual disturbance. Respiratory:  Positive for chest tightness and shortness of breath. Negative for cough. Cardiovascular:  Negative for chest pain and palpitations. Gastrointestinal:  Negative for abdominal pain, diarrhea, nausea and vomiting. Genitourinary:  Negative for dysuria, frequency, hematuria and urgency. Musculoskeletal:  Negative for back pain and neck pain. Skin:  Negative for rash. Neurological:  Negative for dizziness, weakness, light-headedness and headaches. \"Positives and Pertinent negatives as per HPI\"    Physical Exam:  Physical Exam  Vitals and nursing note reviewed. Constitutional:       Appearance: Normal appearance. He is not diaphoretic. HENT:      Head: Normocephalic and atraumatic. Nose: Nose normal.      Mouth/Throat:      Mouth: Mucous membranes are moist.   Eyes:      General:         Right eye: No discharge. Left eye: No discharge. Extraocular Movements: Extraocular movements intact. Pupils: Pupils are equal, round, and reactive to light. Cardiovascular:      Rate and Rhythm: Normal rate and regular rhythm. Pulses: Normal pulses. Heart sounds: Normal heart sounds. No murmur heard. No friction rub. No gallop. Pulmonary:      Effort: Pulmonary effort is normal. No respiratory distress. Breath sounds: Normal breath sounds. No stridor. No wheezing, rhonchi or rales.    Abdominal:      General: Abdomen is flat. Palpations: Abdomen is soft. Tenderness: There is no abdominal tenderness. There is no guarding or rebound. Musculoskeletal:         General: Normal range of motion. Cervical back: Normal range of motion and neck supple. Skin:     General: Skin is warm and dry. Coloration: Skin is not pale. Neurological:      Mental Status: He is alert and oriented to person, place, and time.    Psychiatric:         Mood and Affect: Mood normal.         Behavior: Behavior normal.       MEDICAL DECISION MAKING    Vitals:    Vitals:    12/30/22 1953 12/30/22 2051 12/30/22 2148 12/30/22 2153   BP: 133/86 131/70 (!) 138/94    Pulse: 74 79  82   Resp: 23      Temp:       TempSrc:       SpO2: 97% 98%  94%   Weight:       Height:           LABS:  Labs Reviewed   CBC WITH AUTO DIFFERENTIAL - Abnormal; Notable for the following components:       Result Value    Hemoglobin 12.2 (*)     Hematocrit 36.7 (*)     Lymphocytes Absolute 0.9 (*)     All other components within normal limits   COMPREHENSIVE METABOLIC PANEL W/ REFLEX TO MG FOR LOW K - Abnormal; Notable for the following components:    Glucose 128 (*)     All other components within normal limits    Narrative:     Dylan Su tel. [phone],  Chemistry results called to and read back by PENELOPE Villagomez, 12/30/2022  18:50, by HUONG   TROPONIN - Abnormal; Notable for the following components:    Troponin 0.71 (*)     All other components within normal limits    Narrative:     Dylan Su tel. [phone],  Chemistry results called to and read back by PENELOPE Villagomez, 12/30/2022  18:50, by Nicky Mosquera - Abnormal; Notable for the following components:    Magnesium 1.70 (*)     All other components within normal limits    Narrative:     Dylan Su tel. [phone],  Chemistry results called to and read back by PENELOPE Villagomez, 12/30/2022  18:50, by Bib Glass - Abnormal; Notable for the following components:    Pro-BNP 5,555 (*)     All other components within normal limits    Narrative:     Dean Urias tel. [phone],  Chemistry results called to and read back by PENELOPE Love, 12/30/2022  18:50, by [address] - Abnormal; Notable for the following components:    CRP 32.1 (*)     All other components within normal limits    Narrative:     Dean Urias tel. [phone],  Chemistry results called to and read back by PENELOPE Love, 12/30/2022  18:50, by HUONG   TROPONIN - Abnormal; Notable for the following components:    Troponin 0.66 (*)     All other components within normal limits    Narrative:     Dean Urias tel. [phone],  Chemistry results called to and read back by Ton Gandhi RN, 12/30/2022  22:09, by Nury Giraldo of labs reviewed and were negative at this time or not returned at the time of this note. RADIOLOGY:   Non-plain film images such as CT, Ultrasound and MRI are read by the radiologist. Graig Fleischer, PA have directly visualized the radiologic plain film image(s) with the below findings:      Interpretation per the Radiologist below, if available at the time of this note:    CT CHEST PULMONARY EMBOLISM W CONTRAST   Preliminary Result   1. Acute small segmental pulmonary embolus in the left lower lobe. 2. Findings of pulmonary edema with interlobular septal thickening and small   pleural effusions. Findings were discussed with Wolf Romero at 9:34 pm on 12/30/2022. XR CHEST PORTABLE   Final Result   Stable cardiomegaly with mild central pulmonary congestion or pulmonary   artery hypertension which is more prominent. Hazy perihilar and bibasilar atelectasis vs small infiltrates or scarring   which is less prominent. XR CHEST PORTABLE    Result Date: 12/30/2022  EXAMINATION: ONE XRAY VIEW OF THE CHEST 12/30/2022 6:08 pm COMPARISON: None.  HISTORY: ORDERING SYSTEM PROVIDED HISTORY: chest pain TECHNOLOGIST PROVIDED HISTORY: Reason for exam:->chest pain Reason for Exam: cp FINDINGS: The heart is mildly enlarged. The pulmonary vessels are engorged centrally and more prominent. There are hazy perihilar and bibasilar interstitial and ground-glass opacities which is less prominent. No effusion is seen. Stable cardiomegaly with mild central pulmonary congestion or pulmonary artery hypertension which is more prominent. Hazy perihilar and bibasilar atelectasis vs small infiltrates or scarring which is less prominent. XR CHEST PORTABLE    Result Date: 12/7/2022  EXAMINATION: ONE XRAY VIEW OF THE CHEST 12/7/2022 11:49 am COMPARISON: 11/21/2022 and 03/06/2021 HISTORY: ORDERING SYSTEM PROVIDED HISTORY: SOB TECHNOLOGIST PROVIDED HISTORY: Reason for exam:->SOB Reason for Exam: sob FINDINGS: Cardiac silhouette appears borderline enlarged. Low lung volumes. Question mild central pulmonary vascular congestion. No parenchymal consolidation. No effusion or pneumothorax. No aggressive osseous lesion. Question of mild central pulmonary vascular congestion. No consolidation. CT CHEST PULMONARY EMBOLISM W CONTRAST    1. Acute small segmental pulmonary embolus in the left lower lobe. 2. Findings of pulmonary edema with interlobular septal thickening and small pleural effusions. Findings were discussed with Ruba Echols at 9:34 pm on 12/30/2022. No results found. MEDICAL DECISION MAKING / ED COURSE:      Is this patient to be included in the SEP-1 Core Measure due to severe sepsis or septic shock?    No   Exclusion criteria - the patient is NOT to be included for SEP-1 Core Measure due to:  2+ SIRS criteria are not met      PROCEDURES:   Procedures    None    Patient was given:  Medications   iopamidol (ISOVUE-370) 76 % injection 75 mL (75 mLs IntraVENous Given 12/30/22 2050)   furosemide (LASIX) injection 40 mg (40 mg IntraVENous Given 12/30/22 2148)         Per chart review the patient initially was scheduled to have the ablation on 12/5 for A. fib and a flutter. The procedure was complicated by pericardial effusion with tamponade, emergent pericardiocentesis performed with approximately 550 mL of blood aspirated. They were still able to perform the ablation. On 12/6 an echo was performed which showed pericardial effusion. He was given 20 mg of IV Lasix. The patient then underwent cardiac ablation on 12/27. He had an echo which showed trivial pericardial effusion. He was ultimately discharged home and advised to continue Eliquis, amiodarone, pantoprazole, Carafate. I received a call from [facility] radiology 9:34 PM reporting that the patient had findings of pulmonary edema with interlobular septal thickening and small pleural effusions. Also noted to have acute small segmental pulmonary embolus in the left lower lobe. Remainder of lab work is notable for elevated BNP at 5555 and elevated CRP at 32.1. ESR is normal.  The patient was given 40 mg of IV Lasix in the emergency department. He is anticoagulated on Eliquis 5 mg twice daily therefore I will defer to cardiology for heparinn recommendations. Cardiology is consulted again to discuss any findings. I spoke with Dr. Daisy Mckinney who did not recommend additional anticoagulation, she recommended continuing the patient's Eliquis twice daily. She did agree with admission to hospitalist for further evaluation and treatment. Agreed with Lasix. We will plan to admit the patient to hospitalist for further evaluation and treatment. The patient tolerated their visit well. I evaluated the patient. The physician was available for consultation as needed. The patient and / or the family were informed of the results of any tests, a time was given to answer questions, a plan was proposed and they agreed with plan. I am the Primary Clinician of Record. CLINICAL IMPRESSION:  1.  Acute pulmonary edema (HCC)    2. Other acute pulmonary embolism without acute cor pulmonale (HCC)    3. Elevated troponin      Results for orders placed or performed during the hospital encounter of 12/30/22   CBC with Auto Differential   Result Value Ref Range    WBC 5.3 4.0 - 11.0 K/uL    RBC 4.20 4. 20 - 5.90 M/uL    Hemoglobin 12.2 (L) 13.5 - 17.5 g/dL    Hematocrit 36.7 (L) 40.5 - 52.5 %    MCV 87.3 80.0 - 100.0 fL    MCH 29.0 26.0 - 34.0 pg    MCHC 33.2 31.0 - 36.0 g/dL    RDW 14.9 12.4 - 15.4 %    Platelets 087 565 - 328 K/uL    MPV 7.5 5.0 - 10.5 fL    Neutrophils % 64.9 %    Lymphocytes % 17.9 %    Monocytes % 13.1 %    Eosinophils % 3.2 %    Basophils % 0.9 %    Neutrophils Absolute 3.4 1.7 - 7.7 K/uL    Lymphocytes Absolute 0.9 (L) 1.0 - 5.1 K/uL    Monocytes Absolute 0.7 0.0 - 1.3 K/uL    Eosinophils Absolute 0.2 0.0 - 0.6 K/uL    Basophils Absolute 0.0 0.0 - 0.2 K/uL   CMP w/ Reflex to MG   Result Value Ref Range    Sodium 139 136 - 145 mmol/L    Potassium reflex Magnesium 3.5 3.5 - 5.1 mmol/L    Chloride 104 99 - 110 mmol/L    CO2 27 21 - 32 mmol/L    Anion Gap 8 3 - 16    Glucose 128 (H) 70 - 99 mg/dL    BUN 19 7 - 20 mg/dL    Creatinine 1.0 0.8 - 1.3 mg/dL    Est, Glom Filt Rate >60 >60    Calcium 9.1 8.3 - 10.6 mg/dL    Total Protein 6.4 6.4 - 8.2 g/dL    Albumin 3.8 3.4 - 5.0 g/dL    Albumin/Globulin Ratio 1.5 1.1 - 2.2    Total Bilirubin 0.8 0.0 - 1.0 mg/dL    Alkaline Phosphatase 85 40 - 129 U/L    ALT 36 10 - 40 U/L    AST 32 15 - 37 U/L   Troponin   Result Value Ref Range    Troponin 0.71 (HH) <0.01 ng/mL   Magnesium   Result Value Ref Range    Magnesium 1.70 (L) 1.80 - 2.40 mg/dL   Brain Natriuretic Peptide   Result Value Ref Range    Pro-BNP 5,555 (H) 0 - 124 pg/mL   Sedimentation Rate   Result Value Ref Range    Sed Rate 14 0 - 20 mm/Hr   C-Reactive Protein   Result Value Ref Range    CRP 32.1 (H) 0.0 - 5.1 mg/L   Troponin   Result Value Ref Range    Troponin 0.66 (HH) <0.01 ng/mL       I spoke with Dr. Liya Dill HOSPITALIST.  We discussed the history, physical exam, diagnostics, as well as their emergency department course. Based upon that discussion, we've decided to admit Esme Jensens. for further observation and evaluation of Michael Hanson Jr.'s   1. Acute pulmonary edema (HCC)    2. Other acute pulmonary embolism without acute cor pulmonale (HCC)    3. Elevated troponin    . DISPOSITION Decision To Admit 12/30/2022 10:19:07 PM      PATIENT REFERRED TO:  No follow-up provider specified.     DISCHARGE MEDICATIONS:  New Prescriptions    No medications on file       DISCONTINUED MEDICATIONS:  Discontinued Medications    No medications on file              (Please note the MDM and HPI sections of this note were completed with a voice recognition program.  Efforts were made to edit the dictations but occasionally words are mis-transcribed.)    Electronically signed, Elio Santamaria, [id number],           Elio Santamaria, [id number]  12/31/22 [id number]

## 2022-12-31 NOTE — PROGRESS NOTES
Walked patient around the unit on room air. Oxygen level stayed between 94-97%. Heart rate was between 92-97/min. Patient states no lightheadedness, dizziness, but had slight shortness of breath. Tolerated well. SUZANNE hose placed per verbal order of Dr. Maury Correia. Will continue to monitor.

## 2022-12-31 NOTE — PLAN OF CARE
Problem: Safety - Adult  Goal: Free from fall injury  12/31/2022 1015 by Margarito Gonzalez RN  Outcome: Progressing   Encouraged to call for assistance before getting out of bed, non skid slipper socks on. Problem: Pain  Goal: Verbalizes/displays adequate comfort level or baseline comfort level  Outcome: Progressing   Monitor for pain. Currently no pain.

## 2023-01-03 ENCOUNTER — CARE COORDINATION (OUTPATIENT)
Dept: CASE MANAGEMENT | Age: 63
End: 2023-01-03

## 2023-01-03 ENCOUNTER — TELEPHONE (OUTPATIENT)
Dept: CARDIOLOGY CLINIC | Age: 63
End: 2023-01-03

## 2023-01-03 DIAGNOSIS — Z86.79 STATUS POST ABLATION OF ATRIAL FIBRILLATION: ICD-10-CM

## 2023-01-03 DIAGNOSIS — Z98.890 STATUS POST ABLATION OF ATRIAL FIBRILLATION: ICD-10-CM

## 2023-01-03 DIAGNOSIS — R06.02 SOB (SHORTNESS OF BREATH): ICD-10-CM

## 2023-01-03 DIAGNOSIS — J81.0 ACUTE PULMONARY EDEMA (HCC): Primary | ICD-10-CM

## 2023-01-03 RX ORDER — FUROSEMIDE 40 MG/1
40 TABLET ORAL DAILY
Qty: 5 TABLET | Refills: 0 | Status: SHIPPED | OUTPATIENT
Start: 2023-01-03 | End: 2023-01-08

## 2023-01-03 NOTE — TELEPHONE ENCOUNTER
Pt went to ED as instructed, he was told to make appt to see KXA ASAP. Pt still having SOB, along with A-fib. No openings, should pt be over booked. Please advise.

## 2023-01-03 NOTE — TELEPHONE ENCOUNTER
I called the patient to discuss symptoms after reviewing most recent hospitalization. He reports when he got home from the hospital he realized he had not added Eliquis back to his pill box. He is now taking this as ordered twice a day. He reports feeling like he wants to cough all the time. When he lays on his left/right side at night the dependent side has a burning sensation. When he stands, he feels a burning sensation across his chest and it feels tight. No other associated symptoms. He has unchanged shortness of breath from the hospitalization. In the hospital, the shortness of breath would briefly resolve after he receivied a dose of lasix, then it would return. He reports intermittent episodes of AF with rates in the low 100's. His weight was 197 lbs this morning. He does not have a way to check his O2 or BP. We reviewed his medication list. Will plan to start furosemide 40 mg PO daily for 5 days. Repeat BMP in 1 week to look at kidney function. Recommend he purchase a pulse oximeter to monitor oxygen saturation at home. We reviewed importance of daily standing weights and significant weight gain. We discussed signs and symptoms and when he should present to the ED. All questions answered. He voiced understanding. He has an appointment scheduled with Dr. Denise Nyhan on 1/09/202.       PARI Bui-MATT [facility]   [phone]

## 2023-01-03 NOTE — TELEPHONE ENCOUNTER
Please advise on worsening shortness of breath. The patient was evaluated in the hospital 12/30/2022. Post ablation on 12/27/2022. Nicki Gan is OOT. We will wait to cancel to 01/30/2022 appointment until the patient is seen in office.

## 2023-01-03 NOTE — CARE COORDINATION
[facility] Care Transitions Initial Follow Up Call    Call within 2 business days of discharge: Yes    Care Transition Nurse contacted the patient by telephone to perform post hospital discharge assessment. Verified name and  with patient as identifiers. Provided introduction to self, and explanation of the Care Transition Nurse role. Patient: Daisy Silva. Patient : 1960   MRN: [id number]  Reason for Admission: acute pulmonary edema, afib s/p ablation  Discharge Date: 22 RARS: Readmission Risk Score: 12.9      Last Discharge  Street       Date Complaint Diagnosis Description Type Department Provider    22 Shortness of Breath Acute pulmonary edema ([facility] [address] . .. ED to Hosp-Admission (Discharged) ([facility]) Rigoberto Calderón MD; [facility]. .. Was this an external facility discharge? No Discharge Facility:     Challenges to be reviewed by the provider   Additional needs identified to be addressed with provider: yes  Patient continues to have shortness of breath and placed call to Dr Rudy Vaz office this morning. Method of communication with provider: chart routing. Patient answered call and verified . Patient pleasant and agreeable to transition call. Stated that he continues to have shortness of breath and placed call to cardiologist in regards to follow up visit or further treatment options. Patient has AVS and aware of medication regimen. Declined medication reconciliation with CTN. Patient asking if CTN would be able to see if he left his brown corduroy slippers. CTN called [facility] and patient did leave his slippers and are current at the [facility] nurses station. CTN notified patient that slippers were at the nurses station and appreciative of assistance in finding them. Denies any acute needs at present time. Agreeable to f/u calls. Educated on the use of urgent care or physicians 24 hr access line if assistance is needed after hours.       Care Transition Nurse reviewed discharge instructions with patient who verbalized understanding. The patient was given an opportunity to ask questions and does not have any further questions or concerns at this time. Were discharge instructions available to patient? Yes. Reviewed appropriate site of care based on symptoms and resources available to patient including: PCP  Specialist. The patient agrees to contact the PCP office for questions related to their healthcare. Advance Care Planning:   Does patient have an Advance Directive: not on file. Medication reconciliation was not performed with patient, who verbalizes understanding of administration of home medications. Medications reviewed, 1111F entered: no    Was patient discharged with a pulse oximeter? no    Non-face-to-face services provided:  Obtained and reviewed discharge summary and/or continuity of care documents    Offered patient enrollment in the Remote Patient Monitoring (RPM) program for in-home monitoring: Patient is not eligible for RPM program.    Care Transitions 24 Hour Call    Do you have all of your prescriptions and are they filled?: Yes  Care Transitions Interventions         Follow Up  Future Appointments   Date Time Provider Tamika Romero   1/25/2023  7:20 AM SCHEDULE, [facility] [address] [facility]   1/30/2023 11:45 AM SCHEDULE, [facility]   1/30/2023 11:45 AM MD Debbi Chris [facility]   2/27/2023  9:00 AM Daisy Sebastian MD [address] [place] [address] Transition Nurse provided contact information. Plan for follow-up call in 3-5 days based on severity of symptoms and risk factors.   Plan for next call: self management-was patient able to get follow up visit scheduled    Lupe Reyes RN

## 2023-01-03 NOTE — TELEPHONE ENCOUNTER
Dotty Pablo MD  You 4 days ago     Kristian Pendleton  I agree, an ED evaluation would be most appropriate

## 2023-01-03 NOTE — TELEPHONE ENCOUNTER
01/03 Called and spoke to pt, pt has been scheduled for approved overbook date and time. Should 01/30 appt be cancelled? Pt also wondering is there something he should be doing in the meantime while he waits to be seen, still struggling extremely with sob. Stated sometimes he feels like he is breathing normally and other times he is fighting to breathe and fighting off a cough. Please advise.

## 2023-01-04 RX ORDER — CLONAZEPAM 1 MG/1
TABLET ORAL
Qty: 30 TABLET | Refills: 2 | Status: SHIPPED | OUTPATIENT
Start: 2023-01-04 | End: 2023-01-28

## 2023-01-05 LAB — POC ACT LR: 167 SEC

## 2023-01-06 ENCOUNTER — CARE COORDINATION (OUTPATIENT)
Dept: CASE MANAGEMENT | Age: 63
End: 2023-01-06

## 2023-01-06 NOTE — CARE COORDINATION
[facility] Care Transitions Follow Up Call    Care Transition Nurse contacted the patient by telephone. Verified name and  with patient as identifiers. Patient: Lele Francis Patient : 1960   MRN: [id number]  Reason for Admission: acute pulmonary edema, afib s/p ablation  Discharge Date: 22 RARS: Readmission Risk Score: 12.9      Needs to be reviewed by the provider   Additional needs identified to be addressed with provider: No  none             Method of communication with provider: none. CTN spoke with patient who reported he is doing better. Patient reported his breathing is stable and pain is less upon lying on his side. Patient has apt with cardio on 23. Denies any acute needs at present time. Agreeable to f/u calls. Educated on the use of urgent care or physicians 24 hr access line if assistance is needed after hours. Addressed changes since last contact:  none  Discussed follow-up appointments. If no appointment was previously scheduled, appointment scheduling offered: Yes. Is follow up appointment scheduled within 7 days of discharge? Yes. Follow Up  Future Appointments   Date Time Provider Tamika Romero   2023  9:15 AM SCHEDULE, MURALI OLIVO [facility] Sujey Ayers [facility]   2023  9:15 AM MD Sujey Cruz [facility]   2023  7:20 AM SCHEDULE, Gilbert Stevens [facility]   2023 11:45 AM SCHEDULE, [facility] Quinn [facility]   2023 11:45 AM MD Sujey Cruz [facility]   2023  9:00 AM MD Gilbert Hernandez  [place] - [facility]     [facility] follow up appointment(s):     Care Transition Nurse reviewed medical action plan with patient and discussed any barriers to care and/or understanding of plan of care after discharge. Discussed appropriate site of care based on symptoms and resources available to patient including: PCP  Specialist  When to call 911.  The patient agrees to contact the PCP office for questions related to their healthcare. Advance Care Planning:   not on file. Patients top risk factors for readmission: medical condition-. Interventions to address risk factors: Education of patient/family/caregiver/guardian to support self-management-. Offered patient enrollment in the Remote Patient Monitoring (RPM) program for in-home monitoring: Patient is not eligible for RPM program.     Care Transitions Subsequent and Final Call    Subsequent and Final Calls  Do you have any ongoing symptoms?: No  Have your medications changed?: No  Do you have any questions related to your medications?: No  Do you currently have any active services?: No  Are you currently active with any services?: Outpatient/Community Services  Do you have any needs or concerns that I can assist you with?: No  Identified Barriers: None  Care Transitions Interventions  Other Interventions:             Care Transition Nurse provided contact information for future needs. Plan for follow-up call in 5-7 days based on severity of symptoms and risk factors.   Plan for next call: self management-cardio apt   breathing     Fabricio WILCOXN, RN, [facility]  Care Transition Nurse  [phone] mobile

## 2023-01-09 ENCOUNTER — NURSE ONLY (OUTPATIENT)
Dept: CARDIOLOGY CLINIC | Age: 63
End: 2023-01-09

## 2023-01-09 ENCOUNTER — OFFICE VISIT (OUTPATIENT)
Dept: CARDIOLOGY CLINIC | Age: 63
End: 2023-01-09
Payer: COMMERCIAL

## 2023-01-09 VITALS
DIASTOLIC BLOOD PRESSURE: 74 MMHG | BODY MASS INDEX: 28.14 KG/M2 | WEIGHT: 201 LBS | HEART RATE: 63 BPM | HEIGHT: 71 IN | OXYGEN SATURATION: 98 % | SYSTOLIC BLOOD PRESSURE: 118 MMHG

## 2023-01-09 DIAGNOSIS — I48.0 PAF (PAROXYSMAL ATRIAL FIBRILLATION) (HCC): Primary | ICD-10-CM

## 2023-01-09 DIAGNOSIS — Z86.79 STATUS POST ABLATION OF ATRIAL FLUTTER: ICD-10-CM

## 2023-01-09 DIAGNOSIS — Z98.890 STATUS POST ABLATION OF ATRIAL FIBRILLATION: ICD-10-CM

## 2023-01-09 DIAGNOSIS — I26.99 ACUTE PULMONARY EMBOLISM, UNSPECIFIED PULMONARY EMBOLISM TYPE, UNSPECIFIED WHETHER ACUTE COR PULMONALE PRESENT (HCC): ICD-10-CM

## 2023-01-09 DIAGNOSIS — I48.3 TYPICAL ATRIAL FLUTTER (HCC): ICD-10-CM

## 2023-01-09 DIAGNOSIS — Z45.09 ENCOUNTER FOR LOOP RECORDER CHECK: ICD-10-CM

## 2023-01-09 DIAGNOSIS — Z98.890 STATUS POST ABLATION OF ATRIAL FLUTTER: ICD-10-CM

## 2023-01-09 DIAGNOSIS — I31.4 PERICARDIAL EFFUSION WITH CARDIAC TAMPONADE: ICD-10-CM

## 2023-01-09 DIAGNOSIS — I49.3 PVC (PREMATURE VENTRICULAR CONTRACTION): ICD-10-CM

## 2023-01-09 DIAGNOSIS — I31.39 PERICARDIAL EFFUSION WITH CARDIAC TAMPONADE: ICD-10-CM

## 2023-01-09 DIAGNOSIS — Z86.79 STATUS POST ABLATION OF ATRIAL FIBRILLATION: ICD-10-CM

## 2023-01-09 PROBLEM — I48.92 ATRIAL FLUTTER (HCC): Status: ACTIVE | Noted: 2023-01-09

## 2023-01-09 PROCEDURE — 93000 ELECTROCARDIOGRAM COMPLETE: CPT | Performed by: INTERNAL MEDICINE

## 2023-01-09 PROCEDURE — 99214 OFFICE O/P EST MOD 30 MIN: CPT | Performed by: INTERNAL MEDICINE

## 2023-01-09 ASSESSMENT — ENCOUNTER SYMPTOMS
LEFT EYE: 0
SHORTNESS OF BREATH: 0
STRIDOR: 0
HEMATEMESIS: 0
HEMATOCHEZIA: 0
WHEEZING: 0
RIGHT EYE: 0

## 2023-01-09 NOTE — PROGRESS NOTES
Patient comes in for interrogation of their implanted loop recorder. Patient has a history of pAF and PVCs. Takes verapamil, amiodarone, and Eliquis. Patient's last device interrogation was on 12/21. Since last interrogation, AT/AF burden measures 87.8%. Patient will see Dr. Erinn Bowens today in clinic. See Paceart report under the Cardiology tab. We will follow the patient remotely.

## 2023-01-09 NOTE — PATIENT INSTRUCTIONS
Plan:     Continue taking current cardiac medications as prescribed  Please call our office with any postoperative issues or questions  Okay to stop Carafate in 1 week   Okay to stop Protonix one month post operatively   Follow up with me in 1 month

## 2023-01-09 NOTE — PROGRESS NOTES
Assessment:     1. Atrial fibrillation: patient has Persistent atrial fibrillation. Associated symptoms: Dizziness, Dyspnea on exertion, Fatigue, and Palpitations     History of cardioversion: Had electrical cardioversion in the past (multiple)  History of AF ablation: Had atrial fibrillation ablation in the past (December 2022)  History of heart surgery/procedure: yes, cardioversion    Current use of anti-arrhythmic drugs: amiodarone  Previous use of anti-arrhythmic drugs: amiodarone and dofetilide    Overall LV function: Normal left ventricular systolic function  Size of left atrium: Severely dilated LA size  Significant cardiac valvular disease: No significant valve disease  Family history of atrial fibrillation: Unknown    Alcohol consumption: Mild alcohol intake  Caffeine consumption: Mild caffeine intake  Smoking status: non-smoker  Obstructive sleep apnea: No/low suspicion  Exercise status: Occasional exercise, not routine    Patient has hypertrophic cardiomyopathy. Stroke risk is elevated  Longterm anticoagulation is: recommended  Current anticoagulation: Apixaban  Bleeding issues reported: no  Renal function: Normal    In setting of HCM, and development of persistent atrial fibrillation, being quite symptomatic and having failed multiple anti-arrhythmic drug therapy options (most recently dofetilide), it became clear that the option of AF ablation should be considered. After discussions with patient, we proceeded with attempted AF ablation in early December 2022. Procedure was complicated by pericardial effusion requiring emergent pericardiocentesis. We only managed ablation of the cavo-tricuspid isthmus during that procedure. Patient came back for a second attempt on 12/28/2022. We performed pulmonary vein isolation with roof line and posterior wall ablation. There were no acute complications.  Since then, the patient's ILR shows episodes of AF and today ECG shows atypical atrial flutter with rate controlled. In sinus rhythm intermittently. He is on amiodarone 200 mg daily. Will continue to monitor during blanking period. No logic to pursue cardioversion at this time. He also presented with some shortness of breath after the second ablation and noted to have an acute small pulmonary embolus (unclear source). He has since done well and has no significant shortness of breath. No swallowing problems. No bleeding issues. Continue amiodarone 200 mg daily and other medications (including verapamil and anticoagulation). Will re-evaluate in 4 weeks (earlier if needed). 2. Good blood pressure control today. 3. Hypertrophic, non-obstructive cardiomyopathy: prior cardiac MRI indicating \"late gadolinium enhancement imaging demonstrates areas of heterogeneous hyperenhancement predominantly throughout the basal and mid anterior and septal myocardial segments\". Warrants close monitoring. No personal history of syncope. No noted ventricular arrhythmias. Plan:     Continue taking current cardiac medications as prescribed  Please call our office with any postoperative issues or questions  Okay to stop Carafate in 1 week   Okay to stop Protonix one month post operatively   Follow up with me in 1 month    Subjective:       Patient ID: Kely Cortez. is a 58 y.o. male. Chief Complaint:  Chief Complaint   Patient presents with    Follow-up    Device Check    Atrial Fibrillation     Occasionally can feel when in Afib - wears smart watch         HPI    Patient is a pleasant 58 y.o. male with a PMH significant for PAF, HCM without LVOT gradient. PVC's. An ILR was placed in 12/2021 for ongoing palpitations. Patient was hospitalized on 10/31/2022 for Tikosyn. Patient underwent DCCV on 11/3/2022 and converted back to AF after one minute. Office Visit (11/7/2022):  Presents today to discuss ablation option for his atrial fibrillation. He reports that he thinks he has been in AF for a few months now.  He is fatigued, lightheaded, and has palpitations. He plays baseball and long distance runs and has been unable to do so recently. He is taking his medications as prescribed. Denies recent issues with bleeding or bruising. Patient denies current edema, chest pain, shortness of breath, or syncope. Interval History since last office visit: On 12/05/2022, he underwent RRFCA for typical atrial flutter. Procedure was complicated by pericardial effusion with tamponade physiology. Emergent pericardiocentesis performed with approximately 550 mL blood aspirated. His limited echocardiogram on 12/06/2022 showed no obvious evidence of pericardial effusion. On 12/27/2022, he underwent RFCA for AF. Amiodarone was resumed postoperatively. On 12/30/2022 he presented to the emergency department with complaints of shortness of breath. His chest CT revealed pulmonary edema and acute PE. Lasix and Eliquis were continued. He was discharged with the intent to start Verapamil on 01/04/2023. Office Visit ([address], 01/09/2023): Today he presents for follow up. He states that his shortness of breath has improved. He states that he noticed that he was urinating frequently and slowly noticed an improvement in his breathing. He denies shortness of breath in the last 24 hours. He reports that he experiences orthopnea and a burning sensation in his lungs when laying flat. He does report that he may have missed a few doses of his Eliquis postoperatively. He stated that he did experience postoperative pain while in the hospital. He reports that his groin sites have healed well. He reports that he is eating well. He denies burning while swallowing. He states that he does occasionally feel like food is getting \"stuck\" in his throat, but this is not outside of his normal swallowing. Patient denies current edema, chest pain, dizziness or syncope. He denies any recent issues with bleeding or bruising.  Patient is taking all cardiac medications as prescribed and tolerates them well. Review of Systems  Review of Systems   Constitutional: Negative for malaise/fatigue, weight gain and weight loss. HENT:  Negative for nosebleeds and stridor. Eyes:  Negative for vision loss in left eye and vision loss in right eye. Cardiovascular:  Negative for chest pain, dyspnea on exertion, leg swelling, palpitations and syncope. Respiratory:  Negative for shortness of breath and wheezing. Hematologic/Lymphatic: Negative for bleeding problem. Bruises/bleeds easily. Skin:  Negative for itching and rash. Musculoskeletal:  Negative for joint pain and joint swelling. Gastrointestinal:  Negative for hematemesis and hematochezia. Genitourinary:  Negative for dysuria and hematuria. Neurological:  Negative for dizziness and light-headedness. Psychiatric/Behavioral:  Negative for altered mental status. The patient is not nervous/anxious.         Past Medical History:   Diagnosis Date    A-fib [facility])     ADHD (attention deficit hyperactivity disorder)     Carpal tunnel syndrome 01/21/2015    Chronic low back pain     Chronic neck pain     Chronic sinusitis     Dr. Marquita Padilla crown present     lower    Epigastric hernia     Esophageal spasm     GERD (gastroesophageal reflux disease)     Hyperlipidemia     Hypertrophic cardiomyopathy (HCC)     IHSS (idiopathic hypertrophic subaortic stenosis) (HCC)     Kidney stones     HUNTER on CPAP     Dr. Shin Benitez- A-PAP    Primary localized osteoarthrosis, shoulder region 10/18/2013    [facility]     prostate ; s/p radiation treatment    PVC's (premature ventricular contractions)     RBBB (right bundle branch block)     RLS (restless legs syndrome)     Dr. Shin Benitez    Seasonal allergies     Tachycardia          Social History     Socioeconomic History    Marital status:      Spouse name: Not on file    Number of children: Not on file    Years of education: Not on file    Highest education level: Not on file   Occupational History    Not on file   Tobacco Use    Smoking status: Never     Passive exposure: Never    Smokeless tobacco: Never   Vaping Use    Vaping Use: Never used   Substance and Sexual Activity    Alcohol use: Yes     Alcohol/week: 1.0 - 2.0 standard drink     Types: 1 Cans of beer per week     Comment: occasionally    Drug use: No    Sexual activity: Yes     Partners: Female   Other Topics Concern    Not on file   Social History Narrative    Not on file     Social Determinants of Health     Financial Resource Strain: Not on file   Food Insecurity: Not on file   Transportation Needs: Not on file   Physical Activity: Not on file   Stress: Not on file   Social Connections: Not on file   Intimate Partner Violence: Not on file   Housing Stability: Not on file         Family History   Problem Relation Age of Onset    Cancer Mother         melenoma    High Cholesterol Mother     High Blood Pressure Father     High Cholesterol Brother     Heart Disease Maternal Grandmother     Heart Disease Maternal Grandfather     Prostate Cancer Paternal Uncle          Objective:       /74   Pulse 63   Ht 5' 11\" (1.803 m)   Wt 201 lb (91.2 kg)   SpO2 98%   BMI 28.03 kg/m²     Physical Exam  Constitutional:       Appearance: Normal appearance. HENT:      Head: Normocephalic and atraumatic. Nose: Nose normal. No rhinorrhea. Eyes:      General: No scleral icterus. Conjunctiva/sclera: Conjunctivae normal.   Cardiovascular:      Rate and Rhythm: Normal rate and regular rhythm. Heart sounds: No murmur heard. Pulmonary:      Effort: Pulmonary effort is normal.      Breath sounds: Normal breath sounds. Abdominal:      General: There is no distension. Musculoskeletal:         General: Normal range of motion. Cervical back: Normal range of motion and neck supple. Skin:     General: Skin is warm and dry. Neurological:      General: No focal deficit present.       Mental Status: He is alert and oriented to person, place, and time. Psychiatric:         Mood and Affect: Mood normal.         Behavior: Behavior normal.     ECG Interpretation:  (Date: 01/09/2023)  Rhythm: Atrial flutter  Rate: 61 BPM  PAC's / PVC's present: None  Conduction abnormalities: Right bundle branch block (RBBB)          ECG Interpretation:  (Date: 11/2/2022)  Rhythm: Atrial fibrillation  Rate: 63 BPM  PAC's / PVC's present: None      Limited Echocardiogram  (Date: 12/31/2022)  Summary   Limited exam for RV function and pericardial effusion. -- Left ventricular systolic function is normal with ejection fraction   estimated at 55%. No regional wall motion abnormalities. The right ventricle   is normal in size and function. -- Systolic pulmonary artery pressure (SPAP) is estimated at 59 mmHg   consistent with moderate pulmonary hypertension (Right atrial pressure of 8   mmHg). There is a small posterior pericardial effusion noted. Echocardiogram  (Date: 10/2019)  Summary   Normal systolic function with an estimated ejection fraction of 55-60%. Moderate concentric left ventricular hypertrophy ( septal wall is more   increased in size (1.6 cm) ). No regional wall motion abnormalities are seen. Normal left ventricular diastolic filling pressure. The left atrium is severely dilated. The right atrium is mildly dilated. Mild aortic regurgitation. Mild mitral and pulmonic regurgitation. Stress Test (Date: 6/20/2022)   Summary  Normal myocardial perfusion during rest and stress with normal left  ventricular function/wall motion. The left ventricular size is mildly dilated. The estimated left ventricular function is 63%.      Current Medications     Current Outpatient Medications   Medication Sig Dispense Refill    clonazePAM (KLONOPIN) 1 MG tablet TAKE 1 TABLET BY MOUTH EVERY NIGHT AS NEEDED 30 tablet 2    furosemide (LASIX) 40 MG tablet Take 1 tablet by mouth daily for 5 days 5 tablet 0    amiodarone (CORDARONE) 200 MG tablet Take amiodarone 200 mg PO BID x 7 days then 200 mg PO daily 35 tablet 3    pantoprazole (PROTONIX) 40 MG tablet Take 1 tablet by mouth 2 times daily (before meals) 30 tablet 3    sucralfate (CARAFATE) 1 GM tablet Take 1 tablet by mouth every 8 hours 120 tablet 3    verapamil (VERELAN) 120 MG extended release capsule TAKE 1 CAPSULE BY MOUTH EVERY NIGHT 30 capsule 3    Cyanocobalamin (B-12 PO) Take by mouth      b complex vitamins capsule Take 1 capsule by mouth daily      pravastatin (PRAVACHOL) 20 MG tablet Take 1 tablet by mouth every evening 90 tablet 1    apixaban (ELIQUIS) 5 MG TABS tablet TAKE 1 TABLET BY MOUTH TWICE DAILY 180 tablet 3    tamsulosin (FLOMAX) 0.4 MG capsule Take 0.4 mg by mouth daily       No current facility-administered medications for this visit. Lab Review     Lab Results   Component Value Date/Time     12/31/2022 06:05 AM    K 3.9 12/31/2022 06:05 AM     12/31/2022 06:05 AM    CO2 27 12/31/2022 06:05 AM    BUN 18 12/31/2022 06:05 AM    CREATININE 0.9 12/31/2022 06:05 AM    GLUCOSE 159 12/31/2022 06:05 AM    GLUCOSE 83 05/22/2012 04:40 PM    CALCIUM 9.2 12/31/2022 06:05 AM        Lab Results   Component Value Date    WBC 5.3 12/30/2022    HGB 12.2 (L) 12/30/2022    HCT 36.7 (L) 12/30/2022    MCV 87.3 12/30/2022     12/30/2022       Lab Results   Component Value Date    TSHFT4 3.89 02/26/2020    TSH 2.95 12/15/2021    TSHREFLEX 2.51 04/15/2016       BNP   Date Value Ref Range Status   01/03/2014 395 (H) <100 pg/mL Final       I, Annabelle Francisco RN, am scribing for and in the presence of Dr. Yenny Yu.  01/09/23 9:40 AM   Annabelle Francisco RN

## 2023-01-13 ENCOUNTER — CARE COORDINATION (OUTPATIENT)
Dept: CASE MANAGEMENT | Age: 63
End: 2023-01-13

## 2023-01-13 NOTE — CARE COORDINATION
[facility] Care Transitions Follow Up Call    LPN Care Coordinator contacted the patient by telephone to follow up after admission on 2022. Verified name and  with patient as identifiers. Patient: Beverlie Blizzard. Patient : 1960   MRN: [id number]  Reason for Admission: acute pulmonary edema, afib s/p ablation  Discharge Date: 22 RARS: Readmission Risk Score: 12.9      Needs to be reviewed by the provider   Additional needs identified to be addressed with provider: No  none             Method of communication with provider: none. {Spoke with Beverlie Blizzard. Who reported that he is doing good. Patient reported that [place] on 2023 went well. Patient denied any more episodes of Afib as far as he knowsNo new medications ordered. Patient denied cp, sob, cough, dizziness, headache, n/v, diarrhea, abdominal pains, fever, or chills. Patient report that appetite and fluid intake is good and denied any problems with bowel or bladder. Patient reported that he is taking all medications as ordered. Patient denied any other needs at this time. Patient instructed to continue to monitor s/s, reporting any that may present to MD immediately for early intervention. Patient is agreeable to f/u calls. Addressed changes since last contact:   HFU visit  Discussed follow-up appointments. If no appointment was previously scheduled, appointment scheduling offered: Yes. Is follow up appointment scheduled within 7 days of discharge? Yes.     Follow Up  Future Appointments   Date Time Provider Tamika Romero   2023  7:20 AM SCHEDULE, Kylee MENCHACA   2023 11:15 AM SCHEDULE, MURALI [facility]   2023 11:15 AM Gamal Guillory MD [facility]   2023  9:00 AM MD Kylee Tobias     Non-[facility] follow up appointment(s): n    N Care Coordinator reviewed medical action plan with patient and discussed any barriers to care and/or understanding of plan of care after discharge. Discussed appropriate site of care based on symptoms and resources available to patient including: PCP  Specialist  When to call 911. The patient agrees to contact the PCP office for questions related to their healthcare. Advance Care Planning:   not on file. Patients top risk factors for readmission: medical condition-. Patient Active Problem List   Diagnosis    GERD (gastroesophageal reflux disease)    Hyperlipidemia    Hypertrophic cardiomyopathy (HCC)    PVC (premature ventricular contraction)    RBBB    Restless legs syndrome    Obstructive sleep apnea    Obesity    Personal history of malignant neoplasm of prostate    SBO (small bowel obstruction) (HCC)    Elevated blood sugar    PAF (paroxysmal atrial fibrillation) (HCC)    Current use of long term anticoagulation    History of GI bleed    Tremor    medtronic LINQ 12/11/2020 Dr Florentin Bledsoe    Atrial fibrillation [facility])    Atrial fibrillation, unspecified type [facility])    Status post ablation of atrial flutter    Status post ablation of atrial fibrillation    SOB (shortness of breath)    Pericardial effusion with cardiac tamponade 12/2022    Pulmonary edema    Acute pulmonary embolism (HCC)    Elevated troponin    Atrial flutter ([address]       Interventions to address risk factors: Assessment and support for treatment adherence and medication management-.     Offered patient enrollment in the Remote Patient Monitoring (RPM) program for in-home monitoring: Patient is not eligible for RPM program.     Care Transitions Subsequent and Final Call    Subsequent and Final Calls  Do you have any ongoing symptoms?: No  Have your medications changed?: No  Do you have any questions related to your medications?: No  Do you currently have any active services?: No  Are you currently active with any services?: Outpatient/Community Services  Do you have any needs or concerns that I can assist you with?: No  Identified Barriers: None  Care Transitions Interventions  No Identified Needs  Other Interventions:             LPN Care Coordinator provided contact information for future needs. Plan for follow-up call in 7-10 days based on severity of symptoms and risk factors.   Plan for next call: symptom management-,  self management-.    Juliet Araujo LPN

## 2023-01-20 ENCOUNTER — CARE COORDINATION (OUTPATIENT)
Dept: CASE MANAGEMENT | Age: 63
End: 2023-01-20

## 2023-01-20 NOTE — CARE COORDINATION
[facility] Care Transitions Follow Up Call    Care Transition Nurse contacted the patient by telephone. Verified name and  with patient as identifiers. Patient: Awilda Roberts. Patient : 1960   MRN: [id number]  Reason for Admission: Acute pulmonary edema afib s/p ablation   Discharge Date: 22 RARS: Readmission Risk Score: 12.9      Needs to be reviewed by the provider   Additional needs identified to be addressed with provider: No  none             Method of communication with provider: none. CTN spoke to patient who reported he is doing well. Patient denied any sob or cp. Patient reported his follow up apt went well and denied any concerns at this time. CTN advised patient of use of urgent care or physicians 24 hr access line if assistance is needed after hours. Addressed changes since last contact:  none  Discussed follow-up appointments. If no appointment was previously scheduled, appointment scheduling offered: Yes. Is follow up appointment scheduled within 7 days of discharge? Yes. Follow Up  Future Appointments   Date Time Provider Tamika Romero   2023  7:20 AM SCHEDULE, [facility]   2023 11:15 AM SCHEDULE, [facility]   2023 11:15 AM Kathy Sheth MD [facility]   2023  9:00 AM MD Michell Méndez  [place] - DYD     [facility] follow up appointment(s):     Care Transition Nurse reviewed medical action plan with patient and discussed any barriers to care and/or understanding of plan of care after discharge. Discussed appropriate site of care based on symptoms and resources available to patient including: PCP  Specialist  When to call 911. The patient agrees to contact the PCP office for questions related to their healthcare. Advance Care Planning:   not on file. Patients top risk factors for readmission: medical condition-.   Interventions to address risk factors: Education of patient/family/caregiver/guardian to support self-management-. Offered patient enrollment in the Remote Patient Monitoring (RPM) program for in-home monitoring: Patient is not eligible for RPM program.     Care Transitions Subsequent and Final Call    Subsequent and Final Calls  Do you have any ongoing symptoms?: No  Have your medications changed?: No  Do you have any questions related to your medications?: No  Do you currently have any active services?: No  Are you currently active with any services?: Outpatient/Community Services  Do you have any needs or concerns that I can assist you with?: No  Identified Barriers: None  Care Transitions Interventions  Other Interventions:             Care Transition Nurse provided contact information for future needs. Plan for follow-up call in 5-7 days based on severity of symptoms and risk factors.   Plan for next call: self management-.    Ankita MOREL, RN, [facility]  Care Transition Nurse  [phone] mobile

## 2023-01-25 ENCOUNTER — NURSE ONLY (OUTPATIENT)
Dept: CARDIOLOGY CLINIC | Age: 63
End: 2023-01-25

## 2023-01-25 DIAGNOSIS — Z45.09 ENCOUNTER FOR LOOP RECORDER CHECK: ICD-10-CM

## 2023-01-25 DIAGNOSIS — I48.91 ATRIAL FIBRILLATION, UNSPECIFIED TYPE (HCC): Primary | ICD-10-CM

## 2023-01-26 NOTE — PROGRESS NOTES
We received a remote transmission from patient's monitor at home. Remote Linq report shows AF. EP physician to review. We will continue to monitor remotely. Implanted for AF management. Pt is on Eliquis. End of 31-day monitoring period 1-25-23.

## 2023-01-27 ENCOUNTER — CARE COORDINATION (OUTPATIENT)
Dept: CASE MANAGEMENT | Age: 63
End: 2023-01-27

## 2023-01-27 NOTE — CARE COORDINATION
[address] Care Transitions Follow Up Call    Care Transition Nurse contacted the patient by telephone. Verified name and  with patient as identifiers. Patient: Drinda Siemens. Patient : 1960   MRN: [id number]  Reason for Admission: acute pulmonary edema, afib s/p ablation  Discharge Date: 22 RARS: Readmission Risk Score: 12.9      Needs to be reviewed by the provider   Additional needs identified to be addressed with provider: No  none             Method of communication with provider: none. CTN spoke with patient who reported he is doing alright. Patient reported his breathing has been stable. Patient reported he has a cough but reports he thinks it is seasonal and that he gets a cough like this every year. Patient reported the cough is productive at times and sputum is clear. Patient denied any fevers. CTN encouraged patient to notify provider if sputum changes colors or fevers develop. Addressed changes since last contact:  none  Discussed follow-up appointments. If no appointment was previously scheduled, appointment scheduling offered: Yes. Is follow up appointment scheduled within 7 days of discharge? Yes. Follow Up  Future Appointments   Date Time Provider Tamika Romero   2023 11:15 AM SCHEDULE, [facility]   2023 11:15 AM Diane Jorge MD [facility]   2023  9:00 AM MD Rocio [place] - DYD     [facility] follow up appointment(s):     Care Transition Nurse reviewed medical action plan with patient and discussed any barriers to care and/or understanding of plan of care after discharge. Discussed appropriate site of care based on symptoms and resources available to patient including: PCP  Specialist  When to call 911. The patient agrees to contact the PCP office for questions related to their healthcare. Advance Care Planning:   not on file.      Patients top risk factors for readmission: medical condition-. Interventions to address risk factors: Education of patient/family/caregiver/guardian to support self-management-. Offered patient enrollment in the Remote Patient Monitoring (RPM) program for in-home monitoring: Patient is not eligible for RPM program.     Care Transitions Subsequent and Final Call    Subsequent and Final Calls  Do you have any ongoing symptoms?: No  Have your medications changed?: No  Do you have any questions related to your medications?: No  Do you currently have any active services?: No  Are you currently active with any services?: Outpatient/Community Services  Do you have any needs or concerns that I can assist you with?: No  Identified Barriers: None  Care Transitions Interventions  Other Interventions:             Care Transition Nurse provided contact information for future needs. Plan for follow-up call in 5-7 days based on severity of symptoms and risk factors.   Plan for next call: self management-cough apt   Charlotte MOREL, RN, [facility]  Care Transition Nurse  [phone] mobile

## 2023-01-27 NOTE — CARE COORDINATION
[facility] Care Transitions Follow Up Call    Care Transition Nurse contacted the patient by telephone. Verified name and  with patient as identifiers. Patient: Prasad Luong. Patient : 1960   MRN: [id number]  Reason for Admission:  Acute pulmonary edema afib s/p ablation   Discharge Date: 22 RARS: Readmission Risk Score: 12.9          Patient reported he is at work and requested a call back later.      Follow Up  Future Appointments   Date Time Provider Tamika Romero   2023 11:15 AM SCHEDULE, [facility] Anna Neibert [facility]   2023 11:15 AM Jessica Schneider MD [facility]   2023  9:00 AM Kina Ahuja MD [facility] Transitions Subsequent and Final Call    Subsequent and Final Calls  Are you currently active with any services?: Outpatient/Community Services  Care Transitions Interventions  Other Interventions:             Janae WILCOXN, RN, [facility]  Care Transition Nurse  [phone] mobile

## 2023-01-27 NOTE — CARE COORDINATION
[facility] Care Transitions Follow Up Call    Care Transition Nurse contacted the patient by telephone. Patient: Erum Wen Patient : 1960   MRN: [id number]  Reason for Admission: Acute PE   Discharge Date: 22 RARS: Readmission Risk Score: 12.9          Attempted to reach patient via phone for care transition. VM left stating purpose of call along with my contact information requesting a return call.             Follow Up  Future Appointments   Date Time Provider Tamika Romero   2023 11:15 AM SCHEDULE, [facility] Caryle Neve [facility]   2023 11:15 AM Luiza Wild MD [facility]le [facility]   2023  9:00 AM Aj Serrato MD [address] [place] [address] Transitions Subsequent and Final Call    Subsequent and Final Calls  Are you currently active with any services?: Outpatient/Community Services  Care Transitions Interventions  Other Interventions:         Nick WILCOXN, RN, [facility]  Care Transition Nurse  [phone] mobile

## 2023-01-30 PROBLEM — R77.8 ELEVATED TROPONIN: Status: RESOLVED | Noted: 2022-12-31 | Resolved: 2023-01-30

## 2023-01-30 PROBLEM — R79.89 ELEVATED TROPONIN: Status: RESOLVED | Noted: 2022-12-31 | Resolved: 2023-01-30

## 2023-02-02 ENCOUNTER — CARE COORDINATION (OUTPATIENT)
Dept: CASE MANAGEMENT | Age: 63
End: 2023-02-02

## 2023-02-02 NOTE — CARE COORDINATION
[facility] Care Transitions Follow Up Call    Care Transition Nurse contacted the patient by telephone. Patient: Roxana Yoo. Patient : 1960   MRN: [id number]  Reason for Admission: PE   Discharge Date: 22 RARS: Readmission Risk Score: 12.9          Final attempt made to reach patient for post hospital follow up call. Left a voice message for patient with my contact information and informed of final outreach attempt.            Follow Up  Future Appointments   Date Time Provider Tamika Romero   2023 11:15 AM [facility]   2023 11:15 AM Lo Newman MD [facility]   2023  9:00 AM Tiffanie Ortiz  [address] Transitions Subsequent and Final Call    Subsequent and Final Calls  Are you currently active with any services?: Outpatient/Community Services  Care Transitions Interventions  Other Interventions:           Kael MOREL, RN, [facility]  Care Transition Nurse  [phone] mobile

## 2023-02-15 ENCOUNTER — TELEPHONE (OUTPATIENT)
Dept: CARDIOLOGY CLINIC | Age: 63
End: 2023-02-15

## 2023-02-15 ENCOUNTER — OFFICE VISIT (OUTPATIENT)
Dept: CARDIOLOGY CLINIC | Age: 63
End: 2023-02-15
Payer: COMMERCIAL

## 2023-02-15 ENCOUNTER — NURSE ONLY (OUTPATIENT)
Dept: CARDIOLOGY CLINIC | Age: 63
End: 2023-02-15

## 2023-02-15 VITALS
WEIGHT: 205 LBS | DIASTOLIC BLOOD PRESSURE: 70 MMHG | HEART RATE: 63 BPM | HEIGHT: 71 IN | BODY MASS INDEX: 28.7 KG/M2 | SYSTOLIC BLOOD PRESSURE: 114 MMHG | OXYGEN SATURATION: 95 %

## 2023-02-15 DIAGNOSIS — Z45.09 ENCOUNTER FOR LOOP RECORDER CHECK: ICD-10-CM

## 2023-02-15 DIAGNOSIS — R53.83 EASY FATIGABILITY: ICD-10-CM

## 2023-02-15 DIAGNOSIS — I48.4 ATYPICAL ATRIAL FLUTTER (HCC): ICD-10-CM

## 2023-02-15 DIAGNOSIS — I48.0 PAF (PAROXYSMAL ATRIAL FIBRILLATION) (HCC): Primary | ICD-10-CM

## 2023-02-15 DIAGNOSIS — Z01.818 PREOPERATIVE CLEARANCE: ICD-10-CM

## 2023-02-15 DIAGNOSIS — I51.7 HYPERTROPHIC CARDIOMEGALY: ICD-10-CM

## 2023-02-15 PROCEDURE — 93000 ELECTROCARDIOGRAM COMPLETE: CPT | Performed by: INTERNAL MEDICINE

## 2023-02-15 PROCEDURE — 99215 OFFICE O/P EST HI 40 MIN: CPT | Performed by: INTERNAL MEDICINE

## 2023-02-15 ASSESSMENT — ENCOUNTER SYMPTOMS
HEMATEMESIS: 0
WHEEZING: 0
SHORTNESS OF BREATH: 0
LEFT EYE: 0
HEMATOCHEZIA: 0
STRIDOR: 0
RIGHT EYE: 0

## 2023-02-15 NOTE — PATIENT INSTRUCTIONS
Plan:     Discussed the risks and benefits of a FATIMAH/cardioversion with restarting Amiodarone 200 mg daily  Hold Verapamil until further instructed to restart  Device checks monthly  Follow up with me after the procedure

## 2023-02-15 NOTE — PROGRESS NOTES
Assessment:     1. Atrial fibrillation: patient had Persistent atrial fibrillation prior to ablation. Since then, mostly in atypical atrial flutter. Associated symptoms: Fatigue and Palpitations     History of cardioversion: Had electrical cardioversion in the past (multiple)  History of AF ablation: Had atrial fibrillation ablation in the past (December 2022)  History of heart surgery/procedure: yes, cardioversion and ablation    Current use of anti-arrhythmic drugs: Not currently on anti-arrhythmic drugs  Previous use of anti-arrhythmic drugs: amiodarone and dofetilide    Overall LV function: Normal left ventricular systolic function  Size of left atrium: Severely dilated LA size  Significant cardiac valvular disease: No significant valve disease  Family history of atrial fibrillation: Unknown    Alcohol consumption: Mild alcohol intake  Caffeine consumption: Mild caffeine intake  Smoking status: non-smoker  Obstructive sleep apnea: No/low suspicion  Exercise status: Occasional exercise, not routine    Patient has hypertrophic cardiomyopathy. Stroke risk is elevated  Longterm anticoagulation is: recommended  Current anticoagulation: Apixaban  Bleeding issues reported: no  Renal function: Normal    In setting of HCM, and development of persistent atrial fibrillation, being quite symptomatic and having failed multiple anti-arrhythmic drug therapy options (most recently dofetilide), it became clear that the option of AF ablation should be considered. After discussions with patient, we proceeded with attempted AF ablation in early December 2022. Procedure was complicated by pericardial effusion requiring emergent pericardiocentesis. We only managed ablation of the cavo-tricuspid isthmus during that procedure. Patient came back for a second attempt on 12/28/2022. We performed pulmonary vein isolation with roof line and posterior wall ablation. There were no acute complications.      Since then, the patient's ILR shows episodes of AF and today ECG shows atypical atrial flutter with rate controlled. I suspect his ILR is under-counting atrial flutter burden due to its regular (and not tachycardic) nature. In sinus rhythm intermittently. He was on amiodarone 200 mg daily but stopped a few weeks ago. I would like to see how patient does/feels in more prolonged periods of sinus rhythm. This is important prior to deciding if further AF ablation is warranted (beyond the blanking period). We will, after today's discussions, proceed with restarting amiodarone and planning DCCV in next couple of weeks. Hold verapamil for now (given 3 second pauses noted during AFL on event monitor). Will continue to monitor during blanking period. He also presented with some shortness of breath after the second ablation and noted to have an acute small pulmonary embolus (unclear source). He has since done well and has no significant shortness of breath. No bleeding issues. 2. Good blood pressure control today. 3. Hypertrophic, non-obstructive cardiomyopathy: prior cardiac MRI indicating \"late gadolinium enhancement imaging demonstrates areas of heterogeneous hyperenhancement predominantly throughout the basal and mid anterior and septal myocardial segments\". Warrants close monitoring. No personal history of syncope. No noted ventricular arrhythmias. Plan:     Discussed the risks and benefits of a FATIMAH/cardioversion with restarting Amiodarone 200 mg daily  Hold Verapamil until further instructed to restart  Device checks monthly  Follow up with me after the procedure    Subjective:       Patient ID: Reed Powell. is a 58 y.o. male.       Chief Complaint:  Chief Complaint   Patient presents with    Follow-up    Device Check    Atrial Fibrillation     Can feel irregular heart rate - occurs several times weekly     Chest Pain     Discomfort on left side of chest        HPI    Patient is a pleasant 58 y.o. male with a PMH significant for PAF, HCM without LVOT gradient. PVC's. An ILR was placed in 12/2021 for ongoing palpitations. Patient was hospitalized on 10/31/2022 for Tikosyn. Patient underwent DCCV on 11/3/2022 and converted back to AF after one minute. Office Visit ([address], 11/07/2022):  Presents today to discuss ablation option for his atrial fibrillation. He reports that he thinks he has been in AF for a few months now. He is fatigued, lightheaded, and has palpitations. He plays baseball and long distance runs and has been unable to do so recently. He is taking his medications as prescribed. Denies recent issues with bleeding or bruising. Patient denies current edema, chest pain, shortness of breath, or syncope. Interval History since last office visit: On 12/05/2022, he underwent RFCA for typical atrial flutter. Procedure was complicated by pericardial effusion with tamponade physiology. Emergent pericardiocentesis performed with approximately 550 mL blood aspirated. His limited echocardiogram on 12/06/2022 showed no obvious evidence of pericardial effusion. On 12/27/2022, he underwent RFCA for AF. Amiodarone was resumed postoperatively. On 12/30/2022 he presented to the emergency department with complaints of shortness of breath. His chest CT revealed pulmonary edema and acute PE. Lasix and Eliquis were continued. He was discharged with the intent to start Verapamil on 01/04/2023. Office Visit ([address], 01/09/2023): Today he presents for follow up. He states that his shortness of breath has improved. He states that he noticed that he was urinating frequently and slowly noticed an improvement in his breathing. He denies shortness of breath in the last 24 hours. He reports that he experiences orthopnea and a burning sensation in his lungs when laying flat. He does report that he may have missed a few doses of his Eliquis postoperatively.  He stated that he did experience postoperative pain while in the hospital. He reports that his groin sites have healed well. He reports that he is eating well. He denies burning while swallowing. He states that he does occasionally feel like food is getting \"stuck\" in his throat, but this is not outside of his normal swallowing. Patient denies current edema, chest pain, dizziness or syncope. He denies any recent issues with bleeding or bruising. Patient is taking all cardiac medications as prescribed and tolerates them well. Office Visit ([address], 02/15/2023):  He presents today for follow up. He reports that compared to November he is feeling better. However, he reports that he is still feeling fatigued. His dyspnea on exertion has improved compared to prior to ablation. He states he walked 2 miles recently and was able to tolerate this well. He states the stopped taking amiodarone a few weeks ago. He reports that he has not missed any does of anticoagulation. He reports that he still continues to have trouble swallowing but this is being manged by his gastroenterologist. Patient denies current edema, chest pain, shortness of breath, dizziness or syncope. He denies any recent issues with bleeding or bruising. Patient is taking all cardiac medications as prescribed and tolerates them well. Review of Systems  Review of Systems   Constitutional: Positive for malaise/fatigue. Negative for weight gain and weight loss. HENT:  Negative for nosebleeds and stridor. Eyes:  Negative for vision loss in left eye and vision loss in right eye. Cardiovascular:  Positive for palpitations. Negative for chest pain, dyspnea on exertion, leg swelling and syncope. Respiratory:  Negative for shortness of breath and wheezing. Hematologic/Lymphatic: Negative for bleeding problem. Does not bruise/bleed easily. Skin:  Negative for itching and rash. Musculoskeletal:  Negative for joint pain and joint swelling. Gastrointestinal:  Negative for hematemesis and hematochezia.    Genitourinary: Negative for dysuria and hematuria. Neurological:  Negative for dizziness and light-headedness. Psychiatric/Behavioral:  Negative for altered mental status. The patient is not nervous/anxious. Past Medical History:   Diagnosis Date    A-fib [facility])     ADHD (attention deficit hyperactivity disorder)     Carpal tunnel syndrome 01/21/2015    Chronic low back pain     Chronic neck pain     Chronic sinusitis     Dr. Jessenia jamil present     lower    Epigastric hernia     Esophageal spasm     GERD (gastroesophageal reflux disease)     Hyperlipidemia     Hypertrophic cardiomyopathy (HCC)     IHSS (idiopathic hypertrophic subaortic stenosis) (HCC)     Kidney stones     HUNTER on CPAP     Dr. Mary Kay Sánchez- A-PAP    Primary localized osteoarthrosis, shoulder region 10/18/2013    Prostate cancer ([facility] [address]     prostate ; s/p radiation treatment    PVC's (premature ventricular contractions)     RBBB (right bundle branch block)     RLS (restless legs syndrome)     Dr. Mary Kay Sánchez    Seasonal allergies     Tachycardia          Social History     Socioeconomic History    Marital status:      Spouse name: Not on file    Number of children: Not on file    Years of education: Not on file    Highest education level: Not on file   Occupational History    Not on file   Tobacco Use    Smoking status: Never     Passive exposure: Never    Smokeless tobacco: Never   Vaping Use    Vaping Use: Never used   Substance and Sexual Activity    Alcohol use:  Yes     Alcohol/week: 1.0 - 2.0 standard drink     Types: 1 Cans of beer per week     Comment: occasionally    Drug use: No    Sexual activity: Yes     Partners: Female   Other Topics Concern    Not on file   Social History Narrative    Not on file     Social Determinants of Health     Financial Resource Strain: Not on file   Food Insecurity: Not on file   Transportation Needs: Not on file   Physical Activity: Not on file   Stress: Not on file   Social Connections: Not on file Intimate Partner Violence: Not on file   Housing Stability: Not on file         Family History   Problem Relation Age of Onset    Cancer Mother         melenoma    High Cholesterol Mother     High Blood Pressure Father     High Cholesterol Brother     Heart Disease Maternal Grandmother     Heart Disease Maternal Grandfather     Prostate Cancer Paternal Uncle          Objective:       /70   Pulse 63   Ht 5' 11\" (1.803 m)   Wt 205 lb (93 kg)   SpO2 95%   BMI 28.59 kg/m²     Physical Exam  Constitutional:       Appearance: Normal appearance. HENT:      Head: Normocephalic and atraumatic. Nose: Nose normal. No rhinorrhea. Eyes:      General: No scleral icterus. Conjunctiva/sclera: Conjunctivae normal.   Cardiovascular:      Rate and Rhythm: Normal rate. Rhythm irregular. Heart sounds: No murmur heard. Pulmonary:      Effort: Pulmonary effort is normal.      Breath sounds: Normal breath sounds. Abdominal:      General: There is no distension. Musculoskeletal:         General: Normal range of motion. Cervical back: Normal range of motion and neck supple. Skin:     General: Skin is warm and dry. Neurological:      General: No focal deficit present. Mental Status: He is alert and oriented to person, place, and time. Psychiatric:         Mood and Affect: Mood normal.         Behavior: Behavior normal.     ECG Interpretation:  (Date: 02/15/2023)  Rhythm: Atrial flutter  Rate: 63 BPM  PAC's / PVC's present: None        ECG Interpretation:  (Date: 01/09/2023)  Rhythm: Atrial flutter  Rate: 61 BPM  PAC's / PVC's present: None  Conduction abnormalities: Right bundle branch block (RBBB)          ECG Interpretation:  (Date: 11/2/2022)  Rhythm: Atrial fibrillation  Rate: 63 BPM  PAC's / PVC's present: None      Limited Echocardiogram  (Date: 12/31/2022)  Summary   Limited exam for RV function and pericardial effusion.    -- Left ventricular systolic function is normal with ejection fraction   estimated at 55%. No regional wall motion abnormalities. The right ventricle   is normal in size and function. -- Systolic pulmonary artery pressure (SPAP) is estimated at 59 mmHg   consistent with moderate pulmonary hypertension (Right atrial pressure of 8   mmHg). There is a small posterior pericardial effusion noted. Echocardiogram  (Date: 10/2019)  Summary   Normal systolic function with an estimated ejection fraction of 55-60%. Moderate concentric left ventricular hypertrophy ( septal wall is more   increased in size (1.6 cm) ). No regional wall motion abnormalities are seen. Normal left ventricular diastolic filling pressure. The left atrium is severely dilated. The right atrium is mildly dilated. Mild aortic regurgitation. Mild mitral and pulmonic regurgitation. Stress Test (Date: 6/20/2022)   Summary  Normal myocardial perfusion during rest and stress with normal left  ventricular function/wall motion. The left ventricular size is mildly dilated. The estimated left ventricular function is 63%.      Current Medications     Current Outpatient Medications   Medication Sig Dispense Refill    clonazePAM (KLONOPIN) 1 MG tablet TAKE 1 TABLET BY MOUTH EVERY NIGHT AS NEEDED 30 tablet 2    verapamil (VERELAN) 120 MG extended release capsule TAKE 1 CAPSULE BY MOUTH EVERY NIGHT 30 capsule 3    Cyanocobalamin (B-12 PO) Take by mouth      b complex vitamins capsule Take 1 capsule by mouth daily      pravastatin (PRAVACHOL) 20 MG tablet Take 1 tablet by mouth every evening 90 tablet 1    apixaban (ELIQUIS) 5 MG TABS tablet TAKE 1 TABLET BY MOUTH TWICE DAILY 180 tablet 3    tamsulosin (FLOMAX) 0.4 MG capsule Take 0.4 mg by mouth daily      furosemide (LASIX) 40 MG tablet Take 1 tablet by mouth daily for 5 days 5 tablet 0    amiodarone (CORDARONE) 200 MG tablet Take amiodarone 200 mg PO BID x 7 days then 200 mg PO daily (Patient not taking: Reported on 2/15/2023) 35 tablet 3 pantoprazole (PROTONIX) 40 MG tablet Take 1 tablet by mouth 2 times daily (before meals) (Patient not taking: Reported on 2/15/2023) 30 tablet 3    sucralfate (CARAFATE) 1 GM tablet Take 1 tablet by mouth every 8 hours (Patient not taking: Reported on 2/15/2023) 120 tablet 3     No current facility-administered medications for this visit. Lab Review     Lab Results   Component Value Date/Time     12/31/2022 06:05 AM    K 3.9 12/31/2022 06:05 AM     12/31/2022 06:05 AM    CO2 27 12/31/2022 06:05 AM    BUN 18 12/31/2022 06:05 AM    CREATININE 0.9 12/31/2022 06:05 AM    GLUCOSE 159 12/31/2022 06:05 AM    GLUCOSE 83 05/22/2012 04:40 PM    CALCIUM 9.2 12/31/2022 06:05 AM        Lab Results   Component Value Date    WBC 5.3 12/30/2022    HGB 12.2 (L) 12/30/2022    HCT 36.7 (L) 12/30/2022    MCV 87.3 12/30/2022     12/30/2022       Lab Results   Component Value Date    TSHFT4 3.89 02/26/2020    TSH 2.95 12/15/2021    TSHREFLEX 2.51 04/15/2016       BNP   Date Value Ref Range Status   01/03/2014 395 (H) <100 pg/mL Final       I, Latisha Boyd RN, am scribing for and in the presence of Dr. Diane Jorge.  02/15/23 1:30 PM   Latisha Boyd RN

## 2023-02-15 NOTE — PROGRESS NOTES
Patient comes in for interrogation of their implanted loop recorder. Patient has a history of pAF, A Flutter, hypertrophic CM, and PVCs. Takes amiodarone, Eliquis, and verapamil. Patient's last device interrogation was on 1/25. Since last interrogation, AT/AF burden measures 10.5%. 1 pause recorded on 2/3 x 3 seconds. Patient will see Dr. Venancio Heart today in clinic. See [facility] report under the Cardiology tab. We will follow the patient remotely.

## 2023-02-23 ENCOUNTER — HOSPITAL ENCOUNTER (OUTPATIENT)
Age: 63
Discharge: HOME | End: 2023-02-23
Payer: COMMERCIAL

## 2023-02-23 DIAGNOSIS — Z01.818 PREOPERATIVE CLEARANCE: ICD-10-CM

## 2023-02-23 PROCEDURE — U0005 INFEC AGEN DETEC AMPLI PROBE: HCPCS

## 2023-02-23 PROCEDURE — U0003 INFECTIOUS AGENT DETECTION BY NUCLEIC ACID (DNA OR RNA); SEVERE ACUTE RESPIRATORY SYNDROME CORONAVIRUS 2 (SARS-COV-2) (CORONAVIRUS DISEASE [COVID-19]), AMPLIFIED PROBE TECHNIQUE, MAKING USE OF HIGH THROUGHPUT TECHNOLOGIES AS DESCRIBED BY CMS-2020-01-R: HCPCS

## 2023-02-24 LAB — SARS-COV-2: NOT DETECTED

## 2023-02-27 ENCOUNTER — OFFICE VISIT (OUTPATIENT)
Dept: FAMILY MEDICINE CLINIC | Age: 63
End: 2023-02-27
Payer: COMMERCIAL

## 2023-02-27 VITALS
WEIGHT: 206.6 LBS | HEIGHT: 71 IN | DIASTOLIC BLOOD PRESSURE: 78 MMHG | SYSTOLIC BLOOD PRESSURE: 124 MMHG | HEART RATE: 66 BPM | OXYGEN SATURATION: 98 % | BODY MASS INDEX: 28.92 KG/M2

## 2023-02-27 DIAGNOSIS — G89.4 CHRONIC PAIN SYNDROME: ICD-10-CM

## 2023-02-27 DIAGNOSIS — I48.91 ATRIAL FIBRILLATION, UNSPECIFIED TYPE (HCC): ICD-10-CM

## 2023-02-27 DIAGNOSIS — Z87.19 HISTORY OF GI BLEED: ICD-10-CM

## 2023-02-27 DIAGNOSIS — R73.9 ELEVATED BLOOD SUGAR: ICD-10-CM

## 2023-02-27 DIAGNOSIS — E78.5 HYPERLIPIDEMIA, UNSPECIFIED HYPERLIPIDEMIA TYPE: Primary | ICD-10-CM

## 2023-02-27 DIAGNOSIS — R25.1 TREMOR: ICD-10-CM

## 2023-02-27 DIAGNOSIS — I42.2 HYPERTROPHIC CARDIOMYOPATHY (HCC): ICD-10-CM

## 2023-02-27 DIAGNOSIS — Z79.01 CURRENT USE OF LONG TERM ANTICOAGULATION: ICD-10-CM

## 2023-02-27 DIAGNOSIS — K21.9 GASTROESOPHAGEAL REFLUX DISEASE WITHOUT ESOPHAGITIS: ICD-10-CM

## 2023-02-27 PROBLEM — R06.02 SOB (SHORTNESS OF BREATH): Status: RESOLVED | Noted: 2022-12-30 | Resolved: 2023-02-27

## 2023-02-27 LAB
A/G RATIO: 1.9 (ref 1.1–2.2)
ALBUMIN SERPL-MCNC: 4.1 G/DL (ref 3.4–5)
ALP BLD-CCNC: 127 U/L (ref 40–129)
ALT SERPL-CCNC: 28 U/L (ref 10–40)
ANION GAP SERPL CALCULATED.3IONS-SCNC: 13 MMOL/L (ref 3–16)
AST SERPL-CCNC: 34 U/L (ref 15–37)
BILIRUB SERPL-MCNC: 0.8 MG/DL (ref 0–1)
BUN BLDV-MCNC: 17 MG/DL (ref 7–20)
CALCIUM SERPL-MCNC: 9 MG/DL (ref 8.3–10.6)
CHLORIDE BLD-SCNC: 104 MMOL/L (ref 99–110)
CHOLESTEROL, TOTAL: 166 MG/DL (ref 0–199)
CO2: 25 MMOL/L (ref 21–32)
CREAT SERPL-MCNC: 1.1 MG/DL (ref 0.8–1.3)
FOLATE: >20 NG/ML (ref 4.78–24.2)
GFR SERPL CREATININE-BSD FRML MDRD: >60 ML/MIN/{1.73_M2}
GLUCOSE BLD-MCNC: 110 MG/DL (ref 70–99)
HDLC SERPL-MCNC: 47 MG/DL (ref 40–60)
LDL CHOLESTEROL CALCULATED: 105 MG/DL
POTASSIUM SERPL-SCNC: 4.7 MMOL/L (ref 3.5–5.1)
SODIUM BLD-SCNC: 142 MMOL/L (ref 136–145)
TOTAL PROTEIN: 6.3 G/DL (ref 6.4–8.2)
TRIGL SERPL-MCNC: 72 MG/DL (ref 0–150)
TSH SERPL DL<=0.05 MIU/L-ACNC: 0.02 UIU/ML (ref 0.27–4.2)
VITAMIN B-12: 1777 PG/ML (ref 211–911)
VLDLC SERPL CALC-MCNC: 14 MG/DL

## 2023-02-27 PROCEDURE — 99214 OFFICE O/P EST MOD 30 MIN: CPT | Performed by: FAMILY MEDICINE

## 2023-02-27 RX ORDER — PRAVASTATIN SODIUM 20 MG
20 TABLET ORAL EVERY EVENING
Qty: 90 TABLET | Refills: 1 | Status: SHIPPED | OUTPATIENT
Start: 2023-02-27

## 2023-02-27 RX ORDER — OMEPRAZOLE 20 MG/1
20 CAPSULE, DELAYED RELEASE ORAL DAILY
COMMUNITY

## 2023-02-27 ASSESSMENT — PATIENT HEALTH QUESTIONNAIRE - PHQ9
2. FEELING DOWN, DEPRESSED OR HOPELESS: 0
4. FEELING TIRED OR HAVING LITTLE ENERGY: 0
7. TROUBLE CONCENTRATING ON THINGS, SUCH AS READING THE NEWSPAPER OR WATCHING TELEVISION: 0
9. THOUGHTS THAT YOU WOULD BE BETTER OFF DEAD, OR OF HURTING YOURSELF: 0
SUM OF ALL RESPONSES TO PHQ QUESTIONS 1-9: 0
SUM OF ALL RESPONSES TO PHQ QUESTIONS 1-9: 0
5. POOR APPETITE OR OVEREATING: 0
SUM OF ALL RESPONSES TO PHQ9 QUESTIONS 1 & 2: 0
SUM OF ALL RESPONSES TO PHQ QUESTIONS 1-9: 0
6. FEELING BAD ABOUT YOURSELF - OR THAT YOU ARE A FAILURE OR HAVE LET YOURSELF OR YOUR FAMILY DOWN: 0
1. LITTLE INTEREST OR PLEASURE IN DOING THINGS: 0
3. TROUBLE FALLING OR STAYING ASLEEP: 0
SUM OF ALL RESPONSES TO PHQ QUESTIONS 1-9: 0
10. IF YOU CHECKED OFF ANY PROBLEMS, HOW DIFFICULT HAVE THESE PROBLEMS MADE IT FOR YOU TO DO YOUR WORK, TAKE CARE OF THINGS AT HOME, OR GET ALONG WITH OTHER PEOPLE: 0
8. MOVING OR SPEAKING SO SLOWLY THAT OTHER PEOPLE COULD HAVE NOTICED. OR THE OPPOSITE, BEING SO FIGETY OR RESTLESS THAT YOU HAVE BEEN MOVING AROUND A LOT MORE THAN USUAL: 0

## 2023-02-27 NOTE — PROGRESS NOTES
Kimberley Jo. presents for   Chief Complaint   Patient presents with    Hyperlipidemia     Pt states that he is here for a 6 month f/u,is fasting for bw     Gastroesophageal Reflux        ASSESSMENT:   Diagnosis Orders   1. Hyperlipidemia, unspecified hyperlipidemia type, labs are pending we will see how Pravachol 20 mg is working for him. Patient states that he has been taking it on a consistent basis Lipid Panel    Comprehensive Metabolic Panel    pravastatin (PRAVACHOL) 20 MG tablet      2. Hypertrophic cardiomyopathy [facility]), continue cardiology follow-up       3. Elevated blood sugar, labs are pending we will see what his fasting blood sugar looks like       4. Tremor, worsening, referral back to neurology. We will check a TSH and B12 level MAIA - Zaheer Quiñonez MD, Neurology, [facility]    TSH    Vitamin B12 & Folate      5. Gastroesophageal reflux disease without esophagitis, stable continue omeprazole 20 mg       6. History of GI bleed, as above continue omeprazole 20 mg no recurrence       7. Current use of long term anticoagulation, controlled, continue Eliquis 5 mg twice daily ordered and monitored by cardiology       8. Chronic pain syndrome, patient has seen pain management, was unable to have procedure until he is off Eliquis for 3 days       9. Atrial fibrillation, unspecified type ([address] needs improvement, continue working with cardiology he is scheduled for another ablation later this week            Plan:  1)  Medication: continue current medication regimen unchanged, medications are working and tolerated, continue as listed  2)  Recheck in 6 months, sooner should new symptoms or problems arise. 3) LLR          SUBJECTIVE:  Kimberley Smalls is a 58 y.o. male who presents for evaluation of hypertrophic cardiomyopathy, atrial fibrillation, long-term anticoagulation, GERD, history of GI bleed, elevated blood sugar, chronic pain syndrome, tremor and hyperlipidemia.  He  denies any symptoms referable to his elevated blood lipids. Specifically denies chest pain,  dyspnea, orthopnea, PND or peripheral edema   No anorexia, arthralgia, or leg cramps noted. Current medication regimen is as listed below. He denies any side effects of medication, and has been taking it regularly. Lab Results   Component Value Date    [facility] 96 12/27/2022       Recently patient has had issues with A-fib. He has had multiple ablations that have not been successful. He has another ablation this week. Cardiology has been managing his cardiac medications he is now back on amiodarone, once he is in sinus rhythm they will stop that and put him back on verapamil. Cardiology also manages his hypertrophic cardiomyopathy. He is on long-term anticoagulation due to the A-fib. He does have a history of GERD and a GI bleed. He is on omeprazole 20 mg 2 protect his stomach. He has not had any recent bleeding. Patient's last A1c was 5.6 we will see what his fasting glucose is today. Patient has a chronic pain syndrome. At his last appointment he was referred to PM and R. Patient instead went to see Dr. Isreal Bell recommended a procedure but the patient would have to be off his Eliquis for 3 days and that was not possible with all of his heart issues. Patient has noticed that his tremor has worsened. He has a tremor at rest and also with intention. His tremor is only in his upper extremities. He has not noticed anything in his lower extremities he does have chronic neck pain. Patient was referred back to neurology.   TSH and B12 and folate levels were added to his labs today    Current Outpatient Medications   Medication Sig Dispense Refill    omeprazole (PRILOSEC) 20 MG delayed release capsule Take 20 mg by mouth daily      pravastatin (PRAVACHOL) 20 MG tablet Take 1 tablet by mouth every evening 90 tablet 1    amiodarone (CORDARONE) 200 MG tablet Take amiodarone 200 mg PO BID x 7 days then 200 mg PO daily 35 tablet 3 verapamil (VERELAN) 120 MG extended release capsule TAKE 1 CAPSULE BY MOUTH EVERY NIGHT 30 capsule 3    Cyanocobalamin (B-12 PO) Take by mouth      b complex vitamins capsule Take 1 capsule by mouth daily      apixaban (ELIQUIS) 5 MG TABS tablet TAKE 1 TABLET BY MOUTH TWICE DAILY 180 tablet 3    tamsulosin (FLOMAX) 0.4 MG capsule Take 0.4 mg by mouth daily      clonazePAM (KLONOPIN) 1 MG tablet TAKE 1 TABLET BY MOUTH EVERY NIGHT AS NEEDED 30 tablet 2     No current facility-administered medications for this visit. Allergies   Allergen Reactions    Niacin And Related      Extreme itching /stinging started at head to waist       Social History     Tobacco Use    Smoking status: Never     Passive exposure: Never    Smokeless tobacco: Never   Substance Use Topics    Alcohol use: Yes     Alcohol/week: 1.0 - 2.0 standard drink     Types: 1 Cans of beer per week     Comment: occasionally       OBJECTIVE:   /78   Pulse 66   Ht 5' 11\" (1.803 m)   Wt 206 lb 9.6 oz (93.7 kg)   SpO2 98%   BMI 28.81 kg/m²   NAD  Neck no bruit or JVD  S1 and S2 normal, no murmurs, clicks, gallops or rubs. Regular rate and rhythm. Chest is clear; no wheezes or rales. No edema or JVD. Neuro alert, no CN, tremor of the upper extremities bilateral, psych nl mood, thought and judgement                EMR Dragon/transcription disclaimer:  Much of this encounter note is electronic transcription/translation of spoken language to printed texts. The electronic translation of spoken language may be erroneous, or at times, nonsensical words or phrases may be inadvertently transcribed.   Although I have reviewed the note for such errors, some may still exist.

## 2023-02-28 DIAGNOSIS — E05.90 HYPERTHYROIDISM: Primary | ICD-10-CM

## 2023-03-01 ENCOUNTER — NURSE ONLY (OUTPATIENT)
Dept: CARDIOLOGY CLINIC | Age: 63
End: 2023-03-01

## 2023-03-01 DIAGNOSIS — Z45.09 ENCOUNTER FOR LOOP RECORDER CHECK: ICD-10-CM

## 2023-03-01 DIAGNOSIS — I48.0 PAF (PAROXYSMAL ATRIAL FIBRILLATION) (HCC): Primary | ICD-10-CM

## 2023-03-02 ENCOUNTER — HOSPITAL ENCOUNTER (OUTPATIENT)
Dept: NON INVASIVE DIAGNOSTICS | Age: 63
Discharge: HOME | End: 2023-03-02
Attending: INTERNAL MEDICINE
Payer: COMMERCIAL

## 2023-03-02 ENCOUNTER — HOSPITAL ENCOUNTER (OUTPATIENT)
Dept: CARDIAC CATH/INVASIVE PROCEDURES | Age: 63
Discharge: HOME | End: 2023-03-02
Attending: INTERNAL MEDICINE | Admitting: INTERNAL MEDICINE
Payer: COMMERCIAL

## 2023-03-02 ENCOUNTER — ANESTHESIA (OUTPATIENT)
Dept: NON INVASIVE DIAGNOSTICS | Age: 63
End: 2023-03-02
Payer: COMMERCIAL

## 2023-03-02 ENCOUNTER — ANESTHESIA EVENT (OUTPATIENT)
Dept: NON INVASIVE DIAGNOSTICS | Age: 63
End: 2023-03-02
Payer: COMMERCIAL

## 2023-03-02 VITALS — BODY MASS INDEX: 28.74 KG/M2 | HEIGHT: 71 IN | WEIGHT: 205.3 LBS

## 2023-03-02 LAB
ANION GAP SERPL CALCULATED.3IONS-SCNC: 9 MMOL/L (ref 3–16)
BUN BLDV-MCNC: 15 MG/DL (ref 7–20)
CALCIUM SERPL-MCNC: 9.1 MG/DL (ref 8.3–10.6)
CHLORIDE BLD-SCNC: 104 MMOL/L (ref 99–110)
CO2: 28 MMOL/L (ref 21–32)
CREAT SERPL-MCNC: 1 MG/DL (ref 0.8–1.3)
EKG ATRIAL RATE: 264 BPM
EKG ATRIAL RATE: 70 BPM
EKG DIAGNOSIS: NORMAL
EKG DIAGNOSIS: NORMAL
EKG P AXIS: -46 DEGREES
EKG P AXIS: 71 DEGREES
EKG P-R INTERVAL: 148 MS
EKG Q-T INTERVAL: 430 MS
EKG Q-T INTERVAL: 462 MS
EKG QRS DURATION: 136 MS
EKG QRS DURATION: 142 MS
EKG QTC CALCULATION (BAZETT): 498 MS
EKG QTC CALCULATION (BAZETT): 505 MS
EKG R AXIS: 28 DEGREES
EKG R AXIS: 32 DEGREES
EKG T AXIS: 105 DEGREES
EKG T AXIS: 146 DEGREES
EKG VENTRICULAR RATE: 70 BPM
EKG VENTRICULAR RATE: 83 BPM
GFR SERPL CREATININE-BSD FRML MDRD: >60 ML/MIN/{1.73_M2}
GLUCOSE BLD-MCNC: 112 MG/DL (ref 70–99)
HCT VFR BLD CALC: 42.4 % (ref 40.5–52.5)
HEMOGLOBIN: 13.7 G/DL (ref 13.5–17.5)
MCH RBC QN AUTO: 28.1 PG (ref 26–34)
MCHC RBC AUTO-ENTMCNC: 32.4 G/DL (ref 31–36)
MCV RBC AUTO: 86.8 FL (ref 80–100)
PDW BLD-RTO: 15.7 % (ref 12.4–15.4)
PLATELET # BLD: 220 K/UL (ref 135–450)
PMV BLD AUTO: 7.9 FL (ref 5–10.5)
POTASSIUM REFLEX MAGNESIUM: 3.9 MMOL/L (ref 3.5–5.1)
RBC # BLD: 4.88 M/UL (ref 4.2–5.9)
SODIUM BLD-SCNC: 141 MMOL/L (ref 136–145)
WBC # BLD: 4.8 K/UL (ref 4–11)

## 2023-03-02 PROCEDURE — 3700000000 HC ANESTHESIA ATTENDED CARE

## 2023-03-02 PROCEDURE — 2500000003 HC RX 250 WO HCPCS

## 2023-03-02 PROCEDURE — 93320 DOPPLER ECHO COMPLETE: CPT

## 2023-03-02 PROCEDURE — 80048 BASIC METABOLIC PNL TOTAL CA: CPT

## 2023-03-02 PROCEDURE — 2580000003 HC RX 258

## 2023-03-02 PROCEDURE — 93312 ECHO TRANSESOPHAGEAL: CPT

## 2023-03-02 PROCEDURE — 92960 CARDIOVERSION ELECTRIC EXT: CPT

## 2023-03-02 PROCEDURE — 93005 ELECTROCARDIOGRAM TRACING: CPT | Performed by: INTERNAL MEDICINE

## 2023-03-02 PROCEDURE — 6360000002 HC RX W HCPCS

## 2023-03-02 PROCEDURE — 85027 COMPLETE CBC AUTOMATED: CPT

## 2023-03-02 PROCEDURE — 93010 ELECTROCARDIOGRAM REPORT: CPT | Performed by: INTERNAL MEDICINE

## 2023-03-02 PROCEDURE — 93325 DOPPLER ECHO COLOR FLOW MAPG: CPT

## 2023-03-02 RX ORDER — SODIUM CHLORIDE 0.9 % (FLUSH) 0.9 %
5-40 SYRINGE (ML) INJECTION EVERY 12 HOURS SCHEDULED
Status: DISCONTINUED | OUTPATIENT
Start: 2023-03-02 | End: 2023-03-02 | Stop reason: HOSPADM

## 2023-03-02 RX ORDER — SODIUM CHLORIDE 9 MG/ML
INJECTION, SOLUTION INTRAVENOUS CONTINUOUS PRN
Status: DISCONTINUED | OUTPATIENT
Start: 2023-03-02 | End: 2023-03-02 | Stop reason: SDUPTHER

## 2023-03-02 RX ORDER — SODIUM CHLORIDE 9 MG/ML
1000 INJECTION, SOLUTION INTRAVENOUS CONTINUOUS
Status: DISCONTINUED | OUTPATIENT
Start: 2023-03-02 | End: 2023-03-02 | Stop reason: HOSPADM

## 2023-03-02 RX ORDER — SODIUM CHLORIDE 0.9 % (FLUSH) 0.9 %
5-40 SYRINGE (ML) INJECTION PRN
Status: DISCONTINUED | OUTPATIENT
Start: 2023-03-02 | End: 2023-03-02 | Stop reason: HOSPADM

## 2023-03-02 RX ORDER — LIDOCAINE HYDROCHLORIDE 20 MG/ML
INJECTION, SOLUTION EPIDURAL; INFILTRATION; INTRACAUDAL; PERINEURAL PRN
Status: DISCONTINUED | OUTPATIENT
Start: 2023-03-02 | End: 2023-03-02 | Stop reason: SDUPTHER

## 2023-03-02 RX ORDER — PROPOFOL 10 MG/ML
INJECTION, EMULSION INTRAVENOUS PRN
Status: DISCONTINUED | OUTPATIENT
Start: 2023-03-02 | End: 2023-03-02 | Stop reason: SDUPTHER

## 2023-03-02 RX ORDER — SODIUM CHLORIDE 9 MG/ML
INJECTION, SOLUTION INTRAVENOUS PRN
Status: DISCONTINUED | OUTPATIENT
Start: 2023-03-02 | End: 2023-03-02 | Stop reason: HOSPADM

## 2023-03-02 RX ADMIN — PROPOFOL 20 MG: 10 INJECTION, EMULSION INTRAVENOUS at 08:25

## 2023-03-02 RX ADMIN — PROPOFOL 20 MG: 10 INJECTION, EMULSION INTRAVENOUS at 08:29

## 2023-03-02 RX ADMIN — SODIUM CHLORIDE: 9 INJECTION, SOLUTION INTRAVENOUS at 08:21

## 2023-03-02 RX ADMIN — LIDOCAINE HYDROCHLORIDE 80 MG: 20 INJECTION, SOLUTION EPIDURAL; INFILTRATION; INTRACAUDAL; PERINEURAL at 08:23

## 2023-03-02 RX ADMIN — PROPOFOL 50 MG: 10 INJECTION, EMULSION INTRAVENOUS at 08:23

## 2023-03-02 RX ADMIN — PROPOFOL 20 MG: 10 INJECTION, EMULSION INTRAVENOUS at 08:27

## 2023-03-02 NOTE — ANESTHESIA PRE PROCEDURE
Department of Anesthesiology  Preprocedure Note       Name:  Ana Maria Barrios. Age:  58 y.o.  :  1960                                          MRN:  [id number]         Date:  3/2/2023      Surgeon: * Surgery not found *    Procedure:     Medications prior to admission:   Prior to Admission medications    Medication Sig Start Date End Date Taking? Authorizing Provider   omeprazole (PRILOSEC) 20 MG delayed release capsule Take 20 mg by mouth daily    Historical Provider, MD   pravastatin (PRAVACHOL) 20 MG tablet Take 1 tablet by mouth every evening 23   Allegra Lopes MD   clonazePAM (KLONOPIN) 1 MG tablet TAKE 1 TABLET BY MOUTH EVERY NIGHT AS NEEDED 1/4/23 2/15/23  Andrey Pandya MD   amiodarone (CORDARONE) 200 MG tablet Take amiodarone 200 mg PO BID x 7 days then 200 mg PO daily 22   PARI Zheng CNP   verapamil (VERELAN) 120 MG extended release capsule TAKE 1 CAPSULE BY MOUTH EVERY NIGHT 23   PARI Zheng CNP   Cyanocobalamin (B-12 PO) Take by mouth    Historical Provider, MD   b complex vitamins capsule Take 1 capsule by mouth daily    Historical Provider, MD   apixaban (ELIQUIS) 5 MG TABS tablet TAKE 1 TABLET BY MOUTH TWICE DAILY 22   Leopoldo Palm, MD   tamsulosin (FLOMAX) 0.4 MG capsule Take 0.4 mg by mouth daily    Historical Provider, MD       Current medications:    Current Facility-Administered Medications   Medication Dose Route Frequency Provider Last Rate Last Admin    sodium chloride flush 0.9 % injection 5-40 mL  5-40 mL IntraVENous 2 times per day May Burch MD        sodium chloride flush 0.9 % injection 5-40 mL  5-40 mL IntraVENous PRN May Burch MD        0.9 % sodium chloride infusion   IntraVENous PRN May Burch MD        0.9 % sodium chloride infusion  1,000 mL IntraVENous Continuous May Burch MD           Allergies:     Allergies   Allergen Reactions    Niacin And Related      Extreme itching /stinging started at head to waist       Problem List:    Patient Active Problem List   Diagnosis Code    GERD (gastroesophageal reflux disease) K21.9    Hyperlipidemia E78.5    Hypertrophic cardiomyopathy (HCC) I42.2    PVC (premature ventricular contraction) I49.3    RBBB I45.10    Restless legs syndrome G25.81    Obstructive sleep apnea G47.33    Personal history of malignant neoplasm of prostate Z85.46    SBO (small bowel obstruction) ([facility]) K56.609    Elevated blood sugar R73.9    PAF (paroxysmal atrial fibrillation) ([facility]) I48.0    Current use of long term anticoagulation Z79.01    History of GI bleed Z87.19    Tremor R25.1    medtronic LINQ 12/11/2020 Dr Geannie Severe Z45.09    Atrial fibrillation ([facility] I48.91    Atrial fibrillation, unspecified type ([facility] I48.91    Status post ablation of atrial flutter Z98.890, Z86.79    Status post ablation of atrial fibrillation Z98.890, Z86.79    Pericardial effusion with cardiac tamponade 12/2022 I31.39, I31.4    Pulmonary edema J81.1    Acute pulmonary embolism ([facility]) I26.99    Atrial flutter ([facility]) I48.92       Past Medical History:        Diagnosis Date    A-fib ([facility]     ADHD (attention deficit hyperactivity disorder)     Carpal tunnel syndrome 01/21/2015    Chronic low back pain     Chronic neck pain     Chronic sinusitis     Dr. Colette Rivera Dental crown present     lower    Epigastric hernia     Esophageal spasm     GERD (gastroesophageal reflux disease)     Hyperlipidemia     Hypertrophic cardiomyopathy (HCC)     IHSS (idiopathic hypertrophic subaortic stenosis) ([facility]     Kidney stones     HUNTER on CPAP     Dr. Anabella Alvarado- A-PAP    Primary localized osteoarthrosis, shoulder region 10/18/2013    Prostate cancer [facility])     prostate ; s/p radiation treatment    PVC's (premature ventricular contractions)     RBBB (right bundle branch block)     RLS (restless legs syndrome)     Dr. Colette Rivera Seasonal allergies     Tachycardia        Past Surgical History: Procedure Laterality Date    CARDIAC ELECTROPHYSIOLOGY STUDY AND ABLATION      CARPAL TUNNEL RELEASE Left     COLONOSCOPY  01/17/2011    COLONOSCOPY N/A 02/02/2020    COLONOSCOPY WITH BIOPSY performed by Tate Wiggins MD at [facility]  2015    left    ENDOSCOPY, COLON, DIAGNOSTIC      INGUINAL HERNIA REPAIR Bilateral 2009, 2012,    INSERTABLE CARDIAC MONITOR Left 12/11/2020    INSERTABLE CARDIAC MONITOR  12/11/2020    Loop Implant    KNEE ARTHROSCOPY Right 1982    Right    KNEE ARTHROSCOPY Left 12/21/2017    KNEE ARTHROSCOPY Left 07/17/2020    VIDEO ARTHROSCOPY LEFT KNEE, CHONDROPLASTY, PLICA EXCISION, PARTIAL MEDIAL LATERAL MENISECTOMY -SLEEP APNEA- performed by Jacquelyn Hood MD at [place] [address] ARTHROSCOPY Right 12/31/2013    VIDEO ARTHROSCOPY RIGHT SHOULDER, SUBACROMIAL DECOMPRESSION, DISTAL CLAVICLE RESECTION, ROTATOR CUFF DEBRIDEMENT          TURP  2466    X 2    UMBILICAL HERNIA REPAIR      X 2    UPPER GASTROINTESTINAL ENDOSCOPY  09/2003    VARICOCELECTOMY  2000       Social History:    Social History     Tobacco Use    Smoking status: Never     Passive exposure: Never    Smokeless tobacco: Never   Substance Use Topics    Alcohol use: Yes     Alcohol/week: 1.0 - 2.0 standard drink     Types: 1 Cans of beer per week     Comment: occasionally                                Counseling given: Not Answered      Vital Signs (Current):   Vitals:    03/02/23 0643   Weight: 205 lb 4.8 oz (93.1 kg)   Height: 5' 11\" (1.803 m)                                              BP Readings from Last 3 Encounters:   02/27/23 124/78   02/15/23 114/70   01/09/23 118/74       NPO Status:                                                                                 BMI:   Wt Readings from Last 3 Encounters:   03/02/23 205 lb 4.8 oz (93.1 kg)   02/27/23 206 lb 9.6 oz (93.7 kg)   02/15/23 205 lb (93 kg)     Body mass index is 28.63 kg/m².     CBC:   Lab Results   Component Value Date/Time    WBC 5.3 12/30/2022 06:17 PM    RBC 4.20 12/30/2022 06:17 PM    HGB 12.2 12/30/2022 06:17 PM    HCT 36.7 12/30/2022 06:17 PM    MCV 87.3 12/30/2022 06:17 PM    RDW 14.9 12/30/2022 06:17 PM     12/30/2022 06:17 PM       CMP:   Lab Results   Component Value Date/Time     02/27/2023 09:15 AM    K 4.7 02/27/2023 09:15 AM    K 3.9 12/31/2022 06:05 AM     02/27/2023 09:15 AM    CO2 25 02/27/2023 09:15 AM    BUN 17 02/27/2023 09:15 AM    CREATININE 1.1 02/27/2023 09:15 AM    GFRAA >60 08/22/2022 02:51 PM    GFRAA >60 03/04/2013 09:07 AM    AGRATIO 1.9 02/27/2023 09:15 AM    LABGLOM >60 02/27/2023 09:15 AM    GLUCOSE 110 02/27/2023 09:15 AM    GLUCOSE 83 05/22/2012 04:40 PM    PROT 6.3 02/27/2023 09:15 AM    PROT 7.1 03/04/2013 09:07 AM    CALCIUM 9.0 02/27/2023 09:15 AM    BILITOT 0.8 02/27/2023 09:15 AM    ALKPHOS 127 02/27/2023 09:15 AM    AST 34 02/27/2023 09:15 AM    ALT 28 02/27/2023 09:15 AM       POC Tests: No results for input(s): POCGLU, POCNA, POCK, POCCL, POCBUN, POCHEMO, POCHCT in the last 72 hours.     Coags:   Lab Results   Component Value Date/Time    PROTIME 13.2 12/27/2022 07:06 AM    INR 1.01 12/27/2022 07:06 AM    APTT 29.0 03/01/2011 11:28 AM       HCG (If Applicable): No results found for: PREGTESTUR, PREGSERUM, HCG, HCGQUANT     ABGs: No results found for: PHART, PO2ART, BDQ1SBN, ADT8ULK, BEART, A4YXSITS     Type & Screen (If Applicable):  No results found for: LABABO, LABRH    Drug/Infectious Status (If Applicable):  No results found for: HIV, HEPCAB    COVID-19 Screening (If Applicable):   Lab Results   Component Value Date/Time    COVID19 Not Detected 02/23/2023 08:59 AM           Anesthesia Evaluation  Patient summary reviewed no history of anesthetic complications:   Airway: Mallampati: II  TM distance: >3 FB   Neck ROM: full  Mouth opening: > = 3 FB   Dental: normal exam         Pulmonary:normal exam  breath sounds clear to auscultation  (+) sleep apnea:      (-) COPD and asthma                           Cardiovascular:  Exercise tolerance: good (>4 METS),   (+) dysrhythmias: atrial fibrillation, pulmonary hypertension:,     (-) hypertension, CAD,  angina and  VALERA      Rhythm: regular  Rate: normal                 ROS comment: HOCM     Neuro/Psych:   (+) neuromuscular disease:, psychiatric history:   (-) seizures and TIA           GI/Hepatic/Renal:   (+) GERD: well controlled,      (-) liver disease and no renal disease       Endo/Other:        (-) diabetes mellitus               Abdominal:             Vascular: Other Findings:           Anesthesia Plan      MAC     ASA 3     (I discussed with the patient the risks and benefits of PIV, anesthesia, IV Narcotics, PACU. All questions were answered the patient agrees with the plan and wishes to proceed)  Induction: intravenous.                             aKllie Barnett MD   3/2/2023

## 2023-03-02 NOTE — PROCEDURES
ELECTROPHYSIOLOGY REPORT    : Rose Gallagher MD    COMPLICATIONS:  None    ESTIMATED LOSS OF BLOOD: None    ANESTHESIA: MAC    HISTORY:  58 y.o. with HCM and symptomatic atypical appearing atrial flutter. Presents for FATIMAH-guided cardioversion. DETAILS OF PROCEDURE:      The patient was in the pre-post area  in good condition. The patient was in a fasting, nonsedated state. He was hemodynamically stable. The risks, benefits and alternatives of the procedure were discussed with the patient and family in detail. The risks including, but not limited to, the risks of stroke, asystole, skin burns were discussed with the patient. The patient considered his options and opted to proceed with FATIMAH-guided external electrical cardioversion as planned. Written informed consent was obtained and placed on the chart. At this time, a timeout protocol was completed to identify the patient and the procedure being performed. The patient was attached to continuous electrocardiographic monitoring with noninvasive blood pressure monitoring as well. Anterior and posterior chest defibrillation pads were attached in the typical position. A full trans-esophageal echocardiogram study was performed. Multi-plane imaging of the cardiac structures and chambers were obtained along with Doppler data. A full report is dictated separately. In brief, there were no significant cardiac abnormalities and there was no evidence of any left atrial/left atrial appendage thrombus. Once adequate sedation was confirmed, a 125 joule synchronized biphasic external shock was delivered and successfully cardioverted the patient to normal sinus rhythm. The patient maintained adequate oxygenation throughout the procedure. He remained hemodynamically stable. Within a few minutes of the successful cardioversion, the patient started to wake up and had no evidence of any neurologic sequelae.  He continued to recover and remained hemodynamically stable with adequate vital signs and oxygen saturation. At the end of the procedure, the patient was monitored in the Pre-Post area until he recovers back to baseline. SUMMARY:    1. Successful FATIMAH-guided external electrical cardioversion. RECOMMENDATIONS:    1.  Bed rest x 1 hour.   2.  Continue telemetry monitoring while in the hospital.

## 2023-03-02 NOTE — PROGRESS NOTES
We received a remote transmission from patient's monitor at home. Remote Linq report shows AF with RVR. EP physician to review. We will continue to monitor remotely. Implanted for AF management. Pt is on Eliquis. End of 31-day monitoring period 3-1-23.

## 2023-03-02 NOTE — H&P
I have reviewed seen, interviewed and examined the patient. There has been no change in the findings since our last office visit dated 2/15/2023.

## 2023-03-02 NOTE — ANESTHESIA POSTPROCEDURE EVALUATION
Department of Anesthesiology  Postprocedure Note    Patient: Kimberley Smalls MRN: [id number]  YOB: 1960  Date of evaluation: 3/2/2023      Procedure Summary     Date: 03/02/23 Room / Location: [facility]    Anesthesia Start: 0821 Anesthesia Stop: 7266    Procedure: TRANSESOPHAGEAL ECHOCARDIOGRAM Diagnosis: Paroxysmal atrial fibrillation    Scheduled Providers:  Responsible Provider: Ashley Romano MD    Anesthesia Type: MAC ASA Status: 3          Anesthesia Type: No value filed. Ruth Phase I:      Ruth Phase II:        Anesthesia Post Evaluation    Comments: Postoperative Anesthesia Note    Name:    Kimberley Smalls   MRN:      [id number]    Patient Vitals in the past 12 hrs:  03/02/23 0643, Height:5' 11\" (1.803 m), Weight:205 lb 4.8 oz (93.1 kg)     LABS:    CBC  Lab Results       Component                Value               Date/Time                  WBC                      4.8                 03/02/2023 07:04 AM        HGB                      13.7                03/02/2023 07:04 AM        HCT                      42.4                03/02/2023 07:04 AM        PLT                      220                 03/02/2023 07:04 AM   RENAL  Lab Results       Component                Value               Date/Time                  NA                       141                 03/02/2023 07:04 AM        K                        3.9                 03/02/2023 07:04 AM        CL                       104                 03/02/2023 07:04 AM        CO2                      28                  03/02/2023 07:04 AM        BUN                      15                  03/02/2023 07:04 AM        CREATININE               1.0                 03/02/2023 07:04 AM        GLUCOSE                  112 (H)             03/02/2023 07:04 AM        GLUCOSE                  83                  05/22/2012 04:40 PM   COAGS  Lab Results       Component                Value               Date/Time                  PROTIME 13.2                12/27/2022 07:06 AM        INR                      1.01                12/27/2022 07:06 AM        APTT                     29.0                03/01/2011 11:28 AM     Intake & Output:  @23LEVP@    Nausea & Vomiting:  No    Level of Consciousness:  Awake    Pain Assessment:  Adequate analgesia    Anesthesia Complications:  No apparent anesthetic complications    SUMMARY      Vital signs stable  OK to discharge from Stage I post anesthesia care.   Care transferred from Anesthesiology department on discharge from perioperative area

## 2023-03-02 NOTE — DISCHARGE INSTRUCTIONS
Cath Labs at  [facility]                        3/2/2023  Niurka Hall. Date of Birth 1960     TRANSESOPHAGEAL ECHOCARDIOGRAM DISCHARGE INSTRUCTIONS      You have been given sedation for your procedure so you may not drive, operate any machinery, or sign any legal documentation for 24 hours. Do not drink or eat for at least 2 hours AND until your gag reflex returns. Check by touching the back of the tongue to be sure you can gag. Do not drink any alcohol today. Eat soft foods at first then gradually return to your normal diet. CALL YOUR DOCTOR If you should develop a fever of 101, cough up teaspoon amounts of blood, develop severe shortness of breath or chest pain. If symptoms are severe, go to the emergency room. Contact your doctor for a follow-up visit and/or results of your procedure. SEDATION DISCHARGE INSTRUCTION:  For the next 24 hours do not drive a car, operate machinery, power tools or kitchen appliances. Do not drink alcohol; including beer or wine. Do not make any important decisions or sign any important papers. For the next 24 hours you can expect drowsiness, light-headed or dizziness, nausea/ vomiting, inability to concentrate, fatigue and desire to sleep. We strongly suggest that a responsible adult be with for the next 24 hours, for your protection and safety. You are not allowed to drive yourself home, or take any type of public transportation. If any questions, please call your doctor. If you cannot reach your Doctor then call the Emergency Department at  [phone]. Explain to the Emergency Department the procedure you had performed and they will be able to assist you. If you seek Emergency Care, bring this form with you. If your condition worsens or if you have any concerns, call your doctor or seek emergency medical services as needed. If you have any of the following symptoms/conditions, call your doctor. Cath Labs at  [address] Discharge Instructions    3/2/2023  Peter Qiu. Date of Birth 1960       Activity:  No driving for 24 hours  Resume regular activities in 24 hours    Diet  Resume previous diet    Special instructions:  Report any difficulties breathing to doctor or call 911  Report and change in heart rhythm to doctor  Report skin irritation from cardioversion pads to doctor  Report any change in level of consciousness or sensory/motor changes      FOLLOW-UP APPOINTMENTS    [facility] OFFICE - Appointment on March 20th at 8:30am with Ilana Lackey MD, electrophysiologist, [facility]. [facility],  [facility], [address] [facility] [address], [address] [facility], [address] [place] [address]. Office #: [phone]. If you are unable to make this appointment, please call to reschedule. Directions to [employer]  [facility] towards [place]. [address] [facility] exit. Right off exit. Cross over [employer]. Right on [address] Left into hospital. Follow the signs to the emergency room ( turn left toward the Emergency room). Go right at the first stop sign. Just past the Emergency room at the second stop sign turn right and go up the ramp and park on the top level if possible. Go in the glass doors of the [facility] [place] [facility] we on the top level of the garage [address]. As soon as you get in the door turn left and our office is the one with the glass doors.

## 2023-03-17 ENCOUNTER — TELEPHONE (OUTPATIENT)
Dept: CARDIOLOGY CLINIC | Age: 63
End: 2023-03-17

## 2023-03-17 NOTE — TELEPHONE ENCOUNTER
Per [facility] OV note 1/9/2023 patient was on Verapamil 120mg once a day. It had been on hold per OV note 2/15/2023. He was told he could resume verapamil per telephone encounter 3/13/2023. No dosage was stated. LVM for patient. What dose is he taking and what dose was the prescription for? Per our records it shows Verapamil 120mg once a day.

## 2023-03-17 NOTE — TELEPHONE ENCOUNTER
Per KMICHAEL:     Since he is also on amiodarone, would go with the lower (120) dose     Spoke to pt to relay message.  V/U

## 2023-03-17 NOTE — TELEPHONE ENCOUNTER
Please advise what does of Verapamil pt should be taking.  Pt reports that he has been on 240mg QD, however Rx was refilled by [facility] on 1/4/23 for 120mg QD    Last OV [facility] 2/15/23  Cardioversion 3/2/23

## 2023-03-17 NOTE — TELEPHONE ENCOUNTER
Pt called stating he got a new prescription of verapamil and the dosage is different than his current dose and wants to make sure this was intentional. Please advise

## 2023-03-17 NOTE — TELEPHONE ENCOUNTER
Pt returned call to [facility]. Pt states that he has been taking the 240mg Verapamil QD, what he was originally on from [facility]. Pt states his new bottle is for the 120mg QD. Pt just wants to make sure this wasn't an oversight & he should only be taking the 120 mg, not the 240mg. Please advise pt on the correct dose. & thank you.

## 2023-03-23 DIAGNOSIS — I48.91 ATRIAL FIBRILLATION, UNSPECIFIED TYPE (HCC): ICD-10-CM

## 2023-03-23 RX ORDER — VERAPAMIL HYDROCHLORIDE 120 MG/1
CAPSULE, EXTENDED RELEASE ORAL
Qty: 90 CAPSULE | Refills: 1 | Status: SHIPPED | OUTPATIENT
Start: 2023-03-23

## 2023-04-05 ENCOUNTER — NURSE ONLY (OUTPATIENT)
Dept: CARDIOLOGY CLINIC | Age: 63
End: 2023-04-05
Payer: COMMERCIAL

## 2023-04-05 DIAGNOSIS — I48.91 ATRIAL FIBRILLATION, UNSPECIFIED TYPE (HCC): Primary | ICD-10-CM

## 2023-04-05 DIAGNOSIS — Z45.09 ENCOUNTER FOR LOOP RECORDER CHECK: ICD-10-CM

## 2023-04-05 PROCEDURE — 93298 REM INTERROG DEV EVAL SCRMS: CPT | Performed by: INTERNAL MEDICINE

## 2023-04-05 PROCEDURE — G2066 INTER DEVC REMOTE 30D: HCPCS | Performed by: INTERNAL MEDICINE

## 2023-04-06 NOTE — PROGRESS NOTES
We received a remote transmission from patient's monitor at home. Remote Linq report shows AF with RVR. EP physician to review. We will continue to monitor remotely. Implanted for AF management. Pt is on Eliquis. End of 31-day monitoring period 4-5-23.

## 2023-04-13 ENCOUNTER — HOSPITAL ENCOUNTER (INPATIENT)
Dept: CARDIAC CATH/INVASIVE PROCEDURES | Age: 63
LOS: 1 days | Discharge: HOME | DRG: 274 | End: 2023-04-14
Attending: INTERNAL MEDICINE | Admitting: INTERNAL MEDICINE
Payer: COMMERCIAL

## 2023-04-13 LAB
ABO + RH BLD: NORMAL
ANION GAP SERPL CALCULATED.3IONS-SCNC: 8 MMOL/L (ref 3–16)
BLD GP AB SCN SERPL QL: NORMAL
BUN SERPL-MCNC: 11 MG/DL (ref 7–20)
CALCIUM SERPL-MCNC: 8.7 MG/DL (ref 8.3–10.6)
CHLORIDE SERPL-SCNC: 105 MMOL/L (ref 99–110)
CO2 SERPL-SCNC: 30 MMOL/L (ref 21–32)
CREAT SERPL-MCNC: 0.9 MG/DL (ref 0.8–1.3)
DEPRECATED RDW RBC AUTO: 16 % (ref 12.4–15.4)
EKG ATRIAL RATE: 256 BPM
EKG ATRIAL RATE: 70 BPM
EKG DIAGNOSIS: NORMAL
EKG DIAGNOSIS: NORMAL
EKG P AXIS: 77 DEGREES
EKG P AXIS: 90 DEGREES
EKG P-R INTERVAL: 184 MS
EKG Q-T INTERVAL: 468 MS
EKG Q-T INTERVAL: 494 MS
EKG QRS DURATION: 130 MS
EKG QRS DURATION: 134 MS
EKG QTC CALCULATION (BAZETT): 482 MS
EKG QTC CALCULATION (BAZETT): 533 MS
EKG R AXIS: 30 DEGREES
EKG R AXIS: 41 DEGREES
EKG T AXIS: 119 DEGREES
EKG T AXIS: 159 DEGREES
EKG VENTRICULAR RATE: 64 BPM
EKG VENTRICULAR RATE: 70 BPM
GFR SERPLBLD CREATININE-BSD FMLA CKD-EPI: >60 ML/MIN/{1.73_M2}
GLUCOSE SERPL-MCNC: 101 MG/DL (ref 70–99)
HCT VFR BLD AUTO: 42 % (ref 40.5–52.5)
HGB BLD-MCNC: 13.7 G/DL (ref 13.5–17.5)
LV EF: 55 %
LVEF MODALITY: NORMAL
MCH RBC QN AUTO: 28.1 PG (ref 26–34)
MCHC RBC AUTO-ENTMCNC: 32.7 G/DL (ref 31–36)
MCV RBC AUTO: 85.9 FL (ref 80–100)
PLATELET # BLD AUTO: 207 K/UL (ref 135–450)
PMV BLD AUTO: 7.6 FL (ref 5–10.5)
POTASSIUM SERPL-SCNC: 3.7 MMOL/L (ref 3.5–5.1)
RBC # BLD AUTO: 4.88 M/UL (ref 4.2–5.9)
SODIUM SERPL-SCNC: 143 MMOL/L (ref 136–145)
WBC # BLD AUTO: 4 K/UL (ref 4–11)

## 2023-04-13 PROCEDURE — 02583ZZ DESTRUCTION OF CONDUCTION MECHANISM, PERCUTANEOUS APPROACH: ICD-10-PCS | Performed by: INTERNAL MEDICINE

## 2023-04-13 PROCEDURE — C1893 INTRO/SHEATH, FIXED,NON-PEEL: HCPCS

## 2023-04-13 PROCEDURE — 2580000003 HC RX 258: Performed by: ANESTHESIOLOGY

## 2023-04-13 PROCEDURE — 86850 RBC ANTIBODY SCREEN: CPT

## 2023-04-13 PROCEDURE — C2630 CATH, EP, COOL-TIP: HCPCS

## 2023-04-13 PROCEDURE — 2709999900 HC NON-CHARGEABLE SUPPLY

## 2023-04-13 PROCEDURE — 93312 ECHO TRANSESOPHAGEAL: CPT | Performed by: INTERNAL MEDICINE

## 2023-04-13 PROCEDURE — 7100000001 HC PACU RECOVERY - ADDTL 15 MIN

## 2023-04-13 PROCEDURE — C1730 CATH, EP, 19 OR FEW ELECT: HCPCS

## 2023-04-13 PROCEDURE — 85027 COMPLETE CBC AUTOMATED: CPT

## 2023-04-13 PROCEDURE — 6360000002 HC RX W HCPCS: Performed by: INTERNAL MEDICINE

## 2023-04-13 PROCEDURE — 93653 COMPRE EP EVAL TX SVT: CPT | Performed by: INTERNAL MEDICINE

## 2023-04-13 PROCEDURE — 94761 N-INVAS EAR/PLS OXIMETRY MLT: CPT

## 2023-04-13 PROCEDURE — 93010 ELECTROCARDIOGRAM REPORT: CPT | Performed by: INTERNAL MEDICINE

## 2023-04-13 PROCEDURE — 02K83ZZ MAP CONDUCTION MECHANISM, PERCUTANEOUS APPROACH: ICD-10-PCS | Performed by: INTERNAL MEDICINE

## 2023-04-13 PROCEDURE — 2580000003 HC RX 258

## 2023-04-13 PROCEDURE — C1713 ANCHOR/SCREW BN/BN,TIS/BN: HCPCS

## 2023-04-13 PROCEDURE — 2060000000 HC ICU INTERMEDIATE R&B

## 2023-04-13 PROCEDURE — 93005 ELECTROCARDIOGRAM TRACING: CPT | Performed by: INTERNAL MEDICINE

## 2023-04-13 PROCEDURE — 6360000002 HC RX W HCPCS

## 2023-04-13 PROCEDURE — 86901 BLOOD TYPING SEROLOGIC RH(D): CPT

## 2023-04-13 PROCEDURE — 86900 BLOOD TYPING SEROLOGIC ABO: CPT

## 2023-04-13 PROCEDURE — 4A0234Z MEASUREMENT OF CARDIAC ELECTRICAL ACTIVITY, PERCUTANEOUS APPROACH: ICD-10-PCS | Performed by: INTERNAL MEDICINE

## 2023-04-13 PROCEDURE — 3700000000 HC ANESTHESIA ATTENDED CARE

## 2023-04-13 PROCEDURE — 80048 BASIC METABOLIC PNL TOTAL CA: CPT

## 2023-04-13 PROCEDURE — B24BZZ4 ULTRASONOGRAPHY OF HEART WITH AORTA, TRANSESOPHAGEAL: ICD-10-PCS | Performed by: INTERNAL MEDICINE

## 2023-04-13 PROCEDURE — 03HC33Z INSERTION OF INFUSION DEVICE INTO LEFT RADIAL ARTERY, PERCUTANEOUS APPROACH: ICD-10-PCS | Performed by: ANESTHESIOLOGY

## 2023-04-13 PROCEDURE — 2500000003 HC RX 250 WO HCPCS

## 2023-04-13 PROCEDURE — 3700000001 HC ADD 15 MINUTES (ANESTHESIA)

## 2023-04-13 PROCEDURE — 2700000000 HC OXYGEN THERAPY PER DAY

## 2023-04-13 PROCEDURE — 93653 COMPRE EP EVAL TX SVT: CPT

## 2023-04-13 PROCEDURE — 7100000000 HC PACU RECOVERY - FIRST 15 MIN

## 2023-04-13 PROCEDURE — 6370000000 HC RX 637 (ALT 250 FOR IP): Performed by: INTERNAL MEDICINE

## 2023-04-13 PROCEDURE — 2720000010 HC SURG SUPPLY STERILE

## 2023-04-13 PROCEDURE — 93312 ECHO TRANSESOPHAGEAL: CPT

## 2023-04-13 PROCEDURE — C1760 CLOSURE DEV, VASC: HCPCS

## 2023-04-13 PROCEDURE — 85347 COAGULATION TIME ACTIVATED: CPT

## 2023-04-13 PROCEDURE — 6360000002 HC RX W HCPCS: Performed by: ANESTHESIOLOGY

## 2023-04-13 PROCEDURE — C1894 INTRO/SHEATH, NON-LASER: HCPCS

## 2023-04-13 PROCEDURE — 1200000000 HC SEMI PRIVATE

## 2023-04-13 RX ORDER — SODIUM CHLORIDE 0.9 % (FLUSH) 0.9 %
5-40 SYRINGE (ML) INJECTION EVERY 12 HOURS SCHEDULED
Status: DISCONTINUED | OUTPATIENT
Start: 2023-04-13 | End: 2023-04-14

## 2023-04-13 RX ORDER — PRAVASTATIN SODIUM 20 MG
20 TABLET ORAL EVERY EVENING
Status: DISCONTINUED | OUTPATIENT
Start: 2023-04-13 | End: 2023-04-14 | Stop reason: HOSPADM

## 2023-04-13 RX ORDER — KETOROLAC TROMETHAMINE 30 MG/ML
INJECTION, SOLUTION INTRAMUSCULAR; INTRAVENOUS
Status: DISPENSED
Start: 2023-04-13 | End: 2023-04-14

## 2023-04-13 RX ORDER — OXYCODONE HYDROCHLORIDE 5 MG/1
10 TABLET ORAL PRN
Status: ACTIVE | OUTPATIENT
Start: 2023-04-13 | End: 2023-04-13

## 2023-04-13 RX ORDER — CLONAZEPAM 1 MG/1
1 TABLET ORAL
Status: DISCONTINUED | OUTPATIENT
Start: 2023-04-13 | End: 2023-04-14 | Stop reason: HOSPADM

## 2023-04-13 RX ORDER — SODIUM CHLORIDE 0.9 % (FLUSH) 0.9 %
5-40 SYRINGE (ML) INJECTION EVERY 12 HOURS SCHEDULED
Status: DISCONTINUED | OUTPATIENT
Start: 2023-04-13 | End: 2023-04-14 | Stop reason: HOSPADM

## 2023-04-13 RX ORDER — LABETALOL HYDROCHLORIDE 5 MG/ML
5 INJECTION, SOLUTION INTRAVENOUS EVERY 10 MIN PRN
Status: DISCONTINUED | OUTPATIENT
Start: 2023-04-13 | End: 2023-04-14 | Stop reason: HOSPADM

## 2023-04-13 RX ORDER — KETOROLAC TROMETHAMINE 30 MG/ML
15 INJECTION, SOLUTION INTRAMUSCULAR; INTRAVENOUS EVERY 6 HOURS PRN
Status: DISCONTINUED | OUTPATIENT
Start: 2023-04-13 | End: 2023-04-14 | Stop reason: HOSPADM

## 2023-04-13 RX ORDER — HEPARIN SODIUM 1000 [USP'U]/ML
INJECTION, SOLUTION INTRAVENOUS; SUBCUTANEOUS
Status: COMPLETED | OUTPATIENT
Start: 2023-04-13 | End: 2023-04-13

## 2023-04-13 RX ORDER — TAMSULOSIN HYDROCHLORIDE 0.4 MG/1
0.4 CAPSULE ORAL DAILY
Status: DISCONTINUED | OUTPATIENT
Start: 2023-04-13 | End: 2023-04-14 | Stop reason: HOSPADM

## 2023-04-13 RX ORDER — SODIUM CHLORIDE 9 MG/ML
INJECTION, SOLUTION INTRAVENOUS PRN
Status: DISCONTINUED | OUTPATIENT
Start: 2023-04-13 | End: 2023-04-14 | Stop reason: HOSPADM

## 2023-04-13 RX ORDER — ONDANSETRON 2 MG/ML
4 INJECTION INTRAMUSCULAR; INTRAVENOUS EVERY 6 HOURS PRN
Status: DISCONTINUED | OUTPATIENT
Start: 2023-04-13 | End: 2023-04-14 | Stop reason: HOSPADM

## 2023-04-13 RX ORDER — OXYCODONE HYDROCHLORIDE 5 MG/1
5 TABLET ORAL PRN
Status: ACTIVE | OUTPATIENT
Start: 2023-04-13 | End: 2023-04-13

## 2023-04-13 RX ORDER — SODIUM CHLORIDE 0.9 % (FLUSH) 0.9 %
5-40 SYRINGE (ML) INJECTION PRN
Status: DISCONTINUED | OUTPATIENT
Start: 2023-04-13 | End: 2023-04-14 | Stop reason: HOSPADM

## 2023-04-13 RX ORDER — SODIUM CHLORIDE, SODIUM LACTATE, POTASSIUM CHLORIDE, CALCIUM CHLORIDE 600; 310; 30; 20 MG/100ML; MG/100ML; MG/100ML; MG/100ML
INJECTION, SOLUTION INTRAVENOUS CONTINUOUS
Status: DISCONTINUED | OUTPATIENT
Start: 2023-04-13 | End: 2023-04-14

## 2023-04-13 RX ORDER — ACETAMINOPHEN 325 MG/1
650 TABLET ORAL EVERY 4 HOURS PRN
Status: DISCONTINUED | OUTPATIENT
Start: 2023-04-13 | End: 2023-04-14 | Stop reason: HOSPADM

## 2023-04-13 RX ORDER — FAMOTIDINE 10 MG/ML
20 INJECTION, SOLUTION INTRAVENOUS ONCE
Status: DISCONTINUED | OUTPATIENT
Start: 2023-04-13 | End: 2023-04-14 | Stop reason: HOSPADM

## 2023-04-13 RX ORDER — ONDANSETRON 2 MG/ML
4 INJECTION INTRAMUSCULAR; INTRAVENOUS
Status: DISCONTINUED | OUTPATIENT
Start: 2023-04-13 | End: 2023-04-14 | Stop reason: HOSPADM

## 2023-04-13 RX ORDER — SODIUM CHLORIDE 9 MG/ML
INJECTION, SOLUTION INTRAVENOUS PRN
Status: DISCONTINUED | OUTPATIENT
Start: 2023-04-13 | End: 2023-04-14

## 2023-04-13 RX ORDER — SODIUM CHLORIDE 9 MG/ML
INJECTION, SOLUTION INTRAVENOUS CONTINUOUS PRN
Status: DISCONTINUED | OUTPATIENT
Start: 2023-04-13 | End: 2023-04-14 | Stop reason: HOSPADM

## 2023-04-13 RX ORDER — MEPERIDINE HYDROCHLORIDE 50 MG/ML
12.5 INJECTION INTRAMUSCULAR; INTRAVENOUS; SUBCUTANEOUS EVERY 5 MIN PRN
Status: DISCONTINUED | OUTPATIENT
Start: 2023-04-13 | End: 2023-04-14 | Stop reason: HOSPADM

## 2023-04-13 RX ORDER — LORAZEPAM 0.5 MG/1
0.5 TABLET ORAL
Status: ACTIVE | OUTPATIENT
Start: 2023-04-13 | End: 2023-04-14

## 2023-04-13 RX ORDER — DIPHENHYDRAMINE HYDROCHLORIDE 50 MG/ML
12.5 INJECTION INTRAMUSCULAR; INTRAVENOUS
Status: DISCONTINUED | OUTPATIENT
Start: 2023-04-13 | End: 2023-04-14 | Stop reason: HOSPADM

## 2023-04-13 RX ORDER — SODIUM CHLORIDE 0.9 % (FLUSH) 0.9 %
5-40 SYRINGE (ML) INJECTION PRN
Status: DISCONTINUED | OUTPATIENT
Start: 2023-04-13 | End: 2023-04-14

## 2023-04-13 RX ADMIN — APIXABAN 5 MG: 5 TABLET, FILM COATED ORAL at 17:22

## 2023-04-13 RX ADMIN — KETOROLAC TROMETHAMINE 15 MG: 30 INJECTION, SOLUTION INTRAMUSCULAR at 13:43

## 2023-04-13 RX ADMIN — APIXABAN 5 MG: 5 TABLET, FILM COATED ORAL at 20:39

## 2023-04-13 RX ADMIN — HYDROMORPHONE HYDROCHLORIDE 0.5 MG: 1 INJECTION, SOLUTION INTRAMUSCULAR; INTRAVENOUS; SUBCUTANEOUS at 13:49

## 2023-04-13 RX ADMIN — CLONAZEPAM 1 MG: 1 TABLET ORAL at 20:41

## 2023-04-13 RX ADMIN — Medication 10 ML: at 20:39

## 2023-04-13 RX ADMIN — PRAVASTATIN SODIUM 20 MG: 20 TABLET ORAL at 17:22

## 2023-04-13 RX ADMIN — HEPARIN SODIUM 7000 UNITS: 1000 INJECTION, SOLUTION INTRAVENOUS; SUBCUTANEOUS at 09:50

## 2023-04-13 RX ADMIN — SODIUM CHLORIDE 1000 ML: 9 INJECTION, SOLUTION INTRAVENOUS at 09:22

## 2023-04-13 RX ADMIN — TAMSULOSIN HYDROCHLORIDE 0.4 MG: 0.4 CAPSULE ORAL at 17:22

## 2023-04-13 ASSESSMENT — PAIN DESCRIPTION - DESCRIPTORS
DESCRIPTORS: ACHING
DESCRIPTORS: ACHING

## 2023-04-13 ASSESSMENT — PAIN DESCRIPTION - PAIN TYPE: TYPE: ACUTE PAIN;CHRONIC PAIN

## 2023-04-13 ASSESSMENT — ENCOUNTER SYMPTOMS
SHORTNESS OF BREATH: 0
RIGHT EYE: 0
STRIDOR: 0
LEFT EYE: 0
HEMATEMESIS: 0
HEMATOCHEZIA: 0
WHEEZING: 0

## 2023-04-13 ASSESSMENT — PAIN SCALES - GENERAL
PAINLEVEL_OUTOF10: 0
PAINLEVEL_OUTOF10: 8
PAINLEVEL_OUTOF10: 4

## 2023-04-13 ASSESSMENT — PAIN DESCRIPTION - ORIENTATION
ORIENTATION: LOWER;MID
ORIENTATION: LOWER;MID

## 2023-04-13 ASSESSMENT — PAIN DESCRIPTION - LOCATION
LOCATION: BACK
LOCATION: BACK

## 2023-04-13 ASSESSMENT — PAIN DESCRIPTION - FREQUENCY: FREQUENCY: CONTINUOUS

## 2023-04-13 NOTE — DISCHARGE INSTRUCTIONS
FOLLOW-UP APPOINTMENTS    [facility] OFFICE - Appointment on May 15th at 11:15am with Mirian Claros MD, electrophysiologist, [facility]. [facility],  [facility], [address] [place] [address] [address], [address] [facility], [address] [place] [address]. Office #: [phone]. If you are unable to make this appointment, please call to reschedule. Directions to [place] [address] towards [place]. [address] [facility] exit. Right off exit. Cross over [employer]. Right on [address] Left into hospital. Follow the signs to the emergency room ( turn left toward the Emergency room). Go right at the first stop sign. Just past the Emergency room at the second stop sign turn right and go up the ramp and park on the top level if possible. Go in the glass doors of the [facility] we on the top level of the garage [address]. As soon as you get in the door turn left and our office is the one with the glass doors.

## 2023-04-13 NOTE — PROGRESS NOTES
TR band removed as ordered. Pt tolerated well. Bedrest completed. Pt ambulatory to bathroom with minimal assistance. Cath sites remain clean dry and soft. Dressings in place. VSS.

## 2023-04-13 NOTE — H&P
Cardiac Electrophysiology H&P Note    Assessment:     1. Atrial fibrillation: patient had Persistent atrial fibrillation prior to ablation. Since then, mostly in atypical atrial flutter. Associated symptoms: Fatigue and Palpitations      History of cardioversion: Had electrical cardioversion in the past (multiple)  History of AF ablation: Had atrial fibrillation ablation in the past (December 2022)  History of heart surgery/procedure: yes, cardioversion and ablation     Current use of anti-arrhythmic drugs: Not currently on anti-arrhythmic drugs  Previous use of anti-arrhythmic drugs: amiodarone and dofetilide     Overall LV function: Normal left ventricular systolic function  Size of left atrium: Severely dilated LA size  Significant cardiac valvular disease: No significant valve disease  Family history of atrial fibrillation: Unknown     Alcohol consumption: Mild alcohol intake  Caffeine consumption: Mild caffeine intake  Smoking status: non-smoker  Obstructive sleep apnea: No/low suspicion  Exercise status: Occasional exercise, not routine     Patient has hypertrophic cardiomyopathy. Stroke risk is elevated  Longterm anticoagulation is: recommended  Current anticoagulation: Apixaban  Bleeding issues reported: no  Renal function: Normal     In setting of HCM, and development of persistent atrial fibrillation, being quite symptomatic and having failed multiple anti-arrhythmic drug therapy options (most recently dofetilide), it became clear that the option of AF ablation should be considered. After discussions with patient, we proceeded with attempted AF ablation in early December 2022. Procedure was complicated by pericardial effusion requiring emergent pericardiocentesis. We only managed ablation of the cavo-tricuspid isthmus during that procedure. Patient came back for a second attempt on 12/28/2022. We performed pulmonary vein isolation with roof line and posterior wall ablation.  There were no acute

## 2023-04-13 NOTE — PROGRESS NOTES
Vitals:    04/13/23 1422   BP: 115/81   Pulse: 78   Resp: 18   Temp:    SpO2: 93%        Pt arrived from PACU. VSS. Cath sites clean dry and soft. TR band remains in place. Pt able to sit up at 1430 per PACU RN.CMU aware of pt transfer.

## 2023-04-13 NOTE — PROCEDURES
ablation Location: Cavo-Tricuspid isthmus    2. Right femoral vein:       Sheath size: 8 F  Catheter type: HD Grid Location: Right atrium    3. Left femoral vein:         Sheath size: 7F  Catheter type: Decapolar Location: Coronary Sinus    4. Left femoral vein:         Sheath size: 7F  Catheter type: Quadripolar  Location: His Bundle Electrogram area        Baseline parameters (msec):    QRS duration: 141       QT interval: 486     Baseline cycle length: 1024    HV interval: 44         Arrhythmia Induction:  The patient presented to the electrophysiology laboratory in atrial arrhythmia appearing to represent atrial flutter. The atrial tachycardia cycle length was 268 msec. Activation mapping demonstrated counterclockwise movement consistent with typical cavotricuspid isthmus dependent atrial flutter. Entrainment was performed from the proximal coronary sinus region, lateral right atrium and cavotricuspid isthmus and demonstrated concealed entrainment with a short return cycle length consistent with critical areas of the circuit. The diagnosis of typical cavotricuspid isthmus dependent flutter was made. Following the diagnosis of counterclockwise cavotricuspid isthmus dependent flutter a guidewire was advanced through the 8 F sheath in the right femoral vein into the SVC. The sheath was removed and a SR0 sheath was advance over a dilator into the SVC. After removal of the dilator and guidewire, a TactiCath D/F ablation catheter was advanced into position along the ventricular septal aspect of the cavotricuspid isthmus under fluoroscopic guidance. Electroanatomical Mapping:    Using the ablation catheter, a shell map of the right atrium was created during atrial arrhythmia using the electroanatomical mapping system. The locations of the His Bundle electrograms, the coronary sinus ostium and the tricuspid valve annulus were noted on the 3-D electroanatomical map.  The system was also used to maddi ablation

## 2023-04-14 ENCOUNTER — APPOINTMENT (OUTPATIENT)
Dept: GENERAL RADIOLOGY | Age: 63
DRG: 274 | End: 2023-04-14
Attending: INTERNAL MEDICINE
Payer: COMMERCIAL

## 2023-04-14 VITALS
TEMPERATURE: 97.7 F | OXYGEN SATURATION: 93 % | WEIGHT: 205 LBS | HEART RATE: 87 BPM | BODY MASS INDEX: 28.7 KG/M2 | RESPIRATION RATE: 16 BRPM | HEIGHT: 71 IN | DIASTOLIC BLOOD PRESSURE: 90 MMHG | SYSTOLIC BLOOD PRESSURE: 153 MMHG

## 2023-04-14 LAB
ANION GAP SERPL CALCULATED.3IONS-SCNC: 11 MMOL/L (ref 3–16)
BUN SERPL-MCNC: 14 MG/DL (ref 7–20)
CALCIUM SERPL-MCNC: 8.8 MG/DL (ref 8.3–10.6)
CHLORIDE SERPL-SCNC: 103 MMOL/L (ref 99–110)
CO2 SERPL-SCNC: 23 MMOL/L (ref 21–32)
CREAT SERPL-MCNC: 0.9 MG/DL (ref 0.8–1.3)
DEPRECATED RDW RBC AUTO: 16.1 % (ref 12.4–15.4)
EKG ATRIAL RATE: 78 BPM
EKG DIAGNOSIS: NORMAL
EKG P AXIS: 77 DEGREES
EKG P-R INTERVAL: 168 MS
EKG Q-T INTERVAL: 418 MS
EKG QRS DURATION: 132 MS
EKG QTC CALCULATION (BAZETT): 476 MS
EKG R AXIS: 46 DEGREES
EKG T AXIS: 118 DEGREES
EKG VENTRICULAR RATE: 78 BPM
GFR SERPLBLD CREATININE-BSD FMLA CKD-EPI: >60 ML/MIN/{1.73_M2}
GLUCOSE SERPL-MCNC: 163 MG/DL (ref 70–99)
HCT VFR BLD AUTO: 38.9 % (ref 40.5–52.5)
HGB BLD-MCNC: 12.5 G/DL (ref 13.5–17.5)
MCH RBC QN AUTO: 27.9 PG (ref 26–34)
MCHC RBC AUTO-ENTMCNC: 32.1 G/DL (ref 31–36)
MCV RBC AUTO: 86.9 FL (ref 80–100)
PLATELET # BLD AUTO: 182 K/UL (ref 135–450)
PMV BLD AUTO: 8 FL (ref 5–10.5)
POTASSIUM SERPL-SCNC: 4.2 MMOL/L (ref 3.5–5.1)
RBC # BLD AUTO: 4.47 M/UL (ref 4.2–5.9)
SODIUM SERPL-SCNC: 137 MMOL/L (ref 136–145)
WBC # BLD AUTO: 7 K/UL (ref 4–11)

## 2023-04-14 PROCEDURE — 99239 HOSP IP/OBS DSCHRG MGMT >30: CPT

## 2023-04-14 PROCEDURE — 36415 COLL VENOUS BLD VENIPUNCTURE: CPT

## 2023-04-14 PROCEDURE — 74018 RADEX ABDOMEN 1 VIEW: CPT

## 2023-04-14 PROCEDURE — 85027 COMPLETE CBC AUTOMATED: CPT

## 2023-04-14 PROCEDURE — 80048 BASIC METABOLIC PNL TOTAL CA: CPT

## 2023-04-14 PROCEDURE — 2580000003 HC RX 258: Performed by: INTERNAL MEDICINE

## 2023-04-14 PROCEDURE — 6370000000 HC RX 637 (ALT 250 FOR IP): Performed by: INTERNAL MEDICINE

## 2023-04-14 PROCEDURE — 6360000002 HC RX W HCPCS: Performed by: INTERNAL MEDICINE

## 2023-04-14 PROCEDURE — 93005 ELECTROCARDIOGRAM TRACING: CPT

## 2023-04-14 PROCEDURE — 93010 ELECTROCARDIOGRAM REPORT: CPT | Performed by: INTERNAL MEDICINE

## 2023-04-14 RX ADMIN — Medication 10 ML: at 05:37

## 2023-04-14 RX ADMIN — KETOROLAC TROMETHAMINE 15 MG: 30 INJECTION, SOLUTION INTRAMUSCULAR at 05:36

## 2023-04-14 RX ADMIN — APIXABAN 5 MG: 5 TABLET, FILM COATED ORAL at 08:48

## 2023-04-14 RX ADMIN — TAMSULOSIN HYDROCHLORIDE 0.4 MG: 0.4 CAPSULE ORAL at 08:48

## 2023-04-14 ASSESSMENT — PAIN DESCRIPTION - LOCATION: LOCATION: NECK

## 2023-04-14 ASSESSMENT — PAIN SCALES - GENERAL
PAINLEVEL_OUTOF10: 0
PAINLEVEL_OUTOF10: 8

## 2023-04-14 ASSESSMENT — PAIN DESCRIPTION - DESCRIPTORS: DESCRIPTORS: DISCOMFORT

## 2023-04-14 NOTE — DISCHARGE SUMMARY
Patient ID:  Attila Arrington  [id number]  61 y.o.  1960    Admit date: 4/13/2023    Discharge date and time: 4/14/2023    Admitting Physician: Mirian Claros MD     Discharge NP: AIDAN Cohen    Admission Diagnoses: Typical atrial flutter [I48.3]  Atrial flutter [facility]) [I48.92]    Discharge Diagnoses: SAME    Admission Condition: fair    Discharged Condition: good    Hospital Course: Attila Hernandez was admitted on 4/13/2023 and had an EPS and ablation of typical atrial flutter with achievement of bidirectional cavotricuspid isthmus block. Rhythm has been SR with rates in the 70's-80's. He reports new onset diarrhea after breakfast this morning. He had a brief episode of chest pain last evening with no recurrence. He denies chest pain, palpitations, shortness of breath, and dizziness. He has eaten, ambulated, and voided.      Consults: none    Assessment:   Paroxsymal atrial fibrillation: stable              -[id number] score 0   -s/p RFCA of AF 12/27/2022             -previously on Tikosyn, but AF recurrent  Presumed typical atrial flutter: stable              -s/p EPS and RFCA of CTI dependent AFL 12/5/2022   -s/p EPS and RFCA of typical atrial flutter with achievement of bidirectional cavotricuspid isthmus block   RBBB: stable  PVC's: stable   NSVT: stable               -noted on event monitor 7/2020  S/P loop recorder implant   HLD  HOCM              -per cardiac MRI 2018 without LVOT obstruction   Chronic pain   Sleep apnea   GERD       Plan:   Discontinue verapamil due to abnormal sinus node function  Continue Eliquis  Remote device transmissions every month      Post procedure instructions reviewed  Follow up in office on 5/17/2023    Discharge Exam:  BP (!) 136/94   Pulse 81   Temp 97.7 °F (36.5 °C) (Oral)   Resp 16   Ht 5' 11\" (1.803 m)   Wt 205 lb (93 kg)   SpO2 93%   BMI 28.59 kg/m²     General Appearance:    Alert, cooperative, no distress, appears stated age   Head:

## 2023-04-14 NOTE — CARE COORDINATION
discharge: N/A            Potential DME:    Patient expects to discharge to: [facility] for transportation at discharge:      Financial    Payor: Iasbella Mancini / Plan: Phuong Sewell PPO / Product Type: *No Product type* /     Does insurance require precert for SNF: Yes    Potential assistance Purchasing Medications: (P) No  Meds-to-Beds request: Yes      [id number], [address]  [address] [address]  Phone: [phone] Fax: [phone]      Notes:    Factors facilitating achievement of predicted outcomes: Family support, Motivated, Cooperative, and Pleasant    Barriers to discharge: Medical complications    Additional Case Management Notes: Referred to patient for d/c planning. Spoke to patient. Patient is a 61year old male admitted for a flutter. Patient lives at home with wife. Patient reports he is independent in ADLs. Patient denies d/c needs at this time. The Plan for Transition of Care is related to the following treatment goals of Typical atrial flutter [I48.3]  Atrial flutter ([address] [J37.18]    IF APPLICABLE: The Patient and/or patient representative Juwan Aguirre and his family were provided with a choice of provider and agrees with the discharge plan. Freedom of choice list with basic dialogue that supports the patient's individualized plan of care/goals and shares the quality data associated with the providers was provided to:     Patient Representative Name:       The Patient and/or Patient Representative Agree with the Discharge Plan?       KALLI Haq, CHELSY   Case Management Department  Ph: [phone] Fax: [phone]

## 2023-04-14 NOTE — FLOWSHEET NOTE
04/13/23 1951   Assessment   Charting Type Shift assessment   Psychosocial   Psychosocial (WDL) WDL   Neurological   Neuro (WDL) WDL   Level of Consciousness 0   Menard Coma Scale   Eye Opening 4   Best Verbal Response 5   Best Motor Response 6   Menard Coma Scale Score 15   HEENT (Head, Ears, Eyes, Nose, & Throat)   HEENT (WDL) WDL   Respiratory   Respiratory (WDL) WDL   Breath Sounds   Right Upper Lobe Clear   Right Middle Lobe Clear   Right Lower Lobe Clear   Left Upper Lobe Clear   Left Lower Lobe Clear   Cardiac   Cardiac (WDL) X   Cardiac Regularity Regular   Heart Sounds S1, S2;Murmur   Cardiac Rhythm Sinus rhythm   Gastrointestinal   Abdominal (WDL) WDL   Genitourinary   Genitourinary (WDL) WDL   Peripheral Vascular   Peripheral Vascular (WDL) WDL   RUE Neurovascular Assessment   R Radial Pulse +2 (Moderate)   LUE Neurovascular Assessment   L Radial Pulse +2 (Moderate)   RLE Neurovascular Assessment   Capillary Refill Less than/Equal to 3 seconds   Temperature Warm   RLE Sensation  Full sensation   R Pedal Pulse +1   LLE Neurovascular Assessment   Capillary Refill Less than/Equal to 3 seconds   Temperature Warm   LLE Sensation  Full sensation   L Pedal Pulse +1   Puncture Site Assessment 1   Location Femoral - right   Site Assessment No redness, drainage, swelling or hematoma   Hemostasis Intervention Closure device   Dressing Applied Transparent occlusive dressing   Location 2 Femoral-left   Site Assessment 2 No redness,drainage,swelling or hematoma   Hemostasis Intervention 2 Closure device    Dressing Applied 2 Transparent occlusive   Location 3 Radial-right   Site Assessment 3 No redness,drainage,swelling or hematoma   Dressing Applied 3 Transparent occlusive   Skin Integumentary    Skin Integumentary (WDL) WDL   Musculoskeletal   Musculoskeletal (WDL) WDL

## 2023-04-14 NOTE — PROGRESS NOTES
Pt. with complaints of multiple episodes of diarrhea throughout the day. [place] EP made aware. KUB ordered.

## 2023-04-14 NOTE — PLAN OF CARE
Problem: Pain  Goal: Verbalizes/displays adequate comfort level or baseline comfort level  4/14/2023 1205 by Nadeem Hyatt RN  Outcome: Progressing  Note: Pt will be satisfied with pain control. Pt uses numeric pain rating scale with reassessments after pain med administration. Will continue to monitor progression throughout shift. Problem: Safety - Adult  Goal: Free from fall injury  4/14/2023 1205 by Nadeem Hyatt RN  Outcome: Progressing  Note: Pt will remain free from falls throughout hospital stay. Fall precautions in place, bed in lowest position with wheels locked and side rails 2/4 up. Room door open and hourly rounding completed. Will continue to monitor throughout shift.

## 2023-04-14 NOTE — PLAN OF CARE
Patient reported new onset diarrhea after breakfast this morning. This afternoon he voiced concern to his nurse that he felt he was developing a bowel obstruction as he has had one in the past. KUB ordered and completed. KUB reviewed and gas is seen in the stomach, colon, and small bowel. No concern for bowel obstruction. Okay to discharge home today.       Kala Officer, AIDAN [address]   [phone]

## 2023-04-14 NOTE — PROGRESS NOTES
Assessment completed and medications given. Patient is A&O and denies any needs at this time. Call light and personal belongings are within reach. Bed is in the lowest and locked position and 2/4 bed rails are up.

## 2023-04-17 ENCOUNTER — NURSE ONLY (OUTPATIENT)
Dept: CARDIOLOGY CLINIC | Age: 63
End: 2023-04-17

## 2023-04-17 ENCOUNTER — TELEPHONE (OUTPATIENT)
Dept: CARDIOLOGY CLINIC | Age: 63
End: 2023-04-17

## 2023-04-17 DIAGNOSIS — Z45.09 ENCOUNTER FOR LOOP RECORDER CHECK: ICD-10-CM

## 2023-04-17 NOTE — TELEPHONE ENCOUNTER
Sorry for the confusion - meant to include this also. Update from [address] [place] [address] Ave -   1 AF event recorded 4/16. Presenting rhythm appears to show AT/AF however not clear on whether or not 4/16 event is still ongoing? AT/AF burden since 4/15 - 0.6%. See Paceart report under the Cardiology tab.

## 2023-04-17 NOTE — TELEPHONE ENCOUNTER
Pt had ablation 4/13/23. Pt is having fatigue,weakness, diarrhea and noticed he has a lump on his neck which is sore to the touch. Pt also back in a-fib.

## 2023-04-17 NOTE — PROGRESS NOTES
Care alert for pause on 4/13/23 @ 1010. EGM shows ? AVB (known hx transient AVB). 4/13/23 AFL ablation. Longest AF (last 90 days): (ID# [id number]) 06-Mar-2023, Duration: 16:30:00   Since 3/28/23 % of Time in AT/AF 19.8%. (eliquis). Remote transmission received for patients ILR. EP physician will review. See interrogation under the cardiology tab in the [address] [place] [address] [address] field for more details. Will continue to monitor remotely. ILR implanted 12/11/20 by Dr Goran Little for PAF/NSVT. Tabby Zhu Hx brief pauses in AF, suggestive of transient AV block (OV JMB 7/27/22). suspect his ILR is under-counting atrial flutter burden due to its regular (and not tachycardic) nature. In sinus rhythm intermittently. He was on amiodarone 200 mg daily but then stopped. Atrial fibrillation: patient had Persistent atrial fibrillation prior to ablation.  Since then, mostly in atypical atrial flutter

## 2023-04-17 NOTE — TELEPHONE ENCOUNTER
Spoke with patient. He is sending a transmission. He feels like he is in AF. He does not think he has a fever, but does have a headache. He has no way of checking his BP. Current HR 96 bpm.     I gave him [employer] stay connected number- he seemed to be having issues sending transmission.

## 2023-04-18 ENCOUNTER — OFFICE VISIT (OUTPATIENT)
Dept: FAMILY MEDICINE CLINIC | Age: 63
End: 2023-04-18
Payer: COMMERCIAL

## 2023-04-18 VITALS — HEART RATE: 50 BPM | BODY MASS INDEX: 28.06 KG/M2 | WEIGHT: 200.4 LBS | HEIGHT: 71 IN | OXYGEN SATURATION: 98 %

## 2023-04-18 DIAGNOSIS — K21.9 GASTROESOPHAGEAL REFLUX DISEASE WITHOUT ESOPHAGITIS: ICD-10-CM

## 2023-04-18 DIAGNOSIS — R10.30 LOWER ABDOMINAL PAIN: ICD-10-CM

## 2023-04-18 DIAGNOSIS — R09.89 TENDER LYMPH NODE: Primary | ICD-10-CM

## 2023-04-18 PROCEDURE — 99214 OFFICE O/P EST MOD 30 MIN: CPT | Performed by: FAMILY MEDICINE

## 2023-04-18 RX ORDER — OMEPRAZOLE 40 MG/1
CAPSULE, DELAYED RELEASE ORAL
Qty: 90 CAPSULE | Refills: 1 | OUTPATIENT
Start: 2023-04-18

## 2023-04-18 RX ORDER — OMEPRAZOLE 20 MG/1
20 CAPSULE, DELAYED RELEASE ORAL DAILY
Qty: 90 CAPSULE | Refills: 1 | Status: SHIPPED | OUTPATIENT
Start: 2023-04-18

## 2023-04-18 ASSESSMENT — ENCOUNTER SYMPTOMS
NAUSEA: 0
CONSTIPATION: 0
DIARRHEA: 0
VOMITING: 0
ABDOMINAL PAIN: 1

## 2023-04-18 NOTE — TELEPHONE ENCOUNTER
[employer]. [phone] (home) [phone] (work)   is requesting refill(s) of medication [facility]    Last OV 2/27/23 (pertaining to medication)   Last refill  (per medication requested)  Next office visit scheduled or attempted No  Date   If No, reason Pt is due in August.

## 2023-04-18 NOTE — PROGRESS NOTES
Patient:  Leonel Ken Jr.is a 61 y.o. [place] presents today with the following Chief Complaint(s):  Chief Complaint   Patient presents with    Mass     Pt states that he was in the hospital on 4/5/23 for A-Fib. He states that after he was released, he was having stomach cramps and diarrhea on Friday afternoon. He states that all of that has resolved except for some of the stomach pain. He states that he noticed a marble sized lump on the left side of his throat. Patient was in the hospital for his third cardiac ablation. On 4/14/2023 he was discharged home. On that day he states he developed abdominal pain and diarrhea. He had an x-ray of his abdomen before he left the hospital that did not show any small bowel obstruction. He was having diarrhea at times bloody. No vomiting no fever. He states that the diarrhea has resolved. His appetite has improved but is not back to baseline. No vomiting. He says his abdominal pain is a 4 out of 10. It is not increasing but it is also not resolving. Patient also happened to notice a marble size lump in his neck and is concerned that it is a lymph node. The area is tender. Denies sore throat URI symptoms no head congestion no ear pain. Patient was recently seen by endocrinology for hyperthyroidism. He had a thyroid ultrasound done which did not show any abnormal lymphadenopathy.         Current Outpatient Medications   Medication Sig Dispense Refill    omeprazole (PRILOSEC) 20 MG delayed release capsule Take 1 capsule by mouth daily      pravastatin (PRAVACHOL) 20 MG tablet Take 1 tablet by mouth every evening 90 tablet 1    Cyanocobalamin (B-12 PO) Take by mouth      b complex vitamins capsule Take 1 capsule by mouth daily      apixaban (ELIQUIS) 5 MG TABS tablet TAKE 1 TABLET BY MOUTH TWICE DAILY 180 tablet 3    tamsulosin (FLOMAX) 0.4 MG capsule Take 1 capsule by mouth daily      clonazePAM (KLONOPIN) 1 MG tablet TAKE 1 TABLET BY MOUTH EVERY NIGHT AS

## 2023-04-18 NOTE — TELEPHONE ENCOUNTER
MD Michell Bragg Linker, [address] [facility]; [facility] Staff 15 hours ago (5:02 PM)     KA  Please let him know this is atrial fibrillation and NOT the atrial flutter we ablated on April 13. Please stay off slowing down medications and let us see how this arrhythmia develops/behaves. As long as he is ok overall with no marked symptoms      LMOVM for pt to return call back regarding KXA message.

## 2023-04-18 NOTE — TELEPHONE ENCOUNTER
Pt returned call to [facility]. Relayed KMICHAEL's message. Pt would like to know if he should continue to be off of the Verapamil? ? Also, pt would like to make aware that he spoke with [employer] yesterday (04/17/23) & they will be sending him another handset for his transmitter & will send his transmission once he receives.

## 2023-04-19 LAB
POC ACT LR: 174 SEC
POC ACT LR: 233 SEC
POC ACT LR: 329 SEC

## 2023-05-10 ENCOUNTER — NURSE ONLY (OUTPATIENT)
Dept: CARDIOLOGY CLINIC | Age: 63
End: 2023-05-10

## 2023-05-10 DIAGNOSIS — I48.0 PAF (PAROXYSMAL ATRIAL FIBRILLATION) (HCC): ICD-10-CM

## 2023-05-10 DIAGNOSIS — Z45.09 ENCOUNTER FOR LOOP RECORDER CHECK: Primary | ICD-10-CM

## 2023-05-11 DIAGNOSIS — G47.00 INSOMNIA, UNSPECIFIED TYPE: ICD-10-CM

## 2023-05-11 RX ORDER — CLONAZEPAM 1 MG/1
TABLET ORAL
Qty: 30 TABLET | Refills: 2 | Status: SHIPPED | OUTPATIENT
Start: 2023-05-11 | End: 2023-08-18

## 2023-05-17 ENCOUNTER — TELEPHONE (OUTPATIENT)
Dept: CARDIOLOGY CLINIC | Age: 63
End: 2023-05-17

## 2023-05-17 NOTE — TELEPHONE ENCOUNTER
----- Message from Franko Ramires MD sent at 5/17/2023  4:34 PM EDT -----  Reviewed  Pause noted during EP study (coincides with date of record on ILR)  Will schedule in clinic for follow up

## 2023-05-30 ASSESSMENT — ENCOUNTER SYMPTOMS
LEFT EYE: 0
HEMATOCHEZIA: 0
SHORTNESS OF BREATH: 0
RIGHT EYE: 0
HEMATEMESIS: 0
STRIDOR: 0
WHEEZING: 0

## 2023-05-30 NOTE — PROGRESS NOTES
mitral and aortic regurgitation. Limited Echocardiogram  (Date: 12/31/2022)  Summary   Limited exam for RV function and pericardial effusion. -- Left ventricular systolic function is normal with ejection fraction   estimated at 55%. No regional wall motion abnormalities. The right ventricle   is normal in size and function. -- Systolic pulmonary artery pressure (SPAP) is estimated at 59 mmHg   consistent with moderate pulmonary hypertension (Right atrial pressure of 8   mmHg). There is a small posterior pericardial effusion noted. Echocardiogram  (Date: 10/2019)  Summary   Normal systolic function with an estimated ejection fraction of 55-60%. Moderate concentric left ventricular hypertrophy ( septal wall is more   increased in size (1.6 cm) ). No regional wall motion abnormalities are seen. Normal left ventricular diastolic filling pressure. The left atrium is severely dilated. The right atrium is mildly dilated. Mild aortic regurgitation. Mild mitral and pulmonic regurgitation. Stress Test (Date: 6/20/2022)   Summary  Normal myocardial perfusion during rest and stress with normal left  ventricular function/wall motion. The left ventricular size is mildly dilated. The estimated left ventricular function is 63%.      Current Medications     Current Outpatient Medications   Medication Sig Dispense Refill    clonazePAM (KLONOPIN) 1 MG tablet TAKE 1 TABLET BY MOUTH EVERY NIGHT AS NEEDED 30 tablet 2    omeprazole (PRILOSEC) 20 MG delayed release capsule Take 1 capsule by mouth daily 90 capsule 1    pravastatin (PRAVACHOL) 20 MG tablet Take 1 tablet by mouth every evening 90 tablet 1    Cyanocobalamin (B-12 PO) Take by mouth      b complex vitamins capsule Take 1 capsule by mouth daily      apixaban (ELIQUIS) 5 MG TABS tablet TAKE 1 TABLET BY MOUTH TWICE DAILY 180 tablet 3    tamsulosin (FLOMAX) 0.4 MG capsule Take 1 capsule by mouth daily       No current facility-administered medications

## 2023-05-31 ENCOUNTER — NURSE ONLY (OUTPATIENT)
Dept: CARDIOLOGY CLINIC | Age: 63
End: 2023-05-31

## 2023-05-31 ENCOUNTER — OFFICE VISIT (OUTPATIENT)
Dept: CARDIOLOGY CLINIC | Age: 63
End: 2023-05-31
Payer: COMMERCIAL

## 2023-05-31 VITALS
DIASTOLIC BLOOD PRESSURE: 80 MMHG | BODY MASS INDEX: 27.61 KG/M2 | OXYGEN SATURATION: 96 % | TEMPERATURE: 98.6 F | HEART RATE: 55 BPM | SYSTOLIC BLOOD PRESSURE: 128 MMHG | WEIGHT: 197.2 LBS | HEIGHT: 71 IN

## 2023-05-31 DIAGNOSIS — I48.0 PAF (PAROXYSMAL ATRIAL FIBRILLATION) (HCC): Primary | ICD-10-CM

## 2023-05-31 DIAGNOSIS — I48.3 TYPICAL ATRIAL FLUTTER (HCC): ICD-10-CM

## 2023-05-31 DIAGNOSIS — I45.10 RBBB: ICD-10-CM

## 2023-05-31 DIAGNOSIS — Z45.09 ENCOUNTER FOR LOOP RECORDER CHECK: ICD-10-CM

## 2023-05-31 DIAGNOSIS — I48.4 ATYPICAL ATRIAL FLUTTER (HCC): ICD-10-CM

## 2023-05-31 PROCEDURE — 93000 ELECTROCARDIOGRAM COMPLETE: CPT | Performed by: INTERNAL MEDICINE

## 2023-05-31 PROCEDURE — 99214 OFFICE O/P EST MOD 30 MIN: CPT | Performed by: INTERNAL MEDICINE

## 2023-05-31 NOTE — PROGRESS NOTES
Patient comes in for interrogation of their implanted loop recorder. Patient has a history of pAF, A Flutter, hypertrophic CM, and PVCs. Takes amiodarone, Eliquis, and verapamil. Patient's last device interrogation was on 5/10. Since last interrogation, AT/AF burden measures. Tachy events recorded show RVR. Patient will see Dr. Amanda Kerr today in clinic. See Paceart report under the Cardiology tab. We will follow the patient remotely.

## 2023-05-31 NOTE — PATIENT INSTRUCTIONS
Plan:     Remote device checks monthly  Discussed risks and benefits of pacemaker with antiarrhythmic medications (amiodarone)  Discussed risks and benefits of repeat ablation  Please consider your options and let us know how you would like to proceed   Follow up with me pending your decision

## 2023-06-14 ENCOUNTER — NURSE ONLY (OUTPATIENT)
Dept: CARDIOLOGY CLINIC | Age: 63
End: 2023-06-14
Payer: COMMERCIAL

## 2023-06-14 DIAGNOSIS — Z45.09 ENCOUNTER FOR LOOP RECORDER CHECK: ICD-10-CM

## 2023-06-14 DIAGNOSIS — I48.91 ATRIAL FIBRILLATION, UNSPECIFIED TYPE (HCC): Primary | ICD-10-CM

## 2023-06-14 PROCEDURE — G2066 INTER DEVC REMOTE 30D: HCPCS | Performed by: INTERNAL MEDICINE

## 2023-06-14 PROCEDURE — 93298 REM INTERROG DEV EVAL SCRMS: CPT | Performed by: INTERNAL MEDICINE

## 2023-06-23 ENCOUNTER — TELEPHONE (OUTPATIENT)
Dept: CARDIOLOGY CLINIC | Age: 63
End: 2023-06-23

## 2023-06-23 NOTE — TELEPHONE ENCOUNTER
Pt stated that his urologist Dr. Erick Valenzuela would like to start him on testosterone therapy due low testosterone. Pt wants to make sure that it would not effect his cardiac medications/cardiac care. Please advise.

## 2023-06-23 NOTE — TELEPHONE ENCOUNTER
Savanah Reynolds MD  You 21 minutes ago (4:33 PM)     Marlene Chacko  This should not adversely affect any of his medications.

## 2023-06-30 ENCOUNTER — TELEPHONE (OUTPATIENT)
Dept: CARDIOLOGY CLINIC | Age: 63
End: 2023-06-30

## 2023-07-07 ENCOUNTER — OFFICE VISIT (OUTPATIENT)
Dept: PULMONOLOGY | Age: 63
End: 2023-07-07
Payer: COMMERCIAL

## 2023-07-07 VITALS
HEART RATE: 84 BPM | RESPIRATION RATE: 16 BRPM | TEMPERATURE: 97.7 F | WEIGHT: 197.4 LBS | HEIGHT: 71 IN | OXYGEN SATURATION: 97 % | BODY MASS INDEX: 27.64 KG/M2 | DIASTOLIC BLOOD PRESSURE: 77 MMHG | SYSTOLIC BLOOD PRESSURE: 122 MMHG

## 2023-07-07 DIAGNOSIS — G47.00 INSOMNIA, UNSPECIFIED TYPE: ICD-10-CM

## 2023-07-07 DIAGNOSIS — J32.9 CHRONIC SINUSITIS, UNSPECIFIED LOCATION: ICD-10-CM

## 2023-07-07 DIAGNOSIS — G47.33 OBSTRUCTIVE SLEEP APNEA: Primary | ICD-10-CM

## 2023-07-07 DIAGNOSIS — G47.10 HYPERSOMNOLENCE: ICD-10-CM

## 2023-07-07 DIAGNOSIS — G25.81 RLS (RESTLESS LEGS SYNDROME): ICD-10-CM

## 2023-07-07 PROCEDURE — 99214 OFFICE O/P EST MOD 30 MIN: CPT | Performed by: INTERNAL MEDICINE

## 2023-07-07 RX ORDER — TADALAFIL 20 MG/1
20 TABLET ORAL PRN
COMMUNITY

## 2023-07-07 ASSESSMENT — ENCOUNTER SYMPTOMS
ALLERGIC/IMMUNOLOGIC NEGATIVE: 1
GASTROINTESTINAL NEGATIVE: 1
RESPIRATORY NEGATIVE: 1
EYES NEGATIVE: 1

## 2023-07-07 NOTE — PROGRESS NOTES
MA Communication: The following orders are received by verbal communication from Harry Choi MD    Orders include:  6 month f/u, orders to DME.

## 2023-07-07 NOTE — PATIENT INSTRUCTIONS
ASSESSMENT/PLAN:  1. Obstructive sleep apnea  2. Insomnia, unspecified type  3. RLS (restless legs syndrome)  4. Hypersomnolence  5. Chronic sinusitis, unspecified location        Overweight  Weight is flucuating from 207 to 201 to 206 to 215 to 216 to 205  Try to lose weight    afib is controlled  Seen by cardiologist      RLS/insomnia  Will continue  Klonopin 1mg at night (recommend taking 1/2 hour before bedtime)- will refill  Will change to 0.5 mg, will try to alternate between 1mg and 0.5 mg to see if any better sleep      Could consider later  mirapex or requip for the RLS    sinus  Controlled at this time  flonase as needed     Hypersomnolence  Stopped the  nuvigil b/c of insomnia, no allergy, I have changed this  Tried the Quintero however it caused more sleepiness  And didn't like it, will stop this        Would avoid all amphetamines b/c of a fib      Obstructive sleep apnea  Advised to avoid driving when too sleepy to function safely and given a discussion of the risks of untreated apnea such as accidents, cognitive impairment, mood impairment, high blood pressure, various cardiac diseases and stroke. Weight loss was encouraged.            Set up date was 2/11/16  autopap is set at max of 11 and min of 9  Ramp is off  epr is at 3  Using nasal mask        Feeling the benefit of cpap/autopap  Will continue with cpap    cpap titration done 1/4/20  Wt was 207  cpap at 9 cwp best controlled      New set up on 4/17/2023  AutoPap set at a minimum of 9 and a maximum of 11  Using a nasal mask  DME patient aids    90% pressure is 9.9  Using 100% of time  Using 7.5 hours per night  Leak 29  No sleep apnea, AHI of 0.6  Feeling the benefit of AutoPap use  We will continue    Dme will be patient aids    Bring back sooner because patient will be having a pacemaker soon and possibly testosterone replacement    TSH - done by at [address] [facility] , was normal Done 2/26/20    Blood pressure controlled-   Some issues with the afib

## 2023-07-07 NOTE — PROGRESS NOTES
Uzma Gamino. (:  1960) is a 61 y.o. male,Established patient, here for evaluation of the following chief complaint(s):  Follow-up (Need Med Agreement) and Sleep Apnea ()         ASSESSMENT/PLAN:  1. Obstructive sleep apnea  2. Insomnia, unspecified type  3. RLS (restless legs syndrome)  4. Hypersomnolence  5. Chronic sinusitis, unspecified location        Overweight  Weight is flucuating from 207 to 201 to 206 to 215 to 216 to 205  Try to lose weight    afib is controlled  Seen by cardiologist      RLS/insomnia  Will continue  Klonopin 1mg at night (recommend taking 1/2 hour before bedtime)- will refill  Will change to 0.5 mg, will try to alternate between 1mg and 0.5 mg to see if any better sleep      Could consider later  mirapex or requip for the RLS    sinus  Controlled at this time  flonase as needed     Hypersomnolence  Stopped the  nuvigil b/c of insomnia, no allergy, I have changed this  Tried the Quintero however it caused more sleepiness  And didn't like it, will stop this        Would avoid all amphetamines b/c of a fib      Obstructive sleep apnea  Advised to avoid driving when too sleepy to function safely and given a discussion of the risks of untreated apnea such as accidents, cognitive impairment, mood impairment, high blood pressure, various cardiac diseases and stroke. Weight loss was encouraged.            Set up date was 16  autopap is set at max of 11 and min of 9  Ramp is off  epr is at 3  Using nasal mask        Feeling the benefit of cpap/autopap  Will continue with cpap    cpap titration done 20  Wt was 207  cpap at 9 cwp best controlled      New set up on 2023  AutoPap set at a minimum of 9 and a maximum of 11  Using a nasal mask  DME patient aids    90% pressure is 9.9  Using 100% of time  Using 7.5 hours per night  Leak 29  No sleep apnea, AHI of 0.6  Feeling the benefit of AutoPap use  We will continue    Dme will be patient aids    Bring back sooner because

## 2023-07-10 NOTE — TELEPHONE ENCOUNTER
Pt returned call. Message given. Pt stated that he takes Klonopin at night. Pt would like to verify that he does not need to help eliquis. Pt stated that he usually holds for 2 days prior and after. Pt would also like to know if he should start Amiodarone? Please advise.

## 2023-07-10 NOTE — TELEPHONE ENCOUNTER
LVM for patient to review medication instructions prior to procedure    HOLD morning of:  Klonopin  B complex vitamin

## 2023-07-10 NOTE — TELEPHONE ENCOUNTER
LVM for patient. He should continue blood thinner, KXA does not want it held. He should not start amiodarone yet. Can reassess once device is in place.

## 2023-07-13 DIAGNOSIS — I48.91 ATRIAL FIBRILLATION, UNSPECIFIED TYPE (HCC): ICD-10-CM

## 2023-07-14 DIAGNOSIS — I48.91 ATRIAL FIBRILLATION, UNSPECIFIED TYPE (HCC): ICD-10-CM

## 2023-07-17 NOTE — TELEPHONE ENCOUNTER
Per [facility] note on 04/13/2023:  Plan:   Discontinue verapamil due to abnormal sinus node function  Continue Eliquis  Remote device transmissions every month      Post procedure instructions reviewed  Follow up in office on 5/17/2023

## 2023-07-19 ENCOUNTER — NURSE ONLY (OUTPATIENT)
Dept: CARDIOLOGY CLINIC | Age: 63
End: 2023-07-19

## 2023-07-20 PROBLEM — Z95.0 PACEMAKER: Status: ACTIVE | Noted: 2023-07-20

## 2023-07-21 ENCOUNTER — ANESTHESIA (OUTPATIENT)
Dept: CARDIAC CATH/INVASIVE PROCEDURES | Age: 63
End: 2023-07-21
Payer: COMMERCIAL

## 2023-07-21 ENCOUNTER — APPOINTMENT (OUTPATIENT)
Dept: GENERAL RADIOLOGY | Age: 63
End: 2023-07-21
Attending: INTERNAL MEDICINE
Payer: COMMERCIAL

## 2023-07-21 ENCOUNTER — ANESTHESIA EVENT (OUTPATIENT)
Dept: CARDIAC CATH/INVASIVE PROCEDURES | Age: 63
End: 2023-07-21
Payer: COMMERCIAL

## 2023-07-21 ENCOUNTER — NURSE ONLY (OUTPATIENT)
Dept: CARDIOLOGY CLINIC | Age: 63
End: 2023-07-21

## 2023-07-21 ENCOUNTER — HOSPITAL ENCOUNTER (OUTPATIENT)
Dept: CARDIAC CATH/INVASIVE PROCEDURES | Age: 63
Setting detail: OBSERVATION
Discharge: HOME | End: 2023-07-23
Attending: INTERNAL MEDICINE | Admitting: INTERNAL MEDICINE
Payer: COMMERCIAL

## 2023-07-21 DIAGNOSIS — I49.5 SINUS NODE DYSFUNCTION (HCC): Primary | ICD-10-CM

## 2023-07-21 DIAGNOSIS — I51.7 LVH (LEFT VENTRICULAR HYPERTROPHY): ICD-10-CM

## 2023-07-21 DIAGNOSIS — Z95.0 PACEMAKER: ICD-10-CM

## 2023-07-21 DIAGNOSIS — Z45.09 ENCOUNTER FOR LOOP RECORDER CHECK: ICD-10-CM

## 2023-07-21 PROBLEM — R00.1 SYMPTOMATIC BRADYCARDIA: Status: ACTIVE | Noted: 2023-07-21

## 2023-07-21 LAB
ANION GAP SERPL CALCULATED.3IONS-SCNC: 10 MMOL/L (ref 3–16)
BUN SERPL-MCNC: 15 MG/DL (ref 7–20)
CALCIUM SERPL-MCNC: 8.8 MG/DL (ref 8.3–10.6)
CHLORIDE SERPL-SCNC: 105 MMOL/L (ref 99–110)
CO2 SERPL-SCNC: 26 MMOL/L (ref 21–32)
CREAT SERPL-MCNC: 1 MG/DL (ref 0.8–1.3)
DEPRECATED RDW RBC AUTO: 16.1 % (ref 12.4–15.4)
EKG ATRIAL RATE: 312 BPM
EKG ATRIAL RATE: 60 BPM
EKG DIAGNOSIS: NORMAL
EKG DIAGNOSIS: NORMAL
EKG P AXIS: 77 DEGREES
EKG P AXIS: 96 DEGREES
EKG P-R INTERVAL: 158 MS
EKG Q-T INTERVAL: 438 MS
EKG Q-T INTERVAL: 498 MS
EKG QRS DURATION: 150 MS
EKG QRS DURATION: 152 MS
EKG QTC CALCULATION (BAZETT): 473 MS
EKG QTC CALCULATION (BAZETT): 498 MS
EKG R AXIS: 55 DEGREES
EKG R AXIS: 66 DEGREES
EKG T AXIS: 112 DEGREES
EKG T AXIS: 93 DEGREES
EKG VENTRICULAR RATE: 60 BPM
EKG VENTRICULAR RATE: 70 BPM
GFR SERPLBLD CREATININE-BSD FMLA CKD-EPI: >60 ML/MIN/{1.73_M2}
GLUCOSE SERPL-MCNC: 99 MG/DL (ref 70–99)
HCT VFR BLD AUTO: 39.9 % (ref 40.5–52.5)
HGB BLD-MCNC: 13.1 G/DL (ref 13.5–17.5)
MCH RBC QN AUTO: 28.4 PG (ref 26–34)
MCHC RBC AUTO-ENTMCNC: 32.8 G/DL (ref 31–36)
MCV RBC AUTO: 86.5 FL (ref 80–100)
PLATELET # BLD AUTO: 239 K/UL (ref 135–450)
PMV BLD AUTO: 8 FL (ref 5–10.5)
POTASSIUM SERPL-SCNC: 3.9 MMOL/L (ref 3.5–5.1)
RBC # BLD AUTO: 4.61 M/UL (ref 4.2–5.9)
SODIUM SERPL-SCNC: 141 MMOL/L (ref 136–145)
WBC # BLD AUTO: 3.4 K/UL (ref 4–11)

## 2023-07-21 PROCEDURE — 2060000000 HC ICU INTERMEDIATE R&B

## 2023-07-21 PROCEDURE — 3700000001 HC ADD 15 MINUTES (ANESTHESIA)

## 2023-07-21 PROCEDURE — 3700000000 HC ANESTHESIA ATTENDED CARE

## 2023-07-21 PROCEDURE — C1894 INTRO/SHEATH, NON-LASER: HCPCS | Performed by: INTERNAL MEDICINE

## 2023-07-21 PROCEDURE — 6370000000 HC RX 637 (ALT 250 FOR IP)

## 2023-07-21 PROCEDURE — 2580000003 HC RX 258: Performed by: INTERNAL MEDICINE

## 2023-07-21 PROCEDURE — 2580000003 HC RX 258: Performed by: NURSE ANESTHETIST, CERTIFIED REGISTERED

## 2023-07-21 PROCEDURE — 2500000003 HC RX 250 WO HCPCS

## 2023-07-21 PROCEDURE — 33208 INSRT HEART PM ATRIAL & VENT: CPT

## 2023-07-21 PROCEDURE — 92960 CARDIOVERSION ELECTRIC EXT: CPT | Performed by: INTERNAL MEDICINE

## 2023-07-21 PROCEDURE — 33208 INSRT HEART PM ATRIAL & VENT: CPT | Performed by: INTERNAL MEDICINE

## 2023-07-21 PROCEDURE — C1785 PMKR, DUAL, RATE-RESP: HCPCS | Performed by: INTERNAL MEDICINE

## 2023-07-21 PROCEDURE — 6370000000 HC RX 637 (ALT 250 FOR IP): Performed by: INTERNAL MEDICINE

## 2023-07-21 PROCEDURE — 85027 COMPLETE CBC AUTOMATED: CPT

## 2023-07-21 PROCEDURE — C1898 LEAD, PMKR, OTHER THAN TRANS: HCPCS | Performed by: INTERNAL MEDICINE

## 2023-07-21 PROCEDURE — 80048 BASIC METABOLIC PNL TOTAL CA: CPT

## 2023-07-21 PROCEDURE — 6360000002 HC RX W HCPCS: Performed by: INTERNAL MEDICINE

## 2023-07-21 PROCEDURE — 6360000002 HC RX W HCPCS

## 2023-07-21 PROCEDURE — 6360000002 HC RX W HCPCS: Performed by: NURSE ANESTHETIST, CERTIFIED REGISTERED

## 2023-07-21 PROCEDURE — 93005 ELECTROCARDIOGRAM TRACING: CPT | Performed by: INTERNAL MEDICINE

## 2023-07-21 PROCEDURE — 71045 X-RAY EXAM CHEST 1 VIEW: CPT

## 2023-07-21 PROCEDURE — 2500000003 HC RX 250 WO HCPCS: Performed by: NURSE ANESTHETIST, CERTIFIED REGISTERED

## 2023-07-21 PROCEDURE — 93010 ELECTROCARDIOGRAM REPORT: CPT | Performed by: INTERNAL MEDICINE

## 2023-07-21 PROCEDURE — G0378 HOSPITAL OBSERVATION PER HR: HCPCS

## 2023-07-21 RX ORDER — DIPHENHYDRAMINE HYDROCHLORIDE 50 MG/ML
12.5 INJECTION INTRAMUSCULAR; INTRAVENOUS
Status: DISCONTINUED | OUTPATIENT
Start: 2023-07-21 | End: 2023-07-21 | Stop reason: HOSPADM

## 2023-07-21 RX ORDER — KETOROLAC TROMETHAMINE 30 MG/ML
30 INJECTION, SOLUTION INTRAMUSCULAR; INTRAVENOUS EVERY 6 HOURS PRN
Status: DISCONTINUED | OUTPATIENT
Start: 2023-07-21 | End: 2023-07-23 | Stop reason: HOSPADM

## 2023-07-21 RX ORDER — PROPOFOL 10 MG/ML
INJECTION, EMULSION INTRAVENOUS CONTINUOUS PRN
Status: DISCONTINUED | OUTPATIENT
Start: 2023-07-21 | End: 2023-07-21 | Stop reason: SDUPTHER

## 2023-07-21 RX ORDER — SODIUM CHLORIDE 0.9 % (FLUSH) 0.9 %
5-40 SYRINGE (ML) INJECTION EVERY 12 HOURS SCHEDULED
Status: DISCONTINUED | OUTPATIENT
Start: 2023-07-21 | End: 2023-07-21 | Stop reason: HOSPADM

## 2023-07-21 RX ORDER — SODIUM CHLORIDE 9 MG/ML
INJECTION, SOLUTION INTRAVENOUS CONTINUOUS PRN
Status: DISCONTINUED | OUTPATIENT
Start: 2023-07-21 | End: 2023-07-21 | Stop reason: SDUPTHER

## 2023-07-21 RX ORDER — SODIUM CHLORIDE 0.9 % (FLUSH) 0.9 %
5-40 SYRINGE (ML) INJECTION EVERY 12 HOURS SCHEDULED
Status: DISCONTINUED | OUTPATIENT
Start: 2023-07-21 | End: 2023-07-23 | Stop reason: HOSPADM

## 2023-07-21 RX ORDER — MEPERIDINE HYDROCHLORIDE 50 MG/ML
12.5 INJECTION INTRAMUSCULAR; INTRAVENOUS; SUBCUTANEOUS EVERY 5 MIN PRN
Status: DISCONTINUED | OUTPATIENT
Start: 2023-07-21 | End: 2023-07-21 | Stop reason: HOSPADM

## 2023-07-21 RX ORDER — SODIUM CHLORIDE 0.9 % (FLUSH) 0.9 %
5-40 SYRINGE (ML) INJECTION PRN
Status: DISCONTINUED | OUTPATIENT
Start: 2023-07-21 | End: 2023-07-23 | Stop reason: HOSPADM

## 2023-07-21 RX ORDER — CEFAZOLIN SODIUM IN 0.9 % NACL 2 G/100 ML
2000 PLASTIC BAG, INJECTION (ML) INTRAVENOUS ONCE
Status: COMPLETED | OUTPATIENT
Start: 2023-07-21 | End: 2023-07-21

## 2023-07-21 RX ORDER — HYDROMORPHONE HYDROCHLORIDE 1 MG/ML
0.5 INJECTION, SOLUTION INTRAMUSCULAR; INTRAVENOUS; SUBCUTANEOUS EVERY 5 MIN PRN
Status: DISCONTINUED | OUTPATIENT
Start: 2023-07-21 | End: 2023-07-21 | Stop reason: HOSPADM

## 2023-07-21 RX ORDER — PRAVASTATIN SODIUM 20 MG
20 TABLET ORAL EVERY EVENING
Status: DISCONTINUED | OUTPATIENT
Start: 2023-07-21 | End: 2023-07-23 | Stop reason: HOSPADM

## 2023-07-21 RX ORDER — SODIUM CHLORIDE 0.9 % (FLUSH) 0.9 %
5-40 SYRINGE (ML) INJECTION PRN
Status: DISCONTINUED | OUTPATIENT
Start: 2023-07-21 | End: 2023-07-21 | Stop reason: HOSPADM

## 2023-07-21 RX ORDER — LABETALOL HYDROCHLORIDE 5 MG/ML
10 INJECTION, SOLUTION INTRAVENOUS
Status: DISCONTINUED | OUTPATIENT
Start: 2023-07-21 | End: 2023-07-21 | Stop reason: HOSPADM

## 2023-07-21 RX ORDER — HYDROMORPHONE HYDROCHLORIDE 1 MG/ML
0.25 INJECTION, SOLUTION INTRAMUSCULAR; INTRAVENOUS; SUBCUTANEOUS EVERY 5 MIN PRN
Status: DISCONTINUED | OUTPATIENT
Start: 2023-07-21 | End: 2023-07-21 | Stop reason: HOSPADM

## 2023-07-21 RX ORDER — ONDANSETRON 2 MG/ML
4 INJECTION INTRAMUSCULAR; INTRAVENOUS EVERY 6 HOURS PRN
Status: DISCONTINUED | OUTPATIENT
Start: 2023-07-21 | End: 2023-07-23 | Stop reason: HOSPADM

## 2023-07-21 RX ORDER — TAMSULOSIN HYDROCHLORIDE 0.4 MG/1
0.4 CAPSULE ORAL DAILY
Status: DISCONTINUED | OUTPATIENT
Start: 2023-07-21 | End: 2023-07-23 | Stop reason: HOSPADM

## 2023-07-21 RX ORDER — ONDANSETRON 2 MG/ML
INJECTION INTRAMUSCULAR; INTRAVENOUS PRN
Status: DISCONTINUED | OUTPATIENT
Start: 2023-07-21 | End: 2023-07-21 | Stop reason: SDUPTHER

## 2023-07-21 RX ORDER — CLONAZEPAM 1 MG/1
1 TABLET ORAL
Status: DISCONTINUED | OUTPATIENT
Start: 2023-07-21 | End: 2023-07-23 | Stop reason: HOSPADM

## 2023-07-21 RX ORDER — SODIUM CHLORIDE 9 MG/ML
INJECTION, SOLUTION INTRAVENOUS PRN
Status: DISCONTINUED | OUTPATIENT
Start: 2023-07-21 | End: 2023-07-23 | Stop reason: HOSPADM

## 2023-07-21 RX ORDER — OXYCODONE HYDROCHLORIDE AND ACETAMINOPHEN 5; 325 MG/1; MG/1
1 TABLET ORAL EVERY 4 HOURS PRN
Status: DISCONTINUED | OUTPATIENT
Start: 2023-07-21 | End: 2023-07-23 | Stop reason: HOSPADM

## 2023-07-21 RX ORDER — OXYCODONE HYDROCHLORIDE 5 MG/1
5 TABLET ORAL PRN
Status: DISCONTINUED | OUTPATIENT
Start: 2023-07-21 | End: 2023-07-21 | Stop reason: HOSPADM

## 2023-07-21 RX ORDER — MIDAZOLAM HYDROCHLORIDE 1 MG/ML
INJECTION INTRAMUSCULAR; INTRAVENOUS PRN
Status: DISCONTINUED | OUTPATIENT
Start: 2023-07-21 | End: 2023-07-21 | Stop reason: SDUPTHER

## 2023-07-21 RX ORDER — PROPOFOL 10 MG/ML
INJECTION, EMULSION INTRAVENOUS PRN
Status: DISCONTINUED | OUTPATIENT
Start: 2023-07-21 | End: 2023-07-21 | Stop reason: SDUPTHER

## 2023-07-21 RX ORDER — ACETAMINOPHEN 325 MG/1
650 TABLET ORAL EVERY 4 HOURS PRN
Status: DISCONTINUED | OUTPATIENT
Start: 2023-07-21 | End: 2023-07-23 | Stop reason: HOSPADM

## 2023-07-21 RX ORDER — OXYCODONE HYDROCHLORIDE 5 MG/1
10 TABLET ORAL PRN
Status: DISCONTINUED | OUTPATIENT
Start: 2023-07-21 | End: 2023-07-21 | Stop reason: HOSPADM

## 2023-07-21 RX ORDER — LIDOCAINE HYDROCHLORIDE 20 MG/ML
INJECTION, SOLUTION EPIDURAL; INFILTRATION; INTRACAUDAL; PERINEURAL PRN
Status: DISCONTINUED | OUTPATIENT
Start: 2023-07-21 | End: 2023-07-21 | Stop reason: SDUPTHER

## 2023-07-21 RX ORDER — ONDANSETRON 2 MG/ML
4 INJECTION INTRAMUSCULAR; INTRAVENOUS
Status: DISCONTINUED | OUTPATIENT
Start: 2023-07-21 | End: 2023-07-21 | Stop reason: HOSPADM

## 2023-07-21 RX ORDER — LORAZEPAM 0.5 MG/1
0.5 TABLET ORAL
Status: ACTIVE | OUTPATIENT
Start: 2023-07-21 | End: 2023-07-22

## 2023-07-21 RX ORDER — AMIODARONE HYDROCHLORIDE 200 MG/1
200 TABLET ORAL DAILY
Status: DISCONTINUED | OUTPATIENT
Start: 2023-07-21 | End: 2023-07-22

## 2023-07-21 RX ADMIN — APIXABAN 5 MG: 5 TABLET, FILM COATED ORAL at 21:17

## 2023-07-21 RX ADMIN — ACETAMINOPHEN 650 MG: 325 TABLET ORAL at 13:52

## 2023-07-21 RX ADMIN — ONDANSETRON 4 MG: 2 INJECTION INTRAMUSCULAR; INTRAVENOUS at 10:53

## 2023-07-21 RX ADMIN — KETOROLAC TROMETHAMINE 30 MG: 30 INJECTION, SOLUTION INTRAMUSCULAR; INTRAVENOUS at 18:51

## 2023-07-21 RX ADMIN — PROPOFOL 50 MG: 10 INJECTION, EMULSION INTRAVENOUS at 10:56

## 2023-07-21 RX ADMIN — AMIODARONE HYDROCHLORIDE 1 MG/MIN: 50 INJECTION, SOLUTION INTRAVENOUS at 18:45

## 2023-07-21 RX ADMIN — MIDAZOLAM 2 MG: 1 INJECTION INTRAMUSCULAR; INTRAVENOUS at 10:42

## 2023-07-21 RX ADMIN — LIDOCAINE HYDROCHLORIDE 60 MG: 20 INJECTION, SOLUTION EPIDURAL; INFILTRATION; INTRACAUDAL; PERINEURAL at 10:54

## 2023-07-21 RX ADMIN — TAMSULOSIN HYDROCHLORIDE 0.4 MG: 0.4 CAPSULE ORAL at 18:45

## 2023-07-21 RX ADMIN — KETOROLAC TROMETHAMINE 30 MG: 30 INJECTION, SOLUTION INTRAMUSCULAR; INTRAVENOUS at 12:37

## 2023-07-21 RX ADMIN — PRAVASTATIN SODIUM 20 MG: 20 TABLET ORAL at 18:45

## 2023-07-21 RX ADMIN — OXYCODONE HYDROCHLORIDE AND ACETAMINOPHEN 1 TABLET: 5; 325 TABLET ORAL at 17:23

## 2023-07-21 RX ADMIN — Medication 2000 MG: at 10:44

## 2023-07-21 RX ADMIN — CLONAZEPAM 1 MG: 1 TABLET ORAL at 22:11

## 2023-07-21 RX ADMIN — SODIUM CHLORIDE: 9 INJECTION, SOLUTION INTRAVENOUS at 10:31

## 2023-07-21 RX ADMIN — AMIODARONE HYDROCHLORIDE 1 MG/MIN: 50 INJECTION, SOLUTION INTRAVENOUS at 11:27

## 2023-07-21 RX ADMIN — AMIODARONE HYDROCHLORIDE 200 MG: 200 TABLET ORAL at 13:13

## 2023-07-21 RX ADMIN — PROPOFOL 75 MCG/KG/MIN: 10 INJECTION, EMULSION INTRAVENOUS at 10:56

## 2023-07-21 RX ADMIN — AMIODARONE HYDROCHLORIDE 150 MG: 50 INJECTION, SOLUTION INTRAVENOUS at 11:10

## 2023-07-21 ASSESSMENT — PAIN DESCRIPTION - ORIENTATION
ORIENTATION: LEFT
ORIENTATION: LEFT;MID
ORIENTATION: LEFT;UPPER
ORIENTATION: LEFT

## 2023-07-21 ASSESSMENT — PAIN DESCRIPTION - DESCRIPTORS
DESCRIPTORS: ACHING;THROBBING
DESCRIPTORS: ACHING
DESCRIPTORS: ACHING;THROBBING

## 2023-07-21 ASSESSMENT — PAIN SCALES - GENERAL
PAINLEVEL_OUTOF10: 0
PAINLEVEL_OUTOF10: 8
PAINLEVEL_OUTOF10: 0
PAINLEVEL_OUTOF10: 8
PAINLEVEL_OUTOF10: 9
PAINLEVEL_OUTOF10: 8

## 2023-07-21 ASSESSMENT — ENCOUNTER SYMPTOMS: SHORTNESS OF BREATH: 1

## 2023-07-21 ASSESSMENT — PAIN DESCRIPTION - LOCATION
LOCATION: BACK;OTHER (COMMENT)
LOCATION: CHEST

## 2023-07-21 NOTE — PROGRESS NOTES
Pt has [employer] ILR implant from 12/11/20. ([facility]-discontinued and return kit ordered)    (known hx transient AVB). 4/13/23 AFL ablation. He takes Eliquis. Atrial fibrillation: patient had Persistent atrial fibrillation prior to ablation. Since then, mostly in atypical atrial flutter    Prior to atrial lead placement, the patient underwent external electrical cardioversion (50 Joules) which restored sinus rhythm (from atrial flutter). There was a prolonged sinus pause post cardioversion.      DC PPM implant 7/21/23

## 2023-07-21 NOTE — PROGRESS NOTES
Patient admitted to room 435 from cath lab. Patient oriented to room, call light, bed rails, phone, lights and bathroom. Patient instructed about the schedule of the day including: vital sign frequency, lab draws, possible tests, frequency of MD and staff rounds, including RN/MD rounding together at bedside, daily weights, and I &O's. Patient instructed about prescribed diet and television, given guide. Pt independent but educated on call light use and urinal is placed at bedside. Bed locked, in lowest position, side rails up 2/4, call light within reach. VSS, pt has no needs at this time. Complex Repair And Tissue Cultured Epidermal Autograft Text: The defect edges were debeveled with a #15 scalpel blade.  The primary defect was closed partially with a complex linear closure.  Given the location of the defect, shape of the defect and the proximity to free margins an tissue cultured epidermal autograft was deemed most appropriate to repair the remaining defect.  The graft was trimmed to fit the size of the remaining defect.  The graft was then placed in the primary defect, oriented appropriately, and sutured into place.

## 2023-07-21 NOTE — PROCEDURES
PROCEDURE PERFORMED:     1. Implantation of a dual-chamber pacemaker (PPM)  2. External electrical cardioversion of atrial flutter    Event Time In   In Facility    In Preprocedure    Physician Available    Anesthesia Available    Out of Preprocedure    Back to Preprocedure    Preprocedure Complete    In Holding Area    Out of Holding Area    Anesthesia Start 1031   Perfusion Start    Setup Start    Setup Complete    In Room    Induction    Anesthesia Ready    Procedure Start    Procedure Closing    Procedure Finish    Out of Room    In PACU    Perfusion Stop    Anesthesia Stop    PACU Care Complete    Out of PACU    Return to PACU    Out of PACU (2nd Time)    In Phase II    Phase II Care Complete    Out of Phase II    Return to Phase II    Out of Phase II (2nd Time)    Returned to OR    End of Periop Care    Epidural to         Surgeon: Prisca Bowden MD    Complications: none    Estimated blood loss: less than 30 ml    Anesthesia: MAC    ANTIBIOTIC GIVEN:  Cefazolin 2 g IV. History/Indication: 61 y.o. male  with history of symptomatic bradycardia and atrial arrhythmias. Presents today for implant of dual chamber pacemaker and external cardioversion. DETAILS OF PROCEDURE: The patient was brought to the electrophysiology laboratory in stable condition. The patient was in a fasting, nonsedated state. The risks, benefits and alternatives of the procedure were discussed with the patient. The risks including, but not limited to, the risks of infection, bleeding, vascular injury, injury to cardiac or surrounding structures including pneumothorax, lead or device malfunction or dislodgement, radiation exposure, injury to cardiac structures, stroke, myocardial infarction and minimal risk to life were all discussed. The patient considered his treatment options and decided to proceed with the pacemaker implantation and cardioversion procedure.      A venogram of the left upper extremity was performed to confirm patency of the left subclavian vein. The patient was prepped and draped in a sterile fashion. An approximately 4-cm incision was made in the left pectoral area after administration of lidocaine/bupivicaine mixture. Using electrocautery and blunt dissection, a pocket was created. Central venous access into the left subclavian vein was obtained using the modified Seldinger technique. After central venous access was obtained, a sheath was placed in the left subclavian vein. A right ventricular lead was advanced into position in the apical septum under fluoroscopic guidance and using a series of curved stylets. The lead was actively fixated. After confirming appropriate function, the sheath was split and removed. The lead was secured to the underlying tissue. A new sheath was advanced over a second previously placed wire in the left subclavian vein. The atrial lead was advanced to the right atrial appendage and passively fixated under fluoroscopic guidance. After confirming appropriate function, the sheath was split and removed. The lead was secured to the underlying tissue. An anchoring suture was placed within the pocket to prevent excess pulse generator movement. The pocket was irrigated with a large volume irrigation solution. The leads were then connected to the new pulse generator and placed into the cleaned pocket. Prior to atrial lead placement, the patient underwent external electrical cardioversion (50 Joules) which restored sinus rhythm (from atrial flutter). There was a prolonged sinus pause post cardioversion. Final parameters were obtained and are detailed below. The pocket was closed in three successive layers using 2-0 and 4-0 Vicryl suture material. Steri-Strips and a pressure dressing were applied. The patient was transported to the holding area in stable condition. IMPLANTED MATERIALS:     1. PPM generator. Device name is [email or web address].  is [employer].  Model number 8 DR-T.

## 2023-07-21 NOTE — PROGRESS NOTES
Returned on room air, soft bp, but improved (98/63). Amiodarone infusing upon arrival. Alert, but sleepy. Left chest site unremarkable/covered with gauze. Sling and swathe in place. Chest XR completed at bedside as well as post procedure EKG (NSR with RBBB). Given water per request, declined snack at this time. Male wick in place. Wife here at bedside, Dr. Fried Graft stopped by to speak to them.

## 2023-07-21 NOTE — PROGRESS NOTES
Continues to report surgical site discomfort- \"it's an ache,\"  \"It'll ache real bad and then subside, then come back. \" MD notified and Percocet ordered (given @ [phone]). Up independently with steady gait. Knows to not use L arm/kept in sling (following instructions). Report given to [facility] on C4. Dinner ordered to new room (435). Vitals stable on room air- 101/80 bp, HR 64 bpm (remained in 60s during time in pre/post), 99% room air.

## 2023-07-21 NOTE — ANESTHESIA POSTPROCEDURE EVALUATION
Department of Anesthesiology  Postprocedure Note    Patient: Elodia Parra MRN: [id number]  YOB: 1960  Date of evaluation: 7/21/2023      Procedure Summary     Date: 07/21/23 Room / Location: [facility] Cardiac Cath Lab    Anesthesia Start: 1031 Anesthesia Stop: 1218    Procedure: PACEMAKER Diagnosis:       Paroxysmal atrial fibrillation      Sinus node dysfunction (HCC)      Paroxysmal atrial fibrillation    Scheduled Providers: Lizeth Doyle MD Responsible Provider: Lizeth Doyle MD    Anesthesia Type: MAC ASA Status: 3          Anesthesia Type: No value filed. Ruth Phase I:      Ruth Phase II:        Anesthesia Post Evaluation    Patient location during evaluation: PACU  Patient participation: complete - patient participated  Level of consciousness: awake and alert  Airway patency: patent  Nausea & Vomiting: no nausea and no vomiting  Complications: no  Cardiovascular status: blood pressure returned to baseline  Respiratory status: acceptable  Hydration status: euvolemic  Comments: VSS on transfer to phase 2 recovery. No anesthetic complications.

## 2023-07-21 NOTE — H&P
Carley Hobbs      Ordering Physician Vero Anthony MD            Physician           Nicole JONES MD     Procedure    Type of Study      TTE procedure:ECHOCARDIOGRAM COMPLETE 2D W DOPPLER W COLOR. Procedure Date  Date: 10/02/2019 Start: 08:44 AM    Study Location: [address] [facility]  Technical Quality: Adequate visualization    Indications:Cardiomyopathy. Patient Status: Routine    Height: 71 inches Weight: 200 pounds BSA: 2.11 m2 BMI: 27.89 kg/m2    BP: 124/74 mmHg     Conclusions      Summary   Normal systolic function with an estimated ejection fraction of 55-60%. Moderate concentric left ventricular hypertrophy ( septal wall is more   increased in size (1.6 cm) ). No regional wall motion abnormalities are seen. Normal left ventricular diastolic filling pressure. The left atrium is severely dilated. The right atrium is mildly dilated. Mild aortic regurgitation. Mild mitral and pulmonic regurgitation. Signature      ------------------------------------------------------------------   Electronically signed by Donaldo Medina MD (Interpreting   physician) on 10/02/2019 at 01:55 PM   ------------------------------------------------------------------      Findings      Left Ventricle   Normal left ventricle size. Moderate concentric left ventricular hypertrophy ( septal wall is more   increased in size (1.6 cm) ). Normal systolic function with an estimated ejection fraction of 55%. No regional wall motion abnormalities are seen. Normal left ventricular diastolic filling pressure. Mitral Valve   The mitral valve is normal in structure. Mild mitral regurgitation. Left Atrium   The left atrium is severely dilated. Aortic Valve   The aortic valve is normal in structure. Mild aortic regurgitation. Aorta   The aortic root is normal in size.       Right CREATININE 1.0       No results for input(s): INR, PROTIME in the last 72 hours. No results for input(s): CKMB, TROPONINI in the last 72 hours. No results for input(s): PROBNP in the last 72 hours.         Signed by: Caprice Hodgson MD

## 2023-07-22 ENCOUNTER — APPOINTMENT (OUTPATIENT)
Dept: GENERAL RADIOLOGY | Age: 63
End: 2023-07-22
Attending: INTERNAL MEDICINE
Payer: COMMERCIAL

## 2023-07-22 ENCOUNTER — PROCEDURE VISIT (OUTPATIENT)
Dept: CARDIOLOGY CLINIC | Age: 63
End: 2023-07-22
Payer: COMMERCIAL

## 2023-07-22 DIAGNOSIS — I49.5 SINUS NODE DYSFUNCTION (HCC): ICD-10-CM

## 2023-07-22 DIAGNOSIS — I48.91 ATRIAL FIBRILLATION, UNSPECIFIED TYPE (HCC): ICD-10-CM

## 2023-07-22 DIAGNOSIS — R00.1 SYMPTOMATIC BRADYCARDIA: ICD-10-CM

## 2023-07-22 DIAGNOSIS — I49.3 PVC (PREMATURE VENTRICULAR CONTRACTION): ICD-10-CM

## 2023-07-22 DIAGNOSIS — Z95.0 PACEMAKER: ICD-10-CM

## 2023-07-22 DIAGNOSIS — I48.0 PAF (PAROXYSMAL ATRIAL FIBRILLATION) (HCC): Primary | ICD-10-CM

## 2023-07-22 DIAGNOSIS — I48.3 TYPICAL ATRIAL FLUTTER (HCC): ICD-10-CM

## 2023-07-22 LAB
EKG ATRIAL RATE: 60 BPM
EKG DIAGNOSIS: NORMAL
EKG P AXIS: 97 DEGREES
EKG P-R INTERVAL: 188 MS
EKG Q-T INTERVAL: 480 MS
EKG QRS DURATION: 126 MS
EKG QTC CALCULATION (BAZETT): 480 MS
EKG R AXIS: 50 DEGREES
EKG T AXIS: 115 DEGREES
EKG VENTRICULAR RATE: 60 BPM

## 2023-07-22 PROCEDURE — 94640 AIRWAY INHALATION TREATMENT: CPT

## 2023-07-22 PROCEDURE — 99233 SBSQ HOSP IP/OBS HIGH 50: CPT | Performed by: NURSE PRACTITIONER

## 2023-07-22 PROCEDURE — 93280 PM DEVICE PROGR EVAL DUAL: CPT | Performed by: INTERNAL MEDICINE

## 2023-07-22 PROCEDURE — G0378 HOSPITAL OBSERVATION PER HR: HCPCS

## 2023-07-22 PROCEDURE — 6370000000 HC RX 637 (ALT 250 FOR IP): Performed by: INTERNAL MEDICINE

## 2023-07-22 PROCEDURE — 2580000003 HC RX 258: Performed by: INTERNAL MEDICINE

## 2023-07-22 PROCEDURE — 96365 THER/PROPH/DIAG IV INF INIT: CPT

## 2023-07-22 PROCEDURE — 93005 ELECTROCARDIOGRAM TRACING: CPT | Performed by: INTERNAL MEDICINE

## 2023-07-22 PROCEDURE — 71046 X-RAY EXAM CHEST 2 VIEWS: CPT

## 2023-07-22 PROCEDURE — 96376 TX/PRO/DX INJ SAME DRUG ADON: CPT

## 2023-07-22 PROCEDURE — 96375 TX/PRO/DX INJ NEW DRUG ADDON: CPT

## 2023-07-22 PROCEDURE — 6360000002 HC RX W HCPCS: Performed by: NURSE PRACTITIONER

## 2023-07-22 PROCEDURE — 96366 THER/PROPH/DIAG IV INF ADDON: CPT

## 2023-07-22 PROCEDURE — 6360000002 HC RX W HCPCS: Performed by: INTERNAL MEDICINE

## 2023-07-22 RX ORDER — FUROSEMIDE 10 MG/ML
20 INJECTION INTRAMUSCULAR; INTRAVENOUS ONCE
Status: COMPLETED | OUTPATIENT
Start: 2023-07-22 | End: 2023-07-22

## 2023-07-22 RX ORDER — IPRATROPIUM BROMIDE AND ALBUTEROL SULFATE 2.5; .5 MG/3ML; MG/3ML
1 SOLUTION RESPIRATORY (INHALATION) EVERY 4 HOURS PRN
Status: DISCONTINUED | OUTPATIENT
Start: 2023-07-22 | End: 2023-07-23 | Stop reason: HOSPADM

## 2023-07-22 RX ORDER — FUROSEMIDE 10 MG/ML
80 INJECTION INTRAMUSCULAR; INTRAVENOUS ONCE
Status: COMPLETED | OUTPATIENT
Start: 2023-07-22 | End: 2023-07-22

## 2023-07-22 RX ADMIN — PRAVASTATIN SODIUM 20 MG: 20 TABLET ORAL at 18:14

## 2023-07-22 RX ADMIN — CLONAZEPAM 1 MG: 1 TABLET ORAL at 22:13

## 2023-07-22 RX ADMIN — KETOROLAC TROMETHAMINE 30 MG: 30 INJECTION, SOLUTION INTRAMUSCULAR; INTRAVENOUS at 09:14

## 2023-07-22 RX ADMIN — APIXABAN 5 MG: 5 TABLET, FILM COATED ORAL at 19:57

## 2023-07-22 RX ADMIN — Medication 10 ML: at 09:16

## 2023-07-22 RX ADMIN — KETOROLAC TROMETHAMINE 30 MG: 30 INJECTION, SOLUTION INTRAMUSCULAR; INTRAVENOUS at 02:23

## 2023-07-22 RX ADMIN — AMIODARONE HYDROCHLORIDE 1 MG/MIN: 50 INJECTION, SOLUTION INTRAVENOUS at 02:27

## 2023-07-22 RX ADMIN — FUROSEMIDE 80 MG: 10 INJECTION, SOLUTION INTRAMUSCULAR; INTRAVENOUS at 18:19

## 2023-07-22 RX ADMIN — Medication 10 ML: at 19:57

## 2023-07-22 RX ADMIN — ACETAMINOPHEN 650 MG: 325 TABLET ORAL at 09:14

## 2023-07-22 RX ADMIN — TAMSULOSIN HYDROCHLORIDE 0.4 MG: 0.4 CAPSULE ORAL at 18:14

## 2023-07-22 RX ADMIN — FUROSEMIDE 20 MG: 10 INJECTION, SOLUTION INTRAMUSCULAR; INTRAVENOUS at 12:55

## 2023-07-22 RX ADMIN — IPRATROPIUM BROMIDE AND ALBUTEROL SULFATE 1 DOSE: .5; 3 SOLUTION RESPIRATORY (INHALATION) at 18:21

## 2023-07-22 RX ADMIN — KETOROLAC TROMETHAMINE 30 MG: 30 INJECTION, SOLUTION INTRAMUSCULAR; INTRAVENOUS at 18:14

## 2023-07-22 RX ADMIN — APIXABAN 5 MG: 5 TABLET, FILM COATED ORAL at 09:14

## 2023-07-22 RX ADMIN — AMIODARONE HYDROCHLORIDE 200 MG: 200 TABLET ORAL at 09:14

## 2023-07-22 ASSESSMENT — PAIN SCALES - GENERAL
PAINLEVEL_OUTOF10: 0
PAINLEVEL_OUTOF10: 0
PAINLEVEL_OUTOF10: 6
PAINLEVEL_OUTOF10: 4
PAINLEVEL_OUTOF10: 0
PAINLEVEL_OUTOF10: 8

## 2023-07-22 ASSESSMENT — PAIN DESCRIPTION - DESCRIPTORS
DESCRIPTORS: ACHING

## 2023-07-22 ASSESSMENT — PAIN DESCRIPTION - ORIENTATION
ORIENTATION: LEFT

## 2023-07-22 ASSESSMENT — PAIN DESCRIPTION - LOCATION
LOCATION: INCISION

## 2023-07-22 NOTE — PROGRESS NOTES
Call for shortness of breath. Increase lasix dose as inadequate urine output. Obtain ekg.  Trial ez pap and albuterol

## 2023-07-23 ENCOUNTER — APPOINTMENT (OUTPATIENT)
Dept: GENERAL RADIOLOGY | Age: 63
End: 2023-07-23
Attending: INTERNAL MEDICINE
Payer: COMMERCIAL

## 2023-07-23 VITALS
HEIGHT: 71 IN | WEIGHT: 195 LBS | OXYGEN SATURATION: 96 % | BODY MASS INDEX: 27.3 KG/M2 | HEART RATE: 78 BPM | TEMPERATURE: 98 F | RESPIRATION RATE: 18 BRPM | SYSTOLIC BLOOD PRESSURE: 125 MMHG | DIASTOLIC BLOOD PRESSURE: 79 MMHG

## 2023-07-23 LAB
ANION GAP SERPL CALCULATED.3IONS-SCNC: 8 MMOL/L (ref 3–16)
ANION GAP SERPL CALCULATED.3IONS-SCNC: 9 MMOL/L (ref 3–16)
BUN SERPL-MCNC: 28 MG/DL (ref 7–20)
BUN SERPL-MCNC: 29 MG/DL (ref 7–20)
CALCIUM SERPL-MCNC: 9 MG/DL (ref 8.3–10.6)
CALCIUM SERPL-MCNC: 9.1 MG/DL (ref 8.3–10.6)
CHLORIDE SERPL-SCNC: 102 MMOL/L (ref 99–110)
CHLORIDE SERPL-SCNC: 104 MMOL/L (ref 99–110)
CO2 SERPL-SCNC: 27 MMOL/L (ref 21–32)
CO2 SERPL-SCNC: 27 MMOL/L (ref 21–32)
CREAT SERPL-MCNC: 1.2 MG/DL (ref 0.8–1.3)
CREAT SERPL-MCNC: 1.2 MG/DL (ref 0.8–1.3)
EKG ATRIAL RATE: 67 BPM
EKG ATRIAL RATE: 70 BPM
EKG DIAGNOSIS: NORMAL
EKG DIAGNOSIS: NORMAL
EKG P AXIS: 81 DEGREES
EKG P AXIS: 96 DEGREES
EKG P-R INTERVAL: 144 MS
EKG P-R INTERVAL: 180 MS
EKG Q-T INTERVAL: 438 MS
EKG Q-T INTERVAL: 532 MS
EKG QRS DURATION: 128 MS
EKG QRS DURATION: 162 MS
EKG QTC CALCULATION (BAZETT): 473 MS
EKG QTC CALCULATION (BAZETT): 562 MS
EKG R AXIS: 35 DEGREES
EKG R AXIS: 51 DEGREES
EKG T AXIS: 115 DEGREES
EKG T AXIS: 139 DEGREES
EKG VENTRICULAR RATE: 67 BPM
EKG VENTRICULAR RATE: 70 BPM
GFR SERPLBLD CREATININE-BSD FMLA CKD-EPI: >60 ML/MIN/{1.73_M2}
GFR SERPLBLD CREATININE-BSD FMLA CKD-EPI: >60 ML/MIN/{1.73_M2}
GLUCOSE SERPL-MCNC: 91 MG/DL (ref 70–99)
GLUCOSE SERPL-MCNC: 92 MG/DL (ref 70–99)
MAGNESIUM SERPL-MCNC: 2.1 MG/DL (ref 1.8–2.4)
MAGNESIUM SERPL-MCNC: 2.2 MG/DL (ref 1.8–2.4)
POTASSIUM SERPL-SCNC: 4.3 MMOL/L (ref 3.5–5.1)
POTASSIUM SERPL-SCNC: 4.6 MMOL/L (ref 3.5–5.1)
SODIUM SERPL-SCNC: 138 MMOL/L (ref 136–145)
SODIUM SERPL-SCNC: 139 MMOL/L (ref 136–145)

## 2023-07-23 PROCEDURE — 36415 COLL VENOUS BLD VENIPUNCTURE: CPT

## 2023-07-23 PROCEDURE — 6370000000 HC RX 637 (ALT 250 FOR IP): Performed by: INTERNAL MEDICINE

## 2023-07-23 PROCEDURE — 83735 ASSAY OF MAGNESIUM: CPT

## 2023-07-23 PROCEDURE — 71046 X-RAY EXAM CHEST 2 VIEWS: CPT

## 2023-07-23 PROCEDURE — 96376 TX/PRO/DX INJ SAME DRUG ADON: CPT

## 2023-07-23 PROCEDURE — 6360000002 HC RX W HCPCS: Performed by: INTERNAL MEDICINE

## 2023-07-23 PROCEDURE — 80048 BASIC METABOLIC PNL TOTAL CA: CPT

## 2023-07-23 PROCEDURE — G0378 HOSPITAL OBSERVATION PER HR: HCPCS

## 2023-07-23 PROCEDURE — 93005 ELECTROCARDIOGRAM TRACING: CPT | Performed by: INTERNAL MEDICINE

## 2023-07-23 PROCEDURE — 99239 HOSP IP/OBS DSCHRG MGMT >30: CPT | Performed by: NURSE PRACTITIONER

## 2023-07-23 PROCEDURE — 2580000003 HC RX 258: Performed by: INTERNAL MEDICINE

## 2023-07-23 PROCEDURE — 94640 AIRWAY INHALATION TREATMENT: CPT

## 2023-07-23 RX ORDER — AMIODARONE HYDROCHLORIDE 200 MG/1
100 TABLET ORAL DAILY
Qty: 30 TABLET | Refills: 2 | Status: SHIPPED
Start: 2023-07-23

## 2023-07-23 RX ADMIN — Medication 10 ML: at 08:47

## 2023-07-23 RX ADMIN — KETOROLAC TROMETHAMINE 30 MG: 30 INJECTION, SOLUTION INTRAMUSCULAR; INTRAVENOUS at 08:46

## 2023-07-23 RX ADMIN — APIXABAN 5 MG: 5 TABLET, FILM COATED ORAL at 08:42

## 2023-07-23 RX ADMIN — KETOROLAC TROMETHAMINE 30 MG: 30 INJECTION, SOLUTION INTRAMUSCULAR; INTRAVENOUS at 00:33

## 2023-07-23 ASSESSMENT — PAIN DESCRIPTION - ORIENTATION
ORIENTATION: LEFT
ORIENTATION: RIGHT

## 2023-07-23 ASSESSMENT — PAIN SCALES - GENERAL
PAINLEVEL_OUTOF10: 8
PAINLEVEL_OUTOF10: 5
PAINLEVEL_OUTOF10: 5
PAINLEVEL_OUTOF10: 0
PAINLEVEL_OUTOF10: 2
PAINLEVEL_OUTOF10: 3
PAINLEVEL_OUTOF10: 5

## 2023-07-23 ASSESSMENT — PAIN DESCRIPTION - DESCRIPTORS
DESCRIPTORS: ACHING

## 2023-07-23 ASSESSMENT — PAIN DESCRIPTION - LOCATION
LOCATION: INCISION
LOCATION: INCISION
LOCATION: CHEST

## 2023-07-23 NOTE — PROGRESS NOTES
Discharge order noted iv hub and tele discontinued Discharge instruction reviewed with patient per Nubia Monroe verbalizes understanding of instruction and follow up care.

## 2023-07-24 ENCOUNTER — CARE COORDINATION (OUTPATIENT)
Dept: CASE MANAGEMENT | Age: 63
End: 2023-07-24

## 2023-07-24 DIAGNOSIS — Z95.0 PACEMAKER: Primary | ICD-10-CM

## 2023-07-24 DIAGNOSIS — I45.10 RBBB: ICD-10-CM

## 2023-07-26 NOTE — PROGRESS NOTES
A inpatient rep check 1 day s/p device implant. P wave 5.5mV  R wave 8.4mV    AP 48%   0.0%    No arrhythmias recorded. See Paceart report under the Cardiology tab.

## 2023-07-31 ENCOUNTER — CARE COORDINATION (OUTPATIENT)
Dept: CASE MANAGEMENT | Age: 63
End: 2023-07-31

## 2023-07-31 ENCOUNTER — NURSE ONLY (OUTPATIENT)
Dept: CARDIOLOGY CLINIC | Age: 63
End: 2023-07-31
Payer: COMMERCIAL

## 2023-07-31 DIAGNOSIS — R00.1 SYMPTOMATIC BRADYCARDIA: ICD-10-CM

## 2023-07-31 DIAGNOSIS — Z95.0 PACEMAKER: ICD-10-CM

## 2023-07-31 DIAGNOSIS — I48.0 PAF (PAROXYSMAL ATRIAL FIBRILLATION) (HCC): Primary | ICD-10-CM

## 2023-07-31 DIAGNOSIS — I48.3 TYPICAL ATRIAL FLUTTER (HCC): ICD-10-CM

## 2023-07-31 DIAGNOSIS — I49.3 PVC (PREMATURE VENTRICULAR CONTRACTION): ICD-10-CM

## 2023-07-31 DIAGNOSIS — I49.5 SINUS NODE DYSFUNCTION (HCC): ICD-10-CM

## 2023-07-31 PROCEDURE — 93280 PM DEVICE PROGR EVAL DUAL: CPT | Performed by: INTERNAL MEDICINE

## 2023-07-31 NOTE — CARE COORDINATION
[facility] Care Transitions Follow Up Call    Patient Current Location: [address] [place] [address] Transition Nurse contacted the patient by telephone. Verified name and  with patient as identifiers. Patient: Itz Duran. Patient : 1960   MRN: [id number]  Reason for Admission: pacemaker placement   Discharge Date: 23 RARS: Readmission Risk Score: 11.1      Needs to be reviewed by the provider   Additional needs identified to be addressed with provider: No  none             Method of communication with provider: none. CTN spoke with patient who reported he is doing alright. Patient reported he had a device check today and dressing/steri strips removed. Patient reported site healing well and denied any worsening swelling or drainage. Patient reported he had some intermittent chest pain yesterday and did let nurse know at device check today who recommended patient try naproxen. Patient denied any chest pain today. CTN encouraged patient to notify provider if symptoms return. Addressed changes since last contact:  none  Discussed follow-up appointments. If no appointment was previously scheduled, appointment scheduling offered: Yes. Is follow up appointment scheduled within 7 days of discharge? Yes. Follow Up  Future Appointments   Date Time Provider [facility] [address]   2023  8:30 AM SCHEDULE, [facility] [place] Fabricio Allred [facility]   2023  8:30 AM PARI Donnelly - MATT Allred [facility]   1/3/2024  9:00 AM Dominick Mabry MD AND JEZ [facility]   2024  7:10 AM SCHEDULE, Anjum Guerra REMOTE TRANSMISSION Fabriciobarbra Allred [facility]     Non-[facility] follow up appointment(s):     Care Transition Nurse reviewed medical action plan with patient and discussed any barriers to care and/or understanding of plan of care after discharge. Discussed appropriate site of care based on symptoms and resources available to patient including: PCP  Specialist  When to call 911.  The patient agrees to contact the PCP office for

## 2023-07-31 NOTE — PATIENT INSTRUCTIONS
New Cardiac Device Implant (Pacemaker and/or Defibrillator) Post Op Instructions  Bathe with water indirectly hitting the incision site. Water and soap may run over the incision site. Do not scrub. Pat dry with a clean towel after bathing. Leave incision open to the air; do not apply any dressings, ointments, or bandages to the area. Do not apply lotion, perfume/cologne, or powders to the area until it is completely healed. Any scabbing or skin glue that is noted will fall off within 1-2 weeks after the post op appointment. If any oozing, bleeding, or pus drainage occurs, please call the office immediately at [phone]. Patient has movement restrictions in place until 4 weeks post op (to the day of implant) unless otherwise instructed by physician. Patient may not lift the device side arm above shoulder height. Do not far reach or stretch across body or behind body with effected side. Do not use this arm for pushing, pulling, or lifting body. Do not use cane on the effected side. Patient may not lift anything heavier than a gallon of milk with the associated arm. Appointments to expect going forward:  Post operatively the patient will have had a 1-week post op check and a 3 month follow up with NP/MD and the device clinic. Remote Monitoring Instructions    Within 2-3 weeks of your device being implanted, you will receive a call from the  of your device. Please answer this call as it is to set up remote monitoring for your device. Once you receive your in-home monitor, please follow the instructions provided to sync the home monitor to your implanted device. Once you have paired your home monitor to your implanted device, keep your monitor plugged in within 6 feet of where you sleep. Your monitor will routinely check in with your device during sleep hours and transmit any urgent events to the [address] [place] [address] for review.      Please do not send additional routine

## 2023-07-31 NOTE — PROGRESS NOTES
Patient presents to the device clinic today for a programming evaluation for his pacemaker. Patient has a history of AF, A Flutter, Hypertrophic CM, SND, symptomatic bradycardia, PVCs, and SND. Takes amiodarone and Eliquis. Patient's device was implanted on 7/21 by Dr. Ruth Ann Corey. Since then, no arrhythmias recorded. P wave: 6.8 mV  R wave: 8.1 mV    AP 82%  0.0%    All sensing and pacing parameters are within normal range. No changes need to be made at this time. Incision is closed, clean, and dry with all dressings/steri strips removed. Site left open to the air. Incision well approximated. No s/s of infection. Patient complains of throbbing intermittent chest pains just below device site. Discussed with Dr. Ruth Ann Corey - per Dr. Ruth Ann Corey patient is to place cold compress at pain site (away from actual pacemaker) and take Aleve intermittently until pain dissipates. Patient v/u. Patient education was provided about site care, device functionality, in home monitoring, and any other patient questions and/or concerns were addressed. Aftercare and remote monitoring literature was provided. Patient voices understanding. Patient will follow up in 3 months in office or remotely. Please see interrogation for more detail - Paceart report located under the Cardiology tab.

## 2023-08-03 ENCOUNTER — CARE COORDINATION (OUTPATIENT)
Dept: CASE MANAGEMENT | Age: 63
End: 2023-08-03

## 2023-08-03 NOTE — CARE COORDINATION
Care Transitions Outreach Attempt      Attempted to reach patient for transitions of care follow up. Unable to reach patient. LVM     Patient: Uzma Gamino. Patient : 1960 MRN: [id number]    Last Discharge [address]       Date Complaint Diagnosis Description Type Department Provider    23  Sinus node dysfunction ([address]) . .. Admission (Discharged) from [address], MD              Was this an external facility discharge? No Discharge Facility: n.a    Noted following upcoming appointments from discharge chart review:   [facility] follow up appointment(s):   Future Appointments   Date Time Provider [address]   2023  8:30 AM SCHEDULE, [facility]I [facility]   2023  8:30 AM PARI Yu - CNP [employer] [facility]   1/3/2024  9:00 AM Amada Mackenzie MD AND PULM [facility]   2024  7:10 AM SCHEDULE, Clint Velazquez REMOTE TRANSMISSION [employer] [facility]     Non-[facility] follow up appointment(s):  Davina MOREL, RN, [facility]  Care Transition Nurse  [phone] mobile

## 2023-08-10 ENCOUNTER — CARE COORDINATION (OUTPATIENT)
Dept: CASE MANAGEMENT | Age: 63
End: 2023-08-10

## 2023-08-18 ENCOUNTER — CARE COORDINATION (OUTPATIENT)
Dept: CASE MANAGEMENT | Age: 63
End: 2023-08-18

## 2023-08-18 NOTE — CARE COORDINATION
Care Transitions Outreach Attempt    Attempted to reach patient for transitions of care follow up. Unable to reach patient. LVM    Patient: Candace Mackenzie. Patient : 1960 MRN: [id number]    Last Discharge [address]       Date Complaint Diagnosis Description Type Department Provider    23  Sinus node dysfunction ([address]) . .. Admission (Discharged) from [address], MD              Noted following upcoming appointments from discharge chart review:   [facility] follow up appointment(s):   Future Appointments   Date Time Provider [address]   2023  8:00 AM A [address] [facility]   2023  3:00 PM SCHEDULE, [facility]   2023  3:00 PM PARI Pretty - CNP [facility]   1/3/2024  9:00 AM Charles Aguilar MD AND PULAMADO [facility]   2024  7:10 AM SCHEDULE, César Enciso REMOTE TRANSMISSION [facility]     Non-[facility] follow up appointment(s):  Rosina MOREL, RN, [facility]  Care Transition Nurse  [phone] mobile

## 2023-08-23 ENCOUNTER — CARE COORDINATION (OUTPATIENT)
Dept: CASE MANAGEMENT | Age: 63
End: 2023-08-23

## 2023-08-23 NOTE — CARE COORDINATION
[address] [facility] Care Transitions Follow Up Call    Patient Current Location: [address] [place] [address] Transition Nurse contacted the patient by telephone. Verified name and  with patient as identifiers. Patient: Juan José Davenport Patient : 1960   MRN: [id number]  Reason for Admission: pacemaker placement  Discharge Date: 23 RARS: Readmission Risk Score: 11.1      Needs to be reviewed by the provider   Additional needs identified to be addressed with provider: Yes  Patient reported episode last week of cp sharp and heavy lasted about 30 minutes. Patient reports palpitations intermittent. Please advise. Thanks             Method of communication with provider: chart routing. CTN spoke with patient who reported he continues to have palpitations and last week had an episode of cp sharp and heavy last about 30 minutes. Patient denied any other symptoms. Patient encouraged to contact cardio and CTN will route message. Addressed changes since last contact:  none  Discussed follow-up appointments. If no appointment was previously scheduled, appointment scheduling offered: Yes. Is follow up appointment scheduled within 7 days of discharge? Yes. Follow Up  Future Appointments   Date Time Provider [address]   2023  8:00 AM A ECHO ROOM [facility] system [facility]   2023  3:00 PM SCHEDULE, [facility]   2023  3:00 PM Paulina Topete, APRN - CNP [facility]   1/3/2024  9:00 AM Carlos Larkin MD AND PULM [facility]   2024  7:10 AM SCHEDULE, Deandra Colindres REMOTE TRANSMISSION [facility]     Non-[facility] follow up appointment(s):     Care Transition Nurse reviewed medical action plan with patient and discussed any barriers to care and/or understanding of plan of care after discharge. Discussed appropriate site of care based on symptoms and resources available to patient including: PCP  Specialist  When to call 911.  The patient agrees to contact the PCP office for questions

## 2023-08-25 ENCOUNTER — TELEPHONE (OUTPATIENT)
Dept: CARDIOLOGY CLINIC | Age: 63
End: 2023-08-25

## 2023-08-25 ENCOUNTER — CARE COORDINATION (OUTPATIENT)
Dept: CARE COORDINATION | Age: 63
End: 2023-08-25

## 2023-08-25 SDOH — ECONOMIC STABILITY: TRANSPORTATION INSECURITY
IN THE PAST 12 MONTHS, HAS THE LACK OF TRANSPORTATION KEPT YOU FROM MEDICAL APPOINTMENTS OR FROM GETTING MEDICATIONS?: NO

## 2023-08-25 SDOH — ECONOMIC STABILITY: TRANSPORTATION INSECURITY
IN THE PAST 12 MONTHS, HAS LACK OF TRANSPORTATION KEPT YOU FROM MEETINGS, WORK, OR FROM GETTING THINGS NEEDED FOR DAILY LIVING?: NO

## 2023-08-25 NOTE — CARE COORDINATION
How do daily activities impact on the patient's well-being? (include current or anticipated unemployment, work, caregiving, access to transportation or other): No identified problems or perceived positive benefits   How would you rate their social network (family, work, friends)?: Good participation with social networks   How would you rate their financial resources (including ability to afford all required medical care)?: Financially secure, resources adequate, no identified problems   How wells does the patient now understand their health and well-being (symptoms, signs or risk factors) and what they need to do to manage their health?: Reasonable to good understanding and already engages in managing health or is willing to undertake better management   How well do you think your patient can engage in healthcare discussions? (Barriers include language, deafness, aphasia, alcohol or drug problems, learning difficulties, concentration): Clear and open communication, no identified barriers   Do other services need to be involved to help this patient?: Other care/services not required at this time   Are current services involved with this patient well-coordinated? (Include coordination with other services you are now recommendation):  All required care/services in place and well-coordinated   Suggested Interventions and [employer] Services: Declined                  Future Appointments   Date Time Provider [address]   9/19/2023  8:00 AM MHA ECHO ROOM [facility] AND [facility]   12/11/2023  3:00 PM SCHEDULE, [facility] [email or web address]   12/11/2023  3:00 PM PAIR Nuñez CNP [email or web address]   1/3/2024  9:00 AM Carlos Larkin MD AND JEZ [employer]   1/23/2024  7:10 AM SCHEDULE, Deandra Colindres REMOTE TRANSMISSION [email or web address]      and   General Assessment    Do you have any symptoms that are causing concern?: No

## 2023-08-26 ENCOUNTER — ENROLLMENT (OUTPATIENT)
Dept: CARE COORDINATION | Age: 63
End: 2023-08-26

## 2023-09-01 ENCOUNTER — TELEPHONE (OUTPATIENT)
Dept: PULMONOLOGY | Age: 63
End: 2023-09-01

## 2023-09-01 ENCOUNTER — TELEPHONE (OUTPATIENT)
Dept: FAMILY MEDICINE CLINIC | Age: 63
End: 2023-09-01

## 2023-09-01 NOTE — TELEPHONE ENCOUNTER
Pt states that he called to schedule an appt woth the [address] [place] [address] dermatologist but they are not taking any new pts. I advised him to contact his insurance provider and find out who is covered and then contact those office. He states that he would rather get your opinion.

## 2023-09-01 NOTE — TELEPHONE ENCOUNTER
We can mail him our list of dermatologist and he can determine which one he would prefer to see.   Keep in mind that it may take months for him to get a new patient appointment

## 2023-09-07 DIAGNOSIS — G47.00 INSOMNIA, UNSPECIFIED TYPE: ICD-10-CM

## 2023-09-08 RX ORDER — CLONAZEPAM 1 MG/1
TABLET ORAL
Qty: 30 TABLET | Refills: 2 | Status: SHIPPED | OUTPATIENT
Start: 2023-09-08 | End: 2023-10-07

## 2023-09-20 ENCOUNTER — CARE COORDINATION (OUTPATIENT)
Dept: CARE COORDINATION | Age: 63
End: 2023-09-20

## 2023-09-20 NOTE — CARE COORDINATION
Ambulatory Care Coordination Note  2023    Patient Current Location:  Home: [address] [place] [address]     ACM contacted the patient by telephone. Verified name and  with patient as identifiers. Provided introduction to self, and explanation of the ACM role. Challenges to be reviewed by the provider   Additional needs identified to be addressed with provider: No  none               Method of communication with provider: none. ACM: Noble Gilliam, RN    ACM made care coordination outreach to patient. Patient stated that he has been doing [place]. \"  Patient reported frequent \"rapid heart beats\" with some shortness of breath. Patient stated that these have been more frequent since getting the pacemaker inserted. Patient's next scheduled appointment with cardiology is 23. ACM encouraged patient to call and be seen earlier to discuss palpitations, patient verbalized understanding. Patient stated that the pacemaker site is healing good. He denied any signs or symptoms of infection. Patient denied any other questions or concerns at this time. ACM to follow up at a later time. Plan   Follow up palpitations   Follow up 2 weeks    Offered patient enrollment in the Remote Patient Monitoring (RPM) program for in-home monitoring: Patient declined. Lab Results       None                 Goals Addressed                   This Visit's Progress     Conditions and Symptoms   On track     I will schedule office visits, as directed by my provider. I will keep my appointment or reschedule if I have to cancel. I will notify my provider of any barriers to my plan of care. I will notify my provider of any symptoms that indicate a worsening of my condition.     Barriers: overwhelmed by complexity of regimen  Plan for overcoming my barriers: education and support   Confidence: 8/10  Anticipated Goal Completion Date: 10/25/23                Future Appointments   Date Time Provider [address] [place] [address]

## 2023-10-03 ENCOUNTER — CARE COORDINATION (OUTPATIENT)
Dept: CARE COORDINATION | Age: 63
End: 2023-10-03

## 2023-10-03 RX ORDER — TESTOSTERONE 20.25 MG/1.25G
GEL TOPICAL DAILY
COMMUNITY

## 2023-10-03 NOTE — CARE COORDINATION
Ambulatory Care Coordination Note  10/3/2023    Patient Current Location: [place]     ACM contacted the patient by telephone. Verified name and  with patient as identifiers. Provided introduction to self, and explanation of the ACM role. Challenges to be reviewed by the provider   Additional needs identified to be addressed with provider: No  none               Method of communication with provider: none. ACM: Mariia Manzanares RN    ACM made care coordination outreach to patient. Patient stated that he is doing \"pretty good. \"  Patient stated that he occasionally has \"rapid heart beats\" with shortness of breath. Patient stated that he just returned home from vacation and has not had a chance to call cardiology to discuss. ACM encouraged patient to call cardiology to discuss. Patient denied any chest pain, shortness of breath or swelling at this time. ACM completed medication review with patient. Patient denied any other questions or concerns at this time. ACM to follow up at a later time. Plan   Follow up palpitations  Graduation ? Offered patient enrollment in the Remote Patient Monitoring (RPM) program for in-home monitoring: Patient declined. Lab Results       None                 Goals Addressed                   This Visit's Progress     Conditions and Symptoms   On track     I will schedule office visits, as directed by my provider. I will keep my appointment or reschedule if I have to cancel. I will notify my provider of any barriers to my plan of care. I will notify my provider of any symptoms that indicate a worsening of my condition.     Barriers: overwhelmed by complexity of regimen  Plan for overcoming my barriers: education and support   Confidence: 8/10  Anticipated Goal Completion Date: 10/25/23                Future Appointments   Date Time Provider [address]   2023  3:00 PM SCHEDULE, [facility] Bro Lopesanabel [facility]   2023  3:00 PM PARI Gonzalez -

## 2023-10-09 RX ORDER — OMEPRAZOLE 20 MG/1
CAPSULE, DELAYED RELEASE ORAL DAILY
Qty: 90 CAPSULE | Refills: 1 | Status: SHIPPED | OUTPATIENT
Start: 2023-10-09

## 2023-10-09 NOTE — TELEPHONE ENCOUNTER
LM for the pt to contact the office. Pt is due for a 6 month f/u.     Amanda Ashley. is requesting refill(s) omeprazole  Last OV 4/18/23 (pertaining to medication)  LR 4/18/23 (per medication requested)  Next office visit scheduled or attempted No   If no, reason:  LM for the pt to schedule

## 2023-10-12 DIAGNOSIS — E78.5 HYPERLIPIDEMIA, UNSPECIFIED HYPERLIPIDEMIA TYPE: ICD-10-CM

## 2023-10-12 RX ORDER — PRAVASTATIN SODIUM 20 MG
20 TABLET ORAL EVERY EVENING
Qty: 90 TABLET | Refills: 1 | Status: SHIPPED | OUTPATIENT
Start: 2023-10-12

## 2023-10-12 NOTE — TELEPHONE ENCOUNTER
Kylie Rincon. is requesting refill(s) pravastatin  Last OV 2/27/23 (pertaining to medication)  LR 2/27/23 (per medication requested)  Next office visit scheduled or attempted Yes   If no, reason:  10/24/23

## 2023-10-24 ENCOUNTER — OFFICE VISIT (OUTPATIENT)
Dept: FAMILY MEDICINE CLINIC | Age: 63
End: 2023-10-24
Payer: COMMERCIAL

## 2023-10-24 VITALS
BODY MASS INDEX: 26.65 KG/M2 | HEIGHT: 71 IN | OXYGEN SATURATION: 98 % | SYSTOLIC BLOOD PRESSURE: 128 MMHG | WEIGHT: 190.4 LBS | DIASTOLIC BLOOD PRESSURE: 68 MMHG | HEART RATE: 68 BPM

## 2023-10-24 DIAGNOSIS — I42.2 HYPERTROPHIC CARDIOMYOPATHY (HCC): ICD-10-CM

## 2023-10-24 DIAGNOSIS — R79.89 ABNORMAL TSH: ICD-10-CM

## 2023-10-24 DIAGNOSIS — K21.9 GASTROESOPHAGEAL REFLUX DISEASE WITHOUT ESOPHAGITIS: ICD-10-CM

## 2023-10-24 DIAGNOSIS — R25.1 TREMOR: ICD-10-CM

## 2023-10-24 DIAGNOSIS — E78.5 HYPERLIPIDEMIA, UNSPECIFIED HYPERLIPIDEMIA TYPE: Primary | ICD-10-CM

## 2023-10-24 DIAGNOSIS — I48.91 ATRIAL FIBRILLATION, UNSPECIFIED TYPE (HCC): ICD-10-CM

## 2023-10-24 PROCEDURE — 99214 OFFICE O/P EST MOD 30 MIN: CPT | Performed by: FAMILY MEDICINE

## 2023-10-24 SDOH — ECONOMIC STABILITY: INCOME INSECURITY: HOW HARD IS IT FOR YOU TO PAY FOR THE VERY BASICS LIKE FOOD, HOUSING, MEDICAL CARE, AND HEATING?: NOT HARD AT ALL

## 2023-10-24 SDOH — ECONOMIC STABILITY: HOUSING INSECURITY
IN THE LAST 12 MONTHS, WAS THERE A TIME WHEN YOU DID NOT HAVE A STEADY PLACE TO SLEEP OR SLEPT IN A SHELTER (INCLUDING NOW)?: NO

## 2023-10-24 SDOH — ECONOMIC STABILITY: FOOD INSECURITY: WITHIN THE PAST 12 MONTHS, YOU WORRIED THAT YOUR FOOD WOULD RUN OUT BEFORE YOU GOT MONEY TO BUY MORE.: NEVER TRUE

## 2023-10-24 SDOH — ECONOMIC STABILITY: FOOD INSECURITY: WITHIN THE PAST 12 MONTHS, THE FOOD YOU BOUGHT JUST DIDN'T LAST AND YOU DIDN'T HAVE MONEY TO GET MORE.: NEVER TRUE

## 2023-10-25 LAB
ALBUMIN SERPL-MCNC: 4.4 G/DL (ref 3.4–5)
ALBUMIN/GLOB SERPL: 1.7 {RATIO} (ref 1.1–2.2)
ALP SERPL-CCNC: 96 U/L (ref 40–129)
ALT SERPL-CCNC: 26 U/L (ref 10–40)
ANION GAP SERPL CALCULATED.3IONS-SCNC: 13 MMOL/L (ref 3–16)
AST SERPL-CCNC: 18 U/L (ref 15–37)
BILIRUB SERPL-MCNC: 0.7 MG/DL (ref 0–1)
BUN SERPL-MCNC: 17 MG/DL (ref 7–20)
CALCIUM SERPL-MCNC: 9.3 MG/DL (ref 8.3–10.6)
CHLORIDE SERPL-SCNC: 101 MMOL/L (ref 99–110)
CHOLEST SERPL-MCNC: 187 MG/DL (ref 0–199)
CO2 SERPL-SCNC: 27 MMOL/L (ref 21–32)
CREAT SERPL-MCNC: 1 MG/DL (ref 0.8–1.3)
GFR SERPLBLD CREATININE-BSD FMLA CKD-EPI: >60 ML/MIN/{1.73_M2}
GLUCOSE SERPL-MCNC: 122 MG/DL (ref 70–99)
HDLC SERPL-MCNC: 64 MG/DL (ref 40–60)
LDLC SERPL CALC-MCNC: 111 MG/DL
POTASSIUM SERPL-SCNC: 4.6 MMOL/L (ref 3.5–5.1)
PROT SERPL-MCNC: 7 G/DL (ref 6.4–8.2)
SODIUM SERPL-SCNC: 141 MMOL/L (ref 136–145)
TRIGL SERPL-MCNC: 59 MG/DL (ref 0–150)
TSH SERPL DL<=0.005 MIU/L-ACNC: 1.4 UIU/ML (ref 0.27–4.2)
VLDLC SERPL CALC-MCNC: 12 MG/DL

## 2023-11-01 ENCOUNTER — CARE COORDINATION (OUTPATIENT)
Dept: CARE COORDINATION | Age: 63
End: 2023-11-01

## 2023-11-01 NOTE — CARE COORDINATION
Ambulatory Care Coordination Note  2023    Patient Current Location:  Home: [address] [place] [address]     ACM contacted the patient by telephone. Verified name and  with patient as identifiers. Provided introduction to self, and explanation of the ACM role. Challenges to be reviewed by the provider   Additional needs identified to be addressed with provider: No  none               Method of communication with provider: none. ACM: Aden Brandt RN    ACM made final care coordination outreach to patient. Patient stated that he is doing \"pretty good. \" Patient denied any chest pain, shortness of breath or swelling at this time. Patient stated that he has all medications filled and is taking as prescribed. Patient denied any other questions or concerns at this time. Patient given ACM contact information and informed that he can call for any future care coordination needs or if health status changes. Offered patient enrollment in the Remote Patient Monitoring (RPM) program for in-home monitoring: Patient declined. Lab Results       None            Care Coordination Interventions    Referral from Primary Care Provider: No  Suggested Interventions and Community Resources  Transportation Support: Declined          Goals Addressed                   This Visit's Progress     COMPLETED: Conditions and Symptoms        I will schedule office visits, as directed by my provider. I will keep my appointment or reschedule if I have to cancel. I will notify my provider of any barriers to my plan of care. I will notify my provider of any symptoms that indicate a worsening of my condition.     Barriers: overwhelmed by complexity of regimen  Plan for overcoming my barriers: education and support   Confidence: 8/10  Anticipated Goal Completion Date: 10/25/23                Future Appointments   Date Time Provider [address]   2023  3:00 PM SCHEDULE, [address]

## 2023-11-10 ENCOUNTER — TELEPHONE (OUTPATIENT)
Dept: CARDIOLOGY CLINIC | Age: 63
End: 2023-11-10

## 2023-11-10 NOTE — TELEPHONE ENCOUNTER
Received fax from urology group pt is schedule on 11/29/2023 to have bilateral laprscopic varionelectomy. Pt is receiving general anesthesia. Pt is requested to hold Eliquis 3 day prior to procedure.      Last ov 5/31/2023  EKG 7/23/2023    [phone]

## 2023-11-14 ENCOUNTER — OFFICE VISIT (OUTPATIENT)
Dept: FAMILY MEDICINE CLINIC | Age: 63
End: 2023-11-14
Payer: COMMERCIAL

## 2023-11-14 VITALS
HEART RATE: 74 BPM | SYSTOLIC BLOOD PRESSURE: 128 MMHG | BODY MASS INDEX: 27.1 KG/M2 | WEIGHT: 193.6 LBS | OXYGEN SATURATION: 99 % | DIASTOLIC BLOOD PRESSURE: 64 MMHG | HEIGHT: 71 IN

## 2023-11-14 DIAGNOSIS — Z95.0 PACEMAKER: ICD-10-CM

## 2023-11-14 DIAGNOSIS — I86.1 VARICOCELE: Primary | ICD-10-CM

## 2023-11-14 DIAGNOSIS — G47.33 OBSTRUCTIVE SLEEP APNEA: ICD-10-CM

## 2023-11-14 DIAGNOSIS — E78.5 HYPERLIPIDEMIA, UNSPECIFIED HYPERLIPIDEMIA TYPE: ICD-10-CM

## 2023-11-14 DIAGNOSIS — I48.91 ATRIAL FIBRILLATION, UNSPECIFIED TYPE (HCC): ICD-10-CM

## 2023-11-14 DIAGNOSIS — I42.2 HYPERTROPHIC CARDIOMYOPATHY (HCC): ICD-10-CM

## 2023-11-14 DIAGNOSIS — Z01.818 PREOP EXAMINATION: ICD-10-CM

## 2023-11-14 DIAGNOSIS — Z79.01 CURRENT USE OF LONG TERM ANTICOAGULATION: ICD-10-CM

## 2023-11-14 PROCEDURE — 99214 OFFICE O/P EST MOD 30 MIN: CPT | Performed by: FAMILY MEDICINE

## 2023-11-14 RX ORDER — AMIODARONE HYDROCHLORIDE 100 MG/1
100 TABLET ORAL DAILY
COMMUNITY

## 2023-11-14 NOTE — PROGRESS NOTES
[facility]   Pre-operative History and Physical      DIAGNOSIS:  varicocoele    PROCEDURE: Bilateral laparoscopic varicocelectomy      History Obtained From:  patient    HISTORY OF PRESENT ILLNESS:    The patient is a 61 y.o. male who presents for pre-operative evaluation.   Dr. Ludivina Bajwa sends patient for preop clearance due to his history of A-fib, hyperlipidemia, hypertrophic cardiomyopathy, chronic anticoagulation, obstructive sleep apnea, pacemaker, GERD    Past Medical History:    Past Medical History:   Diagnosis Date    A-fib [facility])     ADHD (attention deficit hyperactivity disorder)     Carpal tunnel syndrome 01/21/2015    Chronic low back pain     Chronic neck pain     Chronic sinusitis     Dr. Dana jamil present     lower    Epigastric hernia     Esophageal spasm     GERD (gastroesophageal reflux disease)     Hyperlipidemia     Hypertrophic cardiomyopathy (HCC)     IHSS (idiopathic hypertrophic subaortic stenosis) ([address])     Kidney stones     HUNTER on CPAP     Dr. Audrey Alberto- A-PAP    Primary localized osteoarthrosis, shoulder region 10/18/2013    Prostate cancer [facility])     prostate ; s/p radiation treatment    PVC's (premature ventricular contractions)     RBBB (right bundle branch block)     RLS (restless legs syndrome)     Dr. Audrey Alberto    Seasonal allergies     Tachycardia      Past Surgical History:    Past Surgical History:   Procedure Laterality Date    CARDIAC ELECTROPHYSIOLOGY STUDY AND ABLATION      CARPAL TUNNEL RELEASE Left     COLONOSCOPY  01/17/2011    COLONOSCOPY N/A 02/02/2020    COLONOSCOPY WITH BIOPSY performed by Garrett Gee MD at [address] [place] [address]  2015    left    ENDOSCOPY, COLON, DIAGNOSTIC      INGUINAL HERNIA REPAIR Bilateral 2009, 2012,    INSERTABLE CARDIAC MONITOR Left 12/11/2020    INSERTABLE CARDIAC MONITOR  12/11/2020    Loop Implant    KNEE ARTHROSCOPY Right 1982    Right    KNEE ARTHROSCOPY Left 12/21/2017    KNEE ARTHROSCOPY Left

## 2023-12-07 NOTE — PROGRESS NOTES
07/23/2023 4.3 3.5 - 5.1 mmol/L Final   07/23/2023 4.6 3.5 - 5.1 mmol/L Final     CO2   Date Value Ref Range Status   10/24/2023 27 21 - 32 mmol/L Final   07/23/2023 27 21 - 32 mmol/L Final   07/23/2023 27 21 - 32 mmol/L Final     BUN   Date Value Ref Range Status   10/24/2023 17 7 - 20 mg/dL Final   07/23/2023 28 (H) 7 - 20 mg/dL Final   07/23/2023 29 (H) 7 - 20 mg/dL Final     Creatinine   Date Value Ref Range Status   10/24/2023 1.0 0.8 - 1.3 mg/dL Final   07/23/2023 1.2 0.8 - 1.3 mg/dL Final   07/23/2023 1.2 0.8 - 1.3 mg/dL Final     Est, Glom Filt Rate   Date Value Ref Range Status   10/24/2023 >60 >60 Final     Comment:     Pediatric calculator link  Denator.at. org/professionals/kdoqi/gfr_calculatorped  Effective Oct 3, 2022  These results are not intended for use in patients  <25years of age. eGFR results are calculated without  a race factor using the 2021 CKD-EPI equation. Careful  clinical correlation is recommended, particularly when  comparing to results calculated using previous equations. The CKD-EPI equation is less accurate in patients with  extremes of muscle mass, extra-renal metabolism of  creatinine, excessive creatinine ingestion, or following  therapy that affects renal tubular secretion. 07/23/2023 >60 >60 Final     Comment:     Pediatric calculator link  Denator.at. org/professionals/kdoqi/gfr_calculatorped  Effective Oct 3, 2022  These results are not intended for use in patients  <25years of age. eGFR results are calculated without  a race factor using the 2021 CKD-EPI equation. Careful  clinical correlation is recommended, particularly when  comparing to results calculated using previous equations. The CKD-EPI equation is less accurate in patients with  extremes of muscle mass, extra-renal metabolism of  creatinine, excessive creatinine ingestion, or following  therapy that affects renal tubular secretion.      07/23/2023 >60 >60 Final     Comment:     Pediatric

## 2023-12-11 ENCOUNTER — NURSE ONLY (OUTPATIENT)
Dept: CARDIOLOGY CLINIC | Age: 63
End: 2023-12-11
Payer: COMMERCIAL

## 2023-12-11 ENCOUNTER — TELEPHONE (OUTPATIENT)
Dept: CARDIOLOGY CLINIC | Age: 63
End: 2023-12-11

## 2023-12-11 ENCOUNTER — OFFICE VISIT (OUTPATIENT)
Dept: CARDIOLOGY CLINIC | Age: 63
End: 2023-12-11
Payer: COMMERCIAL

## 2023-12-11 VITALS
HEIGHT: 71 IN | DIASTOLIC BLOOD PRESSURE: 84 MMHG | BODY MASS INDEX: 27.27 KG/M2 | HEART RATE: 58 BPM | OXYGEN SATURATION: 98 % | WEIGHT: 194.8 LBS | SYSTOLIC BLOOD PRESSURE: 138 MMHG

## 2023-12-11 DIAGNOSIS — Z95.0 PACEMAKER: Primary | ICD-10-CM

## 2023-12-11 DIAGNOSIS — R06.02 SHORTNESS OF BREATH: Primary | ICD-10-CM

## 2023-12-11 DIAGNOSIS — I49.5 SINUS NODE DYSFUNCTION (HCC): ICD-10-CM

## 2023-12-11 DIAGNOSIS — I49.9 IRREGULAR HEART RATE: ICD-10-CM

## 2023-12-11 PROCEDURE — 93280 PM DEVICE PROGR EVAL DUAL: CPT | Performed by: INTERNAL MEDICINE

## 2023-12-11 PROCEDURE — 93000 ELECTROCARDIOGRAM COMPLETE: CPT

## 2023-12-11 PROCEDURE — 99214 OFFICE O/P EST MOD 30 MIN: CPT

## 2023-12-11 RX ORDER — METHOCARBAMOL 500 MG/1
500 TABLET, FILM COATED ORAL 2 TIMES DAILY
COMMUNITY
Start: 2023-10-26

## 2023-12-11 NOTE — PATIENT INSTRUCTIONS
1. Continue amiodarone 100 mg daily  2. Continue Eliquis 5 mg twice daily for stroke risk reduction  3. Continue Pravachol 20 mg nightly  4. Start Toprol XL 25 mg daily   5. Will check back in 2 weeks to see how he is feeling; remote device check in 2 weeks  6.  Consider stress testing

## 2023-12-13 ENCOUNTER — PATIENT MESSAGE (OUTPATIENT)
Dept: CARDIOLOGY CLINIC | Age: 63
End: 2023-12-13

## 2023-12-13 DIAGNOSIS — I48.0 PAF (PAROXYSMAL ATRIAL FIBRILLATION) (HCC): Primary | ICD-10-CM

## 2023-12-14 NOTE — TELEPHONE ENCOUNTER
From: Daivd Alva. To: Dr. Lawson Homans: 12/13/2023 7:40 PM EST  Subject: Perscription    I stopped by the pharmacy to  the new medication Mac Gabriel was prescribing and they said it was not called in yet.

## 2023-12-15 ENCOUNTER — HOSPITAL ENCOUNTER (OUTPATIENT)
Age: 63
Discharge: HOME | End: 2023-12-15
Payer: COMMERCIAL

## 2023-12-15 DIAGNOSIS — R06.02 SHORTNESS OF BREATH: ICD-10-CM

## 2023-12-15 LAB
BASOPHILS # BLD: 0.1 K/UL (ref 0–0.2)
BASOPHILS NFR BLD: 0.9 %
DEPRECATED RDW RBC AUTO: 16.6 % (ref 12.4–15.4)
EOSINOPHIL # BLD: 0.2 K/UL (ref 0–0.6)
EOSINOPHIL NFR BLD: 3.2 %
HCT VFR BLD AUTO: 42.6 % (ref 40.5–52.5)
HGB BLD-MCNC: 13.7 G/DL (ref 13.5–17.5)
LYMPHOCYTES # BLD: 0.8 K/UL (ref 1–5.1)
LYMPHOCYTES NFR BLD: 14.3 %
MCH RBC QN AUTO: 28.4 PG (ref 26–34)
MCHC RBC AUTO-ENTMCNC: 32.1 G/DL (ref 31–36)
MCV RBC AUTO: 88.5 FL (ref 80–100)
MONOCYTES # BLD: 0.4 K/UL (ref 0–1.3)
MONOCYTES NFR BLD: 8 %
NEUTROPHILS # BLD: 4.1 K/UL (ref 1.7–7.7)
NEUTROPHILS NFR BLD: 73.6 %
PLATELET # BLD AUTO: 250 K/UL (ref 135–450)
PMV BLD AUTO: 8.4 FL (ref 5–10.5)
RBC # BLD AUTO: 4.81 M/UL (ref 4.2–5.9)
WBC # BLD AUTO: 5.5 K/UL (ref 4–11)

## 2023-12-15 PROCEDURE — 85025 COMPLETE CBC W/AUTO DIFF WBC: CPT

## 2023-12-15 PROCEDURE — 36415 COLL VENOUS BLD VENIPUNCTURE: CPT

## 2023-12-15 RX ORDER — METOPROLOL SUCCINATE 25 MG/1
25 TABLET, EXTENDED RELEASE ORAL DAILY
Qty: 90 TABLET | Refills: 3 | Status: SHIPPED | OUTPATIENT
Start: 2023-12-15

## 2023-12-15 NOTE — TELEPHONE ENCOUNTER
PT called and stated he would like his medication to go to pharmacy:    [facility] [place], [address] [place], [place] [address]e [place], [facility]  Phone: [phone]  Fax: [phone]

## 2023-12-23 DIAGNOSIS — G47.00 INSOMNIA, UNSPECIFIED TYPE: ICD-10-CM

## 2023-12-26 ENCOUNTER — NURSE ONLY (OUTPATIENT)
Dept: CARDIOLOGY CLINIC | Age: 63
End: 2023-12-26

## 2023-12-26 DIAGNOSIS — Z95.0 PACEMAKER: ICD-10-CM

## 2023-12-26 RX ORDER — CLONAZEPAM 1 MG/1
TABLET ORAL
Qty: 30 TABLET | Refills: 2 | Status: SHIPPED | OUTPATIENT
Start: 2023-12-26 | End: 2024-01-25

## 2023-12-26 NOTE — PROGRESS NOTES
12/11/23 per NPKK to check for arrhythmias/pvcs/etc ?  Message will need to be sent to [address] once received. Last recorded PAF 12/20/23 x 10 hrs, V rates controlled. PVC count 3/hr since 12/12/23. See Geena Clear report under cardiology tab once EP reviews.

## 2024-01-02 RX ORDER — AMIODARONE HYDROCHLORIDE 100 MG/1
100 TABLET ORAL DAILY
Qty: 90 TABLET | Refills: 1 | Status: SHIPPED | OUTPATIENT
Start: 2024-01-02

## 2024-01-02 NOTE — TELEPHONE ENCOUNTER
Medication Refill    Medication needing refilled:  Amiodarone  Dosage of the medication:  100mg  How are you taking this medication (QD, BID, TID, QID, PRN):  Qd  30 or 90 day supply called in:  90  When will you run out of your medication:    Which Pharmacy are we sending the medication to?:  [id number] [place] - [address] - P [phone]       Last OV: 12.11.2023 NPKK

## 2024-01-03 ENCOUNTER — OFFICE VISIT (OUTPATIENT)
Dept: PULMONOLOGY | Age: 64
End: 2024-01-03
Payer: COMMERCIAL

## 2024-01-03 VITALS
HEIGHT: 71 IN | BODY MASS INDEX: 28.48 KG/M2 | RESPIRATION RATE: 16 BRPM | HEART RATE: 69 BPM | WEIGHT: 203.4 LBS | DIASTOLIC BLOOD PRESSURE: 90 MMHG | TEMPERATURE: 97 F | SYSTOLIC BLOOD PRESSURE: 139 MMHG | OXYGEN SATURATION: 97 %

## 2024-01-03 DIAGNOSIS — G47.00 INSOMNIA, UNSPECIFIED TYPE: ICD-10-CM

## 2024-01-03 DIAGNOSIS — G47.10 HYPERSOMNOLENCE: ICD-10-CM

## 2024-01-03 DIAGNOSIS — J32.9 CHRONIC SINUSITIS, UNSPECIFIED LOCATION: ICD-10-CM

## 2024-01-03 DIAGNOSIS — G25.81 RLS (RESTLESS LEGS SYNDROME): ICD-10-CM

## 2024-01-03 DIAGNOSIS — G47.33 OBSTRUCTIVE SLEEP APNEA: Primary | ICD-10-CM

## 2024-01-03 PROCEDURE — 99214 OFFICE O/P EST MOD 30 MIN: CPT | Performed by: INTERNAL MEDICINE

## 2024-01-03 ASSESSMENT — ENCOUNTER SYMPTOMS
EYES NEGATIVE: 1
RESPIRATORY NEGATIVE: 1
ALLERGIC/IMMUNOLOGIC NEGATIVE: 1
GASTROINTESTINAL NEGATIVE: 1

## 2024-01-03 NOTE — PROGRESS NOTES
Matty Galeana Jr. (:  1960) is a 63 y.o. male,Established patient, here for evaluation of the following chief complaint(s):  No chief complaint on file.         ASSESSMENT/PLAN:  1. Obstructive sleep apnea  2. Insomnia, unspecified type  3. RLS (restless legs syndrome)  4. Hypersomnolence  5. Chronic sinusitis, unspecified location        Overweight  Weight is flucuating from 207 to 201 to 206 to 215 to 216 to 205 to 203  Try to lose weight    afib is controlled  Seen by cardiologist  Blood pressure controlled-   Some issues with the afib and cardiac disease            RLS/insomnia  Will continue  Klonopin 1mg at night (recommend taking 1/2 hour before bedtime)- will refill    Tried to reduce to 0.5 mg but had more issues with RLS and Insomnia    Will consider other options better  Dayvigo or Quiviviq  However would need to come off the klonopin    Could consider later  mirapex or requip for the RLS      Currently on muscle relaxer for neck issues        sinus  Controlled at this time  flonase as needed     Hypersomnolence  Stopped the  nuvigil b/c of insomnia, no allergy, I have changed this  Tried the ambien however it caused more sleepiness  And didn't like it, will stop this        Would avoid all amphetamines b/c of a fib      Obstructive sleep apnea  Advised to avoid driving when too sleepy to function safely and given a discussion of the risks of untreated apnea such as accidents, cognitive impairment, mood impairment, high blood pressure, various cardiac diseases and stroke. Weight loss was encouraged.           Set up date was 16  autopap is set at max of 11 and min of 9  Ramp is off  epr is at 3  Using nasal mask        Feeling the benefit of cpap/autopap  Will continue with cpap    cpap titration done 20  Wt was 207  cpap at 9 cwp best controlled      New set up on 2023  AutoPap set at a minimum of 9 and a maximum of 11  Using a nasal mask  DME patient aids    90% pressure is

## 2024-01-03 NOTE — PATIENT INSTRUCTIONS
ASSESSMENT/PLAN:  1. Obstructive sleep apnea  2. Insomnia, unspecified type  3. RLS (restless legs syndrome)  4. Hypersomnolence  5. Chronic sinusitis, unspecified location        Overweight  Weight is flucuating from 207 to 201 to 206 to 215 to 216 to 205 to 203  Try to lose weight    afib is controlled  Seen by cardiologist  Blood pressure controlled-   Some issues with the afib and cardiac disease            RLS/insomnia  Will continue  Klonopin 1mg at night (recommend taking 1/2 hour before bedtime)- will refill    Tried to reduce to 0.5 mg but had more issues with RLS and Insomnia    Will consider other options better  Dayvigo or Quiviviq  However would need to come off the klonopin    Could consider later  mirapex or requip for the RLS      Currently on muscle relaxer for neck issues        sinus  Controlled at this time  flonase as needed     Hypersomnolence  Stopped the  nuvigil b/c of insomnia, no allergy, I have changed this  Tried the ambien however it caused more sleepiness  And didn't like it, will stop this        Would avoid all amphetamines b/c of a fib      Obstructive sleep apnea  Advised to avoid driving when too sleepy to function safely and given a discussion of the risks of untreated apnea such as accidents, cognitive impairment, mood impairment, high blood pressure, various cardiac diseases and stroke. Weight loss was encouraged.           Set up date was 2/11/16  autopap is set at max of 11 and min of 9  Ramp is off  epr is at 3  Using nasal mask        Feeling the benefit of cpap/autopap  Will continue with cpap    cpap titration done 1/4/20  Wt was 207  cpap at 9 cwp best controlled      New set up on 4/17/2023  AutoPap set at a minimum of 9 and a maximum of 11  Using a nasal mask  DME patient aids    90% pressure is 10.6  Using 100% of time  Using 7.5 hours per night  Leak 18  No sleep apnea, AHI of 0.5  Feeling the benefit of AutoPap use  We will continue    Dme will be patient

## 2024-01-03 NOTE — PROGRESS NOTES
MA Communication:  The following orders are received by verbal communication from Sung Rausch MD    Orders include:  6 month f/u ,order to DME

## 2024-01-23 PROCEDURE — 93296 REM INTERROG EVL PM/IDS: CPT | Performed by: INTERNAL MEDICINE

## 2024-01-23 PROCEDURE — 93294 REM INTERROG EVL PM/LDLS PM: CPT | Performed by: INTERNAL MEDICINE

## 2024-03-19 DIAGNOSIS — G47.00 INSOMNIA, UNSPECIFIED TYPE: ICD-10-CM

## 2024-03-19 RX ORDER — CLONAZEPAM 1 MG/1
1 TABLET ORAL NIGHTLY PRN
Qty: 30 TABLET | Refills: 2 | Status: SHIPPED | OUTPATIENT
Start: 2024-03-19 | End: 2024-04-18

## 2024-04-04 DIAGNOSIS — E78.5 HYPERLIPIDEMIA, UNSPECIFIED HYPERLIPIDEMIA TYPE: ICD-10-CM

## 2024-04-04 RX ORDER — OMEPRAZOLE 20 MG/1
CAPSULE, DELAYED RELEASE ORAL DAILY
Qty: 90 CAPSULE | Refills: 1 | Status: SHIPPED | OUTPATIENT
Start: 2024-04-04

## 2024-04-04 RX ORDER — PRAVASTATIN SODIUM 20 MG
20 TABLET ORAL EVERY EVENING
Qty: 90 TABLET | Refills: 1 | Status: SHIPPED | OUTPATIENT
Start: 2024-04-04

## 2024-04-04 NOTE — TELEPHONE ENCOUNTER
Matty Galeana Jr. [phone] (home) [phone] (work)   is requesting refill(s) of medication Omeprazole 20 Dr Capsule to preferred pharmacy [facility]    Last OV 10/24/23 (pertaining to medication)   Last refill 10/09/23 (per medication requested)  Next office visit scheduled or attempted Yes  Date 04/26/24

## 2024-04-06 DIAGNOSIS — I48.91 ATRIAL FIBRILLATION, UNSPECIFIED TYPE (HCC): ICD-10-CM

## 2024-04-26 ENCOUNTER — OFFICE VISIT (OUTPATIENT)
Dept: FAMILY MEDICINE CLINIC | Age: 64
End: 2024-04-26
Payer: COMMERCIAL

## 2024-04-26 VITALS
OXYGEN SATURATION: 98 % | HEART RATE: 78 BPM | SYSTOLIC BLOOD PRESSURE: 130 MMHG | HEIGHT: 71 IN | DIASTOLIC BLOOD PRESSURE: 88 MMHG | BODY MASS INDEX: 28.9 KG/M2 | RESPIRATION RATE: 16 BRPM | WEIGHT: 206.4 LBS

## 2024-04-26 DIAGNOSIS — Z95.811 PRESENCE OF HEART ASSIST DEVICE (HCC): ICD-10-CM

## 2024-04-26 DIAGNOSIS — K21.9 GASTROESOPHAGEAL REFLUX DISEASE WITHOUT ESOPHAGITIS: ICD-10-CM

## 2024-04-26 DIAGNOSIS — E78.5 HYPERLIPIDEMIA, UNSPECIFIED HYPERLIPIDEMIA TYPE: Primary | ICD-10-CM

## 2024-04-26 DIAGNOSIS — R73.9 ELEVATED BLOOD SUGAR: ICD-10-CM

## 2024-04-26 DIAGNOSIS — I42.2 HYPERTROPHIC CARDIOMYOPATHY (HCC): ICD-10-CM

## 2024-04-26 DIAGNOSIS — I48.91 ATRIAL FIBRILLATION, UNSPECIFIED TYPE (HCC): ICD-10-CM

## 2024-04-26 DIAGNOSIS — I50.22 CHRONIC SYSTOLIC (CONGESTIVE) HEART FAILURE (HCC): ICD-10-CM

## 2024-04-26 PROBLEM — I26.99 ACUTE PULMONARY EMBOLISM (HCC): Status: RESOLVED | Noted: 2022-12-30 | Resolved: 2024-04-26

## 2024-04-26 LAB
ALBUMIN SERPL-MCNC: 4.3 G/DL (ref 3.4–5)
ALBUMIN/GLOB SERPL: 2 {RATIO} (ref 1.1–2.2)
ALP SERPL-CCNC: 94 U/L (ref 40–129)
ALT SERPL-CCNC: 35 U/L (ref 10–40)
ANION GAP SERPL CALCULATED.3IONS-SCNC: 12 MMOL/L (ref 3–16)
AST SERPL-CCNC: 23 U/L (ref 15–37)
BILIRUB SERPL-MCNC: 0.4 MG/DL (ref 0–1)
BUN SERPL-MCNC: 17 MG/DL (ref 7–20)
CALCIUM SERPL-MCNC: 9.2 MG/DL (ref 8.3–10.6)
CHLORIDE SERPL-SCNC: 105 MMOL/L (ref 99–110)
CHOLEST SERPL-MCNC: 192 MG/DL (ref 0–199)
CO2 SERPL-SCNC: 28 MMOL/L (ref 21–32)
CREAT SERPL-MCNC: 0.9 MG/DL (ref 0.8–1.3)
GFR SERPLBLD CREATININE-BSD FMLA CKD-EPI: >90 ML/MIN/{1.73_M2}
GLUCOSE SERPL-MCNC: 90 MG/DL (ref 70–99)
HDLC SERPL-MCNC: 52 MG/DL (ref 40–60)
LDLC SERPL CALC-MCNC: 122 MG/DL
POTASSIUM SERPL-SCNC: 4.4 MMOL/L (ref 3.5–5.1)
PROT SERPL-MCNC: 6.5 G/DL (ref 6.4–8.2)
SODIUM SERPL-SCNC: 145 MMOL/L (ref 136–145)
TRIGL SERPL-MCNC: 92 MG/DL (ref 0–150)
VLDLC SERPL CALC-MCNC: 18 MG/DL

## 2024-04-26 PROCEDURE — 99214 OFFICE O/P EST MOD 30 MIN: CPT | Performed by: FAMILY MEDICINE

## 2024-04-26 RX ORDER — ANASTROZOLE 1 MG/1
1 TABLET ORAL WEEKLY
COMMUNITY
Start: 2024-02-01

## 2024-04-26 ASSESSMENT — PATIENT HEALTH QUESTIONNAIRE - PHQ9
SUM OF ALL RESPONSES TO PHQ QUESTIONS 1-9: 0
SUM OF ALL RESPONSES TO PHQ9 QUESTIONS 1 & 2: 0
SUM OF ALL RESPONSES TO PHQ QUESTIONS 1-9: 0
1. LITTLE INTEREST OR PLEASURE IN DOING THINGS: NOT AT ALL
2. FEELING DOWN, DEPRESSED OR HOPELESS: NOT AT ALL

## 2024-04-26 NOTE — PROGRESS NOTES
mouth as needed for Erectile Dysfunction      b complex vitamins capsule Take 1 capsule by mouth daily      tamsulosin (FLOMAX) 0.4 MG capsule Take 1 capsule by mouth daily       No current facility-administered medications for this visit.       Allergies   Allergen Reactions    Niacin And Related      Extreme itching /stinging started at head to waist       Social History     Tobacco Use    Smoking status: Never     Passive exposure: Never    Smokeless tobacco: Never   Substance Use Topics    Alcohol use: Yes     Alcohol/week: 1.0 - 2.0 standard drink of alcohol     Types: 1 Cans of beer per week     Comment: occasionally       OBJECTIVE:   /88   Pulse 78   Resp 16   Ht 1.803 m (5' 10.98\")   Wt 93.6 kg (206 lb 6.4 oz)   SpO2 98%   BMI 28.80 kg/m²   NAD  Neck no bruit or JVD  S1 and S2 normal, 1/6 murmurs, clicks, gallops or rubs. Regular rate and rhythm. Chest is clear; no wheezes or rales. No edema or JVD.  Neuro alert, no CN or motor deficits  Psych flat affect, depressed mood, normal thought and judgement                EMR Dragon/transcription disclaimer:  Much of this encounter note is electronic transcription/translation of spoken language to printed texts.  The electronic translation of spoken language may be erroneous, or at times, nonsensical words or phrases may be inadvertently transcribed.  Although I have reviewed the note for such errors, some may still exist.

## 2024-04-27 LAB
EST. AVERAGE GLUCOSE BLD GHB EST-MCNC: 122.6 MG/DL
HBA1C MFR BLD: 5.9 %

## 2024-05-03 NOTE — PROGRESS NOTES
Assessment:     1. Atrial fibrillation: patient had Persistent atrial fibrillation prior to ablation.     Associated symptoms: Fatigue and Palpitations     History of cardioversion: Had electrical cardioversion in the past (multiple)  History of AF ablation: Had atrial fibrillation ablation in the past (December 2022 and April 2023)  History of heart surgery/procedure: yes, cardioversion and ablation    Current use of anti-arrhythmic drugs: amiodarone  Previous use of anti-arrhythmic drugs: amiodarone and dofetilide    Overall LV function: Normal left ventricular systolic function  Size of left atrium: Severely dilated LA size  Significant cardiac valvular disease: No significant valve disease  Family history of atrial fibrillation: Unknown    Alcohol consumption: Mild alcohol intake  Caffeine consumption: Mild caffeine intake  Smoking status: non-smoker  Obstructive sleep apnea: No/low suspicion  Exercise status: Occasional exercise, not routine    Patient has hypertrophic cardiomyopathy. Stroke risk is elevated  Longterm anticoagulation is: recommended  Current anticoagulation: Apixaban  Bleeding issues reported: no  Renal function: Normal    In setting of HCM, and development of persistent atrial fibrillation, being quite symptomatic and having failed multiple anti-arrhythmic drug therapy options (most recently dofetilide), it became clear that the option of AF ablation should be considered. After discussions with patient, we proceeded with attempted AF ablation in early December 2022. Procedure was complicated by pericardial effusion requiring emergent pericardiocentesis. We only managed ablation of the cavo-tricuspid isthmus during that procedure. Patient came back for a second attempt on 12/28/2022. We performed pulmonary vein isolation with roof line and posterior wall ablation. There were no acute complications.     Subsequently, the patient's ILR shows episodes of AF and ECG showed what appeared to be

## 2024-05-06 ENCOUNTER — OFFICE VISIT (OUTPATIENT)
Dept: CARDIOLOGY CLINIC | Age: 64
End: 2024-05-06

## 2024-05-06 ENCOUNTER — NURSE ONLY (OUTPATIENT)
Dept: CARDIOLOGY CLINIC | Age: 64
End: 2024-05-06
Payer: COMMERCIAL

## 2024-05-06 VITALS
OXYGEN SATURATION: 99 % | SYSTOLIC BLOOD PRESSURE: 126 MMHG | WEIGHT: 204.5 LBS | HEART RATE: 70 BPM | BODY MASS INDEX: 28.63 KG/M2 | HEIGHT: 71 IN | DIASTOLIC BLOOD PRESSURE: 80 MMHG

## 2024-05-06 DIAGNOSIS — R00.2 PALPITATIONS: ICD-10-CM

## 2024-05-06 DIAGNOSIS — I49.5 SINUS NODE DYSFUNCTION (HCC): ICD-10-CM

## 2024-05-06 DIAGNOSIS — Z51.81 ENCOUNTER FOR MONITORING AMIODARONE THERAPY: ICD-10-CM

## 2024-05-06 DIAGNOSIS — Z95.0 PACEMAKER: Primary | ICD-10-CM

## 2024-05-06 DIAGNOSIS — I42.2 HYPERTROPHIC CARDIOMYOPATHY (HCC): ICD-10-CM

## 2024-05-06 DIAGNOSIS — I48.4 ATYPICAL ATRIAL FLUTTER (HCC): ICD-10-CM

## 2024-05-06 DIAGNOSIS — Z79.899 ENCOUNTER FOR MONITORING AMIODARONE THERAPY: ICD-10-CM

## 2024-05-06 DIAGNOSIS — I48.0 PAF (PAROXYSMAL ATRIAL FIBRILLATION) (HCC): Primary | ICD-10-CM

## 2024-05-06 PROCEDURE — 93280 PM DEVICE PROGR EVAL DUAL: CPT | Performed by: INTERNAL MEDICINE

## 2024-05-06 NOTE — PATIENT INSTRUCTIONS
Plan:     Start Verapamil 120 mg daily.   Stop Metoprolol   Echocardiogram to reassess heart structure and function.   Remote device checks every three months.   Follow up with me in 3 - 4 months.      Your provider has ordered testing for further evaluation.  An order/prescription has been included in your paper work.   To schedule outpatient testing, contact Central Scheduling by calling [email or web address] ([phone]).

## 2024-05-24 PROBLEM — M47.812 CERVICAL SPONDYLOSIS WITHOUT MYELOPATHY: Status: ACTIVE | Noted: 2024-05-24

## 2024-05-24 PROBLEM — M47.816 SPONDYLOSIS OF LUMBAR REGION WITHOUT MYELOPATHY OR RADICULOPATHY: Status: ACTIVE | Noted: 2024-05-24

## 2024-07-01 ENCOUNTER — OFFICE VISIT (OUTPATIENT)
Dept: PULMONOLOGY | Age: 64
End: 2024-07-01
Payer: COMMERCIAL

## 2024-07-01 VITALS
DIASTOLIC BLOOD PRESSURE: 89 MMHG | BODY MASS INDEX: 28.7 KG/M2 | WEIGHT: 205 LBS | SYSTOLIC BLOOD PRESSURE: 139 MMHG | RESPIRATION RATE: 16 BRPM | OXYGEN SATURATION: 96 % | HEIGHT: 71 IN | TEMPERATURE: 97.5 F | HEART RATE: 69 BPM

## 2024-07-01 DIAGNOSIS — G47.00 INSOMNIA, UNSPECIFIED TYPE: ICD-10-CM

## 2024-07-01 DIAGNOSIS — G47.33 OBSTRUCTIVE SLEEP APNEA: Primary | ICD-10-CM

## 2024-07-01 PROCEDURE — 99215 OFFICE O/P EST HI 40 MIN: CPT | Performed by: INTERNAL MEDICINE

## 2024-07-01 RX ORDER — GABAPENTIN 100 MG/1
CAPSULE ORAL
Qty: 120 CAPSULE | Refills: 2 | Status: SHIPPED | OUTPATIENT
Start: 2024-07-01 | End: 2025-06-30

## 2024-07-01 RX ORDER — CLONAZEPAM 0.5 MG/1
0.5 TABLET ORAL 2 TIMES DAILY
Qty: 60 TABLET | Refills: 1 | Status: SHIPPED | OUTPATIENT
Start: 2024-07-01 | End: 2024-09-29

## 2024-07-01 RX ORDER — AMIODARONE HYDROCHLORIDE 100 MG/1
100 TABLET ORAL DAILY
Qty: 90 TABLET | Refills: 0 | Status: SHIPPED | OUTPATIENT
Start: 2024-07-01

## 2024-07-01 ASSESSMENT — SLEEP AND FATIGUE QUESTIONNAIRES
HOW LIKELY ARE YOU TO NOD OFF OR FALL ASLEEP WHILE WATCHING TV: WOULD NEVER DOZE
HOW LIKELY ARE YOU TO NOD OFF OR FALL ASLEEP WHILE SITTING AND READING: WOULD NEVER DOZE
HOW LIKELY ARE YOU TO NOD OFF OR FALL ASLEEP WHILE LYING DOWN TO REST IN THE AFTERNOON WHEN CIRCUMSTANCES PERMIT: MODERATE CHANCE OF DOZING
NECK CIRCUMFERENCE (INCHES): 16.25
ESS TOTAL SCORE: 2
HOW LIKELY ARE YOU TO NOD OFF OR FALL ASLEEP WHILE SITTING QUIETLY AFTER LUNCH WITHOUT ALCOHOL: WOULD NEVER DOZE
HOW LIKELY ARE YOU TO NOD OFF OR FALL ASLEEP WHILE SITTING INACTIVE IN A PUBLIC PLACE: WOULD NEVER DOZE
HOW LIKELY ARE YOU TO NOD OFF OR FALL ASLEEP IN A CAR, WHILE STOPPED FOR A FEW MINUTES IN TRAFFIC: WOULD NEVER DOZE
HOW LIKELY ARE YOU TO NOD OFF OR FALL ASLEEP WHEN YOU ARE A PASSENGER IN A CAR FOR AN HOUR WITHOUT A BREAK: WOULD NEVER DOZE
HOW LIKELY ARE YOU TO NOD OFF OR FALL ASLEEP WHILE SITTING AND TALKING TO SOMEONE: WOULD NEVER DOZE

## 2024-07-01 NOTE — PATIENT INSTRUCTIONS
Gabapentin about 30 minutes prior to go to sleep time.  Make your go to bed time very close to your typical go to sleep time.    You will increase the gabapentin by 100 mg every 3 days or so, until you find the dose that is helping you sleep through the night better.  Maximum dose for now will be 400 mg.      Once you are on 300 mg of gabapentin for a couple of nights, I would consider trying to drop the clonazepam.      Let Henderson know once you are on the final dose of gabepentin and whether it is helping at all.     For insomnia: a cognitive behavioral program is the most effective intervention and will help almost everyone with insomnia:  I recommend trying the [facility] Go! To Sleep! Program for insomnia  https://[email or web address].com/pages/GoToSleep.htm      Remember to bring a list of pulmonary medications and any CPAP or BiPAP machines to your next appointment with the office.     Please keep all of your future appointments scheduled by [facility] [place] [facility] office. Out of respect for other patients and providers, you may be asked to reschedule your appointment if you arrive later than your scheduled appointment time. Appointments cancelled less than 24hrs in advance will be considered a no show. Patients with three missed appointments within 1 year or four missed appointments within 2 years can be dismissed from the practice.     Please be aware that our physicians are required to work in the Intensive Care Unit at [facility].  Your appointment may need to be rescheduled if they are designated to work during your appointment time.      You may receive a survey regarding the care you received during your visit.  Your input is valuable to us.  We encourage you to complete and return your survey.  We hope you will choose us in the future for your healthcare needs.     Pt instructed of all future appointment dates & times, including radiology, labs, procedures &

## 2024-07-01 NOTE — PROGRESS NOTES
MA Communication:  The following orders are received by verbal communication from Jabier Henderson MD    Orders include:  Follow up with Dr Henderson in 3 months                              Will try to get sleep studies from other locations    
read the package insert.  It is my hope that gabapentin will enable us to come off of the Klonopin completely.  For hypersomnia, no new intervention -this is not a primary complaint today  Stopped nuvigil 2/2 insomnia  Avoiding amphetamines with AFIB/tachycardia   Patient was instructed, including in writing, to never drive if drowsy or sleepy  F/U in 3 months      Total time spent on reviewing past medical records, past treatment plans, PDMP, outside records and with the patient extensively reviewing insomnia, risk, benefits, alternatives for this patient equals 55 minutes today

## 2024-07-01 NOTE — TELEPHONE ENCOUNTER
Last ov 05/06/2024  NO UPCOMING OV    CMP 04/2024  CBC 12/2023  TSH 10/2023  LFT 04/2024  EKG 12/2023  XR Chest 07/2023

## 2024-07-15 ENCOUNTER — TELEPHONE (OUTPATIENT)
Dept: CARDIOLOGY CLINIC | Age: 64
End: 2024-07-15

## 2024-07-15 NOTE — TELEPHONE ENCOUNTER
Matty Galeana Jr., 1960    Cardiac Risk Assessment    What type of procedure are you having?  Cervical radiofrequency ablation     When is your procedure scheduled for?  TBD    Medications to be stopped.  Eliquis 3 days prior to procedure    What physician is performing your procedure?  Jw Sánchez MD    Phone Number:   [phone]    Fax number to send the letter:   [phone]    Cardiologist:   WILBUR    Last Appointment:   05/06/2024 WILBUR  12/11/2023 LIVIA    Next Appointment:   N/a    Are you on any blood thinners?   eliquis      [facility] OOT

## 2024-07-16 NOTE — TELEPHONE ENCOUNTER
Trish Hatfield, APRN - CNP  Erika Romero Ep10 minutes ago (7:33 AM)     He can come off the Eliquis for 3 days however he is at increased risk of stroke while off his anticoagulant.  Thank you       Letter created and faxed.

## 2024-08-26 ENCOUNTER — HOSPITAL ENCOUNTER (OUTPATIENT)
Dept: MRI IMAGING | Age: 64
Discharge: HOME | End: 2024-08-26
Attending: PODIATRIST
Payer: COMMERCIAL

## 2024-08-26 DIAGNOSIS — S93.692A SPRING LIGAMENT TEAR, LEFT, INITIAL ENCOUNTER: ICD-10-CM

## 2024-08-26 PROCEDURE — 73721 MRI JNT OF LWR EXTRE W/O DYE: CPT

## 2024-09-25 DIAGNOSIS — I48.0 PAF (PAROXYSMAL ATRIAL FIBRILLATION) (HCC): Primary | ICD-10-CM

## 2024-09-25 RX ORDER — AMIODARONE HYDROCHLORIDE 100 MG/1
100 TABLET ORAL DAILY
Qty: 90 TABLET | Refills: 0 | Status: SHIPPED | OUTPATIENT
Start: 2024-09-25

## 2024-09-30 DIAGNOSIS — E78.5 HYPERLIPIDEMIA, UNSPECIFIED HYPERLIPIDEMIA TYPE: ICD-10-CM

## 2024-09-30 RX ORDER — PRAVASTATIN SODIUM 20 MG
20 TABLET ORAL EVERY EVENING
Qty: 90 TABLET | Refills: 1 | Status: SHIPPED | OUTPATIENT
Start: 2024-09-30

## 2024-09-30 NOTE — TELEPHONE ENCOUNTER
Matty Galeana Jr. is requesting refill(s) omeprazole  Last OV 4/26/24 (pertaining to medication)  LR 4/4/24 (per medication requested)  Next office visit scheduled or attempted Yes   If no, reason:  11/4/24

## 2024-09-30 NOTE — TELEPHONE ENCOUNTER
Matty Galeana Jr. is requesting refill(s) pravastatin  Last OV 4/26/24 (pertaining to medication)  LR 4/4/24 (per medication requested)  Next office visit scheduled or attempted Yes   If no, reason:  11/4/24

## 2024-10-07 ENCOUNTER — HOSPITAL ENCOUNTER (OUTPATIENT)
Age: 64
Discharge: HOME | End: 2024-10-07
Payer: COMMERCIAL

## 2024-10-07 ENCOUNTER — OFFICE VISIT (OUTPATIENT)
Dept: PULMONOLOGY | Age: 64
End: 2024-10-07
Payer: COMMERCIAL

## 2024-10-07 VITALS
RESPIRATION RATE: 16 BRPM | HEART RATE: 61 BPM | WEIGHT: 201 LBS | SYSTOLIC BLOOD PRESSURE: 141 MMHG | BODY MASS INDEX: 28.14 KG/M2 | TEMPERATURE: 97.2 F | DIASTOLIC BLOOD PRESSURE: 89 MMHG | HEIGHT: 71 IN | OXYGEN SATURATION: 97 %

## 2024-10-07 DIAGNOSIS — G25.81 RLS (RESTLESS LEGS SYNDROME): ICD-10-CM

## 2024-10-07 DIAGNOSIS — G25.81 RLS (RESTLESS LEGS SYNDROME): Primary | ICD-10-CM

## 2024-10-07 DIAGNOSIS — I48.0 PAF (PAROXYSMAL ATRIAL FIBRILLATION) (HCC): ICD-10-CM

## 2024-10-07 DIAGNOSIS — G47.00 INSOMNIA, UNSPECIFIED TYPE: ICD-10-CM

## 2024-10-07 DIAGNOSIS — G47.33 OBSTRUCTIVE SLEEP APNEA: ICD-10-CM

## 2024-10-07 LAB
ALBUMIN SERPL-MCNC: 4.5 G/DL (ref 3.4–5)
ALBUMIN/GLOB SERPL: 1.9 {RATIO} (ref 1.1–2.2)
ALP SERPL-CCNC: 95 U/L (ref 40–129)
ALT SERPL-CCNC: 44 U/L (ref 10–40)
ANION GAP SERPL CALCULATED.3IONS-SCNC: 11 MMOL/L (ref 3–16)
AST SERPL-CCNC: 28 U/L (ref 15–37)
BILIRUB SERPL-MCNC: 0.7 MG/DL (ref 0–1)
BUN SERPL-MCNC: 16 MG/DL (ref 7–20)
CALCIUM SERPL-MCNC: 9.3 MG/DL (ref 8.3–10.6)
CHLORIDE SERPL-SCNC: 105 MMOL/L (ref 99–110)
CO2 SERPL-SCNC: 27 MMOL/L (ref 21–32)
CREAT SERPL-MCNC: 1.1 MG/DL (ref 0.8–1.3)
FERRITIN SERPL IA-MCNC: 35.6 NG/ML (ref 30–400)
GFR SERPLBLD CREATININE-BSD FMLA CKD-EPI: 75 ML/MIN/{1.73_M2}
GLUCOSE SERPL-MCNC: 86 MG/DL (ref 70–99)
IRON SATN MFR SERPL: 17 % (ref 20–50)
IRON SERPL-MCNC: 71 UG/DL (ref 59–158)
POTASSIUM SERPL-SCNC: 4.1 MMOL/L (ref 3.5–5.1)
PROT SERPL-MCNC: 6.9 G/DL (ref 6.4–8.2)
SODIUM SERPL-SCNC: 143 MMOL/L (ref 136–145)
T4 FREE SERPL-MCNC: 1.1 NG/DL (ref 0.9–1.8)
TIBC SERPL-MCNC: 411 UG/DL (ref 260–445)
TSH SERPL DL<=0.005 MIU/L-ACNC: 5.07 UIU/ML (ref 0.27–4.2)

## 2024-10-07 PROCEDURE — 82728 ASSAY OF FERRITIN: CPT

## 2024-10-07 PROCEDURE — 80053 COMPREHEN METABOLIC PANEL: CPT

## 2024-10-07 PROCEDURE — 84443 ASSAY THYROID STIM HORMONE: CPT

## 2024-10-07 PROCEDURE — 83540 ASSAY OF IRON: CPT

## 2024-10-07 PROCEDURE — 36415 COLL VENOUS BLD VENIPUNCTURE: CPT

## 2024-10-07 PROCEDURE — 84439 ASSAY OF FREE THYROXINE: CPT

## 2024-10-07 PROCEDURE — 83550 IRON BINDING TEST: CPT

## 2024-10-07 PROCEDURE — 99214 OFFICE O/P EST MOD 30 MIN: CPT | Performed by: INTERNAL MEDICINE

## 2024-10-07 RX ORDER — GABAPENTIN 100 MG/1
CAPSULE ORAL
Qty: 360 CAPSULE | Refills: 1 | Status: SHIPPED | OUTPATIENT
Start: 2024-10-07 | End: 2025-10-06

## 2024-10-07 ASSESSMENT — SLEEP AND FATIGUE QUESTIONNAIRES
ESS TOTAL SCORE: 3
HOW LIKELY ARE YOU TO NOD OFF OR FALL ASLEEP IN A CAR, WHILE STOPPED FOR A FEW MINUTES IN TRAFFIC: WOULD NEVER DOZE
HOW LIKELY ARE YOU TO NOD OFF OR FALL ASLEEP WHILE SITTING QUIETLY AFTER LUNCH WITHOUT ALCOHOL: WOULD NEVER DOZE
HOW LIKELY ARE YOU TO NOD OFF OR FALL ASLEEP WHILE SITTING INACTIVE IN A PUBLIC PLACE: WOULD NEVER DOZE
HOW LIKELY ARE YOU TO NOD OFF OR FALL ASLEEP WHILE WATCHING TV: WOULD NEVER DOZE
HOW LIKELY ARE YOU TO NOD OFF OR FALL ASLEEP WHILE LYING DOWN TO REST IN THE AFTERNOON WHEN CIRCUMSTANCES PERMIT: SLIGHT CHANCE OF DOZING
HOW LIKELY ARE YOU TO NOD OFF OR FALL ASLEEP WHEN YOU ARE A PASSENGER IN A CAR FOR AN HOUR WITHOUT A BREAK: WOULD NEVER DOZE
HOW LIKELY ARE YOU TO NOD OFF OR FALL ASLEEP WHILE SITTING AND READING: MODERATE CHANCE OF DOZING
HOW LIKELY ARE YOU TO NOD OFF OR FALL ASLEEP WHILE SITTING AND TALKING TO SOMEONE: WOULD NEVER DOZE

## 2024-10-07 NOTE — PATIENT INSTRUCTIONS
For Patients with Obstructive Sleep Apnea:  Never drive a car or operate a motorized vehicle while drowsy or sleepy.  Maintaining an optimal weight can help limit the severity of sleep apnea.  Treating sleep apnea effectively may help reduce the risk of heart disease, stroke and type II diabetes.    Gabapentin - try comparing taking 200, 300 & 400 at night with regards to next morning sleepiness and quality of sleep (did you sleep soundly).  Keep a diary.    If you find a pretty good solution, fine.  If not, call Santiago and he will prescribe pramipexole as a trial.         Please remember to bring a list of medications and any CPAP or BiPAP machines to your next appointment with the office.     Out of respect for other patients and providers, we ask that you arrive no later than your scheduled appointment time.  If you arrive later than your appointment time, you may be asked to reschedule your appointment.     Late cancellations result in other patients being unable to utilize the appointment slot to see their physician.  Please avoid cancelling your appointment less than 1 week prior to the appointment date.  Patients with multiple missed appointments within 2 years may be dismissed from the practice.     In the next few weeks, you will be receiving a survey from [facility] regarding your visit today.  Your input is valuable to us & surveys are regularly reviewed by [facility] leadership.  It is very important to us that our patients receive excellent care.  If your experience was excellent, please let us know!  If your experience was not a good one, please tell us so we can make needed corrections. We hope you are comfortable recommending us to others for their healthcare needs.     We [facility] you [email or web address]!

## 2024-10-07 NOTE — PROGRESS NOTES
2  SLEEP MEDICINE FOLLOW UP NOTE  CC: Obstructive Sleep Apnea, RLS, Insomnia    Interval history  -    HUNTER: Uses CPAP 9-11 every single night with benefit.  From prior: Excellent treatment efficacy.  He cannot sleep without CPAP, he ends up waking up frequently.  He has nasal CPAP.      Insomnia-sleep onset and sleep maintenance.  Typically goes to bed between 10 and 11 PM.  Takes maybe an hour to fall asleep.  He has been able to come off of clonazepam completely, had previously decreased from 1 to 0.5 mg.  He does see a pain doc and takes occasional opiates and so the goal is to keep him off of benzodiazepines.  He still will have a couple of nights each week where he has significantly more issues falling asleep.  He started gabapentin and titrated up to 400 mg.  As he increased the dose to 300 and then up to 400 he noticed that he has at first regular leg cramping, but now it is more intermittent.  He does feel some hangover effect in the morning with grogginess.  He does not think gabapentin is effective as Klonopin at helping him get a solid night sleep.    Presenting history - Prior records including those from  were reviewed and are summarized.  [facility] patient at , sleep studies in 2010, repeated in 2018.  Treated with Klonopin for RLS/insomnia, stable dosing.  Ongoing issues with excessive daytime sleepiness. Has f/w cardiology for AFIB/tachy/dolores, is s/p PPM.         10/7/2024     9:12 AM 7/1/2024     9:07 AM   Sleep Medicine   Sitting and reading 2 0   Watching TV 0 0   Sitting, inactive in a public place (e.g. a theatre or a meeting) 0 0   As a passenger in a car for an hour without a break 0 0   Lying down to rest in the afternoon when circumstances permit 1 2   Sitting and talking to someone 0 0   Sitting quietly after a lunch without alcohol 0 0   In a car, while stopped for a few minutes in traffic 0 0   Taylor Sleepiness Score 3 2   Neck (Inches)  16.25          PHYSICAL EXAM:  Blood pressure

## 2024-10-07 NOTE — PROGRESS NOTES
MA Communication:  The following orders are received by verbal communication from aJbier Henderson MD        Orders include:      Follow up in 6 months

## 2024-10-08 NOTE — RESULT ENCOUNTER NOTE
Please tell patient his iron levels are low.  He should start an iron supplement, typically 325 mg three times each week, taken with small amount of orange juice if available (otherwise can use any liquid).  Plan to recheck iron levels in 3 months.  He can get iron supplement over the counter, or if he prefers I can send a prescription in to pharmacy of his choice.     He also needs to let his PCP know his iron levels are low as this usually requires investigation of cause.  If he is not up to date on colonoscopy, this is certainly recommended, but even if he is up to date, it is usually recommended to be evaluated for potential blood loss.  I will also forward the result to his PCP, but he needs to call and schedule an apppointment with her.

## 2024-10-08 NOTE — RESULT ENCOUNTER NOTE
Hi Dr. Sharma, I checked Julio's iron levels due to Restless legs syndrome.  Iron levels are low so I am recommending he start iron.  I also asked him to call your office to schedule an appointment to discuss whether he needs any additional testing for blood loss.  Thanks, Roland

## 2024-10-09 ENCOUNTER — TELEPHONE (OUTPATIENT)
Dept: CARDIOLOGY CLINIC | Age: 64
End: 2024-10-09

## 2024-10-09 DIAGNOSIS — Z79.899 MEDICATION MANAGEMENT: Primary | ICD-10-CM

## 2024-10-09 PROBLEM — M54.16 LUMBAR RADICULOPATHY: Status: ACTIVE | Noted: 2024-10-09

## 2024-10-09 RX ORDER — FERROUS SULFATE 325(65) MG
325 TABLET ORAL
Qty: 30 TABLET | Refills: 1 | Status: SHIPPED | OUTPATIENT
Start: 2024-10-09

## 2024-10-09 NOTE — TELEPHONE ENCOUNTER
----- Message from Dr. Sunny Marshall MD sent at 10/8/2024  7:26 PM EDT -----  Please let patient know his thyroid test (TSH) is mildly abnormal. No change in management at this time. We will repeat test in 2 months and then decide if any changes in management are required.  ----- Message -----  From: [facility] Incoming Lab Results From Soft (Epic Adt)  Sent: 10/7/2024   4:37 PM EDT  To: Sunny Marshall MD

## 2024-10-14 ENCOUNTER — TELEPHONE (OUTPATIENT)
Dept: CARDIOLOGY CLINIC | Age: 64
End: 2024-10-14

## 2024-10-14 NOTE — TELEPHONE ENCOUNTER
Matty Galeana Jr., 1960    Cardiac Risk Assessment    What type of procedure are you having?  Lumbar epidural steroid injection     When is your procedure scheduled for?  10/10/24    Medications to be stopped.  Eliquis 3 days prior     What physician is performing your procedure?  Jw Sánchez     Phone Number:   [phone]    Fax number to send the letter:   [phone]    Cardiologist:   WILBUR     Last Appointment:   05/06/24    Next Appointment:   N/A

## 2024-10-31 DIAGNOSIS — G47.00 INSOMNIA, UNSPECIFIED TYPE: Primary | ICD-10-CM

## 2024-10-31 RX ORDER — FERROUS SULFATE 325(65) MG
325 TABLET ORAL
Qty: 90 TABLET | Refills: 1 | Status: SHIPPED | OUTPATIENT
Start: 2024-11-01

## 2024-10-31 NOTE — TELEPHONE ENCOUNTER
Last office visit 10/7/2024     Last written 10/9/24    Next office visit scheduled 4/7/2025    Requested Prescriptions     Pending Prescriptions Disp Refills    ferrous sulfate (IRON 325) 325 (65 Fe) MG tablet [Pharmacy Med Name: FERROUS SULFATE 325 MG TABLET] 90 tablet 1     Sig: TAKE 1 TABLET BY MOUTH THREE TIMES A WEEK BEST WITH VITAMIN C. AVOID TAKING WITH CALCIUM.       Med pended

## 2024-11-04 ENCOUNTER — OFFICE VISIT (OUTPATIENT)
Dept: FAMILY MEDICINE CLINIC | Age: 64
End: 2024-11-04

## 2024-11-04 VITALS
HEART RATE: 59 BPM | SYSTOLIC BLOOD PRESSURE: 122 MMHG | WEIGHT: 202 LBS | BODY MASS INDEX: 28.28 KG/M2 | DIASTOLIC BLOOD PRESSURE: 76 MMHG | HEIGHT: 71 IN | OXYGEN SATURATION: 98 %

## 2024-11-04 DIAGNOSIS — Z79.01 CURRENT USE OF LONG TERM ANTICOAGULATION: ICD-10-CM

## 2024-11-04 DIAGNOSIS — K21.9 GASTROESOPHAGEAL REFLUX DISEASE WITHOUT ESOPHAGITIS: ICD-10-CM

## 2024-11-04 DIAGNOSIS — I42.2 HYPERTROPHIC CARDIOMYOPATHY (HCC): ICD-10-CM

## 2024-11-04 DIAGNOSIS — R73.9 ELEVATED BLOOD SUGAR: ICD-10-CM

## 2024-11-04 DIAGNOSIS — E61.1 IRON DEFICIENCY: ICD-10-CM

## 2024-11-04 DIAGNOSIS — E78.5 HYPERLIPIDEMIA, UNSPECIFIED HYPERLIPIDEMIA TYPE: Primary | ICD-10-CM

## 2024-11-04 DIAGNOSIS — Z95.811 PRESENCE OF HEART ASSIST DEVICE (HCC): ICD-10-CM

## 2024-11-04 DIAGNOSIS — I50.22 CHRONIC SYSTOLIC (CONGESTIVE) HEART FAILURE (HCC): ICD-10-CM

## 2024-11-04 SDOH — ECONOMIC STABILITY: FOOD INSECURITY: WITHIN THE PAST 12 MONTHS, YOU WORRIED THAT YOUR FOOD WOULD RUN OUT BEFORE YOU GOT MONEY TO BUY MORE.: NEVER TRUE

## 2024-11-04 SDOH — ECONOMIC STABILITY: FOOD INSECURITY: WITHIN THE PAST 12 MONTHS, THE FOOD YOU BOUGHT JUST DIDN'T LAST AND YOU DIDN'T HAVE MONEY TO GET MORE.: NEVER TRUE

## 2024-11-04 SDOH — ECONOMIC STABILITY: INCOME INSECURITY: HOW HARD IS IT FOR YOU TO PAY FOR THE VERY BASICS LIKE FOOD, HOUSING, MEDICAL CARE, AND HEATING?: NOT HARD AT ALL

## 2024-11-04 NOTE — PROGRESS NOTES
Matty Galeana Jr. presents for   Chief Complaint   Patient presents with    Hyperlipidemia     Pt states that he is here for a 6 month f/u, had bw done on 10/7/24        ASSESSMENT:   Diagnosis Orders   1. Hyperlipidemia, unspecified hyperlipidemia type, labs are pending we will see how Pravachol 20 mg is work Lipid Panel      2. Elevated blood sugar, uncertain control last A1c 5.9 will reassess with lab work Hemoglobin A1C      3. Gastroesophageal reflux disease without esophagitis, controlled, continue Prilosec 20 mg       4. Presence of heart assist device (HCC), controlled, continue cardiology follow-up       5. Chronic systolic (congestive) heart failure, controlled, continue cardiology follow-up       6. Current use of long term anticoagulation, controlled, no recent history of bleeding       7. Hypertrophic cardiomyopathy (HCC), controlled, continue cardiology follow-up       8. Iron deficiency, new issue, patient now on iron supplement.  Colonoscopy done in 2020.  Will check CBC CBC with Auto Differential           Plan:  1)  Medication: continue current medication regimen unchanged, medications are working and tolerated, continue as listed  2)  Recheck in 6 months, sooner should new symptoms or problems arise.  3) LLR          SUBJECTIVE:  Matty Galeana Jr. is a 64 y.o. male who presents for evaluation of elevated blood sugar, GERD, CHF, history of A-fib, hypertrophic cardiomyopathy, long-term anticoagulant use, hypertension and hyperlipidemia. He denies any symptoms referable to his elevated blood pressure.   Specifically denies chest pain, palpitations, dyspnea, orthopnea, PND or peripheral edema or neuro sx.  No anorexia, arthralgia, or leg cramps noted. Current medication regimen is as listed below. He denies any side effects of medication, and has been taking it regularly.   Lab Results   Component Value Date    CHOL 192 04/26/2024    TRIG 92 04/26/2024    HDL 52 04/26/2024     (H) 04/26/2024

## 2024-11-05 LAB
BASOPHILS # BLD: 0 K/UL (ref 0–0.2)
BASOPHILS NFR BLD: 0.7 %
CHOLEST SERPL-MCNC: 180 MG/DL (ref 0–199)
DEPRECATED RDW RBC AUTO: 16.8 % (ref 12.4–15.4)
EOSINOPHIL # BLD: 0 K/UL (ref 0–0.6)
EOSINOPHIL NFR BLD: 0.6 %
EST. AVERAGE GLUCOSE BLD GHB EST-MCNC: 122.6 MG/DL
HBA1C MFR BLD: 5.9 %
HCT VFR BLD AUTO: 44.5 % (ref 40.5–52.5)
HDLC SERPL-MCNC: 45 MG/DL (ref 40–60)
HGB BLD-MCNC: 14.6 G/DL (ref 13.5–17.5)
LDLC SERPL CALC-MCNC: 121 MG/DL
LYMPHOCYTES # BLD: 0.4 K/UL (ref 1–5.1)
LYMPHOCYTES NFR BLD: 8.9 %
MCH RBC QN AUTO: 30.4 PG (ref 26–34)
MCHC RBC AUTO-ENTMCNC: 32.8 G/DL (ref 31–36)
MCV RBC AUTO: 92.6 FL (ref 80–100)
MONOCYTES # BLD: 0.2 K/UL (ref 0–1.3)
MONOCYTES NFR BLD: 3.2 %
NEUTROPHILS # BLD: 4.3 K/UL (ref 1.7–7.7)
NEUTROPHILS NFR BLD: 86.6 %
PLATELET # BLD AUTO: 223 K/UL (ref 135–450)
PMV BLD AUTO: 9.1 FL (ref 5–10.5)
RBC # BLD AUTO: 4.81 M/UL (ref 4.2–5.9)
TRIGL SERPL-MCNC: 69 MG/DL (ref 0–150)
VLDLC SERPL CALC-MCNC: 14 MG/DL
WBC # BLD AUTO: 5 K/UL (ref 4–11)

## 2024-11-21 ENCOUNTER — TELEPHONE (OUTPATIENT)
Dept: CARDIOLOGY CLINIC | Age: 64
End: 2024-11-21

## 2024-11-21 NOTE — TELEPHONE ENCOUNTER
Sunny Marshall MD  [facility] Ep7 minutes ago (4:14 PM)       Patient last seen in our clinic in May 2024 and was doing reasonably well. He has not returned for his planned follow up visit.    Based on last encounter, 6 months ago, he is acceptable risk for planned steroid injection.    If interruption of anticoagulation is deemed necessary by the physician performing the procedure, then apixaban can be held starting 48 hours prior to the planned procedure (according to the 2024 American College of Cardiology guidelines for parminder-operative management of oral anticoagulation). It should be resumed as soon as possible after the procedure if no bleeding issues noted.

## 2024-11-21 NOTE — TELEPHONE ENCOUNTER
Matty Galeana Jr., 1960    Cardiac Risk Assessment    What type of procedure are you having?  Thoracic epidural steroid injection     When is your procedure scheduled for?  To be determined     Medications to be stopped.  Eliquis 3 days prior     What physician is performing your procedure?  Dr. Jw Clemente     Phone Number:   [phone]    Fax number to send the letter:   [phone]    Cardiologist:   LIVIA BOWLES     Last Appointment:   05/06/24    Next Appointment:   N/A

## 2024-12-06 ENCOUNTER — TELEPHONE (OUTPATIENT)
Dept: CARDIOLOGY CLINIC | Age: 64
End: 2024-12-06

## 2024-12-06 NOTE — TELEPHONE ENCOUNTER
Pt sts he was prescribed Donepezil 5 mg yesterday and started last night. To take 1/2 tab QD for first week. Pt told to contact [facility] about medication interaction to  amiodarone (PACERONE) 100 MG tablet. Could could abnormal HR. Please advise.

## 2024-12-09 DIAGNOSIS — I48.91 ATRIAL FIBRILLATION, UNSPECIFIED TYPE (HCC): ICD-10-CM

## 2024-12-24 ENCOUNTER — NURSE ONLY (OUTPATIENT)
Dept: CARDIOLOGY CLINIC | Age: 64
End: 2024-12-24

## 2024-12-27 NOTE — TELEPHONE ENCOUNTER
LOV 05/06/24  NOV N/A     CMP 10/07/24  TSH 10/09/24  LFT 10/07/24  EKG 12/11/23- needs upated EKG, order added to chart. Will call pt at appropriate time to schedule EKG

## 2024-12-27 NOTE — TELEPHONE ENCOUNTER
Called and spoke w/ pt. Pt states he will return call to schedule EKG when he gets home in front of calendar. Please schedule pt for annual EKG when call is returned.

## 2024-12-30 ENCOUNTER — LAB (OUTPATIENT)
Dept: CARDIOLOGY CLINIC | Age: 64
End: 2024-12-30

## 2024-12-30 DIAGNOSIS — I48.3 TYPICAL ATRIAL FLUTTER (HCC): ICD-10-CM

## 2024-12-30 DIAGNOSIS — I48.91 ATRIAL FIBRILLATION, UNSPECIFIED TYPE (HCC): ICD-10-CM

## 2024-12-30 DIAGNOSIS — I48.0 PAF (PAROXYSMAL ATRIAL FIBRILLATION) (HCC): Primary | ICD-10-CM

## 2024-12-30 DIAGNOSIS — I45.10 RBBB: ICD-10-CM

## 2024-12-30 DIAGNOSIS — I49.3 PVC (PREMATURE VENTRICULAR CONTRACTION): ICD-10-CM

## 2024-12-30 DIAGNOSIS — I49.5 SINUS NODE DYSFUNCTION (HCC): ICD-10-CM

## 2024-12-30 PROCEDURE — 93000 ELECTROCARDIOGRAM COMPLETE: CPT | Performed by: INTERNAL MEDICINE

## 2024-12-30 RX ORDER — AMIODARONE HYDROCHLORIDE 100 MG/1
100 TABLET ORAL DAILY
Qty: 90 TABLET | Refills: 1 | Status: SHIPPED | OUTPATIENT
Start: 2024-12-30

## 2025-01-02 ENCOUNTER — TELEPHONE (OUTPATIENT)
Dept: CARDIOLOGY CLINIC | Age: 65
End: 2025-01-02

## 2025-01-02 NOTE — TELEPHONE ENCOUNTER
----- Message from Dr. Sunny Marshall MD sent at 1/2/2025  2:47 PM EST -----  Regarding: Needs appointment with me  Please call patient and schedule him to see me in next few weeks. Please also see how patient is feeling. Need to discuss treatment plan given that he is having more atrial fibrillation at this time.     Thank you  ----- Message -----  From: Zara Velazquez MA  Sent: 12/30/2024   3:19 PM EST  To: Sunny Marshall MD

## 2025-01-02 NOTE — TELEPHONE ENCOUNTER
I attempted to call the patient. Unable to reach patient and unable to leave VM. Staff will reattempt at a later time. 01/06/2025 at 1145 can be used for appointment.

## 2025-01-08 PROBLEM — M54.14 THORACIC NEURITIS: Status: ACTIVE | Noted: 2025-01-08

## 2025-01-31 DIAGNOSIS — I48.0 PAF (PAROXYSMAL ATRIAL FIBRILLATION) (HCC): ICD-10-CM

## 2025-01-31 RX ORDER — VERAPAMIL HYDROCHLORIDE 120 MG/1
120 TABLET, FILM COATED, EXTENDED RELEASE ORAL DAILY
Qty: 90 TABLET | Refills: 3 | Status: SHIPPED | OUTPATIENT
Start: 2025-01-31

## 2025-02-04 ENCOUNTER — TELEPHONE (OUTPATIENT)
Dept: FAMILY MEDICINE CLINIC | Age: 65
End: 2025-02-04

## 2025-02-04 NOTE — TELEPHONE ENCOUNTER
Getting over the flu, and now coughing up green mucus and can't get it to stop. Wants to know if there is something that he can take over the counter or if he needs to be seen.

## 2025-02-05 ENCOUNTER — TELEPHONE (OUTPATIENT)
Dept: CARDIOLOGY CLINIC | Age: 65
End: 2025-02-05

## 2025-02-05 NOTE — TELEPHONE ENCOUNTER
Pt called stating he was not able to get out of work for his apt today 2/5 and device check. Pt stated he is in afib and would like to know if he can be seen this week. Please provide overbook date and time if available.

## 2025-02-05 NOTE — TELEPHONE ENCOUNTER
I recommend that the patient take plain Mucinex, this is over-the-counter he does not need a prescription.  He can also take Delsym for cough

## 2025-02-05 NOTE — TELEPHONE ENCOUNTER
I spoke with the pt, he states that he was not diagnosed with the flu but was having sinus drainage, cough, headache, body aches, sore throat that started on Friday. He states that he took a covid test and it was negative. He wants something called into the pharmacy. I advised him that he would need to be seen to get any antibiotics but he asked that a message be sent to Dr. Sharma.

## 2025-02-05 NOTE — TELEPHONE ENCOUNTER
Unfortunately [facility] does not have any availability this week. Soonest available overbook for [facility] 2/19/2025 @ 3:30PM

## 2025-02-07 NOTE — TELEPHONE ENCOUNTER
Spoke with pt at [phone] and informed him of RNJANUSZ's msg. Pt vu and is scheduled with tania for 2/19/25 at 330pm.

## 2025-02-18 ENCOUNTER — HOSPITAL ENCOUNTER (OUTPATIENT)
Dept: MRI IMAGING | Age: 65
Discharge: HOME | End: 2025-02-18
Payer: COMMERCIAL

## 2025-02-18 DIAGNOSIS — M77.42 METATARSALGIA OF LEFT FOOT: ICD-10-CM

## 2025-02-18 DIAGNOSIS — M72.2 PLANTAR FASCIITIS OF LEFT FOOT: ICD-10-CM

## 2025-02-18 PROCEDURE — 73718 MRI LOWER EXTREMITY W/O DYE: CPT

## 2025-02-19 ENCOUNTER — NURSE ONLY (OUTPATIENT)
Dept: CARDIOLOGY CLINIC | Age: 65
End: 2025-02-19

## 2025-02-19 ENCOUNTER — OFFICE VISIT (OUTPATIENT)
Dept: CARDIOLOGY CLINIC | Age: 65
End: 2025-02-19
Payer: COMMERCIAL

## 2025-02-19 VITALS
SYSTOLIC BLOOD PRESSURE: 116 MMHG | WEIGHT: 197 LBS | DIASTOLIC BLOOD PRESSURE: 70 MMHG | OXYGEN SATURATION: 96 % | HEART RATE: 62 BPM | HEIGHT: 71 IN | BODY MASS INDEX: 27.58 KG/M2

## 2025-02-19 DIAGNOSIS — I48.11 LONGSTANDING PERSISTENT ATRIAL FIBRILLATION (HCC): ICD-10-CM

## 2025-02-19 DIAGNOSIS — I49.5 SICK SINUS SYNDROME (HCC): ICD-10-CM

## 2025-02-19 DIAGNOSIS — I42.2 HYPERTROPHIC CARDIOMYOPATHY (HCC): ICD-10-CM

## 2025-02-19 DIAGNOSIS — I48.3 TYPICAL ATRIAL FLUTTER (HCC): ICD-10-CM

## 2025-02-19 DIAGNOSIS — I48.4 ATYPICAL ATRIAL FLUTTER (HCC): ICD-10-CM

## 2025-02-19 DIAGNOSIS — I48.19 PERSISTENT ATRIAL FIBRILLATION (HCC): Primary | ICD-10-CM

## 2025-02-19 DIAGNOSIS — Z95.0 PACEMAKER: Primary | ICD-10-CM

## 2025-02-19 DIAGNOSIS — I10 PRIMARY HYPERTENSION: ICD-10-CM

## 2025-02-19 PROCEDURE — 3078F DIAST BP <80 MM HG: CPT | Performed by: INTERNAL MEDICINE

## 2025-02-19 PROCEDURE — 3074F SYST BP LT 130 MM HG: CPT | Performed by: INTERNAL MEDICINE

## 2025-02-19 PROCEDURE — 99214 OFFICE O/P EST MOD 30 MIN: CPT | Performed by: INTERNAL MEDICINE

## 2025-02-19 PROCEDURE — G2211 COMPLEX E/M VISIT ADD ON: HCPCS | Performed by: INTERNAL MEDICINE

## 2025-02-19 ASSESSMENT — ENCOUNTER SYMPTOMS
SHORTNESS OF BREATH: 0
WHEEZING: 0
STRIDOR: 0
HEMATEMESIS: 0
HEMATOCHEZIA: 0
RIGHT EYE: 0
LEFT EYE: 0

## 2025-02-19 NOTE — PROGRESS NOTES
frequently. He states episodes can last 5 - 20 minutes and can occur a dozen or more times daily. He states that the palpitations occur mostly at rest. He feels more fatigued than normal. Dyspnea occurs with exertion. Patient denies current edema, chest pain, dizziness or syncope. Patient is taking all cardiac medications as prescribed and tolerates them well. He denies any recent issues with bleeding or bruising.     Alcohol consumption is rare and minimal.     Interval History since last office visit: On 05/06/2024 Metoprolol was stopped and Verapamil 120 mg daily was started. An echocardiogram was ordered, but not completed.     Office Visit ( Clinic, 02/19/2025):  He presents to the clinic today for a follow up visit. He reports that he has felt fatigued over the last few months. He reports that he has felt short of breath when his pacemaker is activated. Patient denies current edema, chest pain, dizziness or syncope. Patient is taking all cardiac medications as prescribed and tolerates them well. He denies any recent issues with spontaneous bleeding or excessive bruising.     Review of Systems  Review of Systems   Constitutional: Positive for malaise/fatigue. Negative for weight gain and weight loss.   HENT:  Negative for nosebleeds and stridor.    Eyes:  Negative for vision loss in left eye and vision loss in right eye.   Cardiovascular:  Positive for palpitations. Negative for chest pain, dyspnea on exertion, leg swelling and syncope.   Respiratory:  Negative for shortness of breath and wheezing.    Hematologic/Lymphatic: Negative for bleeding problem. Does not bruise/bleed easily.   Skin:  Negative for itching and rash.   Musculoskeletal:  Negative for joint pain and joint swelling.   Gastrointestinal:  Negative for hematemesis and hematochezia.   Genitourinary:  Negative for dysuria and hematuria.   Neurological:  Negative for dizziness and light-headedness.   Psychiatric/Behavioral:  Negative for altered

## 2025-03-25 ENCOUNTER — HOSPITAL ENCOUNTER (OUTPATIENT)
Dept: CARDIOLOGY | Age: 65
Discharge: HOME | End: 2025-03-27
Attending: INTERNAL MEDICINE
Payer: COMMERCIAL

## 2025-03-25 VITALS
SYSTOLIC BLOOD PRESSURE: 116 MMHG | HEIGHT: 71 IN | DIASTOLIC BLOOD PRESSURE: 70 MMHG | BODY MASS INDEX: 27.58 KG/M2 | WEIGHT: 197 LBS

## 2025-03-25 DIAGNOSIS — I48.11 LONGSTANDING PERSISTENT ATRIAL FIBRILLATION (HCC): ICD-10-CM

## 2025-03-25 LAB
ECHO AO ROOT DIAM: 2.9 CM
ECHO AO ROOT INDEX: 1.38 CM/M2
ECHO AV AREA PEAK VELOCITY: 1.4 CM2
ECHO AV AREA/BSA PEAK VELOCITY: 0.7 CM2/M2
ECHO AV CUSP MM: 1.8 CM
ECHO AV PEAK GRADIENT: 8 MMHG
ECHO AV PEAK GRADIENT: 8 MMHG
ECHO AV PEAK VELOCITY: 1.4 M/S
ECHO AV VELOCITY RATIO: 0.5
ECHO BSA: 2.12 M2
ECHO IVC PROX: 2.1 CM
ECHO LA AREA 2C: 33.5 CM2
ECHO LA AREA 4C: 39.8 CM2
ECHO LA DIAMETER INDEX: 2.52 CM/M2
ECHO LA DIAMETER: 5.3 CM
ECHO LA MAJOR AXIS: 7.5 CM
ECHO LA MINOR AXIS: 7.2 CM
ECHO LA TO AORTIC ROOT RATIO: 1.83
ECHO LA VOL BP: 146 ML (ref 18–58)
ECHO LA VOL MOD A2C: 125 ML (ref 18–58)
ECHO LA VOL MOD A4C: 164 ML (ref 18–58)
ECHO LA VOL/BSA BIPLANE: 70 ML/M2 (ref 16–34)
ECHO LA VOLUME INDEX MOD A2C: 60 ML/M2 (ref 16–34)
ECHO LA VOLUME INDEX MOD A4C: 78 ML/M2 (ref 16–34)
ECHO LV E' LATERAL VELOCITY: 9.79 CM/S
ECHO LV E' SEPTAL VELOCITY: 8.63 CM/S
ECHO LV EDV 3D: 192 ML
ECHO LV EDV INDEX 3D: 91 ML/M2
ECHO LV EF PHYSICIAN: 52 %
ECHO LV EJECTION FRACTION 3D: 52 %
ECHO LV ESV 3D: 92 ML
ECHO LV ESV INDEX 3D: 44 ML/M2
ECHO LV FRACTIONAL SHORTENING: 36 % (ref 28–44)
ECHO LV INTERNAL DIMENSION DIASTOLE INDEX: 2.62 CM/M2
ECHO LV INTERNAL DIMENSION DIASTOLIC: 5.5 CM (ref 4.2–5.9)
ECHO LV INTERNAL DIMENSION SYSTOLIC INDEX: 1.67 CM/M2
ECHO LV INTERNAL DIMENSION SYSTOLIC: 3.5 CM
ECHO LV ISOVOLUMETRIC RELAXATION TIME (IVRT): 79 MS
ECHO LV IVSD: 1.5 CM (ref 0.6–1)
ECHO LV MASS 2D: 320.9 G (ref 88–224)
ECHO LV MASS 3D INDEX: 111.4 G/M2
ECHO LV MASS 3D: 234 G
ECHO LV MASS INDEX 2D: 152.8 G/M2 (ref 49–115)
ECHO LV POSTERIOR WALL DIASTOLIC: 1.2 CM (ref 0.6–1)
ECHO LV RELATIVE WALL THICKNESS RATIO: 0.44
ECHO LVOT AREA: 2.5 CM2
ECHO LVOT DIAM: 1.8 CM
ECHO LVOT PEAK GRADIENT: 2 MMHG
ECHO LVOT PEAK VELOCITY: 0.7 M/S
ECHO MV E VELOCITY: 1.07 M/S
ECHO MV E/E' LATERAL: 10.93
ECHO MV E/E' RATIO (AVERAGED): 11.66
ECHO MV E/E' SEPTAL: 12.4
ECHO RA AREA 4C: 19.9 CM2
ECHO RA END SYSTOLIC VOLUME APICAL 4 CHAMBER INDEX BSA: 26 ML/M2
ECHO RA VOLUME: 55 ML
ECHO RV FREE WALL PEAK S': 10.3 CM/S
ECHO RV TAPSE: 2.4 CM (ref 1.7–?)

## 2025-03-25 PROCEDURE — 93306 TTE W/DOPPLER COMPLETE: CPT

## 2025-03-25 PROCEDURE — 93306 TTE W/DOPPLER COMPLETE: CPT | Performed by: INTERNAL MEDICINE

## 2025-03-26 ENCOUNTER — RESULTS FOLLOW-UP (OUTPATIENT)
Dept: CARDIOLOGY CLINIC | Age: 65
End: 2025-03-26

## 2025-03-27 DIAGNOSIS — E78.5 HYPERLIPIDEMIA, UNSPECIFIED HYPERLIPIDEMIA TYPE: ICD-10-CM

## 2025-03-27 RX ORDER — OMEPRAZOLE 20 MG/1
CAPSULE, DELAYED RELEASE ORAL DAILY
Qty: 90 CAPSULE | Refills: 1 | Status: SHIPPED | OUTPATIENT
Start: 2025-03-27

## 2025-03-27 RX ORDER — PRAVASTATIN SODIUM 20 MG
20 TABLET ORAL EVERY EVENING
Qty: 90 TABLET | Refills: 1 | Status: SHIPPED | OUTPATIENT
Start: 2025-03-27

## 2025-03-27 NOTE — TELEPHONE ENCOUNTER
Matty Galeana Jr. is requesting refill(s) pravastatin  Last OV 11/4/24 (pertaining to medication)  LR 9/30/24 (per medication requested)  Next office visit scheduled or attempted Yes   If no, reason:  5/12/25      Matty Galeana Jr. is requesting refill(s) omeprazole  Last OV 11/4/24 (pertaining to medication)  LR 9/30/24 (per medication requested)  Next office visit scheduled or attempted Yes   If no, reason:  5/12/25

## 2025-04-03 ENCOUNTER — OFFICE VISIT (OUTPATIENT)
Dept: CARDIOLOGY CLINIC | Age: 65
End: 2025-04-03
Payer: COMMERCIAL

## 2025-04-03 ENCOUNTER — TELEPHONE (OUTPATIENT)
Dept: CARDIOLOGY CLINIC | Age: 65
End: 2025-04-03

## 2025-04-03 ENCOUNTER — CLINICAL SUPPORT (OUTPATIENT)
Dept: CARDIOLOGY CLINIC | Age: 65
End: 2025-04-03

## 2025-04-03 VITALS — SYSTOLIC BLOOD PRESSURE: 126 MMHG | DIASTOLIC BLOOD PRESSURE: 84 MMHG | HEART RATE: 74 BPM | OXYGEN SATURATION: 96 %

## 2025-04-03 VITALS
SYSTOLIC BLOOD PRESSURE: 124 MMHG | OXYGEN SATURATION: 96 % | DIASTOLIC BLOOD PRESSURE: 84 MMHG | HEIGHT: 71 IN | HEART RATE: 74 BPM | BODY MASS INDEX: 28.8 KG/M2 | WEIGHT: 205.69 LBS

## 2025-04-03 DIAGNOSIS — Z79.899 ENCOUNTER FOR MONITORING AMIODARONE THERAPY: ICD-10-CM

## 2025-04-03 DIAGNOSIS — I44.30 AV BLOCK: ICD-10-CM

## 2025-04-03 DIAGNOSIS — I48.19 PERSISTENT ATRIAL FIBRILLATION (HCC): Primary | ICD-10-CM

## 2025-04-03 DIAGNOSIS — Z51.81 ENCOUNTER FOR MONITORING AMIODARONE THERAPY: ICD-10-CM

## 2025-04-03 DIAGNOSIS — Z95.0 PACEMAKER: Primary | ICD-10-CM

## 2025-04-03 DIAGNOSIS — I48.0 PAF (PAROXYSMAL ATRIAL FIBRILLATION) (HCC): ICD-10-CM

## 2025-04-03 DIAGNOSIS — I49.5 SINUS NODE DYSFUNCTION (HCC): ICD-10-CM

## 2025-04-03 DIAGNOSIS — I48.4 ATYPICAL ATRIAL FLUTTER: ICD-10-CM

## 2025-04-03 LAB
EKG ATRIAL RATE: 300 BPM
EKG DIAGNOSIS: NORMAL
EKG Q-T INTERVAL: 406 MS
EKG QRS DURATION: 142 MS
EKG QTC CALCULATION (BAZETT): 444 MS
EKG R AXIS: 13 DEGREES
EKG T AXIS: 222 DEGREES
EKG VENTRICULAR RATE: 72 BPM

## 2025-04-03 PROCEDURE — G2211 COMPLEX E/M VISIT ADD ON: HCPCS | Performed by: INTERNAL MEDICINE

## 2025-04-03 PROCEDURE — 99214 OFFICE O/P EST MOD 30 MIN: CPT | Performed by: INTERNAL MEDICINE

## 2025-04-03 PROCEDURE — 1123F ACP DISCUSS/DSCN MKR DOCD: CPT | Performed by: INTERNAL MEDICINE

## 2025-04-03 ASSESSMENT — ENCOUNTER SYMPTOMS
HEMATEMESIS: 0
STRIDOR: 0
RIGHT EYE: 0
LEFT EYE: 0
WHEEZING: 0
HEMATOCHEZIA: 0
SHORTNESS OF BREATH: 0

## 2025-04-03 NOTE — PATIENT INSTRUCTIONS
Plan:     Discussed the risks and benefits of a pulsed-field catheter ablation for atrial fibrillation.   Medications to hold:    - Hold eliquis the night prior to and the morning of the procedure   - Hold b complex and iron the morning of the procedure   Continue taking current cardiac medications as prescribed.   Remote device checks every three months.   Follow up with me in 5 months.

## 2025-04-03 NOTE — PROGRESS NOTES
Assessment:     1. Atrial fibrillation: patient had Persistent atrial fibrillation prior to ablation.     Associated symptoms: Fatigue and Palpitations     History of cardioversion: Had electrical cardioversion in the past (multiple)  History of AF ablation: Had atrial fibrillation ablation in the past (December 2022, April 2023 and July 2023)  History of heart surgery/procedure: yes, cardioversion and ablation    Current use of anti-arrhythmic drugs: amiodarone  Previous use of anti-arrhythmic drugs: amiodarone and dofetilide    Overall LV function: Normal left ventricular systolic function  Size of left atrium: Severely dilated LA size  Significant cardiac valvular disease: No significant valve disease  Family history of atrial fibrillation: Unknown    Alcohol consumption: Mild alcohol intake  Caffeine consumption: Mild caffeine intake  Smoking status: non-smoker  Obstructive sleep apnea: No/low suspicion  Exercise status: Occasional exercise, not routine    Patient has hypertrophic cardiomyopathy. Stroke risk is elevated  Longterm anticoagulation is: recommended  Current anticoagulation: Apixaban  Bleeding issues reported: no  Renal function: Normal    In setting of HCM, and development of persistent atrial fibrillation, being quite symptomatic and having failed multiple anti-arrhythmic drug therapy options (including dofetilide), it became clear that the option of AF ablation should be considered. After discussions with patient, we proceeded with attempted AF ablation in early December 2022. Procedure was complicated by pericardial effusion requiring emergent pericardiocentesis. We only managed ablation of the cavo-tricuspid isthmus during that procedure. Patient came back for a second attempt on 12/28/2022. We performed pulmonary vein isolation with roof line and posterior wall ablation. There were no acute complications.     Subsequently, the patient's ILR shows episodes of AF and ECG showed what appeared to

## 2025-04-03 NOTE — TELEPHONE ENCOUNTER
Plan:     Discussed the risks and benefits of a pulsed-field catheter ablation for atrial fibrillation.   Medications to hold:    - Hold eliquis the night prior to and the morning of the procedure   - Hold b complex and iron the morning of the procedure     Case request placed. PFA ablation and FATIMAH with "[facility] and [facility].

## 2025-04-04 NOTE — TELEPHONE ENCOUNTER
Procedure:  FATIMAH/PFA Ablation  Doctor:  Dr. Marshall  Date:  5/22/25 (pt request)  Time:  8am  Arrival:  6:30am  Reps:  [employer]  Anesthesia:  Yes      Spoke with patient. Please have patient arrive to the main entrance of [facility] ([address] [place], [place] [id number]) and check in with the registration desk.  They will be directed to the Cath Lab.  Remind patient to be NPO after midnight (8 hours prior). Do not apply lotions/creams on skin the day of procedure.    Instructions sent thru MyChart and email.

## 2025-04-07 ENCOUNTER — OFFICE VISIT (OUTPATIENT)
Dept: PULMONOLOGY | Age: 65
End: 2025-04-07
Payer: COMMERCIAL

## 2025-04-07 VITALS
HEART RATE: 65 BPM | DIASTOLIC BLOOD PRESSURE: 88 MMHG | SYSTOLIC BLOOD PRESSURE: 142 MMHG | RESPIRATION RATE: 16 BRPM | WEIGHT: 204.6 LBS | TEMPERATURE: 97.2 F | HEIGHT: 71 IN | OXYGEN SATURATION: 96 % | BODY MASS INDEX: 28.64 KG/M2

## 2025-04-07 DIAGNOSIS — G25.81 RLS (RESTLESS LEGS SYNDROME): ICD-10-CM

## 2025-04-07 DIAGNOSIS — G47.33 OBSTRUCTIVE SLEEP APNEA: Primary | ICD-10-CM

## 2025-04-07 DIAGNOSIS — G47.00 INSOMNIA, UNSPECIFIED TYPE: ICD-10-CM

## 2025-04-07 PROCEDURE — 1123F ACP DISCUSS/DSCN MKR DOCD: CPT | Performed by: INTERNAL MEDICINE

## 2025-04-07 PROCEDURE — 99214 OFFICE O/P EST MOD 30 MIN: CPT | Performed by: INTERNAL MEDICINE

## 2025-04-07 RX ORDER — GABAPENTIN 400 MG/1
400 CAPSULE ORAL
Qty: 90 CAPSULE | Refills: 1 | Status: SHIPPED | OUTPATIENT
Start: 2025-04-07 | End: 2025-10-04

## 2025-04-07 ASSESSMENT — SLEEP AND FATIGUE QUESTIONNAIRES
HOW LIKELY ARE YOU TO NOD OFF OR FALL ASLEEP WHILE SITTING AND READING: WOULD NEVER DOZE
HOW LIKELY ARE YOU TO NOD OFF OR FALL ASLEEP WHEN YOU ARE A PASSENGER IN A CAR FOR AN HOUR WITHOUT A BREAK: WOULD NEVER DOZE
HOW LIKELY ARE YOU TO NOD OFF OR FALL ASLEEP WHILE WATCHING TV: SLIGHT CHANCE OF DOZING
HOW LIKELY ARE YOU TO NOD OFF OR FALL ASLEEP WHILE SITTING QUIETLY AFTER LUNCH WITHOUT ALCOHOL: WOULD NEVER DOZE
HOW LIKELY ARE YOU TO NOD OFF OR FALL ASLEEP IN A CAR, WHILE STOPPED FOR A FEW MINUTES IN TRAFFIC: WOULD NEVER DOZE
HOW LIKELY ARE YOU TO NOD OFF OR FALL ASLEEP WHILE SITTING AND TALKING TO SOMEONE: WOULD NEVER DOZE
HOW LIKELY ARE YOU TO NOD OFF OR FALL ASLEEP WHILE LYING DOWN TO REST IN THE AFTERNOON WHEN CIRCUMSTANCES PERMIT: SLIGHT CHANCE OF DOZING
HOW LIKELY ARE YOU TO NOD OFF OR FALL ASLEEP WHILE SITTING INACTIVE IN A PUBLIC PLACE: WOULD NEVER DOZE
ESS TOTAL SCORE: 2

## 2025-04-07 NOTE — PATIENT INSTRUCTIONS
INSOMNIA TREATMENT - the most effective treatment for insomnia is cognitive behavioral therapy.  I recommend the online program through the [facility], which is available at the URL listed below free of charge:    [facility] Cognitive Behavioral Therapy - Insomnia     [email or web address]           Please remember to bring a list of medications and any CPAP or BiPAP machines to your next appointment with the office.     Out of respect for other patients and providers, we ask that you arrive no later than your scheduled appointment time.  If you arrive later than your appointment time, you may be asked to reschedule your appointment.     Late cancellations result in other patients being unable to utilize the appointment slot to see their physician.  Please avoid cancelling your appointment less than 1 week prior to the appointment date.  Patients with multiple missed appointments within 2 years may be dismissed from the practice.     In the next few weeks, you will be receiving a survey from [facility] regarding your visit today.  Your input is valuable to us & surveys are regularly reviewed by [facility] leadership.  It is very important to us that our patients receive excellent care.  If your experience was excellent, please let us know!  If your experience was not a good one, please tell us so we can make needed corrections. We hope you are comfortable recommending us to others for their healthcare needs.     We thank you for choosing [facility]!

## 2025-04-07 NOTE — PROGRESS NOTES
MA Communication:  The following orders are received by verbal communication from Jabier Henderson MD        Orders include:        Follow up 6 months HUNTER  Order for blood work given to pt  
edema.  Pulmonary/Chest: Clear breath sounds.  No accessory muscle usage.   Musculoskeletal: No cyanosis. No clubbing.  Skin: Skin is warm and dry.   Psychiatric: Normal mood and affect.    DATA:    PSG 8/5/10 TH AHI 5, PLMI 11, PLMAI 11  Titration 1/4/20 looked best at CPAP 9     ECHO FATIMAH EF 55%     LABS: 10/7/24 Iron sat 17%; Ferritin 36; TSH 5; FT4 1.1    I have personally downloaded PAP therapy data and compliance report & personally interpreted & summarized the PAP data for dates March 2025, see HPI.    I have made adjustments as described in A/P.     ASSESSMENT:  Obstructive Sleep Apnea   RLS/PLMD   Insomnia   AFIB, s/p PPM  Chronic neck pain with plan for procedure on cervical spine    PLAN:   APAP 9-11   Gabapentin 400, can try increasing to max of 600.  Patient is going to try to find the optimal dose that helps him have consolidated sleep without having a hangover effect.     Has successfully stopped Klonopin  Failed ambien/hypersomnia  Previously given information for go to sleep CBT-I program and encouraged enrollment.  I again discussed sleep restriction today and gave information on [facility] program   [email or web address]   On oral Iron since October 2024; has been notified to see GI; recheck levels today -- He saw Priyank in past, I offered referral today and he said he would make the call -- may need IV iron if levels not responding to oral iron due to his persistent sx   For hypersomnia, no new intervention   Stopped nuvigil 2/2 insomnia  Avoiding amphetamines with AFIB/tachycardia   F/U in 6 months, patient to call Franklin EVIE due to low iron levels

## 2025-04-10 ENCOUNTER — TRANSCRIBE ORDERS (OUTPATIENT)
Dept: ADMINISTRATIVE | Age: 65
End: 2025-04-10

## 2025-04-10 DIAGNOSIS — G25.0 ESSENTIAL TREMOR: Primary | ICD-10-CM

## 2025-04-10 DIAGNOSIS — R41.3 MEMORY LOSS: ICD-10-CM

## 2025-04-28 ENCOUNTER — HOSPITAL ENCOUNTER (OUTPATIENT)
Dept: NEUROLOGY | Age: 65
Discharge: HOME | End: 2025-04-28
Attending: PSYCHIATRY & NEUROLOGY
Payer: COMMERCIAL

## 2025-04-28 DIAGNOSIS — R41.3 MEMORY LOSS: ICD-10-CM

## 2025-04-28 DIAGNOSIS — G25.0 ESSENTIAL TREMOR: ICD-10-CM

## 2025-04-28 PROCEDURE — 95816 EEG AWAKE AND DROWSY: CPT

## 2025-04-28 PROCEDURE — 95816 EEG AWAKE AND DROWSY: CPT | Performed by: NEUROMUSCULOSKELETAL MEDICINE & OMM

## 2025-04-28 NOTE — PROCEDURES
PROCEDURE NOTE  Date: 4/28/2025   Name: Matty Galeana Jr.  YOB: 1960    EEG    Date/Time: 4/28/2025 11:12 AM    Performed by: Lourdes Mcghee MD  Authorized by: Effie Longo MD      Duration: 27 Minutes      History:   64 y/o pt presented with possible seizure activity; tremors, memory loss.     Method:  This 20 channel  digital EEG was performed utilizing the international   10/20 of electrode placements of both referential and bipolar montages.       The background of the EEG is showing a normal alpha activity in the   frequency of 9 Hz and amplitude 20 µV.  There is no asymmetry, focal   slowing or epileptiform discharges seen throughout the study.      Pt was  awake/ mildly drowsy throughout recording. No sleep achieved  HV omitted due to age/condition.   Photic stimulation was performed at various flash frequencies and did not   evoke any significant posterior driving response.       ECG lead showing a regular rhythm.       Impression:      This is a normal EEG study during the awake and drowsy state.      No epileptiform discharges were seen. Please note that the absence of epileptiform discharges does not rule out a clinical diagnosis of epilepsy and clinical correlation is advised.      Lourdes Mcghee MD   Neurology    patient

## 2025-05-05 ENCOUNTER — HOSPITAL ENCOUNTER (OUTPATIENT)
Dept: MRI IMAGING | Age: 65
Discharge: HOME | End: 2025-05-05
Attending: PSYCHIATRY & NEUROLOGY
Payer: COMMERCIAL

## 2025-05-05 ENCOUNTER — HOSPITAL ENCOUNTER (OUTPATIENT)
Dept: GENERAL RADIOLOGY | Age: 65
Discharge: HOME | End: 2025-05-05
Attending: PSYCHIATRY & NEUROLOGY
Payer: COMMERCIAL

## 2025-05-05 DIAGNOSIS — R41.3 MEMORY LOSS: ICD-10-CM

## 2025-05-05 DIAGNOSIS — G25.0 ESSENTIAL TREMOR: ICD-10-CM

## 2025-05-05 PROCEDURE — 70551 MRI BRAIN STEM W/O DYE: CPT

## 2025-05-05 PROCEDURE — 71046 X-RAY EXAM CHEST 2 VIEWS: CPT

## 2025-05-12 ENCOUNTER — OFFICE VISIT (OUTPATIENT)
Dept: FAMILY MEDICINE CLINIC | Age: 65
End: 2025-05-12
Payer: COMMERCIAL

## 2025-05-12 VITALS
OXYGEN SATURATION: 98 % | WEIGHT: 202.8 LBS | HEART RATE: 63 BPM | HEIGHT: 71 IN | DIASTOLIC BLOOD PRESSURE: 78 MMHG | SYSTOLIC BLOOD PRESSURE: 132 MMHG | BODY MASS INDEX: 28.39 KG/M2

## 2025-05-12 DIAGNOSIS — K21.9 GASTROESOPHAGEAL REFLUX DISEASE WITHOUT ESOPHAGITIS: ICD-10-CM

## 2025-05-12 DIAGNOSIS — I42.2 HYPERTROPHIC CARDIOMYOPATHY (HCC): ICD-10-CM

## 2025-05-12 DIAGNOSIS — I48.91 ATRIAL FIBRILLATION, UNSPECIFIED TYPE (HCC): ICD-10-CM

## 2025-05-12 DIAGNOSIS — Z23 NEED FOR PNEUMOCOCCAL VACCINATION: ICD-10-CM

## 2025-05-12 DIAGNOSIS — Z95.811 PRESENCE OF HEART ASSIST DEVICE (HCC): ICD-10-CM

## 2025-05-12 DIAGNOSIS — Z79.01 CURRENT USE OF LONG TERM ANTICOAGULATION: ICD-10-CM

## 2025-05-12 DIAGNOSIS — R73.9 ELEVATED BLOOD SUGAR: ICD-10-CM

## 2025-05-12 DIAGNOSIS — E78.5 HYPERLIPIDEMIA, UNSPECIFIED HYPERLIPIDEMIA TYPE: Primary | ICD-10-CM

## 2025-05-12 DIAGNOSIS — I50.22 CHRONIC SYSTOLIC (CONGESTIVE) HEART FAILURE (HCC): ICD-10-CM

## 2025-05-12 LAB
ALBUMIN SERPL-MCNC: 4.2 G/DL (ref 3.4–5)
ALBUMIN/GLOB SERPL: 1.9 {RATIO} (ref 1.1–2.2)
ALP SERPL-CCNC: 71 U/L (ref 40–129)
ALT SERPL-CCNC: 60 U/L (ref 10–40)
ANION GAP SERPL CALCULATED.3IONS-SCNC: 9 MMOL/L (ref 3–16)
AST SERPL-CCNC: 28 U/L (ref 15–37)
BILIRUB SERPL-MCNC: 0.9 MG/DL (ref 0–1)
BUN SERPL-MCNC: 24 MG/DL (ref 7–20)
CALCIUM SERPL-MCNC: 8.9 MG/DL (ref 8.3–10.6)
CHLORIDE SERPL-SCNC: 108 MMOL/L (ref 99–110)
CHOLEST SERPL-MCNC: 184 MG/DL (ref 0–199)
CO2 SERPL-SCNC: 27 MMOL/L (ref 21–32)
CREAT SERPL-MCNC: 1.2 MG/DL (ref 0.8–1.3)
GFR SERPLBLD CREATININE-BSD FMLA CKD-EPI: 67 ML/MIN/{1.73_M2}
GLUCOSE SERPL-MCNC: 103 MG/DL (ref 70–99)
HDLC SERPL-MCNC: 50 MG/DL (ref 40–60)
LDLC SERPL CALC-MCNC: 122 MG/DL
POTASSIUM SERPL-SCNC: 4.2 MMOL/L (ref 3.5–5.1)
PROT SERPL-MCNC: 6.4 G/DL (ref 6.4–8.2)
SODIUM SERPL-SCNC: 144 MMOL/L (ref 136–145)
TRIGL SERPL-MCNC: 60 MG/DL (ref 0–150)
TSH SERPL DL<=0.005 MIU/L-ACNC: 6.23 UIU/ML (ref 0.27–4.2)
VLDLC SERPL CALC-MCNC: 12 MG/DL

## 2025-05-12 PROCEDURE — 99214 OFFICE O/P EST MOD 30 MIN: CPT | Performed by: FAMILY MEDICINE

## 2025-05-12 PROCEDURE — 90677 PCV20 VACCINE IM: CPT | Performed by: FAMILY MEDICINE

## 2025-05-12 PROCEDURE — 90471 IMMUNIZATION ADMIN: CPT | Performed by: FAMILY MEDICINE

## 2025-05-12 PROCEDURE — 1123F ACP DISCUSS/DSCN MKR DOCD: CPT | Performed by: FAMILY MEDICINE

## 2025-05-12 SDOH — ECONOMIC STABILITY: FOOD INSECURITY: WITHIN THE PAST 12 MONTHS, THE FOOD YOU BOUGHT JUST DIDN'T LAST AND YOU DIDN'T HAVE MONEY TO GET MORE.: NEVER TRUE

## 2025-05-12 SDOH — ECONOMIC STABILITY: FOOD INSECURITY: WITHIN THE PAST 12 MONTHS, YOU WORRIED THAT YOUR FOOD WOULD RUN OUT BEFORE YOU GOT MONEY TO BUY MORE.: NEVER TRUE

## 2025-05-12 ASSESSMENT — PATIENT HEALTH QUESTIONNAIRE - PHQ9
7. TROUBLE CONCENTRATING ON THINGS, SUCH AS READING THE NEWSPAPER OR WATCHING TELEVISION: NOT AT ALL
3. TROUBLE FALLING OR STAYING ASLEEP: NOT AT ALL
10. IF YOU CHECKED OFF ANY PROBLEMS, HOW DIFFICULT HAVE THESE PROBLEMS MADE IT FOR YOU TO DO YOUR WORK, TAKE CARE OF THINGS AT HOME, OR GET ALONG WITH OTHER PEOPLE: NOT DIFFICULT AT ALL
5. POOR APPETITE OR OVEREATING: NOT AT ALL
SUM OF ALL RESPONSES TO PHQ QUESTIONS 1-9: 0
4. FEELING TIRED OR HAVING LITTLE ENERGY: NOT AT ALL
1. LITTLE INTEREST OR PLEASURE IN DOING THINGS: NOT AT ALL
SUM OF ALL RESPONSES TO PHQ QUESTIONS 1-9: 0
8. MOVING OR SPEAKING SO SLOWLY THAT OTHER PEOPLE COULD HAVE NOTICED. OR THE OPPOSITE, BEING SO FIGETY OR RESTLESS THAT YOU HAVE BEEN MOVING AROUND A LOT MORE THAN USUAL: NOT AT ALL
SUM OF ALL RESPONSES TO PHQ QUESTIONS 1-9: 0
2. FEELING DOWN, DEPRESSED OR HOPELESS: NOT AT ALL
9. THOUGHTS THAT YOU WOULD BE BETTER OFF DEAD, OR OF HURTING YOURSELF: NOT AT ALL
6. FEELING BAD ABOUT YOURSELF - OR THAT YOU ARE A FAILURE OR HAVE LET YOURSELF OR YOUR FAMILY DOWN: NOT AT ALL
SUM OF ALL RESPONSES TO PHQ QUESTIONS 1-9: 0

## 2025-05-12 NOTE — PROGRESS NOTES
Matty Galeana Jr. presents for   Chief Complaint   Patient presents with    Hyperlipidemia     Pt states that he is here for a 6 month f/u, is fasting for bw         ASSESSMENT:   Diagnosis Orders   1. Hyperlipidemia, unspecified hyperlipidemia type, labs are pending we will see how Pravachol 20 mg is working Lipid Panel    Comprehensive Metabolic Panel      2. Elevated blood sugar, uncertain control, will reassess with A1c continue working on lifestyle efforts Hemoglobin A1C      3. Gastroesophageal reflux disease without esophagitis, controlled, continue Prilosec 20 mg daily       4. Presence of heart assist device (HCC), continue cardiology follow-up       5. Chronic systolic (congestive) heart failure, unchanged, continue cardiology follow-up       6. Current use of long term anticoagulation, controlled, continue Eliquis 5 mg twice daily ordered by cardiology       7. Hypertrophic cardiomyopathy (HCC), unchanged, continue cardiology follow-up       8. Atrial fibrillation, unspecified type (HCC), persistent, patient scheduled for ablation, continue Eliquis amiodarone and cardiology follow-up TSH reflex to FT4      9. Need for pneumococcal vaccination, discussed potential side effects that should complete his vaccine Pneumococcal, PCV20, PREVNAR 20, (age 6w+), IM, PF           Plan:  1)  Medication: continue current medication regimen unchanged, medications are working and tolerated, continue as listed  2)  Recheck in 6 months, sooner should new symptoms or problems arise.  3) LLR          SUBJECTIVE:  Matty Galeana Jr. is a 65 y.o. male who presents for evaluation of GERD, elevated blood sugar, CHF, chronic anticoagulation, permanent A-fib, hypertrophic cardiomyopathy, chronic congestive heart failure, presence of heart assist device, hypertension and hyperlipidemia. He indicates that he is feeling well and denies any symptoms referable to his elevated blood pressure.   Specifically denies chest pain, palpitations,

## 2025-05-13 ENCOUNTER — RESULTS FOLLOW-UP (OUTPATIENT)
Dept: FAMILY MEDICINE CLINIC | Age: 65
End: 2025-05-13

## 2025-05-13 LAB
EST. AVERAGE GLUCOSE BLD GHB EST-MCNC: 122.6 MG/DL
HBA1C MFR BLD: 5.9 %
T4 FREE SERPL-MCNC: 1.1 NG/DL (ref 0.9–1.8)

## 2025-05-21 ENCOUNTER — ANESTHESIA EVENT (OUTPATIENT)
Dept: CARDIAC CATH/INVASIVE PROCEDURES | Age: 65
End: 2025-05-21
Payer: COMMERCIAL

## 2025-05-22 ENCOUNTER — APPOINTMENT (OUTPATIENT)
Age: 65
End: 2025-05-22
Attending: INTERNAL MEDICINE
Payer: COMMERCIAL

## 2025-05-22 ENCOUNTER — HOSPITAL ENCOUNTER (OUTPATIENT)
Age: 65
Setting detail: OBSERVATION
Discharge: HOME | End: 2025-05-23
Attending: INTERNAL MEDICINE | Admitting: INTERNAL MEDICINE
Payer: COMMERCIAL

## 2025-05-22 ENCOUNTER — ANESTHESIA (OUTPATIENT)
Dept: CARDIAC CATH/INVASIVE PROCEDURES | Age: 65
End: 2025-05-22
Payer: COMMERCIAL

## 2025-05-22 DIAGNOSIS — I48.0 PAF (PAROXYSMAL ATRIAL FIBRILLATION) (HCC): ICD-10-CM

## 2025-05-22 DIAGNOSIS — I48.19 PERSISTENT ATRIAL FIBRILLATION (HCC): ICD-10-CM

## 2025-05-22 PROBLEM — I48.4 ATYPICAL ATRIAL FLUTTER (HCC): Status: ACTIVE | Noted: 2025-05-22

## 2025-05-22 LAB
ABO/RH: NORMAL
ANION GAP SERPL CALCULATED.3IONS-SCNC: 15 MMOL/L (ref 3–16)
ANTIBODY SCREEN: NORMAL
BUN SERPL-MCNC: 20 MG/DL (ref 7–20)
CALCIUM SERPL-MCNC: 9.1 MG/DL (ref 8.3–10.6)
CHLORIDE SERPL-SCNC: 105 MMOL/L (ref 99–110)
CO2 SERPL-SCNC: 26 MMOL/L (ref 21–32)
CREAT SERPL-MCNC: 1.2 MG/DL (ref 0.8–1.3)
DEPRECATED RDW RBC AUTO: 15.6 % (ref 12.4–15.4)
ECHO BSA: 2.12 M2
ECHO BSA: 2.12 M2
ECHO LV EDV A2C: 124 ML
ECHO LV EDV A4C: 181 ML
ECHO LV EDV INDEX A4C: 86 ML/M2
ECHO LV EDV NDEX A2C: 59 ML/M2
ECHO LV EJECTION FRACTION A2C: 39 %
ECHO LV EJECTION FRACTION A4C: 53 %
ECHO LV EJECTION FRACTION BIPLANE: 47 % (ref 55–100)
ECHO LV ESV A2C: 75 ML
ECHO LV ESV A4C: 85 ML
ECHO LV ESV INDEX A2C: 36 ML/M2
ECHO LV ESV INDEX A4C: 40 ML/M2
EKG ATRIAL RATE: 288 BPM
EKG ATRIAL RATE: 79 BPM
EKG DIAGNOSIS: NORMAL
EKG DIAGNOSIS: NORMAL
EKG P AXIS: 57 DEGREES
EKG P AXIS: 77 DEGREES
EKG P-R INTERVAL: 172 MS
EKG Q-T INTERVAL: 436 MS
EKG Q-T INTERVAL: 512 MS
EKG QRS DURATION: 138 MS
EKG QRS DURATION: 170 MS
EKG QTC CALCULATION (BAZETT): 499 MS
EKG QTC CALCULATION (BAZETT): 512 MS
EKG R AXIS: -36 DEGREES
EKG R AXIS: 31 DEGREES
EKG T AXIS: 112 DEGREES
EKG T AXIS: 154 DEGREES
EKG VENTRICULAR RATE: 60 BPM
EKG VENTRICULAR RATE: 79 BPM
GFR SERPLBLD CREATININE-BSD FMLA CKD-EPI: 67 ML/MIN/{1.73_M2}
GLUCOSE SERPL-MCNC: 97 MG/DL (ref 70–99)
HCT VFR BLD AUTO: 44.2 % (ref 40.5–52.5)
HGB BLD-MCNC: 15 G/DL (ref 13.5–17.5)
MCH RBC QN AUTO: 31.4 PG (ref 26–34)
MCHC RBC AUTO-ENTMCNC: 33.8 G/DL (ref 31–36)
MCV RBC AUTO: 92.7 FL (ref 80–100)
PLATELET # BLD AUTO: 177 K/UL (ref 135–450)
PMV BLD AUTO: 7.5 FL (ref 5–10.5)
POTASSIUM SERPL-SCNC: 3.8 MMOL/L (ref 3.5–5.1)
RBC # BLD AUTO: 4.77 M/UL (ref 4.2–5.9)
SODIUM SERPL-SCNC: 146 MMOL/L (ref 136–145)
WBC # BLD AUTO: 4.6 K/UL (ref 4–11)

## 2025-05-22 PROCEDURE — 7100000000 HC PACU RECOVERY - FIRST 15 MIN: Performed by: INTERNAL MEDICINE

## 2025-05-22 PROCEDURE — 93655 ICAR CATH ABLTJ DSCRT ARRHYT: CPT | Performed by: INTERNAL MEDICINE

## 2025-05-22 PROCEDURE — 3700000001 HC ADD 15 MINUTES (ANESTHESIA): Performed by: INTERNAL MEDICINE

## 2025-05-22 PROCEDURE — C1893 INTRO/SHEATH, FIXED,NON-PEEL: HCPCS | Performed by: INTERNAL MEDICINE

## 2025-05-22 PROCEDURE — 2500000003 HC RX 250 WO HCPCS: Performed by: INTERNAL MEDICINE

## 2025-05-22 PROCEDURE — 3700000000 HC ANESTHESIA ATTENDED CARE: Performed by: INTERNAL MEDICINE

## 2025-05-22 PROCEDURE — 86900 BLOOD TYPING SEROLOGIC ABO: CPT

## 2025-05-22 PROCEDURE — 93656 COMPRE EP EVAL ABLTJ ATR FIB: CPT | Performed by: INTERNAL MEDICINE

## 2025-05-22 PROCEDURE — 7100000011 HC PHASE II RECOVERY - ADDTL 15 MIN: Performed by: INTERNAL MEDICINE

## 2025-05-22 PROCEDURE — 86850 RBC ANTIBODY SCREEN: CPT

## 2025-05-22 PROCEDURE — 85027 COMPLETE CBC AUTOMATED: CPT

## 2025-05-22 PROCEDURE — 80048 BASIC METABOLIC PNL TOTAL CA: CPT

## 2025-05-22 PROCEDURE — G0378 HOSPITAL OBSERVATION PER HR: HCPCS

## 2025-05-22 PROCEDURE — 2500000003 HC RX 250 WO HCPCS

## 2025-05-22 PROCEDURE — C1766 INTRO/SHEATH,STRBLE,NON-PEEL: HCPCS | Performed by: INTERNAL MEDICINE

## 2025-05-22 PROCEDURE — C1730 CATH, EP, 19 OR FEW ELECT: HCPCS | Performed by: INTERNAL MEDICINE

## 2025-05-22 PROCEDURE — 2709999900 HC NON-CHARGEABLE SUPPLY: Performed by: INTERNAL MEDICINE

## 2025-05-22 PROCEDURE — C1769 GUIDE WIRE: HCPCS | Performed by: INTERNAL MEDICINE

## 2025-05-22 PROCEDURE — C1759 CATH, INTRA ECHOCARDIOGRAPHY: HCPCS | Performed by: INTERNAL MEDICINE

## 2025-05-22 PROCEDURE — 2720000010 HC SURG SUPPLY STERILE: Performed by: INTERNAL MEDICINE

## 2025-05-22 PROCEDURE — 7100000010 HC PHASE II RECOVERY - FIRST 15 MIN: Performed by: INTERNAL MEDICINE

## 2025-05-22 PROCEDURE — 6370000000 HC RX 637 (ALT 250 FOR IP): Performed by: NURSE PRACTITIONER

## 2025-05-22 PROCEDURE — 2580000003 HC RX 258: Performed by: INTERNAL MEDICINE

## 2025-05-22 PROCEDURE — 6360000002 HC RX W HCPCS: Performed by: INTERNAL MEDICINE

## 2025-05-22 PROCEDURE — C1894 INTRO/SHEATH, NON-LASER: HCPCS | Performed by: INTERNAL MEDICINE

## 2025-05-22 PROCEDURE — 6360000002 HC RX W HCPCS

## 2025-05-22 PROCEDURE — 93010 ELECTROCARDIOGRAM REPORT: CPT | Performed by: INTERNAL MEDICINE

## 2025-05-22 PROCEDURE — 93005 ELECTROCARDIOGRAM TRACING: CPT | Performed by: INTERNAL MEDICINE

## 2025-05-22 PROCEDURE — 93657 TX L/R ATRIAL FIB ADDL: CPT | Performed by: INTERNAL MEDICINE

## 2025-05-22 PROCEDURE — 93312 ECHO TRANSESOPHAGEAL: CPT

## 2025-05-22 PROCEDURE — 86901 BLOOD TYPING SEROLOGIC RH(D): CPT

## 2025-05-22 PROCEDURE — 7100000001 HC PACU RECOVERY - ADDTL 15 MIN: Performed by: INTERNAL MEDICINE

## 2025-05-22 PROCEDURE — 93623 PRGRMD STIMJ&PACG IV RX NFS: CPT | Performed by: INTERNAL MEDICINE

## 2025-05-22 PROCEDURE — 93005 ELECTROCARDIOGRAM TRACING: CPT

## 2025-05-22 PROCEDURE — C1760 CLOSURE DEV, VASC: HCPCS | Performed by: INTERNAL MEDICINE

## 2025-05-22 PROCEDURE — C1733 CATH, EP, OTHR THAN COOL-TIP: HCPCS | Performed by: INTERNAL MEDICINE

## 2025-05-22 RX ORDER — SODIUM CHLORIDE 9 MG/ML
INJECTION, SOLUTION INTRAVENOUS
Status: DISCONTINUED | OUTPATIENT
Start: 2025-05-22 | End: 2025-05-22

## 2025-05-22 RX ORDER — HEPARIN SODIUM 10000 [USP'U]/100ML
INJECTION, SOLUTION INTRAVENOUS CONTINUOUS PRN
Status: COMPLETED | OUTPATIENT
Start: 2025-05-22 | End: 2025-05-22

## 2025-05-22 RX ORDER — ISOPROTERENOL HYDROCHLORIDE 0.2 MG/ML
INJECTION, SOLUTION INTRAVENOUS PRN
Status: DISCONTINUED | OUTPATIENT
Start: 2025-05-22 | End: 2025-05-22 | Stop reason: HOSPADM

## 2025-05-22 RX ORDER — ONDANSETRON 2 MG/ML
4 INJECTION INTRAMUSCULAR; INTRAVENOUS EVERY 6 HOURS PRN
Status: DISCONTINUED | OUTPATIENT
Start: 2025-05-22 | End: 2025-05-22 | Stop reason: HOSPADM

## 2025-05-22 RX ORDER — SODIUM CHLORIDE 9 MG/ML
INJECTION, SOLUTION INTRAVENOUS PRN
Status: DISCONTINUED | OUTPATIENT
Start: 2025-05-22 | End: 2025-05-22 | Stop reason: HOSPADM

## 2025-05-22 RX ORDER — HEPARIN SODIUM 1000 [USP'U]/ML
INJECTION, SOLUTION INTRAVENOUS; SUBCUTANEOUS PRN
Status: DISCONTINUED | OUTPATIENT
Start: 2025-05-22 | End: 2025-05-22 | Stop reason: HOSPADM

## 2025-05-22 RX ORDER — TAMSULOSIN HYDROCHLORIDE 0.4 MG/1
0.4 CAPSULE ORAL NIGHTLY
Status: DISCONTINUED | OUTPATIENT
Start: 2025-05-22 | End: 2025-05-23 | Stop reason: HOSPADM

## 2025-05-22 RX ORDER — LABETALOL HYDROCHLORIDE 5 MG/ML
10 INJECTION, SOLUTION INTRAVENOUS
Status: DISCONTINUED | OUTPATIENT
Start: 2025-05-22 | End: 2025-05-22 | Stop reason: HOSPADM

## 2025-05-22 RX ORDER — SODIUM CHLORIDE 0.9 % (FLUSH) 0.9 %
5-40 SYRINGE (ML) INJECTION PRN
Status: DISCONTINUED | OUTPATIENT
Start: 2025-05-22 | End: 2025-05-22 | Stop reason: HOSPADM

## 2025-05-22 RX ORDER — ONDANSETRON 2 MG/ML
INJECTION INTRAMUSCULAR; INTRAVENOUS
Status: DISCONTINUED | OUTPATIENT
Start: 2025-05-22 | End: 2025-05-22 | Stop reason: SDUPTHER

## 2025-05-22 RX ORDER — OXYCODONE HYDROCHLORIDE 5 MG/1
5 TABLET ORAL PRN
Status: DISCONTINUED | OUTPATIENT
Start: 2025-05-22 | End: 2025-05-22 | Stop reason: HOSPADM

## 2025-05-22 RX ORDER — LORAZEPAM 0.5 MG/1
0.5 TABLET ORAL
Status: DISCONTINUED | OUTPATIENT
Start: 2025-05-22 | End: 2025-05-22 | Stop reason: HOSPADM

## 2025-05-22 RX ORDER — KETOROLAC TROMETHAMINE 30 MG/ML
15 INJECTION, SOLUTION INTRAMUSCULAR; INTRAVENOUS EVERY 6 HOURS PRN
Status: DISCONTINUED | OUTPATIENT
Start: 2025-05-22 | End: 2025-05-23 | Stop reason: HOSPADM

## 2025-05-22 RX ORDER — BUPIVACAINE HYDROCHLORIDE 5 MG/ML
INJECTION, SOLUTION EPIDURAL; INTRACAUDAL; PERINEURAL PRN
Status: DISCONTINUED | OUTPATIENT
Start: 2025-05-22 | End: 2025-05-22 | Stop reason: HOSPADM

## 2025-05-22 RX ORDER — EPHEDRINE SULFATE 50 MG/ML
INJECTION, SOLUTION INTRAVENOUS
Status: DISCONTINUED | OUTPATIENT
Start: 2025-05-22 | End: 2025-05-22 | Stop reason: SDUPTHER

## 2025-05-22 RX ORDER — SODIUM CHLORIDE 0.9 % (FLUSH) 0.9 %
5-40 SYRINGE (ML) INJECTION PRN
Status: DISCONTINUED | OUTPATIENT
Start: 2025-05-22 | End: 2025-05-23 | Stop reason: HOSPADM

## 2025-05-22 RX ORDER — VERAPAMIL HYDROCHLORIDE 240 MG/1
120 TABLET, FILM COATED, EXTENDED RELEASE ORAL NIGHTLY
Status: DISCONTINUED | OUTPATIENT
Start: 2025-05-22 | End: 2025-05-23 | Stop reason: HOSPADM

## 2025-05-22 RX ORDER — SODIUM CHLORIDE 0.9 % (FLUSH) 0.9 %
5-40 SYRINGE (ML) INJECTION EVERY 12 HOURS SCHEDULED
Status: DISCONTINUED | OUTPATIENT
Start: 2025-05-22 | End: 2025-05-22 | Stop reason: HOSPADM

## 2025-05-22 RX ORDER — ROCURONIUM BROMIDE 10 MG/ML
INJECTION, SOLUTION INTRAVENOUS
Status: DISCONTINUED | OUTPATIENT
Start: 2025-05-22 | End: 2025-05-22 | Stop reason: SDUPTHER

## 2025-05-22 RX ORDER — LIDOCAINE HYDROCHLORIDE 20 MG/ML
INJECTION, SOLUTION EPIDURAL; INFILTRATION; INTRACAUDAL; PERINEURAL PRN
Status: DISCONTINUED | OUTPATIENT
Start: 2025-05-22 | End: 2025-05-22 | Stop reason: HOSPADM

## 2025-05-22 RX ORDER — PRAVASTATIN SODIUM 20 MG
20 TABLET ORAL NIGHTLY
Status: DISCONTINUED | OUTPATIENT
Start: 2025-05-22 | End: 2025-05-23 | Stop reason: HOSPADM

## 2025-05-22 RX ORDER — PROPOFOL 10 MG/ML
INJECTION, EMULSION INTRAVENOUS
Status: DISCONTINUED | OUTPATIENT
Start: 2025-05-22 | End: 2025-05-22 | Stop reason: SDUPTHER

## 2025-05-22 RX ORDER — FENTANYL CITRATE 50 UG/ML
INJECTION, SOLUTION INTRAMUSCULAR; INTRAVENOUS
Status: DISCONTINUED | OUTPATIENT
Start: 2025-05-22 | End: 2025-05-22 | Stop reason: SDUPTHER

## 2025-05-22 RX ORDER — SODIUM CHLORIDE 0.9 % (FLUSH) 0.9 %
5-40 SYRINGE (ML) INJECTION EVERY 12 HOURS SCHEDULED
Status: DISCONTINUED | OUTPATIENT
Start: 2025-05-22 | End: 2025-05-23 | Stop reason: HOSPADM

## 2025-05-22 RX ORDER — SODIUM CHLORIDE 9 MG/ML
INJECTION, SOLUTION INTRAVENOUS PRN
Status: DISCONTINUED | OUTPATIENT
Start: 2025-05-22 | End: 2025-05-23 | Stop reason: HOSPADM

## 2025-05-22 RX ORDER — CEFAZOLIN SODIUM 1 G/3ML
INJECTION, POWDER, FOR SOLUTION INTRAMUSCULAR; INTRAVENOUS
Status: DISCONTINUED | OUTPATIENT
Start: 2025-05-22 | End: 2025-05-22 | Stop reason: SDUPTHER

## 2025-05-22 RX ORDER — DEXAMETHASONE SODIUM PHOSPHATE 4 MG/ML
INJECTION, SOLUTION INTRA-ARTICULAR; INTRALESIONAL; INTRAMUSCULAR; INTRAVENOUS; SOFT TISSUE
Status: DISCONTINUED | OUTPATIENT
Start: 2025-05-22 | End: 2025-05-22 | Stop reason: SDUPTHER

## 2025-05-22 RX ORDER — NALOXONE HYDROCHLORIDE 0.4 MG/ML
INJECTION, SOLUTION INTRAMUSCULAR; INTRAVENOUS; SUBCUTANEOUS PRN
Status: DISCONTINUED | OUTPATIENT
Start: 2025-05-22 | End: 2025-05-22 | Stop reason: HOSPADM

## 2025-05-22 RX ORDER — PHENYLEPHRINE HCL IN 0.9% NACL 1 MG/10 ML
SYRINGE (ML) INTRAVENOUS
Status: DISCONTINUED | OUTPATIENT
Start: 2025-05-22 | End: 2025-05-22 | Stop reason: SDUPTHER

## 2025-05-22 RX ORDER — ONDANSETRON 2 MG/ML
4 INJECTION INTRAMUSCULAR; INTRAVENOUS EVERY 30 MIN PRN
Status: DISCONTINUED | OUTPATIENT
Start: 2025-05-22 | End: 2025-05-22 | Stop reason: HOSPADM

## 2025-05-22 RX ORDER — MAGNESIUM GLYCINATE 100 MG
CAPSULE ORAL
COMMUNITY

## 2025-05-22 RX ORDER — OXYCODONE HYDROCHLORIDE 5 MG/1
10 TABLET ORAL PRN
Status: DISCONTINUED | OUTPATIENT
Start: 2025-05-22 | End: 2025-05-22 | Stop reason: HOSPADM

## 2025-05-22 RX ADMIN — ROCURONIUM BROMIDE 20 MG: 50 INJECTION, SOLUTION INTRAVENOUS at 11:06

## 2025-05-22 RX ADMIN — Medication 10 ML: at 20:04

## 2025-05-22 RX ADMIN — ROCURONIUM BROMIDE 20 MG: 50 INJECTION, SOLUTION INTRAVENOUS at 10:21

## 2025-05-22 RX ADMIN — Medication 150 MCG: at 12:01

## 2025-05-22 RX ADMIN — VERAPAMIL HYDROCHLORIDE 120 MG: 240 TABLET, FILM COATED, EXTENDED RELEASE ORAL at 23:22

## 2025-05-22 RX ADMIN — Medication 100 MCG: at 12:29

## 2025-05-22 RX ADMIN — TAMSULOSIN HYDROCHLORIDE 0.4 MG: 0.4 CAPSULE ORAL at 23:22

## 2025-05-22 RX ADMIN — EPHEDRINE SULFATE 5 MG: 50 INJECTION, SOLUTION INTRAVENOUS at 09:16

## 2025-05-22 RX ADMIN — ROCURONIUM BROMIDE 10 MG: 50 INJECTION, SOLUTION INTRAVENOUS at 12:04

## 2025-05-22 RX ADMIN — SODIUM CHLORIDE: 9 INJECTION, SOLUTION INTRAVENOUS at 12:13

## 2025-05-22 RX ADMIN — SODIUM CHLORIDE: 9 INJECTION, SOLUTION INTRAVENOUS at 08:21

## 2025-05-22 RX ADMIN — GABAPENTIN 500 MG: 400 CAPSULE ORAL at 23:22

## 2025-05-22 RX ADMIN — PROPOFOL 100 MG: 10 INJECTION, EMULSION INTRAVENOUS at 08:25

## 2025-05-22 RX ADMIN — ROCURONIUM BROMIDE 10 MG: 50 INJECTION, SOLUTION INTRAVENOUS at 10:27

## 2025-05-22 RX ADMIN — PRAVASTATIN SODIUM 20 MG: 20 TABLET ORAL at 23:22

## 2025-05-22 RX ADMIN — SUGAMMADEX 200 MG: 100 INJECTION, SOLUTION INTRAVENOUS at 12:35

## 2025-05-22 RX ADMIN — Medication 100 MCG: at 12:37

## 2025-05-22 RX ADMIN — ROCURONIUM BROMIDE 30 MG: 50 INJECTION, SOLUTION INTRAVENOUS at 09:21

## 2025-05-22 RX ADMIN — PROPOFOL 30 MG: 10 INJECTION, EMULSION INTRAVENOUS at 08:28

## 2025-05-22 RX ADMIN — DEXAMETHASONE SODIUM PHOSPHATE 8 MG: 4 INJECTION, SOLUTION INTRAMUSCULAR; INTRAVENOUS at 08:36

## 2025-05-22 RX ADMIN — Medication 100 MCG: at 09:37

## 2025-05-22 RX ADMIN — KETOROLAC TROMETHAMINE 15 MG: 30 INJECTION, SOLUTION INTRAMUSCULAR at 20:03

## 2025-05-22 RX ADMIN — Medication 20 MG: at 08:25

## 2025-05-22 RX ADMIN — EPHEDRINE SULFATE 5 MG: 50 INJECTION, SOLUTION INTRAVENOUS at 09:21

## 2025-05-22 RX ADMIN — EPHEDRINE SULFATE 10 MG: 50 INJECTION, SOLUTION INTRAVENOUS at 09:31

## 2025-05-22 RX ADMIN — EPHEDRINE SULFATE 5 MG: 50 INJECTION, SOLUTION INTRAVENOUS at 10:33

## 2025-05-22 RX ADMIN — PROPOFOL 20 MG: 10 INJECTION, EMULSION INTRAVENOUS at 08:47

## 2025-05-22 RX ADMIN — Medication 100 MCG: at 09:20

## 2025-05-22 RX ADMIN — Medication 100 MCG: at 10:33

## 2025-05-22 RX ADMIN — ONDANSETRON 4 MG: 2 INJECTION INTRAMUSCULAR; INTRAVENOUS at 12:27

## 2025-05-22 RX ADMIN — FENTANYL CITRATE 25 MCG: 50 INJECTION, SOLUTION INTRAMUSCULAR; INTRAVENOUS at 08:34

## 2025-05-22 RX ADMIN — EPHEDRINE SULFATE 5 MG: 50 INJECTION, SOLUTION INTRAVENOUS at 09:13

## 2025-05-22 RX ADMIN — ROCURONIUM BROMIDE 50 MG: 50 INJECTION, SOLUTION INTRAVENOUS at 08:28

## 2025-05-22 RX ADMIN — FENTANYL CITRATE 50 MCG: 50 INJECTION, SOLUTION INTRAMUSCULAR; INTRAVENOUS at 10:03

## 2025-05-22 RX ADMIN — EPHEDRINE SULFATE 10 MG: 50 INJECTION, SOLUTION INTRAVENOUS at 11:31

## 2025-05-22 RX ADMIN — Medication 150 MCG: at 12:17

## 2025-05-22 RX ADMIN — FENTANYL CITRATE 25 MCG: 50 INJECTION, SOLUTION INTRAMUSCULAR; INTRAVENOUS at 11:44

## 2025-05-22 RX ADMIN — KETOROLAC TROMETHAMINE 15 MG: 30 INJECTION, SOLUTION INTRAMUSCULAR at 14:12

## 2025-05-22 RX ADMIN — EPHEDRINE SULFATE 10 MG: 50 INJECTION, SOLUTION INTRAVENOUS at 10:03

## 2025-05-22 RX ADMIN — PROPOFOL 20 MG: 10 INJECTION, EMULSION INTRAVENOUS at 12:27

## 2025-05-22 RX ADMIN — ROCURONIUM BROMIDE 20 MG: 50 INJECTION, SOLUTION INTRAVENOUS at 10:03

## 2025-05-22 RX ADMIN — CEFAZOLIN 2 G: 1 INJECTION, POWDER, FOR SOLUTION INTRAMUSCULAR; INTRAVENOUS at 08:55

## 2025-05-22 ASSESSMENT — PAIN SCALES - WONG BAKER: WONGBAKER_NUMERICALRESPONSE: NO HURT

## 2025-05-22 ASSESSMENT — PAIN DESCRIPTION - ORIENTATION
ORIENTATION: LOWER
ORIENTATION: LOWER;MID

## 2025-05-22 ASSESSMENT — PAIN DESCRIPTION - LOCATION
LOCATION: BACK
LOCATION: BACK

## 2025-05-22 ASSESSMENT — PAIN SCALES - GENERAL
PAINLEVEL_OUTOF10: 8
PAINLEVEL_OUTOF10: 7

## 2025-05-22 ASSESSMENT — ENCOUNTER SYMPTOMS: SHORTNESS OF BREATH: 0

## 2025-05-22 ASSESSMENT — PAIN DESCRIPTION - DESCRIPTORS: DESCRIPTORS: ACHING

## 2025-05-22 NOTE — PROGRESS NOTES
Vitals:    05/22/25 1808   BP: 125/70   Pulse: 80   Resp: 18   Temp:    SpO2: 96%        Pt complaints of numbness in right thigh. VSS> Right groin remains soft and dry. Assessed groin with second RN. All distal pulses palpable and audible with doppler.

## 2025-05-22 NOTE — PROGRESS NOTES
Vitals:    05/22/25 1500   BP: 111/81   Pulse: 78   Resp: 18   Temp: 97.9 °F (36.6 °C)   SpO2: 96%        Pt arrived from Cath Lab. Bedrest completed at 1445. Pt ambulated to the bathroom with minimal assist. Once back to bed groin site noted to have oozing. Dressing changed. Oozing appeared to have stopped. Groin remains soft with figure 8 suture in place. New dry dressing applied. Pt denies pain. Instructed pt to remain in bed for the next 30 minutes. Spouse at bedside.    VSS. [facility] aware of transfer.

## 2025-05-22 NOTE — DISCHARGE INSTRUCTIONS
Cath Labs at  [facility]   Electrophysiology Study and/or Ablation discharge instructions   5/22/2025  Matty Galeana Jr.   Date of Birth 1960       Activity   Rest for one to two days after your procedure.  If the catheter was put in your groin, avoid using stairs for a few days.  When you must use stairs, step up with the leg that was not used for the ablation/ EP Study.  Straighten this leg to move the other leg up to the next step without putting stress on it.    Bathing and catheter site care  Keep the catheter site clean and dry for the next 24 hours.    You may shower after 24 hours  Wait one week before taking a tub bath, swimming or soaking in water.   If the site begins to bleed have someone hold pressure.  If the bleeding does not stop go to the nearest hospital or clinic.  Do not walk and do not drive yourself.  If you are bleeding dial 911.     Diet   You may eat your usual diet.     Contact a Caregiver if:   You have a fever  The catheter site become red or has drainage.    You have increasing pain at the catheter site.  (it is normal to have some soreness, but this should get better, not worse)  You have questions or concerns about your procedure, medicines or gerri condition.   If you recieved    Seek Care Immediately if:   If you have any of the following it is an emergency- Call 911  Your leg or arm becomes numb, painful or changes color   The bruise at the catheter site starts to get bigger or the area has new swelling.  The catheter site is bleeding   You have signs of a stroke.  Having new weakness or trouble moving one side of your face or body may be signs of a stroke.  Other signs include new trouble thinking or speaking clearly, and new changes in vision          Sedation Discharge Instructions:  For the next 24 hours do not drive a car, operate machinery, power tools or kitchen appliances.  Do not drink alcohol; including beer or wine.  Do not make any important decisions or sign any

## 2025-05-22 NOTE — ANESTHESIA POSTPROCEDURE EVALUATION
Department of Anesthesiology  Postprocedure Note    Patient: Matty Galeana Jr.  MRN: [id number]  YOB: 1960  Date of evaluation: 5/22/2025    Procedure Summary       Date: 05/22/25 Room / Location: [facility] CATH/EP LAB  3 / [facility] CARDIAC CATH LAB    Anesthesia Start: 0816 Anesthesia Stop: 1254    Procedure: Ablation A-fib w complete ep study Diagnosis:       PAF (paroxysmal atrial fibrillation) (HCC)      (PAF (paroxysmal atrial fibrillation) (HCC) [I48.0])    Providers: Sunny Marshall MD Responsible Provider: Yohannes Ramos MD    Anesthesia Type: general ASA Status: 3            Anesthesia Type: No value filed.    Ruth Phase I: Ruth Score: 10    Ruth Phase II:      Anesthesia Post Evaluation    Patient location during evaluation: PACU  Level of consciousness: awake  Airway patency: patent  Nausea & Vomiting: no vomiting  Cardiovascular status: blood pressure returned to baseline  Respiratory status: acceptable  Hydration status: stable  Pain management: adequate    No notable events documented.

## 2025-05-22 NOTE — PLAN OF CARE
Problem: Pain  Goal: Verbalizes/displays adequate comfort level or baseline comfort level  Outcome: Progressing   Continuing to assess pt's pain level and administer PRN medications as appropriate.    Problem: Safety - Adult  Goal: Free from fall injury  Outcome: Progressing

## 2025-05-22 NOTE — PROGRESS NOTES
Bedside handoff completed with cath lab RNNatali. Pt placed on continuous cardiac and O2 bedside monitor. VSS, cycling. R Groin site checked with Cath Lab nurse. Site intact, no bleeding, swelling or hematoma noted, no change. Transparent occlusive dressing in place, C/D/I. Femoral pulse palpated. Pedal pulses palpable.

## 2025-05-22 NOTE — PROGRESS NOTES
Patient arrived to PACU bay 4, phase one initiated. Placed on continuous cardiac and O2 bedside monitor. VSS, cycling. Report obtained from cath lab RN and anesthesia.  Patient on RA. R Groin site checked with Cath Lab nurse. Site intact, no bleeding, swelling or hematoma noted. Transparent occlusive dressing in place, C/D/I. Femoral pulse palpated. Pedal pulses palpable. Warm blankets applied, patient normothermic. All standard safety measures in place.

## 2025-05-22 NOTE — ACP (ADVANCE CARE PLANNING)
Advance Care Planning     Advance Care Planning Inpatient Note  [facility] Department    Today's Date: 5/22/2025  Unit: [facility] A2 CARD TELEMETRY    Received request from patient.  Upon review of chart and communication with care team, patient's decision making abilities are not in question.. Patient was/were present in the room during visit.    Goals of ACP Conversation:  Discuss advance care planning documents    Health Care Decision Makers:       Primary Decision Maker: Arelis Galeana - Spouse - [phone]  Summary:  Verified Healthcare Decision Maker    Advance Care Planning Documents (Patient Wishes):  None     Assessment:  Patient was unsure if he had ACP documents. He will check with his wife and request another consult if needed.      Interventions:  Provided education on documents for clarity and greater understanding  Discussed and provided education on state decision maker hierarchy    Care Preferences Communicated:   No    Outcomes/Plan:  ACP Discussion: Postponed    Electronically signed by Chaplain Bri on 5/22/2025 at 4:56 PM

## 2025-05-22 NOTE — H&P
Cardiac Electrophysiology H&P Note      Assessment:     1. Atrial fibrillation: patient had Persistent atrial fibrillation prior to ablation.      Associated symptoms: Fatigue and Palpitations      History of cardioversion: Had electrical cardioversion in the past (multiple)  History of AF ablation: Had atrial fibrillation ablation in the past (December 2022, April 2023 and July 2023)  History of heart surgery/procedure: yes, cardioversion and ablation     Current use of anti-arrhythmic drugs: amiodarone  Previous use of anti-arrhythmic drugs: amiodarone and dofetilide     Overall LV function: Normal left ventricular systolic function  Size of left atrium: Severely dilated LA size  Significant cardiac valvular disease: No significant valve disease  Family history of atrial fibrillation: Unknown     Alcohol consumption: Mild alcohol intake  Caffeine consumption: Mild caffeine intake  Smoking status: non-smoker  Obstructive sleep apnea: No/low suspicion  Exercise status: Occasional exercise, not routine     Patient has hypertrophic cardiomyopathy. Stroke risk is elevated  Longterm anticoagulation is: recommended  Current anticoagulation: Apixaban  Bleeding issues reported: no  Renal function: Normal     In setting of HCM, and development of persistent atrial fibrillation, being quite symptomatic and having failed multiple anti-arrhythmic drug therapy options (including dofetilide), it became clear that the option of AF ablation should be considered. After discussions with patient, we proceeded with attempted AF ablation in early December 2022. Procedure was complicated by pericardial effusion requiring emergent pericardiocentesis. We only managed ablation of the cavo-tricuspid isthmus during that procedure. Patient came back for a second attempt on 12/28/2022. We performed pulmonary vein isolation with roof line and posterior wall ablation. There were no acute complications.      Subsequently, the patient's ILR  is now low normal (previously normal) possibly due to persistent atrial arrhythmia.      We again had a discussion about options of repeat left atrial ablation versus continued monitoring versus AV node ablation and upgrade of current pacemaker.     PFA further ablation of posterior wall and mitral isthmus may help better control his arrhythmia.   Risks, benefits, alternatives and description of procedure discussed with patient in details. He is agreeable to proceed.      Will continue low dose amiodarone for now to cover the initial post procedure time period (blanking period) and then likely stop it.      Of note, he also feels more short of breath when having significant RV pacing. Currently modest RV pacing burden (24%) with 0% atrial pacing.      2. Reasonable blood pressure control today.     3. Hypertrophic, non-obstructive cardiomyopathy: prior cardiac MRI indicating \"late gadolinium enhancement imaging demonstrates areas of heterogeneous hyperenhancement predominantly throughout the basal and mid anterior and septal myocardial segments\". Warrants close monitoring. No personal history of syncope. No noted sustained ventricular arrhythmias.     Plan:     1. Proceed with atypical atrial flutter ablation  2. Plan to admit patient post procedrue    Subjective:     History of Present Illness:    Patient is a 65 y.o. male with past medical history significant for paroxysmal atrial fibrillation and atypical/typical atrial flutter presenting for planned AFL ablation. No new complaints.    Problem List:  Patient Active Problem List   Diagnosis    GERD (gastroesophageal reflux disease)    Hyperlipidemia    Hypertrophic cardiomyopathy (HCC)    PVC (premature ventricular contraction)    RBBB    Restless legs syndrome    Obstructive sleep apnea    Personal history of malignant neoplasm of prostate    SBO (small bowel obstruction) ([facility])    Elevated blood sugar    PAF (paroxysmal atrial fibrillation) ([facility])    Current use

## 2025-05-22 NOTE — PROGRESS NOTES
Report given to patients nurse Engel. Pt transferred to room 219. CMU called and monitor is on and verified.

## 2025-05-22 NOTE — ANESTHESIA POSTPROCEDURE EVALUATION
Department of Anesthesiology  Postprocedure Note    Patient: Matty Galeana Jr.  MRN: [id number]  YOB: 1960  Date of evaluation: 5/22/2025    Procedure Summary       Date: 05/22/25 Room / Location: [facility] CATH/EP LAB  3 / [facility] CARDIAC CATH LAB    Anesthesia Start: 0816 Anesthesia Stop: 1254    Procedure: Ablation A-fib w complete ep study Diagnosis:       PAF (paroxysmal atrial fibrillation) (HCC)      (PAF (paroxysmal atrial fibrillation) (HCC) [I48.0])    Providers: Sunny Marshall MD Responsible Provider: Yohannes Ramos MD    Anesthesia Type: general ASA Status: 3            Anesthesia Type: No value filed.    Ruth Phase I: Ruth Score: 10    Ruth Phase II:      Anesthesia Post Evaluation    Patient location during evaluation: PACU  Level of consciousness: awake  Airway patency: patent  Nausea & Vomiting: no vomiting  Cardiovascular status: blood pressure returned to baseline  Respiratory status: acceptable  Hydration status: stable  Pain management: adequate    No notable events documented.

## 2025-05-22 NOTE — PROGRESS NOTES
Pt complaints of new numbness in his right inner thigh. Call placed to on call cardiologist. Spoke with Doctor Palomares. No new orders at this time. Right groin dry and soft. Pulses remain palpable.

## 2025-05-22 NOTE — PROGRESS NOTES
05/22/25 1652   Encounter Summary   Encounter Overview/Reason Advance Care Planning   Service Provided For Patient   Referral/Consult From Nurse   Support System Spouse   Last Encounter  05/22/25  (Patient unsure if he has ACP documents. He will check with wife and request another consult if needed. DS)   Complexity of Encounter Low   Begin Time 1650   End Time  1655   Total Time Calculated 5 min   Advance Care Planning   Type ACP conversation   Assessment/Intervention/Outcome   Assessment Peaceful   Intervention Active listening   Outcome Connection/Belonging;Expressed Gratitude

## 2025-05-22 NOTE — ANESTHESIA PRE PROCEDURE
Department of Anesthesiology  Preprocedure Note       Name:  Matty Galeana Jr.   Age:  65 y.o.  :  1960                                          MRN:  [id number]         Date:  2025      Surgeon: Surgeon(s):  Sunny Marshall MD    Procedure: Procedure(s):  Ablation A-fib w complete ep study    Medications prior to admission:   Prior to Admission medications    Medication Sig Start Date End Date Taking? Authorizing Provider   gabapentin (NEURONTIN) 400 MG capsule Take 1 capsule by mouth nightly for 180 days.  Patient taking differently: Take 500 mg by mouth nightly. 4/7/25 10/4/25  Jabier Henderson MD   baclofen (LIORESAL) 10 MG tablet Take 1 tablet by mouth 3 times daily as needed (muscle spasms) 25  Jw Sánchez MD   pravastatin (PRAVACHOL) 20 MG tablet TAKE 1 TABLET BY MOUTH EVERY DAY IN THE EVENING 3/27/25   Jewels Sharma MD   omeprazole (PRILOSEC) 20 MG delayed release capsule TAKE 1 CAPSULE BY MOUTH EVERY DAY 3/27/25   Jewels Sharma MD   verapamil (CALAN SR) 120 MG extended release tablet Take 1 tablet by mouth daily 25   Sunny Marshall MD   amiodarone (PACERONE) 100 MG tablet Take 1 tablet by mouth daily 24   Sunny Marshall MD   apixaban (ELIQUIS) 5 MG TABS tablet TAKE 1 TABLET BY MOUTH TWICE A DAY 12/10/24   Sunny Marshall MD   ferrous sulfate (IRON 325) 325 (65 Fe) MG tablet TAKE 1 TABLET BY MOUTH THREE TIMES A WEEK BEST WITH VITAMIN C. AVOID TAKING WITH CALCIUM. 24   RadhauYmiko PA-C   tadalafil (CIALIS) 20 MG tablet Take 1 tablet by mouth as needed for Erectile Dysfunction    Debbi Maciel MD   b complex vitamins capsule Take 1 capsule by mouth daily    Debbi Maciel MD   tamsulosin (FLOMAX) 0.4 MG capsule Take 1 capsule by mouth daily    ProviderDebbi MD       Current medications:    Current Facility-Administered Medications   Medication Dose Route Frequency Provider Last Rate Last Admin   • sodium chloride flush 0.9 %

## 2025-05-23 VITALS
WEIGHT: 198 LBS | BODY MASS INDEX: 27.72 KG/M2 | TEMPERATURE: 98.1 F | RESPIRATION RATE: 16 BRPM | HEIGHT: 71 IN | SYSTOLIC BLOOD PRESSURE: 117 MMHG | OXYGEN SATURATION: 98 % | HEART RATE: 62 BPM | DIASTOLIC BLOOD PRESSURE: 74 MMHG

## 2025-05-23 LAB
ANION GAP SERPL CALCULATED.3IONS-SCNC: 9 MMOL/L (ref 3–16)
BUN SERPL-MCNC: 21 MG/DL (ref 7–20)
CALCIUM SERPL-MCNC: 8.9 MG/DL (ref 8.3–10.6)
CHLORIDE SERPL-SCNC: 107 MMOL/L (ref 99–110)
CO2 SERPL-SCNC: 26 MMOL/L (ref 21–32)
CREAT SERPL-MCNC: 1.1 MG/DL (ref 0.8–1.3)
DEPRECATED RDW RBC AUTO: 15.5 % (ref 12.4–15.4)
EKG ATRIAL RATE: 65 BPM
EKG DIAGNOSIS: NORMAL
EKG P AXIS: 86 DEGREES
EKG P-R INTERVAL: 134 MS
EKG Q-T INTERVAL: 508 MS
EKG QRS DURATION: 160 MS
EKG QTC CALCULATION (BAZETT): 528 MS
EKG R AXIS: 50 DEGREES
EKG T AXIS: 134 DEGREES
EKG VENTRICULAR RATE: 65 BPM
GFR SERPLBLD CREATININE-BSD FMLA CKD-EPI: 74 ML/MIN/{1.73_M2}
GLUCOSE SERPL-MCNC: 139 MG/DL (ref 70–99)
HCT VFR BLD AUTO: 39.9 % (ref 40.5–52.5)
HGB BLD-MCNC: 13.5 G/DL (ref 13.5–17.5)
MCH RBC QN AUTO: 31.6 PG (ref 26–34)
MCHC RBC AUTO-ENTMCNC: 33.8 G/DL (ref 31–36)
MCV RBC AUTO: 93.3 FL (ref 80–100)
PLATELET # BLD AUTO: 161 K/UL (ref 135–450)
PMV BLD AUTO: 8.2 FL (ref 5–10.5)
POTASSIUM SERPL-SCNC: 4.3 MMOL/L (ref 3.5–5.1)
RBC # BLD AUTO: 4.28 M/UL (ref 4.2–5.9)
SODIUM SERPL-SCNC: 142 MMOL/L (ref 136–145)
WBC # BLD AUTO: 6.6 K/UL (ref 4–11)

## 2025-05-23 PROCEDURE — G0378 HOSPITAL OBSERVATION PER HR: HCPCS

## 2025-05-23 PROCEDURE — 99239 HOSP IP/OBS DSCHRG MGMT >30: CPT | Performed by: NURSE PRACTITIONER

## 2025-05-23 PROCEDURE — 80048 BASIC METABOLIC PNL TOTAL CA: CPT

## 2025-05-23 PROCEDURE — 93005 ELECTROCARDIOGRAM TRACING: CPT | Performed by: INTERNAL MEDICINE

## 2025-05-23 PROCEDURE — 85027 COMPLETE CBC AUTOMATED: CPT

## 2025-05-23 PROCEDURE — 36415 COLL VENOUS BLD VENIPUNCTURE: CPT

## 2025-05-23 PROCEDURE — 6370000000 HC RX 637 (ALT 250 FOR IP): Performed by: NURSE PRACTITIONER

## 2025-05-23 PROCEDURE — 93005 ELECTROCARDIOGRAM TRACING: CPT

## 2025-05-23 PROCEDURE — 2500000003 HC RX 250 WO HCPCS: Performed by: INTERNAL MEDICINE

## 2025-05-23 RX ADMIN — Medication 10 ML: at 08:49

## 2025-05-23 RX ADMIN — APIXABAN 5 MG: 5 TABLET, FILM COATED ORAL at 08:49

## 2025-05-23 NOTE — CARE COORDINATION
Discharge order noted.   Spoke with RN who states patient is independent at home and has no needs from CM.   Patient has insurance and a PCP.

## 2025-05-23 NOTE — PLAN OF CARE
Problem: Discharge Planning  Goal: Discharge to home or other facility with appropriate resources  Outcome: Adequate for Discharge     Problem: Pain  Goal: Verbalizes/displays adequate comfort level or baseline comfort level  Outcome: Adequate for Discharge     Problem: Chronic Conditions and Co-morbidities  Goal: Patient's chronic conditions and co-morbidity symptoms are monitored and maintained or improved  Outcome: Adequate for Discharge     Problem: Safety - Adult  Goal: Free from fall injury  Outcome: Adequate for Discharge     Problem: ABCDS Injury Assessment  Goal: Absence of physical injury  Outcome: Adequate for Discharge

## 2025-05-23 NOTE — DISCHARGE SUMMARY
Patient ID:  Matty Galeana Jr.  [id number]  65 y.o.  1960    Admit date: 5/22/2025    Discharge date and time: 5/23/2025    Admitting Physician: Sunny Marshall MD     Discharge NP: PARI Zheng-CNP    Admission Diagnoses: PAF (paroxysmal atrial fibrillation) ([facility]) [I48.0]  Atypical atrial flutter ([facility]) [I48.4]    Discharge Diagnoses: SAME    Admission Condition: fair    Discharged Condition: good    Hospital Course: Matty Galeana Jr. was admitted on 5/22/2025 and had a PFA for the treatment of atrial fibrillation and ablation of atypical atrial flutter x 2. Rhythm has been sinus. He complains of some numbness to right anterior thigh although this is improving.Denies chest pain, palpitations, shortness of breath, and dizziness. Has eaten, ambulated, and voided.     Assessment:   Paroxysmal (formerly persistent) atrial fibrillation: stable              -[id number] score 1 (age)              -s/p RFCA of AF 12/27/2022   -s/p PFA 5/22/2025              -amiodarone started 10/2017 (stopped 5/2022 due to age), but resumed given ongoing arrhythmias              -Tikosyn stopped due to ineffectiveness               -s/p unsuccessful DCCV 11/3/2022  Presumed typical atrial flutter: stable              -s/p EPS and RFCA of CTI dependent AFL 12/5/2022   -s/p RFCA of AFL x 2 5/2025  Sinus node dysfunction: ongoing   -s/p dual-chamber pacemaker implantation 7/2023  Pericardial effusion: resolved               -s/p emergent pericardiocentesis 12/5/2022 (noted post transseptal puncture and procedure was aborted)   RBBB  PVC's: stable   NSVT: stable               -noted on event monitor 7/2020  HLD  S/P loop recorder implant   HOCM              -per cardiac MRI 2018 without LVOT obstruction   Chronic pain   Sleep apnea       Plan:   Continue Eliquis and verapamil  Continue low-dose amiodarone until follow-up  Post procedure instructions reviewed  Follow up in office on 9/3/2025      Discharge Exam:  BP (!) 121/92   Pulse

## 2025-05-23 NOTE — PROGRESS NOTES
Call placed to on call cardiology in order to get pt's nighttime home medications ordered. Spoke with Tony Cloud CNP, per NP home meds ordered (see eMAR.) Also mentioned to CNP that pt continues to c/o numbness in the top and inside of his right thigh, site clean, dry & intact and pulses palpable. No new orders at this time.

## 2025-05-26 ENCOUNTER — HOSPITAL ENCOUNTER (EMERGENCY)
Age: 65
Discharge: HOME | End: 2025-05-26
Attending: EMERGENCY MEDICINE
Payer: COMMERCIAL

## 2025-05-26 ENCOUNTER — ANCILLARY PROCEDURE (OUTPATIENT)
Dept: EMERGENCY DEPT | Age: 65
End: 2025-05-26
Attending: EMERGENCY MEDICINE
Payer: COMMERCIAL

## 2025-05-26 ENCOUNTER — OFFICE VISIT (OUTPATIENT)
Age: 65
End: 2025-05-26

## 2025-05-26 ENCOUNTER — APPOINTMENT (OUTPATIENT)
Dept: CT IMAGING | Age: 65
End: 2025-05-26
Payer: COMMERCIAL

## 2025-05-26 VITALS
OXYGEN SATURATION: 97 % | HEART RATE: 58 BPM | DIASTOLIC BLOOD PRESSURE: 72 MMHG | TEMPERATURE: 97 F | SYSTOLIC BLOOD PRESSURE: 116 MMHG

## 2025-05-26 VITALS
BODY MASS INDEX: 28.01 KG/M2 | TEMPERATURE: 98.3 F | RESPIRATION RATE: 18 BRPM | HEART RATE: 61 BPM | SYSTOLIC BLOOD PRESSURE: 135 MMHG | WEIGHT: 200.8 LBS | DIASTOLIC BLOOD PRESSURE: 94 MMHG | OXYGEN SATURATION: 97 %

## 2025-05-26 DIAGNOSIS — R58 POSTOPERATIVE ECCHYMOSIS: Primary | ICD-10-CM

## 2025-05-26 DIAGNOSIS — I97.630 POSTOPERATIVE HEMATOMA INVOLVING CIRCULATORY SYSTEM FOLLOWING CARDIAC CATHETERIZATION: Primary | ICD-10-CM

## 2025-05-26 DIAGNOSIS — S30.1XXA HEMATOMA OF RIGHT INGUINAL REGION: ICD-10-CM

## 2025-05-26 LAB
ANION GAP SERPL CALCULATED.3IONS-SCNC: 10 MMOL/L (ref 3–16)
BASOPHILS # BLD: 0 K/UL (ref 0–0.2)
BASOPHILS NFR BLD: 0.6 %
BUN SERPL-MCNC: 15 MG/DL (ref 7–20)
CALCIUM SERPL-MCNC: 9.2 MG/DL (ref 8.3–10.6)
CHLORIDE SERPL-SCNC: 104 MMOL/L (ref 99–110)
CO2 SERPL-SCNC: 27 MMOL/L (ref 21–32)
CREAT SERPL-MCNC: 1 MG/DL (ref 0.8–1.3)
DEPRECATED RDW RBC AUTO: 15.2 % (ref 12.4–15.4)
EOSINOPHIL # BLD: 0.1 K/UL (ref 0–0.6)
EOSINOPHIL NFR BLD: 2.4 %
GFR SERPLBLD CREATININE-BSD FMLA CKD-EPI: 83 ML/MIN/{1.73_M2}
GLUCOSE SERPL-MCNC: 93 MG/DL (ref 70–99)
HCT VFR BLD AUTO: 41.2 % (ref 40.5–52.5)
HGB BLD-MCNC: 13.9 G/DL (ref 13.5–17.5)
LYMPHOCYTES # BLD: 0.6 K/UL (ref 1–5.1)
LYMPHOCYTES NFR BLD: 12 %
MCH RBC QN AUTO: 31.4 PG (ref 26–34)
MCHC RBC AUTO-ENTMCNC: 33.7 G/DL (ref 31–36)
MCV RBC AUTO: 93.1 FL (ref 80–100)
MONOCYTES # BLD: 0.4 K/UL (ref 0–1.3)
MONOCYTES NFR BLD: 8 %
NEUTROPHILS # BLD: 3.7 K/UL (ref 1.7–7.7)
NEUTROPHILS NFR BLD: 77 %
PLATELET # BLD AUTO: 161 K/UL (ref 135–450)
PMV BLD AUTO: 8 FL (ref 5–10.5)
POTASSIUM SERPL-SCNC: 4.1 MMOL/L (ref 3.5–5.1)
RBC # BLD AUTO: 4.43 M/UL (ref 4.2–5.9)
SODIUM SERPL-SCNC: 141 MMOL/L (ref 136–145)
WBC # BLD AUTO: 4.9 K/UL (ref 4–11)

## 2025-05-26 PROCEDURE — 85025 COMPLETE CBC W/AUTO DIFF WBC: CPT

## 2025-05-26 PROCEDURE — 80048 BASIC METABOLIC PNL TOTAL CA: CPT

## 2025-05-26 PROCEDURE — 74177 CT ABD & PELVIS W/CONTRAST: CPT

## 2025-05-26 PROCEDURE — 6360000004 HC RX CONTRAST MEDICATION

## 2025-05-26 PROCEDURE — 99285 EMERGENCY DEPT VISIT HI MDM: CPT

## 2025-05-26 PROCEDURE — 76882 US LMTD JT/FCL EVL NVASC XTR: CPT

## 2025-05-26 RX ORDER — IOPAMIDOL 755 MG/ML
75 INJECTION, SOLUTION INTRAVASCULAR
Status: COMPLETED | OUTPATIENT
Start: 2025-05-26 | End: 2025-05-26

## 2025-05-26 RX ADMIN — IOPAMIDOL 75 ML: 755 INJECTION, SOLUTION INTRAVENOUS at 12:44

## 2025-05-26 ASSESSMENT — PAIN DESCRIPTION - PAIN TYPE: TYPE: ACUTE PAIN

## 2025-05-26 ASSESSMENT — ENCOUNTER SYMPTOMS: COLOR CHANGE: 1

## 2025-05-26 ASSESSMENT — PAIN - FUNCTIONAL ASSESSMENT: PAIN_FUNCTIONAL_ASSESSMENT: 0-10

## 2025-05-26 ASSESSMENT — PAIN SCALES - GENERAL: PAINLEVEL_OUTOF10: 4

## 2025-05-26 ASSESSMENT — PAIN DESCRIPTION - LOCATION: LOCATION: GROIN

## 2025-05-26 NOTE — ED NOTES
@1311 called Cardiology per Irineo Barth Jr. PA  RE:  post op ecchymosis  @1318 Dr. Cohn called back and spoke with  Irineo Barth Jr., PA

## 2025-05-26 NOTE — PROGRESS NOTES
Matty Galeana Jr. (: 1960) is a 65 y.o. male, New patient, here for evaluation of the following chief complaint(s):  Bleeding/Bruising (Pt states Thursday had cardiac ablasion done and incision in groin and states has lots of bruising and swelling that he was told to get checked out) and Care Coordination          ASSESSMENT/PLAN:    ICD-10-CM    1. Postoperative hematoma involving circulatory system following cardiac catheterization  I97.630       2. Hematoma of right inguinal region  S30.1XXA           Due to patient presentation of bruising, tenderness recent cardiac catheterization ablation with exam findings of bruising, tenderness and 3 cm hematoma at the access site right groin, decision to escalate care to the ER. Report called to Dr Best at [facility].     The patient was advised to go to the emergency department. They require a higher level of care due to suspected post op complication. The patient is agreeable to this plan. They were offered EMS transport and declined. They were allowed to self transport as they are awake and alert, with normal vitals, non-intoxicated, non-suicidal, and nonpsychotic. There is no medical condition that prevents or inhibits their understanding of the risks/benefits of their decision. They are capable of medical decision making. The patient verbalized understanding of the risks and benefits of their decision including morbidity, and ultimately mortality. They have been given the opportunity to ask questions about their medical condition. Rubina ARCINIEGA  was a witness to the above conversation.      SUBJECTIVE/OBJECTIVE:  HPI:   65 y.o. male presents for complaint of pain and bruising right groin right thigh following a ablation with right groin access on the     Admits symptoms include pain swelling and bruising.  Patient is on Eliquis  Denies loss of sensation weakness    Nothing makes symptoms better.  Time makes symptoms worse.    Has attempted nothing for

## 2025-05-26 NOTE — ED PROVIDER NOTES
[facility] EMERGENCY DEPARTMENT     EMERGENCY DEPARTMENT ENCOUNTER     Location: [facility] EMERGENCY DEPARTMENT  5/26/2025  Note Started: 1:25 PM EDT 5/26/25      Patient Identification  Matty Galeana Jr. is a 65 y.o. male      HPI:Matyt Galeana Jr. was evaluated in the Emergency Department for right groin pain.  Patient had cardiac ablation performed by  early this week.  He has noted increasing swelling of the right groin with bruising/discomfort.  Patient is on oral anticoagulation.  No additional complaints.  Although initial history and physical exam information was obtained by RANJAN/NPP/MD/DO (who also dictated a record of this visit), I personally saw the patient and performed and made/approved the management plan and take responsibility for the patient management.      PHYSICAL EXAM:  General: No acute distress.  Head: Rosac/atraumatic.  Heart: Regular rate rhythm.  Normal S1-S2.  Lungs were clear to auscultation bilaterally.  Open: Soft.  Nontender.  Nondistended.  Right groin: Noted ecchymosis without pulsatile mass.  Normal femoral pulse noted.          POC US EXTREMITY NON VASC LIMITED  Result Date: 5/26/2025  POCUS_Soft_Tissue     Exam Information:          Exam type:  Clinically indicated     Indication(s) for Exam:          The exam was performed with the following indications::  Soft tissue pain, Soft tissue swelling     Views obtained/location & Images Saved for These Views:          Multiple sonographic views were obtained in the following specific location:  R groin     Interpretation:          Normal limited soft tissue ultrasound         Other::  No evidence of abscess or pseudoaneurysm     Confirmatory study:          What confirmatory study was done?:  CT  Electronically signed by Gonzalo Best on Monday, May 26, 2025 at 3:08 PM : Attending:       Patient seen and evaluated.  Relevant records reviewed.  MDM      Patient presents to the emergency department with right 
(CIALIS) 20 MG tablet Take 1 tablet by mouth as needed for Erectile DysfunctionHistorical Med      b complex vitamins capsule Take 1 capsule by mouth dailyHistorical Med      tamsulosin (FLOMAX) 0.4 MG capsule Take 1 capsule by mouth dailyHistorical Med             ALLERGIES     Niacin and related    FAMILYHISTORY       Family History   Problem Relation Age of Onset    Cancer Mother         melenoma    High Cholesterol Mother     High Blood Pressure Father     Prostate Cancer Father     High Cholesterol Brother     Heart Disease Maternal Grandmother     Heart Disease Maternal Grandfather     Prostate Cancer Paternal Uncle         SOCIAL HISTORY       Social History     Tobacco Use    Smoking status: Never     Passive exposure: Never    Smokeless tobacco: Never   Vaping Use    Vaping status: Never Used   Substance Use Topics    Alcohol use: Yes     Alcohol/week: 1.0 - 2.0 standard drink of alcohol     Types: 1 Cans of beer per week     Comment: occasionally    Drug use: No       SCREENINGS        Hudson Coma Scale  Eye Opening: Spontaneous  Best Verbal Response: Oriented  Best Motor Response: Obeys commands  Hudson Coma Scale Score: 15                CIWA Assessment  BP: (!) 135/94  Pulse: 61           PHYSICAL EXAM  1 or more Elements     ED Triage Vitals [05/26/25 1127]   BP Systolic BP Percentile Diastolic BP Percentile Temp Temp Source Pulse Respirations SpO2   137/87 -- -- 98.3 °F (36.8 °C) Oral 60 16 98 %      Height Weight - Scale         -- 91.1 kg (200 lb 12.8 oz)             Physical Exam  Constitutional:       General: He is not in acute distress.     Appearance: Normal appearance. He is not ill-appearing.   HENT:      Head: Normocephalic and atraumatic.   Cardiovascular:      Rate and Rhythm: Normal rate.   Pulmonary:      Effort: Pulmonary effort is normal.   Skin:     Findings: Bruising present. No erythema.      Comments: Moderate amount of ecchymosis in the right groin extending into the medial

## 2025-05-26 NOTE — PATIENT INSTRUCTIONS
Due to your recent cardiac catheterization ablation procedure,presentation of bruising, tenderness and hematoma at the access site right groin, you will require further evaluation at the ER  Please go directly to the ER as instructed. Do not eat or drink until you have been evaluated     Matty    Thank you for trusting [facility] [place] with your care. Your decision to come to us means a lot and we are honored to be part of your healthcare journey. We value your trust and hope your experience with us was positive and met your expectations.    We're always looking for ways to improve, and your feedback is incredibly important to us. You will receive a text or email soon asking you how your visit went. for If you could take a moment to share your thoughts, it would mean the world to us. Your input helps us better serve you and others in the community.     Thank you again for choosing us. We're grateful for the opportunity to care for you and your loved ones. We hope to see you again - though we always wish you health and wellness!    Warm regards,    [facility]    Rubina Day MA, Catherine QUINTERO, and Mati Dove MA

## 2025-05-27 LAB — ECHO BSA: 2.12 M2

## 2025-06-06 ENCOUNTER — TRANSCRIBE ORDERS (OUTPATIENT)
Dept: ADMINISTRATIVE | Age: 65
End: 2025-06-06

## 2025-06-06 DIAGNOSIS — M25.511 RIGHT SHOULDER PAIN, UNSPECIFIED CHRONICITY: Primary | ICD-10-CM

## 2025-06-06 LAB
POC ACT LR: 144 SEC
POC ACT LR: 157 SEC
POC ACT LR: 274 SEC
POC ACT LR: 334 SEC
POC ACT LR: 336 SEC
POC ACT LR: 347 SEC
POC ACT LR: 383 SEC
POC ACT LR: >400 SEC

## 2025-06-23 RX ORDER — AMIODARONE HYDROCHLORIDE 100 MG/1
100 TABLET ORAL DAILY
Qty: 90 TABLET | Refills: 1 | Status: SHIPPED | OUTPATIENT
Start: 2025-06-23

## 2025-06-23 NOTE — TELEPHONE ENCOUNTER
Last Office Visit: 4/3/2025 Provider: WILBUR  **Is provider OOT? No    Next Office Visit: 9/3/2025 Provider: WILBUR    **Has patient already had 30 day supply? No    Lab orders needed? no   Encounter provider correct? Yes If not, change provider  Script changes since last refill? no    LAST LABS:   BMP:  Lab Results   Component Value Date/Time     05/26/2025 12:03 PM    K 4.1 05/26/2025 12:03 PM    K 3.9 07/21/2023 09:03 AM     05/26/2025 12:03 PM    CO2 27 05/26/2025 12:03 PM    BUN 15 05/26/2025 12:03 PM    CREATININE 1.0 05/26/2025 12:03 PM    GLUCOSE 93 05/26/2025 12:03 PM    GLUCOSE 83 05/22/2012 04:40 PM    CALCIUM 9.2 05/26/2025 12:03 PM    LABGLOM 83 05/26/2025 12:03 PM    LABGLOM >90 04/26/2024 08:23 AM      CBC:  Lab Results   Component Value Date    WBC 4.9 05/26/2025    HGB 13.9 05/26/2025    HCT 41.2 05/26/2025    MCV 93.1 05/26/2025     05/26/2025    LYMPHOPCT 12.0 05/26/2025    RBC 4.43 05/26/2025    MCH 31.4 05/26/2025    MCHC 33.7 05/26/2025    RDW 15.2 05/26/2025           EKG:  Encounter Date: 05/22/25   EKG 12 Lead   Result Value    Ventricular Rate 65    Atrial Rate 65    P-R Interval 134    QRS Duration 160    Q-T Interval 508    QTc Calculation (Bazett) 528    P Axis 86    R Axis 50    T Axis 134    Diagnosis      Normal sinus rhythmRight bundle branch blockT wave abnormality, consider lateral ischemiaAbnormal ECGWhen compared with ECG of 22-MAY-2025 14:42,No significant change was foundConfirmed by ANDI LEWIS ([id number]) on 5/23/2025 8:35:33 AM     TSH:  Lab Results   Component Value Date    TSH 1.40 10/24/2023    TSHFT4 6.23 (H) 05/12/2025      Xray Result (most recent):  XR CHEST (2 VW) 05/05/2025    Narrative  XR CHEST (2 VW)    TECHNIQUE: PA and lateral views    INDICATION: Essential tremor; Other amnesia    COMPARISONS: 7/23/2023    FINDINGS:  Bipolar pacer. Loop recorder overlies the left lower chest.  The heart is enlarged but stable. Mediastinal and hilar contours are

## 2025-06-30 ENCOUNTER — HOSPITAL ENCOUNTER (OUTPATIENT)
Dept: MRI IMAGING | Age: 65
Discharge: HOME | End: 2025-06-30
Payer: COMMERCIAL

## 2025-06-30 VITALS — HEART RATE: 70 BPM | DIASTOLIC BLOOD PRESSURE: 68 MMHG | OXYGEN SATURATION: 95 % | SYSTOLIC BLOOD PRESSURE: 130 MMHG

## 2025-06-30 DIAGNOSIS — M25.511 RIGHT SHOULDER PAIN, UNSPECIFIED CHRONICITY: ICD-10-CM

## 2025-06-30 PROCEDURE — 73221 MRI JOINT UPR EXTREM W/O DYE: CPT

## 2025-08-01 ENCOUNTER — TELEPHONE (OUTPATIENT)
Dept: CARDIOLOGY CLINIC | Age: 65
End: 2025-08-01

## 2025-08-01 NOTE — TELEPHONE ENCOUNTER
Please help me generate a letter.  Patient at low risk for MACE during a low risk procedure.  No further cardiac testing indicated.  Ok to hold OAC however while off OAC risk for CVA increases however risk benefits favor holding (would suggest holding 2 days prior and two days post procedure).  Please make sure patient aware.

## 2025-08-01 NOTE — TELEPHONE ENCOUNTER
Keeley stated that their phones shut off at 4pm and knows this is short notice but they had a cancellation and the pt is wanting to get in asap. Keeley stated that is clearance isnt going to be approved today, to let her know so that they can contact another pt for Mondays apt. Thank you    CARDIAC CLEARANCE REQUEST    What type of procedure are you having:  Injection for pain management   Are you taking any blood thinners:  Eliquis, stop for 3 days  Type on anesthesia:    When is your procedure scheduled for:  8.4.25  What physician is performing your procedure:  Jw Sánchez  Phone Number:  [phone]  Fax number to send the letter:   [phone]    LOV 4.3.25  NOV 9.3.25

## 2025-08-30 ENCOUNTER — APPOINTMENT (OUTPATIENT)
Dept: CT IMAGING | Age: 65
End: 2025-08-30
Payer: COMMERCIAL

## 2025-08-30 ENCOUNTER — HOSPITAL ENCOUNTER (EMERGENCY)
Age: 65
Discharge: HOME | End: 2025-08-30
Attending: EMERGENCY MEDICINE
Payer: COMMERCIAL

## 2025-08-30 VITALS
TEMPERATURE: 98.7 F | WEIGHT: 188.4 LBS | SYSTOLIC BLOOD PRESSURE: 134 MMHG | OXYGEN SATURATION: 94 % | RESPIRATION RATE: 11 BRPM | DIASTOLIC BLOOD PRESSURE: 81 MMHG | HEART RATE: 60 BPM | HEIGHT: 71 IN | BODY MASS INDEX: 26.38 KG/M2

## 2025-08-30 DIAGNOSIS — R51.9 ACUTE NONINTRACTABLE HEADACHE, UNSPECIFIED HEADACHE TYPE: Primary | ICD-10-CM

## 2025-08-30 DIAGNOSIS — N28.9 ACUTE RENAL INSUFFICIENCY: ICD-10-CM

## 2025-08-30 LAB
ALBUMIN SERPL-MCNC: 4.2 G/DL (ref 3.4–5)
ALBUMIN/GLOB SERPL: 1.8 {RATIO} (ref 1.1–2.2)
ALP SERPL-CCNC: 62 U/L (ref 40–129)
ALT SERPL-CCNC: 26 U/L (ref 10–40)
ANION GAP SERPL CALCULATED.3IONS-SCNC: 11 MMOL/L (ref 3–16)
AST SERPL-CCNC: 21 U/L (ref 15–37)
BASOPHILS # BLD: 0 K/UL (ref 0–0.2)
BASOPHILS NFR BLD: 0.5 %
BILIRUB SERPL-MCNC: 0.6 MG/DL (ref 0–1)
BILIRUB UR QL STRIP.AUTO: NEGATIVE
BUN SERPL-MCNC: 20 MG/DL (ref 7–20)
CALCIUM SERPL-MCNC: 9.6 MG/DL (ref 8.3–10.6)
CHLORIDE SERPL-SCNC: 105 MMOL/L (ref 99–110)
CLARITY UR: CLEAR
CO2 SERPL-SCNC: 27 MMOL/L (ref 21–32)
COLOR UR: YELLOW
CREAT SERPL-MCNC: 1.5 MG/DL (ref 0.8–1.3)
DEPRECATED RDW RBC AUTO: 14.8 % (ref 12.4–15.4)
EOSINOPHIL # BLD: 0 K/UL (ref 0–0.6)
EOSINOPHIL NFR BLD: 0.4 %
GFR SERPLBLD CREATININE-BSD FMLA CKD-EPI: 51 ML/MIN/{1.73_M2}
GLUCOSE SERPL-MCNC: 105 MG/DL (ref 70–99)
GLUCOSE UR STRIP.AUTO-MCNC: NEGATIVE MG/DL
HCT VFR BLD AUTO: 45.7 % (ref 40.5–52.5)
HGB BLD-MCNC: 14.7 G/DL (ref 13.5–17.5)
HGB UR QL STRIP.AUTO: NEGATIVE
KETONES UR STRIP.AUTO-MCNC: ABNORMAL MG/DL
LEUKOCYTE ESTERASE UR QL STRIP.AUTO: NEGATIVE
LYMPHOCYTES # BLD: 0.6 K/UL (ref 1–5.1)
LYMPHOCYTES NFR BLD: 10.7 %
MCH RBC QN AUTO: 29.8 PG (ref 26–34)
MCHC RBC AUTO-ENTMCNC: 32.1 G/DL (ref 31–36)
MCV RBC AUTO: 93 FL (ref 80–100)
MONOCYTES # BLD: 0.4 K/UL (ref 0–1.3)
MONOCYTES NFR BLD: 6.2 %
NEUTROPHILS # BLD: 4.7 K/UL (ref 1.7–7.7)
NEUTROPHILS NFR BLD: 82.2 %
NITRITE UR QL STRIP.AUTO: NEGATIVE
PH UR STRIP.AUTO: 6 [PH] (ref 5–8)
PLATELET # BLD AUTO: 234 K/UL (ref 135–450)
PMV BLD AUTO: 7.3 FL (ref 5–10.5)
POTASSIUM SERPL-SCNC: 3.6 MMOL/L (ref 3.5–5.1)
PROT SERPL-MCNC: 6.5 G/DL (ref 6.4–8.2)
PROT UR STRIP.AUTO-MCNC: NEGATIVE MG/DL
RBC # BLD AUTO: 4.92 M/UL (ref 4.2–5.9)
SODIUM SERPL-SCNC: 143 MMOL/L (ref 136–145)
SP GR UR STRIP.AUTO: 1.02 (ref 1–1.03)
UA COMPLETE W REFLEX CULTURE PNL UR: ABNORMAL
UA DIPSTICK W REFLEX MICRO PNL UR: ABNORMAL
URN SPEC COLLECT METH UR: ABNORMAL
UROBILINOGEN UR STRIP-ACNC: 0.2 E.U./DL
WBC # BLD AUTO: 5.7 K/UL (ref 4–11)

## 2025-08-30 PROCEDURE — 96374 THER/PROPH/DIAG INJ IV PUSH: CPT

## 2025-08-30 PROCEDURE — 81003 URINALYSIS AUTO W/O SCOPE: CPT

## 2025-08-30 PROCEDURE — 70450 CT HEAD/BRAIN W/O DYE: CPT

## 2025-08-30 PROCEDURE — 96375 TX/PRO/DX INJ NEW DRUG ADDON: CPT

## 2025-08-30 PROCEDURE — 6360000002 HC RX W HCPCS: Performed by: EMERGENCY MEDICINE

## 2025-08-30 PROCEDURE — 36415 COLL VENOUS BLD VENIPUNCTURE: CPT

## 2025-08-30 PROCEDURE — 80053 COMPREHEN METABOLIC PANEL: CPT

## 2025-08-30 PROCEDURE — 96361 HYDRATE IV INFUSION ADD-ON: CPT

## 2025-08-30 PROCEDURE — 2580000003 HC RX 258: Performed by: EMERGENCY MEDICINE

## 2025-08-30 PROCEDURE — 99284 EMERGENCY DEPT VISIT MOD MDM: CPT

## 2025-08-30 PROCEDURE — 85025 COMPLETE CBC W/AUTO DIFF WBC: CPT

## 2025-08-30 RX ORDER — DIPHENHYDRAMINE HYDROCHLORIDE 50 MG/ML
12.5 INJECTION, SOLUTION INTRAMUSCULAR; INTRAVENOUS ONCE
Status: COMPLETED | OUTPATIENT
Start: 2025-08-30 | End: 2025-08-30

## 2025-08-30 RX ORDER — PROCHLORPERAZINE EDISYLATE 5 MG/ML
10 INJECTION INTRAMUSCULAR; INTRAVENOUS ONCE
Status: COMPLETED | OUTPATIENT
Start: 2025-08-30 | End: 2025-08-30

## 2025-08-30 RX ORDER — 0.9 % SODIUM CHLORIDE 0.9 %
500 INTRAVENOUS SOLUTION INTRAVENOUS ONCE
Status: COMPLETED | OUTPATIENT
Start: 2025-08-30 | End: 2025-08-30

## 2025-08-30 RX ADMIN — PROCHLORPERAZINE EDISYLATE 10 MG: 5 INJECTION INTRAMUSCULAR; INTRAVENOUS at 15:03

## 2025-08-30 RX ADMIN — SODIUM CHLORIDE 500 ML: 0.9 INJECTION, SOLUTION INTRAVENOUS at 15:01

## 2025-08-30 RX ADMIN — DIPHENHYDRAMINE HYDROCHLORIDE 12.5 MG: 50 INJECTION INTRAMUSCULAR; INTRAVENOUS at 15:03

## 2025-08-30 ASSESSMENT — PAIN SCALES - GENERAL: PAINLEVEL_OUTOF10: 6

## 2025-08-30 ASSESSMENT — PAIN DESCRIPTION - LOCATION: LOCATION: HEAD

## 2025-08-30 ASSESSMENT — PAIN - FUNCTIONAL ASSESSMENT: PAIN_FUNCTIONAL_ASSESSMENT: 0-10

## 2025-09-02 ASSESSMENT — ENCOUNTER SYMPTOMS
WHEEZING: 0
LEFT EYE: 0
RIGHT EYE: 0
SHORTNESS OF BREATH: 0
HEMATEMESIS: 0
HEMATOCHEZIA: 0
STRIDOR: 0

## 2025-09-03 ENCOUNTER — CLINICAL SUPPORT (OUTPATIENT)
Dept: CARDIOLOGY CLINIC | Age: 65
End: 2025-09-03

## 2025-09-03 ENCOUNTER — OFFICE VISIT (OUTPATIENT)
Dept: CARDIOLOGY CLINIC | Age: 65
End: 2025-09-03
Payer: COMMERCIAL

## 2025-09-03 VITALS
HEART RATE: 60 BPM | SYSTOLIC BLOOD PRESSURE: 126 MMHG | BODY MASS INDEX: 27.1 KG/M2 | HEIGHT: 71 IN | WEIGHT: 193.56 LBS | OXYGEN SATURATION: 96 % | DIASTOLIC BLOOD PRESSURE: 80 MMHG

## 2025-09-03 DIAGNOSIS — I49.5 SINUS NODE DYSFUNCTION (HCC): ICD-10-CM

## 2025-09-03 DIAGNOSIS — I49.3 PVC (PREMATURE VENTRICULAR CONTRACTION): ICD-10-CM

## 2025-09-03 DIAGNOSIS — Z95.0 PACEMAKER: Primary | ICD-10-CM

## 2025-09-03 DIAGNOSIS — I42.2 HYPERTROPHIC CARDIOMYOPATHY (HCC): ICD-10-CM

## 2025-09-03 DIAGNOSIS — I48.0 PAF (PAROXYSMAL ATRIAL FIBRILLATION) (HCC): Primary | ICD-10-CM

## 2025-09-03 DIAGNOSIS — Z95.0 PACEMAKER: ICD-10-CM

## 2025-09-03 DIAGNOSIS — R00.1 SYMPTOMATIC BRADYCARDIA: ICD-10-CM

## 2025-09-03 DIAGNOSIS — I48.3 TYPICAL ATRIAL FLUTTER (HCC): ICD-10-CM

## 2025-09-03 DIAGNOSIS — I45.10 RBBB: ICD-10-CM

## 2025-09-03 LAB
EKG ATRIAL RATE: 300 BPM
EKG ATRIAL RATE: 60 BPM
EKG DIAGNOSIS: NORMAL
EKG DIAGNOSIS: NORMAL
EKG P AXIS: 18 DEGREES
EKG P-R INTERVAL: 176 MS
EKG Q-T INTERVAL: 406 MS
EKG Q-T INTERVAL: 406 MS
EKG QRS DURATION: 128 MS
EKG QRS DURATION: 142 MS
EKG QTC CALCULATION (BAZETT): 406 MS
EKG QTC CALCULATION (BAZETT): 444 MS
EKG R AXIS: -3 DEGREES
EKG R AXIS: 13 DEGREES
EKG T AXIS: -5 DEGREES
EKG T AXIS: 222 DEGREES
EKG VENTRICULAR RATE: 60 BPM
EKG VENTRICULAR RATE: 72 BPM

## 2025-09-03 PROCEDURE — 1123F ACP DISCUSS/DSCN MKR DOCD: CPT | Performed by: INTERNAL MEDICINE

## 2025-09-03 PROCEDURE — G2211 COMPLEX E/M VISIT ADD ON: HCPCS | Performed by: INTERNAL MEDICINE

## 2025-09-03 PROCEDURE — 99214 OFFICE O/P EST MOD 30 MIN: CPT | Performed by: INTERNAL MEDICINE

## 2025-09-03 RX ORDER — AMIODARONE HYDROCHLORIDE 100 MG/1
100 TABLET ORAL SEE ADMIN INSTRUCTIONS
Qty: 90 TABLET | Refills: 1
Start: 2025-09-03

## (undated) DEVICE — AMBIENT SUPER MULTIVAC 50 WITH                                    INTEGRATED FINGER SWITCHES IFS: Brand: COBLATION

## (undated) DEVICE — SET GRAV VENT NVENT CK VLV 3 NDL FREE PRT 10 GTT

## (undated) DEVICE — PINNACLE INTRODUCER SHEATH: Brand: PINNACLE

## (undated) DEVICE — 4.5 MM INCISOR PLUS STRAIGHT                                    BLADES, POWER/EP-1, VIOLET, PACKAGED                                    6 PER BOX, STERILE

## (undated) DEVICE — GLOVE SURG SZ 85 L12IN FNGR THK94MIL STD WHT LTX FREE

## (undated) DEVICE — ZIMMER® STERILE DISPOSABLE TOURNIQUET CUFF WITH PLC, DUAL PORT, SINGLE BLADDER, 30 IN. (76 CM)

## (undated) DEVICE — PACK PROCEDURE SURG ARTHSCP CUST

## (undated) DEVICE — Device

## (undated) DEVICE — ACCESS SOLUTION: Brand: VERSACROSS™ ACCESS SOLUTION

## (undated) DEVICE — DRAPE 54X23IN MAYO STAND COVER REINF

## (undated) DEVICE — SHEATH CATH L 65.5 CM DIA10 FR SZ 13 MM STEER STRL DISP

## (undated) DEVICE — GLOVE SURG SZ 75 L12IN FNGR THK94MIL STD WHT LTX FREE

## (undated) DEVICE — TUBE IRRIG L8IN LNG PT W/ CONN FOR PMP SYS REDEUCE

## (undated) DEVICE — SUTURE MCRYL SZ 4-0 L18IN ABSRB UD L19MM PS-2 3/8 CIR PRIM Y496G

## (undated) DEVICE — ABDOMINAL PAD: Brand: DERMACEA

## (undated) DEVICE — SET ADMIN PRIMING 7ML L30IN 7.35LB 20 GTT 2ND RLER CLMP

## (undated) DEVICE — AIRLIFE™ NASAL OXYGEN CANNULA CURVED, FLARED TIP, WITH 7 FEET (2.1 M) CRUSH RESISTANT TUBING, OVER-THE-EAR STYLE: Brand: AIRLIFE™

## (undated) DEVICE — PADDING CAST W6INXL4YD ST COT RAYON MICROPLEATED HIGHLY

## (undated) DEVICE — GLOVE SURG SZ 8 L12IN FNGR THK94MIL STD WHT LTX FREE

## (undated) DEVICE — FORCEPS BX L240CM DIA2.4MM L NDL RAD JAW 4 133340

## (undated) DEVICE — GAUZE,SPONGE,4"X4",16PLY,STRL,LF,10/TRAY: Brand: MEDLINE

## (undated) DEVICE — 3M™ STERI-STRIP™ COMPOUND BENZOIN TINCTURE 40 BAGS/CARTON 4 CARTONS/CASE C1544: Brand: 3M™ STERI-STRIP™

## (undated) DEVICE — 4-PORT MANIFOLD: Brand: NEPTUNE 2

## (undated) DEVICE — 3.5 MM INCISOR STRAIGHT BLADES,                                    POWER/EP-1, MEDIUM GRAY, PACKAGED 6                                    PER BOX, STERILE

## (undated) DEVICE — GLOVE,SURG,SENSICARE,ALOE,LF,PF,7: Brand: MEDLINE

## (undated) DEVICE — PADDING CAST W4INXL4YD NONSTERILE COT RAYON MICROPLEATED

## (undated) DEVICE — 3M™ TEGADERM™ TRANSPARENT FILM DRESSING FRAME STYLE, 1624W, 2-3/8 IN X 2-3/4 IN (6 CM X 7 CM), 100/CT 4CT/CASE: Brand: 3M™ TEGADERM™

## (undated) DEVICE — SYSTEM CLOSURE 6-12 FR VEN VASC VASCADE MVP

## (undated) DEVICE — BANDAGE COMPR W6INXL5YD HI E BGE W/ CLP SURE-WRAP

## (undated) DEVICE — SOLUTION IRRIG 5L LAC R BG

## (undated) DEVICE — EP VASCULAR PACK: Brand: MEDLINE INDUSTRIES, INC.

## (undated) DEVICE — 3M™ STERI-STRIP™ REINFORCED ADHESIVE SKIN CLOSURES, R1547, 1/2 IN X 4 IN (12 MM X 100 MM), 6 STRIPS/ENVELOPE: Brand: 3M™ STERI-STRIP™

## (undated) DEVICE — DRAPE ARTHRO 90X124IN KNEE SMS FAB EXT FLD COLL PCH RESIST

## (undated) DEVICE — SOLUTION IV 1000ML LAC RINGERS PH 6.5 INJ USP VIAFLX PLAS

## (undated) DEVICE — CATHETER ABLATION PFA LOOP STRL DISP PULSESELECT

## (undated) DEVICE — SOLUTION IV HEPARIN SODIUM SODIUM CHL 0.9% 500 ML INJ VIAFLX

## (undated) DEVICE — Z DISCONTINUED USE 2276105 GOWN PROTCT UNIV CHST W28IN L49IN SL 24IN BLU SPUNBOND FLM

## (undated) DEVICE — KIT CATH EP SURFACE ELECTRODE ENSITE X PATCH

## (undated) DEVICE — CATHETER IV 20GA L1.25IN PNK FEP SFTY STR HUB RADPQ DISP

## (undated) DEVICE — PRESSURE MONITORING SET: Brand: TRUWAVE

## (undated) DEVICE — AMPLATZ EXTRA STIFF WIRE GUIDE: Brand: AMPLATZ

## (undated) DEVICE — CABLE ABLATION CATH INTFACE PULSESELECT